# Patient Record
Sex: FEMALE | Race: WHITE | NOT HISPANIC OR LATINO | Employment: OTHER | URBAN - METROPOLITAN AREA
[De-identification: names, ages, dates, MRNs, and addresses within clinical notes are randomized per-mention and may not be internally consistent; named-entity substitution may affect disease eponyms.]

---

## 2017-01-04 ENCOUNTER — APPOINTMENT (OUTPATIENT)
Dept: PHYSICAL THERAPY | Facility: CLINIC | Age: 72
End: 2017-01-04
Payer: MEDICARE

## 2017-01-04 PROCEDURE — 97140 MANUAL THERAPY 1/> REGIONS: CPT

## 2017-01-04 PROCEDURE — G8991 OTHER PT/OT GOAL STATUS: HCPCS

## 2017-01-04 PROCEDURE — G8990 OTHER PT/OT CURRENT STATUS: HCPCS

## 2017-01-04 PROCEDURE — 97161 PT EVAL LOW COMPLEX 20 MIN: CPT

## 2017-01-16 ENCOUNTER — APPOINTMENT (OUTPATIENT)
Dept: PHYSICAL THERAPY | Facility: CLINIC | Age: 72
End: 2017-01-16
Payer: MEDICARE

## 2017-01-16 PROCEDURE — 97110 THERAPEUTIC EXERCISES: CPT

## 2017-01-16 PROCEDURE — 97140 MANUAL THERAPY 1/> REGIONS: CPT

## 2017-01-18 ENCOUNTER — APPOINTMENT (OUTPATIENT)
Dept: PHYSICAL THERAPY | Facility: CLINIC | Age: 72
End: 2017-01-18
Payer: MEDICARE

## 2017-01-18 PROCEDURE — 97110 THERAPEUTIC EXERCISES: CPT

## 2017-01-18 PROCEDURE — 97140 MANUAL THERAPY 1/> REGIONS: CPT

## 2017-01-23 ENCOUNTER — APPOINTMENT (OUTPATIENT)
Dept: PHYSICAL THERAPY | Facility: CLINIC | Age: 72
End: 2017-01-23
Payer: MEDICARE

## 2017-01-23 PROCEDURE — 97110 THERAPEUTIC EXERCISES: CPT

## 2017-01-23 PROCEDURE — 97140 MANUAL THERAPY 1/> REGIONS: CPT

## 2017-01-25 ENCOUNTER — APPOINTMENT (OUTPATIENT)
Dept: PHYSICAL THERAPY | Facility: CLINIC | Age: 72
End: 2017-01-25
Payer: MEDICARE

## 2017-01-25 PROCEDURE — 97110 THERAPEUTIC EXERCISES: CPT

## 2017-01-25 PROCEDURE — G8991 OTHER PT/OT GOAL STATUS: HCPCS

## 2017-01-25 PROCEDURE — 97140 MANUAL THERAPY 1/> REGIONS: CPT

## 2017-01-25 PROCEDURE — G8992 OTHER PT/OT  D/C STATUS: HCPCS | Performed by: PHYSICAL THERAPIST

## 2017-03-22 ENCOUNTER — APPOINTMENT (OUTPATIENT)
Dept: PHYSICAL THERAPY | Facility: CLINIC | Age: 72
End: 2017-03-22
Payer: MEDICARE

## 2017-03-22 PROCEDURE — G8991 OTHER PT/OT GOAL STATUS: HCPCS

## 2017-03-22 PROCEDURE — 97110 THERAPEUTIC EXERCISES: CPT

## 2017-03-22 PROCEDURE — 97161 PT EVAL LOW COMPLEX 20 MIN: CPT

## 2017-03-22 PROCEDURE — 97140 MANUAL THERAPY 1/> REGIONS: CPT

## 2017-03-22 PROCEDURE — G8990 OTHER PT/OT CURRENT STATUS: HCPCS

## 2017-03-28 ENCOUNTER — APPOINTMENT (OUTPATIENT)
Dept: PHYSICAL THERAPY | Facility: CLINIC | Age: 72
End: 2017-03-28
Payer: MEDICARE

## 2017-03-28 PROCEDURE — G8992 OTHER PT/OT  D/C STATUS: HCPCS

## 2017-03-28 PROCEDURE — G8991 OTHER PT/OT GOAL STATUS: HCPCS

## 2017-03-28 PROCEDURE — 97110 THERAPEUTIC EXERCISES: CPT

## 2017-03-28 PROCEDURE — 97140 MANUAL THERAPY 1/> REGIONS: CPT

## 2017-03-30 ENCOUNTER — APPOINTMENT (OUTPATIENT)
Dept: PHYSICAL THERAPY | Facility: CLINIC | Age: 72
End: 2017-03-30
Payer: MEDICARE

## 2017-09-20 ENCOUNTER — APPOINTMENT (OUTPATIENT)
Dept: PHYSICAL THERAPY | Facility: CLINIC | Age: 72
End: 2017-09-20
Payer: MEDICARE

## 2017-09-20 PROCEDURE — 97161 PT EVAL LOW COMPLEX 20 MIN: CPT

## 2017-09-20 PROCEDURE — 97140 MANUAL THERAPY 1/> REGIONS: CPT

## 2017-09-20 PROCEDURE — G8990 OTHER PT/OT CURRENT STATUS: HCPCS

## 2017-09-20 PROCEDURE — G8991 OTHER PT/OT GOAL STATUS: HCPCS

## 2017-09-25 ENCOUNTER — APPOINTMENT (OUTPATIENT)
Dept: PHYSICAL THERAPY | Facility: CLINIC | Age: 72
End: 2017-09-25
Payer: MEDICARE

## 2017-09-25 PROCEDURE — 97110 THERAPEUTIC EXERCISES: CPT

## 2017-09-25 PROCEDURE — 97140 MANUAL THERAPY 1/> REGIONS: CPT

## 2017-09-29 ENCOUNTER — APPOINTMENT (OUTPATIENT)
Dept: PHYSICAL THERAPY | Facility: CLINIC | Age: 72
End: 2017-09-29
Payer: MEDICARE

## 2017-09-29 PROCEDURE — 97140 MANUAL THERAPY 1/> REGIONS: CPT

## 2017-09-29 PROCEDURE — G8992 OTHER PT/OT  D/C STATUS: HCPCS

## 2017-09-29 PROCEDURE — G8991 OTHER PT/OT GOAL STATUS: HCPCS

## 2017-12-10 ENCOUNTER — OFFICE VISIT (OUTPATIENT)
Dept: URGENT CARE | Facility: CLINIC | Age: 72
End: 2017-12-10
Payer: MEDICARE

## 2017-12-10 ENCOUNTER — APPOINTMENT (EMERGENCY)
Dept: CT IMAGING | Facility: HOSPITAL | Age: 72
End: 2017-12-10
Payer: MEDICARE

## 2017-12-10 ENCOUNTER — HOSPITAL ENCOUNTER (EMERGENCY)
Facility: HOSPITAL | Age: 72
Discharge: HOME | End: 2017-12-10
Payer: MEDICARE

## 2017-12-10 VITALS
RESPIRATION RATE: 16 BRPM | HEIGHT: 60 IN | OXYGEN SATURATION: 98 % | WEIGHT: 140 LBS | BODY MASS INDEX: 27.48 KG/M2 | DIASTOLIC BLOOD PRESSURE: 74 MMHG | HEART RATE: 75 BPM | TEMPERATURE: 97.6 F | SYSTOLIC BLOOD PRESSURE: 179 MMHG

## 2017-12-10 DIAGNOSIS — R42 LIGHTHEADEDNESS: ICD-10-CM

## 2017-12-10 DIAGNOSIS — R11.2 NAUSEA AND VOMITING: Primary | ICD-10-CM

## 2017-12-10 DIAGNOSIS — R19.7 DIARRHEA: ICD-10-CM

## 2017-12-10 LAB
ALBUMIN SERPL BCP-MCNC: 4.5 G/DL (ref 3.5–5)
ALP SERPL-CCNC: 50 U/L (ref 46–116)
ALT SERPL W P-5'-P-CCNC: 48 U/L (ref 12–78)
AMORPH PHOS CRY URNS QL MICRO: ABNORMAL /HPF
ANION GAP SERPL CALCULATED.3IONS-SCNC: 10 MMOL/L (ref 4–13)
AST SERPL W P-5'-P-CCNC: 28 U/L (ref 5–45)
BACTERIA UR QL AUTO: ABNORMAL /HPF
BASOPHILS # BLD AUTO: 0.02 THOUSANDS/ΜL (ref 0–0.1)
BASOPHILS NFR BLD AUTO: 0 % (ref 0–1)
BILIRUB SERPL-MCNC: 0.5 MG/DL (ref 0.2–1)
BILIRUB UR QL STRIP: NEGATIVE
BUN SERPL-MCNC: 17 MG/DL (ref 5–25)
CALCIUM SERPL-MCNC: 9.3 MG/DL (ref 8.3–10.1)
CHLORIDE SERPL-SCNC: 101 MMOL/L (ref 100–108)
CLARITY UR: ABNORMAL
CO2 SERPL-SCNC: 29 MMOL/L (ref 21–32)
COLOR UR: YELLOW
CREAT SERPL-MCNC: 0.71 MG/DL (ref 0.6–1.3)
EOSINOPHIL # BLD AUTO: 0.02 THOUSAND/ΜL (ref 0–0.61)
EOSINOPHIL NFR BLD AUTO: 0 % (ref 0–6)
ERYTHROCYTE [DISTWIDTH] IN BLOOD BY AUTOMATED COUNT: 15.2 % (ref 11.6–15.1)
GFR SERPL CREATININE-BSD FRML MDRD: 85 ML/MIN/1.73SQ M
GLUCOSE SERPL-MCNC: 124 MG/DL (ref 65–140)
GLUCOSE UR STRIP-MCNC: NEGATIVE MG/DL
HCT VFR BLD AUTO: 44 % (ref 34.8–46.1)
HGB BLD-MCNC: 15.3 G/DL (ref 11.5–15.4)
HGB UR QL STRIP.AUTO: ABNORMAL
KETONES UR STRIP-MCNC: NEGATIVE MG/DL
LEUKOCYTE ESTERASE UR QL STRIP: NEGATIVE
LIPASE SERPL-CCNC: 146 U/L (ref 73–393)
LYMPHOCYTES # BLD AUTO: 1.15 THOUSANDS/ΜL (ref 0.6–4.47)
LYMPHOCYTES NFR BLD AUTO: 13 % (ref 14–44)
MCH RBC QN AUTO: 31.1 PG (ref 26.8–34.3)
MCHC RBC AUTO-ENTMCNC: 34.8 G/DL (ref 31.4–37.4)
MCV RBC AUTO: 89 FL (ref 82–98)
MONOCYTES # BLD AUTO: 0.09 THOUSAND/ΜL (ref 0.17–1.22)
MONOCYTES NFR BLD AUTO: 1 % (ref 4–12)
NEUTROPHILS # BLD AUTO: 7.5 THOUSANDS/ΜL (ref 1.85–7.62)
NEUTS SEG NFR BLD AUTO: 86 % (ref 43–75)
NITRITE UR QL STRIP: NEGATIVE
NON-SQ EPI CELLS URNS QL MICRO: ABNORMAL /HPF
PH UR STRIP.AUTO: 8 [PH] (ref 4.5–8)
PLATELET # BLD AUTO: 307 THOUSANDS/UL (ref 149–390)
PMV BLD AUTO: 10 FL (ref 8.9–12.7)
POTASSIUM SERPL-SCNC: 3.7 MMOL/L (ref 3.5–5.3)
PROT SERPL-MCNC: 8.1 G/DL (ref 6.4–8.2)
PROT UR STRIP-MCNC: NEGATIVE MG/DL
RBC # BLD AUTO: 4.92 MILLION/UL (ref 3.81–5.12)
RBC #/AREA URNS AUTO: ABNORMAL /HPF
SODIUM SERPL-SCNC: 140 MMOL/L (ref 136–145)
SP GR UR STRIP.AUTO: 1.01 (ref 1–1.03)
TROPONIN I SERPL-MCNC: <0.02 NG/ML
UROBILINOGEN UR QL STRIP.AUTO: 0.2 E.U./DL
WBC # BLD AUTO: 8.78 THOUSAND/UL (ref 4.31–10.16)
WBC #/AREA URNS AUTO: ABNORMAL /HPF

## 2017-12-10 PROCEDURE — G0463 HOSPITAL OUTPT CLINIC VISIT: HCPCS

## 2017-12-10 PROCEDURE — 96361 HYDRATE IV INFUSION ADD-ON: CPT

## 2017-12-10 PROCEDURE — 36415 COLL VENOUS BLD VENIPUNCTURE: CPT | Performed by: PHYSICIAN ASSISTANT

## 2017-12-10 PROCEDURE — 93005 ELECTROCARDIOGRAM TRACING: CPT | Performed by: PHYSICIAN ASSISTANT

## 2017-12-10 PROCEDURE — 83690 ASSAY OF LIPASE: CPT | Performed by: PHYSICIAN ASSISTANT

## 2017-12-10 PROCEDURE — 93005 ELECTROCARDIOGRAM TRACING: CPT

## 2017-12-10 PROCEDURE — 70450 CT HEAD/BRAIN W/O DYE: CPT

## 2017-12-10 PROCEDURE — 99284 EMERGENCY DEPT VISIT MOD MDM: CPT

## 2017-12-10 PROCEDURE — 84484 ASSAY OF TROPONIN QUANT: CPT | Performed by: PHYSICIAN ASSISTANT

## 2017-12-10 PROCEDURE — 96374 THER/PROPH/DIAG INJ IV PUSH: CPT

## 2017-12-10 PROCEDURE — 80053 COMPREHEN METABOLIC PANEL: CPT | Performed by: PHYSICIAN ASSISTANT

## 2017-12-10 PROCEDURE — 85025 COMPLETE CBC W/AUTO DIFF WBC: CPT | Performed by: PHYSICIAN ASSISTANT

## 2017-12-10 PROCEDURE — 81001 URINALYSIS AUTO W/SCOPE: CPT | Performed by: PHYSICIAN ASSISTANT

## 2017-12-10 PROCEDURE — 99213 OFFICE O/P EST LOW 20 MIN: CPT

## 2017-12-10 RX ORDER — ACETAMINOPHEN 325 MG/1
650 TABLET ORAL ONCE
Status: COMPLETED | OUTPATIENT
Start: 2017-12-10 | End: 2017-12-10

## 2017-12-10 RX ORDER — MULTIVIT-MIN/IRON FUM/FOLIC AC 7.5 MG-4
1 TABLET ORAL DAILY
COMMUNITY

## 2017-12-10 RX ORDER — MELOXICAM 7.5 MG/5ML
SUSPENSION ORAL
COMMUNITY
End: 2022-02-17

## 2017-12-10 RX ORDER — ROSUVASTATIN CALCIUM 5 MG/1
2.5 TABLET, COATED ORAL DAILY
COMMUNITY
End: 2022-03-24

## 2017-12-10 RX ORDER — ONDANSETRON 4 MG/1
4 TABLET, ORALLY DISINTEGRATING ORAL EVERY 4 HOURS PRN
Qty: 15 TABLET | Refills: 0 | Status: SHIPPED | OUTPATIENT
Start: 2017-12-10 | End: 2022-02-17

## 2017-12-10 RX ORDER — ONDANSETRON 2 MG/ML
4 INJECTION INTRAMUSCULAR; INTRAVENOUS ONCE
Status: COMPLETED | OUTPATIENT
Start: 2017-12-10 | End: 2017-12-10

## 2017-12-10 RX ADMIN — ACETAMINOPHEN 650 MG: 325 TABLET ORAL at 19:14

## 2017-12-10 RX ADMIN — ONDANSETRON 4 MG: 2 INJECTION INTRAMUSCULAR; INTRAVENOUS at 18:43

## 2017-12-10 RX ADMIN — SODIUM CHLORIDE 1000 ML: 0.9 INJECTION, SOLUTION INTRAVENOUS at 18:43

## 2017-12-10 NOTE — ED PROVIDER NOTES
History  Chief Complaint   Patient presents with    Nausea     N/V/D since 11am this am approx 5 episodes of both  Presented to urgent care and was sent here for treatment   Diarrhea     Patient presents to the ED with lightheadedness, nausea, vomiting, and diarrhea that started this morning  Patient states she ate breakfast this morning, but shortly after started with nausea, vomiting, diarrhea  She denies any sick contacts, foreign travel, recent antibiotic use  She states she made lamb soup last night, no one else ate it, but doesn't think it was bad  She denies any abdominal pain  She denies any blood in stool or vomit  She went to the urgent care today, and they sent her to the ED  PMH significant for hyperlipidemia  Patient states her BP is normally in the 120s  Patient denies headache or visual changes  Patient states standing up makes her lightheadedness worse  History provided by:  Patient  Nausea   The primary symptoms include nausea, vomiting and diarrhea  Primary symptoms do not include fever, abdominal pain or rash  The illness began today  The problem has not changed since onset  The illness is also significant for chills  The illness does not include bloating, constipation or back pain  Diarrhea   Associated symptoms: chills and vomiting    Associated symptoms: no abdominal pain, no fever and no headaches        Prior to Admission Medications   Prescriptions Last Dose Informant Patient Reported? Taking?    ASPIRIN 81 PO   Yes No   Sig: Take by mouth   Cholecalciferol (VITAMIN D PO)   Yes Yes   Sig: Take by mouth   Glucosamine-Chondroitin--400-375 MG TABS   Yes No   Sig: Take by mouth   Meloxicam 7 5 MG/5ML SUSP   Yes No   Sig: Take by mouth   Mirabegron ER (MYRBETRIQ) 50 MG TB24   Yes No   Sig: Take by mouth   Multiple Vitamins-Minerals (MULTIVITAMIN WITH MINERALS) tablet   Yes Yes   Sig: Take 1 tablet by mouth daily   rosuvastatin (CRESTOR) 5 mg tablet   Yes No   Sig: Take 2 5 mg by mouth      Facility-Administered Medications: None       Past Medical History:   Diagnosis Date    Hyperlipidemia     Overactive bladder        Past Surgical History:   Procedure Laterality Date    HYSTERECTOMY      REPLACEMENT TOTAL KNEE         History reviewed  No pertinent family history  I have reviewed and agree with the history as documented  Social History   Substance Use Topics    Smoking status: Former Smoker    Smokeless tobacco: Never Used    Alcohol use Yes      Comment: occasional        Review of Systems   Constitutional: Positive for chills  Negative for fever  HENT: Negative for facial swelling and trouble swallowing  Eyes: Negative for visual disturbance  Respiratory: Negative for shortness of breath  Cardiovascular: Negative for chest pain and leg swelling  Gastrointestinal: Positive for diarrhea, nausea and vomiting  Negative for abdominal pain, bloating, blood in stool and constipation  Musculoskeletal: Negative for back pain and neck pain  Skin: Negative for color change and rash  Neurological: Positive for dizziness and light-headedness  Negative for tremors, seizures, syncope, facial asymmetry, speech difficulty, weakness, numbness and headaches  Psychiatric/Behavioral: Negative for confusion  All other systems reviewed and are negative  Physical Exam  ED Triage Vitals [12/10/17 1803]   Temperature Pulse Respirations Blood Pressure SpO2   97 6 °F (36 4 °C) 75 16 (!) 205/93 99 %      Temp Source Heart Rate Source Patient Position - Orthostatic VS BP Location FiO2 (%)   Temporal Monitor Lying Left arm --      Pain Score       No Pain           Orthostatic Vital Signs  Vitals:    12/10/17 1830 12/10/17 1845 12/10/17 1900 12/10/17 1915   BP: (!) 182/79 170/80 138/63    Pulse: 81 75 81 80   Patient Position - Orthostatic VS:           Physical Exam   Constitutional: She is oriented to person, place, and time   She appears well-developed and well-nourished  She is active and cooperative  She does not appear ill  No distress  HENT:   Head: Normocephalic and atraumatic  Right Ear: Hearing normal    Left Ear: Hearing normal    Nose: Nose normal    Mouth/Throat: Mucous membranes are pale and dry  No posterior oropharyngeal edema or posterior oropharyngeal erythema  Eyes: Conjunctivae and lids are normal  Pupils are equal, round, and reactive to light  Neck: Normal range of motion  No spinous process tenderness and no muscular tenderness present  Cardiovascular: Normal rate, regular rhythm and normal heart sounds  No murmur heard  Pulmonary/Chest: Effort normal and breath sounds normal  She has no wheezes  She has no rhonchi  She has no rales  Abdominal: Soft  Normal appearance  Bowel sounds are increased  There is no tenderness  Musculoskeletal: Normal range of motion  She exhibits no edema or deformity  Neurological: She is alert and oriented to person, place, and time  She has normal strength  No sensory deficit  Gait normal    Skin: Skin is warm and dry  No rash noted  She is not diaphoretic  No pallor  Psychiatric: She has a normal mood and affect  Her speech is normal  Cognition and memory are normal    Nursing note and vitals reviewed        ED Medications  Medications   sodium chloride 0 9 % bolus 1,000 mL (1,000 mL Intravenous New Bag 12/10/17 1843)   ondansetron (ZOFRAN) injection 4 mg (4 mg Intravenous Given 12/10/17 1843)   acetaminophen (TYLENOL) tablet 650 mg (650 mg Oral Given 12/10/17 1914)       Diagnostic Studies  Results Reviewed     Procedure Component Value Units Date/Time    Troponin I [[id number]]  (Normal) Collected:  12/10/17 1833    Lab Status:  Final result Specimen:  Blood from Arm, Right Updated:  12/10/17 1912     Troponin I <0 02 ng/mL     Narrative:         [employer] Chemistry analyzer 99% cutoff is > 0 04 ng/mL in network labs    o cTnI 99% cutoff is useful only when applied to patients in the clinical setting of myocardial ischemia  o cTnI 99% cutoff should be interpreted in the context of clinical history, ECG findings and possibly cardiac imaging to establish correct diagnosis  o cTnI 99% cutoff may be suggestive but clearly not indicative of a coronary event without the clinical setting of myocardial ischemia  Comprehensive metabolic panel [[id number]] Collected:  12/10/17 1817    Lab Status:  Final result Specimen:  Blood from Arm, Right Updated:  12/10/17 1911     Sodium 140 mmol/L      Potassium 3 7 mmol/L      Chloride 101 mmol/L      CO2 29 mmol/L      Anion Gap 10 mmol/L      BUN 17 mg/dL      Creatinine 0 71 mg/dL      Glucose 124 mg/dL      Calcium 9 3 mg/dL      AST 28 U/L      ALT 48 U/L      Alkaline Phosphatase 50 U/L      Total Protein 8 1 g/dL      Albumin 4 5 g/dL      Total Bilirubin 0 50 mg/dL      eGFR 85 ml/min/1 73sq m     Narrative:         National Kidney Disease Education Program recommendations are as follows:  GFR calculation is accurate only with a steady state creatinine  Chronic Kidney disease less than 60 ml/min/1 73 sq  meters  Kidney failure less than 15 ml/min/1 73 sq  meters      Lipase [[id number]]  (Normal) Collected:  12/10/17 1817    Lab Status:  Final result Specimen:  Blood from Arm, Right Updated:  12/10/17 1911     Lipase 146 u/L     Urine Microscopic [[id number]]  (Abnormal) Collected:  12/10/17 1837    Lab Status:  Final result Specimen:  Urine from Urine, Clean Catch Updated:  12/10/17 1908     RBC, UA 2-4 (A) /hpf      WBC, UA 2-4 (A) /hpf      Epithelial Cells Occasional /hpf      Bacteria, UA Occasional /hpf      AMORPH PHOSPATES Moderate /hpf     UA w Reflex to Microscopic w Reflex to Culture [[id number]]  (Abnormal) Collected:  12/10/17 1837    Lab Status:  Final result Specimen:  Urine from Urine, Clean Catch Updated:  12/10/17 1855     Color, UA Yellow     Clarity, UA Cloudy     Specific Gravity, UA 1 015     pH, UA 8 0     Leukocytes, UA Negative     Nitrite, UA Negative     Protein, UA Negative mg/dl      Glucose, UA Negative mg/dl      Ketones, UA Negative mg/dl      Urobilinogen, UA 0 2 E U /dl      Bilirubin, UA Negative     Blood, UA Trace-Intact (A)    CBC and differential [[id number]]  (Abnormal) Collected:  12/10/17 1817    Lab Status:  Final result Specimen:  Blood from Arm, Right Updated:  12/10/17 1852     WBC 8 78 Thousand/uL      RBC 4 92 Million/uL      Hemoglobin 15 3 g/dL      Hematocrit 44 0 %      MCV 89 fL      MCH 31 1 pg      MCHC 34 8 g/dL      RDW 15 2 (H) %      MPV 10 0 fL      Platelets 712 Thousands/uL      Neutrophils Relative 86 (H) %      Lymphocytes Relative 13 (L) %      Monocytes Relative 1 (L) %      Eosinophils Relative 0 %      Basophils Relative 0 %      Neutrophils Absolute 7 50 Thousands/µL      Lymphocytes Absolute 1 15 Thousands/µL      Monocytes Absolute 0 09 (L) Thousand/µL      Eosinophils Absolute 0 02 Thousand/µL      Basophils Absolute 0 02 Thousands/µL                  CT head without contrast   Final Result by Charly Moore MD (12/10 2005)      No acute intracranial abnormality  Workstation performed: [id number]                    Procedures  ECG 12 Lead Documentation  Date/Time: 12/10/2017 6:51 PM  Performed by: Hannah Deshpande by: Yani Arita     Indications / Diagnosis:  Lightheaded, n/v  Patient location:  ED  Previous ECG:     Previous ECG:  Unavailable  Interpretation:     Interpretation: abnormal    Rate:     ECG rate:  79  Rhythm:     Rhythm: sinus rhythm    Conduction:     Conduction: normal    ST segments:     ST segments:  Non-specific    Depression:  V4, V5, V6, V3 and I           Phone Contacts  ED Phone Contact    ED Course  ED Course as of Dec 10 2015   Sun Dec 10, 2017   1908 Patient states she is feeling a little better, but now has a headache  2006 Patient feeling better, able to ambulate without difficulty                                   MDM  Number of Diagnoses or Management Options  Diarrhea: new and requires workup  Lightheadedness: new and requires workup  Nausea and vomiting: new and requires workup  Diagnosis management comments: Patient with nausea and vomiting and HTN upon arrival, will check labs, trop, ekg  Patient did improve after zofran, but developed a headache, will order CT to r/o brain hemorrhage since she was HTN  The HTN did improve in the ED, patient able to tolerate po  Advised patient to f/u with PCP in 2 days for recheck of BP and to return to ER if symptoms worsen  Amount and/or Complexity of Data Reviewed  Clinical lab tests: ordered and reviewed  Tests in the radiology section of CPT®: ordered and reviewed    Patient Progress  Patient progress: improved    CritCare Time    Disposition  Final diagnoses:   Nausea and vomiting   Diarrhea   Lightheadedness     Time reflects when diagnosis was documented in both MDM as applicable and the Disposition within this note     Time User Action Codes Description Comment    12/10/2017  8:09 PM Lenon Kiss Add [R11 2] Nausea and vomiting     12/10/2017  8:09 PM Lenon Kiss Add [R19 7] Diarrhea     12/10/2017  8:09 PM Lenon Kiss Add [R42] [facility]       ED Disposition     ED Disposition Condition Comment    Discharge  Alina Velarde discharge to home/self care  Condition at discharge: Stable        Follow-up Information     Follow up With Specialties Details Why Contact Info    Alyce Cabrera MD Internal Medicine In 2 days For recheck [address] [facility]  [phone]          Patient's Medications   Discharge Prescriptions    ONDANSETRON (ZOFRAN-ODT) 4 MG DISINTEGRATING TABLET    Take 1 tablet by mouth every 4 (four) hours as needed for nausea       Start Date: 12/10/2017End Date: --       Order Dose: 4 mg       Quantity: 15 tablet    Refills: 0     No discharge procedures on file      ED Provider  Electronically Signed by           Leighann Westfall GENTRY  12/10/17 2015

## 2017-12-11 NOTE — PROGRESS NOTES
Assessment    1  Elevated blood pressure reading (796 2) (R03 0)   2  Dizziness (780 4) (R42)    Plan  Dizziness, Elevated blood pressure reading    · *1 - [facility] (EMERGENCY MEDICINE ) Co-Management  *  Status: Hold For -Scheduling  Requested for: [id number]  Care Summary provided  : Yes    Chief Complaint    1  Dizziness  Chief Complaint Free Text Note Form: Dizziness, N/V/D that started this morning  Denies new medications or and changes to medications  No new diet  180/90  Denies weakness, arm pain, chest pain, blurry vision or spots, being stressed  Does have light sensitivity  Has good  a symmetric smile  History of Present Illness  HPI: Patient presents with dizziness, nausea, vomiting, diarrhea since 10:00 a m  this morning  She has had 5 episodes of vomiting and diarrhea each  She has had dizziness that tends to be worse with positional change  Her blood pressure in the office is 180/90  she does not have a history of hypertension  She denies any headache, numbness, tingling, slurred speech, disorientation or confusion  She denies chest pain or shortness of breath, no palpitations or syncope  patient denies URI symptoms, no cough  No dysuria, urgency, frequency  Hospital Based Practices Required Assessment:   Prefered Language is  english  Primary Language is  english  Review of Systems  Focused-Female:  Constitutional: as noted in HPI   ENT: no ear ache, no loss of hearing, no nosebleeds or nasal discharge, no sore throat or hoarseness  Cardiovascular: no complaints of slow or fast heart rate, no chest pain, no palpitations, no leg claudication or lower extremity edema  Respiratory: no complaints of shortness of breath, no wheezing, no dyspnea on exertion, no orthopnea or PND  Gastrointestinal: as noted in HPI  Genitourinary: no complaints of dysuria, no incontinence, no pelvic pain, no dysmenorrhea, no vaginal discharge or abnormal vaginal bleeding    Neurological: as noted in HPI       Past Medical History  1  History of high cholesterol (V12 29) (Z86 39)    Social History   · Never a smoker    Surgical History    1  History of Knee Surgery    Current Meds   1  Aspirin 81 MG CAPS; Therapy: (Recorded:[id number]) to Recorded   2  Crestor 5 MG Oral Tablet; Therapy: (Recorded:[id number]) to Recorded   3  Meloxicam 7 5 MG/5ML SUSP; Therapy: (Recorded:[id number]) to Recorded   4  Myrbetriq 50 MG Oral Tablet Extended Release 24 Hour; Therapy: (Recorded:[id number]) to Recorded   5  Triple Flex [phone] MG Oral Tablet; Therapy: (Recorded:[id number]) to Recorded    Allergies    1  Wellbutrin SR TB12    Vitals  Signs   Recorded: [id number] 05:20PM   Temperature: 97 1 F  Heart Rate: 64  Respiration: 16  Systolic: 718  Diastolic: 90  Height: 5 ft   Weight: 143 lb   BMI Calculated: 27 93  BSA Calculated: 1 62  O2 Saturation: 99    Physical Exam   Constitutional  General appearance: No acute distress, well appearing and well nourished  Eyes  Conjunctiva and lids: No swelling, erythema or discharge  Pupils and irises: Equal, round and reactive to light  Ears, Nose, Mouth, and Throat  External inspection of ears and nose: Normal    Otoscopic examination: Tympanic membranes translucent with normal light reflex  Canals patent without erythema  Nasal mucosa, septum, and turbinates: Normal without edema or erythema  Oropharynx: Normal with no erythema, edema, exudate or lesions  Pulmonary  Respiratory effort: No increased work of breathing or signs of respiratory distress  Auscultation of lungs: Clear to auscultation  Cardiovascular  Auscultation of heart: Normal rate and rhythm, normal S1 and S2, without murmurs  Examination of extremities for edema and/or varicosities: Normal    Abdomen  Abdomen: Non-tender, no masses  Liver and spleen: No hepatomegaly or splenomegaly  Lymphatic  Palpation of lymph nodes in neck: No lymphadenopathy     Neurologic  Cranial nerves: Cranial nerves 2-12 intact  Psychiatric  Orientation to person, place, and time: Normal    Additional Exam:  Mild tenting centrally        Signatures   Electronically signed by : Layne Wu DO; Dec 10 2017  6:05PM EST                       (Author)

## 2017-12-11 NOTE — DISCHARGE INSTRUCTIONS
Rest, increase fluids  Zofran as needed for nausea  Slowly advance foods  Follow up with family doctor in 1-2 days for recheck of BP  Return to ER if symptoms worsen  Acute Nausea and Vomiting, Ambulatory Care   GENERAL INFORMATION:   Acute nausea and vomiting  starts suddenly, gets worse quickly, and lasts a short time  Nausea and vomiting may be caused by pregnancy, alcohol, infection, or medicines  Common related symptoms include the following:   · Fever    · Abdominal swelling    · Pain, tenderness, or a lump in the abdomen    · Splashing sounds heard in your stomach when you move  Seek immediate care for the following symptoms:   · Blood in your vomit or bowel movements    · Sudden, severe pain in your chest and upper abdomen after hard vomiting    · Dizziness, dry mouth, and thirst    · Urinating very little or not at all    · Muscle weakness, leg cramps, and trouble breathing    · A heart beat that is faster than normal    · Vomiting for more than 48 hours  Treatment for acute nausea and vomiting  may include medicines to calm your stomach and stop the vomiting  You may need IV fluids if you are dehydrated  Manage your nausea and vomiting:   · Drink liquids as directed to prevent dehydration  Ask how much liquid to drink each day and which liquids are best for you  You may need to drink an oral rehydration solution (ORS)  ORS contains water, salts, and sugar that are needed to replace the lost body fluids  Ask what kind of ORS to use, how much to drink, and where to get it  · Eat smaller meals, more often  Eat small amounts of food every 2 to 3 hours, even if you are not hungry  Food in your stomach may help decrease your nausea  · Avoid stress  Find ways to relax and manage your stress  Headaches due to stress may cause nausea and vomiting  Get more rest and sleep    Follow up with your healthcare provider as directed:  Write down your questions so you remember to ask them during your visits  CARE AGREEMENT:   You have the right to help plan your care  Learn about your health condition and how it may be treated  Discuss treatment options with your caregivers to decide what care you want to receive  You always have the right to refuse treatment  The above information is an  only  It is not intended as medical advice for individual conditions or treatments  Talk to your doctor, nurse or pharmacist before following any medical regimen to see if it is safe and effective for you  © 2014 [address] is for End User's use only and may not be sold, redistributed or otherwise used for commercial purposes  All illustrations and images included in CareNotes® are the copyrighted property of A D A M [employer] Inc  or Yamil Marvin  Acute Diarrhea   WHAT YOU NEED TO KNOW:   Acute diarrhea starts quickly and lasts a short time, usually 1 to 3 days  It can last up to 2 weeks  You may not be able to control your diarrhea  Acute diarrhea usually stops on its own  DISCHARGE INSTRUCTIONS:   Return to the emergency department if:   · You feel confused  · Your heartbeat is faster than normal      · Your eyes look deeply sunken, or you have no tears when you cry  · You urinate less than usual, or your urine is dark yellow  · You have blood or mucus in your stools  · You have severe abdominal pain  · You are unable to drink any liquids  Contact your healthcare provider if:   · Your symptoms do not get better with treatment  · You have a fever higher than 101 3°F (38 5°C)  · You have trouble eating and drinking because you are vomiting  · You are thirsty or have a dry mouth  · Your diarrhea does not get better in 7 days  · You have questions or concerns about your condition or care  Follow up with your healthcare provider as directed:  Write down your questions so you remember to ask them during your visits  Medicines:  · Diarrhea medicine  is an over-the-counter medicine that helps slow or stop your diarrhea  If you take other medicines, talk to your healthcare provider before you take diarrhea medicine  · Antibiotics  may be given to help treat an infection caused by bacteria  · Antiparasitics  may be given to treat an infection caused by parasites  · Take your medicine as directed  Contact your healthcare provider if you think your medicine is not helping or if you have side effects  Tell him of her if you are allergic to any medicine  Keep a list of the medicines, vitamins, and herbs you take  Include the amounts, and when and why you take them  Bring the list or the pill bottles to follow-up visits  Carry your medicine list with you in case of an emergency  Self-care:   · Drink liquids as directed  Liquids will help prevent dehydration caused by diarrhea  Ask your healthcare provider how much liquid to drink each day and which liquids are best for you  You may need to drink an oral rehydration solution (ORS)  An ORS has the right amounts of water, salts, and sugar you need to replace body fluids  You can buy an ORS at most grocery stores and pharmacies  · Eat foods that are easy to digest   Examples include rice, lentils, cereal, bananas, potatoes, and bread  It also includes some fruits (bananas, melon), well-cooked vegetables, and lean meats  Avoid foods high in fiber, fat, and sugar  Also avoid caffeine, alcohol, dairy, and red meat until your diarrhea is gone  Prevent acute diarrhea:   · Wash your hands often  Use soap and water  Wash your hands before you eat or prepare food  Also wash your hands after you use the bathroom  Use an alcohol-based hand gel when soap and water are not available  · Keep bathroom surfaces clean  This helps prevent the spread of germs that cause acute diarrhea  · Wash fruits and vegetables well before you eat them    This can help remove germs that cause diarrhea  If possible, remove the skin from fruits and vegetables, or cook them well before you eat them  · Cook meat as directed  ¨ Cook ground meat  to 160°F      ¨ Cook ground poultry, whole poultry, or cuts of poultry  to at least 165°F  Remove the meat from heat  Let it stand for 3 minutes before you eat it  ¨ Cook whole cuts of meat other than poultry  to at least 145°F  Remove the meat from heat  Let it stand for 3 minutes before you eat it  · Wash dishes that have touched raw meat with hot water and soap  This includes cutting boards, utensils, dishes, and serving containers  · Place raw or cooked meat in the refrigerator as soon as possible  Bacteria can grow in meat that is left at room temperature too long  · Do not eat raw or undercooked oysters, clams, or mussels  These foods may be contaminated and cause infection  · Drink filtered or treated water only when you travel  Do not put ice in your drinks  Drink bottled water whenever possible  © 2017 [facility] Information is for End User's use only and may not be sold, redistributed or otherwise used for commercial purposes  All illustrations and images included in CareNotes® are the copyrighted property of A D A M , Inc  or Yamil Wong  The above information is an  only  It is not intended as medical advice for individual conditions or treatments  Talk to your doctor, nurse or pharmacist before following any medical regimen to see if it is safe and effective for you  Dizziness   AMBULATORY CARE:   Dizziness  is a feeling of being off balance or unsteady  Common causes of dizziness are an inner ear fluid imbalance or a lack of oxygen in your blood  Dizziness may be acute (lasts 3 days or less) or chronic (lasts longer than 3 days)  You may have dizzy spells that last from seconds to a few hours     Common symptoms that may happen with dizziness:   · A feeling that your surroundings are moving even though you are standing still    · Ringing in your ears or hearing loss     · Feeling faint or lightheaded     · Weakness or unsteadiness     · Double vision or eye movements you cannot control    · Nausea or vomiting     · Confusion  Seek care immediately if:   · You have a headache and a stiff neck  · You have shaking chills and a fever  · You vomit over and over with no relief  · Your vomit or bowel movements are red or black  · You have pain in your chest, back, or abdomen  · You have numbness, especially in your face, arms, or legs  · You have trouble moving your arms or legs  · You are confused  Contact your healthcare provider if:   · You have a fever  · Your symptoms do not get better with treatment  · You have questions or concerns about your condition or care  Treatment for dizziness  depends on the cause  Your healthcare provider may give you oxygen or medicines to decrease your dizziness and nausea  He may also refer you to a specialist  Eugene Woodlawn may need to be admitted to the hospital for treatment  Manage your symptoms:   · Do not drive  or operate heavy machinery when you are dizzy  · Get up slowly  from sitting or lying down  · Drink plenty of liquids  Liquids help prevent dehydration  Ask how much liquid to drink each day and which liquids are best for you  Follow up with your healthcare provider as directed:  Write down your questions so you remember to ask them during your visits  © 2017 [facility] Information is for End User's use only and may not be sold, redistributed or otherwise used for commercial purposes  All illustrations and images included in CareNotes® are the copyrighted property of A D A M , Inc  or [address]  The above information is an  only  It is not intended as medical advice for individual conditions or treatments   Talk to your doctor, nurse or pharmacist before following any medical regimen to see if it is safe and effective for you

## 2018-01-04 LAB
ATRIAL RATE: 73 BPM
P AXIS: 73 DEGREES
PR INTERVAL: 174 MS
QRS AXIS: 86 DEGREES
QRSD INTERVAL: 86 MS
QT INTERVAL: 396 MS
QTC INTERVAL: 436 MS
T WAVE AXIS: 79 DEGREES
VENTRICULAR RATE: 73 BPM

## 2018-01-23 VITALS
HEART RATE: 64 BPM | OXYGEN SATURATION: 99 % | WEIGHT: 143 LBS | HEIGHT: 60 IN | RESPIRATION RATE: 16 BRPM | DIASTOLIC BLOOD PRESSURE: 90 MMHG | TEMPERATURE: 97.1 F | BODY MASS INDEX: 28.07 KG/M2 | SYSTOLIC BLOOD PRESSURE: 180 MMHG

## 2018-04-25 ENCOUNTER — TRANSCRIBE ORDERS (OUTPATIENT)
Dept: PHYSICAL THERAPY | Facility: CLINIC | Age: 73
End: 2018-04-25

## 2018-04-25 ENCOUNTER — EVALUATION (OUTPATIENT)
Dept: PHYSICAL THERAPY | Facility: CLINIC | Age: 73
End: 2018-04-25
Payer: MEDICARE

## 2018-04-25 DIAGNOSIS — M25.512 ARTHRALGIA OF SHOULDER REGION, LEFT: Primary | ICD-10-CM

## 2018-04-25 DIAGNOSIS — M25.512 PAIN IN JOINT OF LEFT SHOULDER: Primary | ICD-10-CM

## 2018-04-25 PROCEDURE — 97110 THERAPEUTIC EXERCISES: CPT | Performed by: PHYSICAL THERAPIST

## 2018-04-25 PROCEDURE — G8984 CARRY CURRENT STATUS: HCPCS | Performed by: PHYSICAL THERAPIST

## 2018-04-25 PROCEDURE — 97140 MANUAL THERAPY 1/> REGIONS: CPT | Performed by: PHYSICAL THERAPIST

## 2018-04-25 PROCEDURE — G8985 CARRY GOAL STATUS: HCPCS | Performed by: PHYSICAL THERAPIST

## 2018-04-25 PROCEDURE — 97161 PT EVAL LOW COMPLEX 20 MIN: CPT | Performed by: PHYSICAL THERAPIST

## 2018-04-25 NOTE — LETTER
2018    Luann Mayer MD  [address] [id number]    Patient: Evon Blandon   YOB: 1945   Date of Visit: 2018     Encounter Diagnosis     ICD-10-CM    1  Pain in joint of left shoulder M25 512        Dear Dr Braxton Green:    Please review the attached Plan of Care from [facility] recent visit  Please verify that you agree therapy should continue by signing the attached document and sending it back to our office  If you have any questions or concerns, please don't hesitate to call  Sincerely,    Sanya Yanes, PT      Referring Provider:      I certify that I have read the below Plan of Care and certify the need for these services furnished under this plan of treatment while under my care  Luann Mayer MD  [address]: [phone]          PT Evaluation     Today's date: 2018  Patient name: Evon Blandon  : 1945  MRN: [id number]  Referring provider: Marco Antonio Luna MD  Dx:   Encounter Diagnosis     ICD-10-CM    1  Pain in joint of left shoulder M25 512                   Assessment  Impairments: abnormal or restricted ROM, activity intolerance, impaired physical strength, lacks appropriate home exercise program and pain with function    Assessment details: Evon Blandon is a 67 y o  female presenting to outpatient physical therapy at [place] [address] with complaints of L shoulder pain  She presents with decreased range of motion, decreased strength, limited flexibility, poor postural awareness, decreased tolerance to activity and decreased functional mobility due to Pain in joint of left shoulder  She would benefit from skilled PT services in order to address these deficits and reach maximum level of function  Thank you for the referral!  Barriers to therapy: None  Understanding of Dx/Px/POC: excellent  Goals  ST  Independent with HEP in 2 weeks  2    Increase ROM L shoulder to WNL all motions degrees in 3 weeks     LT  Achieve FOTO score of 67/100 in 4 weeks   2  Able to reach [facility] and lift weights at the gym without L shoulder pain in 4 weeks  3  Strength = 5/5 all L shoulder motions in 4 weeks  4  No tightness L pec minor in 4 weeks    Plan  Patient would benefit from: skilled PT  Planned modality interventions: cryotherapy  Planned therapy interventions: flexibility, functional ROM exercises, home exercise program, joint mobilization, manual therapy, neuromuscular re-education, postural training, strengthening, stretching, therapeutic activities and therapeutic exercise  Frequency: 2x week  Duration in weeks: 4  Treatment plan discussed with: patient        Subjective Evaluation    History of Present Illness  Date of onset: 2018  Mechanism of injury: trauma  Mechanism of injury: On 18 pt fell off of a hover board onto her L side with mild pain following  The next pain pain increased  X-rays were negative and MRI showed minor changes per pt  No prior hx of UE injures  Not a recurrent problem   Quality of life: excellent    Pain  Current pain ratin  At best pain ratin  At worst pain ratin  Quality: dull ache  Aggravating factors: lifting and overhead activity  Progression: improved    Social Support    Employment status: not working  Hand dominance: right      Diagnostic Tests  X-ray: normal  MRI studies: abnormal  Patient Goals  Patient goals for therapy: decreased pain, increased motion, increased strength, independence with ADLs/IADLs and return to sport/leisure activities          Objective     Postural Observations  Seated posture: poor  Standing posture: poor  Correction of posture: makes symptoms better        Palpation   Left   No palpable tenderness to the anterior deltoid  Tenderness of the biceps and supraspinatus  Left Shoulder Comments  Left biceps: proximal tendon  Left pectoralis minor: mild tightness  Left supraspinatus: SS tenon  Cervical/Thoracic Screen   Cervical range of motion within normal limits    Neurological Testing     Sensation     Shoulder   Left Shoulder   Intact: light touch    Right Shoulder   Intact: light touch    Reflexes   Left   Biceps (C5/C6): normal (2+)  Brachioradialis (C6): normal (2+)    Right   Biceps (C5/C6): normal (2+)  Brachioradialis (C6): normal (2+)    Active Range of Motion   Left Shoulder   Flexion: 155 degrees   Extension: WFL  Abduction: 150 degrees with pain  External rotation 0°:  WFL  Internal rotation 90°:  WFL    Right Shoulder   Normal active range of motion    Passive Range of Motion   Left Shoulder   Normal passive range of motion    Scapular Mobility   Left Shoulder   Scapular Dyskinesis: none  [facility]: [facility]    Joint Play   Left Shoulder  Joints within functional limits are the anterior capsule, posterior capsule and inferior capsule  Strength/Myotome Testing     Left Shoulder     Planes of Motion   Flexion: 5   Extension: 5   Abduction: 4+   External rotation at 0°:  5   Internal rotation at 0°:  5     Isolated Muscles   Biceps: 5   Triceps: 5     Right Shoulder   Normal muscle strength    Tests     Left Shoulder   Negative apprehension, empty can, Hawkin's and Neer's  General Comments     Shoulder Comments   Unable to lift > 5# OH or with outstretched L arm  Flowsheet Rows    Flowsheet Row Most Recent Value   PT/OT G-Codes   Current Score  57   Projected Score  67   FOTO information reviewed  Yes   Assessment Type  Evaluation   G code set  Carrying, Moving & Handling Objects   Carrying, Moving and Handling Objects Current Status ()  CK   Carrying, Moving and Handling Objects Goal Status ()  CJ        Daily Treatment Diary     Dx:    1   Pain in joint of left shoulder      POC EXPIRES On:  5/23/18  PRECAUTIONS:  None  CO-MORBIDITES:  TKA  PERSONAL FACTORS:  None    Manual  4/25            L GH jt mobs 5'            STM L SS tendon 10' Exercise Diary  4/25            Cross arm posterior delt stretch L 10" 5            Cane AAROM L shoulder flex sitting 10" 10                                                                                                                                                                                                                                                          Modalities

## 2018-04-25 NOTE — PROGRESS NOTES
PT Evaluation     Today's date: 2018  Patient name: Yue Sampson  : 1945  MRN: [id number]  Referring provider: Be Trotter MD  Dx:   Encounter Diagnosis     ICD-10-CM    1  Pain in joint of left shoulder M25 512                   Assessment  Impairments: abnormal or restricted ROM, activity intolerance, impaired physical strength, lacks appropriate home exercise program and pain with function    Assessment details: Yue Sampson is a 67 y o  female presenting to outpatient physical therapy at [place] [address] with complaints of L shoulder pain  She presents with decreased range of motion, decreased strength, limited flexibility, poor postural awareness, decreased tolerance to activity and decreased functional mobility due to Pain in joint of left shoulder  She would benefit from skilled PT services in order to address these deficits and reach maximum level of function  Thank you for the referral!  Barriers to therapy: None  Understanding of Dx/Px/POC: excellent  Goals  ST  Independent with HEP in 2 weeks  2  Increase ROM L shoulder to WNL all motions degrees in 3 weeks     LT  Achieve FOTO score of 67/100 in 4 weeks   2  Able to reach [facility] and lift weights at the gym without L shoulder pain in 4 weeks  3  Strength = 5/5 all L shoulder motions in 4 weeks  4    No tightness L pec minor in 4 weeks    Plan  Patient would benefit from: skilled PT  Planned modality interventions: cryotherapy  Planned therapy interventions: flexibility, functional ROM exercises, home exercise program, joint mobilization, manual therapy, neuromuscular re-education, postural training, strengthening, stretching, therapeutic activities and therapeutic exercise  Frequency: 2x week  Duration in weeks: 4  Treatment plan discussed with: patient        Subjective Evaluation    History of Present Illness  Date of onset: 2018  Mechanism of injury: trauma  Mechanism of injury: On 18 pt fell off of a hover board onto her L side with mild pain following  The next pain pain increased  X-rays were negative and MRI showed minor changes per pt  No prior hx of UE injures  Not a recurrent problem   Quality of life: excellent    Pain  Current pain ratin  At best pain ratin  At worst pain ratin  Quality: dull ache  Aggravating factors: lifting and overhead activity  Progression: improved    Social Support    Employment status: not working  Hand dominance: right      Diagnostic Tests  X-ray: normal  MRI studies: abnormal  Patient Goals  Patient goals for therapy: decreased pain, increased motion, increased strength, independence with ADLs/IADLs and return to sport/leisure activities          Objective     Postural Observations  Seated posture: poor  Standing posture: poor  Correction of posture: makes symptoms better        Palpation   Left   No palpable tenderness to the anterior deltoid  Tenderness of the biceps and supraspinatus  Left Shoulder Comments  Left biceps: proximal tendon  Left pectoralis minor: mild tightness  Left supraspinatus: SS tenon  Cervical/Thoracic Screen   Cervical range of motion within normal limits    Neurological Testing     Sensation     Shoulder   Left Shoulder   Intact: light touch    Right Shoulder   Intact: light touch    Reflexes   Left   Biceps (C5/C6): normal (2+)  Brachioradialis (C6): normal (2+)    Right   Biceps (C5/C6): normal (2+)  Brachioradialis (C6): normal (2+)    Active Range of Motion   Left Shoulder   Flexion: 155 degrees   Extension: WFL  Abduction: 150 degrees with pain  External rotation 0°: WFL  Internal rotation 90°: WFL    Right Shoulder   Normal active range of motion    Passive Range of Motion   Left Shoulder   Normal passive range of motion    Scapular Mobility   Left Shoulder   Scapular Dyskinesis: none  Scapular mobility: [facility]    Joint Play   Left Shoulder  Joints within functional limits are the anterior capsule, posterior capsule and inferior capsule  Strength/Myotome Testing     Left Shoulder     Planes of Motion   Flexion: 5   Extension: 5   Abduction: 4+   External rotation at 0°: 5   Internal rotation at 0°: 5     Isolated Muscles   Biceps: 5   Triceps: 5     Right Shoulder   Normal muscle strength    Tests     Left Shoulder   Negative apprehension, empty can, Hawkin's and Neer's  General Comments     Shoulder Comments   Unable to lift > 5# OH or with outstretched L arm  Flowsheet Rows    Flowsheet Row Most Recent Value   PT/OT G-Codes   Current Score  57   Projected Score  67   FOTO information reviewed  Yes   Assessment Type  Evaluation   G code set  Carrying, Moving & Handling Objects   Carrying, Moving and Handling Objects Current Status ()  CK   Carrying, Moving and Handling Objects Goal Status ()  CJ        Daily Treatment Diary     Dx:    1   Pain in joint of left shoulder      POC EXPIRES On:  5/23/18  PRECAUTIONS:  None  CO-MORBIDITES:  TKA  PERSONAL FACTORS:  None    Manual  4/25            L GH jt mobs 5'            STM L SS tendon 10'                                                       Exercise Diary  4/25            Cross arm posterior delt stretch L 10" 5            Cane AAROM L shoulder flex sitting 10" 10                                                                                                                                                                                                                                                          Modalities

## 2018-05-01 ENCOUNTER — OFFICE VISIT (OUTPATIENT)
Dept: PHYSICAL THERAPY | Facility: CLINIC | Age: 73
End: 2018-05-01
Payer: MEDICARE

## 2018-05-01 DIAGNOSIS — M25.512 PAIN IN JOINT OF LEFT SHOULDER: Primary | ICD-10-CM

## 2018-05-01 PROCEDURE — 97140 MANUAL THERAPY 1/> REGIONS: CPT | Performed by: PHYSICAL THERAPIST

## 2018-05-01 PROCEDURE — 97110 THERAPEUTIC EXERCISES: CPT | Performed by: PHYSICAL THERAPIST

## 2018-05-01 NOTE — PROGRESS NOTES
Daily Note     Today's date: 2018  Patient name: Nadege Mo  : 1945  MRN: [id number]  Referring provider: Krupa Okeefe MD  Dx:   Encounter Diagnosis     ICD-10-CM    1  Pain in joint of left shoulder M25 512                   Subjective: Pt reports feeling 95% better since IE last week  Objective: See treatment diary below      Assessment:  Pt presents to outpatient physical therapy at [place] [address] with complaints of L shoulder pain  She presents with decreased range of motion, decreased strength, limited flexibility, poor postural awareness, decreased tolerance to activity and decreased functional mobility due to Pain in joint of left shoulder  She will continue to benefit from skilled PT services in order to address these deficits and reach maximum level of function  Minor discomfort with UBE, but all symptoms resolved after manual tx  Plan:  PT 2x/wk x 1-2 more weeks  Focus on soft tissue mobility        POC EXPIRES On:  18  PRECAUTIONS:  None  CO-MORBIDITES:  TKA  PERSONAL FACTORS:  None    Manual             L GH jt mobs 5' 5'           STM L SS tendon 10' 10'                                                      Exercise Diary             Cross arm posterior delt stretch L 10" 5 10" 5           Cane AAROM L shoulder flex sitting 10" 10 10" 10           Retro UBE  L6 6'                                                                                                                                                                                                                                            Modalities

## 2018-05-04 ENCOUNTER — OFFICE VISIT (OUTPATIENT)
Dept: PHYSICAL THERAPY | Facility: CLINIC | Age: 73
End: 2018-05-04
Payer: MEDICARE

## 2018-05-04 DIAGNOSIS — M25.512 PAIN IN JOINT OF LEFT SHOULDER: Primary | ICD-10-CM

## 2018-05-04 PROCEDURE — G8986 CARRY D/C STATUS: HCPCS | Performed by: PHYSICAL THERAPIST

## 2018-05-04 PROCEDURE — G8985 CARRY GOAL STATUS: HCPCS | Performed by: PHYSICAL THERAPIST

## 2018-05-04 PROCEDURE — 97110 THERAPEUTIC EXERCISES: CPT | Performed by: PHYSICAL THERAPIST

## 2018-05-04 PROCEDURE — 97140 MANUAL THERAPY 1/> REGIONS: CPT | Performed by: PHYSICAL THERAPIST

## 2018-05-04 NOTE — PROGRESS NOTES
PT Evaluation / Discharge    Today's date: 2018  Patient name: Deborah Lucio  : 1945  MRN: [id number]  Referring provider: Echo Cifuentes MD  Dx:   Encounter Diagnosis     ICD-10-CM    1  Pain in joint of left shoulder M25 512                   Assessment    Assessment details: Deborah Lucio is a 67 y o  female who presented to outpatient physical therapy at [place] [address] with complaints of L shoulder pain  She completed 3 PT sessions and has now met all goals  She is independent with her HEP and is ready for D/C at this time  Barriers to therapy: None  Understanding of Dx/Px/POC: excellent  Goals  ST  Independent with HEP in 2 weeks - Met  2  Increase ROM L shoulder to WNL all motions degrees in 3 weeks  - Met    LT  Achieve FOTO score of 67/100 in 4 weeks - Met  2  Able to reach [facility] and lift weights at the gym without L shoulder pain in 4 weeks - Met  3  Strength = 5/5 all L shoulder motions in 4 weeks - Met  4  No tightness L pec minor in 4 weeks - Met    Plan  Treatment plan discussed with: patient        Subjective Evaluation    History of Present Illness  Date of onset: 2018  Mechanism of injury: trauma  Mechanism of injury: On 18 pt fell off of a hover board onto her L side with mild pain following  The next pain pain increased  X-rays were negative and MRI showed minor changes per pt  No prior hx of UE injures  Pt states that pain has resolved now and is ready for D/C     Not a recurrent problem   Quality of life: excellent    Pain  Current pain ratin  At best pain ratin  At worst pain ratin  Progression: resolved    Social Support    Employment status: not working  Hand dominance: right      Diagnostic Tests  X-ray: normal  MRI studies: abnormal        Objective     Postural Observations  Seated posture: good  Standing posture: good  Correction of posture: makes symptoms better        Palpation   Left   No palpable tenderness to the anterior deltoid, biceps and supraspinatus  Left Shoulder Comments  Left biceps: proximal tendon  Left pectoralis minor: mild tightness  Left supraspinatus: SS tenon  Cervical/Thoracic Screen   Cervical range of motion within normal limits    Neurological Testing     Sensation     Shoulder   Left Shoulder   Intact: light touch    Right Shoulder   Intact: light touch    Reflexes   Left   Biceps (C5/C6): normal (2+)  Brachioradialis (C6): normal (2+)    Right   Biceps (C5/C6): normal (2+)  Brachioradialis (C6): normal (2+)    Active Range of Motion   Left Shoulder   Normal active range of motion    Right Shoulder   Normal active range of motion    Passive Range of Motion   Left Shoulder   Normal passive range of motion    Scapular Mobility   Left Shoulder   Scapular Dyskinesis: none  Scapular mobility: [facility] [employer]    Joint Play   Left Shoulder  Joints within functional limits are the anterior capsule, posterior capsule and inferior capsule  Strength/Myotome Testing     Left Shoulder     Planes of Motion   Flexion: 5   Extension: 5   Abduction: 5   External rotation at 0°: 5   Internal rotation at 0°: 5     Isolated Muscles   Biceps: 5   Triceps: 5     Right Shoulder   Normal muscle strength    Tests     Left Shoulder   Negative apprehension, empty can, Hawkin's and Neer's  Daily Treatment Diary     Dx:    1   Pain in joint of left shoulder        POC EXPIRES On:  5/23/18  PRECAUTIONS:  None  CO-MORBIDITES:  TKA  PERSONAL FACTORS:  None    Manual  4/25 5/1 5/4          L GH jt mobs 5' 5' 5'          STM L SS tendon 10' 10' 10'                                                     Exercise Diary  4/25 5/1 5/4          Cross arm posterior delt stretch L 10" 5 10" 5 10" 5          Cane AAROM L shoulder flex sitting 10" 10 10" 10 10" 10          Retro UBE  L6 6' L6 6' Modalities

## 2018-05-07 ENCOUNTER — APPOINTMENT (OUTPATIENT)
Dept: PHYSICAL THERAPY | Facility: CLINIC | Age: 73
End: 2018-05-07
Payer: MEDICARE

## 2018-05-11 ENCOUNTER — APPOINTMENT (OUTPATIENT)
Dept: PHYSICAL THERAPY | Facility: CLINIC | Age: 73
End: 2018-05-11
Payer: MEDICARE

## 2018-11-21 ENCOUNTER — EVALUATION (OUTPATIENT)
Dept: PHYSICAL THERAPY | Facility: CLINIC | Age: 73
End: 2018-11-21
Payer: MEDICARE

## 2018-11-21 ENCOUNTER — TRANSCRIBE ORDERS (OUTPATIENT)
Dept: PHYSICAL THERAPY | Facility: CLINIC | Age: 73
End: 2018-11-21

## 2018-11-21 DIAGNOSIS — M17.11 ARTHRITIS OF RIGHT KNEE: Primary | ICD-10-CM

## 2018-11-21 PROCEDURE — G8990 OTHER PT/OT CURRENT STATUS: HCPCS | Performed by: PHYSICAL THERAPIST

## 2018-11-21 PROCEDURE — 97110 THERAPEUTIC EXERCISES: CPT | Performed by: PHYSICAL THERAPIST

## 2018-11-21 PROCEDURE — G8992 OTHER PT/OT  D/C STATUS: HCPCS | Performed by: PHYSICAL THERAPIST

## 2018-11-21 PROCEDURE — G8991 OTHER PT/OT GOAL STATUS: HCPCS | Performed by: PHYSICAL THERAPIST

## 2018-11-21 PROCEDURE — 97161 PT EVAL LOW COMPLEX 20 MIN: CPT | Performed by: PHYSICAL THERAPIST

## 2018-11-21 NOTE — PROGRESS NOTES
PT Evaluation and D/C    Today's date: 2018  Patient name: Lyssa Don  : 1945  MRN: [id number]  Referring provider: Ángel Garcia MD  Dx:   Encounter Diagnosis     ICD-10-CM    1  Arthritis of right knee M17 11                   Assessment  Assessment details: Lyssa Don is a 68 y o  female presenting to outpatient physical therapy at [place] [address] with complaints of R knee pain  She presents with decreased range of motion, decreased strength, limited flexibility, decreased tolerance to activity and decreased functional mobility due to Arthritis of right knee  (primary encounter diagnosis)  She is independent with HEP now and ready for D/C to HEP prior to surgery  She would benefit from skilled PT services in order to address these deficits and reach maximum level of function  Thank you for the referral!  Impairments: abnormal or restricted ROM, activity intolerance, impaired balance, impaired physical strength, lacks appropriate home exercise program and pain with function  Barriers to therapy: None  Understanding of Dx/Px/POC: excellent  Goals  ST  Independent with HEP in 1 session - Met      Plan  Patient would benefit from: skilled PT  Planned therapy interventions: functional ROM exercises, home exercise program, strengthening, stretching, therapeutic exercise and flexibility  Frequency: 1x week  Duration in visits: 1  Duration in weeks: 1  Treatment plan discussed with: patient        Subjective Evaluation    History of Present Illness  Mechanism of injury: Pt reports falling about 6 weeks ago and has had R knee pain since that time with advanced OA  Planning to have R TKA on 19  Has L TKA last year with good results  Active in walking 1-2 miles/day and would like to do matt, but pain limits her  Given a HEP from surgeon     Not a recurrent problem   Quality of life: good    Pain  No pain reported  Current pain ratin  At best pain ratin  At worst pain rating: 5  Quality: dull ache  Relieving factors: rest  Progression: no change    Social Support  Steps to enter house: yes  Stairs in house: yes   Lives in: multiple-level home  Lives with: alone    Employment status: not working    Diagnostic Tests  X-ray: abnormal  Treatments  No previous or current treatments  Patient Goals  Patient goals for therapy: decreased pain, increased motion, increased strength and return to sport/leisure activities          Objective     Observations     Right Knee   Negative for edema  Tenderness   Left Knee   Tenderness in the lateral joint line and medial joint line  Right Knee   Tenderness in the lateral joint line and medial joint line  Neurological Testing     Sensation     Knee   Left Knee   Intact: light touch    Right Knee   Intact: light touch     Active Range of Motion   Left Hip   Normal active range of motion    Right Hip   Normal active range of motion  Left Knee   Flexion: 113 degrees   Extension: 0 degrees     Right Knee   Flexion: 125 degrees   Extensor la degrees     Passive Range of Motion   Left Knee   Flexion: 115 degrees   Extension: 0 degrees     Right Knee   Flexion: 128 degrees   Extension: -1 degrees     Strength/Myotome Testing     Left Hip   Normal muscle strength    Right Hip   Normal muscle strength    Left Knee   Flexion: 4+  Extension: 4+    Right Knee   Flexion: 4-  Extension: 4-    Ambulation     Observational Gait     Additional Observational Gait Details  Min decreased L LE stance time         Flowsheet Rows      Most Recent Value   PT/OT G-Codes   Current Score  46   Projected Score  62   FOTO information reviewed  Yes   Assessment Type  Evaluation & Discharge same visit   G code set  Other PT/OT Primary   Other PT Primary Current Status ()  CK   Other PT Primary Goal Status ()  CK   Other PT Primary Discharge Status ()  CK          Precautions: None    Daily Treatment Diary     Manual Exercise Diary  11 21            Quad sets R 5" 20            Prone hangs R 2'            Supine R knee extension stretch with towel roll under ankle 2'                                                                                                                                                                                                                                             Modalities

## 2018-11-21 NOTE — LETTER
2018    Radha Beckwith MD  [address] [address]    Patient: Kim Cardenas   YOB: 1945   Date of Visit: 2018     Encounter Diagnosis     ICD-10-CM    1  Arthritis of right knee M17 11        Dear Dr Svetlana Davenport:    Please review the attached Plan of Care from [facility] recent visit  Please verify that you agree therapy should continue by signing the attached document and sending it back to our office  If you have any questions or concerns, please don't hesitate to call  Sincerely,    Ruben Martínez PT      Referring Provider:      I certify that I have read the below Plan of Care and certify the need for these services furnished under this plan of treatment while under my care  Radha Beckwith MD  [address] [facility]: [phone]          PT Evaluation and D/C    Today's date: 2018  Patient name: Kim Cardenas  : 1945  MRN: [id number]  Referring provider: Susannah Husain MD  Dx:   Encounter Diagnosis     ICD-10-CM    1  Arthritis of right knee M17 11                   Assessment  Assessment details: Kim Cardenas is a 68 y o  female presenting to outpatient physical therapy at [facility] with complaints of R knee pain  She presents with decreased range of motion, decreased strength, limited flexibility, decreased tolerance to activity and decreased functional mobility due to Arthritis of right knee  (primary encounter diagnosis)  She is independent with HEP now and ready for D/C to HEP prior to surgery  She would benefit from skilled PT services in order to address these deficits and reach maximum level of function    Thank you for the referral!  Impairments: abnormal or restricted ROM, activity intolerance, impaired balance, impaired physical strength, lacks appropriate home exercise program and pain with function  Barriers to therapy: None  Understanding of Dx/Px/POC: excellent  Goals  ST  Independent with HEP in 1 session - Met      Plan  Patient would benefit from: skilled PT  Planned therapy interventions: functional ROM exercises, home exercise program, strengthening, stretching, therapeutic exercise and flexibility  Frequency: 1x week  Duration in visits: 1  Duration in weeks: 1  Treatment plan discussed with: patient        Subjective Evaluation    History of Present Illness  Mechanism of injury: Pt reports falling about 6 weeks ago and has had R knee pain since that time with advanced OA  Planning to have R TKA on 19  Has L TKA last year with good results  Active in walking 1-2 miles/day and would like to do matt, but pain limits her  Given a HEP from surgeon  Not a recurrent problem   Quality of life: good    Pain  No pain reported  Current pain ratin  At best pain ratin  At worst pain ratin  Quality: dull ache  Relieving factors: rest  Progression: no change    Social Support  Steps to enter house: yes  Stairs in house: yes   Lives in: multiple-level home  Lives with: alone    Employment status: not working    Diagnostic Tests  X-ray: abnormal  Treatments  No previous or current treatments  Patient Goals  Patient goals for therapy: decreased pain, increased motion, increased strength and return to sport/leisure activities          Objective     Observations     Right Knee   Negative for edema  Tenderness   Left Knee   Tenderness in the lateral joint line and medial joint line  Right Knee   Tenderness in the lateral joint line and medial joint line       Neurological Testing     Sensation     Knee   Left Knee   Intact: light touch    Right Knee   Intact: light touch     Active Range of Motion   Left Hip   Normal active range of motion    Right Hip   Normal active range of motion  Left Knee   Flexion: 113 degrees   Extension: 0 degrees     Right Knee   Flexion: 125 degrees   Extensor la degrees     Passive Range of Motion   Left Knee Flexion: 115 degrees   Extension: 0 degrees     Right Knee   Flexion: 128 degrees   Extension: -1 degrees     Strength/Myotome Testing     Left Hip   Normal muscle strength    Right Hip   Normal muscle strength    Left Knee   Flexion: 4+  Extension: 4+    Right Knee   Flexion: 4-  Extension: 4-    Ambulation     Observational Gait     Additional Observational Gait Details  Min decreased L LE stance time         Flowsheet Rows      Most Recent Value   PT/OT G-Codes   Current Score  46   Projected Score  62   FOTO information reviewed  Yes   Assessment Type  Evaluation & Discharge same visit   G code set  Other PT/OT Primary   Other PT Primary Current Status ()  CK   Other PT Primary Goal Status ()  CK   Other PT Primary Discharge Status ()  CK          Precautions: None    Daily Treatment Diary     Manual                                                                                   Exercise Diary  11 21            Quad sets R 5" 20            Prone hangs R 2'            Supine R knee extension stretch with towel roll under ankle 2'                                                                                                                                                                                                                                             Modalities

## 2018-11-21 NOTE — LETTER
2018    Darling Sandhu MD  [address]  [place] [address]    Patient: Caitlin Martinez   YOB: 1945   Date of Visit: 2018     Encounter Diagnosis     ICD-10-CM    1  Arthritis of right knee M17 11        Dear Dr Brenna Mukherjee:    Please review the attached Plan of Care from [facility] recent visit  Please verify that you agree therapy should continue by signing the attached document and sending it back to our office  If you have any questions or concerns, please don't hesitate to call  Sincerely,    Geri Justice, PT      Referring Provider:      I certify that I have read the below Plan of Care and certify the need for these services furnished under this plan of treatment while under my care  Darling Sandhu MD  [address]  [place] [facility] [place]: [phone]          PT Evaluation and D/C    Today's date: 2018  Patient name: Caitlin Martinez  : 1945  MRN: [id number]  Referring provider: Yolanda Tang MD  Dx:   Encounter Diagnosis     ICD-10-CM    1  Arthritis of right knee M17 11                   Assessment  Assessment details: Caitlin Martinez is a 68 y o  female presenting to outpatient physical therapy at [place] [address] with complaints of R knee pain  She presents with decreased range of motion, decreased strength, limited flexibility, decreased tolerance to activity and decreased functional mobility due to Arthritis of right knee  (primary encounter diagnosis)  She is independent with HEP now and ready for D/C to HEP prior to surgery  She would benefit from skilled PT services in order to address these deficits and reach maximum level of function    Thank you for the referral!  Impairments: abnormal or restricted ROM, activity intolerance, impaired balance, impaired physical strength, lacks appropriate home exercise program and pain with function  Barriers to therapy: None  Understanding of Dx/Px/POC: excellent  Goals  ST  Independent with HEP in 2 weeks  2  Increase ROM by *** degrees in 3 weeks     LT  Achieve FOTO score of ***/100 in 6 weeks   2  Able to *** in 6 weeks  3  Strength = 5/5 *** in 6 weeks      Plan  Patient would benefit from: skilled PT  Planned therapy interventions: functional ROM exercises, home exercise program, strengthening, stretching, therapeutic exercise and flexibility  Frequency: 1x week  Duration in visits: 1  Duration in weeks: 1  Treatment plan discussed with: patient        Subjective Evaluation    History of Present Illness  Mechanism of injury: Pt reports falling about 6 weeks ago and has had R knee pain since that time with advanced OA  Planning to have R TKA on 19  Has L TKA last year with good results  Active in walking 1-2 miles/day and would like to do matt, but pain limits her  Given a HEP from surgeon  Not a recurrent problem   Quality of life: good    Pain  No pain reported  Current pain ratin  At best pain ratin  At worst pain ratin  Quality: dull ache  Relieving factors: rest  Progression: no change    Social Support  Steps to enter house: yes  Stairs in house: yes   Lives in: multiple-level home  Lives with: alone    Employment status: not working    Diagnostic Tests  X-ray: abnormal  Treatments  No previous or current treatments  Patient Goals  Patient goals for therapy: decreased pain, increased motion, increased strength and return to sport/leisure activities          Objective     Observations     Right Knee   Negative for edema  Tenderness   Left Knee   Tenderness in the lateral joint line and medial joint line  Right Knee   Tenderness in the lateral joint line and medial joint line       Neurological Testing     Sensation     Knee   Left Knee   Intact: light touch    Right Knee   Intact: light touch     Active Range of Motion   Left Hip   Normal active range of motion    Right Hip   Normal active range of motion  Left Knee   Flexion: 113 degrees   Extension: 0 degrees     Right Knee   Flexion: 125 degrees   Extensor la degrees     Passive Range of Motion   Left Knee   Flexion: 115 degrees   Extension: 0 degrees     Right Knee   Flexion: 128 degrees   Extension: -1 degrees     Strength/Myotome Testing     Left Hip   Normal muscle strength    Right Hip   Normal muscle strength    Left Knee   Flexion: 4+  Extension: 4+    Right Knee   Flexion: 4-  Extension: 4-    Ambulation     Observational Gait     Additional Observational Gait Details  Min decreased L LE stance time         Flowsheet Rows      Most Recent Value   PT/OT G-Codes   Current Score  46   Projected Score  62   FOTO information reviewed  Yes   Assessment Type  Evaluation & Discharge same visit   G code set  Other PT/OT Primary   Other PT Primary Current Status ()  CK   Other PT Primary Goal Status ()  CK   Other PT Primary Discharge Status ()  CK          Precautions: None    Daily Treatment Diary     Manual                                                                                   Exercise Diary  11 21            Quad sets R 5" 20            Prone hangs R 2'            Supine R knee extension stretch with towel roll under ankle 2'                                                                                                                                                                                                                                             Modalities

## 2019-01-28 ENCOUNTER — EVALUATION (OUTPATIENT)
Dept: PHYSICAL THERAPY | Facility: CLINIC | Age: 74
End: 2019-01-28
Payer: MEDICARE

## 2019-01-28 ENCOUNTER — TRANSCRIBE ORDERS (OUTPATIENT)
Dept: PHYSICAL THERAPY | Facility: CLINIC | Age: 74
End: 2019-01-28

## 2019-01-28 DIAGNOSIS — Z96.651 STATUS POST RIGHT KNEE REPLACEMENT: Primary | ICD-10-CM

## 2019-01-28 PROCEDURE — 97140 MANUAL THERAPY 1/> REGIONS: CPT | Performed by: PHYSICAL THERAPIST

## 2019-01-28 PROCEDURE — 97161 PT EVAL LOW COMPLEX 20 MIN: CPT | Performed by: PHYSICAL THERAPIST

## 2019-01-28 RX ORDER — OXYCODONE HYDROCHLORIDE AND ACETAMINOPHEN 325; 5 MG/5ML; MG/5ML
SOLUTION ORAL EVERY 4 HOURS PRN
COMMUNITY
End: 2022-02-17

## 2019-01-28 NOTE — LETTER
2019    Nguyễn Ceja MD  [address] [address]    Patient: Tommie Ojeda   YOB: 1945   Date of Visit: 2019     Encounter Diagnosis     ICD-10-CM    1  Status post right knee replacement Z96 651        Dear Dr Lonnie Reeves:    Please review the attached Plan of Care from [facility] recent visit  Please verify that you agree therapy should continue by signing the attached document and sending it back to our office  If you have any questions or concerns, please don't hesitate to call  Sincerely,    Saman Eastman PT      Referring Provider:      I certify that I have read the below Plan of Care and certify the need for these services furnished under this plan of treatment while under my care  Nguyễn Ceja MD  [address] [place] [id number]  VIA Facsimile: [phone]          PT Evaluation     Today's date: 2019  Patient name: Tommie Ojeda  : 1945  MRN: [id number]  Referring provider: Benji Pearce MD  Dx:   Encounter Diagnosis     ICD-10-CM    1  Status post right knee replacement Z96 651                   Assessment  Assessment details: Tommie Ojeda is a 68 y o  female presenting to outpatient physical therapy at [place] [address] with complaints of R knee pain  She presents with decreased range of motion, decreased strength, limited flexibility, altered gait pattern, poor balance, decreased tolerance to activity and decreased functional mobility due to Status post right knee replacement on 19  She would benefit from skilled PT services in order to address these deficits and reach maximum level of function    Thank you for the referral!  Impairments: abnormal gait, abnormal or restricted ROM, activity intolerance, impaired balance, impaired physical strength, lacks appropriate home exercise program, pain with function and weight-bearing intolerance  Barriers to therapy: None  Understanding of Dx/Px/POC: excellent  Goals  ST  Independent with HEP in 2 weeks  2  Increase PROM R knee to 0-90 degrees in 3 weeks     LT  Achieve FOTO score of 57/100 in 8 weeks   2  Able to walk x 30 min, perform all housework, drive in 8 weeks  3  Strength R knee flex and ext = 5/5 in 8 weeks  4  Excellent R LE balance in 8 weeks  5  No tenderness R knee in 6 weeks  6  AROM R knee = 0-110 degrees in 8 weeks    Plan  Patient would benefit from: skilled PT  Planned modality interventions: cryotherapy  Planned therapy interventions: balance/weight bearing training, flexibility, functional ROM exercises, home exercise program, joint mobilization, manual therapy, neuromuscular re-education, postural training, strengthening, stretching, therapeutic activities, therapeutic exercise and gait training  Frequency: 2x week  Duration in weeks: 8  Treatment plan discussed with: patient        Subjective Evaluation    History of Present Illness  Date of surgery: 2019  Mechanism of injury: surgery  Mechanism of injury: Pt reports having R knee pain for years and had R TKA on 19  Staples were removed on 19  Has hx of L TKA in May 2016  Would like to be able to hike and ride a horse by the summer  Pt is dependent on friends to take her to PT since she can not drive yet following surgery     Quality of life: good    Pain  Current pain ratin  At best pain ratin  At worst pain ratin  Quality: tight, dull ache and sharp  Relieving factors: ice  Aggravating factors: standing and walking  Progression: no change    Social Support  Steps to enter house: no  Stairs in house: no   Lives in: [facility]  Lives with: alone    Employment status: not working    Diagnostic Tests  No diagnostic tests performed  Treatments  No previous or current treatments  Patient Goals  Patient goals for therapy: decreased pain, improved balance, increased motion, increased strength, independence with ADLs/IADLs, decreased edema and return to sport/leisure activities  Patient goal: return to matt and the gym, travel        Objective     Observations     Additional Observation Details  Well healing R knee incision  Palpation     Additional Palpation Details  Mod general tenderness R knee  Neurological Testing     Sensation     Knee   Left Knee   Intact: light touch    Right Knee   Intact: light touch     Reflexes   Left   Achilles (S1): normal (2+)    Right   Achilles (S1): normal (2+)    Active Range of Motion   Left Knee   Flexion: 112 degrees   Extensor la degrees     Right Knee   Flexion: 66 degrees   Extensor la degrees     Passive Range of Motion   Left Knee   Flexion: 115 degrees   Extension: 0 degrees     Right Knee   Flexion: 80 degrees   Extension: -5 degrees     Patellar Static Positioning   Left Knee: WFL  Right Knee: [place]    Strength/Myotome Testing     Left Knee   Flexion: 4+  Extension: 4+  Quadriceps contraction: good    Right Knee   Flexion: 3+  Extension: 3-  Quadriceps contraction: fair    Additional Strength Details  R hip grossly = 4+/5, L = 5/5  Ambulation     Ambulation: Stairs   Ascend stairs: independent  Pattern: creeps up  Railings: one rail  Descend stairs: independent  Pattern: creeps down  Railings: one rail  Curbs: hand held    Observational Gait   Decreased walking speed, right stance time and left step length  Additional Observational Gait Details  Mod limited R knee flexion with swing phase with or without SPC  General Comments     Knee Comments  1 5 inches swelling around R knee > L  Daily Treatment Diary     Dx:    1   Status post right knee replacement      POC EXPIRES On:  3/25/19  PRECAUTIONS:  None  CO-MORBIDITES:  B TKA  PERSONAL FACTORS:  Dependent on  to get her to PT, lives alone    Manual              PROM R knee flex/ext 10'            R H/S stretching                                                        Exercise Diary              Bike             B calf raises             Mini squats             SLS R             Step ups R             Standing R knee flex             R LAQ             Seated R knee flex stretch             Bridges             Quad sets R             SLR R             SAQ R             Heel slides with strap for knee flex R                                                                                                            Modalities              CP R knee NV

## 2019-01-28 NOTE — PROGRESS NOTES
PT Evaluation     Today's date: 2019  Patient name: Enedelia Woodard  : 1945  MRN: [id number]  Referring provider: Eleanor Crigler, MD  Dx:   Encounter Diagnosis     ICD-10-CM    1  Status post right knee replacement Z96 651                   Assessment  Assessment details: Enedelia Woodard is a 68 y o  female presenting to outpatient physical therapy at [place] [address] with complaints of R knee pain  She presents with decreased range of motion, decreased strength, limited flexibility, altered gait pattern, poor balance, decreased tolerance to activity and decreased functional mobility due to Status post right knee replacement on 19  She would benefit from skilled PT services in order to address these deficits and reach maximum level of function  Thank you for the referral!  Impairments: abnormal gait, abnormal or restricted ROM, activity intolerance, impaired balance, impaired physical strength, lacks appropriate home exercise program, pain with function and weight-bearing intolerance  Barriers to therapy: None  Understanding of Dx/Px/POC: excellent  Goals  ST  Independent with HEP in 2 weeks  2  Increase PROM R knee to 0-90 degrees in 3 weeks     LT  Achieve FOTO score of 57/100 in 8 weeks   2  Able to walk x 30 min, perform all housework, drive in 8 weeks  3  Strength R knee flex and ext = 5/5 in 8 weeks  4  Excellent R LE balance in 8 weeks  5  No tenderness R knee in 6 weeks  6    AROM R knee = 0-110 degrees in 8 weeks    Plan  Patient would benefit from: skilled PT  Planned modality interventions: cryotherapy  Planned therapy interventions: balance/weight bearing training, flexibility, functional ROM exercises, home exercise program, joint mobilization, manual therapy, neuromuscular re-education, postural training, strengthening, stretching, therapeutic activities, therapeutic exercise and gait training  Frequency: 2x week  Duration in weeks: 8  Treatment plan discussed with: patient        Subjective Evaluation    History of Present Illness  Date of surgery: 2019  Mechanism of injury: surgery  Mechanism of injury: Pt reports having R knee pain for years and had R TKA on 19  Staples were removed on 19  Has hx of L TKA in May 2016  Would like to be able to hike and ride a horse by the summer  Pt is dependent on friends to take her to PT since she can not drive yet following surgery  Quality of life: good    Pain  Current pain ratin  At best pain ratin  At worst pain ratin  Quality: tight, dull ache and sharp  Relieving factors: ice  Aggravating factors: standing and walking  Progression: no change    Social Support  Steps to enter house: no  Stairs in house: no   Lives in: [facility]  Lives with: alone    Employment status: not working    Diagnostic Tests  No diagnostic tests performed  Treatments  No previous or current treatments  Patient Goals  Patient goals for therapy: decreased pain, improved balance, increased motion, increased strength, independence with ADLs/IADLs, decreased edema and return to sport/leisure activities  Patient goal: return to matt and the gym, travel        Objective     Observations     Additional Observation Details  Well healing R knee incision  Palpation     Additional Palpation Details  Mod general tenderness R knee       Neurological Testing     Sensation     Knee   Left Knee   Intact: light touch    Right Knee   Intact: light touch     Reflexes   Left   Achilles (S1): normal (2+)    Right   Achilles (S1): normal (2+)    Active Range of Motion   Left Knee   Flexion: 112 degrees   Extensor la degrees     Right Knee   Flexion: 66 degrees   Extensor la degrees     Passive Range of Motion   Left Knee   Flexion: 115 degrees   Extension: 0 degrees     Right Knee   Flexion: 80 degrees   Extension: -5 degrees     Patellar Static Positioning   Left Knee: WFL  Right Knee: [facility]    Strength/Myotome Testing     Left Knee Flexion: 4+  Extension: 4+  Quadriceps contraction: good    Right Knee   Flexion: 3+  Extension: 3-  Quadriceps contraction: fair    Additional Strength Details  R hip grossly = 4+/5, L = 5/5  Ambulation     Ambulation: Stairs   Ascend stairs: independent  Pattern: creeps up  Railings: one rail  Descend stairs: independent  Pattern: creeps down  Railings: one rail  Curbs: hand held    Observational Gait   Decreased walking speed, right stance time and left step length  Additional Observational Gait Details  Mod limited R knee flexion with swing phase with or without SPC  General Comments     Knee Comments  1 5 inches swelling around R knee > L  Daily Treatment Diary     Dx:    1   Status post right knee replacement      POC EXPIRES On:  3/25/19  PRECAUTIONS:  None  CO-MORBIDITES:  B TKA  PERSONAL FACTORS:  Dependent on  to get her to PT, lives alone    Manual  1/28            PROM R knee flex/ext 10'            R H/S stretching                                                        Exercise Diary              Bike             B calf raises             Mini squats             SLS R             Step ups R             Standing R knee flex             R LAQ             Seated R knee flex stretch             Bridges             Quad sets R             SLR R             SAQ R             Heel slides with strap for knee flex R                                                                                                            Modalities              CP R knee NV

## 2019-01-31 ENCOUNTER — OFFICE VISIT (OUTPATIENT)
Dept: PHYSICAL THERAPY | Facility: CLINIC | Age: 74
End: 2019-01-31
Payer: MEDICARE

## 2019-01-31 DIAGNOSIS — Z96.651 STATUS POST RIGHT KNEE REPLACEMENT: Primary | ICD-10-CM

## 2019-01-31 PROCEDURE — 97140 MANUAL THERAPY 1/> REGIONS: CPT | Performed by: PHYSICAL THERAPIST

## 2019-01-31 PROCEDURE — 97112 NEUROMUSCULAR REEDUCATION: CPT | Performed by: PHYSICAL THERAPIST

## 2019-01-31 PROCEDURE — 97110 THERAPEUTIC EXERCISES: CPT | Performed by: PHYSICAL THERAPIST

## 2019-01-31 NOTE — PROGRESS NOTES
Daily Note     Today's date: 2019  Patient name: Torrey Christopher  : 1945  MRN: [id number]  Referring provider: Rochelle Gayle MD  Dx:   Encounter Diagnosis     ICD-10-CM    1  Status post right knee replacement Z96 651                   Subjective:  Pt reports that R knee feels very stiff  Objective: See treatment diary below      Assessment:  Pt presented to outpatient physical therapy at [place] [address] with complaints of R knee pain  She presents with decreased range of motion, decreased strength, limited flexibility, altered gait pattern, poor balance, decreased tolerance to activity and decreased functional mobility due to Status post right knee replacement on 19  She will continue to benefit from skilled PT services in order to address these deficits and reach maximum level of function  Pt did very well with all additional exercises today and has little limp remaining with SPC  May be able to D/C [facility] in the next week  Plan:  Continue to focus on knee flex ROM with more balance, gait training, general R LE strengthening  CP prn         POC EXPIRES On:  3/25/19  PRECAUTIONS:  None  CO-MORBIDITES:  B TKA  PERSONAL FACTORS:  Dependent on  to get her to PT, lives alone    Manual             PROM R knee flex/ext 10' 10'           R H/S stretching  2'                                                      Exercise Diary              Bike 5'            B calf raises 30            Mini squats 20            SLS R 10" 10            Step ups/overs R 6" 20            Standing R knee flex 20            R LAQ 20            Seated R knee flex stretch 5" 20            Bridges 20            Quad sets R 5" 20            SLR R 20            SAQ R 20            Heel slides with strap for knee flex R 10" 10                                                                                                           Modalities              CP R knee NV

## 2019-02-04 ENCOUNTER — OFFICE VISIT (OUTPATIENT)
Dept: PHYSICAL THERAPY | Facility: CLINIC | Age: 74
End: 2019-02-04
Payer: MEDICARE

## 2019-02-04 DIAGNOSIS — Z96.651 STATUS POST RIGHT KNEE REPLACEMENT: Primary | ICD-10-CM

## 2019-02-04 PROCEDURE — 97112 NEUROMUSCULAR REEDUCATION: CPT | Performed by: PHYSICAL THERAPIST

## 2019-02-04 PROCEDURE — 97110 THERAPEUTIC EXERCISES: CPT | Performed by: PHYSICAL THERAPIST

## 2019-02-04 PROCEDURE — 97140 MANUAL THERAPY 1/> REGIONS: CPT | Performed by: PHYSICAL THERAPIST

## 2019-02-04 NOTE — PROGRESS NOTES
Daily Note     Today's date: 2019  Patient name: Odalis Nichole  : 1945  MRN: [id number]  Referring provider: Lowell Davis MD  Dx:   Encounter Diagnosis     ICD-10-CM    1  Status post right knee replacement Z96 651               Subjective:  Pt reports that R knee feels a little less stiff  Objective: See treatment diary below      Assessment:  Pt presented to outpatient physical therapy at [place] [address] with complaints of R knee pain  She presents with decreased range of motion, decreased strength, limited flexibility, altered gait pattern, poor balance, decreased tolerance to activity and decreased functional mobility due to Status post right knee replacement on 19  She will continue to benefit from skilled PT services in order to address these deficits and reach maximum level of function  Pt did very well with all additional exercises today and has essentially no limp remaining with SPC  Should be able to D/C [facility] in the next week  Plan:  Continue to focus on knee flex ROM with more balance, gait training, general R LE strengthening  Add light weights as able  CP prn  8" step ups        POC EXPIRES On:  3/25/19  PRECAUTIONS:  None  CO-MORBIDITES:  B TKA   PERSONAL FACTORS:  Dependent on  to get her to PT, lives alone    Manual            PROM R knee flex/ext 10' 10' 10'          R H/S stretching  2' 2'                                                     Exercise Diary             Bike 5' 5'           B calf raises 30 30           Mini squats 20 20           SLS R 10" 10 Blue foam 10" 10           Step ups/overs R 6" 20 6" 20 8"          Standing R knee flex 20 20           R LAQ 20 20           Seated R knee flex stretch 5" 20 5" 20           Bridges 20 20           Quad sets R 5" 20 5" 20           SLR R 20 20           SAQ R 20 20           Heel slides with strap for knee flex R 10" 10 10" 10 Modalities  2/4            CP R knee 10'

## 2019-02-07 ENCOUNTER — OFFICE VISIT (OUTPATIENT)
Dept: PHYSICAL THERAPY | Facility: CLINIC | Age: 74
End: 2019-02-07
Payer: MEDICARE

## 2019-02-07 DIAGNOSIS — Z96.651 STATUS POST RIGHT KNEE REPLACEMENT: Primary | ICD-10-CM

## 2019-02-07 PROCEDURE — 97112 NEUROMUSCULAR REEDUCATION: CPT | Performed by: PHYSICAL THERAPIST

## 2019-02-07 PROCEDURE — 97110 THERAPEUTIC EXERCISES: CPT | Performed by: PHYSICAL THERAPIST

## 2019-02-07 PROCEDURE — 97140 MANUAL THERAPY 1/> REGIONS: CPT | Performed by: PHYSICAL THERAPIST

## 2019-02-07 NOTE — PROGRESS NOTES
Daily Note     Today's date: 2019  Patient name: Edilia Robles  : 1945  MRN: [id number]  Referring provider: Reinier Houston MD  Dx:   Encounter Diagnosis     ICD-10-CM    1  Status post right knee replacement Z96 651                   Subjective:  Pt reports that she doesn't think that she's getting any better  Walking 1/4 mile and then is tired after  Objective: See treatment diary below      Assessment:  Pt presented to outpatient physical therapy at [place] [address] with complaints of R knee pain  She presents with decreased range of motion, decreased strength, limited flexibility, altered gait pattern, poor balance, decreased tolerance to activity and decreased functional mobility due to Status post right knee replacement on 19  She will continue to benefit from skilled PT services in order to address these deficits and reach maximum level of function  Pt did very well with all additional weights and stretches today and has essentially no limp remaining with or without SPC  R knee flex AROM is now > 90 degrees  Needed some cueing not to circumduct R LE for 8" step ups  Plan:  Continue to focus on knee flex ROM with more balance, gait training, general R LE strengthening  CP prn         POC EXPIRES On:  3/25/19  PRECAUTIONS:  None  CO-MORBIDITES:  B TKA   PERSONAL FACTORS:  Dependent on  to get her to PT, lives alone    Manual           PROM R knee flex/ext 10' 10' 10' 8'         R H/S stretching  2' 2' 2'                                                    Exercise Diary            Bike 5' 5' 6'          B calf raises 30 30 30          Mini squats 20 20 20          SLS R 10" 10 Blue foam 10" 10 Blue foam 10" 10          Step ups/overs R 6" 20 6" 20 8"          Standing R knee flex 20 20 20          R LAQ 20 20 2# 20          Seated R knee flex stretch 5" 20 5" 20 10" 20          Bridges 20 20 20          Quad sets R 5" 20 5" 20 5" 20 SLR R 20 20 2# 20          SAQ R 20 20 2# 20          Heel slides with strap for knee flex R 10" 10 10" 10 10" 10          Step stretch to increase R knee flex ROM   10" 10                                                                                            Modalities  2/4 2/7           CP R knee 10' 10';

## 2019-02-11 ENCOUNTER — OFFICE VISIT (OUTPATIENT)
Dept: PHYSICAL THERAPY | Facility: CLINIC | Age: 74
End: 2019-02-11
Payer: MEDICARE

## 2019-02-11 DIAGNOSIS — Z96.651 STATUS POST RIGHT KNEE REPLACEMENT: Primary | ICD-10-CM

## 2019-02-11 PROCEDURE — 97112 NEUROMUSCULAR REEDUCATION: CPT | Performed by: PHYSICAL THERAPIST

## 2019-02-11 PROCEDURE — 97140 MANUAL THERAPY 1/> REGIONS: CPT | Performed by: PHYSICAL THERAPIST

## 2019-02-11 PROCEDURE — 97110 THERAPEUTIC EXERCISES: CPT | Performed by: PHYSICAL THERAPIST

## 2019-02-11 NOTE — PROGRESS NOTES
Daily Note     Today's date: 2019  Patient name: Angely Dominguez  : 1945  MRN: [id number]  Referring provider: Marcia Witt MD  Dx:   Encounter Diagnosis     ICD-10-CM    1  Status post right knee replacement Z96 651                      Subjective:  Pt reports that she has more knee pain today after doing a lot of walking yesterday  Other knee hurts too  Able to drive in hr neighborhood and short distances for shopping without any issues  Objective: See treatment diary below       Assessment:  Pt presented to outpatient physical therapy at [place] [address] with complaints of R knee pain  She presents with decreased range of motion, decreased strength, limited flexibility, altered gait pattern, poor balance, decreased tolerance to activity and decreased functional mobility due to Status post right knee replacement on 19  She will continue to benefit from skilled PT services in order to address these deficits and reach maximum level of function  Pt improved gait pattern after cueing to increase R knee flex ROM with increased R knee to chest with swing phase  No longer needs SPC  R knee flex AROM is now > 90 degrees  Pt is gaining independence back with several tasks including driving  R LE balance is much improved  Plan:  Continue to focus on knee flex ROM with more balance, gait training, general R LE strengthening  CP prn         POC EXPIRES On:  3/25/19  PRECAUTIONS:  None  CO-MORBIDITES:  B TKA   PERSONAL FACTORS:  Dependent on  to get her to PT, lives alone    Manual          PROM R knee flex/ext 10' 10' 10' 8' 6'        R H/S stretching  2' 2' 2' 2'                                                   Exercise Diary           Bike 5' 5' 6' 6'         B calf raises 30 30 30 30         Mini squats 20 20 20 20         SLS R 10" 10 Blue foam 10" 10 Blue foam 10" 10 Blue foam 10" 10         Step ups/overs R 6" 20 6" 20 8" 20 8" 20 Standing R knee flex 20 20 20 20         R LAQ 20 20 2# 20 2# 20         Seated R knee flex stretch 5" 20 5" 20 10" 20 10" 20         Bridges 20 20 20 20         Quad sets R 5" 20 5" 20 5" 20 5" 20         SLR R 20 20 2# 20 2# 20         SAQ R 20 20 2# 20 2# 20         Heel slides with strap for knee flex R 10" 10 10" 10 10" 10 10" 10         Step stretch to increase R knee flex ROM   10" 10 10" 10         T-mill    1 5 mph 5'                                                                              Modalities  2/4 2/7 2/11          CP R knee 10' 10'; 10'

## 2019-02-14 ENCOUNTER — OFFICE VISIT (OUTPATIENT)
Dept: PHYSICAL THERAPY | Facility: CLINIC | Age: 74
End: 2019-02-14
Payer: MEDICARE

## 2019-02-14 DIAGNOSIS — Z96.651 STATUS POST RIGHT KNEE REPLACEMENT: Primary | ICD-10-CM

## 2019-02-14 PROCEDURE — 97110 THERAPEUTIC EXERCISES: CPT | Performed by: PHYSICAL THERAPIST

## 2019-02-14 PROCEDURE — 97140 MANUAL THERAPY 1/> REGIONS: CPT | Performed by: PHYSICAL THERAPIST

## 2019-02-14 PROCEDURE — 97112 NEUROMUSCULAR REEDUCATION: CPT | Performed by: PHYSICAL THERAPIST

## 2019-02-14 NOTE — PROGRESS NOTES
Daily Note     Today's date: 2019  Patient name: Maria Del Carmen Davis  : 1945  MRN: [id number]  Referring provider: Jas Nash MD  Dx:   Encounter Diagnosis     ICD-10-CM    1  Status post right knee replacement Z96 651                    Subjective:  Pt reports that she is able to lay on her stomach for the first time since surgery as of last night  Able to drive to Pt for the first time as well today  Objective: See treatment diary below       Assessment:  Pt presented to outpatient physical therapy at [place] [address] with complaints of R knee pain  She presents with decreased range of motion, decreased strength, limited flexibility, altered gait pattern, poor balance, decreased tolerance to activity and decreased functional mobility due to Status post right knee replacement on 19  She will continue to benefit from skilled PT services in order to address these deficits and reach maximum level of function  Pt is showing better gait pattern, but still needs some cueing to increase R knee flex ROM with increased R knee to chest with swing phase  No longer needs SPC  R knee flex AROM is now > 90 degrees and even able to climb up and down steps almost normally now  Pt is gaining independence back with several tasks including driving  R LE balance is much improved  Plan:  Continue to focus on knee flex ROM with more balance, gait training, general R LE strengthening  CP prn         POC EXPIRES On:  3/25/19  PRECAUTIONS:  None  CO-MORBIDITES:  B TKA   PERSONAL FACTORS:  Dependent on  to get her to PT, lives alone    Manual         PROM R knee flex/ext 10' 10' 10' 8' 6' 6'       R H/S stretching  2' 2' 2' 2' 2'                                                  Exercise Diary          Bike 5' 5' 6' 6' 6'        B calf raises 30 30 30 30 30        Mini squats 20 20 20 20 20        SLS R 10" 10 Blue foam 10" 10 Blue foam 10" 10 Blue foam 10" 10 Blue foam 10" 10        Step ups/overs R 6" 20 6" 20 8" 20 8" 20 8" 20        Standing R knee flex 20 20 20 20 20        R LAQ 20 20 2# 20 2# 20 3# 20        Seated R knee flex stretch 5" 20 5" 20 10" 20 10" 20 10" 20        Bridges 20 20 20 20 20        Quad sets R 5" 20 5" 20 5" 20 5" 20 5" 20        SLR R 20 20 2# 20 2# 20 3# 20        SAQ R 20 20 2# 20 2# 20 3# 20        Heel slides with strap for knee flex R 10" 10 10" 10 10" 10 10" 10 10" 10        Step stretch to increase R knee flex ROM   10" 10 10" 10 10" 10        T-mill    1 5 mph 5' 2 0 mph 5'                                                                             Modalities  2/4 2/7 2/11 2/14         CP R knee 10' 10'; 10' 10'

## 2019-02-18 ENCOUNTER — OFFICE VISIT (OUTPATIENT)
Dept: PHYSICAL THERAPY | Facility: CLINIC | Age: 74
End: 2019-02-18
Payer: MEDICARE

## 2019-02-18 DIAGNOSIS — Z96.651 STATUS POST RIGHT KNEE REPLACEMENT: Primary | ICD-10-CM

## 2019-02-18 PROCEDURE — 97110 THERAPEUTIC EXERCISES: CPT | Performed by: PHYSICAL THERAPIST

## 2019-02-18 PROCEDURE — 97140 MANUAL THERAPY 1/> REGIONS: CPT | Performed by: PHYSICAL THERAPIST

## 2019-02-18 NOTE — PROGRESS NOTES
Daily Note     Today's date: 2019  Patient name: Haley Bowen  : 1945  MRN: [id number]  Referring provider: Tennie Krabbe, MD  Dx:   Encounter Diagnosis     ICD-10-CM    1  Status post right knee replacement Z96 651                      Subjective:  Pt reports that she is still having some R quadriceps tightness that makes it hard to bend her knee  Objective: See treatment diary below       Assessment:  Pt presented to outpatient physical therapy at [place] [address] with complaints of R knee pain  She presents with decreased range of motion, decreased strength, limited flexibility, altered gait pattern, poor balance, decreased tolerance to activity and decreased functional mobility due to Status post right knee replacement on 19  She will continue to benefit from skilled PT services in order to address these deficits and reach maximum level of function  Pt is showing better gait pattern, but still needs some cueing to increase R knee flex ROM with increased R knee to chest with swing phase  No longer needs SPC  R knee flex AROM is now > 90 degrees and even able to climb up and down steps almost normally now  She is able to complete a full revolution on the bike but is afraid of the pain to do so  Pt is gaining independence back with several tasks including driving  R LE balance is much improved  Plan:  Continue to focus on knee flex ROM with more balance, gait training, general R LE strengthening  CP prn         POC EXPIRES On:  3/25/19  PRECAUTIONS:  None  CO-MORBIDITES:  B TKA   PERSONAL FACTORS:  Dependent on  to get her to PT, lives alone    Manual        PROM R knee flex/ext 10' 10' 10' 8' 6' 6' 6'      R H/S stretching  2' 2' 2' 2' 2' 2'                                                 Exercise Diary         Bike 5' 5' 6' 6' 6' 6'       B calf raises 30 30 30 30 30 30       Mini squats 20 20 20 20 20 20 SLS R 10" 10 Blue foam 10" 10 Blue foam 10" 10 Blue foam 10" 10 Blue foam 10" 10 Blue foam 10" 10       Step ups/overs R 6" 20 6" 20 8" 20 8" 20 8" 20 8" 20       Standing R knee flex 20 20 20 20 20 20       R LAQ 20 20 2# 20 2# 20 3# 20 3# 20       Seated R knee flex stretch 5" 20 5" 20 10" 20 10" 20 10" 20 10" 20       Bridges 20 20 20 20 20 20       Quad sets R 5" 20 5" 20 5" 20 5" 20 5" 20 5" 20 D/C      SLR R 20 20 2# 20 2# 20 3# 20 3# 20       SAQ R 20 20 2# 20 2# 20 3# 20 3# 20       Heel slides with strap for knee flex R 10" 10 10" 10 10" 10 10" 10 10" 10 10" 10       Step stretch to increase R knee flex ROM   10" 10 10" 10 10" 10 10" 10       T-mill    1 5 mph 5' 2 0 mph 5' 2 0 mph 6'       Standing R quad stretch with foot on chair and knee flexed behind body      20" 5                                                               Modalities  2/4 2/7 2/11 2/14 2/18        CP R knee 10' 10'; 10' 10' 10'

## 2019-02-21 ENCOUNTER — OFFICE VISIT (OUTPATIENT)
Dept: PHYSICAL THERAPY | Facility: CLINIC | Age: 74
End: 2019-02-21
Payer: MEDICARE

## 2019-02-21 DIAGNOSIS — Z96.651 STATUS POST RIGHT KNEE REPLACEMENT: Primary | ICD-10-CM

## 2019-02-21 PROCEDURE — 97140 MANUAL THERAPY 1/> REGIONS: CPT | Performed by: PHYSICAL THERAPIST

## 2019-02-21 PROCEDURE — 97112 NEUROMUSCULAR REEDUCATION: CPT | Performed by: PHYSICAL THERAPIST

## 2019-02-21 PROCEDURE — 97110 THERAPEUTIC EXERCISES: CPT | Performed by: PHYSICAL THERAPIST

## 2019-02-21 NOTE — PROGRESS NOTES
Daily Note     Today's date: 2019  Patient name: Emir Myers  : 1945  MRN: [id number]  Referring provider: Chao Strauss MD  Dx:   Encounter Diagnosis     ICD-10-CM    1  Status post right knee replacement Z96 651                Subjective:  Pt reports that she was able to walk 1 mile yesterday for the first time since surgery, but is having knee pain  R quadriceps tightness is lessening  Objective: See treatment diary below       Assessment:  Pt presented to outpatient physical therapy at [place] [address] with complaints of R knee pain  She presents with decreased range of motion, decreased strength, limited flexibility, altered gait pattern, poor balance, decreased tolerance to activity and decreased functional mobility due to Status post right knee replacement on 19  She will continue to benefit from skilled PT services in order to address these deficits and reach maximum level of function  Pt is showing better gait pattern this week and needs less cueing to increase R knee flex ROM with swing phase  R knee flex AROM is now close to 100 degrees and even able to climb up and down steps almost normally now  She is able to complete a full revolution on the bike but is afraid of the pain to do so  Her knee is warm to the touch and was advised to use ice more  No sign of infection  Pt is gaining independence back with several tasks including driving  R LE balance is much improved  Plan:  Continue to focus on knee flex ROM with more balance, gait training, general R LE strengthening  FOTO next session  CP prn         POC EXPIRES On:  3/25/19  PRECAUTIONS:  None  CO-MORBIDITES:  B TKA   PERSONAL FACTORS:  Dependent on  to get her to PT, lives alone    Manual              PROM R knee flex/ext 6'            R H/S stretching 2'                                                       Exercise Diary              Bike 6'            B calf raises 30            Mini squats 20            SLS R on blue foam 10" 10            Step ups/overs R 8" 20            Standing R knee flex 20            R LAQ 4# 20            Seated R knee flex stretch 10" 20            Bridges 20                         SLR R 4# 20            SAQ R 4# 20            Heel slides with strap for knee flex R 10" 10            Step stretch to increase R knee flex ROM 10" 10            T-mill 2 3 mph 6'            Standing R quad stretch with foot on chair and knee flexed behind body 20" 5                                                                    Modalities  2/21            CP R knee 8'

## 2019-02-25 ENCOUNTER — OFFICE VISIT (OUTPATIENT)
Dept: PHYSICAL THERAPY | Facility: CLINIC | Age: 74
End: 2019-02-25
Payer: MEDICARE

## 2019-02-25 DIAGNOSIS — Z96.651 STATUS POST RIGHT KNEE REPLACEMENT: Primary | ICD-10-CM

## 2019-02-25 PROCEDURE — 97110 THERAPEUTIC EXERCISES: CPT | Performed by: PHYSICAL THERAPIST

## 2019-02-25 PROCEDURE — 97112 NEUROMUSCULAR REEDUCATION: CPT | Performed by: PHYSICAL THERAPIST

## 2019-02-25 PROCEDURE — 97140 MANUAL THERAPY 1/> REGIONS: CPT | Performed by: PHYSICAL THERAPIST

## 2019-02-25 NOTE — PROGRESS NOTES
Daily Note / Progress Note    Today's date: 2019  Patient name: John Marlow  : 1945  MRN: [id number]  Referring provider: Catherine Wright MD  Dx:   Encounter Diagnosis     ICD-10-CM    1  Status post right knee replacement Z96 651                   Subjective:  Pt reports that she is able to walk more than 1 mile now and is bending knee better, but still painful and stiff until she loosens it up  R quadriceps tightness is lessening  Objective: See treatment diary below       Assessment:  Pt presented to outpatient physical therapy at [place] [address] with complaints of R knee pain  She presents with decreased range of motion, decreased strength, limited flexibility, altered gait pattern, poor balance, decreased tolerance to activity and decreased functional mobility due to Status post right knee replacement on 19  She will continue to benefit from skilled PT services in order to address these deficits and reach maximum level of function  Pt is showing much better gait pattern today with essentially normal R knee flexion ROM  R knee flex AROM is now about 100 degrees and able to climb up and down steps almost normally now  She is able to complete full revolutions on the bike retro now consistently due to improved ROM  Her knee is less warm to the touch today  No sign of infection  Pt is gaining independence back with several tasks including driving  R LE balance is much improved  FOTO scores show moderate improvement  Plan:  Continue to focus on knee flex ROM with more balance, gait training, general R LE strengthening  CP prn         POC EXPIRES On:  3/25/19  PRECAUTIONS:  None  CO-MORBIDITES:  B TKA   PERSONAL FACTORS:  Dependent on  to get her to PT, lives alone    Manual             PROM R knee flex/ext 6' 6'           R H/S stretching 2' 2'                                                      Exercise Diary             Bike 6' 6'           B calf raises 30 30           Mini squats 20 20           SLS R on blue foam 10" 10 10" 10           Step ups/overs R 8" 20 8" 20           Standing R knee flex 20 20           R LAQ 4# 20 4# 20           Seated R knee flex stretch 10" 20 10" 20           Bridges with ball adduction 20 20                        SLR R 4# 20 4# 20           SAQ R 4# 20 4# 20           Heel slides with strap for knee flex R 10" 10 10" 10           Step stretch to increase R knee flex ROM 10" 10 10" 10           T-mill 2 3 mph 6' 2 5 mph 8'           Standing R quad stretch with foot on chair and knee flexed behind body 20" 5 HEP                                                                   Modalities  2/21 2/25           CP R knee 8' 8'

## 2019-02-28 ENCOUNTER — OFFICE VISIT (OUTPATIENT)
Dept: PHYSICAL THERAPY | Facility: CLINIC | Age: 74
End: 2019-02-28
Payer: MEDICARE

## 2019-02-28 DIAGNOSIS — Z96.651 STATUS POST RIGHT KNEE REPLACEMENT: Primary | ICD-10-CM

## 2019-02-28 PROCEDURE — 97112 NEUROMUSCULAR REEDUCATION: CPT | Performed by: PHYSICAL THERAPIST

## 2019-02-28 PROCEDURE — 97140 MANUAL THERAPY 1/> REGIONS: CPT | Performed by: PHYSICAL THERAPIST

## 2019-02-28 PROCEDURE — 97110 THERAPEUTIC EXERCISES: CPT | Performed by: PHYSICAL THERAPIST

## 2019-02-28 NOTE — PROGRESS NOTES
Daily Note     Today's date: 2019  Patient name: Viviana Platt  : 1945  MRN: [id number]  Referring provider: Loyd Banuelos MD  Dx:   Encounter Diagnosis     ICD-10-CM    1  Status post right knee replacement Z96 651                      Subjective:  Pt reports that she is stiff this morning after being very busy yesterday  Walking more and getting back to the gym  Takes a while to loosen up early, but then does better with ROM  R quadriceps tightness is lessening  Objective: See treatment diary below       Assessment:  Pt presented to outpatient physical therapy at [place] [address] with complaints of R knee pain  She presents with decreased range of motion, decreased strength, limited flexibility, altered gait pattern, poor balance, decreased tolerance to activity and decreased functional mobility due to Status post right knee replacement on 19  She will continue to benefit from skilled PT services in order to address these deficits and reach maximum level of function  Pt is showing much better gait pattern this week with essentially normal R knee flexion ROM  R knee flex AROM is now about 100 degrees and able to climb up and down steps normally now  She is able to complete full revolutions on the bike retro after loosening up for a few minutes due to improved ROM  Her knee is no longer warm to the touch today  Pt is gaining independence back with several tasks including driving and getting to the gym  R LE balance is much improved  = good now  FOTO scores show moderate improvement  Plan:  Continue to focus on knee flex ROM with more balance, gait training, general R LE strengthening  CP prn         POC EXPIRES On:  3/25/19  PRECAUTIONS:  None  CO-MORBIDITES:  B TKA   PERSONAL FACTORS:  Dependent on  to get her to PT, lives alone    Manual            PROM R knee flex/ext 6' 6' 6'          R H/S stretching 2' 2' 2' Exercise Diary  2/21 2/25 2/28          Bike 6' 6' 6'          B calf raises 30 30 30          Mini squats 20 20 20          SLS R on blue foam 10" 10 10" 10 10" 10          Step ups/overs R 8" 20 8" 20 8" 20          Standing R knee flex 20 20 20          R LAQ 4# 20 4# 20 4# 20          Seated R knee flex stretch 10" 20 10" 20 10" 20          Bridges with ball adduction 20 20 20                       SLR R 4# 20 4# 20 4# 20          SAQ R 4# 20 4# 20 4# 20          Heel slides with strap for knee flex R 10" 10 10" 10 10" 10          Step stretch to increase R knee flex ROM 10" 10 10" 10 10" 10          T-mill 2 3 mph 6' 2 5 mph 8' 2 5 mph 8'          Standing R quad stretch with foot on chair and knee flexed behind body 20" 5 HEP                                                                   Modalities  2/21 2/25 2/28          CP R knee 8' 8' 8'

## 2019-03-04 ENCOUNTER — OFFICE VISIT (OUTPATIENT)
Dept: PHYSICAL THERAPY | Facility: CLINIC | Age: 74
End: 2019-03-04
Payer: MEDICARE

## 2019-03-04 DIAGNOSIS — Z96.651 STATUS POST RIGHT KNEE REPLACEMENT: Primary | ICD-10-CM

## 2019-03-04 PROCEDURE — 97110 THERAPEUTIC EXERCISES: CPT | Performed by: PHYSICAL THERAPIST

## 2019-03-04 PROCEDURE — 97112 NEUROMUSCULAR REEDUCATION: CPT | Performed by: PHYSICAL THERAPIST

## 2019-03-04 PROCEDURE — 97140 MANUAL THERAPY 1/> REGIONS: CPT | Performed by: PHYSICAL THERAPIST

## 2019-03-04 NOTE — PROGRESS NOTES
Daily Note     Today's date: 3/4/2019  Patient name: Jhony Dixon  : 1945  MRN: [id number]  Referring provider: Cisco Thorne MD  Dx:   Encounter Diagnosis     ICD-10-CM    1  Status post right knee replacement Z96 651                      Subjective:  Pt reports that she is stiff in the mornings, but getting better overall  Walking more and getting back to the gym  R quadriceps tightness is lessening  Objective: See treatment diary below   R knee A/PROM = 0-103 / 0-106 degrees today  Assessment:  Pt presented to outpatient physical therapy at [place] [address] with complaints of R knee pain  She presents with decreased range of motion, decreased strength, limited flexibility, altered gait pattern, poor balance, decreased tolerance to activity and decreased functional mobility due to Status post right knee replacement on 19  She will continue to benefit from skilled PT services in order to address these deficits and reach maximum level of function  Pt is showing much better gait pattern this week with essentially normal R knee flexion ROM  She is able to climb up and down steps normally now  She is able to complete full revolutions on the bike retro after loosening up for just 1 minute due to improved ROM  Her knee is no longer warm to palpation  Pt is gaining independence back with several tasks including driving and getting to the gym  R LE balance is much improved  = good now  FOTO scores show moderate improvement  Plan:  Continue to focus on knee flex ROM with more balance, gait training, general R LE strengthening  CP prn  Plan D/C by 3/28/19         POC EXPIRES On:  3/25/19  PRECAUTIONS:  None  CO-MORBIDITES:  B TKA   PERSONAL FACTORS:  Dependent on  to get her to PT, lives alone    Manual   3/4         PROM R knee flex/ext 6' 6' 6' 6'         R H/S stretching 2' 2' 2' 2'                                                    Exercise Diary   2/25 2/28 3/4         Bike 6' 6' 6' 6'         B calf raises 30 30 30 30         Mini squats 20 20 20 20         SLS R on blue foam 10" 10 10" 10 10" 10 10" 10         Step ups/overs R 8" 20 8" 20 8" 20 8" 20         Standing R knee flex 20 20 20 20         R LAQ 4# 20 4# 20 4# 20 4# 20         Seated R knee flex stretch 10" 20 10" 20 10" 20 10" 20         Bridges with ball adduction 20 20 20 20                      SLR R 4# 20 4# 20 4# 20 4# 20         SAQ R 4# 20 4# 20 4# 20 4# 20         Heel slides with strap for knee flex R 10" 10 10" 10 10" 10 10" 10         Step stretch to increase R knee flex ROM 10" 10 10" 10 10" 10 10" 10         T-mill 2 3 mph 6' 2 5 mph 8' 2 5 mph 8' 2 3 mph 8'         Standing R quad stretch with foot on chair and knee flexed behind body 20" 5 HEP                                                                   Modalities  2/21 2/25 2/28 3/4         CP R knee 8' 8' 8' 5'

## 2019-03-07 ENCOUNTER — OFFICE VISIT (OUTPATIENT)
Dept: PHYSICAL THERAPY | Facility: CLINIC | Age: 74
End: 2019-03-07
Payer: MEDICARE

## 2019-03-07 DIAGNOSIS — Z96.651 STATUS POST RIGHT KNEE REPLACEMENT: Primary | ICD-10-CM

## 2019-03-07 PROCEDURE — 97112 NEUROMUSCULAR REEDUCATION: CPT

## 2019-03-07 PROCEDURE — 97140 MANUAL THERAPY 1/> REGIONS: CPT

## 2019-03-07 PROCEDURE — 97110 THERAPEUTIC EXERCISES: CPT

## 2019-03-07 NOTE — PROGRESS NOTES
Daily Note     Today's date: 3/7/2019  Patient name: Lucia Borjas  : 1945  MRN: [id number]  Referring provider: Bernard Sepulveda MD  Dx:   Encounter Diagnosis     ICD-10-CM    1  Status post right knee replacement Z96 651                      Subjective:  Pt reports she is doing well overall but still isn't sleeping well and stiff when she wakes up  Objective: See treatment diary below   R knee A/PROM = 0-103 / 0-106 degrees today  Assessment:  Pt presented to outpatient physical therapy at [place] [address] with complaints of R knee pain  She presents with decreased range of motion, decreased strength, limited flexibility, altered gait pattern, poor balance, decreased tolerance to activity and decreased functional mobility due to Status post right knee replacement on 19  She will continue to benefit from skilled PT services in order to address these deficits and reach maximum level of function  Pt  continues to show improvement  Pt  tolerated progressions to TE's with minimal difficulty and no discomfort throughout  Will continue to progress as tolerated and monitor pt  response  Plan:  Continue to focus on knee flex ROM with more balance, gait training, general R LE strengthening  CP prn  Plan D/C by 3/28/19         POC EXPIRES On:  3/25/19  PRECAUTIONS:  None  CO-MORBIDITES:  B TKA   PERSONAL FACTORS:  Dependent on  to get her to PT, lives alone    Manual   3/4 3/7        PROM R knee flex/ext 6' 6' 6' 6'         R H/S stretching 2' 2' 2' 2'                                                    Exercise Diary  2/21 2/25 2/28 3/4 3/7        Bike 6' 6' 6' 6' 6'        B calf raises 30 30 30 30 30        Mini squats 20 20 20 20 20        SLS R on blue foam 10" 10 10" 10 10" 10 10" 10 10" 10        Step ups/overs R 8" 20 8" 20 8" 20 8" 20 8" 20        Standing R knee flex 20 20 20 20 20        R LAQ 4# 20 4# 20 4# 20 4# 20 4# 20        Seated R knee flex stretch 10" 20 10" 20 10" 20 10" 20 10" 20        Bridges with ball adduction 20 20 20 20 20                     SLR R 4# 20 4# 20 4# 20 4# 20 4# 20        SAQ R 4# 20 4# 20 4# 20 4# 20 4# 20        Heel slides with strap for knee flex R 10" 10 10" 10 10" 10 10" 10 10" 10        Step stretch to increase R knee flex ROM 10" 10 10" 10 10" 10 10" 10 10" 10        T-mill 2 3 mph 6' 2 5 mph 8' 2 5 mph 8' 2 3 mph 8' 2 3 mph  8'        Standing R quad stretch with foot on chair and knee flexed behind body 20" 5 HEP                                                                   Modalities  2/21 2/25 2/28 3/4         CP R knee 8' 8' 8' 5'

## 2019-03-11 ENCOUNTER — OFFICE VISIT (OUTPATIENT)
Dept: PHYSICAL THERAPY | Facility: CLINIC | Age: 74
End: 2019-03-11
Payer: MEDICARE

## 2019-03-11 DIAGNOSIS — Z96.651 STATUS POST RIGHT KNEE REPLACEMENT: Primary | ICD-10-CM

## 2019-03-11 PROCEDURE — 97110 THERAPEUTIC EXERCISES: CPT

## 2019-03-11 PROCEDURE — 97112 NEUROMUSCULAR REEDUCATION: CPT

## 2019-03-11 PROCEDURE — 97140 MANUAL THERAPY 1/> REGIONS: CPT

## 2019-03-11 NOTE — PROGRESS NOTES
Daily Note     Today's date: 3/11/2019  Patient name: Kim Cardenas  : 1945  MRN: [id number]  Referring provider: Susannah Husain MD  Dx:   Encounter Diagnosis     ICD-10-CM    1  Status post right knee replacement Z96 651                      Subjective:  Pt reports she is doing ok today having minimal pain mostly on the inside of her R knee 2/10  Objective: See treatment diary below         Assessment:  Pt presented to outpatient physical therapy at [facility] with complaints of R knee pain  She presents with decreased range of motion, decreased strength, limited flexibility, altered gait pattern, poor balance, decreased tolerance to activity and decreased functional mobility due to Status post right knee replacement on 19  She will continue to benefit from skilled PT services in order to address these deficits and reach maximum level of function  Continued to focus on knee mobility as well as strengthening this session with good tolerance  Pt did however only tolerate light overpressure with PROM due to guarding and a low pain tolerance  Will continue to progress as tolerated and monitor pt  response  Plan:  Continue to focus on knee flex ROM with more balance, gait training, general R LE strengthening  CP prn  Plan D/C by 3/28/19         POC EXPIRES On:  3/25/19  PRECAUTIONS:  None  CO-MORBIDITES:  B TKA   PERSONAL FACTORS:  Dependent on  to get her to PT, lives alone    Manual  2/21 2/25 2/28 3/4 3/7 3/11       PROM R knee flex/ext 6' 6' 6' 6' 6' 6'       R H/S stretching 2' 2' 2' 2' 2' 2'                                                  Exercise Diary  2/21 2/25 2/28 3/4 3/7 3/11       Bike 6' 6' 6' 6' 6' 6'       B calf raises 30 30 30 30 30 30       Mini squats 20 20 20 20 20 20       SLS R on blue foam 10" 10 10" 10 10" 10 10" 10 10" 10 10" 10       Step ups/overs R 8" 20 8" 20 8" 20 8" 20 8" 20 8" 20       Standing R knee flex 20 20 20 20 20 20       R LAQ 4# 20 4# 20 4# 20 4# 20 4# 20 4# 20       Seated R knee flex stretch 10" 20 10" 20 10" 20 10" 20 10" 20 10" 20       Bridges with ball adduction 20 20 20 20 20 20                    SLR R 4# 20 4# 20 4# 20 4# 20 4# 20 4# 20       SAQ R 4# 20 4# 20 4# 20 4# 20 4# 20 4# 20       Heel slides with strap for knee flex R 10" 10 10" 10 10" 10 10" 10 10" 10 10"x10       Step stretch to increase R knee flex ROM 10" 10 10" 10 10" 10 10" 10 10" 10 10" 10       T-mill 2 3 mph 6' 2 5 mph 8' 2 5 mph 8' 2 3 mph 8' 2 3 mph  8' 2 3 mph 8'       Standing R quad stretch with foot on chair and knee flexed behind body 20" 5 HEP                                                                   Modalities  2/21 2/25 2/28 3/4 3/11        CP R knee 8' 8' 8' 5' 5'

## 2019-03-14 ENCOUNTER — OFFICE VISIT (OUTPATIENT)
Dept: PHYSICAL THERAPY | Facility: CLINIC | Age: 74
End: 2019-03-14
Payer: MEDICARE

## 2019-03-14 DIAGNOSIS — Z96.651 STATUS POST RIGHT KNEE REPLACEMENT: Primary | ICD-10-CM

## 2019-03-14 PROCEDURE — 97140 MANUAL THERAPY 1/> REGIONS: CPT | Performed by: PHYSICAL THERAPIST

## 2019-03-14 PROCEDURE — 97110 THERAPEUTIC EXERCISES: CPT | Performed by: PHYSICAL THERAPIST

## 2019-03-14 PROCEDURE — 97112 NEUROMUSCULAR REEDUCATION: CPT | Performed by: PHYSICAL THERAPIST

## 2019-03-14 NOTE — PROGRESS NOTES
Daily Note     Today's date: 3/14/2019  Patient name: Aparna Turcios  : 1945  MRN: [id number]  Referring provider: Linda Jeff MD  Dx:   Encounter Diagnosis     ICD-10-CM    1  Status post right knee replacement Z96 651                      Subjective:  Pt reports she is doing well, but still has some tightness in her distal R thigh  Pain level is 2/10  Objective: See treatment diary below         Assessment:  Pt presented to outpatient physical therapy at [place] [address] with complaints of R knee pain  She presents with decreased range of motion, decreased strength, limited flexibility, altered gait pattern, poor balance, decreased tolerance to activity and decreased functional mobility due to Status post right knee replacement on 19  She will continue to benefit from skilled PT services in order to address these deficits and reach maximum level of function  Continued to focus on knee mobility  R LE balance is improving and gait pattern is good  She should be ready for D/C in about 2 weeks  AROM R knee = 0-103 vs L = 0-105 degrees  Plan:  Continue to focus on knee flex ROM with more balance, gait training, general R LE strengthening  CP prn  Plan D/C by 3/25/19 - 3/28/19         POC EXPIRES On:  3/25/19  PRECAUTIONS:  None  CO-MORBIDITES:  B TKA   PERSONAL FACTORS:  Lives alone    Manual  3/14            PROM R knee flex/ext 6'            R H/S stretching 2'                                                       Exercise Diary  3/14            Bike 6'            B calf raises 30            Mini squats 20            SLS R on black foam 10" 10            Step ups/overs R 8" 20            Standing R knee flex 20            R LAQ 4# 20            Seated R knee flex stretch 10" 20            Bridges with ball adduction 20                         SLR R 4# 20            SAQ R 4# 20            Heel slides with strap for knee flex R 10" 10            Step stretch to increase R knee flex ROM 10" 10            T-mill 2 3 mph 8'                                                                                 Modalities  3/14            CP R knee 8'

## 2019-03-18 ENCOUNTER — OFFICE VISIT (OUTPATIENT)
Dept: PHYSICAL THERAPY | Facility: CLINIC | Age: 74
End: 2019-03-18
Payer: MEDICARE

## 2019-03-18 DIAGNOSIS — Z96.651 STATUS POST RIGHT KNEE REPLACEMENT: Primary | ICD-10-CM

## 2019-03-18 PROCEDURE — 97110 THERAPEUTIC EXERCISES: CPT | Performed by: PHYSICAL THERAPIST

## 2019-03-18 PROCEDURE — 97112 NEUROMUSCULAR REEDUCATION: CPT | Performed by: PHYSICAL THERAPIST

## 2019-03-18 PROCEDURE — 97140 MANUAL THERAPY 1/> REGIONS: CPT | Performed by: PHYSICAL THERAPIST

## 2019-03-18 NOTE — PROGRESS NOTES
Daily Note     Today's date: 3/18/2019  Patient name: Bernard Rivas  : 1945  MRN: [id number]  Referring provider: Ana Badillo MD  Dx:   Encounter Diagnosis     ICD-10-CM    1  Status post right knee replacement Z96 651                   Subjective:  Pt reports she is doing well, but still has some tightness and pain in her R knee and stiffens up after walking  Objective: See treatment diary below         Assessment:  Pt presented to outpatient physical therapy at [place] [address] with complaints of R knee pain  She presents with decreased range of motion, decreased strength, limited flexibility, altered gait pattern, poor balance, decreased tolerance to activity and decreased functional mobility due to Status post right knee replacement on 19  She will continue to benefit from skilled PT services in order to address these deficits and reach maximum level of function  Continued to focus on knee mobility  R LE balance is improving and gait pattern is good  She should be ready for D/C in about 2 weeks  She needs to perform more stretching after walking to prevent tightness returning quickly in R quadriceps  AROM R knee = 0-109 vs L = 0-110 degrees  Plan:  Continue to focus on knee flex ROM with more balance, gait training, general R LE strengthening  CP prn  Plan D/C by 3/25/19 - 3/28/19         POC EXPIRES On:  3/25/19  PRECAUTIONS:  None  CO-MORBIDITES:  B TKA   PERSONAL FACTORS:  Lives alone    Manual  3/14 3/18           PROM R knee flex/ext 6' 6'           R H/S stretching 2' 2'           STM R distal quadriceps  2'                                         Exercise Diary  3/14 3/18           Bike 6' 5'           B calf raises 30 30           Mini squats 20 20           SLS R on black foam 10" 10 10" 10           Step ups/overs R 8" 20 8" 20           Standing R knee flex 20 20           R LAQ 4# 20 4# 20           Seated R knee flex stretch 10" 20 10" 20           Bridges with ball adduction 20 20                        SLR R 4# 20 4# 20           SAQ R 4# 20 4# 20           Heel slides with strap for knee flex R 10" 10 10" 10           Step stretch to increase R knee flex ROM 10" 10 10" 10           T-mill 2 3 mph 8' 2 4 mph 6'                                                                                Modalities  3/14 3/18           CP R knee 8' 8'

## 2019-03-21 ENCOUNTER — OFFICE VISIT (OUTPATIENT)
Dept: PHYSICAL THERAPY | Facility: CLINIC | Age: 74
End: 2019-03-21
Payer: MEDICARE

## 2019-03-21 DIAGNOSIS — Z96.651 STATUS POST RIGHT KNEE REPLACEMENT: Primary | ICD-10-CM

## 2019-03-21 PROCEDURE — 97140 MANUAL THERAPY 1/> REGIONS: CPT | Performed by: PHYSICAL THERAPIST

## 2019-03-21 PROCEDURE — 97110 THERAPEUTIC EXERCISES: CPT | Performed by: PHYSICAL THERAPIST

## 2019-03-21 PROCEDURE — 97112 NEUROMUSCULAR REEDUCATION: CPT | Performed by: PHYSICAL THERAPIST

## 2019-03-21 NOTE — PROGRESS NOTES
Daily Note     Today's date: 3/21/2019  Patient name: Maria Del Carmen Davis  : 1945  MRN: [id number]  Referring provider: Jas Nash MD  Dx:   Encounter Diagnosis     ICD-10-CM    1  Status post right knee replacement Z96 651                   Subjective:  Pt reports that she is still limited to about 1 mile walking, but otherwise feels good  Doing everything at home  Objective: See treatment diary below         Assessment:  Pt presented to outpatient physical therapy at [place] [address] with complaints of R knee pain  She presents with decreased range of motion, decreased strength, limited flexibility, altered gait pattern, poor balance, decreased tolerance to activity and decreased functional mobility due to Status post right knee replacement on 19  She will continue to benefit from skilled PT services in order to address these deficits and reach maximum level of function  Continued to focus on knee mobility  R LE balance is improving and gait pattern is good  She should be ready for D/C in about 1 week  The past few days have been very good with less pain  She needs to perform more stretching after walking to prevent tightness returning quickly in R quadriceps  A/PROM R knee now = 0-110 degrees  Plan:  Continue to focus on knee flex ROM with more balance, gait training, general R LE strengthening  CP prn  Plan D/C by 3/25/19 - 3/28/19         POC EXPIRES On:  3/25/19  PRECAUTIONS:  None  CO-MORBIDITES:  B TKA   PERSONAL FACTORS:  Lives alone    Manual  3/14 3/18 3/21          PROM R knee flex/ext 6' 6' 6'          R H/S stretching 2' 2' 2'          STM R distal quadriceps  2' 2'                                        Exercise Diary  3/14 3/18 3/21          Bike 6' 5' 6'          B calf raises 30 30 30          Mini squats 20 20 20          SLS R on black foam 10" 10 10" 10 10" 10          Step ups/overs R 8" 20 8" 20 8" 20          Standing R knee flex 20 20 20          R LAQ 4# 20 4# 20 4# 20          Seated R knee flex stretch 10" 20 10" 20 10" 20          Bridges with ball adduction 20 20 20                       SLR R 4# 20 4# 20 4# 20          SAQ R 4# 20 4# 20 4# 20          Heel slides with strap for knee flex R 10" 10 10" 10 10" 10          Step stretch to increase R knee flex ROM 10" 10 10" 10 10" 10          T-mill 2 3 mph 8' 2 4 mph 6' 2 5 mph 6'                                                                               Modalities  3/14 3/18 3/21          CP R knee 8' 8' 8'

## 2019-03-25 ENCOUNTER — OFFICE VISIT (OUTPATIENT)
Dept: PHYSICAL THERAPY | Facility: CLINIC | Age: 74
End: 2019-03-25
Payer: MEDICARE

## 2019-03-25 DIAGNOSIS — Z96.651 STATUS POST RIGHT KNEE REPLACEMENT: Primary | ICD-10-CM

## 2019-03-25 PROCEDURE — 97140 MANUAL THERAPY 1/> REGIONS: CPT | Performed by: PHYSICAL THERAPIST

## 2019-03-25 PROCEDURE — 97110 THERAPEUTIC EXERCISES: CPT | Performed by: PHYSICAL THERAPIST

## 2019-03-25 PROCEDURE — 97112 NEUROMUSCULAR REEDUCATION: CPT | Performed by: PHYSICAL THERAPIST

## 2019-03-25 NOTE — PROGRESS NOTES
PT Discharge    Today's date: 3/25/2019  Patient name: Anushka Freire  : 1945  MRN: [id number]  Referring provider: Irma Lyle MD  Dx:   Encounter Diagnosis     ICD-10-CM    1  Status post right knee replacement Z96 651                   Assessment  Assessment details: Anushka Freire is a 68 y o  female who presented to outpatient physical therapy at [place] [address] with complaints of R knee pain  She presented with decreased range of motion, decreased strength, limited flexibility, altered gait pattern, poor balance, decreased tolerance to activity and decreased functional mobility due to Status post right knee replacement on 19  She has progressed very well and still has min knee pain, but is fully functional now  She is independent with her HEP now and is ready fro D/C  Barriers to therapy: None  Understanding of Dx/Px/POC: excellent  Goals  ST  Independent with HEP in 2 weeks - Met  2  Increase PROM R knee to 0-90 degrees in 3 weeks - Met     LT  Achieve FOTO score of 57/100 in 8 weeks - Met  2  Able to walk x 30 min, perform all housework, drive in 8 weeks - Met  3  Strength R knee flex and ext = 5/5 in 8 weeks - Met  4  Excellent R LE balance in 8 weeks - Met  5  No tenderness R knee in 6 weeks - Mostly Met  6  AROM R knee = 0-110 degrees in 8 weeks - Met    Plan  Treatment plan discussed with: patient        Subjective Evaluation    History of Present Illness  Date of surgery: 2019  Mechanism of injury: surgery  Mechanism of injury: Pt reports having R knee pain for years and had R TKA on 19  Staples were removed on 19  Has hx of L TKA in May 2016  Feeling much better since starting PT with still min pain at times, but doing essentially everything on her own now  Ready for D/C             Not a recurrent problem   Quality of life: excellent    Pain  Current pain ratin  At best pain ratin  At worst pain rating: 3  Quality: dull ache and tight  Relieving factors: ice  Progression: improved    Social Support  Steps to enter house: no  Stairs in house: no   Lives in: [facility]  Lives with: alone    Employment status: not working    Diagnostic Tests  No diagnostic tests performed  Treatments  Current treatment: physical therapy        Objective     Observations     Additional Observation Details  Well healing R knee incision  Palpation     Additional Palpation Details  Min distal quadriceps tenderness R      Neurological Testing     Sensation     Knee   Left Knee   Intact: light touch    Right Knee   Intact: light touch     Reflexes   Left   Achilles (S1): normal (2+)    Right   Achilles (S1): normal (2+)    Active Range of Motion   Left Knee   Flexion: 112 degrees   Extensor la degrees     Right Knee   Flexion: 110 degrees   Extensor la degrees     Passive Range of Motion   Left Knee   Flexion: 115 degrees   Extension: 0 degrees     Right Knee   Flexion: 111 degrees   Extension: 0 degrees     Patellar Static Positioning   Left Knee: WFL  [facility]: [facility]    Strength/Myotome Testing     Left Knee   Flexion: 5  Extension: 5  Quadriceps contraction: good    Right Knee   Flexion: 5  Extension: 5  Quadriceps contraction: good    Additional Strength Details  R hip grossly = 5/5, L = 5/5  Ambulation     Ambulation: Stairs   Ascend stairs: independent  Pattern: reciprocal  Descend stairs: independent  Pattern: reciprocal  Railings: without rails  Curbs: independent    Observational Gait   Gait: within functional limits     General Comments:      Knee Comments  No swelling R knee           Daily Treatment Diary     Dx:    1   Status post right knee replacement        POC EXPIRES On:  3/25/19  PRECAUTIONS:  None  CO-MORBIDITES:  B TKA   PERSONAL FACTORS:  Lives alone     Manual  3/14 3/18 3/21  3/25               PROM R knee flex/ext 6' 6' 6'  6'               R H/S stretching 2' 2' 2'  2'               STM R distal quadriceps   2' 2'  2'                                                                     Exercise Diary  3/14 3/18 3/21  3/25               Bike 6' 5' 6'  6'               B calf raises 30 30 30  30               Mini squats 20 20 20  20               SLS R on black foam 10" 10 10" 10 10" 10  10" 10               Step ups/overs R 8" 20 8" 20 8" 20  8" 20               Standing R knee flex 20 20 20  20               R LAQ 4# 20 4# 20 4# 20  4# 20               Seated R knee flex stretch 10" 20 10" 20 10" 20  10" 20               Bridges with ball adduction 20 20 20  20                                       SLR R 4# 20 4# 20 4# 20  4# 20               SAQ R 4# 20 4# 20 4# 20  4# 20               Heel slides with strap for knee flex R 10" 10 10" 10 10" 10  10" 10               Step stretch to increase R knee flex ROM 10" 10 10" 10 10" 10  10" 10               T-mill 2 3 mph 8' 2 4 mph 6' 2 5 mph 6'  2 5 mph 6'                                                                                                                                             Modalities  3/14 3/18 3/21  3/25               CP R knee 8' 8' 8'  8'

## 2019-03-28 ENCOUNTER — APPOINTMENT (OUTPATIENT)
Dept: PHYSICAL THERAPY | Facility: CLINIC | Age: 74
End: 2019-03-28
Payer: MEDICARE

## 2019-10-07 ENCOUNTER — EVALUATION (OUTPATIENT)
Dept: PHYSICAL THERAPY | Facility: CLINIC | Age: 74
End: 2019-10-07
Payer: MEDICARE

## 2019-10-07 ENCOUNTER — TRANSCRIBE ORDERS (OUTPATIENT)
Dept: PHYSICAL THERAPY | Facility: CLINIC | Age: 74
End: 2019-10-07

## 2019-10-07 DIAGNOSIS — M17.11 PRIMARY LOCALIZED OSTEOARTHRITIS OF RIGHT KNEE: Primary | ICD-10-CM

## 2019-10-07 PROCEDURE — 97161 PT EVAL LOW COMPLEX 20 MIN: CPT | Performed by: PHYSICAL THERAPIST

## 2019-10-07 PROCEDURE — 97140 MANUAL THERAPY 1/> REGIONS: CPT | Performed by: PHYSICAL THERAPIST

## 2019-10-07 NOTE — LETTER
2019    Gene Love MD  [address]    Patient: Sp Heath   YOB: 1945   Date of Visit: 10/7/2019     Encounter Diagnosis     ICD-10-CM    1  Primary localized osteoarthritis of right knee M17 11        Dear Dr Apryl Newton: Thank you for your recent referral of Sp Heath  Please review the attached evaluation summary from Yuni's recent visit  Please verify that you agree with the plan of care by signing the attached order  If you have any questions or concerns, please do not hesitate to call  I sincerely appreciate the opportunity to share in the care of one of your patients and hope to have another opportunity to work with you in the near future  Sincerely,    Jeffrey Bucio, PT      Referring Provider:      I certify that I have read the below Plan of Care and certify the need for these services furnished under this plan of treatment while under my care  Gene Love MD  [address] [facility]: [phone]          PT Evaluation     Today's date: 10/7/2019  Patient name: Sp Heath  : 1945  MRN: [id number]  Referring provider: Antonina Villar MD  Dx:   Encounter Diagnosis     ICD-10-CM    1  Primary localized osteoarthritis of right knee M17 11                   Assessment  Assessment details: Sp Heath is a 68 y o  female presenting to outpatient physical therapy at [place] [address] with complaints of R knee pain  She presents with decreased R quadriceps strength, limited R distal quadriceps flexibility, limited R LE balance, altered gait pattern, decreased tolerance to activity and decreased functional mobility due to Primary localized osteoarthritis of right knee  (primary encounter diagnosis)  She would benefit from skilled PT services in order to address these deficits and reach maximum level of function    Thank you for the referral!  Impairments: abnormal gait, abnormal or restricted ROM, activity intolerance, impaired balance, impaired physical strength, lacks appropriate home exercise program and pain with function  Barriers to therapy: None  Understanding of Dx/Px/POC: excellent  Goals  ST  Independent with HEP in 2 weeks  2  Increase R quadriceps flexibility to WNL in 3 weeks     LT  Achieve FOTO score of 57/100 in 6 weeks   2  Able to walk x 30 min without R LE fatigue in 6 weeks  3  Strength R quadriceps = 5/5 in 6 weeks  4  Excellent R LE balance in 6 weeks  5  Normal gait pattern in 6 weeks    Plan  Patient would benefit from: skilled PT  Planned modality interventions: cryotherapy and thermotherapy: hydrocollator packs  Planned therapy interventions: ADL retraining, balance/weight bearing training, flexibility, functional ROM exercises, home exercise program, joint mobilization, manual therapy, neuromuscular re-education, strengthening, stretching, therapeutic activities and therapeutic exercise  Frequency: 2x week  Duration in weeks: 6  Treatment plan discussed with: patient        Subjective Evaluation    History of Present Illness  Mechanism of injury: Pt reports having R TKA on 19 and has done well, but feels that she can not walk as fast as she wants to and fatigues quickly  Sometimes gets R LE caught as she swings her leg through  Retired  Had L TKA in May 2016 with success                Recurrent probem    Quality of life: excellent    Pain  Current pain ratin  At best pain ratin  At worst pain ratin  Quality: tight and dull ache  Aggravating factors: walking  Progression: no change    Social Support  Lives with: alone    Employment status: not working    Diagnostic Tests  No diagnostic tests performed  Treatments  Previous treatment: physical therapy  Patient Goals  Patient goals for therapy: improved balance, increased strength and return to sport/leisure activities          Objective     Observations Right Knee   Negative for effusion  Palpation     Additional Palpation Details  Mod tightness R distal quadriceps  No tightness R rectus femoris  Tenderness     Right Knee   No tenderness in the ITB, lateral joint line, medial joint line, patellar tendon and quadriceps tendon  Neurological Testing     Sensation     Knee   Left Knee   Intact: light touch    Right Knee   Intact: light touch     Reflexes   Left   Achilles (S1): normal (2+)    Right   Achilles (S1): normal (2+)    Active Range of Motion     Right Knee   Flexion: 112 degrees   Extension: 0 degrees   Extensor la degrees     Mobility   Patellar Mobility:     Right Knee   WFL: medial, lateral, superior and inferior    Patellar Static Positioning   Right Knee: [facility]    Strength/Myotome Testing     Left Hip   Normal muscle strength    Right Hip   Normal muscle strength    Left Knee   Flexion: 5  Extension: 5    Right Knee   Flexion: 5  Extension: 4+    Left Ankle/Foot   Normal strength    Right Ankle/Foot   Normal strength    Ambulation     Ambulation: Level Surfaces   Ambulation without assistive device: independent    Ambulation: Stairs   Ascend stairs: independent  Descend stairs: independent  Curbs: independent    Observational Gait   Walking speed, left stance time, right stance time, left step length and right step length within functional limits  Additional Observational Gait Details  Min increased inversion R foot with swing  General Comments:      Knee Comments  No swelling R LE  Fair R vs excellent L LE balance  Daily Treatment Diary     Dx:    1   Primary localized osteoarthritis of right knee      POC EXPIRES On:  19  PRECAUTIONS:  None  CO-MORBIDITES:  B TKA  PERSONAL FACTORS:  None    Manual  10/7            STM distal R quadriceps sitting 10'                                                                    Exercise Diary  10/7            R LE SLS  10            T-band kicks L/R flex/ext Green 15 ea                                                                                                                                                                                                                                                          Modalities

## 2019-10-07 NOTE — PROGRESS NOTES
PT Evaluation     Today's date: 10/7/2019  Patient name: Radha Frost  : 1945  MRN: [id number]  Referring provider: Chavo Almeida MD  Dx:   Encounter Diagnosis     ICD-10-CM    1  Primary localized osteoarthritis of right knee M17 11                   Assessment  Assessment details: Radha Frost is a 68 y o  female presenting to outpatient physical therapy at [facility] with complaints of R knee pain  She presents with decreased R quadriceps strength, limited R distal quadriceps flexibility, limited R LE balance, altered gait pattern, decreased tolerance to activity and decreased functional mobility due to Primary localized osteoarthritis of right knee  (primary encounter diagnosis)  She would benefit from skilled PT services in order to address these deficits and reach maximum level of function  Thank you for the referral!  Impairments: abnormal gait, abnormal or restricted ROM, activity intolerance, impaired balance, impaired physical strength, lacks appropriate home exercise program and pain with function  Barriers to therapy: None  Understanding of Dx/Px/POC: excellent  Goals  ST  Independent with HEP in 2 weeks  2  Increase R quadriceps flexibility to WNL in 3 weeks     LT  Achieve FOTO score of 57/100 in 6 weeks   2  Able to walk x 30 min without R LE fatigue in 6 weeks  3  Strength R quadriceps = 5/5 in 6 weeks  4  Excellent R LE balance in 6 weeks  5    Normal gait pattern in 6 weeks    Plan  Patient would benefit from: skilled PT  Planned modality interventions: cryotherapy and thermotherapy: hydrocollator packs  Planned therapy interventions: ADL retraining, balance/weight bearing training, flexibility, functional ROM exercises, home exercise program, joint mobilization, manual therapy, neuromuscular re-education, strengthening, stretching, therapeutic activities and therapeutic exercise  Frequency: 2x week  Duration in weeks: 6  Treatment plan discussed with: patient        Subjective Evaluation    History of Present Illness  Mechanism of injury: Pt reports having R TKA on 19 and has done well, but feels that she can not walk as fast as she wants to and fatigues quickly  Sometimes gets R LE caught as she swings her leg through  Retired  Had L TKA in May 2016 with success  Recurrent probem    Quality of life: excellent    Pain  Current pain ratin  At best pain ratin  At worst pain ratin  Quality: tight and dull ache  Aggravating factors: walking  Progression: no change    Social Support  Lives with: alone    Employment status: not working    Diagnostic Tests  No diagnostic tests performed  Treatments  Previous treatment: physical therapy  Patient Goals  Patient goals for therapy: improved balance, increased strength and return to sport/leisure activities          Objective     Observations     Right Knee   Negative for effusion  Palpation     Additional Palpation Details  Mod tightness R distal quadriceps  No tightness R rectus femoris  Tenderness     Right Knee   No tenderness in the ITB, lateral joint line, medial joint line, patellar tendon and quadriceps tendon       Neurological Testing     Sensation     Knee   Left Knee   Intact: light touch    Right Knee   Intact: light touch     Reflexes   Left   Achilles (S1): normal (2+)    Right   Achilles (S1): normal (2+)    Active Range of Motion     Right Knee   Flexion: 112 degrees   Extension: 0 degrees   Extensor la degrees     Mobility   Patellar Mobility:     Right Knee   WFL: medial, lateral, superior and inferior    Patellar Static Positioning   Right Knee: [facility]    Strength/Myotome Testing     Left Hip   Normal muscle strength    Right Hip   Normal muscle strength    Left Knee   Flexion: 5  Extension: 5    Right Knee   Flexion: 5  Extension: 4+    Left Ankle/Foot   Normal strength    Right Ankle/Foot   Normal strength    Ambulation     Ambulation: Level Surfaces   Ambulation without assistive device: independent    Ambulation: Stairs   Ascend stairs: independent  Descend stairs: independent  Curbs: independent    Observational Gait   Walking speed, left stance time, right stance time, left step length and right step length within functional limits  Additional Observational Gait Details  Min increased inversion R foot with swing  General Comments:      Knee Comments  No swelling R LE  Fair R vs excellent L LE balance  Daily Treatment Diary     Dx:    1   Primary localized osteoarthritis of right knee      POC EXPIRES On:  11/18/19  PRECAUTIONS:  None  CO-MORBIDITES:  B TKA  PERSONAL FACTORS:  None    Manual  10/7            STM distal R quadriceps sitting 10'                                                                    Exercise Diary  10/7            R LE SLS  10            T-band kicks L/R flex/ext Green 15 ea                                                                                                                                                                                                                                                          Modalities

## 2019-10-10 ENCOUNTER — EVALUATION (OUTPATIENT)
Dept: PHYSICAL THERAPY | Facility: CLINIC | Age: 74
End: 2019-10-10
Payer: MEDICARE

## 2019-10-10 DIAGNOSIS — M17.11 PRIMARY LOCALIZED OSTEOARTHRITIS OF RIGHT KNEE: Primary | ICD-10-CM

## 2019-10-10 PROCEDURE — 97112 NEUROMUSCULAR REEDUCATION: CPT | Performed by: PHYSICAL THERAPIST

## 2019-10-10 PROCEDURE — 97140 MANUAL THERAPY 1/> REGIONS: CPT | Performed by: PHYSICAL THERAPIST

## 2019-10-10 PROCEDURE — 97110 THERAPEUTIC EXERCISES: CPT | Performed by: PHYSICAL THERAPIST

## 2019-10-10 NOTE — PROGRESS NOTES
Daily Note     Today's date: 10/10/2019  Patient name: Pablito Goyal  : 1945  MRN: [id number]  Referring provider: Cordelia Lorenzana MD  Dx:   Encounter Diagnosis     ICD-10-CM    1  Primary localized osteoarthritis of right knee M17 11                   Subjective:  Pt reports that she is walking too slowly  Can't get out of her own way sometimes  Objective:  See treatment diary below      Assessment:  Pt presented to outpatient physical therapy at [place] [address] with complaints of R knee pain  She presented with decreased R quadriceps strength, limited R distal quadriceps flexibility, limited R LE balance, altered gait pattern, decreased tolerance to activity and decreased functional mobility due to Primary localized osteoarthritis of right knee  (primary encounter diagnosis)  She will continue to benefit from skilled PT services in order to address these deficits and reach maximum level of function  Pt had mod soft tissue release with ART today  Plan:  Continue to focus on balance and STM distal R quad  Daily Treatment Diary     Dx:    1   Primary localized osteoarthritis of right knee      POC EXPIRES On:  19  PRECAUTIONS:  None  CO-MORBIDITES:  B TKA  PERSONAL FACTORS:  None    Manual  10/7 10/10           STM distal R quadriceps sitting 10' 10'           ART distal R quad  5'                                                      Exercise Diary  10/7 10/10           R LE SLS on foam with small squat 10 no foam Black 20           T-band kicks L/R flex/ext Green 15 ea Blue 20 ea           Bike  L1 5'           Mini squats on bosu dome up + down  20 ea           Leg press B  65# 20                                                                                                                                                                                                                  Modalities

## 2019-10-14 ENCOUNTER — EVALUATION (OUTPATIENT)
Dept: PHYSICAL THERAPY | Facility: CLINIC | Age: 74
End: 2019-10-14
Payer: MEDICARE

## 2019-10-14 DIAGNOSIS — M17.11 PRIMARY LOCALIZED OSTEOARTHRITIS OF RIGHT KNEE: Primary | ICD-10-CM

## 2019-10-14 PROCEDURE — 97112 NEUROMUSCULAR REEDUCATION: CPT | Performed by: PHYSICAL THERAPIST

## 2019-10-14 PROCEDURE — 97110 THERAPEUTIC EXERCISES: CPT | Performed by: PHYSICAL THERAPIST

## 2019-10-14 PROCEDURE — 97140 MANUAL THERAPY 1/> REGIONS: CPT | Performed by: PHYSICAL THERAPIST

## 2019-10-17 ENCOUNTER — EVALUATION (OUTPATIENT)
Dept: PHYSICAL THERAPY | Facility: CLINIC | Age: 74
End: 2019-10-17
Payer: MEDICARE

## 2019-10-17 DIAGNOSIS — M17.11 PRIMARY LOCALIZED OSTEOARTHRITIS OF RIGHT KNEE: Primary | ICD-10-CM

## 2019-10-17 PROCEDURE — 97110 THERAPEUTIC EXERCISES: CPT | Performed by: PHYSICAL THERAPIST

## 2019-10-17 PROCEDURE — 97112 NEUROMUSCULAR REEDUCATION: CPT | Performed by: PHYSICAL THERAPIST

## 2019-10-17 PROCEDURE — 97140 MANUAL THERAPY 1/> REGIONS: CPT | Performed by: PHYSICAL THERAPIST

## 2019-10-17 NOTE — PROGRESS NOTES
PT Discharge    Today's date: 10/17/2019  Patient name: Tomasa Frey  : 1945  MRN: [id number]  Referring provider: Sarah Ling MD  Dx:   Encounter Diagnosis     ICD-10-CM    1  Primary localized osteoarthritis of right knee M17 11                   Assessment  Assessment details: Tomasa Frey is a 68 y o  female who presented to outpatient physical therapy at [place] [address] with complaints of R knee pain  She presented with decreased R quadriceps strength, limited R distal quadriceps flexibility, limited R LE balance, altered gait pattern, decreased tolerance to activity and decreased functional mobility due to Primary localized osteoarthritis of right knee  (primary encounter diagnosis)  She has progressed well and has met her goals  She is independent with her HEP and is ready for D/C to HEP at this time  Barriers to therapy: None  Understanding of Dx/Px/POC: excellent  Goals  ST  Independent with HEP in 2 weeks - Met  2  Increase R quadriceps flexibility to WNL in 3 weeks - Met     LT  Achieve FOTO score of 57/100 in 6 weeks - Met  2  Able to walk x 30 min without R LE fatigue in 6 weeks - Met  3  Strength R quadriceps = 5/5 in 6 weeks - Met  4  Excellent R LE balance in 6 weeks - Met  5  Normal gait pattern in 6 weeks - Met    Plan  Treatment plan discussed with: patient        Subjective Evaluation    History of Present Illness  Mechanism of injury: Pt reports having R TKA on 19 and has done well, but felt that she could not walk as fast as she wanted to and fatigued quickly prior to starting PT  Sometimes got R LE caught as she swung her leg through  Retired  Had L TKA in May 2016 with success  Feeling good now               Recurrent probem    Quality of life: excellent    Pain  Current pain ratin  At best pain ratin  At worst pain ratin  Progression: resolved    Social Support  Lives with: alone    Employment status: not working    Diagnostic Tests  No diagnostic tests performed  Treatments  Previous treatment: physical therapy  Current treatment: physical therapy        Objective     Observations     Right Knee   Negative for effusion  Palpation     Additional Palpation Details  No tightness R distal quadriceps  No tightness R rectus femoris  Tenderness     Right Knee   No tenderness in the ITB, lateral joint line, medial joint line, patellar tendon and quadriceps tendon  Neurological Testing     Sensation     Knee   Left Knee   Intact: light touch    Right Knee   Intact: light touch     Reflexes   Left   Achilles (S1): normal (2+)    Right   Achilles (S1): normal (2+)    Active Range of Motion     Right Knee   Flexion: 112 degrees   Extension: 0 degrees   Extensor la degrees     Mobility   Patellar Mobility:     Right Knee   WFL: medial, lateral, superior and inferior    Patellar Static Positioning   Right Knee: [facility]    Strength/Myotome Testing     Left Hip   Normal muscle strength    Right Hip   Normal muscle strength    Left Knee   Flexion: 5  Extension: 5    Right Knee   Flexion: 5  Extension: 5    Left Ankle/Foot   Normal strength    Right Ankle/Foot   Normal strength    Ambulation     Ambulation: Level Surfaces   Ambulation without assistive device: independent    Ambulation: Stairs   Ascend stairs: independent  Descend stairs: independent  Curbs: independent    Observational Gait   Walking speed, left stance time, right stance time, left step length and right step length within functional limits  General Comments:      Knee Comments  No swelling R LE  Excellent B LE balance  Daily Treatment Diary     Dx:    1   Primary localized osteoarthritis of right knee      POC EXPIRES On:  19  PRECAUTIONS:  None  CO-MORBIDITES:  B TKA  PERSONAL FACTORS:  None     Manual  10/7 10/10 10/14  10/17               STM distal R quadriceps sitting 10' 10' 10'  10'               ART distal R quad   5' 5'  5'                                                                                             Exercise Diary  10/7 10/10 10/14  10/17               R LE SLS on foam with small squat 10 no foam Black 20 Black 20  Black 20               T-band kicks L/R flex/ext Green 15 ea Blue 20 ea Blue 20 ea Blue 20 ea               Bike   L1 5' L1 5'  L1 5'               Mini squats on bosu dome up + down   20 ea 20 ea  20 ea               Leg press B   65# 20 75# 2x15  75# 30                Standing hip flexor stretch L/R       15" 3 ea                                                                                                                                                                                                                                                                                                                                                                      Modalities

## 2019-12-17 ENCOUNTER — TELEPHONE (OUTPATIENT)
Dept: PAIN MEDICINE | Facility: CLINIC | Age: 74
End: 2019-12-17

## 2019-12-17 NOTE — TELEPHONE ENCOUNTER
NO PAIN MEDS   NO PAIN DR'S SEEN  PATIENT UNDERSTANDS DISCLAIMER    PT BEING REFERRED BY DR Shelly Estrada FROM [facility]

## 2020-01-03 ENCOUNTER — CONSULT (OUTPATIENT)
Dept: PAIN MEDICINE | Facility: CLINIC | Age: 75
End: 2020-01-03
Payer: MEDICARE

## 2020-01-03 VITALS
SYSTOLIC BLOOD PRESSURE: 120 MMHG | HEIGHT: 60 IN | WEIGHT: 149 LBS | DIASTOLIC BLOOD PRESSURE: 68 MMHG | BODY MASS INDEX: 29.25 KG/M2 | HEART RATE: 58 BPM

## 2020-01-03 DIAGNOSIS — M54.16 LUMBAR RADICULOPATHY: ICD-10-CM

## 2020-01-03 DIAGNOSIS — M48.062 SPINAL STENOSIS OF LUMBAR REGION WITH NEUROGENIC CLAUDICATION: ICD-10-CM

## 2020-01-03 DIAGNOSIS — M51.26 LUMBAR DISC HERNIATION: Primary | ICD-10-CM

## 2020-01-03 PROCEDURE — 99214 OFFICE O/P EST MOD 30 MIN: CPT | Performed by: ANESTHESIOLOGY

## 2020-01-03 RX ORDER — ZOLPIDEM TARTRATE 10 MG/1
TABLET ORAL
COMMUNITY
Start: 2020-01-02 | End: 2022-02-17

## 2020-01-03 RX ORDER — AMLODIPINE BESYLATE 2.5 MG/1
TABLET ORAL
COMMUNITY
Start: 2019-12-28 | End: 2022-03-24

## 2020-01-03 RX ORDER — ALENDRONATE SODIUM 70 MG/1
TABLET ORAL
COMMUNITY
Start: 2019-12-18 | End: 2022-02-17

## 2020-01-03 RX ORDER — LORAZEPAM 1 MG/1
TABLET ORAL
Qty: 4 TABLET | Refills: 0 | Status: SHIPPED | OUTPATIENT
Start: 2020-01-03 | End: 2022-02-17

## 2020-01-03 NOTE — PROGRESS NOTES
Assessment  1  Lumbar disc herniation    2  Lumbar radiculopathy    3  Spinal stenosis of lumbar region with neurogenic claudication        Plan  The patient's pain persists despite time, relative rest, activity modification and oral analgesics  I believe that she would benefit from a lumbar epidural steroid injection to diminish any inflammatory component of her pain  I will initially use an translaminar approach  If the patient does not receive significant pain relief following the initial injection, I may need to repeat using a transforaminal approach that may allow for better concentrate of the steroid along the affected nerve root  In addition will have the patient undergo a course of physical therapy for lumbar stabilization and strengthening, prescription was provided  The patient is somewhat anxious regard the procedure  I did write a prescription for lorazepam 1 mg to take one hour prior to the procedure and the patient understands the need a  for the procedure  In the office today, we reviewed the nature of the patient's pathology in depth using  diagrams and models  I discussed the approach I would use for the epidural steroid injection and provided literature for home review  The patient understands the risks associated with the procedure including but not limited to bleeding, infection, tissue injury, exacerbation of symptoms, allergic reaction, spinal headache, and paralysis and provided written and verbal consent  today  My impressions and treatment recommendations were discussed in detail with the patient who verbalized understanding and had no further questions  Discharge instructions were provided  I personally saw and examined the patient and I agree with the above discussed plan of care  This note is created using dictation transcription  It may contain typographical errors, grammatical errors, improperly dictated words, background noise and other errors      Orders Placed This Encounter   Procedures    FL spine and pain procedure     Standing Status:   Future     Standing Expiration Date:   1/3/2024     Order Specific Question:   Reason for Exam:     Answer:   LESI to the left 1  Order Specific Question:   Anticoagulant hold needed? Answer:   No    FL spine and pain procedure     Standing Status:   Future     Standing Expiration Date:   1/3/2024     Order Specific Question:   Reason for Exam:     Answer:   LESI to the left 2  Order Specific Question:   Anticoagulant hold needed? Answer:   No    Ambulatory referral to Physical Therapy     Standing Status:   Future     Standing Expiration Date:   1/3/2021     Referral Priority:   Routine     Referral Type:   Physical Therapy     Referral Reason:   Specialty Services Required     Requested Specialty:   Physical Therapy     Number of Visits Requested:   1     Expiration Date:   1/3/2021     New Medications Ordered This Visit   Medications    alendronate (FOSAMAX) 70 mg tablet    amLODIPine (NORVASC) 2 5 mg tablet    zolpidem (AMBIEN) 10 mg tablet    LORazepam (ATIVAN) 1 mg tablet     Sig: Take 1 tablet 60 minutes prior to procedure, may take an additional tablet 30 minutes prior to procedure, needs  day of procedure     Dispense:  4 tablet     Refill:  0     REFERRED BY DR Eb Cueto      History of Present Illness    Marlen Martin is a 76 y o  female with approximately one year history of low back and left leg pain  She is unaware of any clear precipitating event denies any trauma or injury  Her pain is moderate to severe she rates as 6/10 on the visual analog scale significant interfering with her daily living activities  Pain is nearly constant describes achy denies any weakness upper lower limbs reports that sitting increases or symptoms  She denies any bowel or bladder difficulty      I have personally reviewed and/or updated the patient's past medical history, past surgical history, family history, social history, current medications, allergies, and vital signs today  Review of Systems   Constitutional: Negative for fever and unexpected weight change  HENT: Negative for trouble swallowing  Eyes: Negative for visual disturbance  Respiratory: Negative for shortness of breath and wheezing  Cardiovascular: Negative for chest pain and palpitations  Gastrointestinal: Negative for constipation, diarrhea, nausea and vomiting  Endocrine: Negative for cold intolerance, heat intolerance and polydipsia  Genitourinary: Negative for difficulty urinating and frequency  Musculoskeletal: Positive for gait problem (difficulty walking decreased rom )  Negative for arthralgias, joint swelling and myalgias  Skin: Negative for rash  Neurological: Negative for dizziness, seizures, syncope, weakness and headaches  Hematological: Does not bruise/bleed easily  Psychiatric/Behavioral: Negative for dysphoric mood  All other systems reviewed and are negative  There is no problem list on file for this patient  Past Medical History:   Diagnosis Date    Hyperlipidemia     Overactive bladder        Past Surgical History:   Procedure Laterality Date    HYSTERECTOMY      JOINT REPLACEMENT Right     REPLACEMENT TOTAL KNEE         History reviewed  No pertinent family history      Social History     Occupational History    Not on file   Tobacco Use    Smoking status: Former Smoker    Smokeless tobacco: Never Used   Substance and Sexual Activity    Alcohol use: Yes     Comment: occasional    Drug use: Not on file    Sexual activity: Not on file       Current Outpatient Medications on File Prior to Visit   Medication Sig    alendronate (FOSAMAX) 70 mg tablet     amLODIPine (NORVASC) 2 5 mg tablet     Cholecalciferol (VITAMIN D PO) Take by mouth    Meloxicam 7 5 MG/5ML SUSP Take by mouth    Multiple Vitamins-Minerals (MULTIVITAMIN WITH MINERALS) tablet Take 1 tablet by mouth daily    rosuvastatin (CRESTOR) 5 mg tablet Take 2 5 mg by mouth    zolpidem (AMBIEN) 10 mg tablet     ASPIRIN 81 PO Take by mouth    Glucosamine-Chondroitin--400-375 MG TABS Take by mouth    Mirabegron ER (MYRBETRIQ) 50 MG TB24 Take by mouth    ondansetron (ZOFRAN-ODT) 4 mg disintegrating tablet Take 1 tablet by mouth every 4 (four) hours as needed for nausea (Patient not taking: Reported on 1/3/2020)    oxyCODONE-acetaminophen (ROXICET) 5-325 mg/5 mL solution Take by mouth every 4 (four) hours as needed for moderate pain     No current facility-administered medications on file prior to visit  Allergies   Allergen Reactions    Bupropion        Physical Exam    /68   Pulse 58   Ht 5' (1 524 m)   Wt 67 6 kg (149 lb)   BMI 29 10 kg/m²     Constitutional: normal, well developed, well nourished, alert, in no distress and non-toxic and no overt pain behavior  and overweight  Eyes: anicteric  HEENT: grossly intact  Neck: supple, symmetric, trachea midline and no masses   Pulmonary:even and unlabored  Cardiovascular:No edema or pitting edema present  Skin:Normal without rashes or lesions and well hydrated  Psychiatric:Mood and affect appropriate  Neurologic:Cranial Nerves II-XII grossly intact  Musculoskeletal:normal, difficulty going from sitting to standing to sitting position, exaggeration of her lumbar lordosis no obvious skin lesions or erythema lumbar sacral spine    No tenderness to palpation lumbar sacral spine spinous process sacroiliac joint or greater trochanter bilateral, deep tendon reflexes diminished but symmetrical bilateral patella Achilles no motor deficit appreciated lower limbs she is able to heel and toe walk without difficulty, no sensory deficit appreciated lower limbs negative bilateral straight leg raising hip range of motion on the left within normal limits without pain, negative Justin's maneuver bilateral     Imaging  11/24/19 MR lumbar spine wo con [facility]       Low back pain with pain in the anterior thighs for approximately 9 months  Multiplanar multiecho sequences of the lumbar spine was performed with the 1 5 [employer] GE magnet  Study is compared to a prior old examination of 5/14/2012  The T12-L1 disc appears normal without abnormal signal on T2 weighting and there are no findings of disc bulges or herniations  L1-2 disc shows signal loss on T2 weighting compatible with degenerative disc disease  No disc bulging or herniation is seen  The intervertebral foramen are unremarkable  The L2-3 disc shows signal loss on T2 weighting compatible with degenerative disc disease  There are findings of a central disc herniation that has developed since the prior examination  In addition there is adjacent endplate sclerotic changes that have developed since the prior study  There is mild bilateral foraminal stenosis  There is prominence of the ligamentum flavum bilaterally and findings of borderline canal stenosis, similar to the prior study  The L3-4 disc shows signal loss on T2 weighting compatible with degenerative disc disease  There is mild broad-based disc bulging  There appears to be a small annular tear on the left side laterally  There is bilateral facet arthritis, slightly worse on the left with prominence of ligamentum flavum and findings of a mild canal stenosis  Similar findings are seen on the old MR study  The L4-5 disc shows signal loss on T2 weighting compatible with degenerative disc disease  No disc bulging or herniation is seen  There is bilateral facet arthritis of a moderate degree with prominence of the ligamentum flavum and findings of a moderate canal stenosis  This has progressed since the prior study  The L5-S1 disc appears normal without signal loss on T2 weighting  There is no disc bulging or herniation or stenosis  The conus at the T11-12 level  CONCLUSION:  1   Moderate canal stenosis at L4-5   2  Mild canal stenosis at L3-4   3  Borderline canal stenosis at L2-3   4  Central disc herniation at L2-3   5  Multilevel degenerative disc disease  I have personally reviewed pertinent films in PACS

## 2020-01-08 ENCOUNTER — EVALUATION (OUTPATIENT)
Dept: PHYSICAL THERAPY | Facility: CLINIC | Age: 75
End: 2020-01-08
Payer: MEDICARE

## 2020-01-08 DIAGNOSIS — M48.062 SPINAL STENOSIS OF LUMBAR REGION WITH NEUROGENIC CLAUDICATION: ICD-10-CM

## 2020-01-08 DIAGNOSIS — M54.16 LUMBAR RADICULOPATHY: ICD-10-CM

## 2020-01-08 DIAGNOSIS — M51.26 LUMBAR DISC HERNIATION: ICD-10-CM

## 2020-01-08 PROCEDURE — 97161 PT EVAL LOW COMPLEX 20 MIN: CPT | Performed by: PHYSICAL THERAPIST

## 2020-01-08 NOTE — PROGRESS NOTES
PT Evaluation + Discharge    Today's date: 2020  Patient name: Kai Delaney  : 1945  MRN: [id number]  Referring provider: Shelli Dupont DO  Dx:   Encounter Diagnosis     ICD-10-CM    1  Lumbar disc herniation M51 26 Ambulatory referral to Physical Therapy   2  Lumbar radiculopathy M54 16 Ambulatory referral to Physical Therapy   3  Spinal stenosis of lumbar region with neurogenic claudication M48 062 Ambulatory referral to Physical Therapy                  Assessment  Assessment details: Kai Delaney is a 76 y o  female presenting to outpatient physical therapy at [place] [address] with complaints of LBP  She presents with decreased strength, limited flexibility, decreased tolerance to activity and decreased functional mobility due to Lumbar disc herniation, lumbar radiculopathy, spinal stenosis of lumbar region with neurogenic claudication  She is most concerned about injuring herself at the gym and wants to learn what not to do  She would benefit from skilled PT services in order to address these deficits and reach maximum level of function  Thank you for the referral!  Impairments: abnormal gait, abnormal or restricted ROM, activity intolerance, impaired balance, impaired physical strength, lacks appropriate home exercise program and pain with function  Barriers to therapy: None  Understanding of Dx/Px/POC: excellent  Goals  ST    Independent with HEP in 2 weeks - Met    Plan  Patient would benefit from: skilled PT and PT eval  Planned therapy interventions: abdominal trunk stabilization, ADL retraining, body mechanics training, flexibility, functional ROM exercises, home exercise program, joint mobilization, manual therapy, neuromuscular re-education, postural training, strengthening, stretching, therapeutic activities and therapeutic exercise  Frequency: 2x week  Duration in visits: 1  Treatment plan discussed with: patient        Subjective Evaluation    History of Present Illness  Mechanism of injury: Pt reports having central LBP that is usually only mild, but wants to make sure that she will not hurt herself especially at the gym  She likes to do crossfit  Retired  Enjoys traveling  Will have a ANDRE on 20  Has hx of TKA  Some stiffness when sitting for a while, but as she gets moving, feels better  Not a recurrent problem   Quality of life: excellent    Pain  Current pain ratin  At best pain ratin  At worst pain rating: 3  Quality: dull ache and tight  Aggravating factors: lifting  Progression: no change    Social Support  Lives with: alone    Employment status: not working    Diagnostic Tests  MRI studies: abnormal (Lumbar DDD per pt)  Treatments  No previous or current treatments  Patient Goals  Patient goals for therapy: decreased pain, increased motion and return to sport/leisure activities          Objective     Concurrent Complaints  Negative for night pain and disturbed sleep    Postural Observations  Seated posture: good  Standing posture: good        Palpation   Left   No palpable tenderness to the erector spinae  Right   No palpable tenderness to the erector spinae  Tenderness     Lumbar Spine  No tenderness in the spinous process  Neurological Testing     Sensation     Lumbar   Left   Intact: light touch    Right   Intact: light touch    Reflexes   Left   Patellar (L4): normal (2+)    Right   Patellar (L4): normal (2+)    Active Range of Motion     Lumbar   Normal active range of motion    Passive Range of Motion     Additional Passive Range of Motion Details  Min tightness B hip flexors  Others LE's = WNL  Strength/Myotome Testing     Lumbar   Left   Normal strength    Right   Normal strength    Additional Strength Details  Abdominals = 4/5  Tests   Cervical   Negative vertical compression  Lumbar   Negative vertical compression       Left   Negative passive SLR and slump test      Right   Negative passive SLR and slump test      Ambulation Observational Gait   Gait: within functional limits     General Comments:      Lumbar Comments  Poor lifting mechanics  This was addressed today for proper gym lifting mechanics today  Advised pt to do cross leg stretch L/R as part of HEP       Knee Comments  Hx of TKA  Graphical documentation             Precautions:  None      Manual                                                                                   Exercise Diary                                                                                                                                                                                                                                                                                      Modalities  1/8            Instruction in proper lifting mechanics 5'            Crossed leg stretch L/R supine 10" 3 ea

## 2020-01-13 ENCOUNTER — HOSPITAL ENCOUNTER (OUTPATIENT)
Dept: RADIOLOGY | Facility: CLINIC | Age: 75
Discharge: HOME | End: 2020-01-13
Attending: ANESTHESIOLOGY
Payer: MEDICARE

## 2020-01-13 VITALS
HEART RATE: 53 BPM | DIASTOLIC BLOOD PRESSURE: 79 MMHG | TEMPERATURE: 97.7 F | RESPIRATION RATE: 20 BRPM | OXYGEN SATURATION: 97 % | SYSTOLIC BLOOD PRESSURE: 130 MMHG

## 2020-01-13 DIAGNOSIS — M51.26 LUMBAR DISC HERNIATION: ICD-10-CM

## 2020-01-13 DIAGNOSIS — M54.16 LUMBAR RADICULOPATHY: ICD-10-CM

## 2020-01-13 DIAGNOSIS — M48.062 SPINAL STENOSIS OF LUMBAR REGION WITH NEUROGENIC CLAUDICATION: ICD-10-CM

## 2020-01-13 PROCEDURE — 62323 NJX INTERLAMINAR LMBR/SAC: CPT | Performed by: ANESTHESIOLOGY

## 2020-01-13 RX ORDER — METHYLPREDNISOLONE ACETATE 80 MG/ML
160 INJECTION, SUSPENSION INTRA-ARTICULAR; INTRALESIONAL; INTRAMUSCULAR; PARENTERAL; SOFT TISSUE ONCE
Status: COMPLETED | OUTPATIENT
Start: 2020-01-13 | End: 2020-01-13

## 2020-01-13 RX ORDER — LIDOCAINE HYDROCHLORIDE 10 MG/ML
5 INJECTION, SOLUTION EPIDURAL; INFILTRATION; INTRACAUDAL; PERINEURAL ONCE
Status: COMPLETED | OUTPATIENT
Start: 2020-01-13 | End: 2020-01-13

## 2020-01-13 RX ADMIN — LIDOCAINE HYDROCHLORIDE 3 ML: 10 INJECTION, SOLUTION EPIDURAL; INFILTRATION; INTRACAUDAL; PERINEURAL at 14:34

## 2020-01-13 RX ADMIN — IOHEXOL 1 ML: 300 INJECTION, SOLUTION INTRAVENOUS at 14:34

## 2020-01-13 RX ADMIN — METHYLPREDNISOLONE ACETATE 160 MG: 80 INJECTION, SUSPENSION INTRA-ARTICULAR; INTRALESIONAL; INTRAMUSCULAR; SOFT TISSUE at 14:35

## 2020-01-13 NOTE — H&P
History of Present Illness: The patient is a 76 y o  female who presents with complaints of low back and leg pain  There is no problem list on file for this patient        Past Medical History:   Diagnosis Date    Hyperlipidemia     Overactive bladder        Past Surgical History:   Procedure Laterality Date    HYSTERECTOMY      JOINT REPLACEMENT Right     REPLACEMENT TOTAL KNEE           Current Outpatient Medications:     alendronate (FOSAMAX) 70 mg tablet, , Disp: , Rfl:     amLODIPine (NORVASC) 2 5 mg tablet, , Disp: , Rfl:     ASPIRIN 81 PO, Take by mouth, Disp: , Rfl:     Cholecalciferol (VITAMIN D PO), Take by mouth, Disp: , Rfl:     Glucosamine-Chondroitin--400-375 MG TABS, Take by mouth, Disp: , Rfl:     LORazepam (ATIVAN) 1 mg tablet, Take 1 tablet 60 minutes prior to procedure, may take an additional tablet 30 minutes prior to procedure, needs  day of procedure, Disp: 4 tablet, Rfl: 0    Meloxicam 7 5 MG/5ML SUSP, Take by mouth, Disp: , Rfl:     Mirabegron ER (MYRBETRIQ) 50 MG TB24, Take by mouth, Disp: , Rfl:     Multiple Vitamins-Minerals (MULTIVITAMIN WITH MINERALS) tablet, Take 1 tablet by mouth daily, Disp: , Rfl:     ondansetron (ZOFRAN-ODT) 4 mg disintegrating tablet, Take 1 tablet by mouth every 4 (four) hours as needed for nausea (Patient not taking: Reported on 1/3/2020), Disp: 15 tablet, Rfl: 0    oxyCODONE-acetaminophen (ROXICET) 5-325 mg/5 mL solution, Take by mouth every 4 (four) hours as needed for moderate pain, Disp: , Rfl:     rosuvastatin (CRESTOR) 5 mg tablet, Take 2 5 mg by mouth, Disp: , Rfl:     zolpidem (AMBIEN) 10 mg tablet, , Disp: , Rfl:     Current Facility-Administered Medications:     iohexol (OMNIPAQUE) 300 mg/mL injection 50 mL, 50 mL, Epidural, Once, Arash Fuller DO    lidocaine (PF) (XYLOCAINE-MPF) 1 % injection 5 mL, 5 mL, Other, Once, Arash Fuller DO    methylPREDNISolone acetate (DEPO-MEDROL) injection 160 mg, 160 mg, Epidural, Once, Sunday Hirsch, DO    Allergies   Allergen Reactions    Bupropion        Physical Exam:   General: Awake, Alert, Oriented x 3, Mood and affect appropriate  Respiratory: Respirations even and unlabored  Cardiovascular: Peripheral pulses intact; no edema  Musculoskeletal Exam:  Decreased range of motion lumbar spine    ASA Score: II         Assessment:   1  Lumbar disc herniation    2  Lumbar radiculopathy    3  Spinal stenosis of lumbar region with neurogenic claudication        Plan: LESI to the left 1

## 2020-01-13 NOTE — DISCHARGE INSTRUCTIONS
Epidural Steroid Injection   WHAT YOU NEED TO KNOW:   An epidural steroid injection (ANDRE) is a procedure to inject steroid medicine into the epidural space  The epidural space is between your spinal cord and vertebrae  Steroids reduce inflammation and fluid buildup in your spine that may be causing pain  You may be given pain medicine along with the steroids  ACTIVITY  · Do not drive or operate machinery today  · No strenuous activity today - bending, lifting, etc   · You may resume normal activites starting tomorrow - start slowly and as tolerated  · You may shower today, but no tub baths or hot tubs  · You may have numbness for several hours from the local anesthetic  Please use caution and common sense, especially with weight-bearing activities  CARE OF THE INJECTION SITE  · If you have soreness or pain, apply ice to the area today (20 minutes on/20 minutes off)  · Starting tomorrow, you may use warm, moist heat or ice if needed  · You may have an increase or change in your discomfort for 36-48 hours after your treatment  · Apply ice and continue with any pain medication you have been prescribed  · Notify [facility] if you have any of the following: redness, drainage, swelling, headache, stiff neck or fever above 100°F     SPECIAL INSTRUCTIONS  · Our office will contact you in approximately 7 days for a progress report  MEDICATIONS  · Continue to take all routine medications  · Our office may have instructed you to hold some medications  If you have a problem specifically related to your procedure, please call our office at [phone]  Problems not related to your procedure should be directed to your primary care physician

## 2020-01-20 ENCOUNTER — TELEPHONE (OUTPATIENT)
Dept: PAIN MEDICINE | Facility: CLINIC | Age: 75
End: 2020-01-20

## 2020-01-20 NOTE — TELEPHONE ENCOUNTER
Dr Alin Moreno Procedure F/u (S/p LES to the left F/u LESI #2 1/29  )       Spoke with pt who states that her pain level is a 2 and she got 50% relief from injection   She was thinking of canceling her next injection would like a call from procedure        Follow up on 1/29 LESI #2

## 2020-01-21 NOTE — TELEPHONE ENCOUNTER
S/w pt and pt states she will cancel upcoming procedure for 1/29/2020, advised pt if needs anything or symptoms return call office  Procedure canceled in epic

## 2020-02-12 ENCOUNTER — APPOINTMENT (OUTPATIENT)
Dept: LAB | Facility: HOSPITAL | Age: 75
End: 2020-02-12

## 2020-02-12 ENCOUNTER — TRANSCRIBE ORDERS (OUTPATIENT)
Dept: ADMINISTRATIVE | Facility: HOSPITAL | Age: 75
End: 2020-02-12

## 2020-02-12 DIAGNOSIS — Z01.84 IMMUNITY STATUS TESTING: ICD-10-CM

## 2020-02-12 DIAGNOSIS — Z01.84 IMMUNITY STATUS TESTING: Primary | ICD-10-CM

## 2020-02-12 PROCEDURE — 86735 MUMPS ANTIBODY: CPT

## 2020-02-12 PROCEDURE — 86480 TB TEST CELL IMMUN MEASURE: CPT

## 2020-02-12 PROCEDURE — 86765 RUBEOLA ANTIBODY: CPT

## 2020-02-12 PROCEDURE — 86762 RUBELLA ANTIBODY: CPT

## 2020-02-12 PROCEDURE — 86787 VARICELLA-ZOSTER ANTIBODY: CPT

## 2020-02-12 PROCEDURE — 36415 COLL VENOUS BLD VENIPUNCTURE: CPT

## 2020-02-13 ENCOUNTER — TRANSCRIBE ORDERS (OUTPATIENT)
Dept: PHYSICAL THERAPY | Facility: CLINIC | Age: 75
End: 2020-02-13

## 2020-02-13 ENCOUNTER — EVALUATION (OUTPATIENT)
Dept: PHYSICAL THERAPY | Facility: CLINIC | Age: 75
End: 2020-02-13
Payer: MEDICARE

## 2020-02-13 DIAGNOSIS — M54.2 CERVICALGIA: Primary | ICD-10-CM

## 2020-02-13 LAB
MEV IGG SER QL: NORMAL
MUV IGG SER QL: NORMAL
RUBV IGG SERPL IA-ACNC: >175 IU/ML
VZV IGG SER IA-ACNC: NORMAL

## 2020-02-13 PROCEDURE — 97161 PT EVAL LOW COMPLEX 20 MIN: CPT | Performed by: PHYSICAL THERAPIST

## 2020-02-13 PROCEDURE — 97140 MANUAL THERAPY 1/> REGIONS: CPT | Performed by: PHYSICAL THERAPIST

## 2020-02-13 RX ORDER — CYCLOBENZAPRINE HCL 10 MG
10 TABLET ORAL 3 TIMES DAILY PRN
COMMUNITY
End: 2022-02-17

## 2020-02-13 NOTE — LETTER
2020    Michelle Dailey MD  [address]    Patient: Jada Cerrato   YOB: 1945   Date of Visit: 2020     Encounter Diagnosis     ICD-10-CM    1  Cervicalgia M54 2        Dear Dr Mady Rueda: Thank you for your recent referral of Jada Cerrato  Please review the attached evaluation summary from Yuni's recent visit  Please verify that you agree with the plan of care by signing the attached order  If you have any questions or concerns, please do not hesitate to call  I sincerely appreciate the opportunity to share in the care of one of your patients and hope to have another opportunity to work with you in the near future  Sincerely,    Vanna Velázquez, PT      Referring Provider:      I certify that I have read the below Plan of Care and certify the need for these services furnished under this plan of treatment while under my care  Michelle Dailey MD  [address]  [facility]: [phone]          PT Evaluation     Today's date: 2020  Patient name: Jada Cerrato  : 1945  MRN: [id number]  Referring provider: Veronica Dubon MD  Dx:   Encounter Diagnosis     ICD-10-CM    1  Cervicalgia M54 2                   Assessment  Assessment details: Jada Cerrato is a 76 y o  female presenting to outpatient physical therapy at [facility] with complaints of L cervical pain  She presents with decreased range of motion, decreased strength, limited flexibility, poor postural awareness, decreased tolerance to activity and decreased functional mobility due to Cervicalgia (primary encounter diagnosis)  She would benefit from skilled PT services in order to address these deficits and reach maximum level of function    Thank you for the referral!  Impairments: abnormal or restricted ROM, activity intolerance, impaired physical strength, lacks appropriate home exercise program, pain with function and poor posture Barriers to therapy: None  Understanding of Dx/Px/POC: excellent  Goals  ST  Independent with HEP in 2 weeks  2  Increase cervical AROM to WNL all motions in 3 weeks   3  Good postural awareness in 2 weeks    LT  Achieve FOTO score of 67/100 in 6 weeks   2  Able to return to the gym and sleep without cervical pain in 6 weeks  3  Strength traps B = 5/5 in 6 weeks      Plan  Patient would benefit from: skilled PT  Planned modality interventions: cryotherapy, TENS and thermotherapy: hydrocollator packs  Planned therapy interventions: ADL retraining, flexibility, functional ROM exercises, home exercise program, joint mobilization, manual therapy, neuromuscular re-education, postural training, strengthening, stretching, therapeutic activities and therapeutic exercise  Frequency: 2x week  Duration in weeks: 6  Treatment plan discussed with: patient        Subjective Evaluation    History of Present Illness  Mechanism of injury: Pt reports having R>L cervical pain since 20 after lifting weights at the gym  Went to urgent care on 20 due to pain and had x-rays that showed DJD  Since that time pain is getting better  Not sleeping well due to pain  Unable to exercise at the gym due to pain  Concerned about being able to turn her head to drive  No prior hx of cervical strain  Not a recurrent problem   Quality of life: excellent    Pain  Current pain ratin  At best pain ratin  At worst pain ratin  Quality: tight  Aggravating factors: lifting  Progression: improved    Social Support  Lives with: alone    Employment status: not working    Diagnostic Tests  X-ray: abnormal (DJD)  Treatments  No previous or current treatments  Patient Goals  Patient goals for therapy: increased strength, return to sport/leisure activities, decreased pain and increased motion          Objective     Concurrent Complaints  Positive for disturbed sleep   Negative for night pain and dizziness    Static Posture     Head  Forward  Shoulders  Rounded  Postural Observations  Seated posture: fair  Standing posture: fair  Correction of posture: makes symptoms better        Palpation   Left   Muscle spasm in the levator scapulae  Tenderness of the levator scapulae  Tenderness   Cervical Spine   Tenderness in the facet joint (L C5-7)  No tenderness in the spinous process  Neurological Testing     Sensation   Cervical/Thoracic   Left   Intact: light touch    Right   Intact: light touch    Reflexes   Left   Biceps (C5/C6): normal (2+)    Right   Biceps (C5/C6): normal (2+)    Active Range of Motion   Cervical/Thoracic Spine       Cervical    Flexion:  with pain Restriction level: moderate  Extension:  with pain Restriction level: moderate  Left lateral flexion:  with pain Restriction level: maximal  Right lateral flexion:  with pain Restriction level maximal  Left rotation:  with pain Restriction level: maximal  Right rotation:  with pain Restriction level: maximal    Joint Play     Hypomobile: C3, C4, C5, C6 and C7     Pain: C5, C6 and C7     Strength/Myotome Testing     Left Shoulder     Planes of Motion   Flexion: 5   Extension: 5   Abduction: 5   External rotation at 0°:  5   Internal rotation at 0°:  5     Isolated Muscles   Middle trapezius: 4     Right Shoulder     Planes of Motion   Flexion: 5   Extension: 5   Abduction: 5   External rotation at 0°:  5   Internal rotation at 0°:  5     Isolated Muscles   Middle trapezius: 4+     Left Elbow   Flexion: 5  Extension: 5    Right Elbow   Flexion: 5  Extension: 5    Tests   Cervical   Negative vertical compression and alar ligament test      Left Shoulder   Negative ULTT1  Right Shoulder   Negative ULTT1  Lumbar   Negative vertical compression  Graphical documentation              Daily Treatment Diary     Dx:    1   Cervicalgia      POC EXPIRES On:  3/26/20  PRECAUTIONS:  None  CO-MORBIDITES:  None  PERSONAL FACTORS:  None    Manual  2/13 Cervical rotational mobs 5'            STM L UT and cervical paraspinals 10'                                                       Exercise Diary  2/13            Seated chin tuck with B scap retraction 5" 5            L levator scap stretch 10" 5                                                                                                                                                                                                                                                          Modalities

## 2020-02-13 NOTE — PROGRESS NOTES
PT Evaluation     Today's date: 2020  Patient name: Marlen Martin  : 1945  MRN: [id number]  Referring provider: Charli Barrientos MD  Dx:   Encounter Diagnosis     ICD-10-CM    1  Cervicalgia M54 2                   Assessment  Assessment details: Marlen Martin is a 76 y o  female presenting to outpatient physical therapy at [place] [address] with complaints of L cervical pain  She presents with decreased range of motion, decreased strength, limited flexibility, poor postural awareness, decreased tolerance to activity and decreased functional mobility due to Cervicalgia (primary encounter diagnosis)  She would benefit from skilled PT services in order to address these deficits and reach maximum level of function  Thank you for the referral!  Impairments: abnormal or restricted ROM, activity intolerance, impaired physical strength, lacks appropriate home exercise program, pain with function and poor posture   Barriers to therapy: None  Understanding of Dx/Px/POC: excellent  Goals  ST  Independent with HEP in 2 weeks  2  Increase cervical AROM to WNL all motions in 3 weeks   3  Good postural awareness in 2 weeks    LT  Achieve FOTO score of 67/100 in 6 weeks   2  Able to return to the gym and sleep without cervical pain in 6 weeks  3    Strength traps B = 5/5 in 6 weeks      Plan  Patient would benefit from: skilled PT  Planned modality interventions: cryotherapy, TENS and thermotherapy: hydrocollator packs  Planned therapy interventions: ADL retraining, flexibility, functional ROM exercises, home exercise program, joint mobilization, manual therapy, neuromuscular re-education, postural training, strengthening, stretching, therapeutic activities and therapeutic exercise  Frequency: 2x week  Duration in weeks: 6  Treatment plan discussed with: patient        Subjective Evaluation    History of Present Illness  Mechanism of injury: Pt reports having R>L cervical pain since 20 after lifting weights at the gym  Went to urgent care on 20 due to pain and had x-rays that showed DJD  Since that time pain is getting better  Not sleeping well due to pain  Unable to exercise at the gym due to pain  Concerned about being able to turn her head to drive  No prior hx of cervical strain  Not a recurrent problem   Quality of life: excellent    Pain  Current pain ratin  At best pain ratin  At worst pain ratin  Quality: tight  Aggravating factors: lifting  Progression: improved    Social Support  Lives with: alone    Employment status: not working    Diagnostic Tests  X-ray: abnormal (DJD)  Treatments  No previous or current treatments  Patient Goals  Patient goals for therapy: increased strength, return to sport/leisure activities, decreased pain and increased motion          Objective     Concurrent Complaints  Positive for disturbed sleep  Negative for night pain and dizziness    Static Posture     Head  Forward  Shoulders  Rounded  Postural Observations  Seated posture: fair  Standing posture: fair  Correction of posture: makes symptoms better        Palpation   Left   Muscle spasm in the levator scapulae  Tenderness of the levator scapulae  Tenderness   Cervical Spine   Tenderness in the facet joint (L C5-7)  No tenderness in the spinous process       Neurological Testing     Sensation   Cervical/Thoracic   Left   Intact: light touch    Right   Intact: light touch    Reflexes   Left   Biceps (C5/C6): normal (2+)    Right   Biceps (C5/C6): normal (2+)    Active Range of Motion   Cervical/Thoracic Spine       Cervical    Flexion:  with pain Restriction level: moderate  Extension:  with pain Restriction level: moderate  Left lateral flexion:  with pain Restriction level: maximal  Right lateral flexion:  with pain Restriction level maximal  Left rotation:  with pain Restriction level: maximal  Right rotation:  with pain Restriction level: maximal    Joint Play     Hypomobile: C3, C4, C5, C6 and C7     Pain: C5, C6 and C7     Strength/Myotome Testing     Left Shoulder     Planes of Motion   Flexion: 5   Extension: 5   Abduction: 5   External rotation at 0°: 5   Internal rotation at 0°: 5     Isolated Muscles   Middle trapezius: 4     Right Shoulder     Planes of Motion   Flexion: 5   Extension: 5   Abduction: 5   External rotation at 0°: 5   Internal rotation at 0°: 5     Isolated Muscles   Middle trapezius: 4+     Left Elbow   Flexion: 5  Extension: 5    Right Elbow   Flexion: 5  Extension: 5    Tests   Cervical   Negative vertical compression and alar ligament test      Left Shoulder   Negative ULTT1  Right Shoulder   Negative ULTT1  Lumbar   Negative vertical compression  Graphical documentation              Daily Treatment Diary     Dx:    1   Cervicalgia      POC EXPIRES On:  3/26/20  PRECAUTIONS:  None  CO-MORBIDITES:  None  PERSONAL FACTORS:  None    Manual  2/13            Cervical rotational mobs 5'            STM L UT and cervical paraspinals 10'                                                       Exercise Diary  2/13            Seated chin tuck with B scap retraction 5" 5            L levator scap stretch 10" 5                                                                                                                                                                                                                                                          Modalities

## 2020-02-14 LAB
GAMMA INTERFERON BACKGROUND BLD IA-ACNC: 0.02 IU/ML
M TB IFN-G BLD-IMP: NEGATIVE
M TB IFN-G CD4+ BCKGRND COR BLD-ACNC: 0.01 IU/ML
M TB IFN-G CD4+ BCKGRND COR BLD-ACNC: 0.01 IU/ML
MITOGEN IGNF BCKGRD COR BLD-ACNC: >10 IU/ML

## 2020-02-19 ENCOUNTER — EVALUATION (OUTPATIENT)
Dept: PHYSICAL THERAPY | Facility: CLINIC | Age: 75
End: 2020-02-19
Payer: MEDICARE

## 2020-02-19 DIAGNOSIS — M54.2 CERVICALGIA: Primary | ICD-10-CM

## 2020-02-19 PROCEDURE — 97112 NEUROMUSCULAR REEDUCATION: CPT | Performed by: PHYSICAL THERAPIST

## 2020-02-19 PROCEDURE — 97140 MANUAL THERAPY 1/> REGIONS: CPT | Performed by: PHYSICAL THERAPIST

## 2020-02-19 NOTE — PROGRESS NOTES
Daily Note     Today's date: 2020  Patient name: Jada Cerrato  : 1945  MRN: [id number]  Referring provider: Veronica Dubon MD  Dx:   Encounter Diagnosis     ICD-10-CM    1  Cervicalgia M54 2                   Subjective:  Pt reports min more B cervical pain now that she is done taking her prednisone  Felt OK after IE last week  Objective:  See treatment diary below      Assessment:  Pt presented to outpatient physical therapy at [place] [address] with complaints of L cervical pain  She presented with decreased range of motion, decreased strength, limited flexibility, poor postural awareness, decreased tolerance to activity and decreased functional mobility due to Cervicalgia (primary encounter diagnosis)  She will continue to benefit from skilled PT services in order to address these deficits and reach maximum level of function  Pt had mod less tightness post manual tx  Good tolerance of new TE  Plan: Add more postural strengthening  Last scheduled visit on 20  Daily Treatment Diary     Dx:    1   Cervicalgia      POC EXPIRES On:  3/26/20  PRECAUTIONS:  None  CO-MORBIDITES:  None  PERSONAL FACTORS:  None    Manual             Cervical rotational mobs 5' 5'           STM L UT and cervical paraspinals 10' 10' B                                                      Exercise Diary             Seated chin tuck with B scap retraction 5" 5 Supine 5" 10           L levator scap stretch 10" 5 10" 5           Retro UBE  L4 5'           T-band rows B  Blue 20           T-band LPD B  Blue 20           Doorway pec stretch  15" 5           Supine B UE slides OH with shoulder ER  15                                                                                                                                                                                        Modalities

## 2020-02-21 ENCOUNTER — EVALUATION (OUTPATIENT)
Dept: PHYSICAL THERAPY | Facility: CLINIC | Age: 75
End: 2020-02-21
Payer: MEDICARE

## 2020-02-21 DIAGNOSIS — M54.2 CERVICALGIA: Primary | ICD-10-CM

## 2020-02-21 PROCEDURE — 97112 NEUROMUSCULAR REEDUCATION: CPT | Performed by: PHYSICAL THERAPIST

## 2020-02-21 PROCEDURE — 97140 MANUAL THERAPY 1/> REGIONS: CPT | Performed by: PHYSICAL THERAPIST

## 2020-02-21 PROCEDURE — 97110 THERAPEUTIC EXERCISES: CPT | Performed by: PHYSICAL THERAPIST

## 2020-02-21 NOTE — PROGRESS NOTES
Daily Note / Discharge    Today's date: 2020  Patient name: Meagan Knight  : 1945  MRN: [id number]  Referring provider: Yonatan Olmedo MD  Dx:   Encounter Diagnosis     ICD-10-CM    1  Cervicalgia M54 2         Addendum 3/18/20:  Pt continuing HEP and will call if mor PT is needed  Subjective:  Pt reports feeling better, but still having difficulty sleeping because she sleeps prone and can't turn her head fully yet  Objective:  See treatment diary below      Assessment:  Pt presented to outpatient physical therapy at [place] [address] with complaints of L cervical pain  She presented with decreased range of motion, decreased strength, limited flexibility, poor postural awareness, decreased tolerance to activity and decreased functional mobility due to cervicalgia (primary encounter diagnosis)  She will continue to benefit from skilled PT services in order to address these deficits and reach maximum level of function  Pt had mod less tightness today with good response to manual tx, but still min L sided cervical tightness that is limiting full cervical AROM  Plan: Add more postural strengthening  Pt to call for more PT after she returns from [place] trip next week  Daily Treatment Diary     Dx:    1   Cervicalgia      POC EXPIRES On:  3/26/20  PRECAUTIONS:  None  CO-MORBIDITES:  None  PERSONAL FACTORS:  None    Manual            Cervical rotational mobs 5' 5' 5'          STM L UT and cervical paraspinals 10' 10' B 10' B                                                     Exercise Diary            Seated chin tuck with B scap retraction 5" 5 Supine 5" 10 Supine 5" 10          L levator scap stretch 10" 5 10" 5 10" 5          Retro UBE  L4 5' L5 5'          T-band rows B  Blue 20 Blue 20          T-band LPD B  Blue 20 Blue 20          Doorway pec stretch  15" 5 15" 5          Supine B UE slides OH with shoulder ER  15 15 Modalities

## 2020-12-30 ENCOUNTER — APPOINTMENT (OUTPATIENT)
Dept: RADIOLOGY | Facility: CLINIC | Age: 75
End: 2020-12-30
Payer: MEDICARE

## 2020-12-30 ENCOUNTER — OFFICE VISIT (OUTPATIENT)
Dept: URGENT CARE | Facility: CLINIC | Age: 75
End: 2020-12-30
Payer: MEDICARE

## 2020-12-30 VITALS
BODY MASS INDEX: 31.41 KG/M2 | HEIGHT: 60 IN | WEIGHT: 160 LBS | OXYGEN SATURATION: 97 % | DIASTOLIC BLOOD PRESSURE: 78 MMHG | RESPIRATION RATE: 18 BRPM | HEART RATE: 62 BPM | SYSTOLIC BLOOD PRESSURE: 184 MMHG | TEMPERATURE: 97.4 F

## 2020-12-30 DIAGNOSIS — M25.571 ACUTE RIGHT ANKLE PAIN: ICD-10-CM

## 2020-12-30 DIAGNOSIS — M77.51 RETROCALCANEAL BURSITIS (BACK OF HEEL), RIGHT: Primary | ICD-10-CM

## 2020-12-30 PROCEDURE — 73610 X-RAY EXAM OF ANKLE: CPT

## 2020-12-30 PROCEDURE — 99213 OFFICE O/P EST LOW 20 MIN: CPT | Performed by: FAMILY MEDICINE

## 2020-12-30 PROCEDURE — G0463 HOSPITAL OUTPT CLINIC VISIT: HCPCS | Performed by: FAMILY MEDICINE

## 2020-12-30 RX ORDER — FLUOROURACIL 50 MG/G
CREAM TOPICAL
COMMUNITY
Start: 2020-11-18 | End: 2022-03-24

## 2021-01-08 ENCOUNTER — IMMUNIZATIONS (OUTPATIENT)
Dept: FAMILY MEDICINE CLINIC | Facility: HOSPITAL | Age: 76
End: 2021-01-08

## 2021-01-08 DIAGNOSIS — Z23 ENCOUNTER FOR IMMUNIZATION: ICD-10-CM

## 2021-01-08 PROCEDURE — 0011A SARS-COV-2 / COVID-19 MRNA VACCINE (MODERNA) 100 MCG: CPT

## 2021-01-08 PROCEDURE — 91301 SARS-COV-2 / COVID-19 MRNA VACCINE (MODERNA) 100 MCG: CPT

## 2021-02-04 ENCOUNTER — IMMUNIZATIONS (OUTPATIENT)
Dept: FAMILY MEDICINE CLINIC | Facility: HOSPITAL | Age: 76
End: 2021-02-04

## 2021-02-04 DIAGNOSIS — Z23 ENCOUNTER FOR IMMUNIZATION: Primary | ICD-10-CM

## 2021-02-04 PROCEDURE — 0012A SARS-COV-2 / COVID-19 MRNA VACCINE (MODERNA) 100 MCG: CPT

## 2021-02-04 PROCEDURE — 91301 SARS-COV-2 / COVID-19 MRNA VACCINE (MODERNA) 100 MCG: CPT

## 2021-03-19 ENCOUNTER — TRANSCRIBE ORDERS (OUTPATIENT)
Dept: ADMINISTRATIVE | Facility: HOSPITAL | Age: 76
End: 2021-03-19

## 2021-03-19 DIAGNOSIS — Z78.0 MENOPAUSE: Primary | ICD-10-CM

## 2021-04-02 ENCOUNTER — OFFICE VISIT (OUTPATIENT)
Dept: PODIATRY | Facility: CLINIC | Age: 76
End: 2021-04-02
Payer: MEDICARE

## 2021-04-02 VITALS
WEIGHT: 158.8 LBS | HEART RATE: 65 BPM | DIASTOLIC BLOOD PRESSURE: 76 MMHG | BODY MASS INDEX: 31.18 KG/M2 | HEIGHT: 60 IN | SYSTOLIC BLOOD PRESSURE: 111 MMHG

## 2021-04-02 DIAGNOSIS — M67.88 BILATERAL ACHILLES TENDONOSIS: Primary | ICD-10-CM

## 2021-04-02 PROCEDURE — 99203 OFFICE O/P NEW LOW 30 MIN: CPT | Performed by: PODIATRIST

## 2021-04-02 RX ORDER — ASCORBIC ACID 500 MG
500 TABLET ORAL DAILY
COMMUNITY
End: 2022-02-17

## 2021-04-02 NOTE — PATIENT INSTRUCTIONS
Ankle Exercises   AMBULATORY CARE:   What you need to know about ankle exercises: Ankle exercises help strengthen your ankle and improve its function after injury  These are beginning exercises  Ask your healthcare provider if you need to see a physical therapist for more advanced exercises  · Do these exercises 3 to 5 days a week , or as directed by your healthcare provider  Ask if you should perform the exercises on each ankle  · Do the exercises in the order that your healthcare provider recommends  This will help prevent swelling, chronic pain, and reinjury  Start with range of motion exercises  Then progress to strengthening exercises, and finally to balancing exercises  · Warm up before you do ankle exercises  Walk or ride a stationary bike for 5 to 10 minutes to prepare your ankle for movement  · Stop if you feel pain  It is normal to feel some discomfort at first  Regular exercise will help decrease your discomfort over time  How to perform range of motion exercises safely:  Begin with range of motion exercises to improve flexibility  Ask your healthcare provider when you can progress to strengthening exercises  · Ankle alphabet:  Sit on a chair so that your feet do not touch the floor  Use your big toe to write each letter of the alphabet  Use only your foot and ankle, and keep your movements small  Do 2 sets  · Calf stretches:      ? Sitting calf stretches with a towel:  Sit on the floor with both legs out straight in front of you  Loop a towel around the ball of your injured foot  Grasp the ends of the towel and pull it toward you  Keep your leg and back straight  Do not lean forward as you pull the towel  Hold for 30 seconds  Then relax for 30 seconds  Do 2 sets of 10          ? Standing calf stretches:  Stand facing a wall with the foot that is not injured forward and your knee slightly bent   Keep the leg with the injured foot straight and behind you with your toes pointed in slightly  With both heels flat on the floor, press your hips forward  Do not arch your back  Hold for 30 seconds, and then relax for 30 seconds  Do 2 sets of 10  Repeat with your leg bent  Do 2 sets of 10  How to perform strengthening exercises safely:  After you can perform range of motion exercises without pain, you may begin strengthening exercises  Ask your healthcare provider when you can progress to balancing exercises  · Ankle movement in 4 directions:  Sit on the floor with your legs straight in front of you  Keep your heels on the floor for support  ? Dorsiflexion:  Begin with your toes pointing straight up  Pull your toes toward your body  Slowly return to the starting position  Do 3 sets of 5      ? Plantar flexion:  Begin with your toes pointing straight up  Push your toes away from your body  Slowly return to the starting position  Do 3 sets of 5          ? Inversion:  Begin with your toes pointing straight up  Push your toes inward, toward each other  Slowly return to the starting position  Do 3 sets of 5      ? Eversion:  Begin with your toes pointing straight up  Push your toes outward, away from each other  Slowly return to the starting position  Do 3 sets of 5        · Toe curls with a towel:  Sit on a chair so that both of your feet are flat on the floor  Place a small towel on the floor in front of your injured foot  Grab the center of the towel with your toes and curl the towel toward you  Relax and repeat  Do 1 set of 5          · [place] pick-ups:  Sit on a chair so that both of your feet are flat on the floor  Place 20 marbles on the floor in front of your injured foot  Use your toes to  one marble at a time and place it into a bowl  Repeat until you have picked up all the marbles  Do 1 set  · Heel raises:      ? Single leg heel raises:  Stand with your weight evenly on both feet  Hold on to a chair or a wall for balance   Lift the foot that is not injured off the floor so all your weight is placed on your injured foot  Raise the heel of your injured foot as high as you can  Slowly lower your heel to the floor  Do 1 set of 10          ? Double leg heel raises:  Stand with your weight evenly on both feet  Hold on to a chair or a wall for balance  Raise both of your heels as high as you can  Slowly lower your heels to the floor  Do 1 set of 10  · Heel and toe walks:      ? Heel walks:  Begin in a standing position  Lift your toes off the floor and walk on your heels  Keep your toes lifted as high as possible  Do 2 sets of 10          ? Toe walks:  Begin in a standing position  Lift your heels off the floor and walk on the balls and toes of your feet  Keep your heels lifted as high as possible  Do 2 sets of 10  How to perform a balance exercise safely:  After you can perform strengthening exercises without pain, you may do this beginning balancing exercise  Ask your healthcare provider for more advanced balance exercises  · Single leg stance:  Stand with your weight evenly on both feet, or hold on to a chair or a wall  Do not lean to the side  Lift the foot that is not injured off the floor so all your weight is placed on your injured foot  Balance on your injured foot  Ask your healthcare provider how long to hold this position  Contact your healthcare provider if:   · Your pain becomes worse  · You have new pain  · You have questions or concerns about your condition, care, or exercise program     © Copyright [address] Information is for End User's use only and may not be sold, redistributed or otherwise used for commercial purposes  All illustrations and images included in CareNotes® are the copyrighted property of [employer]  or [facility] [employer]   The above information is an  only  It is not intended as medical advice for individual conditions or treatments   Talk to your doctor, nurse or pharmacist before following any medical regimen to see if it is safe and effective for you

## 2021-04-02 NOTE — PROGRESS NOTES
Assessment/Plan:         Diagnoses and all orders for this visit:    Bilateral Achilles tendonosis  -     Ambulatory referral to Physical Therapy; Future    Other orders  -     ascorbic acid (VITAMIN C) 500 MG tablet; Take 500 mg by mouth daily      diagnosis and options discussed  Patient has chronic thickening at the watershed area of bilateral Achilles tendons  This has been present from quite some time and does not represent an acute tearing but likely more chronic degeneration of the tendon  Options discussed with patient  Aggressive physical therapy recommended  Proper shoe gear discussed  Consider a heel lift  Check progress in 6-8 weeks  We did discuss possibility of an MRI to evaluate the amount of degeneration of the tendon but the only realistic use of this would be if surgical intervention is necessary  At this point she would like to avoid surgery as what I  I am recommending conservative care  If we are not seeing any progress this may need a more aggressive surgical plan  Subjective:      Patient ID: Romario Roy is a 76 y o  female  Patient presents with right ankle pain  It began approximately 1 month ago  It is exacerbated with exercise and walking  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   She noticed some some pain and achiness as long with swelling at the back of her Achilles tendon on the right  She went to urgent care on December 30, 2020  An x-ray was taken which did show some edema but no acute bony deformity  She was  Recommended topical anti-inflammatories, diclofenac, and told to follow-up with her primary care physician if symptoms did not improve  She does have medical history significant for lumbar spine disease, osteoarthritis of knees, cervical spine pain  She does seen pain management for some of her spinal pain issues  She has gone to physical therapy multiple times    She is on chronic pain medication including oxycodone, cyclobenzaprine, meloxicam   She denies any trauma to the ankle  She does not smoke  She now has it in both ankles, the left is getting worse, the right is stable but still painful  The left now has a lump in the achilles  The following portions of the patient's history were reviewed and updated as appropriate: allergies, current medications, past family history, past medical history, past social history, past surgical history and problem list     Review of Systems   Constitutional: Negative  HENT: Negative for sinus pressure and sinus pain  Respiratory: Negative for cough and shortness of breath  Cardiovascular: Negative for chest pain and leg swelling  Gastrointestinal: Negative for constipation, diarrhea and vomiting  Musculoskeletal: Positive for arthralgias, joint swelling and myalgias  Skin: Negative for color change and wound  Neurological: Negative for weakness and numbness  Objective:      /76   Pulse 65   Ht 5' (1 524 m) Comment: verbal  Wt 72 kg (158 lb 12 8 oz)   BMI 31 01 kg/m²          Physical Exam      Vitals reviewed    Constitutional: Patient is not distressed  Patient is well developed  Patient is obese  Vascular: Dorsalis pedis and posterior tibial pulses palpable  Capillary refill time within normal limits to all digits  No erythema  No edema  No significant varicosities  Dermatology: No rash  No open lesions  Present pedal hair  Skin has healthy turgor  Musculoskeletal: Normal range of motion to ankle, subtalar joint, and midtarsal joint  Normal range of motion first MTPJ  Manual muscle testing 5 out of 5 for inversion/eversion/dorsiflexion/plantarflexion  On stance patients feet are generally rectus  Palpable lump in watershed area B/L achilles with tenderness  Pain with pressure to the tendon but not with PF  No equinus, normal sanabria test B/L       Neurological: Monofilament sensation is intact  Vibratory sensation is intact  Achilles reflex is normal    Proprioception is normal    Respiratory: Normal respiratory effort, no distress    Psych: Patient is AAOx3  Normal mood  Lymphatic: nonpalpable popliteal lymph nodes  Nonpalpable groin lymph nodes            XRay 3 views of the right ankle personally read by Dr Carlin Sierra in office today and discussed with patient: (XR from 12/30/2020)  1  No bony deformity  2  No specific calcaneal spurring  3  Mild edema distal achilles tendon noted

## 2021-04-05 ENCOUNTER — HOSPITAL ENCOUNTER (OUTPATIENT)
Dept: BONE DENSITY | Facility: CLINIC | Age: 76
Discharge: HOME | End: 2021-04-05
Payer: MEDICARE

## 2021-04-05 DIAGNOSIS — Z78.0 MENOPAUSE: ICD-10-CM

## 2021-04-05 PROCEDURE — 77080 DXA BONE DENSITY AXIAL: CPT

## 2021-04-19 ENCOUNTER — EVALUATION (OUTPATIENT)
Dept: PHYSICAL THERAPY | Facility: CLINIC | Age: 76
End: 2021-04-19
Payer: MEDICARE

## 2021-04-19 DIAGNOSIS — M67.88 BILATERAL ACHILLES TENDONOSIS: ICD-10-CM

## 2021-04-19 PROCEDURE — 97140 MANUAL THERAPY 1/> REGIONS: CPT | Performed by: PHYSICAL THERAPIST

## 2021-04-19 PROCEDURE — 97161 PT EVAL LOW COMPLEX 20 MIN: CPT | Performed by: PHYSICAL THERAPIST

## 2021-04-19 NOTE — PROGRESS NOTES
PT Evaluation     Today's date: 2021  Patient name: Michelle Alvarado  : 1945  MRN: [id number]  Referring provider: LEWIS Rankin  Dx:   Encounter Diagnosis     ICD-10-CM    1  Bilateral Achilles tendonosis  M67 88 Ambulatory referral to Physical Therapy                  Assessment  Assessment details: Michelle Alvarado is a 76 y o  female presenting to outpatient physical therapy at [place] [address] with complaints of R>L achilles pain  She presents with decreased intrinsic foot strength, limited flexibility, decreased tolerance to activity and decreased functional mobility due to B achilles tendinitis  She would benefit from skilled PT services in order to address these deficits and reach maximum level of function  Thank you for the referral!  Impairments: abnormal gait, abnormal or restricted ROM, activity intolerance, impaired physical strength, lacks appropriate home exercise program and pain with function  Barriers to therapy: None  Understanding of Dx/Px/POC: excellent  Goals  ST  Independent with HEP in 2 weeks  2  Increase B achilles flexibility to WNL in 3 weeks     LT  Achieve FOTO score of 65/100 in 6 weeks   2  Able to walk x 30 min for exercise without achilles pain in 6 weeks  3    Strength B foot intrinsics = 5/5 in 6 weeks      Plan  Patient would benefit from: skilled PT and PT eval  Planned modality interventions: cryotherapy, TENS and thermotherapy: hydrocollator packs  Other planned modality interventions: laser  Planned therapy interventions: abdominal trunk stabilization, ADL retraining, balance/weight bearing training, flexibility, functional ROM exercises, home exercise program, joint mobilization, manual therapy, neuromuscular re-education, postural training, strengthening, stretching, therapeutic activities, therapeutic exercise and gait training  Frequency: 2x week  Duration in weeks: 6  Treatment plan discussed with: patient        Subjective Evaluation    History of Present Illness  Mechanism of injury: Pt reports having R>L achilles pain for about 3 1/2 months without cause  Has been limited significantly in her walking  No problems in general health  Has hx of B TKA with success  Not a recurrent problem   Quality of life: good    Pain  Current pain ratin  At best pain ratin  At worst pain ratin  Quality: tight and dull ache  Aggravating factors: walking  Progression: worsening    Social Support  Steps to enter house: no  Stairs in house: no   Lives in: multiple-level home  Lives with: alone    Employment status: not working    Diagnostic Tests  X-ray: abnormal (Thickening R achilles)  Treatments  No previous or current treatments  Patient Goals  Patient goals for therapy: increased strength, return to sport/leisure activities, independence with ADLs/IADLs, decreased pain and increased motion          Objective     Observations   Left Ankle/Foot   Negative for edema  Right Ankle/Foot   Negative for edema  Tenderness   Left Ankle/Foot   Tenderness in the Achilles insertion  Right Ankle/Foot   Tenderness in the Achilles insertion  Additional Tenderness Details  Min tenderness L , severe R achilles tendon  Neurological Testing     Sensation     Ankle/Foot   Left Ankle/Foot   Intact: light touch    Right Ankle/Foot   Intact: light touch     Active Range of Motion   Left Ankle/Foot   Normal active range of motion    Right Ankle/Foot   Normal active range of motion    Strength/Myotome Testing     Left Ankle/Foot   Normal strength    Right Ankle/Foot   Normal strength    Additional Strength Details  Foot intrinsics = 4-/5 B       Ambulation   Weight-Bearing Status   Weight-Bearing Status (Left): full weight bearing   Weight-Bearing Status (Right): full weight-bearing    Assistive device used: none    Ambulation: Level Surfaces   Ambulation without assistive device: independent    Ambulation: Stairs   Ascend stairs: independent  Pattern: non-reciprocal  Railings: one rail  Descend stairs: independent  Pattern: non-reciprocal  Railings: one rail  Curbs: unable    Observational Gait   Gait: within functional limits   Decreased walking speed  Comments   Mod pes planus B               POC EXPIRES On:  5/31/21  PRECAUTIONS:  B TKA  CO-MORBIDITES:  None  PERSONAL FACTORS:  None      Manuals HEP 4/19           STM B achilles in prone R>L  10'                                                  Neuro Re-Ed                                                                                                Ther Ex    Gastroc stretch L/R 4/19            Soleus stretch L/R 4/19            Toe splaying L/R 4/19            Toe flexion L/R 4/19                                                                Ther Activity                              Gait Training                              Modalities

## 2021-04-22 ENCOUNTER — EVALUATION (OUTPATIENT)
Dept: PHYSICAL THERAPY | Facility: CLINIC | Age: 76
End: 2021-04-22
Payer: MEDICARE

## 2021-04-22 DIAGNOSIS — M67.88 BILATERAL ACHILLES TENDONOSIS: Primary | ICD-10-CM

## 2021-04-22 PROCEDURE — 97140 MANUAL THERAPY 1/> REGIONS: CPT | Performed by: PHYSICAL THERAPIST

## 2021-04-22 NOTE — PROGRESS NOTES
Daily Note     Today's date: 2021  Patient name: Lyssa Don  : 1945  MRN: [id number]  Referring provider: LEWIS Arredondo  Dx:   Encounter Diagnosis     ICD-10-CM    1  Bilateral Achilles tendonosis  M67 88                   Subjective:  Pt reports still having difficulty walking due to achilles pain B  Doing HEP, but not much more  Objective:  See treatment diary below      Assessment:  Pt presented to outpatient physical therapy at [place] [address] with complaints of R>L achilles pain  She presented with decreased intrinsic foot strength, limited flexibility, decreased tolerance to activity and decreased functional mobility due to B achilles tendinitis  She will continue to benefit from skilled PT services in order to address these deficits and reach maximum level of function  Mod less tightness post manual tx today with less tenderness R achilles  She needs to be more active at home vs only sitting all day to avoid tightening of B achilles  Plan:  Continue STM and foot strengthening  Add B LE balance          POC EXPIRES On:  21  PRECAUTIONS:  B TKA  CO-MORBIDITES:  None  PERSONAL FACTORS:  None      Manuals HEP           STM B achilles in prone R>L  10' 23'                                                 Neuro Re-Ed                                                                                                Ther Ex    Gastroc stretch L/R   1'          Soleus stretch L/R   1'          Toe splaying L/R   1'          Toe flexion L/R   1'                                                              Ther Activity                              Gait Training                              Modalities

## 2021-04-27 ENCOUNTER — EVALUATION (OUTPATIENT)
Dept: PHYSICAL THERAPY | Facility: CLINIC | Age: 76
End: 2021-04-27
Payer: MEDICARE

## 2021-04-27 DIAGNOSIS — M67.88 BILATERAL ACHILLES TENDONOSIS: Primary | ICD-10-CM

## 2021-04-27 PROCEDURE — 97140 MANUAL THERAPY 1/> REGIONS: CPT | Performed by: PHYSICAL THERAPIST

## 2021-04-27 PROCEDURE — 97112 NEUROMUSCULAR REEDUCATION: CPT | Performed by: PHYSICAL THERAPIST

## 2021-04-27 NOTE — PROGRESS NOTES
Daily Note     Today's date: 2021  Patient name: Enedelia Woodard  : 1945  MRN: [id number]  Referring provider: LEWIS Torres  Dx:   Encounter Diagnosis     ICD-10-CM    1  Bilateral Achilles tendonosis  M67 88                   Subjective:  Pt reports more R achilles pain this week  L isn't as bad  Walking is very painful  Objective:  See treatment diary below      Assessment:  Pt presented to outpatient physical therapy at [place] [address] with complaints of R>L achilles pain  She presented with decreased intrinsic foot strength, limited flexibility, decreased tolerance to activity and decreased functional mobility due to B achilles tendinitis  She will continue to benefit from skilled PT services in order to address these deficits and reach maximum level of function  Mod more tenderness, swelling and tightness in R achilles today vs last week without any significant changes to her activity  That tightness and pain was moderately lessened with manual tx today  Good tolerance to addition of balance activities today  Plan:  Continue STM and foot strengthening  Add more B LE balance  If no significant changes in R achilles tendon mobility in 2 weeks, refer back to MD for possible immobilization            POC EXPIRES On:  21  PRECAUTIONS:  B TKA  CO-MORBIDITES:  None  PERSONAL FACTORS:  None      Manuals HEP          STM B achilles in prone R>L  10' 23' 23'                                                Neuro Re-Ed     Tandem walk on foam FWD 12'    2 laps         SLS on black disc L/R    20" 3 ea                                                                          Ther Ex    Gastroc stretch L/R wedge  20" 3 ea         Soleus stretch L/R wedge ' 20" 3 ea         Toe splaying L/R   1' 1'         Toe flexion L/R   1' 1'                                                             Ther Activity                              Gait Training Modalities

## 2021-04-29 ENCOUNTER — OFFICE VISIT (OUTPATIENT)
Dept: PODIATRY | Facility: CLINIC | Age: 76
End: 2021-04-29
Payer: MEDICARE

## 2021-04-29 ENCOUNTER — EVALUATION (OUTPATIENT)
Dept: PHYSICAL THERAPY | Facility: CLINIC | Age: 76
End: 2021-04-29
Payer: MEDICARE

## 2021-04-29 VITALS
HEIGHT: 60 IN | WEIGHT: 161 LBS | SYSTOLIC BLOOD PRESSURE: 128 MMHG | BODY MASS INDEX: 31.61 KG/M2 | HEART RATE: 58 BPM | DIASTOLIC BLOOD PRESSURE: 60 MMHG

## 2021-04-29 DIAGNOSIS — M67.88 BILATERAL ACHILLES TENDONOSIS: Primary | ICD-10-CM

## 2021-04-29 DIAGNOSIS — S86.011A PARTIAL ACHILLES TENDON TEAR, RIGHT, INITIAL ENCOUNTER: Primary | ICD-10-CM

## 2021-04-29 DIAGNOSIS — M67.88 BILATERAL ACHILLES TENDONOSIS: ICD-10-CM

## 2021-04-29 PROCEDURE — 97112 NEUROMUSCULAR REEDUCATION: CPT | Performed by: PHYSICAL THERAPIST

## 2021-04-29 PROCEDURE — 97140 MANUAL THERAPY 1/> REGIONS: CPT | Performed by: PHYSICAL THERAPIST

## 2021-04-29 PROCEDURE — 99213 OFFICE O/P EST LOW 20 MIN: CPT | Performed by: PODIATRIST

## 2021-04-29 NOTE — PROGRESS NOTES
Assessment/Plan:    No problem-specific Assessment & Plan notes found for this encounter  Diagnoses and all orders for this visit:    Partial Achilles tendon tear, right, initial encounter  -     Cam Boot  -     MRI ankle/heel right wo contrast; Future    Bilateral Achilles tendonosis  -     Cam Boot  -     MRI ankle/heel right wo contrast; Future      PAtient   Is not making much progress on her Achilles tendinosis  The right is extremely painful today and I concerned about a significant partial tear at the watershed area of the tendon  She has not reduced any of her activity and only gone to for physical therapy appointments  I told her she needs to significantly reduce activity on this foot and given the amount of pain on the right we should probably place her in a boot to immobilize the tendon for few weeks  I would still like her to perform daily physical therapy exercises but otherwise remain in the boot and reduce from other activity as much as possible  I also recommended an MRI of the right Achilles tendon  She may need surgical intervention if the tendinosis is severe enough  She also may need a period of immobilization in a cast   She is obviously disappointed with this but  Is agreeable to this plan  Subjective:      Patient ID: John Marlow is a 76 y o  female  4/2/21: Patient presents with right ankle pain  It began approximately 1 month ago  It is exacerbated with exercise and walking  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   She noticed some some pain and achiness as long with swelling at the back of her Achilles tendon on the right  She went to urgent care on December 30, 2020  An x-ray was taken which did show some edema but no acute bony deformity    She was  Recommended topical anti-inflammatories, diclofenac, and told to follow-up with her primary care physician if symptoms did not improve      She does have medical history significant for lumbar spine disease, osteoarthritis of knees, cervical spine pain  She does seen pain management for some of her spinal pain issues  She has gone to physical therapy multiple times  She is on chronic pain medication including oxycodone, cyclobenzaprine, meloxicam   She denies any trauma to the ankle  She does not smoke      She now has it in both ankles, the left is getting worse, the right is stable but still painful  The left now has a lump in the achilles  UPDATE 4/29/21: Patient returns and states she is making little progress in physical therapy  She has been 4 times and states she is trying to do exercises at home  She still volunteering in very active on her feet  Her right seems to hurt worse than the left today  She still only has pain at the thickened part of the Achilles tendon but not at the insertion on the heel  The following portions of the patient's history were reviewed and updated as appropriate:   She  has a past medical history of Hyperlipidemia and Overactive bladder  She   Patient Active Problem List    Diagnosis Date Noted    Retrocalcaneal bursitis (back of heel), right 12/30/2020    Lumbar disc herniation     Lumbar radiculopathy     Spinal stenosis of lumbar region with neurogenic claudication      She  has a past surgical history that includes Replacement total knee; Hysterectomy; and Joint replacement (Right)  Her family history is not on file  She  reports that she has quit smoking  Her smoking use included cigarettes  She has never used smokeless tobacco  She reports current alcohol use  No history on file for drug    Current Outpatient Medications   Medication Sig Dispense Refill    amLODIPine (NORVASC) 2 5 mg tablet       ascorbic acid (VITAMIN C) 500 MG tablet Take 500 mg by mouth daily      Cholecalciferol (VITAMIN D PO) Take by mouth      Multiple Vitamins-Minerals (MULTIVITAMIN WITH MINERALS) tablet Take 1 tablet by mouth daily      rosuvastatin (CRESTOR) 5 mg tablet Take 2 5 mg by mouth      alendronate (FOSAMAX) 70 mg tablet       ASPIRIN 81 PO Take by mouth      cyclobenzaprine (FLEXERIL) 10 mg tablet Take 10 mg by mouth 3 (three) times a day as needed for muscle spasms      Diclofenac Sodium (Voltaren) 1 % Apply 2 g topically 4 (four) times a day 1 Tube 0    fluorouracil (EFUDEX) 5 % cream APPLY TWICE A DAY TO SPOTS ON FACE X 3 WEEKS AS DIRECTED   AVOID EYES       LORazepam (ATIVAN) 1 mg tablet Take 1 tablet 60 minutes prior to procedure, may take an additional tablet 30 minutes prior to procedure, needs  day of procedure (Patient not taking: Reported on 12/30/2020) 4 tablet 0    Meloxicam 7 5 MG/5ML SUSP Take by mouth      Mirabegron ER (MYRBETRIQ) 50 MG TB24 Take by mouth      ondansetron (ZOFRAN-ODT) 4 mg disintegrating tablet Take 1 tablet by mouth every 4 (four) hours as needed for nausea (Patient not taking: Reported on 1/3/2020) 15 tablet 0    oxyCODONE-acetaminophen (ROXICET) 5-325 mg/5 mL solution Take by mouth every 4 (four) hours as needed for moderate pain      zolpidem (AMBIEN) 10 mg tablet        No current facility-administered medications for this visit  Current Outpatient Medications on File Prior to Visit   Medication Sig    amLODIPine (NORVASC) 2 5 mg tablet     ascorbic acid (VITAMIN C) 500 MG tablet Take 500 mg by mouth daily    Cholecalciferol (VITAMIN D PO) Take by mouth    Multiple Vitamins-Minerals (MULTIVITAMIN WITH MINERALS) tablet Take 1 tablet by mouth daily    rosuvastatin (CRESTOR) 5 mg tablet Take 2 5 mg by mouth    alendronate (FOSAMAX) 70 mg tablet     ASPIRIN 81 PO Take by mouth    cyclobenzaprine (FLEXERIL) 10 mg tablet Take 10 mg by mouth 3 (three) times a day as needed for muscle spasms    Diclofenac Sodium (Voltaren) 1 % Apply 2 g topically 4 (four) times a day    fluorouracil (EFUDEX) 5 % cream APPLY TWICE A DAY TO SPOTS ON FACE X 3 WEEKS AS DIRECTED   AVOID EYES      LORazepam (ATIVAN) 1 mg tablet Take 1 tablet 60 minutes prior to procedure, may take an additional tablet 30 minutes prior to procedure, needs  day of procedure (Patient not taking: Reported on 12/30/2020)    Meloxicam 7 5 MG/5ML SUSP Take by mouth    Mirabegron ER (MYRBETRIQ) 50 MG TB24 Take by mouth    ondansetron (ZOFRAN-ODT) 4 mg disintegrating tablet Take 1 tablet by mouth every 4 (four) hours as needed for nausea (Patient not taking: Reported on 1/3/2020)    oxyCODONE-acetaminophen (ROXICET) 5-325 mg/5 mL solution Take by mouth every 4 (four) hours as needed for moderate pain    zolpidem (AMBIEN) 10 mg tablet     [DISCONTINUED] Glucosamine-Chondroitin--400-375 MG TABS Take by mouth     No current facility-administered medications on file prior to visit  She is allergic to bupropion       Review of Systems   Constitutional: Negative  HENT: Negative for sinus pressure and sinus pain  Respiratory: Negative for cough and shortness of breath  Cardiovascular: Negative for chest pain and leg swelling  Gastrointestinal: Negative for diarrhea, nausea and vomiting  Musculoskeletal: Positive for arthralgias, gait problem and myalgias  Skin: Negative for color change and wound  Neurological: Negative for weakness and numbness  Objective:      /60   Pulse 58   Ht 5' (1 524 m)   Wt 73 kg (161 lb)   BMI 31 44 kg/m²          Physical Exam    Vitals reviewed    Constitutional: Patient is not distressed  Patient is well developed  Patient is obese  Vascular: Dorsalis pedis and posterior tibial pulses palpable  Capillary refill time within normal limits to all digits  No erythema  No edema  No significant varicosities  Dermatology: No rash  No open lesions  Present pedal hair  Skin has healthy turgor  Musculoskeletal: Normal range of motion to ankle, subtalar joint, and midtarsal joint  Normal range of motion first MTPJ     Manual muscle testing 5 out of 5 for inversion/eversion/dorsiflexion/plantarflexion  On stance patients feet are generally rectus  There is moderate to severe thickening of the Achilles tendon at the watershed area B/L, right thicker than left  This is TTP  No pain at Achilles insertion  Neurological: Monofilament sensation is intact  Vibratory sensation is intact  Achilles reflex is normal    Proprioception is normal    Respiratory: Normal respiratory effort, no distress    Psych: Patient is AAOx3  Normal mood       Lymphatic: nonpalpable popliteal lymph nodes  Nonpalpable groin lymph nodes

## 2021-04-29 NOTE — PROGRESS NOTES
Daily Note     Today's date: 2021  Patient name: Danielle Mariscal  : 1945  MRN: [id number]  Referring provider: LEWIS Schreiber  Dx:   Encounter Diagnosis     ICD-10-CM    1  Bilateral Achilles tendonosis  M67 88                   Subjective:  Pt reports min less R>L achilles pain this week  Still limited in walking due to achilles pain  Objective:  See treatment diary below      Assessment:  Pt presented to outpatient physical therapy at [place] [address] with complaints of R>L achilles pain  She presented with decreased intrinsic foot strength, limited flexibility, decreased tolerance to activity and decreased functional mobility due to B achilles tendinitis  She will continue to benefit from skilled PT services in order to address these deficits and reach maximum level of function  Min less L achilles tenderness since starting PT, but R still limiting walking tolerance with thickening of her tendon and poor soft tissue mobility  Less swelling and tightness in R achilles today vs last week  Tenderness today was proximal R achilles vs lateral mid aspect last week  Good tolerance to additional of balance activities today  Plan:  Continue STM and foot strengthening  Continue to add more B LE balance  To MD today for possible immobilization to lessen inflammation            POC EXPIRES On:  21  PRECAUTIONS:  B TKA  CO-MORBIDITES:  None  PERSONAL FACTORS:  None      Manuals HEP         STM B achilles in prone R>L  10' 23' 23' 23'                                               Neuro Re-Ed     Tandem walk on foam FWD 12'    2 laps 3 laps        SLS on black disc L/R    20" 3 ea 20" 5 ea                                                                         Ther Ex    Gastroc stretch L/R wedge ' 20" 3 ea 20" 3 ea        Soleus stretch L/R wedge ' 20" 3 ea 20" 3 ea        Toe splaying L/R   1' 1' 1'        Toe flexion L/R   1' 1' 1' Ther Activity                              Gait Training                              Modalities

## 2021-05-05 ENCOUNTER — EVALUATION (OUTPATIENT)
Dept: PHYSICAL THERAPY | Facility: CLINIC | Age: 76
End: 2021-05-05
Payer: MEDICARE

## 2021-05-05 DIAGNOSIS — M67.88 BILATERAL ACHILLES TENDONOSIS: Primary | ICD-10-CM

## 2021-05-05 PROCEDURE — 97110 THERAPEUTIC EXERCISES: CPT | Performed by: PHYSICAL THERAPIST

## 2021-05-05 PROCEDURE — 97112 NEUROMUSCULAR REEDUCATION: CPT | Performed by: PHYSICAL THERAPIST

## 2021-05-05 PROCEDURE — 97140 MANUAL THERAPY 1/> REGIONS: CPT | Performed by: PHYSICAL THERAPIST

## 2021-05-05 NOTE — PROGRESS NOTES
Daily Note     Today's date: 2021  Patient name: Viviana Platt  : 1945  MRN: [id number]  Referring provider: LEWIS Terrazas  Dx:   Encounter Diagnosis     ICD-10-CM    1  Bilateral Achilles tendonosis  M67 88                   Subjective:  Pt reports to PT in a CAM boot for her R foot  States her L knee is hurting now due to walking differently  Will have MRI on 21 and back to MD o 21  Objective:  See treatment diary below      Assessment:  Pt presented to outpatient physical therapy at [place] [address] with complaints of R>L achilles pain  She presented with decreased intrinsic foot strength, limited flexibility, decreased tolerance to activity and decreased functional mobility due to B achilles tendinitis  She will continue to benefit from skilled PT services in order to address these deficits and reach maximum level of function, but needs to allow her achilles tendons to rest first, especially her R  Tenderness to soft tissue manipulation was slightly better today after a few days of immobilization in her CAM boot  Plan:  Continue STM and foot strengthening  Continue to add more B LE balance, but hold on R for 1 more week  MRI for achilles on 21 and to MD on 21         POC EXPIRES On:  21  PRECAUTIONS:  B TKA  CO-MORBIDITES:  None  PERSONAL FACTORS:  None      Manuals HEP        STM B achilles in prone R>L  10' 23' 23' 23' NP       Graston achilles in prone L/R      12' light on R, mod on L                                 Neuro Re-Ed     Tandem walk on foam FWD 12'    2 laps 3 laps NP       SLS on black disc L/R    20" 3 ea 20" 5 ea L only 20" 5       TB kicks with R for L LE balance - hip ext + abd      Blue 20 ea                                                           Ther Ex    Gastroc stretch L/R wedge   1' 20" 3 ea 20" 3 ea L only 20" 3       Soleus stretch L/R wedge   1' 20" 3 ea 20" 3 ea L only 20" 3 Toe splaying L/R 4/19  1' 1' 1' 1' ea       Toe flexion L/R 4/19  1' 1' 1' 1' ea       Strap stretch into DF L/R      20" 5 ea                                              Ther Activity                              Gait Training                              Modalities

## 2021-05-07 ENCOUNTER — EVALUATION (OUTPATIENT)
Dept: PHYSICAL THERAPY | Facility: CLINIC | Age: 76
End: 2021-05-07
Payer: MEDICARE

## 2021-05-07 DIAGNOSIS — M67.88 BILATERAL ACHILLES TENDONOSIS: Primary | ICD-10-CM

## 2021-05-07 PROCEDURE — 97140 MANUAL THERAPY 1/> REGIONS: CPT | Performed by: PHYSICAL THERAPIST

## 2021-05-07 NOTE — PROGRESS NOTES
Daily Note     Today's date: 2021  Patient name: Ramy Ross  : 1945  MRN: [id number]  Referring provider: Griselda Blue, D*  Dx:   Encounter Diagnosis     ICD-10-CM    1  Bilateral Achilles tendonosis  M67 88                   Subjective:  Pt reports to PT in a CAM boot for her R foot  States her L knee is less painful today  Will have MRI on 21 and back to MD o 21  Objective:  See treatment diary below      Assessment:  Pt presented to outpatient physical therapy at [place] [address] with complaints of R>L achilles pain  She presented with decreased intrinsic foot strength, limited flexibility, decreased tolerance to activity and decreased functional mobility due to B achilles tendinitis  She will continue to benefit from skilled PT services in order to address these deficits and reach maximum level of function, but needs to allow her achilles tendons to rest first, especially her R  Tenderness to soft tissue manipulation was moderately better today after 1 week of immobilization in her CAM boot  She should be able to ambulate with normal footwear in the next few weeks  Plan:  Continue STM and foot strengthening  Continue to add more B LE balance, but hold on R for 1 more week  MRI for achilles on 21 and to MD on 21         POC EXPIRES On:  21  PRECAUTIONS:  B TKA  CO-MORBIDITES:  None  PERSONAL FACTORS:  None      Manuals HEP       STM B achilles in prone R>L  10' 23' 23' 23' NP       Graston achilles in prone L/R      12' light on R, mod on L 12'      Laser achilles tendon L/R       11'                   Neuro Re-Ed     Tandem walk on foam FWD 12'    2 laps 3 laps NP       SLS on black disc L/R    20" 3 ea 20" 5 ea L only 20" 5       TB kicks with R for L LE balance - hip ext + abd      Blue 20 ea                                                           Ther Ex    Gastroc stretch L/R wedge   1' 20" 3 ea 20" 3 ea L only 20" 3       Soleus stretch L/R wedge 4/19  1' 20" 3 ea 20" 3 ea L only 20" 3       Toe splaying L/R 4/19  1' 1' 1' 1' ea       Toe flexion L/R 4/19  1' 1' 1' 1' ea       Strap stretch into DF L/R      20" 5 ea                                              Ther Activity                              Gait Training                              Modalities

## 2021-05-11 ENCOUNTER — EVALUATION (OUTPATIENT)
Dept: PHYSICAL THERAPY | Facility: CLINIC | Age: 76
End: 2021-05-11
Payer: MEDICARE

## 2021-05-11 DIAGNOSIS — M67.88 BILATERAL ACHILLES TENDONOSIS: Primary | ICD-10-CM

## 2021-05-11 PROCEDURE — 97140 MANUAL THERAPY 1/> REGIONS: CPT | Performed by: PHYSICAL THERAPIST

## 2021-05-11 NOTE — PROGRESS NOTES
Daily Note     Today's date: 2021  Patient name: Elieser Sofia  : 1945  MRN: [id number]  Referring provider: LEWIS Alvarenga  Dx:   Encounter Diagnosis     ICD-10-CM    1  Bilateral Achilles tendonosis  M67 88                   Subjective:  Pt reports she is trying to wear her CAM boot for her R foot as much as she can with walking  Will have MRI on 21 and back to MD on 21  Objective:  See treatment diary below      Assessment:  Pt presented to outpatient physical therapy at [place] [address] with complaints of R>L achilles pain  She presented with decreased intrinsic foot strength, limited flexibility, decreased tolerance to activity and decreased functional mobility due to B achilles tendinitis  She will continue to benefit from skilled PT services in order to address these deficits and reach maximum level of function, but needs to allow her achilles tendons to rest first, especially her R  Tenderness to soft tissue manipulation was moderately better again today after 1 1/2 weeks of immobilization in her CAM boot  Much less tenderness to palpation  The integrity of her L achilles is still very poor and rough  She should be able to ambulate with normal footwear in the next few weeks  Plan:  Continue STM and foot strengthening  Continue to add more B LE balance, but hold on R until next week  MRI for achilles on 21 and to MD on 21         POC EXPIRES On:  21  PRECAUTIONS:  B TKA  CO-MORBIDITES:  None  PERSONAL FACTORS:  None      Manuals HEP      STM B achilles in prone R>L  10' 23' 23' 23' NP       Graston achilles in prone L/R      12' light on R, mod on L 12' 12'     Laser achilles tendon L/R       11' 11'                  Neuro Re-Ed     Tandem walk on foam FWD 12'    2 laps 3 laps NP       SLS on black disc L/R    20" 3 ea 20" 5 ea L only 20" 5       TB kicks with R for L LE balance - hip ext + abd      Blue 20 ea Ther Ex    Gastroc stretch L/R wedge 4/19 1' 20" 3 ea 20" 3 ea L only 20" 3       Soleus stretch L/R wedge 4/19 1' 20" 3 ea 20" 3 ea L only 20" 3       Toe splaying L/R 4/19  1' 1' 1' 1' ea       Toe flexion L/R 4/19  1' 1' 1' 1' ea       Strap stretch into DF L/R      20" 5 ea                                              Ther Activity                              Gait Training                              Modalities

## 2021-05-13 ENCOUNTER — EVALUATION (OUTPATIENT)
Dept: PHYSICAL THERAPY | Facility: CLINIC | Age: 76
End: 2021-05-13
Payer: MEDICARE

## 2021-05-13 DIAGNOSIS — M67.88 BILATERAL ACHILLES TENDONOSIS: Primary | ICD-10-CM

## 2021-05-13 PROCEDURE — 97140 MANUAL THERAPY 1/> REGIONS: CPT | Performed by: PHYSICAL THERAPIST

## 2021-05-13 NOTE — PROGRESS NOTES
Daily Note     Today's date: 2021  Patient name: Aparna Turcios  : 1945  MRN: [id number]  Referring provider: LEWIS Ward  Dx:   Encounter Diagnosis     ICD-10-CM    1  Bilateral Achilles tendonosis  M67 88                   Subjective:  Pt reports having more R achilles pain this week  Has been wearing her foot  Will have MRI on 21 and back to MD on 21  Objective:  See treatment diary below      Assessment:  Pt presented to outpatient physical therapy at [place] [address] with complaints of R>L achilles pain  She presented with decreased intrinsic foot strength, limited flexibility, decreased tolerance to activity and decreased functional mobility due to B achilles tendinitis  She will continue to benefit from skilled PT services in order to address these deficits and reach maximum level of function, but needs to allow her achilles tendons to rest first, especially her R  Tenderness to soft tissue manipulation was moderately worse today despite reporting wearing her boot  The integrity of her L achilles is still very poor and rough  She may benefit from PRP  Plan:  MRI for achilles on 21 and to MD on 21  Consider PRP          POC EXPIRES On:  21  PRECAUTIONS:  B TKA  CO-MORBIDITES:  None  PERSONAL FACTORS:  None      Manuals HEP     STM B achilles in prone R>L  10' 23' 23' 23' NP       Graston achilles in prone L/R      12' light on R, mod on L 12' 12' 12'    Laser achilles tendon L/R       11' 11' 11'                 Neuro Re-Ed     Tandem walk on foam FWD 12'    2 laps 3 laps NP       SLS on black disc L/R    20" 3 ea 20" 5 ea L only 20" 5       TB kicks with R for L LE balance - hip ext + abd      Blue 20 ea                                                           Ther Ex    Gastroc stretch L/R wedge   1' 20" 3 ea 20" 3 ea L only 20" 3       Soleus stretch L/R wedge   1' 20" 3 ea 20" 3 ea L only 20" 3       Toe splaying L/R 4/19  1' 1' 1' 1' ea       Toe flexion L/R 4/19  1' 1' 1' 1' ea       Strap stretch into DF L/R      20" 5 ea                                              Ther Activity                              Gait Training                              Modalities

## 2021-05-14 ENCOUNTER — HOSPITAL ENCOUNTER (OUTPATIENT)
Dept: MRI IMAGING | Facility: HOSPITAL | Age: 76
Discharge: HOME | End: 2021-05-14
Attending: PODIATRIST
Payer: MEDICARE

## 2021-05-14 DIAGNOSIS — M67.88 BILATERAL ACHILLES TENDONOSIS: ICD-10-CM

## 2021-05-14 DIAGNOSIS — S86.011A PARTIAL ACHILLES TENDON TEAR, RIGHT, INITIAL ENCOUNTER: ICD-10-CM

## 2021-05-14 PROCEDURE — 73721 MRI JNT OF LWR EXTRE W/O DYE: CPT

## 2021-05-14 PROCEDURE — G1004 CDSM NDSC: HCPCS

## 2021-05-18 ENCOUNTER — EVALUATION (OUTPATIENT)
Dept: PHYSICAL THERAPY | Facility: CLINIC | Age: 76
End: 2021-05-18
Payer: MEDICARE

## 2021-05-18 DIAGNOSIS — M67.88 BILATERAL ACHILLES TENDONOSIS: Primary | ICD-10-CM

## 2021-05-18 PROCEDURE — 97140 MANUAL THERAPY 1/> REGIONS: CPT | Performed by: PHYSICAL THERAPIST

## 2021-05-18 NOTE — PROGRESS NOTES
Daily Note     Today's date: 2021  Patient name: Ramy Ross  : 1945  MRN: [id number]  Referring provider: Griselda Blue, D*  Dx:   Encounter Diagnosis     ICD-10-CM    1  Bilateral Achilles tendonosis  M67 88                   Subjective:  Pt reports having more L achilles pain this week  Had MRI, but not results yet  Still wearing her CAM boot on R            Objective:  See treatment diary below      Assessment:  Pt presented to outpatient physical therapy at [place] [address] with complaints of R>L achilles pain  She presented with decreased intrinsic foot strength, limited flexibility, decreased tolerance to activity and decreased functional mobility due to B achilles tendinitis  She will continue to benefit from skilled PT services in order to address these deficits and reach maximum level of function, but needs to allow her achilles tendons to rest first, especially her R  Tenderness to soft tissue manipulation was slightly better today, but still poor pliability  The integrity of B achilles are still very poor and rough  She may benefit from PRP  Plan: To MD on 21  Consider PRP          POC EXPIRES On:  21  PRECAUTIONS:  B TKA  CO-MORBIDITES:  None  PERSONAL FACTORS:  None      Manuals HEP    STM B achilles in prone R>L  10' 23' 23' 23' NP       Graston achilles in prone L/R      12' light on R, mod on L 12' 12' 12' 12'   Laser achilles tendon L/R       11' 11' 11' 11'                Neuro Re-Ed     Tandem walk on foam FWD 12'    2 laps 3 laps NP       SLS on black disc L/R    20" 3 ea 20" 5 ea L only 20" 5       TB kicks with R for L LE balance - hip ext + abd      Blue 20 ea                                                           Ther Ex    Gastroc stretch L/R wedge   1' 20" 3 ea 20" 3 ea L only 20" 3       Soleus stretch L/R wedge   1' 20" 3 ea 20" 3 ea L only 20" 3       Toe splaying L/R   1' 1' 1' 1' ea Toe flexion L/R 4/19  1' 1' 1' 1' ea       Strap stretch into DF L/R      20" 5 ea                                              Ther Activity                              Gait Training                              Modalities

## 2021-05-21 ENCOUNTER — EVALUATION (OUTPATIENT)
Dept: PHYSICAL THERAPY | Facility: CLINIC | Age: 76
End: 2021-05-21
Payer: MEDICARE

## 2021-05-21 ENCOUNTER — OFFICE VISIT (OUTPATIENT)
Dept: PODIATRY | Facility: CLINIC | Age: 76
End: 2021-05-21
Payer: MEDICARE

## 2021-05-21 ENCOUNTER — TELEPHONE (OUTPATIENT)
Dept: PAIN MEDICINE | Facility: MEDICAL CENTER | Age: 76
End: 2021-05-21

## 2021-05-21 VITALS
HEART RATE: 60 BPM | HEIGHT: 60 IN | DIASTOLIC BLOOD PRESSURE: 77 MMHG | SYSTOLIC BLOOD PRESSURE: 125 MMHG | BODY MASS INDEX: 31.61 KG/M2 | WEIGHT: 161 LBS

## 2021-05-21 DIAGNOSIS — S86.011A PARTIAL ACHILLES TENDON TEAR, RIGHT, INITIAL ENCOUNTER: ICD-10-CM

## 2021-05-21 DIAGNOSIS — M67.88 BILATERAL ACHILLES TENDONOSIS: Primary | ICD-10-CM

## 2021-05-21 DIAGNOSIS — S86.012A PARTIAL TEAR OF LEFT ACHILLES TENDON, INITIAL ENCOUNTER: ICD-10-CM

## 2021-05-21 PROCEDURE — 99214 OFFICE O/P EST MOD 30 MIN: CPT | Performed by: PODIATRIST

## 2021-05-21 PROCEDURE — 97140 MANUAL THERAPY 1/> REGIONS: CPT | Performed by: PHYSICAL THERAPIST

## 2021-05-21 NOTE — PROGRESS NOTES
Assessment/Plan:         Diagnoses and all orders for this visit:    Bilateral Achilles tendonosis  -     Walker  -     Cam Boot    Partial Achilles tendon tear, right, initial encounter  -     Walker    Partial tear of left Achilles tendon, initial encounter  -     Cam Boot    Other orders  -     Calcium-Magnesium-Vitamin D (CITRACAL SLOW RELEASE PO)      diagnosis and options discussed  Patient's right ankle MRI reviewed in detail  She has severe tendinosis and some interstitial tearing of her Achilles tendon  We discuss conservative versus surgical options  Surgical option involves possible platelet rich plasma injection with primary repair of the tendon  Unfortunately she has this condition to both lower extremities  It will be very difficult for her to recover from doing both at the same time but even doing just the right foot would put a lot of strain on her left ankle  Following surgery she would need to be as limited weight-bearing as possible if not completely to the surgical site  This is very difficult as she lives alone and does not have any help  There are multiple reasons why recovering for this type of surgery would be difficult for her and at this point I do not feel it is a very safe option  Conservative care involves placing both of her ankles and Cam boots  I would like to immobilize the Achilles tendons for 6 weeks and she must reduce all activity to the lower extremities unless absolutely essential   I explained she cannot be walking to the grocery store and should use the scooter  She should really only be on her feet if moving from the bedroom to the living room or to the kitchen  She should not spend more than 5-10 minutes at any given time on her feet even if they are in the Cam boot  Patient stated she would have a very difficult time reducing activity on her feet as she lives alone    Unfortunately I explained whether or not we do surgery but this would be a necessary component to her recovery  She is obviously very frustrated and I did offer a 2nd opinion either to my partners or other foot and ankle surgeons  I tried to remain apathetic about her condition as she has severe tendinosis of both Achilles tendons but unfortunately without significant reduction in activity, immobilization, possible surgical intervention she will most likely need to live with this pain and risks rupture of the tendons  She states she is considering a 2nd opinion but for the time being did agree to use a walker and Cam boots  She seems to understand the situation but obviously is not very pleased with it  It is extremely difficult to recover from this wound 1 limb is involved but given she has it on both the left and right I understand her frustration  Subjective:      Patient ID: Bakari Lares is a 76 y o  female     /2/21: Patient presents with right ankle pain  It began approximately 1 month ago  It is exacerbated with exercise and walking  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   She noticed some some pain and achiness as long with swelling at the back of her Achilles tendon on the right  She went to urgent care on December 30, 2020  An x-ray was taken which did show some edema but no acute bony deformity  She was  Recommended topical anti-inflammatories, diclofenac, and told to follow-up with her primary care physician if symptoms did not improve      She does have medical history significant for lumbar spine disease, osteoarthritis of knees, cervical spine pain  She does seen pain management for some of her spinal pain issues  She has gone to physical therapy multiple times  She is on chronic pain medication including oxycodone, cyclobenzaprine, meloxicam   She denies any trauma to the ankle  She does not smoke      She now has it in both ankles, the left is getting worse, the right is stable but still painful   The left now has a lump in the achilles       UPDATE 4/29/21: Patient returns and states she is making little progress in physical therapy  She has been 4 times and states she is trying to do exercises at home  She still volunteering in very active on her feet  Her right seems to hurt worse than the left today  She still only has pain at the thickened part of the Achilles tendon but not at the insertion on the heel  UPDATE 5/21/21:  Patient is not still having severe pain in both Achilles tendons  She has been wearing her boot most of the time on her right foot but states now she uses her left more often some of that Achilles hurts  She did get an MRI of her right Achilles tendon  She is here to go over the results  The following portions of the patient's history were reviewed and updated as appropriate:   She  has a past medical history of Hyperlipidemia and Overactive bladder  She   Patient Active Problem List    Diagnosis Date Noted    Retrocalcaneal bursitis (back of heel), right 12/30/2020    Lumbar disc herniation     Lumbar radiculopathy     Spinal stenosis of lumbar region with neurogenic claudication      She  has a past surgical history that includes Replacement total knee; Hysterectomy; and Joint replacement (Right)  Her family history is not on file  She  reports that she has quit smoking  Her smoking use included cigarettes  She has never used smokeless tobacco  She reports current alcohol use  No history on file for drug    Current Outpatient Medications   Medication Sig Dispense Refill    amLODIPine (NORVASC) 2 5 mg tablet       ascorbic acid (VITAMIN C) 500 MG tablet Take 500 mg by mouth daily      Calcium-Magnesium-Vitamin D (CITRACAL SLOW RELEASE PO)       Multiple Vitamins-Minerals (MULTIVITAMIN WITH MINERALS) tablet Take 1 tablet by mouth daily      rosuvastatin (CRESTOR) 5 mg tablet Take 2 5 mg by mouth      alendronate (FOSAMAX) 70 mg tablet       ASPIRIN 81 PO Take by mouth      Cholecalciferol (VITAMIN D PO) Take by mouth      cyclobenzaprine (FLEXERIL) 10 mg tablet Take 10 mg by mouth 3 (three) times a day as needed for muscle spasms      Diclofenac Sodium (Voltaren) 1 % Apply 2 g topically 4 (four) times a day 1 Tube 0    fluorouracil (EFUDEX) 5 % cream APPLY TWICE A DAY TO SPOTS ON FACE X 3 WEEKS AS DIRECTED   AVOID EYES       LORazepam (ATIVAN) 1 mg tablet Take 1 tablet 60 minutes prior to procedure, may take an additional tablet 30 minutes prior to procedure, needs  day of procedure (Patient not taking: Reported on 12/30/2020) 4 tablet 0    Meloxicam 7 5 MG/5ML SUSP Take by mouth      Mirabegron ER (MYRBETRIQ) 50 MG TB24 Take by mouth      ondansetron (ZOFRAN-ODT) 4 mg disintegrating tablet Take 1 tablet by mouth every 4 (four) hours as needed for nausea (Patient not taking: Reported on 1/3/2020) 15 tablet 0    oxyCODONE-acetaminophen (ROXICET) 5-325 mg/5 mL solution Take by mouth every 4 (four) hours as needed for moderate pain      zolpidem (AMBIEN) 10 mg tablet        No current facility-administered medications for this visit  Current Outpatient Medications on File Prior to Visit   Medication Sig    amLODIPine (NORVASC) 2 5 mg tablet     ascorbic acid (VITAMIN C) 500 MG tablet Take 500 mg by mouth daily    Calcium-Magnesium-Vitamin D (CITRACAL SLOW RELEASE PO)     Multiple Vitamins-Minerals (MULTIVITAMIN WITH MINERALS) tablet Take 1 tablet by mouth daily    rosuvastatin (CRESTOR) 5 mg tablet Take 2 5 mg by mouth    alendronate (FOSAMAX) 70 mg tablet     ASPIRIN 81 PO Take by mouth    Cholecalciferol (VITAMIN D PO) Take by mouth    cyclobenzaprine (FLEXERIL) 10 mg tablet Take 10 mg by mouth 3 (three) times a day as needed for muscle spasms    Diclofenac Sodium (Voltaren) 1 % Apply 2 g topically 4 (four) times a day    fluorouracil (EFUDEX) 5 % cream APPLY TWICE A DAY TO SPOTS ON FACE X 3 WEEKS AS DIRECTED    AVOID EYES     LORazepam (ATIVAN) 1 mg tablet Take 1 tablet 60 minutes prior to procedure, may take an additional tablet 30 minutes prior to procedure, needs  day of procedure (Patient not taking: Reported on 12/30/2020)    Meloxicam 7 5 MG/5ML SUSP Take by mouth    Mirabegron ER (MYRBETRIQ) 50 MG TB24 Take by mouth    ondansetron (ZOFRAN-ODT) 4 mg disintegrating tablet Take 1 tablet by mouth every 4 (four) hours as needed for nausea (Patient not taking: Reported on 1/3/2020)    oxyCODONE-acetaminophen (ROXICET) 5-325 mg/5 mL solution Take by mouth every 4 (four) hours as needed for moderate pain    zolpidem (AMBIEN) 10 mg tablet      No current facility-administered medications on file prior to visit  She is allergic to bupropion       Review of Systems   Constitutional: Negative  HENT: Negative for sinus pressure and sinus pain  Respiratory: Negative for cough and shortness of breath  Cardiovascular: Negative for chest pain and leg swelling  Gastrointestinal: Negative for diarrhea, nausea and vomiting  Musculoskeletal: Positive for arthralgias, gait problem and myalgias  Skin: Negative for color change and wound  Neurological: Positive for weakness  Negative for numbness  Psychiatric/Behavioral: The patient is nervous/anxious  Objective:      /77   Pulse 60   Ht 5' (1 524 m) Comment: verbal  Wt 73 kg (161 lb)   BMI 31 44 kg/m²          Physical Exam  Vitals signs reviewed  Constitutional:       Appearance: She is obese  She is not ill-appearing or diaphoretic  HENT:      Nose: No congestion or rhinorrhea  Cardiovascular:      Rate and Rhythm: Normal rate  Pulses: Normal pulses  Pulmonary:      Effort: Pulmonary effort is normal  No respiratory distress  Musculoskeletal:         General: Swelling, tenderness, deformity and signs of injury present  Right lower leg: Edema present  Left lower leg: Edema present          Feet:    Skin:     Capillary Refill: Capillary refill takes less than 2 seconds  Findings: No bruising, erythema, lesion or rash  Neurological:      Mental Status: She is alert and oriented to person, place, and time  Sensory: No sensory deficit  Motor: No weakness  Gait: Gait abnormal    Psychiatric:         Mood and Affect: Mood normal        MRI reviewed with patient of the right ankle  I showed her the severe thickening and interstitial tearing of right Achilles tendon at the watershed area

## 2021-05-21 NOTE — TELEPHONE ENCOUNTER
Pt called stating that she would like to know if she is supposed to be wearing the boots at bedtime     Pt can be reached at [phone]

## 2021-05-21 NOTE — PROGRESS NOTES
Daily Note / D/C    Today's date: 2021  Patient name: Jhony Dixon  : 1945  MRN: [id number]  Referring provider: LEWIS Negrete  Dx:   Encounter Diagnosis     ICD-10-CM    1  Bilateral Achilles tendonosis  M67 88      Addendum 21:  Pt having B achilles surgery next week with Dr Ailyn Zambrano  Subjective:  Pt reports having more L achilles pain recently  Has been wearing her boot, but pain is still bad and limiting her walking a lot  Objective:  See treatment diary below      Assessment:  Pt presented to outpatient physical therapy at [place] [address] with complaints of R>L achilles pain  She presented with decreased intrinsic foot strength, limited flexibility, decreased tolerance to activity and decreased functional mobility due to B achilles tendinitis  Her progression has been poor in PT and may need to consider PRP or achilles surgery  She lives by herself and is concerned about being limited in taking care of her house or getting to PT after surgery  MRI results are very significant for achilles changes  Plan: To MD on 21  Consider PRP or possible reconstruction surgery  Pt on hold for now           POC EXPIRES On:  21  PRECAUTIONS:  B TKA  CO-MORBIDITES:  None  PERSONAL FACTORS:  None      Manuals HEP    STM B achilles in prone R>L  10' 23' 23' 23' NP        Graston achilles in prone L/R      12' light on R, mod on L 12' 12' 12' 12'    Laser achilles tendon L/R       11' 11' 11' 11' 11'   STM B calves           12'   Neuro Re-Ed      Tandem walk on foam FWD 12'    2 laps 3 laps NP        SLS on black disc L/R    20" 3 ea 20" 5 ea L only 20" 5        TB kicks with R for L LE balance - hip ext + abd      Blue 20 ea                                                                Ther Ex     Gastroc stretch L/R wedge   1' 20" 3 ea 20" 3 ea L only 20" 3        Soleus stretch L/R wedge   1' 20" 3 ea 20" 3 ea L only 20" 3        Toe splaying L/R 4/19  1' 1' 1' 1' ea        Toe flexion L/R 4/19  1' 1' 1' 1' ea        Strap stretch into DF L/R      20" 5 ea                                                  Ther Activity                                 Gait Training                                 Modalities

## 2021-05-25 ENCOUNTER — TELEPHONE (OUTPATIENT)
Dept: PODIATRY | Facility: CLINIC | Age: 76
End: 2021-05-25

## 2021-05-28 ENCOUNTER — TELEPHONE (OUTPATIENT)
Dept: PODIATRY | Facility: CLINIC | Age: 76
End: 2021-05-28

## 2021-07-21 ENCOUNTER — EVALUATION (OUTPATIENT)
Dept: PHYSICAL THERAPY | Facility: CLINIC | Age: 76
End: 2021-07-21
Payer: MEDICARE

## 2021-07-21 DIAGNOSIS — M76.61 TENDONITIS, ACHILLES, RIGHT: Primary | ICD-10-CM

## 2021-07-21 PROCEDURE — 97161 PT EVAL LOW COMPLEX 20 MIN: CPT | Performed by: PHYSICAL THERAPIST

## 2021-07-21 PROCEDURE — 97140 MANUAL THERAPY 1/> REGIONS: CPT | Performed by: PHYSICAL THERAPIST

## 2021-07-21 NOTE — LETTER
2021    Yolanda Krabbe, PA-C  [address] [address] [address]    Patient: Iliana Lee   YOB: 1945   Date of Visit: 2021     Encounter Diagnosis     ICD-10-CM    1  Tendonitis, Achilles, right  M76 61        Dear Dr Lovely Garcia: Thank you for your recent referral of Iliana Lee  Please review the attached evaluation summary from Yuni's recent visit  Please verify that you agree with the plan of care by signing the attached order  If you have any questions or concerns, please do not hesitate to call  I sincerely appreciate the opportunity to share in the care of one of your patients and hope to have another opportunity to work with you in the near future  Sincerely,    Shelbi Avila, PT      Referring Provider:      I certify that I have read the below Plan of Care and certify the need for these services furnished under this plan of treatment while under my care  Yolanda Krabbe, PA-C  [address] [address]  Via Fax: [phone]          PT Evaluation     Today's date: 2021  Patient name: Iliana Lee  : 1945  MRN: [id number]  Referring provider: Dionne Fleming*  Dx:   Encounter Diagnosis     ICD-10-CM    1  Tendonitis, Achilles, right  M76 61                   Assessment  Assessment details: Iliana Lee is a 76 y o  female presenting to outpatient physical therapy at [facility] with complaints of R>L achilles pain  She presents with decreased intrinsic foot strength, limited flexibility, limited balance, decreased tolerance to activity and decreased functional mobility following a R achilles debridement on 6/3/21 due to B achilles tendinitis  She would benefit from skilled PT services in order to address these deficits and reach maximum level of function    Thank you for the referral!  Impairments: abnormal gait, abnormal or restricted ROM, activity intolerance, impaired physical strength, lacks appropriate home exercise program and pain with function  Barriers to therapy: None  Understanding of Dx/Px/POC: excellent   Prognosis: good    Goals  ST  Independent with HEP in 2 weeks  2  Increase B achilles flexibility to WNL in 3 weeks     LT  Achieve FOTO score of 64/100 in 8 weeks   2  Able to walk x 30 min for exercise without achilles pain in 8 weeks  3  Strength B foot intrinsics = 5/5 in 8 weeks  4  Good B LE balance in 8 weeks    Plan  Patient would benefit from: skilled PT and PT eval  Planned modality interventions: cryotherapy, TENS and thermotherapy: hydrocollator packs  Other planned modality interventions: laser  Planned therapy interventions: ADL retraining, balance/weight bearing training, flexibility, functional ROM exercises, home exercise program, joint mobilization, manual therapy, neuromuscular re-education, postural training, strengthening, stretching, therapeutic activities, therapeutic exercise and gait training  Frequency: 2x week  Duration in weeks: 8  Treatment plan discussed with: patient        Subjective Evaluation    History of Present Illness  Mechanism of injury: Pt reports having R>L achilles pain for about 6 months without cause  Tried PT without much success  Saw Dr Cheyanne Xiong who performed a R achilles debridement and casted her on 6/3/21 for 2 weeks and CAM boot for 4 weeks  CAM was removed on 21  Has been limited significantly in her walking - limited 10 minutes  Would like to be able to walk more in her neighborhood and travel  No problems in general health  Has hx of B TKA with success              Not a recurrent problem   Quality of life: good    Pain  Current pain ratin  At best pain ratin  At worst pain ratin  Quality: tight and dull ache  Aggravating factors: walking  Progression: improved    Social Support  Steps to enter house: no  Stairs in house: no   Lives in: multiple-level home  Lives with: alone    Employment status: not working    Diagnostic Tests  X-ray: abnormal (Thickening R achilles)  Treatments  Previous treatment: physical therapy and immobilization  Patient Goals  Patient goals for therapy: increased strength, return to sport/leisure activities, independence with ADLs/IADLs, decreased pain, increased motion and improved balance          Objective     Observations   Left Ankle/Foot   Negative for edema  Right Ankle/Foot   Negative for edema  Tenderness   Left Ankle/Foot   No tenderness in the Achilles insertion  Right Ankle/Foot   Tenderness in the Achilles insertion  Additional Tenderness Details  Min tenderness L , severe R achilles tendon mid lateral aspect  Neurological Testing     Sensation     Ankle/Foot   Left Ankle/Foot   Intact: light touch    Right Ankle/Foot   Intact: light touch     Active Range of Motion   Left Ankle/Foot   Normal active range of motion    Right Ankle/Foot   Normal active range of motion    Strength/Myotome Testing     Left Ankle/Foot   Normal strength    Right Ankle/Foot   Dorsiflexion: 5  Plantar flexion: 4-  Inversion: 5  Eversion: 5    Additional Strength Details  Foot intrinsics = 4-/5 B  Ambulation   Weight-Bearing Status   Weight-Bearing Status (Left): full weight bearing   Weight-Bearing Status (Right): full weight-bearing    Assistive device used: none    Ambulation: Level Surfaces   Ambulation without assistive device: independent    Ambulation: Stairs   Ascend stairs: independent  Pattern: non-reciprocal  Railings: one rail  Descend stairs: independent  Pattern: non-reciprocal  Railings: one rail  Curbs: unable    Observational Gait   Gait: within functional limits   Decreased walking speed  Comments   Mod pes planus B               POC EXPIRES On:  9/15/21  PRECAUTIONS:  B TKA  CO-MORBIDITES:  None  PERSONAL FACTORS:  None      Manuals HEP 7/21           STM B achilles in sitting R  10' Neuro Re-Ed                                                                                                Ther Ex    Gastroc stretch L/R             Soleus stretch L/R             Toe splaying L/R             Toe flexion L/R                                                                 Ther Activity                              Gait Training                              Modalities

## 2021-07-21 NOTE — PROGRESS NOTES
PT Evaluation     Today's date: 2021  Patient name: Deborah Lucio  : 1945  MRN: [id number]  Referring provider: Benedicto Rivera*  Dx:   Encounter Diagnosis     ICD-10-CM    1  Tendonitis, Achilles, right  M76 61                   Assessment  Assessment details: Deborah Lucio is a 76 y o  female presenting to outpatient physical therapy at [address] [address] with complaints of R>L achilles pain  She presents with decreased intrinsic foot strength, limited flexibility, limited balance, decreased tolerance to activity and decreased functional mobility following a R achilles debridement on 6/3/21 due to B achilles tendinitis  She would benefit from skilled PT services in order to address these deficits and reach maximum level of function  Thank you for the referral!  Impairments: abnormal gait, abnormal or restricted ROM, activity intolerance, impaired physical strength, lacks appropriate home exercise program and pain with function  Barriers to therapy: None  Understanding of Dx/Px/POC: excellent   Prognosis: good    Goals  ST  Independent with HEP in 2 weeks  2  Increase B achilles flexibility to WNL in 3 weeks     LT  Achieve FOTO score of 64/100 in 8 weeks   2  Able to walk x 30 min for exercise without achilles pain in 8 weeks  3  Strength B foot intrinsics = 5/5 in 8 weeks  4    Good B LE balance in 8 weeks    Plan  Patient would benefit from: skilled PT and PT eval  Planned modality interventions: cryotherapy, TENS and thermotherapy: hydrocollator packs  Other planned modality interventions: laser  Planned therapy interventions: ADL retraining, balance/weight bearing training, flexibility, functional ROM exercises, home exercise program, joint mobilization, manual therapy, neuromuscular re-education, postural training, strengthening, stretching, therapeutic activities, therapeutic exercise and gait training  Frequency: 2x week  Duration in weeks: 8  Treatment plan discussed with: patient        Subjective Evaluation    History of Present Illness  Mechanism of injury: Pt reports having R>L achilles pain for about 6 months without cause  Tried PT without much success  Saw Dr Alexia Baker who performed a R achilles debridement and casted her on 6/3/21 for 2 weeks and CAM boot for 4 weeks  CAM was removed on 21  Has been limited significantly in her walking - limited 10 minutes  Would like to be able to walk more in her neighborhood and travel  No problems in general health  Has hx of B TKA with success  Not a recurrent problem   Quality of life: good    Pain  Current pain ratin  At best pain ratin  At worst pain ratin  Quality: tight and dull ache  Aggravating factors: walking  Progression: improved    Social Support  Steps to enter house: no  Stairs in house: no   Lives in: multiple-level home  Lives with: alone    Employment status: not working    Diagnostic Tests  X-ray: abnormal (Thickening R achilles)  Treatments  Previous treatment: physical therapy and immobilization  Patient Goals  Patient goals for therapy: increased strength, return to sport/leisure activities, independence with ADLs/IADLs, decreased pain, increased motion and improved balance          Objective     Observations   Left Ankle/Foot   Negative for edema  Right Ankle/Foot   Negative for edema  Tenderness   Left Ankle/Foot   No tenderness in the Achilles insertion  Right Ankle/Foot   Tenderness in the Achilles insertion  Additional Tenderness Details  Min tenderness L , severe R achilles tendon mid lateral aspect       Neurological Testing     Sensation     Ankle/Foot   Left Ankle/Foot   Intact: light touch    Right Ankle/Foot   Intact: light touch     Active Range of Motion   Left Ankle/Foot   Normal active range of motion    Right Ankle/Foot   Normal active range of motion    Strength/Myotome Testing     Left Ankle/Foot   Normal strength    Right Ankle/Foot   Dorsiflexion: 5  Plantar flexion: 4-  Inversion: 5  Eversion: 5    Additional Strength Details  Foot intrinsics = 4-/5 B  Ambulation   Weight-Bearing Status   Weight-Bearing Status (Left): full weight bearing   Weight-Bearing Status (Right): full weight-bearing    Assistive device used: none    Ambulation: Level Surfaces   Ambulation without assistive device: independent    Ambulation: Stairs   Ascend stairs: independent  Pattern: non-reciprocal  Railings: one rail  Descend stairs: independent  Pattern: non-reciprocal  Railings: one rail  Curbs: unable    Observational Gait   Gait: within functional limits   Decreased walking speed  Comments   Mod pes planus B               POC EXPIRES On:  9/15/21  PRECAUTIONS:  B TKA  CO-MORBIDITES:  None  PERSONAL FACTORS:  None      Manuals HEP 7/21           STM B achilles in sitting R  10'                                                  Neuro Re-Ed                                                                                                Ther Ex    Gastroc stretch L/R             Soleus stretch L/R             Toe splaying L/R             Toe flexion L/R                                                                 Ther Activity                              Gait Training                              Modalities

## 2021-07-23 ENCOUNTER — OFFICE VISIT (OUTPATIENT)
Dept: PHYSICAL THERAPY | Facility: CLINIC | Age: 76
End: 2021-07-23
Payer: MEDICARE

## 2021-07-23 DIAGNOSIS — M76.61 TENDONITIS, ACHILLES, RIGHT: Primary | ICD-10-CM

## 2021-07-23 PROCEDURE — 97140 MANUAL THERAPY 1/> REGIONS: CPT

## 2021-07-23 PROCEDURE — 97110 THERAPEUTIC EXERCISES: CPT

## 2021-07-23 NOTE — PROGRESS NOTES
Daily Note     Today's date: 2021  Patient name: Melanie Freitas  : 1945  MRN: [id number]  Referring provider: Payam Colindres*  Dx:   Encounter Diagnosis     ICD-10-CM    1  Tendonitis, Achilles, right  M76 61                   Subjective: Pt states wanting to walk without discomfort  Objective: Survey Monkey given (___) and FOTO given ()      Assessment: Introduced new TE with great tolerance  However, during STM pt was very sensitive, highly recommended to pt perform self-stm to area to help desensitize area  Plan: Introduce more wt   Bearing TE     POC EXPIRES On:  9/15/21  PRECAUTIONS:  B TKA  CO-MORBIDITES:  None  PERSONAL FACTORS:  None      Manuals HEP           STM B achilles in sitting R  10' 10'                                                 Neuro Re-Ed     BAPS 4 directions   20 ea          BAPS CCW/CW   20 ea          SLS   10" 3                                                              Ther Ex    Gastroc stretch L/R on wedge   10" 10          Soleus stretch L/R             Gastroc stretch + strap   30" 3          Toe splaying L/R             Toe flexion L/R   20           AROM ccw/cw, 4way    20 ea          Forward lunges   20           Side Lunges   20                       Ther Activity                              Gait Training                              Modalities

## 2021-07-26 ENCOUNTER — OFFICE VISIT (OUTPATIENT)
Dept: PHYSICAL THERAPY | Facility: CLINIC | Age: 76
End: 2021-07-26
Payer: MEDICARE

## 2021-07-26 DIAGNOSIS — M76.61 TENDONITIS, ACHILLES, RIGHT: Primary | ICD-10-CM

## 2021-07-26 PROCEDURE — 97110 THERAPEUTIC EXERCISES: CPT | Performed by: PHYSICAL THERAPIST

## 2021-07-26 PROCEDURE — 97112 NEUROMUSCULAR REEDUCATION: CPT | Performed by: PHYSICAL THERAPIST

## 2021-07-26 PROCEDURE — 97140 MANUAL THERAPY 1/> REGIONS: CPT | Performed by: PHYSICAL THERAPIST

## 2021-07-26 NOTE — PROGRESS NOTES
Daily Note     Today's date: 2021  Patient name: Tori Bhakta  : 1945  MRN: [id number]  Referring provider: Luis Valencia*  Dx:   Encounter Diagnosis     ICD-10-CM    1  Tendonitis, Achilles, right  M76 61                   Subjective:  Pt reports feeling sore since last session  Most soreness is along her R achilles incision  Objective: Survey Monkey given (___) and FOTO given ()      Assessment:  Pt had mod less sensitivity along R achilles tendon today  R LE balance is still fair at best, but control with BAPS was improved vs last week  Pt needs more R achilles STM in order to be able to return to normal tasks  Plan:  Progress R LE WB and balance tasks  Try bike for endurance          POC EXPIRES On:  9/15/21  PRECAUTIONS:  B TKA  CO-MORBIDITES:  None  PERSONAL FACTORS:  None      Manuals HEP          STM B achilles in sitting R  10' 10' 10'                                                Neuro Re-Ed     BAPS R 4 directions   20 ea L2 20 ea         BAPS R CCW/CW   20 ea L2 20 ea         SLS R   10" 3 5" 10         Tandem walk on foam FWD 12'    3 laps                                                Ther Ex    Gastroc stretch R on wedge   10" 10 20" 3          Soleus stretch R on wedge    20" 3          Gastroc stretch with strap R   30" 3 30" 3         Toe splaying R             Toe flexion R   20  30         AROM ccw/cw, 4way    20 ea NP         Forward lunges   20  20         Side Lunges   20 20                      Ther Activity                              Gait Training                              Modalities

## 2021-07-28 ENCOUNTER — OFFICE VISIT (OUTPATIENT)
Dept: PHYSICAL THERAPY | Facility: CLINIC | Age: 76
End: 2021-07-28
Payer: MEDICARE

## 2021-07-28 DIAGNOSIS — M76.61 TENDONITIS, ACHILLES, RIGHT: Primary | ICD-10-CM

## 2021-07-28 PROCEDURE — 97110 THERAPEUTIC EXERCISES: CPT

## 2021-07-28 PROCEDURE — 97140 MANUAL THERAPY 1/> REGIONS: CPT

## 2021-07-28 PROCEDURE — 97112 NEUROMUSCULAR REEDUCATION: CPT

## 2021-07-28 NOTE — PROGRESS NOTES
Daily Note     Today's date: 2021  Patient name: Petrona Sotelo  : 1945  MRN: [id number]  Referring provider: Ailin Gilmore*  Dx:   Encounter Diagnosis     ICD-10-CM    1  Tendonitis, Achilles, right  M76 61                   Subjective:  Pt reports feeling okay  Objective: Survey Monkey given (___) and FOTO given ()      Assessment:  Pt continues to be sensitive around incision region, advised to pt perform self-scar tissue massage more often instead of once a day for less than 1min but 3x/day for 1-2mins  Plan:  Progress R LE WB and balance tasks  Try bike for endurance          POC EXPIRES On:  9/15/21  PRECAUTIONS:  B TKA  CO-MORBIDITES:  None  PERSONAL FACTORS:  None      Manuals HEP         STM B achilles in sitting R  10' 10' 10' 10'                                               Neuro Re-Ed     BAPS R 4 directions   20 ea L2 20 ea L2 20        BAPS R CCW/CW   20 ea L2 20 ea L2 20        SLS R   10" 3 5" 10 5" 10        Tandem walk on foam FWD 12'    3 laps 3LAPS                                               Ther Ex    Gastroc stretch R on wedge   10" 10 20" 3  20" 3        Soleus stretch R on wedge    20" 3  20"3        Gastroc stretch with strap R   30" 3 30" 3 30"3        Toe splaying R             Toe flexion R   20  30 30        AROM ccw/cw, 4way    20 ea NP 20 OTB nv 4way       Forward lunges   20  20 20        Side Lunges   20 20 20                     Ther Activity                              Gait Training                              Modalities

## 2021-07-30 ENCOUNTER — OFFICE VISIT (OUTPATIENT)
Dept: PHYSICAL THERAPY | Facility: CLINIC | Age: 76
End: 2021-07-30
Payer: MEDICARE

## 2021-07-30 DIAGNOSIS — M76.61 TENDONITIS, ACHILLES, RIGHT: Primary | ICD-10-CM

## 2021-07-30 PROCEDURE — 97112 NEUROMUSCULAR REEDUCATION: CPT

## 2021-07-30 PROCEDURE — 97140 MANUAL THERAPY 1/> REGIONS: CPT

## 2021-07-30 PROCEDURE — 97110 THERAPEUTIC EXERCISES: CPT

## 2021-07-30 NOTE — PROGRESS NOTES
Daily Note     Today's date: 2021  Patient name: Nakia Humphreys  : 1945  MRN: [id number]  Referring provider: Beatriz Silva Si*  Dx:   Encounter Diagnosis     ICD-10-CM    1  Tendonitis, Achilles, right  M76 61                   Subjective:  Pt reports her incision is still sensitive  Objective: Survey Monkey given (___) and FOTO given ()      Assessment:  Pt continues to be sensitive around incision region, continue to advise pt to perform self-scar tissue massage and utilize different materials to rub the scar with I e towel, pt appeared to understand  Plan:  Progress R LE WB and balance tasks  Try bike for endurance          POC EXPIRES On:  9/15/21  PRECAUTIONS:  B TKA  CO-MORBIDITES:  None  PERSONAL FACTORS:  None      Manuals HEP        STM B achilles in sitting R  10' 10' 10' 10' 10'                                              Neuro Re-Ed     BAPS R 4 directions   20 ea L2 20 ea L2 20 L2 20       BAPS R CCW/CW   20 ea L2 20 ea L2 20 L2 20       SLS R   10" 3 5" 10 5" 10 5" 10       Tandem walk on foam FWD 12'    3 laps 3LAPS 3laps                                              Ther Ex    Bike      NV       Gastroc stretch R on wedge   10" 10 20" 3  20" 3 20" 3       Soleus stretch R on wedge    20" 3  20"3 20" 3       Gastroc stretch with strap R   30" 3 30" 3 30"3 30" 3       Toe splaying R             Toe flexion R   20  30 30 30       AROM ccw/cw, 4way    20 ea NP 20 OTB nv 4way       Forward lunges   20  20 20 20       Side Lunges   20 20 20 20                    Ther Activity                              Gait Training                              Modalities

## 2021-08-02 ENCOUNTER — OFFICE VISIT (OUTPATIENT)
Dept: PHYSICAL THERAPY | Facility: CLINIC | Age: 76
End: 2021-08-02
Payer: MEDICARE

## 2021-08-02 DIAGNOSIS — M76.61 TENDONITIS, ACHILLES, RIGHT: Primary | ICD-10-CM

## 2021-08-02 PROCEDURE — 97112 NEUROMUSCULAR REEDUCATION: CPT

## 2021-08-02 PROCEDURE — 97110 THERAPEUTIC EXERCISES: CPT

## 2021-08-02 NOTE — PROGRESS NOTES
Daily Note     Today's date: 2021  Patient name: Darryn Norris  : 1945  MRN: [id number]  Referring provider: Kenny Olivier*  Dx:   Encounter Diagnosis     ICD-10-CM    1  Tendonitis, Achilles, right  M76 61                   Subjective:  Pt reports her incision continues to be sensitive  Objective: Survey Monkey given (___) and FOTO given ()      Assessment:  Pt continues to be sensitive around incision region  Progressed below TE with good tolerance  Plan:  Progress R LE WB and balance tasks  Try bike for endurance          POC EXPIRES On:  9/15/21  PRECAUTIONS:  B TKA  CO-MORBIDITES:  None  PERSONAL FACTORS:  None      Manuals HEP       STM B achilles in sitting R  10' 10' 10' 10' 10' 8'                                             Neuro Re-Ed     BAPS R 4 directions   20 ea L2 20 ea L2 20 L2 20 L3 20      BAPS R CCW/CW   20 ea L2 20 ea L2 20 L2 20 L3 20      SLS R   10" 3 5" 10 5" 10 5" 10 5" 10      Tandem walk on foam FWD 12'    3 laps 3LAPS 3laps 3laps                                             Ther Ex    Bike      NV NV      Gastroc stretch R on wedge   10" 10 20" 3  20" 3 20" 3 20" 3      Soleus stretch R on wedge    20" 3  20"3 20" 3 20" 3      Gastroc stretch with strap R   30" 3 30" 3 30"3 30" 3 30" 3      Toe splaying R             Toe flexion R   20  30 30 30 30      AROM ccw/cw, 4way    20 ea NP 20 OTB nv 4way OTB 4way      Forward lunges   20  20 20 20 20      Side Lunges   20 20 20 20 20                   Ther Activity    Step over 8'      10 20                   Gait Training                              Modalities

## 2021-08-04 ENCOUNTER — OFFICE VISIT (OUTPATIENT)
Dept: PHYSICAL THERAPY | Facility: CLINIC | Age: 76
End: 2021-08-04
Payer: MEDICARE

## 2021-08-04 DIAGNOSIS — M76.61 TENDONITIS, ACHILLES, RIGHT: Primary | ICD-10-CM

## 2021-08-04 PROCEDURE — 97110 THERAPEUTIC EXERCISES: CPT | Performed by: PHYSICAL THERAPIST

## 2021-08-04 PROCEDURE — 97140 MANUAL THERAPY 1/> REGIONS: CPT | Performed by: PHYSICAL THERAPIST

## 2021-08-04 PROCEDURE — 97112 NEUROMUSCULAR REEDUCATION: CPT | Performed by: PHYSICAL THERAPIST

## 2021-08-04 NOTE — PROGRESS NOTES
Daily Note     Today's date: 2021  Patient name: Sammy Saez  : 1945  MRN: [id number]  Referring provider: Yvette Mai*  Dx:   Encounter Diagnosis     ICD-10-CM    1  Tendonitis, Achilles, right  M76 61                   Subjective:  Pt reports walking about 1 mile today in her neighborhood and did ok, but has some achilles soreness now  Wants to try matt  Has been going up and down steps with less pain  Objective: Survey Monkey given (___) and FOTO given ()      Assessment:  Pt was less sensitive around incision region today with min less achilles tightness  Good tolerance to addition of foam for SLS  Endurance for bike was poor due to deconditioning  Pt is able to walk longer distances as well with less pain  Plan:  Progress R LE WB and balance tasks  To MD on 21         POC EXPIRES On:  9/15/21  PRECAUTIONS:  B TKA  CO-MORBIDITES:  None  PERSONAL FACTORS:  None      Manuals HEP      STM B achilles in sitting R  10' 10' 10' 10' 10' 8' 8'                                            Neuro Re-Ed     BAPS R 4 directions   20 ea L2 20 ea L2 20 L2 20 L3 20 L3 20     BAPS R CCW/CW   20 ea L2 20 ea L2 20 L2 20 L3 20 L3 20     SLS R   10" 3 5" 10 5" 10 5" 10 5" 10 Blue disc 5" 10     Tandem walk on foam FWD 12'    3 laps 3LAPS 3laps 3laps 3 laps                                            Ther Ex    Bike      NV NV L1 5'     Gastroc stretch R on wedge   10" 10 20" 3  20" 3 20" 3 20" 3 20" 3     Soleus stretch R on wedge    20" 3  20"3 20" 3 20" 3 20" 3     Gastroc stretch with strap R   30" 3 30" 3 30"3 30" 3 30" 3 30" 3     Toe splaying R             Toe flexion R   20  30 30 30 30 30     AROM ccw/cw, 4way    20 ea NP 20 OTB nv 4way OTB 4way OTB 4 way 20 ea     Forward lunges   20  20 20 20 20 20     Side Lunges   20 20 20 20 20 20                  Ther Activity    Step over 8'      10 20 20                  Gait Training Modalities

## 2021-08-06 ENCOUNTER — OFFICE VISIT (OUTPATIENT)
Dept: PHYSICAL THERAPY | Facility: CLINIC | Age: 76
End: 2021-08-06
Payer: MEDICARE

## 2021-08-06 DIAGNOSIS — M76.61 TENDONITIS, ACHILLES, RIGHT: Primary | ICD-10-CM

## 2021-08-06 PROCEDURE — 97140 MANUAL THERAPY 1/> REGIONS: CPT

## 2021-08-06 PROCEDURE — 97110 THERAPEUTIC EXERCISES: CPT

## 2021-08-06 PROCEDURE — 97112 NEUROMUSCULAR REEDUCATION: CPT

## 2021-08-06 NOTE — PROGRESS NOTES
Daily Note     Today's date: 2021  Patient name: Junito Salazar  : 1945  MRN: [id number]  Referring provider: Yoon Brown Angry*  Dx:   Encounter Diagnosis     ICD-10-CM    1  Tendonitis, Achilles, right  M76 61                   Subjective:  Pt reports walking in her neighborhood and had severe pain in her achilles tendon region  Objective: Survey Monkey given (___) and FOTO given ()      Assessment:   Endurance for bike was poor due to deconditioning, pt experienced increased burning in achilles tendon region  Held some TE due to increased achilles pain  Trial ice massage to aid in pt inflammation and tenderness  Advised pt to ice for 20mins throughout the day  Plan:  Progress R LE WB and balance tasks  To MD on 21         POC EXPIRES On:  9/15/21  PRECAUTIONS:  B TKA  CO-MORBIDITES:  None  PERSONAL FACTORS:  None      Manuals HEP     STM B achilles in sitting R  10' 10' 10' 10' 10' 8' 8' gentel 5'    Ice massage          5'                              Neuro Re-Ed     BAPS R 4 directions   20 ea L2 20 ea L2 20 L2 20 L3 20 L3 20 L3 20    BAPS R CCW/CW   20 ea L2 20 ea L2 20 L2 20 L3 20 L3 20 L3 20    SLS R   10" 3 5" 10 5" 10 5" 10 5" 10 Blue disc 5" 10 held    Tandem walk on foam FWD 12'    3 laps 3LAPS 3laps 3laps 3 laps held                                           Ther Ex    Bike      NV NV L1 5' L1 5'    Gastroc stretch R on wedge   10" 10 20" 3  20" 3 20" 3 20" 3 20" 3 held    Soleus stretch R on wedge    20" 3  20"3 20" 3 20" 3 20" 3 held    Gastroc stretch with strap R   30" 3 30" 3 30"3 30" 3 30" 3 30" 3 30" 3    Toe splaying R             Toe flexion R   20  30 30 30 30 30 20    AROM ccw/cw, 4way    20 ea NP 20 OTB nv 4way OTB 4way OTB 4 way 20 ea OTB 4way 20    Forward lunges   20  20 20 20 20 20 20    Side Lunges   20 20 20 20 20 20 20                 Ther Activity    Step over 8'      10 20 20 held                 Gait Training                              Modalities

## 2021-08-09 ENCOUNTER — OFFICE VISIT (OUTPATIENT)
Dept: PHYSICAL THERAPY | Facility: CLINIC | Age: 76
End: 2021-08-09
Payer: MEDICARE

## 2021-08-09 DIAGNOSIS — M76.61 TENDONITIS, ACHILLES, RIGHT: Primary | ICD-10-CM

## 2021-08-09 PROCEDURE — 97112 NEUROMUSCULAR REEDUCATION: CPT

## 2021-08-09 PROCEDURE — 97110 THERAPEUTIC EXERCISES: CPT

## 2021-08-09 PROCEDURE — 97140 MANUAL THERAPY 1/> REGIONS: CPT

## 2021-08-09 NOTE — PROGRESS NOTES
Daily Note     Today's date: 2021  Patient name: Libby Troy  : 1945  MRN: [id number]  Referring provider: Thomas Britt*  Dx:   Encounter Diagnosis     ICD-10-CM    1  Tendonitis, Achilles, right  M76 61                   Subjective:  Pt reports having severe pain due to being on her feet a lot during the weekend hosting for her family  Objective: Survey Monkey given (___) and FOTO given ()      Assessment: Resume below TE with fair tolerance, pt experienced achilles pain when finishing her bike warm up  Noticed significant plantar fascia tightness, highly advised pt to roll plantar fascia with a roller or a frozen ice bottle  Pt was tender as well during STM to fascia  Plan:  Progress R LE WB and balance tasks  To MD on 21         POC EXPIRES On:  9/15/21  PRECAUTIONS:  B TKA  CO-MORBIDITES:  None  PERSONAL FACTORS:  None      Manuals HEP    STM  achilles in sitting R  8' 8' gentel 5' 5'   Plantar fascia STM     10   Ice massage     5'                    Neuro Re-Ed     BAPS R 4 directions  L3 20 L3 20 L3 20 L3 20   BAPS R CCW/CW  L3 20 L3 20 L3 20 L3 2   SLS R  5" 10 Blue disc 5" 10 held [facility] disc 5" 10   Tandem walk on foam FWD 12'  3laps 3 laps held 3 laps                           Ther Ex    Bike  NV L1 5' L1 5' L1 5'   Gastroc stretch R on wedge  20" 3 20" 3 held 20" 3   Soleus stretch R on wedge  20" 3 20" 3 held 20" 3   Gastroc stretch with strap R  30" 3 30" 3 30" 3 30" 3   Toe splaying R        Toe flexion R  30 30 20 20   AROM ccw/cw, 4way   OTB 4way OTB 4 way 20 ea OTB 4way 20 OTB 4way 20   Forward lunges  20 20 20 20   Side Lunges  20 20 20 20           Ther Activity    Step over 8'  20 20 held 20           Gait Training                    Modalities

## 2021-08-11 ENCOUNTER — OFFICE VISIT (OUTPATIENT)
Dept: PHYSICAL THERAPY | Facility: CLINIC | Age: 76
End: 2021-08-11
Payer: MEDICARE

## 2021-08-11 DIAGNOSIS — M76.61 TENDONITIS, ACHILLES, RIGHT: Primary | ICD-10-CM

## 2021-08-11 PROCEDURE — 97112 NEUROMUSCULAR REEDUCATION: CPT | Performed by: PHYSICAL THERAPIST

## 2021-08-11 PROCEDURE — 97140 MANUAL THERAPY 1/> REGIONS: CPT | Performed by: PHYSICAL THERAPIST

## 2021-08-11 PROCEDURE — 97110 THERAPEUTIC EXERCISES: CPT | Performed by: PHYSICAL THERAPIST

## 2021-08-11 NOTE — LETTER
2021    Joel Vivar PA-C  [address] [address] [place]    Patient: Petrona Sotelo   YOB: 1945   Date of Visit: 2021     Encounter Diagnosis     ICD-10-CM    1  Tendonitis, Achilles, right  M76 61        Dear Dr Fred Carson: Thank you for your recent referral of Petrona Sotelo  Please review the attached evaluation summary from Yuni's recent visit  Please verify that you agree with the plan of care by signing the attached order  If you have any questions or concerns, please do not hesitate to call  I sincerely appreciate the opportunity to share in the care of one of your patients and hope to have another opportunity to work with you in the near future  Sincerely,    Frances Earl, PT      Referring Provider:      I certify that I have read the below Plan of Care and certify the need for these services furnished under this plan of treatment while under my care  Joel Vivar PA-C  [place] [address]  Via Fax: [phone]          PT Re-evaluation    Today's date: 2021  Patient name: Petrona Sotelo  : 1945  MRN: [id number]  Referring provider: Ailin Gilmore*  Dx:   Encounter Diagnosis     ICD-10-CM    1  Tendonitis, Achilles, right  M76 61                   Assessment  Assessment details: Petrona Sotelo is a 76 y o  female who presented to outpatient physical therapy at [place] [address] with complaints of R>L achilles pain  She presented with decreased intrinsic foot strength, limited flexibility, limited balance, decreased tolerance to activity and decreased functional mobility following a R achilles debridement on 6/3/21 due to B achilles tendinitis  Overall progression has been good despite still having some burning in her achilles at night  She is now able to ambulate 3-4x as far as she could prior to starting PT and R LE balance is significantly improved    She will continue to benefit from skilled PT services in order to address these deficits and reach maximum level of function  Thank you for the referral!  Impairments: abnormal gait, abnormal or restricted ROM, activity intolerance, impaired physical strength and pain with function  Barriers to therapy: None  Understanding of Dx/Px/POC: excellent   Prognosis: good    Goals  ST  Independent with HEP in 2 weeks - Met  2  Increase B achilles flexibility to WNL in 3 weeks - Mostly Met     LT  Achieve FOTO score of 64/100 in 8 weeks - Mostly Met  2  Able to walk x 30 min for exercise without achilles pain in 8 weeks - Partially Met  3  Strength B foot intrinsics = 5/5 in 8 weeks - Partially Met  4  Good B LE balance in 8 weeks - Mostly Met  5  No burning R achilles in 6 weeks    Plan  Patient would benefit from: skilled PT and PT eval  Planned modality interventions: cryotherapy, TENS and thermotherapy: hydrocollator packs  Other planned modality interventions: laser  Planned therapy interventions: ADL retraining, balance/weight bearing training, flexibility, functional ROM exercises, home exercise program, joint mobilization, manual therapy, neuromuscular re-education, postural training, strengthening, stretching, therapeutic activities, therapeutic exercise and gait training  Frequency: 2x week  Duration in weeks: 6  Treatment plan discussed with: patient        Subjective Evaluation    History of Present Illness  Mechanism of injury: Pt reports having R>L achilles pain for about 7 months without cause  Tried PT without much success  Saw Dr Sandra Ontiveros who performed a R achilles debridement and casted her on 6/3/21 for 2 weeks and CAM boot for 4 weeks  CAM was removed on 21  Has been limited significantly in her walking - limited 10 minutes initially, but since starting PT is able to walk at least 3-4x as far  Would like to be able to walk more in her neighborhood and travel    No problems in general health  Has hx of B TKA with success  Main concern is burning in her achilles area at night  Not a recurrent problem   Quality of life: good    Pain  Current pain ratin  At best pain ratin  At worst pain ratin  Quality: tight and dull ache  Aggravating factors: walking  Progression: improved    Social Support  Steps to enter house: no  Stairs in house: no   Lives in: multiple-level home  Lives with: alone    Employment status: not working    Diagnostic Tests  X-ray: abnormal (Thickening R achilles)  Treatments  Previous treatment: physical therapy and immobilization  Current treatment: physical therapy  Patient Goals  Patient goals for therapy: increased strength, return to sport/leisure activities, independence with ADLs/IADLs, decreased pain, increased motion and improved balance          Objective     Observations   Left Ankle/Foot   Negative for edema  Right Ankle/Foot   Negative for edema  Tenderness   Left Ankle/Foot   No tenderness in the Achilles insertion  Right Ankle/Foot   Tenderness in the Achilles insertion  Additional Tenderness Details  No tenderness L , min R achilles tendon mid lateral aspect  Neurological Testing     Sensation     Ankle/Foot   Left Ankle/Foot   Intact: light touch    Right Ankle/Foot   Intact: light touch     Active Range of Motion   Left Ankle/Foot   Normal active range of motion    Right Ankle/Foot   Normal active range of motion    Strength/Myotome Testing     Left Ankle/Foot   Normal strength    Right Ankle/Foot   Dorsiflexion: 5  Plantar flexion: 4  Inversion: 5  Eversion: 5    Additional Strength Details  Foot intrinsics = 4/5 B       Ambulation   Weight-Bearing Status   Weight-Bearing Status (Left): full weight bearing   Weight-Bearing Status (Right): full weight-bearing    Assistive device used: none    Ambulation: Level Surfaces   Ambulation without assistive device: independent    Ambulation: Stairs   Ascend stairs: independent  Pattern: reciprocal  Railings: without rails  Descend stairs: independent  Pattern: reciprocal  Railings: without rails  Curbs: independent    Observational Gait   Gait: within functional limits   Decreased walking speed  Comments   Mod pes planus B           POC EXPIRES On:  9/22/21  PRECAUTIONS:  B TKA  CO-MORBIDITES:  None  PERSONAL FACTORS:  None      Manuals HEP 8/2 8/4 8/6 8/9 8/11   STM  achilles in sitting R  8' 8' gentel 5' 5' 5'   Plantar fascia STM     10 5'   Ice massage     5'     R ankle PROM into DF      5'            Neuro Re-Ed      BAPS R 4 directions  L3 20 L3 20 L3 20 L3 20 L3 20   BAPS R CCW/CW  L3 20 L3 20 L3 20 L3 20 L3 20   SLS R  5" 10 Blue disc 5" 10 held Blue disc 5" 10 Blue disc 10" 10   Tandem walk on foam FWD 12'  3laps 3 laps held 3 laps 3 laps                              Ther Ex     Bike  NV L1 5' L1 5' L1 5' L1 5'   Gastroc stretch R on wedge  20" 3 20" 3 held 20" 3 20" 3   Soleus stretch R on wedge  20" 3 20" 3 held 20" 3 20" 3   Gastroc stretch with strap R  30" 3 30" 3 30" 3 30" 3 30" 3   Toe splaying R         Toe flexion R  30 30 20 20 20   AROM ccw/cw, 4way   OTB 4way OTB 4 way 20 ea OTB 4way 20 OTB 4way 20 Blue 4 way 20   Forward lunges  20 20 20 20 20   Side Lunges  20 20 20 20 20            Ther Activity     Step over 8'  20 20 held 20 20            Gait Training                       Modalities

## 2021-08-11 NOTE — PROGRESS NOTES
PT Re-evaluation    Today's date: 2021  Patient name: Neo Hwang  : 1945  MRN: [id number]  Referring provider: Te Elias*  Dx:   Encounter Diagnosis     ICD-10-CM    1  Tendonitis, Achilles, right  M76 61                   Assessment  Assessment details: Neo Hwang is a 76 y o  female who presented to outpatient physical therapy at [place] [address] with complaints of R>L achilles pain  She presented with decreased intrinsic foot strength, limited flexibility, limited balance, decreased tolerance to activity and decreased functional mobility following a R achilles debridement on 6/3/21 due to B achilles tendinitis  Overall progression has been good despite still having some burning in her achilles at night  She is now able to ambulate 3-4x as far as she could prior to starting PT and R LE balance is significantly improved  She will continue to benefit from skilled PT services in order to address these deficits and reach maximum level of function  Thank you for the referral!  Impairments: abnormal gait, abnormal or restricted ROM, activity intolerance, impaired physical strength and pain with function  Barriers to therapy: None  Understanding of Dx/Px/POC: excellent   Prognosis: good    Goals  ST  Independent with HEP in 2 weeks - Met  2  Increase B achilles flexibility to WNL in 3 weeks - Mostly Met     LT  Achieve FOTO score of 64/100 in 8 weeks - Mostly Met  2  Able to walk x 30 min for exercise without achilles pain in 8 weeks - Partially Met  3  Strength B foot intrinsics = 5/5 in 8 weeks - Partially Met  4  Good B LE balance in 8 weeks - Mostly Met  5    No burning R achilles in 6 weeks    Plan  Patient would benefit from: skilled PT and PT eval  Planned modality interventions: cryotherapy, TENS and thermotherapy: hydrocollator packs  Other planned modality interventions: laser  Planned therapy interventions: ADL retraining, balance/weight bearing training, flexibility, functional ROM exercises, home exercise program, joint mobilization, manual therapy, neuromuscular re-education, postural training, strengthening, stretching, therapeutic activities, therapeutic exercise and gait training  Frequency: 2x week  Duration in weeks: 6  Treatment plan discussed with: patient        Subjective Evaluation    History of Present Illness  Mechanism of injury: Pt reports having R>L achilles pain for about 7 months without cause  Tried PT without much success  Saw Dr Ancelmo Maloney who performed a R achilles debridement and casted her on 6/3/21 for 2 weeks and CAM boot for 4 weeks  CAM was removed on 21  Has been limited significantly in her walking - limited 10 minutes initially, but since starting PT is able to walk at least 3-4x as far  Would like to be able to walk more in her neighborhood and travel  No problems in general health  Has hx of B TKA with success  Main concern is burning in her achilles area at night  Not a recurrent problem   Quality of life: good    Pain  Current pain ratin  At best pain ratin  At worst pain ratin  Quality: tight and dull ache  Aggravating factors: walking  Progression: improved    Social Support  Steps to enter house: no  Stairs in house: no   Lives in: multiple-level home  Lives with: alone    Employment status: not working    Diagnostic Tests  X-ray: abnormal (Thickening R achilles)  Treatments  Previous treatment: physical therapy and immobilization  Current treatment: physical therapy  Patient Goals  Patient goals for therapy: increased strength, return to sport/leisure activities, independence with ADLs/IADLs, decreased pain, increased motion and improved balance          Objective     Observations   Left Ankle/Foot   Negative for edema  Right Ankle/Foot   Negative for edema  Tenderness   Left Ankle/Foot   No tenderness in the Achilles insertion  Right Ankle/Foot   Tenderness in the Achilles insertion  Additional Tenderness Details  No tenderness L , min R achilles tendon mid lateral aspect  Neurological Testing     Sensation     Ankle/Foot   Left Ankle/Foot   Intact: light touch    Right Ankle/Foot   Intact: light touch     Active Range of Motion   Left Ankle/Foot   Normal active range of motion    Right Ankle/Foot   Normal active range of motion    Strength/Myotome Testing     Left Ankle/Foot   Normal strength    Right Ankle/Foot   Dorsiflexion: 5  Plantar flexion: 4  Inversion: 5  Eversion: 5    Additional Strength Details  Foot intrinsics = 4/5 B  Ambulation   Weight-Bearing Status   Weight-Bearing Status (Left): full weight bearing   Weight-Bearing Status (Right): full weight-bearing    Assistive device used: none    Ambulation: Level Surfaces   Ambulation without assistive device: independent    Ambulation: Stairs   Ascend stairs: independent  Pattern: reciprocal  Railings: without rails  Descend stairs: independent  Pattern: reciprocal  Railings: without rails  Curbs: independent    Observational Gait   Gait: within functional limits   Decreased walking speed  Comments   Mod pes planus B           POC EXPIRES On:  9/22/21  PRECAUTIONS:  B TKA  CO-MORBIDITES:  None  PERSONAL FACTORS:  None      Manuals HEP 8/2 8/4 8/6 8/9 8/11   STM  achilles in sitting R  8' 8' gentel 5' 5' 5'   Plantar fascia STM     10 5'   Ice massage     5'     R ankle PROM into DF      5'            Neuro Re-Ed      BAPS R 4 directions  L3 20 L3 20 L3 20 L3 20 L3 20   BAPS R CCW/CW  L3 20 L3 20 L3 20 L3 20 L3 20   SLS R  5" 10 Blue disc 5" 10 held Blue disc 5" 10 Blue disc 10" 10   Tandem walk on foam FWD 12'  3laps 3 laps held 3 laps 3 laps                              Ther Ex     Bike  NV L1 5' L1 5' L1 5' L1 5'   Gastroc stretch R on wedge  20" 3 20" 3 held 20" 3 20" 3   Soleus stretch R on wedge  20" 3 20" 3 held 20" 3 20" 3   Gastroc stretch with strap R  30" 3 30" 3 30" 3 30" 3 30" 3   Toe splaying R         Toe flexion R  30 30 20 20 20   AROM ccw/cw, 4way   OTB 4way OTB 4 way 20 ea OTB 4way 20 OTB 4way 20 Blue 4 way 20   Forward lunges  20 20 20 20 20   Side Lunges  20 20 20 20 20            Ther Activity     Step over 8'  20 20 held 20 20            Gait Training                       Modalities

## 2021-08-13 ENCOUNTER — OFFICE VISIT (OUTPATIENT)
Dept: PHYSICAL THERAPY | Facility: CLINIC | Age: 76
End: 2021-08-13
Payer: MEDICARE

## 2021-08-13 DIAGNOSIS — M76.61 TENDONITIS, ACHILLES, RIGHT: Primary | ICD-10-CM

## 2021-08-13 PROCEDURE — 97110 THERAPEUTIC EXERCISES: CPT

## 2021-08-13 PROCEDURE — 97140 MANUAL THERAPY 1/> REGIONS: CPT

## 2021-08-13 NOTE — PROGRESS NOTES
Daily Note     Today's date: 2021  Patient name: Krissy Kilgore  : 1945  MRN: [id number]  Referring provider: Tim Sales*  Dx:   Encounter Diagnosis     ICD-10-CM    1  Tendonitis, Achilles, right  M76 61                   Subjective: Pt states not seeing an improvement and is bummed out  Objective: See treatment diary below      Assessment: Pt below TE with fair tolerance, everything seemed to increased pt discomfort  Pt complained of burning in the achilles tendon  Pt did not respond well to Manuals pt continues to present with tenderness plantar region and achilles region  Plan: Progress as neil        POC EXPIRES On:  21  PRECAUTIONS:  B TKA  CO-MORBIDITES:  None  PERSONAL FACTORS:  None      Manuals HEP    STM  achilles in sitting R  5' 5' 5   Plantar fascia STM  10 5' 5   Ice massage        R ankle PROM into DF   5' 5+big toe stretch          Neuro Re-Ed     BAPS R 4 directions  L3 20 L3 20 L3 20   BAPS R CCW/CW  L3 20 L3 20 L3 20   SLS R  Blue disc 5" 10 Blue disc 10" 10 Black 10" 10   Tandem walk on foam FWD 12'  3 laps 3 laps 3laps                        Ther Ex    Bike  L1 5' L1 5' L1 6'   Gastroc stretch R on wedge  20" 3 20" 3 20" 3   Soleus stretch R on wedge  20" 3 20" 3 20" 3   Gastroc stretch with strap R  30" 3 30" 3 30" 3   Toe flexion R  20 20 20   Big toe stretch    10" 5   AROM ccw/cw, 4way   OTB 4way 20 Blue 4 way 20 Blue 4way    Forward lunges  20 20 20   Side Lunges  20 20 20          Ther Activity    Step over 8'  20 20 20          Gait Training                  Modalities

## 2021-08-16 ENCOUNTER — OFFICE VISIT (OUTPATIENT)
Dept: PHYSICAL THERAPY | Facility: CLINIC | Age: 76
End: 2021-08-16
Payer: MEDICARE

## 2021-08-16 DIAGNOSIS — M76.61 TENDONITIS, ACHILLES, RIGHT: Primary | ICD-10-CM

## 2021-08-16 PROCEDURE — 97110 THERAPEUTIC EXERCISES: CPT

## 2021-08-16 NOTE — PROGRESS NOTES
Daily Note     Today's date: 2021  Patient name: Jericho Carvajal  : 1945  MRN: [id number]  Referring provider: Sunita Trujillo*  Dx:   Encounter Diagnosis     ICD-10-CM    1  Tendonitis, Achilles, right  M76 61                   Subjective: Pt states walking a mile and ever since her achilles tendon is on "fire/burning"  Objective: See treatment diary below      Assessment: Pt arrived to PT session with "burning" in her achilles tendon, deferred below TE except for certain TE and deferred manuals  Resume below TE as neil       Plan: Progress as neil        POC EXPIRES On:  21  PRECAUTIONS:  B TKA  CO-MORBIDITES:  None  PERSONAL FACTORS:  None      Manuals HEP    STM  achilles in sitting R  5' 5' 5    Plantar fascia STM  10 5' 5    Ice massage         R ankle PROM into DF   5' 5+big toe stretch            Neuro Re-Ed      BAPS R 4 directions  L3 20 L3 20 L3 20 L3 20   BAPS R CCW/CW  L3 20 L3 20 L3 20 L3 20   SLS R  Blue disc 5" 10 Blue disc 10" 10 Black 10" 10    Tandem walk on foam FWD 12'  3 laps 3 laps 3laps                            Ther Ex     Bike  L1 5' L1 5' L1 6'    Gastroc stretch R on wedge  20" 3 20" 3 20" 3 20" 3   Soleus stretch R on wedge  20" 3 20" 3 20" 3    Gastroc stretch with strap R  30" 3 30" 3 30" 3    Toe flexion R  20 20 20 20   Big toe stretch    10" 5 10" 5   AROM ccw/cw, 4way   OTB 4way 20 Blue 4 way 20 Blue 4way  Blue 4way   Forward lunges  20 20 20    Side Lunges  20 20 20            Ther Activity     Step over 8'  20 20 20            Gait Training                     Modalities     Seated on table     10'

## 2021-12-16 ENCOUNTER — OFFICE VISIT (OUTPATIENT)
Dept: BARIATRICS | Facility: CLINIC | Age: 76
End: 2021-12-16
Payer: MEDICARE

## 2021-12-16 VITALS
TEMPERATURE: 98 F | RESPIRATION RATE: 16 BRPM | SYSTOLIC BLOOD PRESSURE: 130 MMHG | BODY MASS INDEX: 34.46 KG/M2 | HEART RATE: 71 BPM | HEIGHT: 60 IN | DIASTOLIC BLOOD PRESSURE: 70 MMHG | WEIGHT: 175.5 LBS

## 2021-12-16 DIAGNOSIS — I10 ESSENTIAL HYPERTENSION: ICD-10-CM

## 2021-12-16 DIAGNOSIS — E66.9 OBESITY, CLASS I, BMI 30-34.9: Primary | ICD-10-CM

## 2021-12-16 DIAGNOSIS — R53.83 FATIGUE, UNSPECIFIED TYPE: ICD-10-CM

## 2021-12-16 DIAGNOSIS — E78.5 HYPERLIPIDEMIA: ICD-10-CM

## 2021-12-16 PROBLEM — E66.811 OBESITY, CLASS I, BMI 30-34.9: Status: ACTIVE | Noted: 2021-12-16

## 2021-12-16 PROCEDURE — 99204 OFFICE O/P NEW MOD 45 MIN: CPT | Performed by: PHYSICIAN ASSISTANT

## 2021-12-28 ENCOUNTER — OFFICE VISIT (OUTPATIENT)
Dept: BARIATRICS | Facility: CLINIC | Age: 76
End: 2021-12-28

## 2021-12-28 VITALS — BODY MASS INDEX: 34.89 KG/M2 | HEIGHT: 60 IN | WEIGHT: 177.7 LBS

## 2021-12-28 DIAGNOSIS — R63.5 ABNORMAL WEIGHT GAIN: ICD-10-CM

## 2021-12-28 PROCEDURE — WMPRO12

## 2021-12-28 PROCEDURE — RECHECK

## 2022-01-03 ENCOUNTER — TELEPHONE (OUTPATIENT)
Dept: GERIATRICS | Age: 77
End: 2022-01-03

## 2022-01-03 NOTE — TELEPHONE ENCOUNTER
[address]  [phone]  Telephone Intake: PCP    Referral Source: self [facility])     Caller (and relationship to patient): Julita Rosas (self)     (relationship to patient): self    Phone Number: [phone]   Is caller POA? Yes     Reason for choosing Geriatric PCP: proximity is close to her home  Would also like a geriatrician as her PCP    Any additional concerns that you would like the provider to be aware of: memory issues that has noticed are getting worse    Patient Insurance: Medicare and AARP   If insurance is a Medicare Advantage plan please make the patient aware that they will be responsible for a specialties co-pay  Is the patient aware of higher co-pay? Patient has Medicare and AARP    Patient scheduled with Dr Serenity Guerra: Dr Edson Kee does not accept new PCP patients who reside in facilities (i e  levels of care higher than independent living)  She WILL accept patients at independent living facilities

## 2022-01-03 NOTE — TELEPHONE ENCOUNTER
[place] [address]  [phone]    Telephone Intake: Geriatric Assessment         NOTE: Patient also has appointment to establish primary care with Dr Alexander Mercado in March, 2022      Referral source: self                              Caller who is scheduling/relationship to patient: patient Fernandez Song)  Caller's phone number: [phone]              Is there a living will/healthcare POA in place/If so, who? Yes     Reason for referral: Patient concerns  regarding memory concerns  What is the goal of the visit? Patient concerned with memory issues and would like assessment  Patient relayed that she does not think her children are aware  Patient requesting to be present at assessment without a family member  Has the patient been seen by a Neurologist or Geriatrician? No  Has the patient ever been diagnosed with dementia? No      Preferred language? English  Highest education level? Did not inquire  Does the patient wear glasses? Did Not inquire  Does the patient use hearing aids? Did Not inquire     Does the patient and/or caregiver have access for a virtual visit (computer or smart phone, working audio and video)? Yes     Caller was informed:   Please make sure the pt is accompanied by someone who knows them well / caregiver / family member to participate in this appointment  If a pt plans to attend alone, please route a message to the assigned provider for approval  Who will accompany the patient (name and relationship)? Patient does not have a spouse, just moved to the area so does not know anyone who can accompany her  Patient expressed not wanting her children to be present, even virtually by phone, for assessment because "they work"    Office packet mailed out to: [address] [facility] [facility] [id number]  Patient added to wait list for sooner appointments?  No    NOTE FOR : If the patient was recently hospitalized, please route intake to assigned provider for chart review

## 2022-01-03 NOTE — LETTER
January 3, 2022      Dear Jhony Dixon,    Thank you for choosing [address] [place] [address] as your new primary care provider  We specialize in the needs of older adults  Our goal is to assist you in maintaining a healthy, safe and independent lifestyle  Please bring the following to your initial appointment:      A mask without a valve (to be worn by everyone entering the building)    Eye glasses and/or hearing aids (as applicable)    Enclosed forms (please complete and bring to appointment)    Advance directives (a living will and/or durable power of ) if established    Medication list (including vitamins or supplements you are currently taking)     Prior to your appointment, we recommend that you contact your insurance provider to determine whether you may be charged a primary care copay or a specialty copay, due to our providers specializing in geriatric medicine  [place] [address] is a teaching institution and there will be residents and fellows as a part of your visits with us  APPOINTMENT DATE/TIME: Thursday, March 24, 2022    Please arrive at least 15 minutes before your scheduled appointment time  To maintain healthy and safety precautions, please follow the below process:      When you arrive in the parking lot, please call the office at [phone] to let us know you have arrived  We will conduct check-in over the telephone while you are in the vehicle   One person may accompany you to your appointment   All persons entering the building must wear a mask without a valve  If you don't have one, please inform us at [facility] and we will provide one  Thank you again for choosing [address] [place], [place] [address]  We look forward to meeting you!

## 2022-01-03 NOTE — LETTER
January 3, 2022        Dear Breanna Sharma,    Thank you for choosing [facility]  We assist in the needs of older adults and their families  Our goal is to help aging adults in maintaining a healthy, safe and independent lifestyle and to work with their primary care physician to coordinate care  There are many reasons appointments are scheduled with us  Some patients are seeking a baseline assessment as part of medical history; other patients might have a specific concern regarding cognition, multiple medications, or overall health concerns  What to Expect  Two, possibly, three visits:     Initial Visit is approximately one hour  The patient and representative/family meet with the doctor, possibly a resident/fellow, and the   In addition to medical issues, patient goals, quality of life, home safety, and greatest areas of concern are addressed  If necessary, additional testing may be ordered such as bloodwork, imaging, and/or a computerized cognitive evaluation  A physical therapy/occupation therapy evaluation may also be ordered  Conference visit is approximately one hour  The patient and representative/family review an individualized care plan targeting patient's goals/concerns/issues and are provided with recommendations and resources  [facility]  is a teaching institution and there will be residents and fellows as a part of your visits with us       Please bring the following to your initial appointment:      A mask without a valve (to be worn by everyone entering the building)    Eye glasses and/or hearing aids (as applicable)    Enclosed forms (please complete and bring to appointment)    Advance directives (a living will and/or durable power of ) if established    Medication list (including vitamins or supplements you are currently taking)       DATE of INITIAL VISIT: Thursday, February 17, 2022 at 9:00 AM    Please arrive at least 15 minutes before scheduled appointment time  When you park, please call our office at [phone] to let us know you have arrived  We will conduct check-in by telephone  One person may accompany you to your appointment  DATE of CARE CONFERENCE: Thursday, March 31, 2022 at 9:00 AM    For Video Conference: [facility] office will call 30-45 minutes prior to appointment complete check in, review medications and allergies, as well as ask for vitals  If you can take the patient's blood pressure, pulse, temperature, and weight on the day of this appointment, it would be appreciated  For In-office Conference: One person may accompany the patient to appointment  Others are welcome to join by phone or video  Please let us know which you prefer so we can provide a conference phone number or a video link  PLEASE NOTE: Due to the extensive and comprehensive nature of the visit, if you are more than 15 minutes late we will need to reschedule the appointment  Thank you again for choosing [address] [place] [address]  We look forward to meeting you!

## 2022-01-06 ENCOUNTER — CLINICAL SUPPORT (OUTPATIENT)
Dept: BARIATRICS | Facility: CLINIC | Age: 77
End: 2022-01-06

## 2022-01-06 VITALS — WEIGHT: 172.2 LBS | BODY MASS INDEX: 33.81 KG/M2 | HEIGHT: 60 IN

## 2022-01-06 DIAGNOSIS — R63.5 ABNORMAL WEIGHT GAIN: Primary | ICD-10-CM

## 2022-01-06 PROCEDURE — RECHECK

## 2022-01-10 NOTE — PROGRESS NOTES
Weight Management Medical Nutrition Assessment  Sarah Dalton is here for f/u RD session for Healthy [facility] program    Current wt:  170 3  lbs  She has lost 7 4 lbs x 2 wks  Binging during the day has been improved  Planning with [email or web address] pal and consuming 1000 calories/day  Has increased protein at breakfast as recommended  Also reports increase from 2 cups water to 4 cups per day  Questions answered re: dining out, protein bars  Patient seen by Medical Provider in past 6 months:  yes  Requested to schedule appointment with Medical Provider: No    Anthropometric Measurements  Start Weight (#): 177 7 lb 12/28/21  Current Weight (#):  170 3 lbs  TBW % Change from start weight:  4 2%  Ideal Body Weight (#): 127 3 lbs BMI 25 (60")  Goal Weight (#):  lbs   Highest: 183 lbs  Lowest: 97 lbs mid 20's    Weight Loss History  Previous weight loss attempts: Commercial Programs ([employer], [employer], etc )  Exercise  Self Created Diets (Portion Control, Healthy Food Choices, etc )    Food and Nutrition Related History  Wake up:  6:00   Bed Time: 10    Food Recall  Breakfast: 8:00: decaf coffee w/2 tbsp h/h, protein bar    Snack: skip  Lunch: 12:00:   deli turkey or chicken, s/w thin OR healthy choice meal   Snack: quest chips OR fruit OR low fat string cheese   Dinner: 5:00:~3 oz chicken, larger veggies, sometimes carb OR sometimes pasta <1x/wk  Snack:  Skip     Beverages: water, decaf coffee/tea and alcohol (rare)  Volume of beverage intake: 4 cups water, 1 cup decaf    Weekends: Same  Cravings:  carbs   Trouble area of day:midmorning & midafternoon     Frequency of Eating out: 1x/wk  Food restrictions: lactose? Cooking: self   Food Shopping: self    Physical Activity Intake  Activity:Walking and matt, strength training  [facility]  Physical limitations/barriers to exercise:  n/a    Estimated Needs  Energy  [employer] Needs:  BMR : 8073   1# loss weekly sedentary:  921            1# loss weekly lightly active: 1128  Maintenance calories for sedentary activity level: 1421  Protein: 55-68 gm      (1 2-1 5g/kg IBW)  Fluid: 63 oz     (35mL/kg adjusted body weight)    Nutrition Diagnosis  Yes; Overweight/obesity  related to Excess energy intake as evidenced by  BMI more than normative standard for age and sex (obesity-grade I 26-30  9)     Nutrition Intervention    Nutrition Prescription  Calories: 850-1050  Protein: 55-70 gm    Meal Plan (Stewart/Pro)  Breakfast: 200-250, 15 (+45)  Snack: skip  Lunch: 200-300, 15-20  Snack: 100-150, 5-10  Dinner: 300-350, 20-25  Snack: skip  **add mid morning snack if needed**    Nutrition Education:    Healthy Core Manual  Calorie controlled menu  Lean protein food choices  Healthy snack options  Food journaling tips      Nutrition Counseling:  Strategies: meal planning, portion sizes, healthy snack choices, hydration, fiber intake, protein intake, exercise, food journal      Monitoring and Evaluation:  Evaluation criteria:  Energy Intake  Meet protein needs  Maintain adequate hydration  Monitor weekly weight  Meal planning/preparation  Food journal   Decreased portions at mealtimes and snacks  Physical activity     Barriers to learning:none  Readiness to change: Action:  (Changing behavior)  Comprehension: good  Expected Compliance: good

## 2022-01-11 ENCOUNTER — OFFICE VISIT (OUTPATIENT)
Dept: BARIATRICS | Facility: CLINIC | Age: 77
End: 2022-01-11

## 2022-01-11 VITALS — BODY MASS INDEX: 33.44 KG/M2 | HEIGHT: 60 IN | WEIGHT: 170.3 LBS

## 2022-01-11 DIAGNOSIS — R63.5 ABNORMAL WEIGHT GAIN: Primary | ICD-10-CM

## 2022-01-11 PROCEDURE — RECHECK

## 2022-01-13 ENCOUNTER — CLINICAL SUPPORT (OUTPATIENT)
Dept: BARIATRICS | Facility: CLINIC | Age: 77
End: 2022-01-13

## 2022-01-13 VITALS — HEIGHT: 60 IN | WEIGHT: 170.8 LBS | BODY MASS INDEX: 33.53 KG/M2

## 2022-01-13 DIAGNOSIS — R63.5 ABNORMAL WEIGHT GAIN: Primary | ICD-10-CM

## 2022-01-13 PROCEDURE — RECHECK

## 2022-01-20 ENCOUNTER — CLINICAL SUPPORT (OUTPATIENT)
Dept: BARIATRICS | Facility: CLINIC | Age: 77
End: 2022-01-20

## 2022-01-20 VITALS — BODY MASS INDEX: 33.01 KG/M2 | WEIGHT: 169 LBS

## 2022-01-20 DIAGNOSIS — R63.5 ABNORMAL WEIGHT GAIN: Primary | ICD-10-CM

## 2022-01-20 PROCEDURE — RECHECK

## 2022-01-27 ENCOUNTER — CLINICAL SUPPORT (OUTPATIENT)
Dept: BARIATRICS | Facility: CLINIC | Age: 77
End: 2022-01-27

## 2022-01-27 VITALS — HEIGHT: 60 IN | BODY MASS INDEX: 33.18 KG/M2 | WEIGHT: 169 LBS

## 2022-01-27 DIAGNOSIS — R63.5 ABNORMAL WEIGHT GAIN: Primary | ICD-10-CM

## 2022-01-27 PROCEDURE — RECHECK

## 2022-02-03 ENCOUNTER — CLINICAL SUPPORT (OUTPATIENT)
Dept: BARIATRICS | Facility: CLINIC | Age: 77
End: 2022-02-03

## 2022-02-03 VITALS — HEIGHT: 60 IN | BODY MASS INDEX: 32.79 KG/M2 | WEIGHT: 167 LBS

## 2022-02-03 DIAGNOSIS — R63.5 ABNORMAL WEIGHT GAIN: Primary | ICD-10-CM

## 2022-02-03 PROCEDURE — RECHECK

## 2022-02-04 LAB
INSULIN SERPL-ACNC: 5.2 UIU/ML
TSH SERPL-ACNC: 3.69 MIU/L (ref 0.4–4.5)

## 2022-02-07 ENCOUNTER — OFFICE VISIT (OUTPATIENT)
Dept: BARIATRICS | Facility: CLINIC | Age: 77
End: 2022-02-07

## 2022-02-07 VITALS — BODY MASS INDEX: 32.42 KG/M2 | WEIGHT: 166 LBS

## 2022-02-07 DIAGNOSIS — R63.2 BINGE EATING: Primary | ICD-10-CM

## 2022-02-07 DIAGNOSIS — R63.5 ABNORMAL WEIGHT GAIN: ICD-10-CM

## 2022-02-07 PROCEDURE — RECHECK

## 2022-02-07 NOTE — PROGRESS NOTES
Weight Management Medical Nutrition Assessment  Brooke Garner is here for f/u RD session for Healthy [facility] program    Current wt:  163 3   lbs  She has lost 7 lbs  x 1 month with overall loss of 14 4 lbs x 6 wks  Binging during the day continues to be improved  She did see  & was referred for therapy  Has not yet heard from that office, phone number provided along with recommendations for alternative practices  Tracking with [employer] pal and consuming 1000 calories/day  She has no concerns at this time  Patient seen by Medical Provider in past 6 months:  yes  Requested to schedule appointment with Medical Provider: No    Anthropometric Measurements  Start Weight (#): 177 7 lb 12/28/21  Current Weight (#):  163 3 lbs  TBW % Change from start weight: 8 1%  Ideal Body Weight (#): 127 3 lbs BMI 25 (60")  Goal Weight (#):  lbs   Highest: 183 lbs  Lowest: 97 lbs mid 20's    Weight Loss History  Previous weight loss attempts: Commercial Programs ([employer], [employer], etc )  Exercise  Self Created Diets (Portion Control, Healthy Food Choices, etc )    Food and Nutrition Related History  Wake up:  6:00   Bed Time: 10    Food Recall  Breakfast: 8:00: decaf coffee w/2 tbsp h/h, protein bar OR eggs OR cottage cheese, light bread, 1 tbsp pb   Snack: quest chips OR fruit  Lunch: 12:00:   deli turkey or chicken, s/w thin or flatout wrap     Snack: quest chips OR fruit OR low fat string cheese   Dinner: 5:00:~3 oz chicken, larger veggies, sometimes carb OR sometimes pasta <1x/wk OR healthy choice meal  Snack:  Skip     Beverages: water, decaf coffee/tea and alcohol (rare)  Volume of beverage intake: 4 cups water, 1 cup decaf    Weekends: Same  Cravings:  carbs   Trouble area of day:midmorning & midafternoon     Frequency of Eating out: 1x/wk  Food restrictions: lactose?    Cooking: self   Food Shopping: self    Physical Activity Intake  Activity:Walking and matt, strength training  [facility]; daily exercises at home  Physical limitations/barriers to exercise:  n/a    Estimated Needs  Energy  [employer] Energy Needs: BMR : 2294   1# loss weekly sedentary:  883            1# loss weekly lightly active: 1084  Maintenance calories for sedentary activity level: 1383  Protein: 55-68 gm      (1 2-1 5g/kg IBW)  Fluid: 63 oz     (35mL/kg adjusted body weight)    Nutrition Diagnosis  Yes; Overweight/obesity  related to Excess energy intake as evidenced by  BMI more than normative standard for age and sex (obesity-grade I 26-30  9)     Nutrition Intervention    Nutrition Prescription  Calories: 850-1050  Protein: 55-70 gm    Meal Plan (Stewart/Pro)  Breakfast: 200-250, 15 (+45)  Snack: skip  Lunch: 200-300, 15-20  Snack: 100-150, 5-10  Dinner: 300-350, 20-25  Snack: skip  **add mid morning snack if needed**    Nutrition Education:    Healthy Core Manual  Calorie controlled menu  Lean protein food choices  Healthy snack options  Food journaling tips      Nutrition Counseling:  Strategies: meal planning, portion sizes, healthy snack choices, hydration, fiber intake, protein intake, exercise, food journal      Monitoring and Evaluation:  Evaluation criteria:  Energy Intake  Meet protein needs  Maintain adequate hydration  Monitor weekly weight  Meal planning/preparation  Food journal   Decreased portions at mealtimes and snacks  Physical activity     Barriers to learning:none  Readiness to change: Action:  (Changing behavior)  Comprehension: good  Expected Compliance: good

## 2022-02-07 NOTE — PATIENT INSTRUCTIONS
- Continue logging foods and include mood logging to identify stressors that lead to eating  - plan meals before going to grocery store, be aware of environments that may tempt to buy higher calorie itesm

## 2022-02-07 NOTE — RESULT ENCOUNTER NOTE
Spoke with Ke Lund re: Labs  She will be bringing copies of other lab results with her for next appt

## 2022-02-07 NOTE — PROGRESS NOTES
Patient presents for [facility] Evaluation as a part of her Healthy Core program, current weight 166lbs  Eating behaviors/food choices: Patient reports having three meals a day and logging her foods but she will engage in binging episodes  Patient describes going to a grocery story or various grocery stores, getting higher calorie foods and eating them at home over several episodes throughout the day  She has bought food, had some of it and then thrown it away to avoid eating more  She tries not to keep higher calorie snack foods in the home  The last time she engaged in this behavior was in December, since joining the program she hasn't had the impulse to engage in these behaviors  Patient encouraged to continue to plan and track her meals, to create a grocery list that she will stick to and be mindful of places that may trigger buying higher calorie snacks like the checkout line  Patient has tried [employer] in the past to limit her trips to the grocery store but has found it to be expensive  She agrees to be mindful about her meal planning and food purchases  Activity/Exercise:  Patient does go to the gym or for walks every day of the week for as little as 15 mins up to an hour and a half  She enjoys working out and it takes up some of her free time  Mental Health/Wellness:  Patient alluded to some past trauma, the loss of loved ones that may have an impact on her current struggles  Patient has seen a therapist in the past with the hope of processing stressors that may be impacting her weight loss  When she didn't lose the weight she wanted to she felt that therapy wasn't much of a help  SW suggested that her weight may be a symptom of a bigger problem and processing the trauma and stress of her past may help her to work through them without the use of food  Patient was agreeable to a referral to [facility]    Mindfulness was discussed, encouraged to journal moods along with food to identify stressors and themes  Also if patient does engage in a binge eating episodes to reflect after, pulling on her journal entries to understand what triggered it and identifying coping skills to change out that behavior        Goals:    - Continue logging foods and include mood logging to identify stressors that lead to eating  - plan meals before going to grocery store, be aware of environments that may tempt to buy higher calorie itesm    Next Appointment:  With RD on 2/14

## 2022-02-10 ENCOUNTER — CLINICAL SUPPORT (OUTPATIENT)
Dept: BARIATRICS | Facility: CLINIC | Age: 77
End: 2022-02-10

## 2022-02-10 VITALS — BODY MASS INDEX: 32.31 KG/M2 | HEIGHT: 60 IN | WEIGHT: 164.6 LBS

## 2022-02-10 DIAGNOSIS — R63.5 ABNORMAL WEIGHT GAIN: Primary | ICD-10-CM

## 2022-02-10 PROCEDURE — RECHECK

## 2022-02-14 ENCOUNTER — OFFICE VISIT (OUTPATIENT)
Dept: BARIATRICS | Facility: CLINIC | Age: 77
End: 2022-02-14

## 2022-02-14 VITALS — HEIGHT: 60 IN | BODY MASS INDEX: 32.06 KG/M2 | WEIGHT: 163.3 LBS

## 2022-02-14 DIAGNOSIS — R63.5 ABNORMAL WEIGHT GAIN: Primary | ICD-10-CM

## 2022-02-14 PROCEDURE — RECHECK

## 2022-02-17 ENCOUNTER — CONSULT (OUTPATIENT)
Dept: GERIATRICS | Age: 77
End: 2022-02-17
Payer: MEDICARE

## 2022-02-17 ENCOUNTER — CLINICAL SUPPORT (OUTPATIENT)
Dept: BARIATRICS | Facility: CLINIC | Age: 77
End: 2022-02-17

## 2022-02-17 VITALS — WEIGHT: 163.4 LBS | HEIGHT: 60 IN | BODY MASS INDEX: 32.08 KG/M2

## 2022-02-17 VITALS
BODY MASS INDEX: 34.17 KG/M2 | SYSTOLIC BLOOD PRESSURE: 126 MMHG | DIASTOLIC BLOOD PRESSURE: 59 MMHG | WEIGHT: 162.8 LBS | HEIGHT: 58 IN | OXYGEN SATURATION: 98 % | HEART RATE: 65 BPM | TEMPERATURE: 99.6 F

## 2022-02-17 DIAGNOSIS — G31.84 MCI (MILD COGNITIVE IMPAIRMENT): Primary | ICD-10-CM

## 2022-02-17 DIAGNOSIS — F41.9 ANXIETY: ICD-10-CM

## 2022-02-17 DIAGNOSIS — E78.2 MIXED HYPERLIPIDEMIA: ICD-10-CM

## 2022-02-17 DIAGNOSIS — E66.09 CLASS 1 OBESITY DUE TO EXCESS CALORIES WITHOUT SERIOUS COMORBIDITY WITH BODY MASS INDEX (BMI) OF 31.0 TO 31.9 IN ADULT: ICD-10-CM

## 2022-02-17 DIAGNOSIS — R63.5 ABNORMAL WEIGHT GAIN: Primary | ICD-10-CM

## 2022-02-17 DIAGNOSIS — I10 ESSENTIAL HYPERTENSION: ICD-10-CM

## 2022-02-17 DIAGNOSIS — G47.33 OSA (OBSTRUCTIVE SLEEP APNEA): ICD-10-CM

## 2022-02-17 PROCEDURE — 99483 ASSMT & CARE PLN PT COG IMP: CPT | Performed by: INTERNAL MEDICINE

## 2022-02-17 PROCEDURE — RECHECK

## 2022-02-17 RX ORDER — CALCIUM POLYCARBOPHIL 625 MG 625 MG/1
625 TABLET ORAL DAILY
COMMUNITY

## 2022-02-17 NOTE — PATIENT INSTRUCTIONS
ASSESSMENT AND PLAN:  1  MCI (mild cognitive impairment)  Assessment & Plan:  Likely related to anxiety and poor sleep  Recommend continued exercise, staying awake during the day and starting psychology services for CBT and treatment of anxiety  2  VEGA (obstructive sleep apnea)  Assessment & Plan:  Had sleep study  Results not here  Will request results  3  Mixed hyperlipidemia  Assessment & Plan:  May consider stopping rosuvastatin for primary prevention      4  Essential hypertension  Assessment & Plan:  May stop amlodipine and follow BP considering recent weight loss  5  Class 1 obesity due to excess calories without serious comorbidity with body mass index (BMI) of 31 0 to 31 9 in adult  Assessment & Plan:  Appears stable  Continue weight loss with bariatric team       6  Anxiety  Assessment & Plan:  Start with CBT with psychology  Decision-making capacity: Intact    Staging: MCI (Mild Cognitive Impairment)    Medications Review: appear appropriate  Could consider stopping amlodipine and follow BP  HPI:    We had the pleasure of evaluating Teressa Holter who is a 68 y o  female in Geriatric consultation today  Ms Han Moe is in the office with alone  She lives alone  Has 2 daughters, one in [place], one in [place]  History as per patient:    COGNITION:  Memory Issues noticed since 6 month(s)    Memory affected: short term memory loss    Symptoms started: gradual  Over time the memory has:  worsened  Memory issue(s) were noted by: patient   Patient has difficulties with cooking (nonly occasional and in a new kitchen)  Difficulty finding the right word while speaking: Yes once in a while  Requires repeat information or asking the same question repeatedly: Yes - sometimes  Fluctuation in alertness: No  Changes in mood or personality:No  Current or previous treatment for depression or anxiety: Yes    Family member with dementia and what type?  Yes, aunt in [de-identified]  History of head trauma: No  History of stroke: No  History of alcohol or substance abuse: No    FUNCTIONAL STATUS:  BADLs  Does patient require assistance with:  Bathing: No  Dressing: No  Toileting: No  Transferring: No  Continence: No  Feeding: No    IADLs  Dose patient require assistance with:  Telephone: No  Shopping: No  Food Preparation: No  Housekeeping: No  Laundry: No  Transportation: No  Medications: No  Finances: No    NEUROPSYCH SYMPTOMS:  Does patient have trouble sleeping at night? Yes - very poor  Difficulty falling asleep and also with early awakenings  No other symptoms    SAFETY:  Hearing and vision issue: No  Any gait or balance disorder: No  Uses: no assistive devices  Any falls in the last year: No  Any history of wandering: No  Are there firearms or guns in the home: No  Does patient drive: Yes  Any driving accidents or citations in the last year: No  Any concerns about patient's ability to drive: No    ACP REVIEW:  Does patient have POA: Yes  Does patient have a Living will: Yes  Any legal assistance needed for healthcare planning?: No    ROS: Review of Systems   Constitutional: Negative for chills and fever  HENT: Negative for trouble swallowing and voice change  Eyes: Negative for pain and discharge  Respiratory: Negative for cough and wheezing  Cardiovascular: Negative for chest pain and palpitations  Gastrointestinal: Negative for abdominal pain and blood in stool  Genitourinary: Negative for dysuria and hematuria  Musculoskeletal: Positive for joint swelling (ankle, shoulder, hip pain)  Negative for myalgias and neck pain  Skin: Negative for rash and wound  Neurological: Negative for seizures and headaches  Psychiatric/Behavioral: Negative for hallucinations and suicidal ideas         Allergies   Allergen Reactions    Imodium [Loperamide] Headache     Felt that peripheral vision was impaired and mild headache    Pedi-Pre Tape Spray [Wound Dressing Adhesive] Itching and Rash     Adhesive Tape    Bupropion Rash     Around 2010       Medications:    Current Outpatient Medications on File Prior to Visit   Medication Sig Dispense Refill    amLODIPine (NORVASC) 2 5 mg tablet       calcium polycarbophil (FIBERCON) 625 mg tablet Take 625 mg by mouth daily      Calcium-Magnesium-Vitamin D (CITRACAL SLOW RELEASE PO)       Multiple Vitamins-Minerals (MULTIVITAMIN WITH MINERALS) tablet Take 1 tablet by mouth daily      rosuvastatin (CRESTOR) 5 mg tablet Take 2 5 mg by mouth in the morning       fluorouracil (EFUDEX) 5 % cream APPLY TWICE A DAY TO SPOTS ON FACE X 3 WEEKS AS DIRECTED   AVOID EYES   (Patient not taking: Reported on 2/17/2022)      [DISCONTINUED] alendronate (FOSAMAX) 70 mg tablet  (Patient not taking: Reported on 2/17/2022 )      [DISCONTINUED] ascorbic acid (VITAMIN C) 500 MG tablet Take 500 mg by mouth daily (Patient not taking: Reported on 2/17/2022 )      [DISCONTINUED] ASPIRIN 81 PO Take by mouth (Patient not taking: Reported on 2/17/2022 )      [DISCONTINUED] Cholecalciferol (VITAMIN D PO) Take by mouth (Patient not taking: Reported on 2/17/2022 )      [DISCONTINUED] cyclobenzaprine (FLEXERIL) 10 mg tablet Take 10 mg by mouth 3 (three) times a day as needed for muscle spasms (Patient not taking: Reported on 2/17/2022 )      [DISCONTINUED] Diclofenac Sodium (Voltaren) 1 % Apply 2 g topically 4 (four) times a day (Patient not taking: Reported on 2/17/2022 ) 1 Tube 0    [DISCONTINUED] LORazepam (ATIVAN) 1 mg tablet Take 1 tablet 60 minutes prior to procedure, may take an additional tablet 30 minutes prior to procedure, needs  day of procedure (Patient not taking: Reported on 12/30/2020) 4 tablet 0    [DISCONTINUED] Meloxicam 7 5 MG/5ML SUSP Take by mouth (Patient not taking: Reported on 2/17/2022 )      [DISCONTINUED] Mirabegron ER (MYRBETRIQ) 50 MG TB24 Take by mouth (Patient not taking: Reported on 2/17/2022 )      [DISCONTINUED] ondansetron (ZOFRAN-ODT) 4 mg disintegrating tablet Take 1 tablet by mouth every 4 (four) hours as needed for nausea (Patient not taking: Reported on 1/3/2020) 15 tablet 0    [DISCONTINUED] oxyCODONE-acetaminophen (ROXICET) 5-325 mg/5 mL solution Take by mouth every 4 (four) hours as needed for moderate pain (Patient not taking: Reported on 2/17/2022 )      [DISCONTINUED] zolpidem (AMBIEN) 10 mg tablet  (Patient not taking: Reported on 2/17/2022 )       No current facility-administered medications on file prior to visit  History:  Past Medical History:   Diagnosis Date    Hyperlipidemia     Overactive bladder      Past Surgical History:   Procedure Laterality Date    ACHILLES TENDON REPAIR      CATARACT EXTRACTION, BILATERAL      COLPOPEXY VAGINAL EXTRAPERITONEAL (VEC) WITH INSERTION PUBOVAGINAL SLING      HYSTERECTOMY      JOINT REPLACEMENT Right     REPLACEMENT TOTAL KNEE       Family History   Problem Relation Age of Onset    Heart disease Father     Ovarian cancer Maternal Aunt     Breast cancer Paternal Aunt     Hypertension Paternal Grandfather     Diabetes Neg Hx     Stroke Neg Hx     Thyroid disease Neg Hx      Social History     Socioeconomic History    Marital status:       Spouse name: Not on file    Number of children: Not on file    Years of education: Not on file    Highest education level: Not on file   Occupational History    Not on file   Tobacco Use    Smoking status: Former Smoker     Types: Cigarettes    Smokeless tobacco: Never Used   Vaping Use    Vaping Use: Never used   Substance and Sexual Activity    Alcohol use: Yes     Comment: occasional    Drug use: Not on file    Sexual activity: Not on file   Other Topics Concern    Not on file   Social History Narrative    Not on file     Social Determinants of Health     Financial Resource Strain: Not on file   Food Insecurity: Not on file   Transportation Needs: Not on file   Physical Activity: Not on file   Stress: Not on file   Social Connections: Not on file   Intimate Partner Violence: Not on file   Housing Stability: Not on file     Past Surgical History:   Procedure Laterality Date    ACHILLES TENDON REPAIR      CATARACT EXTRACTION, BILATERAL      COLPOPEXY VAGINAL EXTRAPERITONEAL (VEC) WITH INSERTION PUBOVAGINAL SLING      HYSTERECTOMY      JOINT REPLACEMENT Right     REPLACEMENT TOTAL KNEE         OBJECTIVE:  Vitals:    02/17/22 0854   BP: 126/59   BP Location: Left arm   Patient Position: Sitting   Cuff Size: Adult   Pulse: 65   Temp: 99 6 °F (37 6 °C)   TempSrc: Temporal   SpO2: 98%   Weight: 73 8 kg (162 lb 12 8 oz)   Height: 4' 10 25" (1 48 m)     Body mass index is 33 73 kg/m²  Physical Exam  Constitutional:       General: She is not in acute distress  Appearance: She is well-developed  She is not diaphoretic  HENT:      Head: Normocephalic and atraumatic  Eyes:      General: No scleral icterus  Conjunctiva/sclera: Conjunctivae normal    Cardiovascular:      Rate and Rhythm: Normal rate and regular rhythm  Heart sounds: Normal heart sounds  Pulmonary:      Effort: Pulmonary effort is normal  No respiratory distress  Breath sounds: Normal breath sounds  Abdominal:      General: Bowel sounds are normal  There is no distension  Palpations: Abdomen is soft  Musculoskeletal:         General: No deformity  Skin:     General: Skin is warm and dry  Findings: No rash  Neurological:      Mental Status: She is alert and oriented to person, place, and time  Psychiatric:         Thought Content: Thought content normal          MoCA: 26/30    Geriatric Depression Screen      Most Recent Value   Total Score (max 30) 7        Depression Screening Follow-up Plan: Patient's depression screening was positive with a geriatric depression screen (GDS) score of 7  Patient assessed for underlying major depression  They have no active suicidal ideations   Brief counseling provided and recommend additional follow-up/re-evaluation next office visit  Labs & Imaging:  Lab Results   Component Value Date    WBC 8 78 12/10/2017    HGB 15 3 12/10/2017    HCT 44 0 12/10/2017    MCV 89 12/10/2017     12/10/2017     Lab Results   Component Value Date    SODIUM 140 12/10/2017    K 3 7 12/10/2017     12/10/2017    CO2 29 12/10/2017    BUN 17 12/10/2017    CREATININE 0 71 12/10/2017    GLUC 124 12/10/2017    CALCIUM 9 3 12/10/2017       Results for orders placed during the hospital encounter of 12/10/17    CT head without contrast    Narrative  CT BRAIN - WITHOUT CONTRAST    INDICATION:  Headache, nausea and vomiting  COMPARISON:  None  TECHNIQUE:  CT examination of the brain was performed  In addition to axial images, coronal reformatted images were created and submitted for interpretation  Radiation dose length product (DLP) for this visit:  939 41 mGy-cm   This examination, like all CT scans performed in the [facility], was performed utilizing techniques to minimize radiation dose exposure, including the use of iterative  reconstruction and automated exposure control  IMAGE QUALITY:  Diagnostic  FINDINGS:    PARENCHYMA:  Decreased attenuation is noted in the supratentorial white matter demonstrating an appearance most consistent with mild microangiopathic change  No intracranial mass, mass effect or midline shift  No CT signs of acute infarction  There is no parenchymal hemorrhage  VENTRICLES AND EXTRA-AXIAL SPACES:  Normal for patient's age  VISUALIZED ORBITS AND PARANASAL SINUSES:  Unremarkable  CALVARIUM AND EXTRACRANIAL SOFT TISSUES:   Normal     Impression  No acute intracranial abnormality        Workstation performed: [id number]

## 2022-02-17 NOTE — ASSESSMENT & PLAN NOTE
Likely related to anxiety and poor sleep  Recommend continued exercise, staying awake during the day and starting psychology services for CBT and treatment of anxiety

## 2022-02-17 NOTE — PROGRESS NOTES
ASSESSMENT AND PLAN:  1  MCI (mild cognitive impairment)  Assessment & Plan:  Likely related to anxiety and poor sleep  Recommend continued exercise, staying awake during the day and starting psychology services for CBT and treatment of anxiety  2  VEGA (obstructive sleep apnea)  Assessment & Plan:  Had sleep study  Results not here  Will request results  3  Mixed hyperlipidemia  Assessment & Plan:  May consider stopping rosuvastatin for primary prevention      4  Essential hypertension  Assessment & Plan:  May stop amlodipine and follow BP considering recent weight loss  5  Class 1 obesity due to excess calories without serious comorbidity with body mass index (BMI) of 31 0 to 31 9 in adult  Assessment & Plan:  Appears stable  Continue weight loss with bariatric team       6  Anxiety  Assessment & Plan:  Start with CBT with psychology  Decision-making capacity: Intact    Staging: MCI (Mild Cognitive Impairment)    Medications Review: appear appropriate  Could consider stopping amlodipine and follow BP  HPI:    We had the pleasure of evaluating Marino Gordon who is a 68 y o  female in Geriatric consultation today  Ms Ivis Alston is in the office with alone  She lives alone  Has 2 daughters, one in [place], one in [place]  History as per patient:    COGNITION:  Memory Issues noticed since 6 month(s)    Memory affected: short term memory loss    Symptoms started: gradual  Over time the memory has:  worsened  Memory issue(s) were noted by: patient   Patient has difficulties with cooking (nonly occasional and in a new kitchen)  Difficulty finding the right word while speaking: Yes once in a while  Requires repeat information or asking the same question repeatedly: Yes - sometimes  Fluctuation in alertness: No  Changes in mood or personality:No  Current or previous treatment for depression or anxiety: Yes    Family member with dementia and what type?  Yes, aunt in [de-identified]  History of head trauma: No  History of stroke: No  History of alcohol or substance abuse: No    FUNCTIONAL STATUS:  BADLs  Does patient require assistance with:  Bathing: No  Dressing: No  Toileting: No  Transferring: No  Continence: No  Feeding: No    IADLs  Dose patient require assistance with:  Telephone: No  Shopping: No  Food Preparation: No  Housekeeping: No  Laundry: No  Transportation: No  Medications: No  Finances: No    NEUROPSYCH SYMPTOMS:  Does patient have trouble sleeping at night? Yes - very poor  Difficulty falling asleep and also with early awakenings  No other symptoms    SAFETY:  Hearing and vision issue: No  Any gait or balance disorder: No  Uses: no assistive devices  Any falls in the last year: No  Any history of wandering: No  Are there firearms or guns in the home: No  Does patient drive: Yes  Any driving accidents or citations in the last year: No  Any concerns about patient's ability to drive: No    ACP REVIEW:  Does patient have POA: Yes  Does patient have a Living will: Yes  Any legal assistance needed for healthcare planning?: No    ROS: Review of Systems   Constitutional: Negative for chills and fever  HENT: Negative for trouble swallowing and voice change  Eyes: Negative for pain and discharge  Respiratory: Negative for cough and wheezing  Cardiovascular: Negative for chest pain and palpitations  Gastrointestinal: Negative for abdominal pain and blood in stool  Genitourinary: Negative for dysuria and hematuria  Musculoskeletal: Positive for joint swelling (ankle, shoulder, hip pain)  Negative for myalgias and neck pain  Skin: Negative for rash and wound  Neurological: Negative for seizures and headaches  Psychiatric/Behavioral: Negative for hallucinations and suicidal ideas         Allergies   Allergen Reactions    Imodium [Loperamide] Headache     Felt that peripheral vision was impaired and mild headache    Pedi-Pre Tape Spray [Wound Dressing Adhesive] Itching and Rash     Adhesive Tape    Bupropion Rash     Around 2010       Medications:    Current Outpatient Medications on File Prior to Visit   Medication Sig Dispense Refill    amLODIPine (NORVASC) 2 5 mg tablet       calcium polycarbophil (FIBERCON) 625 mg tablet Take 625 mg by mouth daily      Calcium-Magnesium-Vitamin D (CITRACAL SLOW RELEASE PO)       Multiple Vitamins-Minerals (MULTIVITAMIN WITH MINERALS) tablet Take 1 tablet by mouth daily      rosuvastatin (CRESTOR) 5 mg tablet Take 2 5 mg by mouth in the morning       fluorouracil (EFUDEX) 5 % cream APPLY TWICE A DAY TO SPOTS ON FACE X 3 WEEKS AS DIRECTED   AVOID EYES   (Patient not taking: Reported on 2/17/2022)      [DISCONTINUED] alendronate (FOSAMAX) 70 mg tablet  (Patient not taking: Reported on 2/17/2022 )      [DISCONTINUED] ascorbic acid (VITAMIN C) 500 MG tablet Take 500 mg by mouth daily (Patient not taking: Reported on 2/17/2022 )      [DISCONTINUED] ASPIRIN 81 PO Take by mouth (Patient not taking: Reported on 2/17/2022 )      [DISCONTINUED] Cholecalciferol (VITAMIN D PO) Take by mouth (Patient not taking: Reported on 2/17/2022 )      [DISCONTINUED] cyclobenzaprine (FLEXERIL) 10 mg tablet Take 10 mg by mouth 3 (three) times a day as needed for muscle spasms (Patient not taking: Reported on 2/17/2022 )      [DISCONTINUED] Diclofenac Sodium (Voltaren) 1 % Apply 2 g topically 4 (four) times a day (Patient not taking: Reported on 2/17/2022 ) 1 Tube 0    [DISCONTINUED] LORazepam (ATIVAN) 1 mg tablet Take 1 tablet 60 minutes prior to procedure, may take an additional tablet 30 minutes prior to procedure, needs  day of procedure (Patient not taking: Reported on 12/30/2020) 4 tablet 0    [DISCONTINUED] Meloxicam 7 5 MG/5ML SUSP Take by mouth (Patient not taking: Reported on 2/17/2022 )      [DISCONTINUED] Mirabegron ER (MYRBETRIQ) 50 MG TB24 Take by mouth (Patient not taking: Reported on 2/17/2022 )      [DISCONTINUED] ondansetron (ZOFRAN-ODT) 4 mg disintegrating tablet Take 1 tablet by mouth every 4 (four) hours as needed for nausea (Patient not taking: Reported on 1/3/2020) 15 tablet 0    [DISCONTINUED] oxyCODONE-acetaminophen (ROXICET) 5-325 mg/5 mL solution Take by mouth every 4 (four) hours as needed for moderate pain (Patient not taking: Reported on 2/17/2022 )      [DISCONTINUED] zolpidem (AMBIEN) 10 mg tablet  (Patient not taking: Reported on 2/17/2022 )       No current facility-administered medications on file prior to visit  History:  Past Medical History:   Diagnosis Date    Hyperlipidemia     Overactive bladder      Past Surgical History:   Procedure Laterality Date    ACHILLES TENDON REPAIR      CATARACT EXTRACTION, BILATERAL      COLPOPEXY VAGINAL EXTRAPERITONEAL (VEC) WITH INSERTION PUBOVAGINAL SLING      HYSTERECTOMY      JOINT REPLACEMENT Right     REPLACEMENT TOTAL KNEE       Family History   Problem Relation Age of Onset    Heart disease Father     Ovarian cancer Maternal Aunt     Breast cancer Paternal Aunt     Hypertension Paternal Grandfather     Diabetes Neg Hx     Stroke Neg Hx     Thyroid disease Neg Hx      Social History     Socioeconomic History    Marital status:       Spouse name: Not on file    Number of children: Not on file    Years of education: Not on file    Highest education level: Not on file   Occupational History    Not on file   Tobacco Use    Smoking status: Former Smoker     Types: Cigarettes    Smokeless tobacco: Never Used   Vaping Use    Vaping Use: Never used   Substance and Sexual Activity    Alcohol use: Yes     Comment: occasional    Drug use: Not on file    Sexual activity: Not on file   Other Topics Concern    Not on file   Social History Narrative    Not on file     Social Determinants of Health     Financial Resource Strain: Not on file   Food Insecurity: Not on file   Transportation Needs: Not on file   Physical Activity: Not on file   Stress: Not on file   Social Connections: Not on file   Intimate Partner Violence: Not on file   Housing Stability: Not on file     Past Surgical History:   Procedure Laterality Date    ACHILLES TENDON REPAIR      CATARACT EXTRACTION, BILATERAL      COLPOPEXY VAGINAL EXTRAPERITONEAL (VEC) WITH INSERTION PUBOVAGINAL SLING      HYSTERECTOMY      JOINT REPLACEMENT Right     REPLACEMENT TOTAL KNEE         OBJECTIVE:  Vitals:    02/17/22 0854   BP: 126/59   BP Location: Left arm   Patient Position: Sitting   Cuff Size: Adult   Pulse: 65   Temp: 99 6 °F (37 6 °C)   TempSrc: Temporal   SpO2: 98%   Weight: 73 8 kg (162 lb 12 8 oz)   Height: 4' 10 25" (1 48 m)     Body mass index is 33 73 kg/m²  Physical Exam  Constitutional:       General: She is not in acute distress  Appearance: She is well-developed  She is not diaphoretic  HENT:      Head: Normocephalic and atraumatic  Eyes:      General: No scleral icterus  Conjunctiva/sclera: Conjunctivae normal    Cardiovascular:      Rate and Rhythm: Normal rate and regular rhythm  Heart sounds: Normal heart sounds  Pulmonary:      Effort: Pulmonary effort is normal  No respiratory distress  Breath sounds: Normal breath sounds  Abdominal:      General: Bowel sounds are normal  There is no distension  Palpations: Abdomen is soft  Musculoskeletal:         General: No deformity  Skin:     General: Skin is warm and dry  Findings: No rash  Neurological:      Mental Status: She is alert and oriented to person, place, and time  Psychiatric:         Thought Content: Thought content normal          MoCA: 26/30    Geriatric Depression Screen      Most Recent Value   Total Score (max 30) 7        Depression Screening Follow-up Plan: Patient's depression screening was positive with a geriatric depression screen (GDS) score of 7  Patient assessed for underlying major depression  They have no active suicidal ideations   Brief counseling provided and recommend additional follow-up/re-evaluation next office visit  Labs & Imaging:  Lab Results   Component Value Date    WBC 8 78 12/10/2017    HGB 15 3 12/10/2017    HCT 44 0 12/10/2017    MCV 89 12/10/2017     12/10/2017     Lab Results   Component Value Date    SODIUM 140 12/10/2017    K 3 7 12/10/2017     12/10/2017    CO2 29 12/10/2017    BUN 17 12/10/2017    CREATININE 0 71 12/10/2017    GLUC 124 12/10/2017    CALCIUM 9 3 12/10/2017       Results for orders placed during the hospital encounter of 12/10/17    CT head without contrast    Narrative  CT BRAIN - WITHOUT CONTRAST    INDICATION:  Headache, nausea and vomiting  COMPARISON:  None  TECHNIQUE:  CT examination of the brain was performed  In addition to axial images, coronal reformatted images were created and submitted for interpretation  Radiation dose length product (DLP) for this visit:  939 41 mGy-cm   This examination, like all CT scans performed in the [facility], was performed utilizing techniques to minimize radiation dose exposure, including the use of iterative  reconstruction and automated exposure control  IMAGE QUALITY:  Diagnostic  FINDINGS:    PARENCHYMA:  Decreased attenuation is noted in the supratentorial white matter demonstrating an appearance most consistent with mild microangiopathic change  No intracranial mass, mass effect or midline shift  No CT signs of acute infarction  There is no parenchymal hemorrhage  VENTRICLES AND EXTRA-AXIAL SPACES:  Normal for patient's age  VISUALIZED ORBITS AND PARANASAL SINUSES:  Unremarkable  CALVARIUM AND EXTRACRANIAL SOFT TISSUES:   Normal     Impression  No acute intracranial abnormality        Workstation performed: [id number]

## 2022-02-24 ENCOUNTER — CLINICAL SUPPORT (OUTPATIENT)
Dept: BARIATRICS | Facility: CLINIC | Age: 77
End: 2022-02-24

## 2022-02-24 VITALS — HEIGHT: 60 IN | BODY MASS INDEX: 31.88 KG/M2 | WEIGHT: 162.4 LBS

## 2022-02-24 DIAGNOSIS — R63.5 ABNORMAL WEIGHT GAIN: Primary | ICD-10-CM

## 2022-02-24 PROCEDURE — RECHECK

## 2022-03-03 ENCOUNTER — CLINICAL SUPPORT (OUTPATIENT)
Dept: BARIATRICS | Facility: CLINIC | Age: 77
End: 2022-03-03

## 2022-03-03 VITALS — HEIGHT: 60 IN | WEIGHT: 161 LBS | BODY MASS INDEX: 31.61 KG/M2

## 2022-03-03 DIAGNOSIS — R63.5 ABNORMAL WEIGHT GAIN: Primary | ICD-10-CM

## 2022-03-03 PROCEDURE — RECHECK

## 2022-03-10 ENCOUNTER — CLINICAL SUPPORT (OUTPATIENT)
Dept: BARIATRICS | Facility: CLINIC | Age: 77
End: 2022-03-10

## 2022-03-10 VITALS — BODY MASS INDEX: 31.45 KG/M2 | HEIGHT: 60 IN | WEIGHT: 160.2 LBS

## 2022-03-10 DIAGNOSIS — R63.5 ABNORMAL WEIGHT GAIN: Primary | ICD-10-CM

## 2022-03-10 PROCEDURE — RECHECK

## 2022-03-14 ENCOUNTER — OFFICE VISIT (OUTPATIENT)
Dept: URGENT CARE | Facility: CLINIC | Age: 77
End: 2022-03-14
Payer: MEDICARE

## 2022-03-14 VITALS
OXYGEN SATURATION: 97 % | RESPIRATION RATE: 20 BRPM | WEIGHT: 150 LBS | HEIGHT: 59 IN | TEMPERATURE: 98 F | DIASTOLIC BLOOD PRESSURE: 74 MMHG | SYSTOLIC BLOOD PRESSURE: 170 MMHG | HEART RATE: 55 BPM | BODY MASS INDEX: 30.24 KG/M2

## 2022-03-14 DIAGNOSIS — L03.90 CELLULITIS, UNSPECIFIED CELLULITIS SITE: Primary | ICD-10-CM

## 2022-03-14 PROCEDURE — G0463 HOSPITAL OUTPT CLINIC VISIT: HCPCS | Performed by: PHYSICIAN ASSISTANT

## 2022-03-14 PROCEDURE — 99213 OFFICE O/P EST LOW 20 MIN: CPT | Performed by: PHYSICIAN ASSISTANT

## 2022-03-14 RX ORDER — CEPHALEXIN 500 MG/1
500 CAPSULE ORAL EVERY 8 HOURS SCHEDULED
Qty: 30 CAPSULE | Refills: 0 | Status: SHIPPED | OUTPATIENT
Start: 2022-03-14 | End: 2022-03-24

## 2022-03-14 NOTE — PROGRESS NOTES
[facility] [employer] [facility]        NAME: Rita Méndez is a 68 y o  female  : 1945    MRN: [id number]  DATE: 2022  TIME: 7:11 PM    /74   Pulse 55   Temp 98 °F (36 7 °C) (Tympanic)   Resp 20   Ht 4' 11" (1 499 m)   Wt 68 kg (150 lb)   SpO2 97%   BMI 30 30 kg/m²     Assessment and Plan   Cellulitis, unspecified cellulitis site [L03 90]  1  Cellulitis, unspecified cellulitis site  cephalexin (KEFLEX) 500 mg capsule         Patient Instructions       Follow up with PCP in 3-5 days  Proceed to  ER if symptoms worsen  Chief Complaint     Chief Complaint   Patient presents with    Toe Pain     started 2 weeks ago with right toe redness and swelling and pain, no draiange from toe, issues with cutting toenail, no soaks, using neosporin on it and washed out the hydrogen peroxide, no streaking          History of Present Illness       Pt with right 4th toe pain and swelling since cutting skin with nail clippers 12 days ago       Review of Systems   Review of Systems   Constitutional: Negative  HENT: Negative  Eyes: Negative  Respiratory: Negative  Cardiovascular: Negative  Gastrointestinal: Negative  Endocrine: Negative  Genitourinary: Negative  Musculoskeletal: Negative  Skin: Negative  Allergic/Immunologic: Negative  Neurological: Negative  Hematological: Negative  Psychiatric/Behavioral: Negative  All other systems reviewed and are negative          Current Medications       Current Outpatient Medications:     calcium polycarbophil (FIBERCON) 625 mg tablet, Take 625 mg by mouth daily, Disp: , Rfl:     Calcium-Magnesium-Vitamin D (CITRACAL SLOW RELEASE PO), , Disp: , Rfl:     Multiple Vitamins-Minerals (MULTIVITAMIN WITH MINERALS) tablet, Take 1 tablet by mouth daily, Disp: , Rfl:     amLODIPine (NORVASC) 2 5 mg tablet, , Disp: , Rfl:     cephalexin (KEFLEX) 500 mg capsule, Take 1 capsule (500 mg total) by mouth every 8 (eight) hours for 10 days, Disp: 30 capsule, Rfl: 0    fluorouracil (EFUDEX) 5 % cream, APPLY TWICE A DAY TO SPOTS ON FACE X 3 WEEKS AS DIRECTED   AVOID EYES  (Patient not taking: Reported on 2/17/2022), Disp: , Rfl:     rosuvastatin (CRESTOR) 5 mg tablet, Take 2 5 mg by mouth in the morning  (Patient not taking: Reported on 3/14/2022 ), Disp: , Rfl:     Current Allergies     Allergies as of 03/14/2022 - Reviewed 03/14/2022   Allergen Reaction Noted    Imodium [loperamide] Headache 02/17/2022    Pedi-pre tape spray [wound dressing adhesive] Itching and Rash 02/17/2022    Bupropion Rash 12/10/2017            The following portions of the patient's history were reviewed and updated as appropriate: allergies, current medications, past family history, past medical history, past social history, past surgical history and problem list      Past Medical History:   Diagnosis Date    Hyperlipidemia     Overactive bladder        Past Surgical History:   Procedure Laterality Date    ACHILLES TENDON REPAIR      CATARACT EXTRACTION, BILATERAL      COLPOPEXY VAGINAL EXTRAPERITONEAL (VEC) WITH INSERTION PUBOVAGINAL SLING      HYSTERECTOMY      JOINT REPLACEMENT Right     REPLACEMENT TOTAL KNEE         Family History   Problem Relation Age of Onset    Heart disease Father     Ovarian cancer Maternal Aunt     Breast cancer Paternal Aunt     Hypertension Paternal Grandfather     Diabetes Neg Hx     Stroke Neg Hx     Thyroid disease Neg Hx          Medications have been verified  Objective   /74   Pulse 55   Temp 98 °F (36 7 °C) (Tympanic)   Resp 20   Ht 4' 11" (1 499 m)   Wt 68 kg (150 lb)   SpO2 97%   BMI 30 30 kg/m²        Physical Exam     Physical Exam  Vitals and nursing note reviewed  Constitutional:       Appearance: Normal appearance  She is normal weight  Comments: Pt states last dtap was 2 years ago    HENT:      Head: Normocephalic and atraumatic        Right Ear: Tympanic membrane, ear canal and external ear normal       Left Ear: Tympanic membrane, ear canal and external ear normal       Nose: Nose normal       Mouth/Throat:      Mouth: Mucous membranes are moist       Pharynx: Oropharynx is clear  Eyes:      Extraocular Movements: Extraocular movements intact  Conjunctiva/sclera: Conjunctivae normal       Pupils: Pupils are equal, round, and reactive to light  Cardiovascular:      Rate and Rhythm: Normal rate and regular rhythm  Pulses: Normal pulses  Heart sounds: Normal heart sounds  Pulmonary:      Effort: Pulmonary effort is normal       Breath sounds: Normal breath sounds  Abdominal:      General: Abdomen is flat  Bowel sounds are normal    Musculoskeletal:         General: Normal range of motion  Cervical back: Normal range of motion and neck supple  Comments: Right 4th toe slight erythema and swelling    Skin:     General: Skin is warm  Capillary Refill: Capillary refill takes less than 2 seconds  Neurological:      General: No focal deficit present  Mental Status: She is alert and oriented to person, place, and time     Psychiatric:         Mood and Affect: Mood normal          Behavior: Behavior normal

## 2022-03-14 NOTE — PATIENT INSTRUCTIONS
Cellulitis   WHAT YOU NEED TO KNOW:   Cellulitis is a skin infection caused by bacteria  Cellulitis is common and can become severe  Cellulitis usually appears on the lower legs  It can also appear on the arms, face, and other areas  Cellulitis develops when bacteria enter a crack or break in your skin, such as a scratch, bite, or cut  DISCHARGE INSTRUCTIONS:   Return to the emergency department if:   · Your wound gets larger and more painful  · You feel a crackling under your skin when you touch it  · You have purple dots or bumps on your skin, or you see bleeding under your skin  · You see red streaks coming from the infected area  Call your doctor if:   · The red, warm, swollen area gets larger  · Your fever or pain does not go away or gets worse  · The area does not get smaller after 3 days of antibiotics  · You have questions or concerns about your condition or care  Medicines: You should start to see improvement in 3 days  If cellulitis is not treated, the infection can spread through your body and become life-threatening  You may need any of the following medicines:  · Antibiotics  help treat the bacterial infection  · Acetaminophen  decreases pain and fever  It is available without a doctor's order  Ask how much to take and how often to take it  Follow directions  Read the labels of all other medicines you are using to see if they also contain acetaminophen, or ask your doctor or pharmacist  Acetaminophen can cause liver damage if not taken correctly  Do not use more than 4 grams (4,000 milligrams) total of acetaminophen in one day  · NSAIDs , such as ibuprofen, help decrease swelling, pain, and fever  This medicine is available with or without a doctor's order  NSAIDs can cause stomach bleeding or kidney problems in certain people  If you take blood thinner medicine, always ask your healthcare provider if NSAIDs are safe for you   Always read the medicine label and follow directions  · Take your medicine as directed  Contact your healthcare provider if you think your medicine is not helping or if you have side effects  Tell him or her if you are allergic to any medicine  Keep a list of the medicines, vitamins, and herbs you take  Include the amounts, and when and why you take them  Bring the list or the pill bottles to follow-up visits  Carry your medicine list with you in case of an emergency  Self-care:   · Wash the area with soap and water every day  Gently pat dry  Use bandages if directed by your healthcare provider  · Elevate the area above the level of your heart  as often as you can  This will help decrease swelling and pain  Prop the area on pillows or blankets to keep it elevated comfortably  · Place a cool, damp cloth on the area  Use clean cloths and clean water  You can do this as often as you need to  Cool, damp cloths may help decrease pain  · Apply cream or ointment as directed  These help protect the area  Most over-the-counter products, such as petroleum jelly, are good to use  Ask your healthcare provider about specific creams or ointments you should use  Prevent cellulitis:   · Do not scratch bug bites or areas of injury  You increase your risk for cellulitis by scratching these areas  · Do not share personal items, such as towels, clothing, and razors  · Clean exercise equipment  with germ-killing  before and after you use it  · Treat athlete's foot  This can help prevent the spread of a bacterial skin infection  · Wash your hands often  Use soap and water  Wash your hands after you use the bathroom, change a child's diapers, or sneeze  Wash your hands before you prepare or eat food  Use lotion to prevent dry, cracked skin  Follow up with your doctor within 3 days, or as directed:  He or she will check if your cellulitis is getting better   Write down your questions so you remember to ask them during your visits  © Copyright [employer] 2022 Information is for End User's use only and may not be sold, redistributed or otherwise used for commercial purposes  All illustrations and images included in CareNotes® are the copyrighted property of A D A M , Inc  or [employer]  The above information is an  only  It is not intended as medical advice for individual conditions or treatments  Talk to your doctor, nurse or pharmacist before following any medical regimen to see if it is safe and effective for you

## 2022-03-16 NOTE — PROGRESS NOTES
Weight Management Medical Nutrition Assessment  Oneal Barksdale is here for f/u RD session for Healthy Core program w/Seca & REE  Current wt:  154 76  lbs  She has lost  22 9 lbs x 11 wks  Trcatalina Ramirez shows fat loss of 16 1 lbs (70%)  REE WNL  She has been active on fb page with exercise  Binging during the day continues to be improved  She is still waiting on therapist and in the meantime would like to see our [address] [place] [address]  Appointment scheduled at her request  She will continue to follow up in [facility]  Patient seen by Medical Provider in past 6 months:  yes  Requested to schedule appointment with Medical Provider: No    Anthropometric Measurements  Start Weight (#): 177 7 lb 12/28/21  Current Weight (#):  154 8 lbs  TBW % Change from start weight: 12 9%  Ideal Body Weight (#): 127 3 lbs BMI 25 (60")  Goal Weight (#):  lbs   Highest: 183 lbs  Lowest: 97 lbs mid 20's    Weight Loss History  Previous weight loss attempts: Commercial Programs ([employer], [employer], etc )  Exercise  Self Created Diets (Portion Control, Healthy Food Choices, etc )    Food and Nutrition Related History  Wake up:  6:00   Bed Time: 10    Food Recall  Breakfast: 8:00: decaf coffee w/2 tbsp h/h, protein bar OR eggs OR cottage cheese, light bread, 1 tbsp pb   Snack: quest chips OR fruit  Lunch: 12:00:   deli turkey or chicken, s/w thin or flatout wrap     Snack: quest chips OR fruit OR low fat string cheese   Dinner: 5:00:~3 oz chicken, larger veggies, sometimes carb OR sometimes pasta <1x/wk OR healthy choice meal  Snack:  Skip     Beverages: water, decaf coffee/tea and alcohol (rare)  Volume of beverage intake: 4 cups water, 1 cup decaf    Weekends: Same  Cravings:  carbs   Trouble area of day:midmorning & midafternoon     Frequency of Eating out: 1x/wk  Food restrictions: lactose?    Cooking: self   Food Shopping: self    Physical Activity Intake  Activity:Walking and matt, strength training  [facility]; daily exercises at [facility]  Physical limitations/barriers to exercise:  n/a    Estimated Needs  Energy  Seca REE 1296x1 [id number]  [address] 1310, wt loss w/o exercise 950-1150; wt loss w/exercise [id number]  [employer] Needs: BMR : 1113   1# loss weekly sedentary:  836            1# loss weekly lightly active: 1031  Maintenance calories for sedentary activity level: 1336  Protein: 55-68 gm      (1 2-1 5g/kg IBW)  Fluid: 53 oz     (35mL/kg IBW)    Nutrition Diagnosis  Yes; Overweight/obesity  related to Excess energy intake as evidenced by  BMI more than normative standard for age and sex (obesity-grade I 26-30  9)     Nutrition Intervention    Nutrition Prescription  Calories: 850-1050  Protein: 55-70 gm    Meal Plan (Stewart/Pro)  Breakfast: 200-250, 15 (+45)  Snack: skip  Lunch: 200-300, 15-20  Snack: 100-150, 5-10  Dinner: 300-350, 20-25  Snack: skip  **add mid morning snack if needed**    Nutrition Education:    Healthy Core Manual  Calorie controlled menu  Lean protein food choices  Healthy snack options  Food journaling tips      Nutrition Counseling:  Strategies: meal planning, portion sizes, healthy snack choices, hydration, fiber intake, protein intake, exercise, food journal      Monitoring and Evaluation:  Evaluation criteria:  Energy Intake  Meet protein needs  Maintain adequate hydration  Monitor weekly weight  Meal planning/preparation  Food journal   Decreased portions at mealtimes and snacks  Physical activity     Barriers to learning:none  Readiness to change: Action:  (Changing behavior)  Comprehension: very good  Expected Compliance: very good dentures/eyeglasses

## 2022-03-17 ENCOUNTER — CLINICAL SUPPORT (OUTPATIENT)
Dept: BARIATRICS | Facility: CLINIC | Age: 77
End: 2022-03-17

## 2022-03-17 VITALS — WEIGHT: 157.6 LBS | HEIGHT: 60 IN | BODY MASS INDEX: 30.94 KG/M2

## 2022-03-17 DIAGNOSIS — R63.5 ABNORMAL WEIGHT GAIN: Primary | ICD-10-CM

## 2022-03-17 PROCEDURE — RECHECK

## 2022-03-18 ENCOUNTER — TELEPHONE (OUTPATIENT)
Dept: PSYCHIATRY | Facility: CLINIC | Age: 77
End: 2022-03-18

## 2022-03-18 NOTE — TELEPHONE ENCOUNTER
Called patient and talked with her stating that Yanci Rice does not know much about eating disorders gave her the phone number to [facility]  [phone]

## 2022-03-18 NOTE — TELEPHONE ENCOUNTER
From: Negin To <[email or web address]   Sent: Friday, March 18, 2022 9:28 AM  To: Tiny Gerardo <[email or web address]  Subject: RE: [facility]  [phone]    From: Tiny Gerardo <[email or web address]   Sent: Friday, March 18, 2022 9:23 AM  To: Negin To <[email or web address]  Subject: Lucia Borjas    Hi,  I have a new pt appt today (I know you are not here today), it is a referral for a binge eater, who was supposed to see a psychologist   This is definitely outside my wheelhouse  Cecily Christian is calling the pt to make sure she wants to see and LCSW  I can work with this pt short term, but it looks like there is a 6-8 month wait for psychology  Any help?     Daniel,  Divina Laws, [address] in Primary Care

## 2022-03-23 ENCOUNTER — OFFICE VISIT (OUTPATIENT)
Dept: BARIATRICS | Facility: CLINIC | Age: 77
End: 2022-03-23

## 2022-03-23 VITALS — BODY MASS INDEX: 30.38 KG/M2 | HEIGHT: 60 IN | WEIGHT: 154.76 LBS

## 2022-03-23 DIAGNOSIS — R63.5 ABNORMAL WEIGHT GAIN: Primary | ICD-10-CM

## 2022-03-23 PROCEDURE — RECHECK

## 2022-03-24 ENCOUNTER — OFFICE VISIT (OUTPATIENT)
Dept: GERIATRICS | Age: 77
End: 2022-03-24
Payer: MEDICARE

## 2022-03-24 ENCOUNTER — CLINICAL SUPPORT (OUTPATIENT)
Dept: BARIATRICS | Facility: CLINIC | Age: 77
End: 2022-03-24

## 2022-03-24 VITALS
SYSTOLIC BLOOD PRESSURE: 108 MMHG | RESPIRATION RATE: 16 BRPM | OXYGEN SATURATION: 95 % | TEMPERATURE: 98.7 F | BODY MASS INDEX: 32.66 KG/M2 | DIASTOLIC BLOOD PRESSURE: 58 MMHG | HEART RATE: 63 BPM | WEIGHT: 155.6 LBS | HEIGHT: 58 IN

## 2022-03-24 VITALS — BODY MASS INDEX: 30.78 KG/M2 | HEIGHT: 60 IN | WEIGHT: 156.8 LBS

## 2022-03-24 DIAGNOSIS — M85.852 OSTEOPENIA OF NECK OF LEFT FEMUR: ICD-10-CM

## 2022-03-24 DIAGNOSIS — G31.84 MCI (MILD COGNITIVE IMPAIRMENT): ICD-10-CM

## 2022-03-24 DIAGNOSIS — E55.9 VITAMIN D DEFICIENCY: ICD-10-CM

## 2022-03-24 DIAGNOSIS — R63.5 ABNORMAL WEIGHT GAIN: Primary | ICD-10-CM

## 2022-03-24 DIAGNOSIS — G47.33 OSA (OBSTRUCTIVE SLEEP APNEA): ICD-10-CM

## 2022-03-24 DIAGNOSIS — E78.2 MIXED HYPERLIPIDEMIA: ICD-10-CM

## 2022-03-24 DIAGNOSIS — F41.9 ANXIETY: ICD-10-CM

## 2022-03-24 DIAGNOSIS — I10 ESSENTIAL HYPERTENSION: Primary | ICD-10-CM

## 2022-03-24 PROCEDURE — 99483 ASSMT & CARE PLN PT COG IMP: CPT | Performed by: INTERNAL MEDICINE

## 2022-03-24 PROCEDURE — RECHECK

## 2022-03-24 NOTE — PATIENT INSTRUCTIONS
Care Conference     NAME: Tolu Akers   AGE: 68 y o  SEX: female  YOB: 1945   DATE OF ASSESSMENT: 2/17/22  DATE OF CONFERENCE: 3/24/2022     Family Present: none  Staff Present: Karoline London MD     Patient / Family Goals of Care: Review cognitive status and associated symptoms, discuss treatment options and care planning       Medical Concerns (Current/Historical):  hypertension, lumbar radiculopathy, obstructive sleep apnea, spinal stenosis of lumbar region, obesity       Geriatric Syndromes/Age Related Syndromes: Mild cognitive impairment     Neuropsychological  · Mild cognitive impairment  ? [place] Cognitive Assessment: 26/30  ?  Geriatric Depression Screen: 7/15     · Remain active physically, mentally and socially  · Pharmaceutical and non-pharmaceutical interventions discussed  · Engage in cognitively challenging exercises  · Maintain chronic conditions under control     Diagnostic Studies  · Review of bloodwork and prior imaging, CT head     Physical Finding Impacting Function              Fall risk assessment: low risk              Activities of Daily Living: Independent              Instrumental Activities of Daily Living: Independent     · Continue regular exercise  · Encourage appropriate footwear at all times  · Review fall risk prevention tips and adjust within the home environment as needed     Medications Reviewed   · Consider stopping amlodipine, rosuvastatin considering recent weight loss  · Avoid over the counter medications that can affect cognition (e g , Benadryl, Tylenol PM)   · Avoid NSAIDs due to risk of GI bleed and renal impairment     Other Findings              BMI: 33 73 kg/m2  · Maintain well-balanced diet  · Continue following with primary care physician regularly  VEGA  · Will obtain records to review  Hyperlipidemia  · May consider stopping rosuvastatin for primary prevention  Primary hypertension  · Goal blood pressure systolic less than 637  · Will stop amlodipine and monitor blood pressure  Obesity  · Continue weight loss with bariatric team         Recommended Health Maintenance              Immunizations, if not contraindicated:   · Influenza vaccine yearly  · Pneumo vaccine (PCV-20)  · Shingles vaccine (Shingrix)  · COVID-19 vaccine     Social / Safety Concerns  · Consider a [address] for positive socialization, physical exercise and cognitive stimulation  · Stay in touch with family and friends*  · Plan self-care activities for your mental well-being each week  · Recommend review of fall risk prevention tips (Home Safety Checklist); consider use of fall alert device  · Consider assistance for medication administration such as blister packaging, if desired     Long Term Care Issues  · Maintain updated advance directives and provide a copy to your primary care provider     Patient and family verbalized understanding of above care plan  With any questions, please contact our office at [phone]

## 2022-03-24 NOTE — PROGRESS NOTES
Care Conference     NAME: Evelio Huerta   AGE: 68 y o  SEX: female  YOB: 1945   DATE OF ASSESSMENT: 2/17/22  DATE OF CONFERENCE: 3/24/2022     Family Present: none  Staff Present: Mushtaq Parent MD     Patient / Family Goals of Care: Review cognitive status and associated symptoms, discuss treatment options and care planning       Medical Concerns (Current/Historical):  hypertension, lumbar radiculopathy, obstructive sleep apnea, spinal stenosis of lumbar region, obesity       Geriatric Syndromes/Age Related Syndromes: Mild cognitive impairment     Neuropsychological  · Mild cognitive impairment  ? [place] Cognitive Assessment: 26/30  ?  Geriatric Depression Screen: 7/15     · Remain active physically, mentally and socially  · Pharmaceutical and non-pharmaceutical interventions discussed  · Engage in cognitively challenging exercises  · Maintain chronic conditions under control     Diagnostic Studies  · Review of bloodwork and prior imaging, CT head     Physical Finding Impacting Function              Fall risk assessment: low risk              Activities of Daily Living: Independent              Instrumental Activities of Daily Living: Independent     · Continue regular exercise  · Encourage appropriate footwear at all times  · Review fall risk prevention tips and adjust within the home environment as needed     Medications Reviewed   · Consider stopping amlodipine, rosuvastatin considering recent weight loss  · Avoid over the counter medications that can affect cognition (e g , Benadryl, Tylenol PM)   · Avoid NSAIDs due to risk of GI bleed and renal impairment     Other Findings              BMI: 33 73 kg/m2  · Maintain well-balanced diet  · Continue following with primary care physician regularly  VEGA  · Will obtain records to review  Hyperlipidemia  · May consider stopping rosuvastatin for primary prevention  Primary hypertension  · Goal blood pressure systolic less than 262  · Will stop amlodipine and monitor blood pressure  Obesity  · Continue weight loss with bariatric team         Recommended Health Maintenance              Immunizations, if not contraindicated:   · Influenza vaccine yearly  · Pneumo vaccine (PCV-20)  · Shingles vaccine (Shingrix)  · COVID-19 vaccine     Social / Safety Concerns  · Consider a [address] for positive socialization, physical exercise and cognitive stimulation  · Stay in touch with family and friends*  · Plan self-care activities for your mental well-being each week  · Recommend review of fall risk prevention tips (Home Safety Checklist); consider use of fall alert device  · Consider assistance for medication administration such as blister packaging, if desired     Long Term Care Issues  · Maintain updated advance directives and provide a copy to your primary care provider     Patient and family verbalized understanding of above care plan  With any questions, please contact our office at [phone]

## 2022-03-24 NOTE — PROGRESS NOTES
Assessment/Plan:    1  Essential hypertension  Assessment & Plan:  Appears resolved  May continue off of amlodipine      2  Osteopenia of neck of left femur  -     Vitamin D 25 hydroxy; Future    3  Vitamin D deficiency  -     Vitamin D 25 hydroxy; Future    4  Mixed hyperlipidemia  Assessment & Plan:  May continue off of rosuvastatin    Orders:  -     Lipid panel; Future    5  MCI (mild cognitive impairment)  Assessment & Plan: Will work on insomnia and monitor  Reviewed previous results of testing      6  VEGA (obstructive sleep apnea)  Assessment & Plan:  Very mild abnormalities  Would not suggest CPAP  Continue to work on anxiety      7  Anxiety  Assessment & Plan:  Refer to psychotherapy services  Subjective:      Patient ID: Rita Méndez is a 68 y o  female  HPI    Seen for review of geriatric assessment and review of medical issues  No new concerns today  Patient reports stopping amlodipine and stopping rosuvastatin without issue  She reports continuing to take blood pressures at home  She brought in blood pressure along with average systolic pressure 848  The following portions of the patient's history were reviewed and updated as appropriate: allergies, current medications, past family history, past medical history, past social history, past surgical history and problem list     Review of Systems   Constitutional: Negative for chills and fever  HENT: Negative for trouble swallowing and voice change  Eyes: Negative for pain and discharge  Respiratory: Negative for cough and wheezing  Cardiovascular: Negative for chest pain and palpitations  Gastrointestinal: Negative for abdominal pain and blood in stool  Genitourinary: Negative for dysuria and hematuria  Musculoskeletal: Negative for myalgias and neck pain  Skin: Negative for rash and wound  Neurological: Negative for seizures and headaches  Psychiatric/Behavioral: Positive for sleep disturbance   Negative for hallucinations and suicidal ideas  The patient is nervous/anxious  Objective:    /58 (BP Location: Left arm, Patient Position: Sitting, Cuff Size: Adult)   Pulse 63   Temp 98 7 °F (37 1 °C) (Temporal)   Resp 16   Ht 4' 10" (1 473 m)   Wt 70 6 kg (155 lb 9 6 oz)   SpO2 95%   BMI 32 52 kg/m²      Physical Exam  Constitutional:       Appearance: Normal appearance  HENT:      Head: Normocephalic and atraumatic  Nose: Nose normal       Mouth/Throat:      Mouth: Mucous membranes are moist    Eyes:      General: No scleral icterus  Right eye: No discharge  Left eye: No discharge  Conjunctiva/sclera: Conjunctivae normal    Pulmonary:      Effort: Pulmonary effort is normal  No respiratory distress  Abdominal:      General: There is no distension  Skin:     Coloration: Skin is not pale  Findings: No erythema  Neurological:      General: No focal deficit present  Mental Status: She is alert

## 2022-03-28 ENCOUNTER — APPOINTMENT (OUTPATIENT)
Dept: LAB | Facility: CLINIC | Age: 77
End: 2022-03-28
Payer: MEDICARE

## 2022-03-28 DIAGNOSIS — M85.852 OSTEOPENIA OF NECK OF LEFT FEMUR: ICD-10-CM

## 2022-03-28 DIAGNOSIS — E78.2 MIXED HYPERLIPIDEMIA: ICD-10-CM

## 2022-03-28 DIAGNOSIS — E55.9 VITAMIN D DEFICIENCY: ICD-10-CM

## 2022-03-28 LAB
25(OH)D3 SERPL-MCNC: 45 NG/ML (ref 30–100)
CHOLEST SERPL-MCNC: 216 MG/DL
HDLC SERPL-MCNC: 64 MG/DL
LDLC SERPL CALC-MCNC: 125 MG/DL (ref 0–100)
NONHDLC SERPL-MCNC: 152 MG/DL
TRIGL SERPL-MCNC: 136 MG/DL

## 2022-03-28 PROCEDURE — 80061 LIPID PANEL: CPT

## 2022-03-28 PROCEDURE — 82306 VITAMIN D 25 HYDROXY: CPT

## 2022-03-28 PROCEDURE — 36415 COLL VENOUS BLD VENIPUNCTURE: CPT

## 2022-03-29 ENCOUNTER — TELEPHONE (OUTPATIENT)
Dept: GERIATRICS | Age: 77
End: 2022-03-29

## 2022-03-29 DIAGNOSIS — I10 ESSENTIAL HYPERTENSION: Primary | ICD-10-CM

## 2022-03-29 DIAGNOSIS — E78.2 MIXED HYPERLIPIDEMIA: ICD-10-CM

## 2022-03-29 NOTE — TELEPHONE ENCOUNTER
Patient called to inquire about her lab results, particularly would like to know what to do about the cholesterol results  Please advise  (Patient's next appointment is September)

## 2022-03-30 RX ORDER — ATORVASTATIN CALCIUM 10 MG/1
10 TABLET, FILM COATED ORAL DAILY
Qty: 90 TABLET | Refills: 3 | Status: SHIPPED | OUTPATIENT
Start: 2022-03-30

## 2022-03-30 NOTE — PROGRESS NOTES
Weight Management Medical Nutrition Assessment  Ras Eastman is here for f/u RD session for [facility]  Current wt: 155 1    lbs  She has lost 22 6  lbs x just over 3 months  Lipids recently checked and show increase in total cholesterol and LDL despite weight loss and exercise  Had stopped statin and has now resumed  Continues to do well with lifestyle modifications and has no concerns at this time  Meeting with [facility] on Wednesday  She will continue to follow up in [facility]  Patient seen by Medical Provider in past 6 months:  yes  Requested to schedule appointment with Medical Provider: No    Anthropometric Measurements  Start Weight (#): 177 7 lb 12/28/21  Current Weight (#):  155 1 lbs  TBW % Change from start weight: 12 7%  Ideal Body Weight (#): 127 3 lbs BMI 25 (60")  Goal Weight (#):  lbs   Highest: 183 lbs  Lowest: 97 lbs mid 20's    Weight Loss History  Previous weight loss attempts: Commercial Programs ([employer], Margherita Cockayne, etc )  Exercise  Self Created Diets (Portion Control, Healthy Food Choices, etc )    Food and Nutrition Related History  Wake up:  6:00   Bed Time: 10    Food Recall  Breakfast: 8:00: decaf coffee w/2 tbsp h/h, protein bar OR eggs OR cottage cheese, 2 light bread OR oatmeal, fairlife   Snack: quest chips OR fruit OR string cheese  Lunch: 12:00:   deli turkey or chicken, s/w thin or flatout wrap  OR plain greek yogurt, fruit   Snack: quest chips OR fruit OR low fat string cheese   Dinner: 5:00:~3 oz chicken, larger veggies, sometimes carb OR  healthy choice meal  Snack:  Skip     Beverages: water, decaf coffee/tea and alcohol (rare)  Volume of beverage intake: 4 cups water, 1 cup decaf    Weekends: Same  Cravings:  carbs   Trouble area of day:midmorning & midafternoon     Frequency of Eating out: 1x/wk  Food restrictions: lactose?    Cooking: self   Food Shopping: self    Physical Activity Intake  Activity:Walking and matt, strength training  Frequency:1x/wk; daily exercises at home  Physical limitations/barriers to exercise:  n/a    Estimated Needs  Energy  Seca REE 1296x1 [id number]  Re[address] 1310, wt loss w/o exercise 950-1150; wt loss w/exercise 0954-2301  [employer] [place] Energy Needs: BMR : 1113   1# loss weekly sedentary:  836            1# loss weekly lightly active: 1031  Maintenance calories for sedentary activity level: 1336  Protein: 55-68 gm      (1 2-1 5g/kg IBW)  Fluid: 53 oz     (35mL/kg IBW)    Nutrition Diagnosis  Yes; Overweight/obesity  related to Excess energy intake as evidenced by  BMI more than normative standard for age and sex (obesity-grade I 26-30  9)     Nutrition Intervention    Nutrition Prescription  Calories: 850-1050  Protein: 55-70 gm    Meal Plan (Stewart/Pro)  Breakfast: 200-250, 15 (+45)  Snack: skip  Lunch: 200-300, 15-20  Snack: 100-150, 5-10  Dinner: 300-350, 20-25  Snack: skip  **add mid morning snack if needed**    Nutrition Education:    Healthy Core Manual  Calorie controlled menu  Lean protein food choices  Healthy snack options  Food journaling tips      Nutrition Counseling:  Strategies: meal planning, portion sizes, healthy snack choices, hydration, fiber intake, protein intake, exercise, food journal      Monitoring and Evaluation:  Evaluation criteria:  Energy Intake  Meet protein needs  Maintain adequate hydration  Monitor weekly weight  Meal planning/preparation  Food journal   Decreased portions at mealtimes and snacks  Physical activity     Barriers to learning:none  Readiness to change: Action:  (Changing behavior)  Comprehension: very good  Expected Compliance: very good

## 2022-03-31 ENCOUNTER — TELEPHONE (OUTPATIENT)
Dept: GERIATRICS | Age: 77
End: 2022-03-31

## 2022-03-31 ENCOUNTER — CLINICAL SUPPORT (OUTPATIENT)
Dept: BARIATRICS | Facility: CLINIC | Age: 77
End: 2022-03-31

## 2022-03-31 VITALS — BODY MASS INDEX: 30.39 KG/M2 | HEIGHT: 60 IN | WEIGHT: 154.8 LBS

## 2022-03-31 DIAGNOSIS — R63.5 ABNORMAL WEIGHT GAIN: Primary | ICD-10-CM

## 2022-03-31 PROCEDURE — RECHECK

## 2022-03-31 NOTE — TELEPHONE ENCOUNTER
Faxed medical records request to patient's previous PCP office, Dr Saulo Quiroz (fax: [phone]) via [facility]  Correspondence composed in patient's communications tab

## 2022-04-04 ENCOUNTER — OFFICE VISIT (OUTPATIENT)
Dept: BARIATRICS | Facility: CLINIC | Age: 77
End: 2022-04-04

## 2022-04-04 VITALS — BODY MASS INDEX: 30.45 KG/M2 | WEIGHT: 155.1 LBS | HEIGHT: 60 IN

## 2022-04-04 DIAGNOSIS — R63.5 ABNORMAL WEIGHT GAIN: Primary | ICD-10-CM

## 2022-04-04 PROCEDURE — RECHECK

## 2022-04-06 ENCOUNTER — OFFICE VISIT (OUTPATIENT)
Dept: BARIATRICS | Facility: CLINIC | Age: 77
End: 2022-04-06

## 2022-04-06 DIAGNOSIS — F41.9 ANXIETY: Primary | ICD-10-CM

## 2022-04-06 PROCEDURE — RECHECK
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
## 2022-04-14 ENCOUNTER — CLINICAL SUPPORT (OUTPATIENT)
Dept: BARIATRICS | Facility: CLINIC | Age: 77
End: 2022-04-14

## 2022-04-14 VITALS — BODY MASS INDEX: 30.04 KG/M2 | HEIGHT: 60 IN | WEIGHT: 153 LBS

## 2022-04-14 DIAGNOSIS — R63.5 ABNORMAL WEIGHT GAIN: Primary | ICD-10-CM

## 2022-04-14 PROCEDURE — RECHECK

## 2022-04-19 ENCOUNTER — OFFICE VISIT (OUTPATIENT)
Dept: BARIATRICS | Facility: CLINIC | Age: 77
End: 2022-04-19
Payer: MEDICARE

## 2022-04-19 VITALS
BODY MASS INDEX: 29.78 KG/M2 | TEMPERATURE: 97 F | WEIGHT: 151.7 LBS | SYSTOLIC BLOOD PRESSURE: 121 MMHG | HEART RATE: 52 BPM | DIASTOLIC BLOOD PRESSURE: 68 MMHG | HEIGHT: 60 IN

## 2022-04-19 DIAGNOSIS — E78.5 HYPERLIPIDEMIA: ICD-10-CM

## 2022-04-19 DIAGNOSIS — I10 ESSENTIAL HYPERTENSION: ICD-10-CM

## 2022-04-19 DIAGNOSIS — E66.3 OVERWEIGHT: Primary | ICD-10-CM

## 2022-04-19 PROCEDURE — 99214 OFFICE O/P EST MOD 30 MIN: CPT | Performed by: PHYSICIAN ASSISTANT

## 2022-04-19 NOTE — PROGRESS NOTES
Assessment/Plan:    Overweight  -Patient is pursuing [facility]-Intensive Lifestyle Intervention Program  -Initial weight loss goal of 5-10% weight loss for improved health  -not a wellbutrin candidate due to allergy     Initial:177 7 lbs  Current:151 7 lbs  Change:-26 lbs  Goal:120 lbs    Discussed she is in a normal BMI category for her age   Increase water intake  Continue healthways  Continue logging and exercise     Essential hypertension  Not taking norvasc  -should improve with weight loss, dietary, and lifestyle changes  -continue management with prescribing provider      Hyperlipidemia  Taking crestor  -should improve with weight loss, dietary, and lifestyle changes  -continue management with prescribing provider          Follow up in approximately 2 weeks with Non-Surgical Dietician  Diagnoses and all orders for this visit:    Overweight    Essential hypertension    Hyperlipidemia          Subjective:   Chief Complaint   Patient presents with    Follow-up     MWM 16 wk f/u4/        Patient ID: Khadra Walter  is a 68 y o  female with excess weight/obesity here to pursue weight managment    Patient is pursuing [facility]-Intensive Lifestyle Intervention Program      [facility] [employer] [facility]  now doing [address] logging:logging and staying around 1000 calories-weighs and measures food   Exercise: walking 1-2 miles daily and going to [employer] once a week and occasionally silver sneakers online   Hydration:16 oz of water, 16 oz of decaf coffee with half and half, sparkling with diet root beer     B: protein bar and coffee  S: quest chips  L: wrap with deli turkey with batres and lettuce   S: fruit and decaf or SF julius pudding  D: healthychoice meal or salmon and veggies  S: n/a     The following portions of the patient's history were reviewed and updated as appropriate: allergies, current medications, past family history, past medical history, past social history, past surgical history and problem list     Review of Systems   HENT: Negative for sore throat  Respiratory: Negative for cough and shortness of breath  Cardiovascular: Negative for chest pain and palpitations  Gastrointestinal: Negative for abdominal pain, constipation, diarrhea, nausea and vomiting  Denies GERD   Skin: Negative for rash  Psychiatric/Behavioral: Negative for suicidal ideas (denies HI)  Denies depression   + anxiety       Objective:    /68 (BP Location: Left arm, Patient Position: Sitting, Cuff Size: Standard)   Pulse (!) 52   Temp (!) 97 °F (36 1 °C) (Tympanic)   Ht 5' (1 524 m)   Wt 68 8 kg (151 lb 11 2 oz)   BMI 29 63 kg/m²      Physical Exam  Vitals and nursing note reviewed  Constitutional   General appearance: Abnormal   well developed and overweight  Eyes No conjunctival injection   Pulmonary   Respiratory effort: No increased work of breathing or signs of respiratory distress  Abdomen   Abdomen: Abnormal-overweight  Musculoskeletal   Gait and station: Normal     Skin  Dry   No visible rashes   Psychiatric   Orientation to person, place and time: Normal     Affect: appropriate  Neurological   Normal gait

## 2022-04-19 NOTE — ASSESSMENT & PLAN NOTE
Not taking norvasc  -should improve with weight loss, dietary, and lifestyle changes  -continue management with prescribing provider

## 2022-04-19 NOTE — ASSESSMENT & PLAN NOTE
-Patient is pursuing [facility]-Intensive Lifestyle Intervention Program  -Initial weight loss goal of 5-10% weight loss for improved health  -not a wellbutrin candidate due to allergy     Initial:177 7 lbs  Current:151 7 lbs  Change:-26 lbs  Goal:120 lbs    Discussed she is in a normal BMI category for her age   Increase water intake  Continue healthways  Continue logging and exercise

## 2022-04-21 ENCOUNTER — CLINICAL SUPPORT (OUTPATIENT)
Dept: BARIATRICS | Facility: CLINIC | Age: 77
End: 2022-04-21

## 2022-04-21 VITALS — WEIGHT: 152.4 LBS | HEIGHT: 60 IN | BODY MASS INDEX: 29.92 KG/M2

## 2022-04-21 DIAGNOSIS — R63.5 ABNORMAL WEIGHT GAIN: Primary | ICD-10-CM

## 2022-04-21 PROCEDURE — RECHECK

## 2022-04-28 ENCOUNTER — CLINICAL SUPPORT (OUTPATIENT)
Dept: BARIATRICS | Facility: CLINIC | Age: 77
End: 2022-04-28

## 2022-04-28 VITALS — WEIGHT: 150.2 LBS | BODY MASS INDEX: 29.49 KG/M2 | HEIGHT: 60 IN

## 2022-04-28 DIAGNOSIS — R63.5 ABNORMAL WEIGHT GAIN: Primary | ICD-10-CM

## 2022-04-28 PROCEDURE — RECHECK

## 2022-05-05 ENCOUNTER — CLINICAL SUPPORT (OUTPATIENT)
Dept: BARIATRICS | Facility: CLINIC | Age: 77
End: 2022-05-05

## 2022-05-05 VITALS — WEIGHT: 148.6 LBS | BODY MASS INDEX: 29.17 KG/M2 | HEIGHT: 60 IN

## 2022-05-05 DIAGNOSIS — R63.5 ABNORMAL WEIGHT GAIN: Primary | ICD-10-CM

## 2022-05-05 PROCEDURE — RECHECK

## 2022-05-17 NOTE — PROGRESS NOTES
Weight Management Medical Nutrition Assessment  Kurt Mcrae is here for f/u RD session for [facility]  Current wt: 145 4 lbs  She has lost  32 3 lbs x ~5 months  We re-measured height today and is only 58"  Based on this difference will do a Seca scan at next visit  She has been struggling to find a [address] who takes her insurance and specializes in disordered eating  Has a few other options to reach out to that are located further away but she would be willing to meet virtually  She continues to track and is consuming ~1000 calories/day  She would like to lose an additional 20-25 lbs  Discussed that this may or may not be realistic for her body  Instead focus on 5 lbs at a time  Doing great with exercise and has just joined the gym! She will continue to follow up in [facility]       Patient seen by Medical Provider in past 6 months:  yes  Requested to schedule appointment with Medical Provider: No    Anthropometric Measurements  Start Weight (#): 177 7 lb 12/28/21  Current Weight (#):  145 4 lbs  TBW % Change from start weight: 18 1%  Ideal Body Weight (#): 119 1 lbs BMI 25 (58")  Goal Weight (#):  lbs   Highest: 183 lbs  Lowest: 97 lbs mid 20's    Weight Loss History  Previous weight loss attempts: Commercial Programs ([employer], Jorden Mc, etc )  Exercise  Self Created Diets (Portion Control, Healthy Food Choices, etc )    Food and Nutrition Related History  Wake up:  6:00   Bed Time: 10    Food Recall  Breakfast: 8:00: decaf coffee w/2 tbsp h/h, protein bar OR kashi go lean, skim milk OR cottage cheese, 2 light bread OR oatmeal, fairlife   Snack: quest chips OR fruit OR string cheese  Lunch: 12:00:   deli turkey or chicken or tuna, s/w thin or flatout wrap, light batres  OR plain greek yogurt, fruit   Snack: quest chips OR fruit OR low fat string cheese   Dinner: 5:00:~3 oz chicken, larger veggies, sometimes carb OR  healthy choice meal  Snack:  Skip     Beverages: water, decaf coffee/tea and alcohol (rare)  Volume of beverage intake: 48 cups water, 1 cup decaf    Weekends: Same  Cravings:  carbs   Trouble area of day:midmorning & midafternoon     Frequency of Eating out: 1x/wk  Food restrictions: lactose? Cooking: self   Food Shopping: self    Physical Activity Intake  Activity:Walking and matt, strength training  Frequency:3x/wk; Physical limitations/barriers to exercise:  n/a    Estimated Needs  Energy   [employer]ear [place] Energy Needs: BMR : 8313   0 5-1# loss weekly sedentary:  601-376            0 5-1# loss weekly lightly active: 929-1179  Maintenance calories for sedentary activity level: 1247  Protein: 55-68 gm      (1 2-1 5g/kg IBW)  Fluid: 53 oz     (35mL/kg IBW)    Nutrition Diagnosis  Yes; Overweight/obesity  related to Excess energy intake as evidenced by  BMI more than normative standard for age and sex (obesity-grade I 26-30  9)     Nutrition Intervention    Nutrition Prescription  Calories: 850-1050  Protein: 55-70 gm    Meal Plan (Stewart/Pro)  Breakfast: 200-250, 15 (+45)  Snack: skip  Lunch: 200-300, 15-20  Snack: 100-150, 5-10  Dinner: 300-350, 20-25  Snack: skip  **add mid morning snack if needed**    Nutrition Education:    Healthy Core Manual  Calorie controlled menu  Lean protein food choices  Healthy snack options  Food journaling tips      Nutrition Counseling:  Strategies: meal planning, portion sizes, healthy snack choices, hydration, fiber intake, protein intake, exercise, food journal      Monitoring and Evaluation:  Evaluation criteria:  Energy Intake  Meet protein needs  Maintain adequate hydration  Monitor weekly weight  Meal planning/preparation  Food journal   Decreased portions at mealtimes and snacks  Physical activity     Barriers to learning:none  Readiness to change: Action:  (Changing behavior)  Comprehension: very good  Expected Compliance: very good

## 2022-05-19 ENCOUNTER — CLINICAL SUPPORT (OUTPATIENT)
Dept: BARIATRICS | Facility: CLINIC | Age: 77
End: 2022-05-19

## 2022-05-19 VITALS — WEIGHT: 147.2 LBS | BODY MASS INDEX: 28.9 KG/M2 | HEIGHT: 60 IN

## 2022-05-19 DIAGNOSIS — R63.5 ABNORMAL WEIGHT GAIN: Primary | ICD-10-CM

## 2022-05-19 PROCEDURE — RECHECK

## 2022-05-23 ENCOUNTER — OFFICE VISIT (OUTPATIENT)
Dept: BARIATRICS | Facility: CLINIC | Age: 77
End: 2022-05-23

## 2022-05-23 VITALS — WEIGHT: 145.4 LBS | HEIGHT: 58 IN | BODY MASS INDEX: 30.52 KG/M2

## 2022-05-23 DIAGNOSIS — R63.5 ABNORMAL WEIGHT GAIN: Primary | ICD-10-CM

## 2022-05-23 PROCEDURE — RECHECK

## 2022-05-26 ENCOUNTER — CLINICAL SUPPORT (OUTPATIENT)
Dept: BARIATRICS | Facility: CLINIC | Age: 77
End: 2022-05-26

## 2022-05-26 VITALS — BODY MASS INDEX: 30.54 KG/M2 | HEIGHT: 58 IN | WEIGHT: 145.5 LBS

## 2022-05-26 DIAGNOSIS — R63.5 ABNORMAL WEIGHT GAIN: Primary | ICD-10-CM

## 2022-05-26 PROCEDURE — RECHECK

## 2022-06-02 ENCOUNTER — CLINICAL SUPPORT (OUTPATIENT)
Dept: BARIATRICS | Facility: CLINIC | Age: 77
End: 2022-06-02

## 2022-06-02 VITALS — WEIGHT: 144.2 LBS | BODY MASS INDEX: 30.27 KG/M2 | HEIGHT: 58 IN

## 2022-06-02 DIAGNOSIS — R63.5 ABNORMAL WEIGHT GAIN: Primary | ICD-10-CM

## 2022-06-02 PROCEDURE — RECHECK

## 2022-06-09 ENCOUNTER — CLINICAL SUPPORT (OUTPATIENT)
Dept: BARIATRICS | Facility: CLINIC | Age: 77
End: 2022-06-09

## 2022-06-09 VITALS — HEIGHT: 58 IN | BODY MASS INDEX: 30.19 KG/M2 | WEIGHT: 143.8 LBS

## 2022-06-09 DIAGNOSIS — R63.5 ABNORMAL WEIGHT GAIN: Primary | ICD-10-CM

## 2022-06-09 PROCEDURE — RECHECK

## 2022-06-14 NOTE — PROGRESS NOTES
Weight Management Medical Nutrition Assessment  Ras Eastman is here for f/u RD session for [facility]  Current wt: 141 4  lbs  She has lost  36 3  lbs x ~6 months  BMI now WNL for age  Still struggling to find a [facility] who takes her insurance  Had 1 episode of binging which she feels was r/t stress, anxiety due to a trip that lasted longer and threw her schedule off track  She consumed 2-3 bars and a few s/w thins  This had not happened in a long time  Suggest that she keep a bar in her purse so that if she is in this situation she will be better prepared  She continues to track and is consuming ~1000 calories/day  Continues to do well with exercise  Has been doing new classes  She will continue to follow up in [facility]       Patient seen by Medical Provider in past 6 months:  yes  Requested to schedule appointment with Medical Provider: No    Anthropometric Measurements  Start Weight (#): 177 7 lb 12/28/21  Current Weight (#):  141 4 lbs  TBW % Change from start weight: 20 4%  Ideal Body Weight (#): 119 1 lbs BMI 25 (58")  Goal Weight (#):  lbs   Highest: 183 lbs  Lowest: 97 lbs mid 20's    Weight Loss History  Previous weight loss attempts: Commercial Programs ([employer], Margherita Cockayne, etc )  Exercise  Self Created Diets (Portion Control, Healthy Food Choices, etc )    Food and Nutrition Related History  Wake up:  6:00   Bed Time: 10    Food Recall  Breakfast: 8:00: decaf coffee w/2 tbsp h/h, protein bar OR kodiak pancakes 2, SF syrup OR kashi go lean, skim milk OR cottage cheese, 2 light bread  Snack: quest chips OR fruit OR string cheese  Lunch: 12:00:   deli turkey or chicken or tuna, s/w thin or flatout wrap, light batres  OR plain greek yogurt, fruit   Snack: quest chips OR fruit OR low fat string cheese   Dinner: 5:00:~3 oz chicken, larger veggies, sometimes carb OR  healthy choice meal  Snack:  Skip     Beverages: water, decaf coffee/tea and alcohol (rare)  Volume of beverage intake: 48 cups water, 1 cup decaf    Weekends: Same  Cravings:  carbs   Trouble area of day:midmorning & midafternoon     Frequency of Eating out: 1x/wk  Food restrictions: lactose? Cooking: self   Food Shopping: self    Physical Activity Intake  Activity:Walking, cardio fitness class, matt, strength training, pool aerobics  Frequency:3x/wk; Physical limitations/barriers to exercise:  n/a    Estimated Needs  Energy   [employer] [place] Energy Needs: BMR : 1020   0 5# loss weekly sedentary:  974            0 5-1# loss weekly lightly active: 902-1152  Maintenance calories for sedentary activity level: 1224  Protein: 55-68 gm      (1 2-1 5g/kg IBW)  Fluid: 53 oz     (35mL/kg IBW)    Nutrition Diagnosis  Yes; Overweight/obesity  related to Excess energy intake as evidenced by  BMI more than normative standard for age and sex (overweight 25-29  9)     Nutrition Intervention    Nutrition Prescription  Calories: 850-1050  Protein: 55-70 gm    Meal Plan (Stewart/Pro)  Breakfast: 200-250, 15 (+45)  Snack: skip  Lunch: 200-300, 15-20  Snack: 100-150, 5-10  Dinner: 300-350, 20-25  Snack: skip  **add mid morning snack if needed**    Nutrition Education:    Healthy Core Manual  Calorie controlled menu  Lean protein food choices  Healthy snack options  Food journaling tips      Nutrition Counseling:  Strategies: meal planning, portion sizes, healthy snack choices, hydration, fiber intake, protein intake, exercise, food journal      Monitoring and Evaluation:  Evaluation criteria:  Energy Intake  Meet protein needs  Maintain adequate hydration  Monitor weekly weight  Meal planning/preparation  Food journal   Decreased portions at mealtimes and snacks  Physical activity     Barriers to learning:none  Readiness to change: Action:  (Changing behavior)  Comprehension: very good  Expected Compliance: very good

## 2022-06-23 ENCOUNTER — OFFICE VISIT (OUTPATIENT)
Dept: BARIATRICS | Facility: CLINIC | Age: 77
End: 2022-06-23

## 2022-06-23 ENCOUNTER — CLINICAL SUPPORT (OUTPATIENT)
Dept: BARIATRICS | Facility: CLINIC | Age: 77
End: 2022-06-23

## 2022-06-23 VITALS — BODY MASS INDEX: 29.68 KG/M2 | WEIGHT: 141.4 LBS | HEIGHT: 58 IN

## 2022-06-23 VITALS — HEIGHT: 58 IN | BODY MASS INDEX: 29.66 KG/M2 | WEIGHT: 141.31 LBS

## 2022-06-23 DIAGNOSIS — R63.5 ABNORMAL WEIGHT GAIN: Primary | ICD-10-CM

## 2022-06-23 PROCEDURE — RECHECK

## 2022-06-30 ENCOUNTER — CLINICAL SUPPORT (OUTPATIENT)
Dept: BARIATRICS | Facility: CLINIC | Age: 77
End: 2022-06-30

## 2022-06-30 VITALS — HEIGHT: 58 IN | BODY MASS INDEX: 29.47 KG/M2 | WEIGHT: 140.4 LBS

## 2022-06-30 DIAGNOSIS — R63.5 ABNORMAL WEIGHT GAIN: Primary | ICD-10-CM

## 2022-06-30 PROCEDURE — RECHECK

## 2022-07-14 ENCOUNTER — CLINICAL SUPPORT (OUTPATIENT)
Dept: BARIATRICS | Facility: CLINIC | Age: 77
End: 2022-07-14

## 2022-07-14 VITALS — BODY MASS INDEX: 29.26 KG/M2 | HEIGHT: 58 IN | WEIGHT: 139.4 LBS

## 2022-07-14 DIAGNOSIS — R63.5 ABNORMAL WEIGHT GAIN: Primary | ICD-10-CM

## 2022-07-14 PROCEDURE — RECHECK

## 2022-07-18 ENCOUNTER — OFFICE VISIT (OUTPATIENT)
Dept: BARIATRICS | Facility: CLINIC | Age: 77
End: 2022-07-18

## 2022-07-18 VITALS — WEIGHT: 137.68 LBS | HEIGHT: 58 IN | BODY MASS INDEX: 28.9 KG/M2

## 2022-07-18 DIAGNOSIS — R63.5 ABNORMAL WEIGHT GAIN: ICD-10-CM

## 2022-07-18 PROCEDURE — WMWO WEIGHT MANAGEMENT WEEKLY OPTION

## 2022-07-18 PROCEDURE — RECHECK

## 2022-07-18 NOTE — PROGRESS NOTES
Weight Management Medical Nutrition Assessment  Elizabeth Donahue is here for f/u RD session for [facility]  Current wt:  137 7  lbs  She has lost  40  lbs x ~7 months  BMI WNL for age  Seca completed at provider discretion  Leaving Saturday for trip to [facility]  Feels prepared for her trip and plans on bringing bars with her  Continues to track, consuming 1000 calories/day  Incorporating physical activity daily  Has still been struggling to find [email or web address] who takes her insurance  Interested in learning more about mindfulness, assisted her with registering for mindfulness free info session  She will continue to follow up in [employer]       Patient seen by Medical Provider in past 6 months:  yes  Requested to schedule appointment with Medical Provider: No    Anthropometric Measurements  Start Weight (#): 177 7 lb 12/28/21  Current Weight (#):  137 7 lbs  TBW % Change from start weight: 22 5%  Ideal Body Weight (#): 119 1 lbs BMI 25 (58")  Goal Weight (#):  lbs   Highest: 183 lbs  Lowest: 97 lbs mid 20's    Weight Loss History  Previous weight loss attempts: Commercial Programs ([employer], Ang Slain, etc )  Exercise  Self Created Diets (Portion Control, Healthy Food Choices, etc )    Food and Nutrition Related History  Wake up:  6:00   Bed Time: 10    Food Recall  Breakfast: 8:00: decaf coffee w/2 tbsp h/h, protein bar OR kodiak pancakes 2, SF syrup OR kashi go lean, skim milk OR cottage cheese, 2 light bread  Snack: quest chips OR fruit OR string cheese OR sf pudding  Lunch: 12:00:   deli turkey or chicken or tuna, s/w thin or flatout wrap or light bread, light batres  OR plain greek yogurt, fruit   Snack: quest chips OR fruit OR low fat string cheese   Dinner: 5:00:~3 oz chicken/salmon, larger veggies, sometimes carb OR  healthy choice meal  Snack:  Skip or sometimes sf pudding    Beverages: water, decaf coffee/tea and alcohol (rare)  Volume of beverage intake: 48 cups water, 1 cup decaf    Weekends: Same  Cravings: carbs   Trouble area of day:midmorning & midafternoon     Frequency of Eating out: 1x/wk  Food restrictions: lactose? Cooking: self   Food Shopping: self    Physical Activity Intake  Activity:Walking, cardio fitness class, matt, strength training, pool aerobics  Frequency:3x/wk; Physical limitations/barriers to exercise:  n/a    Estimated Needs  Energy  Seca REE 1226x1 3-500= [address] Energy Needs: BMR : 0993   0 5# loss weekly sedentary:  960            0 5 # loss weekly lightly active: 1136  Maintenance calories for sedentary activity level: 1210  Protein: 55-68 gm      (1 2-1 5g/kg IBW)  Fluid: 53 oz     (35mL/kg IBW)    Nutrition Diagnosis  Yes; Overweight/obesity  related to Excess energy intake as evidenced by  BMI more than normative standard for age and sex (overweight 25-29  9)     Nutrition Intervention    Nutrition Prescription  Calories: 850-1050  Protein: 55-70 gm    Meal Plan (Stewart/Pro)  Breakfast: 200-250, 15 (+45)  Snack: skip  Lunch: 200-300, 15-20  Snack: 100-150, 5-10  Dinner: 300-350, 20-25  Snack: skip  **add mid morning snack if needed**    Nutrition Education:    Healthy Core Manual  Calorie controlled menu  Lean protein food choices  Healthy snack options  Food journaling tips      Nutrition Counseling:  Strategies: meal planning, portion sizes, healthy snack choices, hydration, fiber intake, protein intake, exercise, food journal      Monitoring and Evaluation:  Evaluation criteria:  Energy Intake  Meet protein needs  Maintain adequate hydration  Monitor weekly weight  Meal planning/preparation  Food journal   Decreased portions at mealtimes and snacks  Physical activity     Barriers to learning:none  Readiness to change: Action:  (Changing behavior)  Comprehension: very good  Expected Compliance: very good

## 2022-07-19 ENCOUNTER — OFFICE VISIT (OUTPATIENT)
Dept: BARIATRICS | Facility: CLINIC | Age: 77
End: 2022-07-19
Payer: MEDICARE

## 2022-07-19 VITALS
HEART RATE: 59 BPM | WEIGHT: 137.5 LBS | HEIGHT: 58 IN | BODY MASS INDEX: 28.86 KG/M2 | DIASTOLIC BLOOD PRESSURE: 62 MMHG | TEMPERATURE: 96.5 F | SYSTOLIC BLOOD PRESSURE: 116 MMHG

## 2022-07-19 DIAGNOSIS — E66.3 OVERWEIGHT: Primary | ICD-10-CM

## 2022-07-19 DIAGNOSIS — E78.2 MIXED HYPERLIPIDEMIA: ICD-10-CM

## 2022-07-19 PROCEDURE — 99213 OFFICE O/P EST LOW 20 MIN: CPT | Performed by: NURSE PRACTITIONER

## 2022-07-19 NOTE — PROGRESS NOTES
Assessment/Plan:     Overweight  -Patient is pursuing Healthy Ways  - Completed Healthy CORE and is now in healthy Ways   -Initial weight loss goal of 5-10% weight loss for improved health  -not a wellbutrin candidate due to allergy     Initial:177 7 lbs  Current:137 5 lbs  Change:-40 2 lbs  Goal:120 lbs      Increase water intake  Continue healthy ways  Continue logging  Keep up the great work with exercise     Hyperlipidemia  - Taking atorvastatin  May improve with weight loss and lifestyle modification  Continue management with prescribing provider  Sterling Ramires was seen today for follow-up  Diagnoses and all orders for this visit:    Overweight    Mixed hyperlipidemia            Follow up in approximately 3 months with Non-Surgical Physician/Advanced Practitioner  Subjective:   Chief Complaint   Patient presents with    Follow-up     3 MO MWM FUP       Patient ID: Fracisco Garcia  is a 68 y o  female with excess weight/obesity here to pursue weight management  Patient is pursuing Healthy Ways  Completed Healthy CORE, now in Healthy Ways  Most recent notes and records were reviewed  HPI      Wt Readings from Last 10 Encounters:   07/19/22 62 4 kg (137 lb 8 oz)   07/18/22 62 5 kg (137 lb 10 9 oz)   07/14/22 63 2 kg (139 lb 6 4 oz)   06/30/22 63 7 kg (140 lb 6 4 oz)   06/23/22 64 1 kg (141 lb 5 oz)   06/23/22 64 1 kg (141 lb 6 4 oz)   06/09/22 65 2 kg (143 lb 12 8 oz)   06/02/22 65 4 kg (144 lb 3 2 oz)   05/26/22 66 kg (145 lb 8 oz)   05/23/22 66 kg (145 lb 6 4 oz)     Food logging, weighing and measuring  Getting about 1000 calories per day  Completed Healthy CORE, now in Healthy Ways       B- protein bar or cereal with FF milk or cottage cheese with 2 pieces low calorie bread  S- fruit or Quest chips  L- [place] no salt with 2 pieces low russell bread or tuna fish or eggs or nonfat yogurt  S- protein bar or fruit or Quest chips or string cheese or SF pudding cup  D- fish and spinach or Healthy Choice   S- none    Hydration- 4 glasses of water, 2 cups decaf coffee with half and half, 10 oz diet coke daily  Alcohol- 1 glass of wine every other week  Exercise- 7 days per week walks 1 25 miles, weights/bands at home daily  Gym 1-2 times per week - treadmill and bike and machines for 45 minutes  Colonoscopy: UTD, due Jan 2023  Mammogram: UTD, due Jan 2023      The following portions of the patient's history were reviewed and updated as appropriate: allergies, current medications, past family history, past medical history, past social history, past surgical history, and problem list     Review of Systems   HENT: Negative for sore throat  Respiratory: Negative for cough and shortness of breath  Cardiovascular: Negative for chest pain and palpitations  Gastrointestinal: Negative for abdominal pain, constipation, diarrhea, nausea and vomiting  Denies GERD   Skin: Negative for rash  Psychiatric/Behavioral: Negative for suicidal ideas (or HI)  Denies depression and anxiety       Objective:  /62 (BP Location: Left arm, Patient Position: Sitting, Cuff Size: Standard)   Pulse 59   Temp (!) 96 5 °F (35 8 °C) (Tympanic)   Ht 4' 10 25" (1 48 m)   Wt 62 4 kg (137 lb 8 oz)   BMI 28 49 kg/m²     Physical Exam  Vitals and nursing note reviewed  Constitutional   General appearance: Abnormal   well developed and overweight  Eyes No conjunctival injection  Ears, Nose, Mouth, and Throat Oral mucosa moist    Pulmonary   Respiratory effort: No increased work of breathing or signs of respiratory distress  Cardiovascular   Examination of extremities for edema and/or varicosities: Normal   no edema  Abdomen   Abdomen: Abnormal overweight       Musculoskeletal   Gait and station: Normal     Psychiatric   Orientation to person, place and time: Normal     Affect: appropriate

## 2022-07-19 NOTE — ASSESSMENT & PLAN NOTE
-Patient is pursuing Healthy Ways     - Completed Healthy CORE and is now in healthy Ways   -Initial weight loss goal of 5-10% weight loss for improved health  -not a wellbutrin candidate due to allergy     Initial:177 7 lbs  Current:137 5 lbs  Change:-40 2 lbs  Goal:120 lbs      Increase water intake  Continue healthy ways  Continue logging  Keep up the great work with exercise

## 2022-07-21 ENCOUNTER — CLINICAL SUPPORT (OUTPATIENT)
Dept: BARIATRICS | Facility: CLINIC | Age: 77
End: 2022-07-21

## 2022-07-21 VITALS — BODY MASS INDEX: 28.84 KG/M2 | HEIGHT: 58 IN | WEIGHT: 137.4 LBS

## 2022-07-21 DIAGNOSIS — R63.5 ABNORMAL WEIGHT GAIN: Primary | ICD-10-CM

## 2022-07-21 PROCEDURE — RECHECK

## 2022-08-04 ENCOUNTER — OFFICE VISIT (OUTPATIENT)
Dept: URGENT CARE | Facility: CLINIC | Age: 77
End: 2022-08-04
Payer: MEDICARE

## 2022-08-04 VITALS
RESPIRATION RATE: 16 BRPM | DIASTOLIC BLOOD PRESSURE: 65 MMHG | SYSTOLIC BLOOD PRESSURE: 141 MMHG | OXYGEN SATURATION: 96 % | TEMPERATURE: 98.5 F | HEART RATE: 62 BPM

## 2022-08-04 DIAGNOSIS — B34.9 ACUTE VIRAL SYNDROME: Primary | ICD-10-CM

## 2022-08-04 PROCEDURE — G0463 HOSPITAL OUTPT CLINIC VISIT: HCPCS | Performed by: PHYSICIAN ASSISTANT

## 2022-08-04 PROCEDURE — 99213 OFFICE O/P EST LOW 20 MIN: CPT | Performed by: PHYSICIAN ASSISTANT

## 2022-08-04 NOTE — PROGRESS NOTES
[facility]        NAME: Maria Del Carmen Davis is a 68 y o  female  : 1945    MRN: [id number]  DATE: 2022  TIME: 12:03 PM    Assessment and Plan   Acute viral syndrome [B34 9]  1  Acute viral syndrome           Patient Instructions     Discussed etiology is most likely viral   Continue with over-the-counter symptom relief including Tylenol   Encourage lots of fluids and rest  Monitor for worsening symptoms   Follow up with PCP in 3-5 days  Proceed to  ER if symptoms worsen  Chief Complaint     Chief Complaint   Patient presents with    Cold Like Symptoms     Pt reports sore throat, cough, headache, and runny nose "for a few days  Negative home COVID test yesterday   Diarrhea     Pt reports diarrhea "for a couple of days"         History of Present Illness       Patient presents with complaint of cold symptoms for the past 4 days  She describes several episodes of loose stool, sore throat, cough, headache, and rhinorrhea  She reports recent travel to [place] and states that she tested negative for COVID-19 at home yesterday  She reports having had most 4 loose stools and a day and denies blood in stool  She denies fever, chills, sweats, chest tightness, dyspnea, abdominal pain, nausea, and vomiting  She denies known recent sick contacts  She reports taking over-the-counter symptom relief but states that she felt like her symptoms were worse yesterday  Review of Systems   Review of Systems   Constitutional: Negative for chills, diaphoresis and fever  HENT: Positive for rhinorrhea and sore throat  Negative for congestion and ear pain  Eyes: Negative for pain and visual disturbance  Respiratory: Positive for cough  Negative for chest tightness and shortness of breath  Cardiovascular: Negative for chest pain and palpitations  Gastrointestinal: Positive for diarrhea  Negative for abdominal pain, blood in stool and vomiting     Genitourinary: Negative for dysuria and hematuria  Musculoskeletal: Negative for arthralgias, back pain and myalgias  Skin: Negative for color change and rash  Neurological: Positive for headaches  Negative for seizures and syncope  All other systems reviewed and are negative  Current Medications       Current Outpatient Medications:     atorvastatin (LIPITOR) 10 mg tablet, Take 1 tablet (10 mg total) by mouth daily, Disp: 90 tablet, Rfl: 3    Multiple Vitamins-Minerals (MULTIVITAMIN WITH MINERALS) tablet, Take 1 tablet by mouth daily, Disp: , Rfl:     calcium polycarbophil (FIBERCON) 625 mg tablet, Take 625 mg by mouth daily (Patient not taking: Reported on 8/4/2022), Disp: , Rfl:     Calcium-Magnesium-Vitamin D (CITRACAL SLOW RELEASE PO), , Disp: , Rfl:     Current Allergies     Allergies as of 08/04/2022 - Reviewed 08/04/2022   Allergen Reaction Noted    Imodium [loperamide] Headache 02/17/2022    Pedi-pre tape spray [wound dressing adhesive] Itching and Rash 02/17/2022    Dairy aid [lactase] Diarrhea 03/24/2022    Bupropion Rash 12/10/2017            The following portions of the patient's history were reviewed and updated as appropriate: allergies, current medications, past family history, past medical history, past social history, past surgical history and problem list      Past Medical History:   Diagnosis Date    Edema of both lower legs 11/01/2018    Dr Basilio Morse; faxed records   Hx of skin cancer, basal cell     Hyperlipidemia     Impingement syndrome of left shoulder 04/10/2018    Dr Basilio Morse, faxed patient records      Migraines     Osteopenia     Overactive bladder        Past Surgical History:   Procedure Laterality Date    ACHILLES TENDON REPAIR      CATARACT EXTRACTION, BILATERAL      COLPOPEXY VAGINAL EXTRAPERITONEAL (VEC) WITH INSERTION PUBOVAGINAL SLING      CYST REMOVAL      HERNIA REPAIR      HYSTERECTOMY      JOINT REPLACEMENT Right     REPLACEMENT TOTAL KNEE      TONSILLECTOMY 1949       Family History   Problem Relation Age of Onset    Heart disease Father     Ovarian cancer Maternal Aunt     Breast cancer Paternal Aunt     Hypertension Paternal Grandfather     Breast cancer Maternal Grandmother     Heart disease Paternal Grandmother     Diabetes Neg Hx     Stroke Neg Hx     Thyroid disease Neg Hx          Medications have been verified  Objective   /65   Pulse 62   Temp 98 5 °F (36 9 °C)   Resp 16   SpO2 96%   No LMP recorded  Physical Exam     Physical Exam  Vitals and nursing note reviewed  Constitutional:       General: She is not in acute distress  Appearance: Normal appearance  She is well-developed  She is not ill-appearing or diaphoretic  HENT:      Head: Normocephalic and atraumatic  Right Ear: Tympanic membrane, ear canal and external ear normal       Left Ear: Tympanic membrane, ear canal and external ear normal       Nose: Nose normal       Mouth/Throat:      Mouth: Mucous membranes are moist       Pharynx: Oropharynx is clear  No oropharyngeal exudate or posterior oropharyngeal erythema  Eyes:      General:         Right eye: No discharge  Left eye: No discharge  Conjunctiva/sclera: Conjunctivae normal       Pupils: Pupils are equal, round, and reactive to light  Cardiovascular:      Rate and Rhythm: Normal rate and regular rhythm  Heart sounds: Normal heart sounds  Pulmonary:      Effort: Pulmonary effort is normal  No respiratory distress  Breath sounds: Normal breath sounds  No stridor  No wheezing, rhonchi or rales  Abdominal:      General: Abdomen is flat  Bowel sounds are normal       Palpations: Abdomen is soft  Tenderness: There is no abdominal tenderness  Musculoskeletal:      Cervical back: Normal range of motion and neck supple  No tenderness  Lymphadenopathy:      Cervical: No cervical adenopathy  Skin:     General: Skin is warm and dry        Capillary Refill: Capillary refill takes less than 2 seconds  Findings: No rash  Neurological:      Mental Status: She is alert and oriented to person, place, and time  Cranial Nerves: No cranial nerve deficit  Sensory: No sensory deficit  Psychiatric:         Behavior: Behavior normal          Thought Content:  Thought content normal

## 2022-08-05 ENCOUNTER — TELEPHONE (OUTPATIENT)
Dept: GERIATRICS | Age: 77
End: 2022-08-05

## 2022-08-05 DIAGNOSIS — U07.1 COVID-19: Primary | ICD-10-CM

## 2022-08-05 NOTE — TELEPHONE ENCOUNTER
Relayed Provider's message to the patient  Patient was to urgent care yesterday, but she will contact them to see if they will prescribe medication

## 2022-08-05 NOTE — TELEPHONE ENCOUNTER
Patient, Berna Hu ([phone]) called to say she just tested positive for Covid and is feeling crappy  She would like to know if she could get the anti-viral medication

## 2022-08-05 NOTE — TELEPHONE ENCOUNTER
The patient would need to be seen before prescribing any medications  I recommend she go to an urgent care for an assessment as we do not have any availability today

## 2022-08-11 ENCOUNTER — CLINICAL SUPPORT (OUTPATIENT)
Dept: BARIATRICS | Facility: CLINIC | Age: 77
End: 2022-08-11

## 2022-08-11 VITALS — WEIGHT: 136.2 LBS | HEIGHT: 58 IN | BODY MASS INDEX: 28.59 KG/M2

## 2022-08-11 DIAGNOSIS — R63.5 ABNORMAL WEIGHT GAIN: Primary | ICD-10-CM

## 2022-08-11 PROCEDURE — RECHECK

## 2022-08-18 ENCOUNTER — CLINICAL SUPPORT (OUTPATIENT)
Dept: BARIATRICS | Facility: CLINIC | Age: 77
End: 2022-08-18

## 2022-08-18 ENCOUNTER — OFFICE VISIT (OUTPATIENT)
Dept: BARIATRICS | Facility: CLINIC | Age: 77
End: 2022-08-18

## 2022-08-18 VITALS — BODY MASS INDEX: 28.23 KG/M2 | HEIGHT: 58 IN | WEIGHT: 134.5 LBS

## 2022-08-18 VITALS — WEIGHT: 134.5 LBS | HEIGHT: 58 IN | BODY MASS INDEX: 28.23 KG/M2

## 2022-08-18 DIAGNOSIS — R63.5 ABNORMAL WEIGHT GAIN: Primary | ICD-10-CM

## 2022-08-18 PROCEDURE — RECHECK

## 2022-08-18 NOTE — PROGRESS NOTES
Weight Management Medical Nutrition Assessment  Elisabet Mars is here for f/u RD session for Healthy Ways  Current wt:  134 5  lbs  She has lost  40  lbs x ~7 months  BMI WNL for age  Continues to track, consuming 1000 calories/day  Incorporating physical activity daily  She is going to be joining [employer] now that her [facility]  [employer] has   She will be signing up for a mindfulness course with [facility] which she is hoping will help with her relationship with food  She notes she has been eating sometimes 2 or 3 snacks in the afternoon which she thinks may be due to boredom  Recommend she try adding calories to her breakfast, example have light & fit greek yogurt with her bar in case she is having some rebound hunger  Keep up the great work!     Patient seen by Medical Provider in past 6 months:  yes  Requested to schedule appointment with Medical Provider: No    Anthropometric Measurements  Start Weight (#): 177 7 lb 21  Current Weight (#):  134 5 lbs  TBW % Change from start weight: 24 3%  Ideal Body Weight (#): 119 1 lbs BMI 25 (58")  Goal Weight (#):  lbs   Highest: 183 lbs  Lowest: 97 lbs mid 20's    Weight Loss History  Previous weight loss attempts: Commercial Programs ([employer], Brisa Patel, etc )  Exercise  Self Created Diets (Portion Control, Healthy Food Choices, etc )    Food and Nutrition Related History  Wake up:  6:00   Bed Time: 10    Food Recall  Breakfast: 8:00: decaf coffee w/2 tbsp h/h, protein bar OR kodiak waffles OR high protein cereal, fairlife when available    Snack: 2 oz fresh mozz, tomtato OR quest chips OR fruit OR string cheese OR sf pudding  Lunch: 12:00:   deli turkey or chicken or tuna, s/w thin or flatout wrap or light bread, light batres  OR plain greek yogurt, fruit   Snack: 2:00 quest chips, fruit or 2, sf pudding cup   Dinner: 5:00:~3 oz chicken/salmon/halibut, larger veggies, sometimes carb, 1 tsp oil OR  healthy choice meal  Snack:  Skip or sometimes sf pudding    Beverages: water, decaf coffee/tea and alcohol (rare)  Volume of beverage intake: 48 cups water, 1 cup decaf    Weekends: Same  Cravings:  carbs   Trouble area of day:midmorning & midafternoon     Frequency of Eating out: 1x/wk  Food restrictions: lactose? Cooking: self   Food Shopping: self    Physical Activity Intake  Activity:Walking,  Strength training/bands, matt online  Frequency:most days; Physical limitations/barriers to exercise:  n/a    Estimated Needs  Energy   Roberto 1898 Energy Needs:  BMR : 1160   0 5# loss weekly sedentary:  943            0 5 # loss weekly lightly active: 1116  Maintenance calories for sedentary activity level: 1193  Protein: 55-68 gm      (1 2-1 5g/kg IBW)  Fluid: 53 oz     (35mL/kg IBW)    Nutrition Diagnosis   No  Resolved      Nutrition Intervention    Nutrition Prescription  Calories: 850-1050  Protein: 55-70 gm    Meal Plan (Stewart/Pro)  Breakfast: 200-250, 15 (+45)  Snack: skip  Lunch: 200-300, 15-20  Snack: 100-150, 5-10  Dinner: 300-350, 20-25  Snack: skip  **add mid morning snack if needed**    Nutrition Education:    Healthy Core Manual  Calorie controlled menu  Lean protein food choices  Healthy snack options  Food journaling tips      Nutrition Counseling:  Strategies: meal planning, portion sizes, healthy snack choices, hydration, fiber intake, protein intake, exercise, food journal      Monitoring and Evaluation:  Evaluation criteria:  Energy Intake  Meet protein needs  Maintain adequate hydration  Monitor weekly weight  Meal planning/preparation  Food journal   Decreased portions at mealtimes and snacks  Physical activity     Barriers to learning:none  Readiness to change: Action:  (Changing behavior)  Comprehension: very good  Expected Compliance: very good

## 2022-08-25 ENCOUNTER — CLINICAL SUPPORT (OUTPATIENT)
Dept: BARIATRICS | Facility: CLINIC | Age: 77
End: 2022-08-25

## 2022-08-25 VITALS — BODY MASS INDEX: 28.17 KG/M2 | HEIGHT: 58 IN | WEIGHT: 134.2 LBS

## 2022-08-25 DIAGNOSIS — R63.5 ABNORMAL WEIGHT GAIN: Primary | ICD-10-CM

## 2022-08-25 PROCEDURE — RECHECK

## 2022-09-01 ENCOUNTER — CLINICAL SUPPORT (OUTPATIENT)
Dept: BARIATRICS | Facility: CLINIC | Age: 77
End: 2022-09-01

## 2022-09-01 VITALS — WEIGHT: 133.2 LBS | HEIGHT: 58 IN | BODY MASS INDEX: 27.96 KG/M2

## 2022-09-01 DIAGNOSIS — R63.5 ABNORMAL WEIGHT GAIN: Primary | ICD-10-CM

## 2022-09-01 PROCEDURE — RECHECK

## 2022-09-08 ENCOUNTER — CLINICAL SUPPORT (OUTPATIENT)
Dept: BARIATRICS | Facility: CLINIC | Age: 77
End: 2022-09-08

## 2022-09-08 VITALS — HEIGHT: 57 IN | BODY MASS INDEX: 28.3 KG/M2 | WEIGHT: 131.2 LBS

## 2022-09-08 DIAGNOSIS — R63.5 ABNORMAL WEIGHT GAIN: Primary | ICD-10-CM

## 2022-09-08 PROCEDURE — RECHECK

## 2022-09-13 NOTE — PROGRESS NOTES
Weight Management Medical Nutrition Assessment  Brent Epstein is here for f/u RD session for Healthy Ways  Current wt: 131 5   lbs  She has lost  46 2   lbs x 8 1/2 months  BMI  WNL for age  She has started the mindfulness class which is on zoom  Still tracking and consuming ~1000 calories/day  Denies hunger at this time  Has been keeping up with her exercise  She has no concerns at this time & will continue to f/u with Healthy Ways  Patient seen by Medical Provider in past 6 months:  yes  Requested to schedule appointment with Medical Provider: No    Anthropometric Measurements  Start Weight (#): 177 7 lb 12/28/21  Current Weight (#):  134 5 lbs  TBW % Change from start weight: 26%  Ideal Body Weight (#): 119 1 lbs BMI 25 (58")  Goal Weight (#):  lbs   Highest: 183 lbs  Lowest: 97 lbs mid 20's    Weight Loss History  Previous weight loss attempts: Commercial Programs ([employer], Jose Walker, etc )  Exercise  Self Created Diets (Portion Control, Healthy Food Choices, etc )    Food and Nutrition Related History  Wake up:  6:00   Bed Time: 10    Food Recall  Breakfast: 8:00: decaf coffee w/2 tbsp h/h, protein bar OR kodiak waffles 2, SF syrup OR kashi go lean, skim milk OR cottage cheese, 2 light bread   Snack: quest chips    Lunch: 12:00:   deli turkey or chicken or tuna, s/w thin or flatout wrap, light batres  OR plain greek yogurt, fruit   Snack:  fruit    Dinner: 5:00:~3 oz chicken, larger veggies, sometimes carb OR  healthy choice meal  Snack:  Skip     Beverages: water, decaf coffee/tea and alcohol (rare)  Volume of beverage intake: 48 cups water, 1 cup decaf    Weekends: Same  Cravings:  carbs   Trouble area of day:midmorning & midafternoon     Frequency of Eating out: 1x/wk  Food restrictions: lactose? Cooking: self   Food Shopping: self    Physical Activity Intake  Activity:Walking, cardio fitness class, matt, strength training, walking  Frequency:3x/wk;    Physical limitations/barriers to exercise:  n/a    Estimated Needs  Energy   [place] Energy Needs:  BMR : 980   0 5# loss weekly sedentary:  926            0 5 # loss weekly lightly active: 1098  Maintenance calories for sedentary activity level: 1176  Protein: 55-68 gm      (1 2-1 5g/kg IBW)  Fluid: 53 oz     (35mL/kg IBW)    Nutrition Diagnosis  No, resolved    Nutrition Intervention    Nutrition Prescription  Calories: 850-1050  Protein: 55-70 gm    Meal Plan (Stewart/Pro)  Breakfast: 200-250, 15 (+45)  Snack: skip  Lunch: 200-300, 15-20  Snack: 100-150, 5-10  Dinner: 300-350, 20-25  Snack: skip  **add mid morning snack if needed**    Nutrition Education:    Healthy Core Manual  Calorie controlled menu  Lean protein food choices  Healthy snack options  Food journaling tips      Nutrition Counseling:  Strategies: meal planning, portion sizes, healthy snack choices, hydration, fiber intake, protein intake, exercise, food journal      Monitoring and Evaluation:  Evaluation criteria:  Energy Intake  Meet protein needs  Maintain adequate hydration  Monitor weekly weight  Meal planning/preparation  Food journal   Decreased portions at mealtimes and snacks  Physical activity     Barriers to learning:none  Readiness to change: Action:  (Changing behavior)  Comprehension: very good  Expected Compliance: very good

## 2022-09-19 ENCOUNTER — OFFICE VISIT (OUTPATIENT)
Dept: BARIATRICS | Facility: CLINIC | Age: 77
End: 2022-09-19

## 2022-09-19 VITALS — WEIGHT: 131.5 LBS | HEIGHT: 58 IN | BODY MASS INDEX: 27.6 KG/M2

## 2022-09-19 DIAGNOSIS — R63.5 ABNORMAL WEIGHT GAIN: Primary | ICD-10-CM

## 2022-09-19 PROCEDURE — RECHECK

## 2022-09-22 ENCOUNTER — CLINICAL SUPPORT (OUTPATIENT)
Dept: BARIATRICS | Facility: CLINIC | Age: 77
End: 2022-09-22

## 2022-09-22 VITALS — WEIGHT: 131 LBS | BODY MASS INDEX: 27.5 KG/M2 | HEIGHT: 58 IN

## 2022-09-22 DIAGNOSIS — R63.5 ABNORMAL WEIGHT GAIN: Primary | ICD-10-CM

## 2022-09-22 PROCEDURE — RECHECK

## 2022-09-29 ENCOUNTER — CLINICAL SUPPORT (OUTPATIENT)
Dept: BARIATRICS | Facility: CLINIC | Age: 77
End: 2022-09-29

## 2022-09-29 VITALS — WEIGHT: 131.2 LBS | HEIGHT: 58 IN | BODY MASS INDEX: 27.54 KG/M2

## 2022-09-29 DIAGNOSIS — R63.5 ABNORMAL WEIGHT GAIN: Primary | ICD-10-CM

## 2022-09-29 PROCEDURE — RECHECK

## 2022-10-04 NOTE — PROGRESS NOTES
Weight Management Medical Nutrition Assessment  Jania Zabala is here for f/u RD session for Healthy Ways  Current wt:  128 9  lbs  She has lost  48 8    lbs x 9 1/2 months  Will recheck her body composition at her next visit to assess muscle mass  BMI  WNL for age  She is currently enrolled in the mindfulness class which is on zoom  Trying to use the principles being taught but not sure if it has been helpful or not  Still tracking and consuming ~1000 calories/day  Denies hunger at this time  Has been keeping up with her exercise  Just joined a new gym which she has been happy with  She has no concerns at this time & will continue to f/u with Healthy Ways        Patient seen by Medical Provider in past 6 months:  yes  Requested to schedule appointment with Medical Provider: No    Anthropometric Measurements  Start Weight (#): 177 7 lb 12/28/21  Current Weight (#):  128 9 lbs  TBW % Change from start weight: 27 5%  Ideal Body Weight (#): 119 1 lbs BMI 25 (58")  Goal Weight (#):  lbs   Highest: 183 lbs  Lowest: 97 lbs mid 20's    Weight Loss History  Previous weight loss attempts: Commercial Programs ([employer], Farrel Angles, etc )  Exercise  Self Created Diets (Portion Control, Healthy Food Choices, etc )    Food and Nutrition Related History  Wake up:  6:00   Bed Time: 10    Food Recall  Breakfast: 8:00: decaf coffee w/2 tbsp h/h, protein bar OR kodiak waffles 2, SF syrup OR kashi go lean, skim milk OR cottage cheese, 2 light bread   Snack: quest chips    Lunch: 12:00:   deli turkey or chicken or tuna or egg, s/w thin or flatout wrap, light batres  OR plain greek yogurt, fruit   Snack: 4:00: Decaf coffee w/2 tbsp h/h, fruit , cherry tomatoes OR bar  Dinner: 5:00:~4 oz chicken, 1 1/2 cup veggies, sometimes carb OR  healthy choice meal  Snack:  Skip     Beverages: water, decaf coffee/tea, diet soda and alcohol (rare)  Volume of beverage intake: 8-16 oz water, 1 diet soda, 1-2 cup decaf    Weekends: Same  Cravings: carbs   Trouble area of day:midmorning & midafternoon     Frequency of Eating out: 1x every other week  Food restrictions: lactose? Cooking: self   Food Shopping: self    Physical Activity Intake  Activity:Walking, cardio fitness class, matt, strength training, walking  Frequency:2x/wk gym strength, walking daily  Physical limitations/barriers to exercise:  n/a    Estimated Needs  Energy   [place] [place] Energy Needs:  BMR : 980   0 5# loss weekly sedentary:  926            0 5 # loss weekly lightly active: 1098  Maintenance calories for sedentary activity level: 1176  Protein: 55-68 gm      (1 2-1 5g/kg IBW)  Fluid: 53 oz     (35mL/kg IBW)    Nutrition Diagnosis  No, resolved    Nutrition Intervention    Nutrition Prescription  Calories: 850-1050  Protein: 55-70 gm    Meal Plan (Stewart/Pro)  Breakfast: 200-250, 15 (+45)  Snack: skip  Lunch: 200-300, 15-20  Snack: 100-150, 5-10  Dinner: 300-350, 20-25  Snack: skip  **add mid morning snack if needed**    Nutrition Education:    Healthy Core Manual  Calorie controlled menu  Lean protein food choices  Healthy snack options  Food journaling tips      Nutrition Counseling:  Strategies: meal planning, portion sizes, healthy snack choices, hydration, fiber intake, protein intake, exercise, food journal      Monitoring and Evaluation:  Evaluation criteria:  Energy Intake  Meet protein needs  Maintain adequate hydration  Monitor weekly weight  Meal planning/preparation  Food journal   Decreased portions at mealtimes and snacks  Physical activity     Barriers to learning:none  Readiness to change: Action:  (Changing behavior)  Comprehension: very good  Expected Compliance: very good

## 2022-10-06 ENCOUNTER — CLINICAL SUPPORT (OUTPATIENT)
Dept: BARIATRICS | Facility: CLINIC | Age: 77
End: 2022-10-06

## 2022-10-06 VITALS — HEIGHT: 58 IN | BODY MASS INDEX: 27.08 KG/M2 | WEIGHT: 129 LBS

## 2022-10-06 DIAGNOSIS — R63.5 ABNORMAL WEIGHT GAIN: Primary | ICD-10-CM

## 2022-10-06 PROCEDURE — RECHECK

## 2022-10-11 ENCOUNTER — OFFICE VISIT (OUTPATIENT)
Dept: BARIATRICS | Facility: CLINIC | Age: 77
End: 2022-10-11

## 2022-10-11 VITALS — BODY MASS INDEX: 27.06 KG/M2 | HEIGHT: 58 IN | WEIGHT: 128.9 LBS

## 2022-10-11 DIAGNOSIS — R63.5 ABNORMAL WEIGHT GAIN: Primary | ICD-10-CM

## 2022-10-11 PROCEDURE — RECHECK

## 2022-10-14 ENCOUNTER — OFFICE VISIT (OUTPATIENT)
Dept: INTERNAL MEDICINE CLINIC | Facility: CLINIC | Age: 77
End: 2022-10-14
Payer: MEDICARE

## 2022-10-14 VITALS
SYSTOLIC BLOOD PRESSURE: 116 MMHG | HEART RATE: 61 BPM | OXYGEN SATURATION: 96 % | HEIGHT: 58 IN | WEIGHT: 129.2 LBS | RESPIRATION RATE: 14 BRPM | DIASTOLIC BLOOD PRESSURE: 78 MMHG | BODY MASS INDEX: 27.12 KG/M2

## 2022-10-14 DIAGNOSIS — Z00.00 MEDICARE ANNUAL WELLNESS VISIT, SUBSEQUENT: ICD-10-CM

## 2022-10-14 DIAGNOSIS — Z82.49 FAMILY HISTORY OF EARLY CAD: ICD-10-CM

## 2022-10-14 DIAGNOSIS — E55.9 VITAMIN D DEFICIENCY: ICD-10-CM

## 2022-10-14 DIAGNOSIS — R73.9 HYPERGLYCEMIA: Primary | ICD-10-CM

## 2022-10-14 DIAGNOSIS — Z12.11 SCREENING FOR COLON CANCER: ICD-10-CM

## 2022-10-14 DIAGNOSIS — Z87.42 HISTORY OF ABNORMAL UTERINE BLEEDING: ICD-10-CM

## 2022-10-14 DIAGNOSIS — Z13.29 SCREENING FOR THYROID DISORDER: ICD-10-CM

## 2022-10-14 DIAGNOSIS — M77.51 RETROCALCANEAL BURSITIS (BACK OF HEEL), RIGHT: ICD-10-CM

## 2022-10-14 DIAGNOSIS — E78.2 MIXED HYPERLIPIDEMIA: ICD-10-CM

## 2022-10-14 PROCEDURE — G0438 PPPS, INITIAL VISIT: HCPCS | Performed by: INTERNAL MEDICINE

## 2022-10-14 PROCEDURE — 99203 OFFICE O/P NEW LOW 30 MIN: CPT | Performed by: INTERNAL MEDICINE

## 2022-10-14 RX ORDER — ATORVASTATIN CALCIUM 10 MG/1
10 TABLET, FILM COATED ORAL DAILY
Qty: 90 TABLET | Refills: 3 | Status: SHIPPED | OUTPATIENT
Start: 2022-10-14

## 2022-10-14 RX ORDER — OCTISALATE, AVOBENZONE, HOMOSALATE, AND OCTOCRYLENE 29.4; 29.4; 49; 25.48 MG/ML; MG/ML; MG/ML; MG/ML
1 LOTION TOPICAL DAILY
COMMUNITY

## 2022-10-14 NOTE — PROGRESS NOTES
Assessment and Plan:     Problem List Items Addressed This Visit        Other    Hyperlipidemia    Relevant Medications    atorvastatin (LIPITOR) 10 mg tablet    Other Relevant Orders    CBC and differential    Comprehensive metabolic panel    Lipid Panel with Direct LDL reflex      Other Visit Diagnoses     Hyperglycemia    -  Primary    Relevant Orders    CBC and differential    Comprehensive metabolic panel    Hemoglobin A1C    Screening for thyroid disorder        Relevant Orders    CBC and differential    Comprehensive metabolic panel    TSH, 3rd generation with Free T4 reflex    Vitamin D deficiency        Relevant Orders    CBC and differential    Comprehensive metabolic panel    Vitamin D 25 hydroxy    Screening for colon cancer        Relevant Orders    Ambulatory referral for colonoscopy    Family history of early CAD        Relevant Orders    VAS screening    Echo complete w/ contrast if indicated           Preventive health issues were discussed with patient, and age appropriate screening tests were ordered as noted in patient's After Visit Summary  Personalized health advice and appropriate referrals for health education or preventive services given if needed, as noted in patient's After Visit Summary       History of Present Illness:     Patient presents for a Medicare Wellness Visit    HPI   Patient Care Team:  Ernesto Lee DO as PCP - General (Internal Medicine)     Review of Systems:     Review of Systems     Problem List:     Patient Active Problem List   Diagnosis   • Lumbar disc herniation   • Lumbar radiculopathy   • Spinal stenosis of lumbar region with neurogenic claudication   • Retrocalcaneal bursitis (back of heel), right   • Overweight   • Essential hypertension   • Hyperlipidemia   • MCI (mild cognitive impairment)   • VEGA (obstructive sleep apnea)   • Anxiety      Past Medical and Surgical History:     Past Medical History:   Diagnosis Date   • Edema of both lower legs 11/01/2018 Dr Vivek Luna; faxed records  • Hx of skin cancer, basal cell    • Hyperlipidemia    • Impingement syndrome of left shoulder 04/10/2018    Dr Vivek Luna, faxed patient records  • Migraines    • Osteopenia    • Overactive bladder      Past Surgical History:   Procedure Laterality Date   • ACHILLES TENDON REPAIR     • CATARACT EXTRACTION, BILATERAL     • COLPOPEXY VAGINAL EXTRAPERITONEAL (VEC) WITH INSERTION PUBOVAGINAL SLING     • CYST REMOVAL     • HERNIA REPAIR     • HYSTERECTOMY     • JOINT REPLACEMENT Right    • REPLACEMENT TOTAL KNEE     • TONSILLECTOMY  1949      Family History:     Family History   Problem Relation Age of Onset   • Heart disease Father    • Ovarian cancer Maternal Aunt    • Breast cancer Paternal Aunt    • Hypertension Paternal Grandfather    • Breast cancer Maternal Grandmother    • Heart disease Paternal Grandmother    • Diabetes Neg Hx    • Stroke Neg Hx    • Thyroid disease Neg Hx       Social History:     Social History     Socioeconomic History   • Marital status:      Spouse name: None   • Number of children: 2   • Years of education: 25   • Highest education level: Master's degree (e g , MA, MS, Derek, MEd, MSW, CHANELL)   Occupational History   • None   Tobacco Use   • Smoking status: Former Smoker     Types: Cigarettes   • Smokeless tobacco: Never Used   Vaping Use   • Vaping Use: Never used   Substance and Sexual Activity   • Alcohol use: Yes     Comment: occasional   • Drug use: Never   • Sexual activity: Not Currently   Other Topics Concern   • None   Social History Narrative   • None     Social Determinants of Health     Financial Resource Strain: Low Risk    • Difficulty of Paying Living Expenses: Not hard at all   Food Insecurity: No Food Insecurity   • Worried About Running Out of Food in the Last Year: Never true   • Ran Out of Food in the Last Year: Never true   Transportation Needs: No Transportation Needs   • Lack of Transportation (Medical):  No   • Lack of Transportation (Non-Medical): No   Physical Activity: Sufficiently Active   • Days of Exercise per Week: 4 days   • Minutes of Exercise per Session: 60 min   Stress: Stress Concern Present   • Feeling of Stress : Rather much   Social Connections: Socially Isolated   • Frequency of Communication with Friends and Family: More than three times a week   • Frequency of Social Gatherings with Friends and Family: Three times a week   • Attends Restoration Services: Never   • Active Member of Clubs or Organizations: No   • Attends Club or Organization Meetings: Never   • Marital Status:    Intimate Partner Violence: Not At Risk   • Fear of Current or Ex-Partner: No   • Emotionally Abused: No   • Physically Abused: No   • Sexually Abused: No   Housing Stability: Not on file      Medications and Allergies:     Current Outpatient Medications   Medication Sig Dispense Refill   • atorvastatin (LIPITOR) 10 mg tablet Take 1 tablet (10 mg total) by mouth daily 90 tablet 3   • Multiple Vitamins-Minerals (MULTIVITAMIN WITH MINERALS) tablet Take 1 tablet by mouth daily     • Probiotic Product (Align) 4 MG CAPS Take 1 capsule by mouth daily     • calcium polycarbophil (FIBERCON) 625 mg tablet Take 625 mg by mouth daily (Patient not taking: No sig reported)     • Calcium-Magnesium-Vitamin D (CITRACAL SLOW RELEASE PO)  (Patient not taking: No sig reported)       No current facility-administered medications for this visit       Allergies   Allergen Reactions   • Imodium [Loperamide] Headache     Felt that peripheral vision was impaired and mild headache   • Pedi-Pre Tape Spray [Wound Dressing Adhesive] Itching and Rash     Adhesive Tape   • Dairy Aid [Lactase] Diarrhea   • Bupropion Rash     Around 2010      Immunizations:     Immunization History   Administered Date(s) Administered   • COVID-19 MODERNA VACC 0 5 ML IM 01/08/2021, 02/04/2021, 08/28/2021   • INFLUENZA 09/15/2018   • Influenza, high dose seasonal 0 7 mL 09/30/2022      Health Maintenance:         Topic Date Due   • Hepatitis C Screening  Never done   • Breast Cancer Screening: Mammogram  03/19/2022         Topic Date Due   • Pneumococcal Vaccine: 65+ Years (1 - PCV) Never done   • COVID-19 Vaccine (4 - Booster for Al Plaster series) 12/28/2021      Medicare Screening Tests and Risk Assessments:         Health Risk Assessment:   Patient rates overall health as very good  Patient feels that their physical health rating is same  Patient is satisfied with their life  Eyesight was rated as same  Hearing was rated as same  Patient feels that their emotional and mental health rating is same  Patients states they are never, rarely angry  Patient states they are never, rarely unusually tired/fatigued  Pain experienced in the last 7 days has been some  Patient's pain rating has been 1/10  Patient states that she has experienced no weight loss or gain in last 6 months  Fall Risk Screening: In the past year, patient has experienced: no history of falling in past year      Urinary Incontinence Screening:   Patient has not leaked urine accidently in the last six months  Home Safety:  Patient does not have trouble with stairs inside or outside of their home  Patient has working smoke alarms and has working carbon monoxide detector  Home safety hazards include: none  Nutrition:   Current diet is Regular  Medications:   Patient is currently taking over-the-counter supplements  OTC medications include: see medication list  Patient is able to manage medications  Activities of Daily Living (ADLs)/Instrumental Activities of Daily Living (IADLs):   Walk and transfer into and out of bed and chair?: Yes  Dress and groom yourself?: Yes    Bathe or shower yourself?: Yes    Feed yourself?  Yes  Manage your money, pay your bills and track your expenses?: Yes  Make your own meals?: Yes    Do your own shopping?: Yes    Previous Hospitalizations:   Any hospitalizations or ED visits within the last 12 months?: No      Advance Care Planning:   Living will: Yes    Durable POA for healthcare: Yes    Advanced directive: Yes    Five wishes given: Yes      PREVENTIVE SCREENINGS      Cardiovascular Screening:    General: Screening Not Indicated and History Lipid Disorder      Breast Cancer Screening:     General: Screening Current      Cervical Cancer Screening:    General: Screening Not Indicated      Lung Cancer Screening:     General: Screening Not Indicated    Screening, Brief Intervention, and Referral to Treatment (SBIRT)    Screening  Typical number of drinks in a day: 0  Typical number of drinks in a week: 0  Interpretation: Low risk drinking behavior      AUDIT-C Screenin) How often did you have a drink containing alcohol in the past year? never  2) How many drinks did you have on a typical day when you were drinking in the past year? 0  3) How often did you have 6 or more drinks on one occasion in the past year? never    AUDIT-C Score: 0  Interpretation: Score 0-2 (female): Negative screen for alcohol misuse    Single Item Drug Screening:  How often have you used an illegal drug (including marijuana) or a prescription medication for non-medical reasons in the past year? never    Single Item Drug Screen Score: 0  Interpretation: Negative screen for possible drug use disorder    No exam data present     Physical Exam:     /78   Pulse 61   Resp 14   Ht 4' 10 25" (1 48 m)   Wt 58 6 kg (129 lb 3 2 oz)   SpO2 96%   BMI 26 77 kg/m²     Physical Exam     Amiel Severin, DO

## 2022-10-14 NOTE — PATIENT INSTRUCTIONS
Medicare Preventive Visit Patient Instructions  Thank you for completing your Welcome to Medicare Visit or Medicare Annual Wellness Visit today  Your next wellness visit will be due in one year (10/15/2023)  The screening/preventive services that you may require over the next 5-10 years are detailed below  Some tests may not apply to you based off risk factors and/or age  Screening tests ordered at today's visit but not completed yet may show as past due  Also, please note that scanned in results may not display below  Preventive Screenings:  Service Recommendations Previous Testing/Comments   Colorectal Cancer Screening  * Colonoscopy    * Fecal Occult Blood Test (FOBT)/Fecal Immunochemical Test (FIT)  * Fecal DNA/Cologuard Test  * Flexible Sigmoidoscopy Age: 39-70 years old   Colonoscopy: every 10 years (may be performed more frequently if at higher risk)  OR  FOBT/FIT: every 1 year  OR  Cologuard: every 3 years  OR  Sigmoidoscopy: every 5 years  Screening may be recommended earlier than age 39 if at higher risk for colorectal cancer  Also, an individualized decision between you and your healthcare provider will decide whether screening between the ages of 74-80 would be appropriate  Colonoscopy: Not on file  FOBT/FIT: Not on file  Cologuard: Not on file  Sigmoidoscopy: Not on file          Breast Cancer Screening Age: 36 years old  Frequency: every 1-2 years  Not required if history of left and right mastectomy Mammogram: 03/19/2021    Screening Current   Cervical Cancer Screening Between the ages of 21-29, pap smear recommended once every 3 years  Between the ages of 33-67, can perform pap smear with HPV co-testing every 5 years     Recommendations may differ for women with a history of total hysterectomy, cervical cancer, or abnormal pap smears in past  Pap Smear: Not on file    Screening Not Indicated   Hepatitis C Screening Once for adults born between 1945 and 1965  More frequently in patients at high risk for Hepatitis C Hep C Antibody: Not on file        Diabetes Screening 1-2 times per year if you're at risk for diabetes or have pre-diabetes Fasting glucose: No results in last 5 years (No results in last 5 years)  A1C: No results in last 5 years (No results in last 5 years)      Cholesterol Screening Once every 5 years if you don't have a lipid disorder  May order more often based on risk factors  Lipid panel: 03/28/2022    Screening Not Indicated  History Lipid Disorder     Other Preventive Screenings Covered by Medicare:  1  Abdominal Aortic Aneurysm (AAA) Screening: covered once if your at risk  You're considered to be at risk if you have a family history of AAA  2  Lung Cancer Screening: covers low dose CT scan once per year if you meet all of the following conditions: (1) Age 50-69; (2) No signs or symptoms of lung cancer; (3) Current smoker or have quit smoking within the last 15 years; (4) You have a tobacco smoking history of at least 20 pack years (packs per day multiplied by number of years you smoked); (5) You get a written order from a healthcare provider  3  Glaucoma Screening: covered annually if you're considered high risk: (1) You have diabetes OR (2) Family history of glaucoma OR (3)  aged 48 and older OR (3)  American aged 72 and older  3  Osteoporosis Screening: covered every 2 years if you meet one of the following conditions: (1) You're estrogen deficient and at risk for osteoporosis based off medical history and other findings; (2) Have a vertebral abnormality; (3) On glucocorticoid therapy for more than 3 months; (4) Have primary hyperparathyroidism; (5) On osteoporosis medications and need to assess response to drug therapy  · Last bone density test (DXA Scan): 04/05/2021   5  HIV Screening: covered annually if you're between the age of 15-65  Also covered annually if you are younger than 13 and older than 72 with risk factors for HIV infection   For pregnant patients, it is covered up to 3 times per pregnancy  Immunizations:  Immunization Recommendations   Influenza Vaccine Annual influenza vaccination during flu season is recommended for all persons aged >= 6 months who do not have contraindications   Pneumococcal Vaccine   * Pneumococcal conjugate vaccine = PCV13 (Prevnar 13), PCV15 (Vaxneuvance), PCV20 (Prevnar 20)  * Pneumococcal polysaccharide vaccine = PPSV23 (Pneumovax) Adults 25-60 years old: 1-3 doses may be recommended based on certain risk factors  Adults 72 years old: 1-2 doses may be recommended based off what pneumonia vaccine you previously received   Hepatitis B Vaccine 3 dose series if at intermediate or high risk (ex: diabetes, end stage renal disease, liver disease)   Tetanus (Td) Vaccine - COST NOT COVERED BY MEDICARE PART B Following completion of primary series, a booster dose should be given every 10 years to maintain immunity against tetanus  Td may also be given as tetanus wound prophylaxis  Tdap Vaccine - COST NOT COVERED BY MEDICARE PART B Recommended at least once for all adults  For pregnant patients, recommended with each pregnancy  Shingles Vaccine (Shingrix) - COST NOT COVERED BY MEDICARE PART B  2 shot series recommended in those aged 48 and above     Health Maintenance Due:      Topic Date Due   • Hepatitis C Screening  Never done   • Breast Cancer Screening: Mammogram  03/19/2022     Immunizations Due:      Topic Date Due   • Pneumococcal Vaccine: 65+ Years (1 - PCV) Never done   • COVID-19 Vaccine (4 - Booster for Moderna series) 12/28/2021   • Influenza Vaccine (1) 09/01/2022     Advance Directives   What are advance directives? Advance directives are legal documents that state your wishes and plans for medical care  These plans are made ahead of time in case you lose your ability to make decisions for yourself   Advance directives can apply to any medical decision, such as the treatments you want, and if you want to donate organs  What are the types of advance directives? There are many types of advance directives, and each state has rules about how to use them  You may choose a combination of any of the following:  · Living will: This is a written record of the treatment you want  You can also choose which treatments you do not want, which to limit, and which to stop at a certain time  This includes surgery, medicine, IV fluid, and tube feedings  · Durable power of  for healthcare [facility]): This is a written record that states who you want to make healthcare choices for you when you are unable to make them for yourself  This person, called a proxy, is usually a family member or a friend  You may choose more than 1 proxy  · Do not resuscitate (DNR) order:  A DNR order is used in case your heart stops beating or you stop breathing  It is a request not to have certain forms of treatment, such as CPR  A DNR order may be included in other types of advance directives  · Medical directive: This covers the care that you want if you are in a coma, near death, or unable to make decisions for yourself  You can list the treatments you want for each condition  Treatment may include pain medicine, surgery, blood transfusions, dialysis, IV or tube feedings, and a ventilator (breathing machine)  · Values history: This document has questions about your views, beliefs, and how you feel and think about life  This information can help others choose the care that you would choose  Why are advance directives important? An advance directive helps you control your care  Although spoken wishes may be used, it is better to have your wishes written down  Spoken wishes can be misunderstood, or not followed  Treatments may be given even if you do not want them  An advance directive may make it easier for your family to make difficult choices about your care     Weight Management   Why it is important to manage your weight:  Being overweight increases your risk of health conditions such as heart disease, high blood pressure, type 2 diabetes, and certain types of cancer  It can also increase your risk for osteoarthritis, sleep apnea, and other respiratory problems  Aim for a slow, steady weight loss  Even a small amount of weight loss can lower your risk of health problems  How to lose weight safely:  A safe and healthy way to lose weight is to eat fewer calories and get regular exercise  You can lose up about 1 pound a week by decreasing the number of calories you eat by 500 calories each day  Healthy meal plan for weight management:  A healthy meal plan includes a variety of foods, contains fewer calories, and helps you stay healthy  A healthy meal plan includes the following:  · Eat whole-grain foods more often  A healthy meal plan should contain fiber  Fiber is the part of grains, fruits, and vegetables that is not broken down by your body  Whole-grain foods are healthy and provide extra fiber in your diet  Some examples of whole-grain foods are whole-wheat breads and pastas, oatmeal, brown rice, and bulgur  · Eat a variety of vegetables every day  Include dark, leafy greens such as spinach, kale, nava greens, and mustard greens  Eat yellow and orange vegetables such as carrots, sweet potatoes, and winter squash  · Eat a variety of fruits every day  Choose fresh or canned fruit (canned in its own juice or light syrup) instead of juice  Fruit juice has very little or no fiber  · Eat low-fat dairy foods  Drink fat-free (skim) milk or 1% milk  Eat fat-free yogurt and low-fat cottage cheese  Try low-fat cheeses such as mozzarella and other reduced-fat cheeses  · Choose meat and other protein foods that are low in fat  Choose beans or other legumes such as split peas or lentils  Choose fish, skinless poultry (chicken or turkey), or lean cuts of red meat (beef or pork)  Before you cook meat or poultry, cut off any visible fat     · Use less fat and oil  Try baking foods instead of frying them  Add less fat, such as margarine, sour cream, regular salad dressing and mayonnaise to foods  Eat fewer high-fat foods  Some examples of high-fat foods include french fries, doughnuts, ice cream, and cakes  · Eat fewer sweets  Limit foods and drinks that are high in sugar  This includes candy, cookies, regular soda, and sweetened drinks  Exercise:  Exercise at least 30 minutes per day on most days of the week  Some examples of exercise include walking, biking, dancing, and swimming  You can also fit in more physical activity by taking the stairs instead of the elevator or parking farther away from stores  Ask your healthcare provider about the best exercise plan for you  © Copyright [employer] 2018 Information is for End User's use only and may not be sold, redistributed or otherwise used for commercial purposes   All illustrations and images included in CareNotes® are the copyrighted property of [employer]  or [address] [place] [address]

## 2022-10-14 NOTE — PROGRESS NOTES
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 and seeing ophthalmology Dr Shin Munoz    #IBS  -has diarrhea with vegetables cooked or raw  -remains on fiber supplementation  -will add on probiotics to see if there is improvement  -colonoscopy pending    #Osteopenia  -noted on DEXA 2021  -will continue with vitamin D and weight lifting exercises    #Family History  -father and grandfather with early MI  -will obtain vas screen and ECHO  -daughters with melanoma and seeing Dr Jairo Diane for skin exam     #Urinary Incontinence  -underwent transvaginal sling    #Health Maintenance  -routine labs and followup 6 months  -mammogram up to date 2022 at [facility]  -covid bivalent and flu vaccine up to date 2022  -retired   -colonsocopy due  -PNA vaccine up to date at age 72 pre patient, used to live in [place] before moving here to be closer to daughter  -seeing gynecology Dr [facility]    No problem-specific [address] [place] [address] notes found for this encounter  Diagnoses and all orders for this visit:    Hyperglycemia  -     CBC and differential  -     Comprehensive metabolic panel  -     Hemoglobin A1C    Mixed hyperlipidemia  -     atorvastatin (LIPITOR) 10 mg tablet;  Take 1 tablet (10 mg total) by mouth daily  -     CBC and differential  -     Comprehensive metabolic panel  -     Lipid Panel with Direct LDL reflex    Screening for thyroid disorder  -     CBC and differential  -     Comprehensive metabolic panel  -     TSH, 3rd generation with Free T4 reflex    Vitamin D deficiency  -     CBC and differential  -     Comprehensive metabolic panel  -     Vitamin D 25 hydroxy    Screening for colon cancer  -     Ambulatory referral for colonoscopy; Future    Family history of early CAD  -     VAS screening; Future  -     Echo complete w/ contrast if indicated; Future    Other orders  -     Probiotic Product (Align) 4 MG CAPS; Take 1 capsule by mouth daily            Current Outpatient Medications:   •  atorvastatin (LIPITOR) 10 mg tablet, Take 1 tablet (10 mg total) by mouth daily, Disp: 90 tablet, Rfl: 3  •  Multiple Vitamins-Minerals (MULTIVITAMIN WITH MINERALS) tablet, Take 1 tablet by mouth daily, Disp: , Rfl:   •  Probiotic Product (Align) 4 MG CAPS, Take 1 capsule by mouth daily, Disp: , Rfl:   •  calcium polycarbophil (FIBERCON) 625 mg tablet, Take 625 mg by mouth daily (Patient not taking: No sig reported), Disp: , Rfl:   •  Calcium-Magnesium-Vitamin D (CITRACAL SLOW RELEASE PO), , Disp: , Rfl:     Subjective:      Patient ID: Enedelia Woodard is a 68 y o  female  HPI     Patient presents for new patient visit  Reports that she had COVID back in August and was treated with Paxlovid  Her symptoms have resolved  She has hyperlipidemia and we refilled her atorvastatin  She is due for routine labs and we will wait this  She is also due for colonoscopy  We will obtain a coronary artery disease screening with a vascular screening as well as an echo  She does have a family history of coronary artery disease and her father who  early  She denies any dyspnea on exertion or any chest pressure or pains  She reports that she has intermittent bouts of diarrhea with cooked and wear vegetables  We will recommend that she trial the probiotics to see if this will help  Patient continues to follow-up with Ophthalmology on a yearly basis    She reports that her left upper quadrant of the eye has optic nerve issues and 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  She states that she depends on her right eye for eyesight  She is up-to-date on her flu vaccine as well as her pneumonia vaccine  She has been work with weight management to lose weight  Her goal I do weight is to get down to 125 lb  She had her surgeries include total hysterectomy for endometrial bleeding  Two knee replacements in 2010  Achilles repair due to calcification in pain in the right Achilles  Cataract surgery in 2020 and transvaginal sling in 2018  She has allergies to Wellbutrin  She denies any smoking or drug use  She drinks alcohol socially  Family history is positive for daughter's with melanoma and aunts with ovarian cancer and breast cancer and pancreatic cancer and grandmother with breast cancer  Father and grandfather both had an MI and hypertension  Patient will return to care in 6 months    The following portions of the patient's history were reviewed and updated as appropriate: allergies, current medications, past family history, past medical history, past social history, past surgical history and problem list     Review of Systems   Constitutional: Negative for activity change, appetite change, chills, fatigue and fever  HENT: Negative for congestion, ear pain, facial swelling, hearing loss, sore throat, tinnitus and trouble swallowing  Eyes: Negative for photophobia and visual disturbance  Respiratory: Negative for cough, shortness of breath and wheezing  Cardiovascular: Negative for chest pain, palpitations and leg swelling  Gastrointestinal: Positive for diarrhea (with vegetables)  Negative for abdominal distention, abdominal pain, blood in stool, constipation, nausea and vomiting  Genitourinary: Negative for difficulty urinating, dysuria and pelvic pain     Musculoskeletal: Negative for arthralgias, back pain, gait problem, joint swelling, myalgias, neck pain and neck stiffness  Skin: Negative for rash and wound  Neurological: Negative for dizziness, tremors, light-headedness and headaches  Objective:      /78   Pulse 61   Resp 14   Ht 4' 10 25" (1 48 m)   Wt 58 6 kg (129 lb 3 2 oz)   SpO2 96%   BMI 26 77 kg/m²          Physical Exam  Vitals reviewed  Constitutional:       Appearance: Normal appearance  She is well-developed  HENT:      Head: Normocephalic and atraumatic  Right Ear: Tympanic membrane, ear canal and external ear normal  There is no impacted cerumen  Left Ear: Tympanic membrane, ear canal and external ear normal  There is no impacted cerumen  Nose: Nose normal  No congestion or rhinorrhea  Mouth/Throat:      Pharynx: Oropharynx is clear  No oropharyngeal exudate or posterior oropharyngeal erythema  Eyes:      Conjunctiva/sclera: Conjunctivae normal       Pupils: Pupils are equal, round, and reactive to light  Neck:      Thyroid: No thyromegaly  Vascular: No JVD  Cardiovascular:      Rate and Rhythm: Normal rate and regular rhythm  Pulses: Normal pulses  Heart sounds: Normal heart sounds  No murmur heard  Pulmonary:      Effort: Pulmonary effort is normal  No respiratory distress  Breath sounds: Normal breath sounds  No stridor  No wheezing, rhonchi or rales  Abdominal:      General: Bowel sounds are normal  There is no distension  Palpations: Abdomen is soft  There is no mass  Tenderness: There is no abdominal tenderness  There is no right CVA tenderness, left CVA tenderness, guarding or rebound  Musculoskeletal:         General: Deformity (right achilles scarring, b/l knee replacements) present  No tenderness  Normal range of motion  Cervical back: Normal range of motion and neck supple  Right lower leg: No edema  Left lower leg: No edema  Lymphadenopathy:      Cervical: No cervical adenopathy  Skin:     General: Skin is warm and dry        Findings: No erythema or rash  Neurological:      Mental Status: She is alert and oriented to person, place, and time  Mental status is at baseline  Motor: No weakness  Gait: Gait normal       Deep Tendon Reflexes: Reflexes are normal and symmetric  Psychiatric:         Mood and Affect: Mood normal          Behavior: Behavior normal          Thought Content: Thought content normal          Judgment: Judgment normal            This note was completed in part utilizing [employer] direct voice recognition software  Grammatical errors, random word insertion, spelling mistakes, and incomplete sentences may be an occasional consequence of the system secondary to software limitations, ambient noise and hardware issues  At the time of dictation, efforts were made to edit, clarify and /or correct errors  Please read the chart carefully and recognize, using context, where substitutions have occurred  If you have any questions or concerns about the context, text or information contained within the body of this dictation, please contact myself, the provider, for further clarification

## 2022-10-17 ENCOUNTER — APPOINTMENT (OUTPATIENT)
Dept: LAB | Facility: CLINIC | Age: 77
End: 2022-10-17
Payer: MEDICARE

## 2022-10-17 DIAGNOSIS — E78.2 MIXED HYPERLIPIDEMIA: Primary | ICD-10-CM

## 2022-10-17 LAB
25(OH)D3 SERPL-MCNC: 70.7 NG/ML (ref 30–100)
ALBUMIN SERPL BCP-MCNC: 3.4 G/DL (ref 3.5–5)
ALP SERPL-CCNC: 35 U/L (ref 46–116)
ALT SERPL W P-5'-P-CCNC: 77 U/L (ref 12–78)
ANION GAP SERPL CALCULATED.3IONS-SCNC: 8 MMOL/L (ref 4–13)
AST SERPL W P-5'-P-CCNC: 38 U/L (ref 5–45)
BASOPHILS # BLD AUTO: 0.03 THOUSANDS/ΜL (ref 0–0.1)
BASOPHILS NFR BLD AUTO: 1 % (ref 0–1)
BILIRUB SERPL-MCNC: 0.5 MG/DL (ref 0.2–1)
BUN SERPL-MCNC: 18 MG/DL (ref 5–25)
CALCIUM ALBUM COR SERPL-MCNC: 10 MG/DL (ref 8.3–10.1)
CALCIUM SERPL-MCNC: 9.5 MG/DL (ref 8.3–10.1)
CHLORIDE SERPL-SCNC: 111 MMOL/L (ref 96–108)
CHOLEST SERPL-MCNC: 150 MG/DL
CO2 SERPL-SCNC: 24 MMOL/L (ref 21–32)
CREAT SERPL-MCNC: 0.66 MG/DL (ref 0.6–1.3)
EOSINOPHIL # BLD AUTO: 0.14 THOUSAND/ΜL (ref 0–0.61)
EOSINOPHIL NFR BLD AUTO: 2 % (ref 0–6)
ERYTHROCYTE [DISTWIDTH] IN BLOOD BY AUTOMATED COUNT: 15.2 % (ref 11.6–15.1)
EST. AVERAGE GLUCOSE BLD GHB EST-MCNC: 100 MG/DL
GFR SERPL CREATININE-BSD FRML MDRD: 86 ML/MIN/1.73SQ M
GLUCOSE P FAST SERPL-MCNC: 97 MG/DL (ref 65–99)
HBA1C MFR BLD: 5.1 %
HCT VFR BLD AUTO: 44.1 % (ref 34.8–46.1)
HDLC SERPL-MCNC: 68 MG/DL
HGB BLD-MCNC: 14.5 G/DL (ref 11.5–15.4)
IMM GRANULOCYTES # BLD AUTO: 0.02 THOUSAND/UL (ref 0–0.2)
IMM GRANULOCYTES NFR BLD AUTO: 0 % (ref 0–2)
LDLC SERPL CALC-MCNC: 63 MG/DL (ref 0–100)
LYMPHOCYTES # BLD AUTO: 1.57 THOUSANDS/ΜL (ref 0.6–4.47)
LYMPHOCYTES NFR BLD AUTO: 27 % (ref 14–44)
MCH RBC QN AUTO: 30.8 PG (ref 26.8–34.3)
MCHC RBC AUTO-ENTMCNC: 32.9 G/DL (ref 31.4–37.4)
MCV RBC AUTO: 94 FL (ref 82–98)
MONOCYTES # BLD AUTO: 0.32 THOUSAND/ΜL (ref 0.17–1.22)
MONOCYTES NFR BLD AUTO: 6 % (ref 4–12)
NEUTROPHILS # BLD AUTO: 3.73 THOUSANDS/ΜL (ref 1.85–7.62)
NEUTS SEG NFR BLD AUTO: 64 % (ref 43–75)
NRBC BLD AUTO-RTO: 0 /100 WBCS
PLATELET # BLD AUTO: 323 THOUSANDS/UL (ref 149–390)
PMV BLD AUTO: 10.9 FL (ref 8.9–12.7)
POTASSIUM SERPL-SCNC: 4 MMOL/L (ref 3.5–5.3)
PROT SERPL-MCNC: 7.1 G/DL (ref 6.4–8.4)
RBC # BLD AUTO: 4.71 MILLION/UL (ref 3.81–5.12)
SODIUM SERPL-SCNC: 143 MMOL/L (ref 135–147)
TRIGL SERPL-MCNC: 97 MG/DL
TSH SERPL DL<=0.05 MIU/L-ACNC: 4.26 UIU/ML (ref 0.45–4.5)
WBC # BLD AUTO: 5.81 THOUSAND/UL (ref 4.31–10.16)

## 2022-10-17 PROCEDURE — 82306 VITAMIN D 25 HYDROXY: CPT | Performed by: INTERNAL MEDICINE

## 2022-10-17 PROCEDURE — 85025 COMPLETE CBC W/AUTO DIFF WBC: CPT | Performed by: INTERNAL MEDICINE

## 2022-10-17 PROCEDURE — 83036 HEMOGLOBIN GLYCOSYLATED A1C: CPT | Performed by: INTERNAL MEDICINE

## 2022-10-17 PROCEDURE — 36415 COLL VENOUS BLD VENIPUNCTURE: CPT | Performed by: INTERNAL MEDICINE

## 2022-10-17 PROCEDURE — 80053 COMPREHEN METABOLIC PANEL: CPT | Performed by: INTERNAL MEDICINE

## 2022-10-17 PROCEDURE — 84443 ASSAY THYROID STIM HORMONE: CPT | Performed by: INTERNAL MEDICINE

## 2022-10-17 PROCEDURE — 80061 LIPID PANEL: CPT | Performed by: INTERNAL MEDICINE

## 2022-10-20 ENCOUNTER — CLINICAL SUPPORT (OUTPATIENT)
Dept: BARIATRICS | Facility: CLINIC | Age: 77
End: 2022-10-20

## 2022-10-20 ENCOUNTER — TELEPHONE (OUTPATIENT)
Dept: BARIATRICS | Facility: CLINIC | Age: 77
End: 2022-10-20

## 2022-10-20 VITALS — HEIGHT: 58 IN | BODY MASS INDEX: 27.16 KG/M2 | WEIGHT: 129.4 LBS

## 2022-10-20 DIAGNOSIS — R63.5 ABNORMAL WEIGHT GAIN: Primary | ICD-10-CM

## 2022-10-20 PROCEDURE — RECHECK

## 2022-10-20 NOTE — TELEPHONE ENCOUNTER
----- Message from Lorie Rich 28 sent at 10/19/2022  5:51 PM EDT -----  Please let the patient know I have reviewed her TSH and it was within normal limits   ThanksRachel

## 2022-10-27 ENCOUNTER — CLINICAL SUPPORT (OUTPATIENT)
Dept: BARIATRICS | Facility: CLINIC | Age: 77
End: 2022-10-27

## 2022-10-27 VITALS — BODY MASS INDEX: 26.62 KG/M2 | HEIGHT: 58 IN | WEIGHT: 126.8 LBS

## 2022-10-27 DIAGNOSIS — R63.5 ABNORMAL WEIGHT GAIN: Primary | ICD-10-CM

## 2022-10-27 PROCEDURE — RECHECK

## 2022-10-31 ENCOUNTER — HOSPITAL ENCOUNTER (OUTPATIENT)
Dept: NON INVASIVE DIAGNOSTICS | Facility: HOSPITAL | Age: 77
Discharge: HOME | End: 2022-10-31

## 2022-10-31 VITALS
HEART RATE: 50 BPM | WEIGHT: 126 LBS | HEIGHT: 58 IN | DIASTOLIC BLOOD PRESSURE: 78 MMHG | BODY MASS INDEX: 26.45 KG/M2 | SYSTOLIC BLOOD PRESSURE: 116 MMHG

## 2022-10-31 DIAGNOSIS — Z82.49 FAMILY HISTORY OF EARLY CAD: ICD-10-CM

## 2022-10-31 LAB
AORTIC ROOT: 2.8 CM
AORTIC VALVE MEAN VELOCITY: 10.3 M/S
APICAL FOUR CHAMBER EJECTION FRACTION: 66 %
ASCENDING AORTA: 2.9 CM
AV LVOT MEAN GRADIENT: 2 MMHG
AV LVOT PEAK GRADIENT: 3 MMHG
AV MEAN GRADIENT: 5 MMHG
AV PEAK GRADIENT: 8 MMHG
DOP CALC AO VTI: 38.4 CM
DOP CALC LVOT PEAK VEL VTI: 20.5 CM
DOP CALC LVOT PEAK VEL: 0.82 M/S
DOP CALC MV VTI: 27.3 CM
E WAVE DECELERATION TIME: 213 MS
FRACTIONAL SHORTENING: 35 % (ref 28–44)
GLOBAL LONGITUIDAL STRAIN: -21 %
INTERVENTRICULAR SEPTUM IN DIASTOLE (PARASTERNAL SHORT AXIS VIEW): 0.8 CM
INTERVENTRICULAR SEPTUM: 0.8 CM (ref 0.6–1.1)
IVC: 29 MM
LA/AORTA RATIO 2D: 1.54
LAAS-AP2: 20 CM2
LAAS-AP4: 17.3 CM2
LEFT ATRIUM SIZE: 4.3 CM
LEFT INTERNAL DIMENSION IN SYSTOLE: 3 CM (ref 2.1–4)
LEFT VENTRICLE DIASTOLIC VOLUME (MOD BIPLANE): 92 ML
LEFT VENTRICLE SYSTOLIC VOLUME (MOD BIPLANE): 33 ML
LEFT VENTRICULAR INTERNAL DIMENSION IN DIASTOLE: 4.6 CM (ref 3.5–6)
LEFT VENTRICULAR POSTERIOR WALL IN END DIASTOLE: 0.7 CM
LEFT VENTRICULAR STROKE VOLUME: 66 ML
LV EF: 64 %
LVSV (TEICH): 66 ML
MV E'TISSUE VEL-LAT: 8 CM/S
MV E'TISSUE VEL-SEP: 8 CM/S
MV MEAN GRADIENT: 1 MMHG
MV PEAK A VEL: 0.61 M/S
MV PEAK E VEL: 67 CM/S
MV PEAK GRADIENT: 2 MMHG
MV STENOSIS PRESSURE HALF TIME: 62 MS
MV VALVE AREA P 1/2 METHOD: 3.55 CM2
RA PRESSURE ESTIMATED: 8 MMHG
RIGHT VENTRICLE ID DIMENSION: 3.8 CM
RV PSP: 28 MMHG
SL CV LV EF: 60
SL CV PED ECHO LEFT VENTRICLE DIASTOLIC VOLUME (MOD BIPLANE) 2D: 100 ML
SL CV PED ECHO LEFT VENTRICLE SYSTOLIC VOLUME (MOD BIPLANE) 2D: 34 ML
TR MAX PG: 20 MMHG
TR PEAK VELOCITY: 2.2 M/S
TRICUSPID ANNULAR PLANE SYSTOLIC EXCURSION: 2.4 CM
TRICUSPID VALVE PEAK REGURGITATION VELOCITY: 2.22 M/S

## 2022-11-03 ENCOUNTER — CLINICAL SUPPORT (OUTPATIENT)
Dept: BARIATRICS | Facility: CLINIC | Age: 77
End: 2022-11-03

## 2022-11-03 VITALS — BODY MASS INDEX: 26.24 KG/M2 | WEIGHT: 125 LBS | HEIGHT: 58 IN

## 2022-11-03 DIAGNOSIS — R63.5 ABNORMAL WEIGHT GAIN: Primary | ICD-10-CM

## 2022-11-04 ENCOUNTER — HOSPITAL ENCOUNTER (OUTPATIENT)
Dept: NON INVASIVE DIAGNOSTICS | Facility: HOSPITAL | Age: 77
Discharge: HOME | End: 2022-11-04

## 2022-11-04 DIAGNOSIS — Z82.49 FAMILY HISTORY OF EARLY CAD: ICD-10-CM

## 2022-11-07 NOTE — PROGRESS NOTES
Weight Management Medical Nutrition Assessment  Ravindra is here for f/u RD session for [facility]  Current wt:   125 3  lbs  She has lost   52 4    lbs x just under 11 months  Seca completed, results reviewed  In the last 4 months she has lost 12 4 lbs with Seca showing 4 of this to be muscle (33%)  ? Accuracy as machine did give an error message  She has been strength training 2x/wk  At this time I did recommend weight maintenance as BMI WNL for age  She has been consuming 1000 calories/day  Discussed increasing to maintenance level of 0250-3049 as per [place]  She was in agreement of this and will add 1/2 cup carb with her dinner meal  Will recheck body composition at next visit as well  She will continue to f/u with [facility] [employer]        Patient seen by Medical Provider in past 6 months:  yes  Requested to schedule appointment with Medical Provider: No    Anthropometric Measurements  Start Weight (#): 177 7 lb 12/28/21  Current Weight (#):  125 3 lbs  TBW % Change from start weight: 29 4%  Ideal Body Weight (#): 119 1 lbs BMI 25 (58")  Goal Weight (#):  lbs   Highest: 183 lbs  Lowest: 97 lbs mid 20's    Weight Loss History  Previous weight loss attempts: Commercial Programs ([employer], Jose Walker, etc )  Exercise  Self Created Diets (Portion Control, Healthy Food Choices, etc )    Food and Nutrition Related History  Wake up:  6:00   Bed Time: 10    Food Recall  Breakfast: 8:00: decaf coffee w/2 tbsp h/h, protein bar OR kodiak waffles 2, SF syrup OR hannah vineet egg s/w  Snack: quest chips   Lunch: 12:00:   2 5-3 oz deli turkey or chicken or tuna or egg, mission low carb or flatout wrap, light batres  OR plain greek yogurt, fruit   Snack: 4:00: Decaf coffee w/2 tbsp h/h, fruit, OR bar  Dinner: 5:00:~4 oz chicken, 1 1/2 cup veggies, sometimes carb OR  healthy choice meal  Snack:  Skip     Beverages: water, decaf coffee/tea, diet soda and alcohol (rare)  Volume of beverage intake: 32 oz water, 1 diet soda, 1-2 cup decaf    Weekends: Same  Cravings:  carbs   Trouble area of day:midmorning & midafternoon     Frequency of Eating out: 1x every other week  Food restrictions: lactose? Cooking: self   Food Shopping: self    Physical Activity Intake  Activity:Walking, cardio fitness class, matt, strength training, walking  Frequency:2x/wk gym strength, walking daily  Physical limitations/barriers to exercise:  n/a    Estimated Needs  Energy   Roberto 1898 Energy Needs:  BMR : 947   Maintenance sedentary: 1137          Maintenance lightly active: 1302  Maintenance calories for sedentary activity level: 1137  Protein: 55-68 gm      (1 2-1 5g/kg IBW)  Fluid: 53 oz     (35mL/kg IBW)    Nutrition Diagnosis  No, resolved    Nutrition Intervention    Nutrition Prescription  Calories:0444-1732  Protein: 55-70 gm    Meal Plan (Stewart/Pro)  Breakfast: 250-350, 15   Snack:  150  Lunch: 300, 15-20  Snack:  150, 5-10  Dinner: 300-350, 20-25  Snack: skip     Nutrition Education:    Healthy Core Manual  Calorie controlled menu  Lean protein food choices  Healthy snack options  Food journaling tips      Nutrition Counseling:  Strategies: meal planning, portion sizes, healthy snack choices, hydration, fiber intake, protein intake, exercise, food journal      Monitoring and Evaluation:  Evaluation criteria:  Energy Intake  Meet protein needs  Maintain adequate hydration  Monitor weekly weight  Meal planning/preparation  Food journal   Decreased portions at mealtimes and snacks  Physical activity     Barriers to learning:none  Readiness to change: Maintenance: (Maintaining the behavior change)  Comprehension: very good  Expected Compliance: very good

## 2022-11-14 ENCOUNTER — OFFICE VISIT (OUTPATIENT)
Dept: BARIATRICS | Facility: CLINIC | Age: 77
End: 2022-11-14

## 2022-11-14 VITALS — HEIGHT: 58 IN | BODY MASS INDEX: 26.31 KG/M2 | WEIGHT: 125.33 LBS

## 2022-11-14 DIAGNOSIS — R63.5 ABNORMAL WEIGHT GAIN: Primary | ICD-10-CM

## 2022-11-17 ENCOUNTER — CLINICAL SUPPORT (OUTPATIENT)
Dept: BARIATRICS | Facility: CLINIC | Age: 77
End: 2022-11-17

## 2022-11-17 VITALS — BODY MASS INDEX: 26.28 KG/M2 | WEIGHT: 125.2 LBS | HEIGHT: 58 IN

## 2022-11-17 DIAGNOSIS — R63.5 ABNORMAL WEIGHT GAIN: Primary | ICD-10-CM

## 2022-12-01 ENCOUNTER — CLINICAL SUPPORT (OUTPATIENT)
Dept: BARIATRICS | Facility: CLINIC | Age: 77
End: 2022-12-01

## 2022-12-01 VITALS — BODY MASS INDEX: 25.86 KG/M2 | WEIGHT: 123.2 LBS | HEIGHT: 58 IN

## 2022-12-01 DIAGNOSIS — R63.5 ABNORMAL WEIGHT GAIN: Primary | ICD-10-CM

## 2022-12-05 NOTE — PROGRESS NOTES
Weight Management Medical Nutrition Assessment  Jignesh Martin is here for f/u RD session for [facility] [employer]  Current wt:  123 2   lbs  She has lost  54 5   lbs x 11 1/2 months  Has increased calories to 1200 per day  Discussed increasing to 1300 on exercise days  Suggestions made for where to add calories  She has been having some GI upset which she believes is from the sugar alcohol in the bars  Recommend she see if she better tolerates them every other day instead of daily  Upcoming holiday discussed  She will continue to f/u with [facility] [employer] [facility]        Patient seen by Medical Provider in past 6 months:  yes  Requested to schedule appointment with Medical Provider: No    Anthropometric Measurements  Start Weight (#): 177 7 lb 12/28/21  Current Weight (#):  123 2 lbs  TBW % Change from start weight: 30 6%  Ideal Body Weight (#): 119 1 lbs BMI 25 (58")  Goal Weight (#):  lbs  GOAL MET  Highest: 183 lbs  Lowest: 97 lbs mid 20's    Weight Loss History  Previous weight loss attempts: Commercial Programs ([employer], Laurel Cohen, etc )  Exercise  Self Created Diets (Portion Control, Healthy Food Choices, etc )    Food and Nutrition Related History  Wake up:  6:00   Bed Time: 10    Food Recall  Breakfast: 8:00: decaf coffee w/2 tbsp h/h, protein bar OR kodiak waffles 2, SF syrup OR hannah vineet egg s/w OR steel cut oats, milk  Snack: quest chips   Lunch: 12:00:   2 5-3 oz deli turkey or chicken or tuna or egg, mission low carb or flatout wrap, light batres    Snack: 4:00: Decaf coffee w/2 tbsp h/h, fruit OR bar OR SF pudding OR hot chocolate w/milk  Dinner: 5:00:~4 oz chicken, 1 1/2 cup veggies, sometimes carb OR  healthy choice meal  Snack:  Skip     Beverages: water, decaf coffee/tea, diet soda and alcohol (rare)  Volume of beverage intake: 32 oz water, 1 diet soda, 1-2 cup decaf    Weekends: Same  Cravings:  carbs   Trouble area of day:midmorning & midafternoon     Frequency of Eating out: 1x every other week  Food restrictions: lactose? Cooking: self   Food Shopping: self    Physical Activity Intake  Activity:Walking, cardio fitness class, matt, strength training, walking  Frequency:2x/wk gym strength, walking daily  Physical limitations/barriers to exercise:  n/a    Estimated Needs  Energy   [employer] Energy Needs:  BMR : 938   Maintenance sedentary: 1125          Maintenance lightly active: 1289  Protein: 55-68 gm      (1 2-1 5g/kg IBW)  Fluid: 53 oz     (35mL/kg IBW)    Nutrition Diagnosis  No, resolved    Nutrition Intervention    Nutrition Prescription  Calories:5583-5676  Protein: 55-70 gm    Meal Plan (Stewart/Pro)  Breakfast: 250-350, 15   Snack:  150  Lunch: 300, 15-20  Snack:  150, 5-10  Dinner: 300-350, 20-25  Snack: skip     Nutrition Education:    Healthy Core Manual  Calorie controlled menu  Lean protein food choices  Healthy snack options  Food journaling tips      Nutrition Counseling:  Strategies: meal planning, portion sizes, healthy snack choices, hydration, fiber intake, protein intake, exercise, food journal      Monitoring and Evaluation:  Evaluation criteria:  Energy Intake  Meet protein needs  Maintain adequate hydration  Monitor weekly weight  Meal planning/preparation  Food journal   Decreased portions at mealtimes and snacks  Physical activity     Barriers to learning:none  Readiness to change: Maintenance: (Maintaining the behavior change)  Comprehension: very good  Expected Compliance: very good

## 2022-12-08 ENCOUNTER — CLINICAL SUPPORT (OUTPATIENT)
Dept: BARIATRICS | Facility: CLINIC | Age: 77
End: 2022-12-08

## 2022-12-08 VITALS — WEIGHT: 124.6 LBS | HEIGHT: 58 IN | BODY MASS INDEX: 26.16 KG/M2

## 2022-12-08 DIAGNOSIS — R63.5 ABNORMAL WEIGHT GAIN: Primary | ICD-10-CM

## 2022-12-12 ENCOUNTER — OFFICE VISIT (OUTPATIENT)
Dept: BARIATRICS | Facility: CLINIC | Age: 77
End: 2022-12-12

## 2022-12-12 VITALS — BODY MASS INDEX: 25.86 KG/M2 | HEIGHT: 58 IN | WEIGHT: 123.2 LBS

## 2022-12-12 DIAGNOSIS — R63.5 ABNORMAL WEIGHT GAIN: Primary | ICD-10-CM

## 2022-12-13 ENCOUNTER — OFFICE VISIT (OUTPATIENT)
Dept: URGENT CARE | Facility: CLINIC | Age: 77
End: 2022-12-13

## 2022-12-13 VITALS
HEART RATE: 91 BPM | HEIGHT: 58 IN | WEIGHT: 126 LBS | TEMPERATURE: 98.4 F | BODY MASS INDEX: 26.45 KG/M2 | RESPIRATION RATE: 17 BRPM | OXYGEN SATURATION: 96 % | DIASTOLIC BLOOD PRESSURE: 64 MMHG | SYSTOLIC BLOOD PRESSURE: 134 MMHG

## 2022-12-13 DIAGNOSIS — H69.83 DYSFUNCTION OF BOTH EUSTACHIAN TUBES: Primary | ICD-10-CM

## 2022-12-13 NOTE — PROGRESS NOTES
[facility]        NAME: Angely Dominguez is a 68 y o  female  : 1945    MRN: [id number]  DATE: 2022  TIME: 2:51 PM    Assessment and Plan   Dysfunction of both eustachian tubes [H69 83]  1  Dysfunction of both eustachian tubes              Patient Instructions       Follow up with PCP in 3-5 days  Proceed to  ER if symptoms worsen  Chief Complaint     Chief Complaint   Patient presents with   • Earache     PT presents with ear pain and pressure bilaterally, with left ear worse than right x 3 weeks  No other symptoms at this time  History of Present Illness       Both ears feel congested and clogged  No fever  No recent head cold  Earache         Review of Systems   Review of Systems   HENT: Positive for ear pain            Current Medications       Current Outpatient Medications:   •  atorvastatin (LIPITOR) 10 mg tablet, Take 1 tablet (10 mg total) by mouth daily, Disp: 90 tablet, Rfl: 3  •  calcium polycarbophil (FIBERCON) 625 mg tablet, Take 625 mg by mouth daily, Disp: , Rfl:   •  Calcium-Magnesium-Vitamin D (CITRACAL SLOW RELEASE PO), , Disp: , Rfl:   •  Multiple Vitamins-Minerals (MULTIVITAMIN WITH MINERALS) tablet, Take 1 tablet by mouth daily, Disp: , Rfl:   •  Probiotic Product (Align) 4 MG CAPS, Take 1 capsule by mouth daily, Disp: , Rfl:     Current Allergies     Allergies as of 2022 - Reviewed 2022   Allergen Reaction Noted   • Imodium [loperamide] Headache 2022   • Pedi-pre tape spray [wound dressing adhesive] Itching and Rash 2022   • Dairy aid [tilactase] Diarrhea 2022   • Bupropion Rash 12/10/2017            The following portions of the patient's history were reviewed and updated as appropriate: allergies, current medications, past family history, past medical history, past social history, past surgical history and problem list      Past Medical History:   Diagnosis Date   • Edema of both lower legs 2018    Dr Janae Parish Latesha Curran; faxed records  • Hx of skin cancer, basal cell    • Hyperlipidemia    • Impingement syndrome of left shoulder 04/10/2018    Dr Bhakti Henriquez, faxed patient records  • Migraines    • Osteopenia    • Overactive bladder        Past Surgical History:   Procedure Laterality Date   • ACHILLES TENDON REPAIR     • CATARACT EXTRACTION, BILATERAL     • COLPOPEXY VAGINAL EXTRAPERITONEAL (VEC) WITH INSERTION PUBOVAGINAL SLING     • CYST REMOVAL     • HERNIA REPAIR     • HYSTERECTOMY     • JOINT REPLACEMENT Right    • REPLACEMENT TOTAL KNEE     • TONSILLECTOMY  1949       Family History   Problem Relation Age of Onset   • Heart disease Father    • Ovarian cancer Maternal Aunt    • Breast cancer Paternal Aunt    • Hypertension Paternal Grandfather    • Breast cancer Maternal Grandmother    • Heart disease Paternal Grandmother    • Diabetes Neg Hx    • Stroke Neg Hx    • Thyroid disease Neg Hx          Medications have been verified  Objective   /64   Pulse 91   Temp 98 4 °F (36 9 °C)   Resp 17   Ht 4' 10" (1 473 m)   Wt 57 2 kg (126 lb)   SpO2 96%   BMI 26 33 kg/m²   No LMP recorded  Physical Exam     Physical Exam  HENT:      Right Ear: Tympanic membrane and ear canal normal       Left Ear: Tympanic membrane and ear canal normal       Ears:      Comments: Both tympanic membranes are mildly convex with no evidence of infection

## 2022-12-13 NOTE — PATIENT INSTRUCTIONS
I believe you have mild mucus congestion behind the eardrums  Try Zyrtec 1 a day    Afrin nasal spray 2 sprays each nostril 3 times a day for no more than 3 days

## 2022-12-29 ENCOUNTER — CLINICAL SUPPORT (OUTPATIENT)
Dept: BARIATRICS | Facility: CLINIC | Age: 77
End: 2022-12-29

## 2022-12-29 VITALS — WEIGHT: 123.4 LBS | BODY MASS INDEX: 25.9 KG/M2 | HEIGHT: 58 IN

## 2022-12-29 DIAGNOSIS — R63.5 ABNORMAL WEIGHT GAIN: Primary | ICD-10-CM

## 2022-12-29 NOTE — PROGRESS NOTES
Weight Management Medical Nutrition Assessment  Blayne Chinchilla is here for f/u RD session for [facility]  Current wt:  123 2  lbs  She has maintained her weight over the last 2 months which is the goal with overall loss of  54 5 lbs x just over 1 year  Reports a few times where she ate 2-3 bars or flatout wraps and is concerned with this  She does feel it was r/t stress and did try to engage in mindfulness techniques  Still tracking, avg ~1200 calories/day  Keep up the great work! She will continue to f/u with [facility]        Patient seen by Medical Provider in past 6 months:  yes  Requested to schedule appointment with Medical Provider: No    Anthropometric Measurements  Start Weight (#): 177 7 lb 12/28/21  Current Weight (#):  123 2 lbs  TBW % Change from start weight: 30 6%  Ideal Body Weight (#): 119 1 lbs BMI 25 (58")  Goal Weight (#):  lbs  GOAL MET  Highest: 183 lbs  Lowest: 97 lbs mid 20's    Weight Loss History  Previous weight loss attempts: Commercial Programs ([employer], [employer], etc )  Exercise  Self Created Diets (Portion Control, Healthy Food Choices, etc )    Food and Nutrition Related History  Wake up:  6:00   Bed Time: 10    Food Recall  Breakfast: 8:00: decaf coffee w/2 tbsp h/h, protein bar OR kodiak waffles 2, SF syrup OR hannah vineet egg s/w OR steel cut oats, milk  Snack: quest chips   Lunch: 12:00:   2 5-3 oz deli turkey or chicken or tuna or egg, mission low carb or flatout wrap, light batres    Snack: 4:00: Decaf coffee w/2 tbsp h/h, fruit OR bar OR SF pudding OR hot chocolate w/milk  Dinner: 5:00:~4 oz chicken/fish, 1 1/2 cup veggies, sometimes carb OR  healthy choice meal  Snack:  Skip     Beverages: water, decaf coffee/tea, diet soda and alcohol (rare)  Volume of beverage intake: 32 oz water, 1 diet soda, 1-2 cup decaf    Weekends: Same  Cravings:  carbs   Trouble area of day:midmorning & midafternoon     Frequency of Eating out: 1x every other week  Food restrictions: lactose? Cooking: self   Food Shopping: self    Physical Activity Intake  Activity:Walking, cardio fitness class, matt, strength training, walking  Frequency:2x/wk gym strength, walking daily  Physical limitations/barriers to exercise:  n/a    Estimated Needs  Energy   [employer] Energy Needs:  BMR : 938   Maintenance sedentary: 1125          Maintenance lightly active: 1289  Protein: 55-68 gm      (1 2-1 5g/kg IBW)  Fluid: 53 oz     (35mL/kg IBW)    Nutrition Diagnosis  No, resolved    Nutrition Intervention    Nutrition Prescription  Calories:8422-3814  Protein: 55-70 gm    Meal Plan (Stewart/Pro)  Breakfast: 250-350, 15   Snack:  150  Lunch: 300, 15-20  Snack:  150, 5-10  Dinner: 300-350, 20-25  Snack: skip     Nutrition Education:    Healthy Core Manual  Calorie controlled menu  Lean protein food choices  Healthy snack options  Food journaling tips      Nutrition Counseling:  Strategies: meal planning, portion sizes, healthy snack choices, hydration, fiber intake, protein intake, exercise, food journal      Monitoring and Evaluation:  Evaluation criteria:  Energy Intake  Meet protein needs  Maintain adequate hydration  Monitor weekly weight  Meal planning/preparation  Food journal   Decreased portions at mealtimes and snacks  Physical activity     Barriers to learning:none  Readiness to change: Maintenance: (Maintaining the behavior change)  Comprehension: very good  Expected Compliance: very good

## 2023-01-03 ENCOUNTER — TELEMEDICINE (OUTPATIENT)
Dept: INTERNAL MEDICINE CLINIC | Facility: CLINIC | Age: 78
End: 2023-01-03

## 2023-01-03 DIAGNOSIS — H66.90 CHRONIC OTITIS MEDIA, UNSPECIFIED OTITIS MEDIA TYPE: Primary | ICD-10-CM

## 2023-01-03 RX ORDER — NEOMYCIN SULFATE, POLYMYXIN B SULFATE AND HYDROCORTISONE 10; 3.5; 1 MG/ML; MG/ML; [USP'U]/ML
4 SUSPENSION/ DROPS AURICULAR (OTIC) EVERY 8 HOURS SCHEDULED
Qty: 10 ML | Refills: 0 | Status: SHIPPED | OUTPATIENT
Start: 2023-01-03

## 2023-01-03 NOTE — PROGRESS NOTES
Virtual Brief Visit    Patient is located in the following state in which I hold an active license [place]      Assessment/Plan:    Problem List Items Addressed This Visit    None  Visit Diagnoses     Chronic otitis media, unspecified otitis media type    -  Primary    Relevant Medications    neomycin-polymyxin-hydrocortisone (CORTISPORIN) 0 35%-10,000 units/mL-1% otic suspension        Patient presents for an acute visit  Reports that her ears feel plugged  She states that she went to urgent care back in December and was prescribed Afrin and Zyrtec however they do not help  She denies any runny nose or congestion  She denies any pain in her ears or ringing in the ears  We will trial her on Cortisporin eardrops to see if this will help  She reports the urgent care physician did not report any earwax in your ears  We will follow-up with her again at her next appointment in January  Recent Visits  No visits were found meeting these conditions  Showing recent visits within past 7 days and meeting all other requirements  Future Appointments  No visits were found meeting these conditions    Showing future appointments within next 150 days and meeting all other requirements         Visit Time    Visit Start Time: 6330  Visit Stop Time: 0683  Total Visit Duration: 10 minutes

## 2023-01-05 ENCOUNTER — CLINICAL SUPPORT (OUTPATIENT)
Dept: BARIATRICS | Facility: CLINIC | Age: 78
End: 2023-01-05

## 2023-01-05 VITALS — BODY MASS INDEX: 26.03 KG/M2 | WEIGHT: 124 LBS | HEIGHT: 58 IN

## 2023-01-05 DIAGNOSIS — R63.5 ABNORMAL WEIGHT GAIN: Primary | ICD-10-CM

## 2023-01-06 ENCOUNTER — OFFICE VISIT (OUTPATIENT)
Dept: BARIATRICS | Facility: CLINIC | Age: 78
End: 2023-01-06

## 2023-01-06 VITALS — BODY MASS INDEX: 25.86 KG/M2 | WEIGHT: 123.2 LBS | HEIGHT: 58 IN

## 2023-01-06 DIAGNOSIS — R63.5 ABNORMAL WEIGHT GAIN: Primary | ICD-10-CM

## 2023-01-12 ENCOUNTER — CLINICAL SUPPORT (OUTPATIENT)
Dept: BARIATRICS | Facility: CLINIC | Age: 78
End: 2023-01-12

## 2023-01-12 VITALS — BODY MASS INDEX: 25.78 KG/M2 | WEIGHT: 122.8 LBS | HEIGHT: 58 IN

## 2023-01-12 DIAGNOSIS — R63.5 ABNORMAL WEIGHT GAIN: Primary | ICD-10-CM

## 2023-01-13 ENCOUNTER — RA CDI HCC (OUTPATIENT)
Dept: OTHER | Facility: HOSPITAL | Age: 78
End: 2023-01-13

## 2023-01-13 NOTE — PROGRESS NOTES
[address]  coding opportunities       Chart reviewed, no opportunity found: CHART REVIEWED, NO OPPORTUNITY FOUND        Patients Insurance     Medicare Insurance: Medicare

## 2023-01-19 ENCOUNTER — CLINICAL SUPPORT (OUTPATIENT)
Dept: BARIATRICS | Facility: CLINIC | Age: 78
End: 2023-01-19

## 2023-01-19 VITALS — BODY MASS INDEX: 26.07 KG/M2 | WEIGHT: 124.2 LBS | HEIGHT: 58 IN

## 2023-01-19 DIAGNOSIS — R63.5 ABNORMAL WEIGHT GAIN: Primary | ICD-10-CM

## 2023-01-20 ENCOUNTER — OFFICE VISIT (OUTPATIENT)
Dept: INTERNAL MEDICINE CLINIC | Facility: CLINIC | Age: 78
End: 2023-01-20

## 2023-01-20 DIAGNOSIS — H91.93 CHANGE IN HEARING, BILATERAL: Primary | ICD-10-CM

## 2023-01-20 DIAGNOSIS — H93.8X3 PRESSURE SENSATION IN EAR, BILATERAL: ICD-10-CM

## 2023-01-20 RX ORDER — METHYLPREDNISOLONE 4 MG/1
TABLET ORAL
Qty: 21 EACH | Refills: 0 | Status: SHIPPED | OUTPATIENT
Start: 2023-01-20

## 2023-01-20 NOTE — H&P (VIEW-ONLY)
Assessment/Plan:    #Pressure in Ears  -has associated hearing loss  -present since December  -went to urgent care and received zyrtec and afrin without relief  -trialed on cortisporin ear drops through a virtual visit without improvement  -will trial medrol dose harinder  -if symptoms persist then will obtain MRI brain and refer to ENT    No problem-specific Assessment & Plan notes found for this encounter  Diagnoses and all orders for this visit:    Change in hearing, bilateral  -     methylPREDNISolone 4 MG tablet therapy pack; Use as directed on package  -     MRI brain w wo contrast; Future  -     Ambulatory Referral to Otolaryngology; Future    Pressure sensation in ear, bilateral  -     methylPREDNISolone 4 MG tablet therapy pack; Use as directed on package  -     MRI brain w wo contrast; Future  -     Ambulatory Referral to Otolaryngology; Future            Current Outpatient Medications:   •  methylPREDNISolone 4 MG tablet therapy pack, Use as directed on package, Disp: 21 each, Rfl: 0  •  atorvastatin (LIPITOR) 10 mg tablet, Take 1 tablet (10 mg total) by mouth daily, Disp: 90 tablet, Rfl: 3  •  calcium polycarbophil (FIBERCON) 625 mg tablet, Take 625 mg by mouth daily, Disp: , Rfl:   •  Calcium-Magnesium-Vitamin D (CITRACAL SLOW RELEASE PO), , Disp: , Rfl:   •  Multiple Vitamins-Minerals (MULTIVITAMIN WITH MINERALS) tablet, Take 1 tablet by mouth daily, Disp: , Rfl:   •  neomycin-polymyxin-hydrocortisone (CORTISPORIN) 0 35%-10,000 units/mL-1% otic suspension, Administer 4 drops into both ears every 8 (eight) hours, Disp: 10 mL, Rfl: 0  •  Probiotic Product (Align) 4 MG CAPS, Take 1 capsule by mouth daily, Disp: , Rfl:     Subjective:      Patient ID: Subhash Burkett is a 68 y o  female  HPI     Patient presents for an acute visit  She was seen in the urgent care back in December for sensation of ears feeling clogged  She was prescribed Afrin and Zyrtec however it did not help    She denies any other upper respiratory symptoms  We placed her on Cortisporin eardrops to see if this would help however it did not improve  On examination today her symptoms continue to persist   She reports that she does have mildly decreased hearing  She denies any trauma to the area  She has not taken anything else for relief  We will obtain an MRI of her brain as well as start her on a Medrol Dosepak to see if there is any improvement after she obtains the MRI she will follow-up with ENT  The following portions of the patient's history were reviewed and updated as appropriate: allergies, current medications, past family history, past medical history, past social history, past surgical history and problem list     Review of Systems   Constitutional: Negative for activity change, appetite change, chills, diaphoresis, fatigue and fever  HENT: Positive for hearing loss  Negative for congestion, ear discharge, ear pain, sore throat and trouble swallowing  Ear pressure sensation   Respiratory: Negative for cough and shortness of breath  Cardiovascular: Negative for chest pain and palpitations  Gastrointestinal: Negative for abdominal pain, constipation, diarrhea, nausea and vomiting  Musculoskeletal: Negative for back pain and myalgias  Neurological: Negative for dizziness and headaches  Objective: There were no vitals taken for this visit  Physical Exam  Constitutional:       General: She is not in acute distress  Appearance: She is well-developed  She is not diaphoretic  HENT:      Head: Normocephalic and atraumatic  Right Ear: Tympanic membrane, ear canal and external ear normal  There is no impacted cerumen  Left Ear: Tympanic membrane, ear canal and external ear normal  There is no impacted cerumen  Eyes:      Conjunctiva/sclera: Conjunctivae normal       Pupils: Pupils are equal, round, and reactive to light  Neck:      Vascular: No JVD        Trachea: No tracheal deviation  Pulmonary:      Effort: Pulmonary effort is normal  No respiratory distress  Musculoskeletal:         General: No tenderness or deformity  Normal range of motion  Cervical back: Normal range of motion and neck supple  Skin:     General: Skin is warm and dry  Findings: No erythema  Neurological:      Mental Status: She is alert and oriented to person, place, and time  Mental status is at baseline  Cranial Nerves: Cranial nerve deficit (hearing diminished b/l ears) present  This note was completed in part utilizing [employer] direct voice recognition software  Grammatical errors, random word insertion, spelling mistakes, and incomplete sentences may be an occasional consequence of the system secondary to software limitations, ambient noise and hardware issues  At the time of dictation, efforts were made to edit, clarify and /or correct errors  Please read the chart carefully and recognize, using context, where substitutions have occurred  If you have any questions or concerns about the context, text or information contained within the body of this dictation, please contact myself, the provider, for further clarification

## 2023-01-20 NOTE — PROGRESS NOTES
Assessment/Plan:    #Pressure in Ears  -has associated hearing loss  -present since December  -went to urgent care and received zyrtec and afrin without relief  -trialed on cortisporin ear drops through a virtual visit without improvement  -will trial medrol dose harinder  -if symptoms persist then will obtain MRI brain and refer to ENT    No problem-specific Assessment & Plan notes found for this encounter  Diagnoses and all orders for this visit:    Change in hearing, bilateral  -     methylPREDNISolone 4 MG tablet therapy pack; Use as directed on package  -     MRI brain w wo contrast; Future  -     Ambulatory Referral to Otolaryngology; Future    Pressure sensation in ear, bilateral  -     methylPREDNISolone 4 MG tablet therapy pack; Use as directed on package  -     MRI brain w wo contrast; Future  -     Ambulatory Referral to Otolaryngology; Future            Current Outpatient Medications:   •  methylPREDNISolone 4 MG tablet therapy pack, Use as directed on package, Disp: 21 each, Rfl: 0  •  atorvastatin (LIPITOR) 10 mg tablet, Take 1 tablet (10 mg total) by mouth daily, Disp: 90 tablet, Rfl: 3  •  calcium polycarbophil (FIBERCON) 625 mg tablet, Take 625 mg by mouth daily, Disp: , Rfl:   •  Calcium-Magnesium-Vitamin D (CITRACAL SLOW RELEASE PO), , Disp: , Rfl:   •  Multiple Vitamins-Minerals (MULTIVITAMIN WITH MINERALS) tablet, Take 1 tablet by mouth daily, Disp: , Rfl:   •  neomycin-polymyxin-hydrocortisone (CORTISPORIN) 0 35%-10,000 units/mL-1% otic suspension, Administer 4 drops into both ears every 8 (eight) hours, Disp: 10 mL, Rfl: 0  •  Probiotic Product (Align) 4 MG CAPS, Take 1 capsule by mouth daily, Disp: , Rfl:     Subjective:      Patient ID: Bharat Strauss is a 68 y o  female  HPI     Patient presents for an acute visit  She was seen in the urgent care back in December for sensation of ears feeling clogged  She was prescribed Afrin and Zyrtec however it did not help    She denies any other upper respiratory symptoms  We placed her on Cortisporin eardrops to see if this would help however it did not improve  On examination today her symptoms continue to persist   She reports that she does have mildly decreased hearing  She denies any trauma to the area  She has not taken anything else for relief  We will obtain an MRI of her brain as well as start her on a Medrol Dosepak to see if there is any improvement after she obtains the MRI she will follow-up with ENT  The following portions of the patient's history were reviewed and updated as appropriate: allergies, current medications, past family history, past medical history, past social history, past surgical history and problem list     Review of Systems   Constitutional: Negative for activity change, appetite change, chills, diaphoresis, fatigue and fever  HENT: Positive for hearing loss  Negative for congestion, ear discharge, ear pain, sore throat and trouble swallowing  Ear pressure sensation   Respiratory: Negative for cough and shortness of breath  Cardiovascular: Negative for chest pain and palpitations  Gastrointestinal: Negative for abdominal pain, constipation, diarrhea, nausea and vomiting  Musculoskeletal: Negative for back pain and myalgias  Neurological: Negative for dizziness and headaches  Objective: There were no vitals taken for this visit  Physical Exam  Constitutional:       General: She is not in acute distress  Appearance: She is well-developed  She is not diaphoretic  HENT:      Head: Normocephalic and atraumatic  Right Ear: Tympanic membrane, ear canal and external ear normal  There is no impacted cerumen  Left Ear: Tympanic membrane, ear canal and external ear normal  There is no impacted cerumen  Eyes:      Conjunctiva/sclera: Conjunctivae normal       Pupils: Pupils are equal, round, and reactive to light  Neck:      Vascular: No JVD        Trachea: No tracheal deviation  Pulmonary:      Effort: Pulmonary effort is normal  No respiratory distress  Musculoskeletal:         General: No tenderness or deformity  Normal range of motion  Cervical back: Normal range of motion and neck supple  Skin:     General: Skin is warm and dry  Findings: No erythema  Neurological:      Mental Status: She is alert and oriented to person, place, and time  Mental status is at baseline  Cranial Nerves: Cranial nerve deficit (hearing diminished b/l ears) present  This note was completed in part utilizing [employer] direct voice recognition software  Grammatical errors, random word insertion, spelling mistakes, and incomplete sentences may be an occasional consequence of the system secondary to software limitations, ambient noise and hardware issues  At the time of dictation, efforts were made to edit, clarify and /or correct errors  Please read the chart carefully and recognize, using context, where substitutions have occurred  If you have any questions or concerns about the context, text or information contained within the body of this dictation, please contact myself, the provider, for further clarification

## 2023-01-26 ENCOUNTER — CLINICAL SUPPORT (OUTPATIENT)
Dept: BARIATRICS | Facility: CLINIC | Age: 78
End: 2023-01-26

## 2023-01-26 VITALS — HEIGHT: 58 IN | BODY MASS INDEX: 25.78 KG/M2 | WEIGHT: 122.8 LBS

## 2023-01-26 DIAGNOSIS — R63.5 ABNORMAL WEIGHT GAIN: Primary | ICD-10-CM

## 2023-01-27 ENCOUNTER — HOSPITAL ENCOUNTER (OUTPATIENT)
Dept: GASTROENTEROLOGY | Facility: HOSPITAL | Age: 78
Setting detail: OUTPATIENT SURGERY
Discharge: HOME | End: 2023-01-27
Attending: COLON & RECTAL SURGERY | Admitting: COLON & RECTAL SURGERY

## 2023-01-27 ENCOUNTER — ANESTHESIA (OUTPATIENT)
Dept: GASTROENTEROLOGY | Facility: HOSPITAL | Age: 78
End: 2023-01-27

## 2023-01-27 ENCOUNTER — ANESTHESIA EVENT (OUTPATIENT)
Dept: GASTROENTEROLOGY | Facility: HOSPITAL | Age: 78
End: 2023-01-27

## 2023-01-27 VITALS
TEMPERATURE: 96.2 F | RESPIRATION RATE: 16 BRPM | DIASTOLIC BLOOD PRESSURE: 58 MMHG | HEART RATE: 56 BPM | SYSTOLIC BLOOD PRESSURE: 97 MMHG | OXYGEN SATURATION: 98 %

## 2023-01-27 DIAGNOSIS — Z86.010 PERSONAL HISTORY OF COLONIC POLYPS: ICD-10-CM

## 2023-01-27 PROBLEM — Z90.710 HISTORY OF HYSTERECTOMY: Status: ACTIVE | Noted: 2023-01-27

## 2023-01-27 PROBLEM — G47.33 OSA (OBSTRUCTIVE SLEEP APNEA): Status: RESOLVED | Noted: 2022-02-17 | Resolved: 2023-01-27

## 2023-01-27 RX ORDER — PROPOFOL 10 MG/ML
INJECTION, EMULSION INTRAVENOUS CONTINUOUS PRN
Status: DISCONTINUED | OUTPATIENT
Start: 2023-01-27 | End: 2023-01-27

## 2023-01-27 RX ORDER — SODIUM CHLORIDE, SODIUM LACTATE, POTASSIUM CHLORIDE, CALCIUM CHLORIDE 600; 310; 30; 20 MG/100ML; MG/100ML; MG/100ML; MG/100ML
INJECTION, SOLUTION INTRAVENOUS CONTINUOUS PRN
Status: DISCONTINUED | OUTPATIENT
Start: 2023-01-27 | End: 2023-01-27

## 2023-01-27 RX ORDER — PROPOFOL 10 MG/ML
INJECTION, EMULSION INTRAVENOUS AS NEEDED
Status: DISCONTINUED | OUTPATIENT
Start: 2023-01-27 | End: 2023-01-27

## 2023-01-27 RX ORDER — SODIUM CHLORIDE 9 MG/ML
INJECTION, SOLUTION INTRAVENOUS CONTINUOUS PRN
Status: DISCONTINUED | OUTPATIENT
Start: 2023-01-27 | End: 2023-01-27

## 2023-01-27 RX ADMIN — SODIUM CHLORIDE: 0.9 INJECTION, SOLUTION INTRAVENOUS at 09:09

## 2023-01-27 RX ADMIN — PROPOFOL 80 MCG/KG/MIN: 10 INJECTION, EMULSION INTRAVENOUS at 09:09

## 2023-01-27 RX ADMIN — PROPOFOL 80 MG: 10 INJECTION, EMULSION INTRAVENOUS at 09:09

## 2023-01-27 NOTE — INTERVAL H&P NOTE
51-year-old female, personal history of colon polyps, last checked in 2013 by epic record  Denies nausea/vomiting/abdominal pain, change in bowel habits, rectal bleeding, or other constitutional symptoms  Plan:  Screening colonoscopy,discussed in a face-to-face, personal, informed consent process, the benefits, alternatives, risks including not limited to bleeding, missed lesion, perforation requiring emergent surgery discussed/understood  H&P reviewed  After examining the patient I find no changes in the patients condition since the H&P had been written      Vitals:    01/27/23 0849   BP: 138/60   Pulse: 60   Resp: 16   Temp: (!) 97 1 °F (36 2 °C)   SpO2: 98%

## 2023-01-27 NOTE — ANESTHESIA PREPROCEDURE EVALUATION
Procedure:  COLONOSCOPY    Relevant Problems   ANESTHESIA (within normal limits)      CARDIO   (+) Essential hypertension   (+) Hyperlipidemia      ENDO   (-) Diabetes mellitus, type 2 (HCC)   (-) Hyperthyroidism   (-) Hypothyroidism      GI/HEPATIC   (-) Gastroesophageal reflux disease      /RENAL (within normal limits)      GYN   (+) History of hysterectomy      HEMATOLOGY (within normal limits)      MUSCULOSKELETAL (within normal limits)      NEURO/PSYCH   (+) Anxiety   (+) History of abnormal uterine bleeding      Musculoskeletal and Integument   (+) Lumbar disc herniation        TTE 10/31/2022  •  Left Ventricle: Left ventricular cavity size is normal  Wall thickness is normal  The left ventricular ejection fraction is 60-65%  Systolic function is normal  Global longitudinal strain is normal at -21%  Wall motion is normal  Diastolic function is normal for age  •  Right Ventricle: Right ventricular cavity size is normal  Systolic function is normal   •  Left Atrium: The atrium is mildly dilated (35-41 mL/m2)  •  Aortic Valve: The aortic valve is trileaflet  The leaflets are mildly calcified  There is mild regurgitation  The aortic valve has no significant stenosis  •  Mitral Valve: There is no evidence of regurgitation  There is no evidence of stenosis  •  Tricuspid Valve: There is mild regurgitation        Physical Exam    Airway    Mallampati score: III  TM Distance: >3 FB  Neck ROM: limited     Dental   No notable dental hx     Cardiovascular      Pulmonary      Other Findings        Anesthesia Plan  ASA Score- 3     Anesthesia Type- IV sedation with anesthesia with ASA Monitors  Additional Monitors:   Airway Plan:           Plan Factors-Exercise tolerance (METS): >4 METS  Chart reviewed  Existing labs reviewed  Patient summary reviewed  Patient is not a current smoker  Induction- intravenous      Postoperative Plan-     Informed Consent- Anesthetic plan and risks discussed with patient  I personally reviewed this patient with the CRNA  Discussed and agreed on the Anesthesia Plan with the CRNA  Frutoso Spatz

## 2023-01-27 NOTE — ANESTHESIA POSTPROCEDURE EVALUATION
Post-Op Assessment Note    CV Status:  Stable  Pain Score: 0    Pain management: adequate     Mental Status:  Alert and awake   Hydration Status:  Euvolemic   PONV Controlled:  Controlled   Airway Patency:  Patent      Post Op Vitals Reviewed: Yes      Staff: CRNA         No notable events documented      BP      Temp     Pulse     Resp      SpO2

## 2023-01-30 NOTE — PROGRESS NOTES
Weight Management Medical Nutrition Assessment  Leanna Butler is here for f/u RD session for [employer]  Current wt:  122 1   lbs  She has maintained her weight  over the last 2 months which is the goal with overall loss of 55 5  lbs since 12/28/21  Seca completed to check muscle mass  No significant changes from last time with overall fat loss of 74% when comparing to initial  Suggest increase calories from 1200 to 1300 by adding 1/2 cup carb at dinner  She will continue to f/u with [employer]  Patient seen by Medical Provider in past 6 months:  yes  Requested to schedule appointment with Medical Provider: No    Anthropometric Measurements  Start Weight (#): 177 7 lb 12/28/21  Current Weight (#):  122 1 lbs  TBW % Change from start weight: 31%  Ideal Body Weight (#): 119 1 lbs BMI 25 (58")  Goal Weight (#):  lbs  GOAL MET  Highest: 183 lbs  Lowest: 97 lbs mid 20's    Weight Loss History  Previous weight loss attempts: Commercial Programs ([employer], Nayla Boo, etc )  Exercise  Self Created Diets (Portion Control, Healthy Food Choices, etc )    Food and Nutrition Related History  Wake up:  6:00   Bed Time: 10    Food Recall  Breakfast: 8:00: decaf coffee w/2 tbsp h/h, toast w/pb, cottage cheese OR kodiak waffles 2, SF syrup OR hannah vineet egg s/w OR steel cut oats, milk  Snack: quest chips   Lunch: 12:00:   2 5-3 oz deli turkey or chicken or tuna or egg, mission low carb or flatout wrap, light batres    Snack: 4:00: Decaf coffee w/2 tbsp h/h, fruit OR bar OR SF pudding OR hot chocolate w/milk  Dinner: 5:00:~4 oz chicken/fish, 1 1/2 cup veggies, sometimes carb OR  healthy choice meal  Snack:  Skip     Beverages: water, decaf coffee/tea, diet soda and alcohol (rare)  Volume of beverage intake: 32 oz water, 1 diet soda, 1-2 cup decaf    Weekends: Same  Cravings:  carbs   Trouble area of day:midmorning & midafternoon     Frequency of Eating out: 1x every other week  Food restrictions: lactose?    Cooking: self   Food Shopping: self    Physical Activity Intake  Activity:Walking, cardio fitness class, matt, strength training, walking  Frequency:2x/wk gym strength, walking daily  Physical limitations/barriers to exercise:  n/a    Estimated Needs  Energy   [employer] Energy Needs:  BMR : 938   Maintenance sedentary: 1125          Maintenance lightly active: 1289  Protein: 55-68 gm      (1 2-1 5g/kg IBW)  Fluid: 53 oz     (35mL/kg IBW)    Nutrition Diagnosis  No, resolved    Nutrition Intervention    Nutrition Prescription  Calories:2448-3930  Protein: 55-70 gm    Meal Plan (Stewart/Pro)  Breakfast: 250-350, 15   Snack:  150  Lunch: 300, 15-20  Snack:  150, 5-10  Dinner: 300-350, 20-25  Snack: skip     Nutrition Education:    Healthy Core Manual  Calorie controlled menu  Lean protein food choices  Healthy snack options  Food journaling tips      Nutrition Counseling:  Strategies: meal planning, portion sizes, healthy snack choices, hydration, fiber intake, protein intake, exercise, food journal      Monitoring and Evaluation:  Evaluation criteria:  Energy Intake  Meet protein needs  Maintain adequate hydration  Monitor weekly weight  Meal planning/preparation  Food journal   Decreased portions at mealtimes and snacks  Physical activity     Barriers to learning:none  Readiness to change: Maintenance: (Maintaining the behavior change)  Comprehension: very good  Expected Compliance: very good

## 2023-02-02 DIAGNOSIS — D24.2 BREAST FIBROADENOMA IN FEMALE, LEFT: Primary | ICD-10-CM

## 2023-02-03 ENCOUNTER — OFFICE VISIT (OUTPATIENT)
Dept: BARIATRICS | Facility: CLINIC | Age: 78
End: 2023-02-03

## 2023-02-03 VITALS — WEIGHT: 122.14 LBS | HEIGHT: 58 IN | BODY MASS INDEX: 25.64 KG/M2

## 2023-02-03 DIAGNOSIS — R63.5 ABNORMAL WEIGHT GAIN: Primary | ICD-10-CM

## 2023-02-05 ENCOUNTER — TELEPHONE (OUTPATIENT)
Dept: OTHER | Facility: OTHER | Age: 78
End: 2023-02-05

## 2023-02-05 NOTE — TELEPHONE ENCOUNTER
Patient is requesting a call back from the office  She states she received a call from the MRI department stating she needs blood work done prior to the test  The orders need to be within 2 months

## 2023-02-06 NOTE — TELEPHONE ENCOUNTER
All of the labs are ordered for now, she should hold off the lipid until next month since Medicare only covers this every 4 months

## 2023-02-06 NOTE — TELEPHONE ENCOUNTER
A CMP has been ordered for her along with a CBC and lipids  She only needs the CMP to get the MRI however we would like her to get the lipids and CBC in March prior to her next appointment

## 2023-02-07 ENCOUNTER — APPOINTMENT (OUTPATIENT)
Dept: LAB | Facility: CLINIC | Age: 78
End: 2023-02-07

## 2023-02-07 DIAGNOSIS — E78.2 MIXED HYPERLIPIDEMIA: ICD-10-CM

## 2023-02-07 LAB
ALBUMIN SERPL BCP-MCNC: 3.8 G/DL (ref 3.5–5)
ALP SERPL-CCNC: 33 U/L (ref 46–116)
ALT SERPL W P-5'-P-CCNC: 44 U/L (ref 12–78)
ANION GAP SERPL CALCULATED.3IONS-SCNC: 8 MMOL/L (ref 4–13)
AST SERPL W P-5'-P-CCNC: 30 U/L (ref 5–45)
BILIRUB SERPL-MCNC: 0.44 MG/DL (ref 0.2–1)
BUN SERPL-MCNC: 22 MG/DL (ref 5–25)
CALCIUM SERPL-MCNC: 9.1 MG/DL (ref 8.3–10.1)
CHLORIDE SERPL-SCNC: 107 MMOL/L (ref 96–108)
CO2 SERPL-SCNC: 28 MMOL/L (ref 21–32)
CREAT SERPL-MCNC: 0.65 MG/DL (ref 0.6–1.3)
GFR SERPL CREATININE-BSD FRML MDRD: 85 ML/MIN/1.73SQ M
GLUCOSE P FAST SERPL-MCNC: 77 MG/DL (ref 65–99)
POTASSIUM SERPL-SCNC: 4.2 MMOL/L (ref 3.5–5.3)
PROT SERPL-MCNC: 7 G/DL (ref 6.4–8.4)
SODIUM SERPL-SCNC: 143 MMOL/L (ref 135–147)

## 2023-02-09 ENCOUNTER — CLINICAL SUPPORT (OUTPATIENT)
Dept: BARIATRICS | Facility: CLINIC | Age: 78
End: 2023-02-09

## 2023-02-09 VITALS — HEIGHT: 57 IN | WEIGHT: 123.6 LBS | BODY MASS INDEX: 26.66 KG/M2

## 2023-02-09 DIAGNOSIS — R63.5 ABNORMAL WEIGHT GAIN: Primary | ICD-10-CM

## 2023-02-11 ENCOUNTER — HOSPITAL ENCOUNTER (OUTPATIENT)
Dept: MRI IMAGING | Facility: HOSPITAL | Age: 78
Discharge: HOME | End: 2023-02-11

## 2023-02-11 DIAGNOSIS — H91.93 CHANGE IN HEARING, BILATERAL: ICD-10-CM

## 2023-02-11 DIAGNOSIS — H93.8X3 PRESSURE SENSATION IN EAR, BILATERAL: ICD-10-CM

## 2023-02-11 RX ADMIN — GADOBUTROL 6 ML: 604.72 INJECTION INTRAVENOUS at 15:06

## 2023-02-14 NOTE — PROGRESS NOTES
Weight Management Medical Nutrition Assessment  Joan Fulling is here for f/u RD session for [facility]  Current wt:  123 7    lbs  She has maintained her weight  over the last 2 1/2 months which is the goal with overall loss of  54  lbs since 12/28/21  Notices she is struggling with binge type episodes more frequently than before  These occur in the afternoon  Based on recall it may be that she is not consuming enough calories at lunch  Has been eating salads with 1/2 cup fat free feta but no additional protein or carbs  Suggest she use log as a guide and ensure she is having at least 300 calories with 20 gm of protein at lunch  She will continue to f/u in [facility]         Patient seen by Medical Provider in past 6 months:  yes  Requested to schedule appointment with Medical Provider: No    Anthropometric Measurements  Start Weight (#): 177 7 lb 12/28/21  Current Weight (#):  123 7 lbs  TBW % Change from start weight: 30 3%  Ideal Body Weight (#): 119 1 lbs BMI 25 (58")  Goal Weight (#):  lbs  GOAL MET  Highest: 183 lbs  Lowest: 97 lbs mid 20's    Weight Loss History  Previous weight loss attempts: Commercial Programs ([employer], Renelda [employer], etc )  Exercise  Self Created Diets (Portion Control, Healthy Food Choices, etc )    Food and Nutrition Related History  Wake up:  6:00   Bed Time: 10    Food Recall  Breakfast: 8:00: decaf coffee w/2 tbsp h/h, toast w/pb, cottage cheese OR kodiak waffles 2, SF syrup OR hannah vineet egg s/w OR steel cut oats, milk  Snack: quest chips   Lunch: 12:00:   2 5-3 oz deli turkey or chicken or tuna or egg, mission low carb or flatout wrap, light batres OR salad w/ff feta, dressing    Snack: 4:00: Decaf coffee w/2 tbsp h/h, fruit OR bar OR SF pudding OR hot chocolate w/milk  Dinner: 5:00:~4 oz chicken/fish, 1 1/2 cup veggies, sometimes carb OR  healthy choice meal  Snack:  Skip     Beverages: water, decaf coffee/tea, diet soda and alcohol (rare)  Volume of beverage intake: 32 oz water, 1 diet soda, 1-2 cup decaf    Weekends: Same  Cravings:  carbs   Trouble area of day:midmorning & midafternoon     Frequency of Eating out: 1x every other week  Food restrictions: lactose? Cooking: self   Food Shopping: self    Physical Activity Intake  Activity:Walking, cardio fitness class, matt, strength training, walking  Frequency:2x/wk gym strength, walking daily  Physical limitations/barriers to exercise:  n/a    Estimated Needs  Energy   [employer] Energy Needs:  BMR : 938   Maintenance sedentary: 1125          Maintenance lightly active: 1289  Protein: 55-68 gm      (1 2-1 5g/kg IBW)  Fluid: 53 oz     (35mL/kg IBW)    Nutrition Diagnosis  No, resolved    Nutrition Intervention    Nutrition Prescription  Calories:7768-0039  Protein: 55-70 gm    Meal Plan (Stewart/Pro)  Breakfast: 250-350, 15   Snack:  150  Lunch: 300, 15-20  Snack:  150, 5-10  Dinner: 300-350, 20-25  Snack: skip     Nutrition Education:    Healthy Core Manual  Calorie controlled menu  Lean protein food choices  Healthy snack options  Food journaling tips      Nutrition Counseling:  Strategies: meal planning, portion sizes, healthy snack choices, hydration, fiber intake, protein intake, exercise, food journal      Monitoring and Evaluation:  Evaluation criteria:  Energy Intake  Meet protein needs  Maintain adequate hydration  Monitor weekly weight  Meal planning/preparation  Food journal   Decreased portions at mealtimes and snacks  Physical activity     Barriers to learning:none  Readiness to change: Maintenance: (Maintaining the behavior change)  Comprehension: very good  Expected Compliance: very good

## 2023-02-16 ENCOUNTER — CLINICAL SUPPORT (OUTPATIENT)
Dept: BARIATRICS | Facility: CLINIC | Age: 78
End: 2023-02-16

## 2023-02-16 VITALS — HEIGHT: 57 IN | BODY MASS INDEX: 26.41 KG/M2 | WEIGHT: 122.4 LBS

## 2023-02-16 DIAGNOSIS — R63.5 ABNORMAL WEIGHT GAIN: Primary | ICD-10-CM

## 2023-02-24 ENCOUNTER — OFFICE VISIT (OUTPATIENT)
Dept: BARIATRICS | Facility: CLINIC | Age: 78
End: 2023-02-24

## 2023-02-24 DIAGNOSIS — R63.5 ABNORMAL WEIGHT GAIN: Primary | ICD-10-CM

## 2023-03-02 ENCOUNTER — CLINICAL SUPPORT (OUTPATIENT)
Dept: BARIATRICS | Facility: CLINIC | Age: 78
End: 2023-03-02

## 2023-03-02 VITALS — HEIGHT: 58 IN | WEIGHT: 120.8 LBS | BODY MASS INDEX: 25.36 KG/M2

## 2023-03-02 DIAGNOSIS — R63.5 ABNORMAL WEIGHT GAIN: Primary | ICD-10-CM

## 2023-03-09 ENCOUNTER — CLINICAL SUPPORT (OUTPATIENT)
Dept: BARIATRICS | Facility: CLINIC | Age: 78
End: 2023-03-09

## 2023-03-09 VITALS — HEIGHT: 58 IN | WEIGHT: 121.2 LBS | BODY MASS INDEX: 25.44 KG/M2

## 2023-03-09 DIAGNOSIS — R63.5 ABNORMAL WEIGHT GAIN: Primary | ICD-10-CM

## 2023-03-22 NOTE — PROGRESS NOTES
"Weight Management Medical Nutrition Assessment  Ravindra is here for f/u RD session for [facility]  Current wt:   120   lbs  She has maintained her weight  over the last 4 months which is the goal with overall loss of  57 7  lbs since 12/28/21  Binge type episodes continue to occur at times but she has tried to keep some of these trigger foods (bread) out of the house  She did add carb and/or additional protein to lunch which she does think has helped with the afternoon  Upcoming trip discussed  She will continue to f/u in [facility]         Patient seen by Medical Provider in past 6 months:  yes  Requested to schedule appointment with Medical Provider: No    Anthropometric Measurements  Start Weight (#): 177 7 lb 12/28/21  Current Weight (#):  120 lbs  TBW % Change from start weight: 32 4%  Ideal Body Weight (#): 119 1 lbs BMI 25 (58\")  Goal Weight (#):  lbs  GOAL MET  Highest: 183 lbs  Lowest: 97 lbs mid 20's    Weight Loss History  Previous weight loss attempts: Commercial Programs ([employer], [employer], etc )  Exercise  Self Created Diets (Portion Control, Healthy Food Choices, etc )    Food and Nutrition Related History  Wake up:  6:00   Bed Time: 10    Food Recall  Breakfast: 8:00: decaf coffee w/2 tbsp h/h, fruit, cottage cheese/yogurt OR kodiak waffles 2, SF syrup OR hannah vineet egg s/w OR steel cut oats, milk  Snack:  quest chips   Lunch: 12:00:   2 5-3 oz deli turkey or chicken or tuna or egg, mission low carb or flatout wrap or banana, light batres OR salad w/ff feta, dressing, hot chocolate with skim milk OR healthy choice meal     Snack: 4:00: Decaf coffee w/2 tbsp h/h, fruit, string cheese OR occasional chocolate  Dinner: 5:00:~4 oz chicken/fish/hamburger, 1 1/2 cup veggies, corn/peas OR  healthy choice meal  Snack:  Skip     Beverages: water, decaf coffee/tea, diet soda and alcohol (rare)  Volume of beverage intake: 8 oz water, 16 oz diet soda caffeine soda, 16 oz cup decaf    Weekends: " Same  Cravings:  carbs   Trouble area of day:midmorning & midafternoon     Frequency of Eating out: 1x every other week  Food restrictions: lactose? Cooking: self   Food Shopping: self    Physical Activity Intake  Activity:Walking, cardio fitness class, matt, strength training, walking  Frequency:2x/wk gym strength, walking daily  Physical limitations/barriers to exercise:  n/a    Estimated Needs  Energy   [employer] Needs:  BMR : 923   Maintenance sedentary: 1108          Maintenance lightly active: 1269  Protein: 55-68 gm      (1 2-1 5g/kg IBW)  Fluid: 53 oz     (35mL/kg IBW)    Nutrition Diagnosis  No, resolved    Nutrition Intervention    Nutrition Prescription  Calories:5807-9172  Protein: 55-70 gm    Meal Plan (Stewart/Pro)  Breakfast: 250-350, 15   Snack:  150  Lunch: 300, 15-20  Snack:  150, 5-10  Dinner: 300-350, 20-25  Snack: skip     Nutrition Education:    Healthy Core Manual  Calorie controlled menu  Lean protein food choices  Healthy snack options  Food journaling tips      Nutrition Counseling:  Strategies: meal planning, portion sizes, healthy snack choices, hydration, fiber intake, protein intake, exercise, food journal      Monitoring and Evaluation:  Evaluation criteria:  Energy Intake  Meet protein needs  Maintain adequate hydration  Monitor weekly weight  Meal planning/preparation  Food journal   Decreased portions at mealtimes and snacks  Physical activity     Barriers to learning:none  Readiness to change: Maintenance: (Maintaining the behavior change)  Comprehension: very good  Expected Compliance: very good

## 2023-03-23 ENCOUNTER — CLINICAL SUPPORT (OUTPATIENT)
Dept: BARIATRICS | Facility: CLINIC | Age: 78
End: 2023-03-23

## 2023-03-23 VITALS — BODY MASS INDEX: 25.86 KG/M2 | WEIGHT: 123.2 LBS | HEIGHT: 58 IN

## 2023-03-23 DIAGNOSIS — R63.5 ABNORMAL WEIGHT GAIN: Primary | ICD-10-CM

## 2023-03-29 ENCOUNTER — OFFICE VISIT (OUTPATIENT)
Dept: BARIATRICS | Facility: CLINIC | Age: 78
End: 2023-03-29

## 2023-03-29 VITALS — HEIGHT: 58 IN | WEIGHT: 120 LBS | BODY MASS INDEX: 25.19 KG/M2

## 2023-03-29 DIAGNOSIS — R63.5 ABNORMAL WEIGHT GAIN: Primary | ICD-10-CM

## 2023-03-30 ENCOUNTER — CLINICAL SUPPORT (OUTPATIENT)
Dept: BARIATRICS | Facility: CLINIC | Age: 78
End: 2023-03-30

## 2023-03-30 VITALS — HEIGHT: 58 IN | WEIGHT: 121.4 LBS | BODY MASS INDEX: 25.48 KG/M2

## 2023-03-30 DIAGNOSIS — R63.5 ABNORMAL WEIGHT GAIN: Primary | ICD-10-CM

## 2023-04-06 ENCOUNTER — CLINICAL SUPPORT (OUTPATIENT)
Dept: BARIATRICS | Facility: CLINIC | Age: 78
End: 2023-04-06

## 2023-04-06 VITALS — HEIGHT: 58 IN | BODY MASS INDEX: 25.61 KG/M2 | WEIGHT: 122 LBS

## 2023-04-06 DIAGNOSIS — R63.5 ABNORMAL WEIGHT GAIN: Primary | ICD-10-CM

## 2023-05-04 ENCOUNTER — OFFICE VISIT (OUTPATIENT)
Dept: INTERNAL MEDICINE CLINIC | Facility: CLINIC | Age: 78
End: 2023-05-04

## 2023-05-04 ENCOUNTER — TELEPHONE (OUTPATIENT)
Dept: INTERNAL MEDICINE CLINIC | Facility: CLINIC | Age: 78
End: 2023-05-04

## 2023-05-04 VITALS
BODY MASS INDEX: 26.85 KG/M2 | HEART RATE: 66 BPM | DIASTOLIC BLOOD PRESSURE: 66 MMHG | RESPIRATION RATE: 16 BRPM | SYSTOLIC BLOOD PRESSURE: 124 MMHG | HEIGHT: 58 IN | OXYGEN SATURATION: 96 % | WEIGHT: 127.9 LBS

## 2023-05-04 DIAGNOSIS — D24.2 FIBROADENOMA OF BREAST, LEFT: ICD-10-CM

## 2023-05-04 DIAGNOSIS — J06.9 UPPER RESPIRATORY TRACT INFECTION, UNSPECIFIED TYPE: ICD-10-CM

## 2023-05-04 DIAGNOSIS — E55.9 VITAMIN D DEFICIENCY: ICD-10-CM

## 2023-05-04 DIAGNOSIS — E78.2 MIXED HYPERLIPIDEMIA: Primary | ICD-10-CM

## 2023-05-04 RX ORDER — DOXYCYCLINE HYCLATE 100 MG
100 TABLET ORAL 2 TIMES DAILY
Qty: 14 TABLET | Refills: 0 | Status: SHIPPED | OUTPATIENT
Start: 2023-05-04 | End: 2023-05-11

## 2023-05-04 RX ORDER — CALCIUM CITRATE 1040MG
250 TABLET ORAL
COMMUNITY
Start: 2022-11-07

## 2023-05-04 RX ORDER — HYDROCODONE POLISTIREX AND CHLORPHENIRAMINE POLISTIREX 10; 8 MG/5ML; MG/5ML
5 SUSPENSION, EXTENDED RELEASE ORAL EVERY 12 HOURS PRN
Qty: 115 ML | Refills: 0 | Status: SHIPPED | OUTPATIENT
Start: 2023-05-04

## 2023-05-04 RX ORDER — PROMETHAZINE HYDROCHLORIDE AND CODEINE PHOSPHATE 6.25; 1 MG/5ML; MG/5ML
5 SYRUP ORAL EVERY 6 HOURS PRN
Qty: 240 ML | Refills: 0 | Status: SHIPPED | OUTPATIENT
Start: 2023-05-04 | End: 2023-05-04

## 2023-05-04 RX ORDER — FLUOROURACIL 50 MG/G
CREAM TOPICAL
COMMUNITY
Start: 2023-03-13

## 2023-05-04 NOTE — PROGRESS NOTES
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  -undergoing surveillance US every 6 months at [address]    #URI  -present 2 weeks, was in [place]  -has yellow mucus production cough  -denies fever  -covid negative  -will start on doxycycline and promethazine codeine    #HLD  -LDL 49 HDL 95  -continue atorvastatin    #COVID  -infection in August 2022  -treated with paxlovid    #Uterine Bleeding  -abnormal in past and underwent DNC then had hysterectomy 1999  -now resolved    #Arthritis  -b/l knee replacements  -has stiffness but denies pain  -has some mobility issues going down steps but takes her time    #Achilles Pain  -underwent surgical repair to right achilles and now resolved  -MRI 2021 with severe right achilles tendinosis with low grade interstitial tearing at critical zone    #Cataracts  -previous surgery and seeing well  -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  -chronic and seeing ophthalmology Dr Franko Etienne    #IBS  -has diarrhea with vegetables cooked or raw  -remains on fiber supplementation  -will add on probiotics to see if there is improvement  -colonoscopy pending     #Osteopenia  -noted on DEXA 2021  -will continue with vitamin D and weight lifting exercises  -had repeat DEXA at [address], and await results     #Family History  -father and grandfather with early MI  -VAS screen normal  -ECHO 2022 EF 60-65% with mild AVR, mild MVR  -daughters with melanoma and seeing Dr Camila Farris for skin exam      #Urinary Incontinence  -underwent transvaginal sling    #Health Maintenance  -routine labs and followup 6 months  -mammogram up to date 2023 at [address], await records  -covid bivalent and flu vaccine up to date 2022  -retired   -colonsocopy up to date 2022 and does not require further surveillance  -PNA vaccine up to date at age 72 per patient, used to live in [place] before moving here to be closer to daughter  -seeing gynecology Dr Jefe Chase  -will be going with granddaughter to [facility]    No problem-specific [address] [place] [address] notes found for this encounter  Diagnoses and all orders for this visit:    Mixed hyperlipidemia  -     CBC and differential; Future  -     Comprehensive metabolic panel; Future  -     Lipid Panel with Direct LDL reflex; Future    Upper respiratory tract infection, unspecified type  -     CBC and differential; Future  -     Comprehensive metabolic panel; Future  -     doxycycline hyclate (VIBRA-TABS) 100 mg tablet; Take 1 tablet (100 mg total) by mouth 2 (two) times a day for 7 days  -     promethazine-codeine (PHENERGAN WITH CODEINE) 6 25-10 mg/5 mL syrup; Take 5 mL by mouth every 6 (six) hours as needed for cough    Vitamin D deficiency  -     CBC and differential; Future  -     Comprehensive metabolic panel; Future  -     Vitamin D 25 hydroxy;  Future    Other orders  -     Calcium Citrate 1040 MG TABS; Take 250 mg by mouth  -     fluorouracil (EFUDEX) 5 % cream; APPLY A THIN LAYER TO SPOT ON THE LIP TWO TIMES A DAY FOR 10 DAYS            Current Outpatient Medications:     Calcium Citrate 1040 MG TABS, Take 250 mg by mouth, Disp: , Rfl:     doxycycline hyclate (VIBRA-TABS) 100 mg tablet, Take 1 tablet (100 mg total) by mouth 2 (two) times a day for 7 days, Disp: 14 tablet, Rfl: 0    promethazine-codeine (PHENERGAN WITH CODEINE) 6 25-10 mg/5 mL syrup, Take 5 mL by mouth every 6 (six) hours as needed for cough, Disp: 240 mL, Rfl: 0    atorvastatin (LIPITOR) 10 mg tablet, Take 1 tablet (10 mg total) by mouth daily, Disp: 90 tablet, Rfl: 3    calcium polycarbophil (FIBERCON) 625 mg tablet, Take 625 mg by mouth daily, Disp: , Rfl:     Calcium-Magnesium-Vitamin D (CITRACAL SLOW RELEASE PO), , Disp: , Rfl:     fluorouracil (EFUDEX) 5 % cream, APPLY A THIN LAYER TO SPOT ON THE LIP TWO TIMES A DAY FOR 10 DAYS, Disp: , Rfl:     Multiple Vitamin (MULTI-VITAMIN DAILY PO),  0 Refill(s), Type: Maintenance, Disp: , Rfl:     Multiple Vitamins-Minerals (MULTIVITAMIN WITH MINERALS) tablet, Take 1 tablet by mouth daily, Disp: , Rfl:     Probiotic Product (Align) 4 MG CAPS, Take 1 capsule by mouth daily, Disp: , Rfl:     Subjective:      Patient ID: Lucero Lopes is a 68 y o  female  HPI     Patient presents for routine checkup  Denies any recent hospitalizations or surgeries  Her LDL was 49 and HDL 95 currently well controlled with atorvastatin and she will continue with this  She has been having an upper respiratory infection for the past couple weeks  She has not taken anything for relief  She was seen here for a vacation  We will start her on doxycycline and promethazine with codeine  She has been continuing to feel ear pressure  MRI of the brain was normal   She did follow-up with ENT but they could not find an etiology  She has a fibroadenoma on her left breast and will undergo surveillance ultrasound every 6 months  She underwent vascular screening previously that was unremarkable     Ejection fraction per echo was EF of 60 to 65% with mild aortic valve regurgitation and mild mitral valve regurgitation  She has osteopenia and completed a DEXA scan  We will await the records for this  She will return to care in 6 months  The following portions of the patient's history were reviewed and updated as appropriate: allergies, current medications, past family history, past medical history, past social history, past surgical history and problem list     Review of Systems   Constitutional: Negative for activity change, appetite change, chills, fatigue and fever     HENT: Negative for congestion, ear pain, facial swelling, hearing loss, sore throat, tinnitus and trouble "swallowing  Eyes: Negative for photophobia and visual disturbance  Respiratory: Negative for cough, shortness of breath and wheezing  Cardiovascular: Negative for chest pain, palpitations and leg swelling  Gastrointestinal: Negative for abdominal distention, abdominal pain, blood in stool, constipation, diarrhea, nausea and vomiting  Genitourinary: Negative for difficulty urinating, dysuria and pelvic pain  Musculoskeletal: Negative for arthralgias, back pain, gait problem, joint swelling, myalgias, neck pain and neck stiffness  Skin: Negative for rash and wound  Neurological: Negative for dizziness, tremors, light-headedness and headaches  Objective:      /66   Pulse 66   Resp 16   Ht 4' 10\" (1 473 m)   Wt 58 kg (127 lb 14 4 oz)   SpO2 96%   BMI 26 73 kg/m²          Physical Exam  Vitals reviewed  Constitutional:       Appearance: Normal appearance  She is well-developed  HENT:      Head: Normocephalic and atraumatic  Right Ear: Tympanic membrane, ear canal and external ear normal  There is no impacted cerumen  Left Ear: Tympanic membrane, ear canal and external ear normal  There is no impacted cerumen  Nose: Congestion and rhinorrhea present  Mouth/Throat:      Pharynx: Oropharynx is clear  No oropharyngeal exudate or posterior oropharyngeal erythema  Eyes:      Conjunctiva/sclera: Conjunctivae normal       Pupils: Pupils are equal, round, and reactive to light  Neck:      Thyroid: No thyromegaly  Vascular: No JVD  Cardiovascular:      Rate and Rhythm: Normal rate and regular rhythm  Pulses: Normal pulses  Heart sounds: Normal heart sounds  No murmur heard  Pulmonary:      Effort: Pulmonary effort is normal  No respiratory distress  Breath sounds: Normal breath sounds  No stridor  No wheezing, rhonchi or rales  Abdominal:      General: Bowel sounds are normal  There is no distension  Palpations: Abdomen is soft   There " is no mass  Tenderness: There is no abdominal tenderness  There is no guarding or rebound  Musculoskeletal:         General: No tenderness  Normal range of motion  Cervical back: Normal range of motion and neck supple  Right lower leg: No edema  Left lower leg: No edema  Lymphadenopathy:      Cervical: No cervical adenopathy  Skin:     General: Skin is warm  Findings: No erythema or rash  Neurological:      Mental Status: She is alert and oriented to person, place, and time  Mental status is at baseline  Deep Tendon Reflexes: Reflexes are normal and symmetric  Psychiatric:         Mood and Affect: Mood normal          Behavior: Behavior normal          Thought Content: Thought content normal          Judgment: Judgment normal            This note was completed in part utilizing [employer] direct voice recognition software  Grammatical errors, random word insertion, spelling mistakes, and incomplete sentences may be an occasional consequence of the system secondary to software limitations, ambient noise and hardware issues  At the time of dictation, efforts were made to edit, clarify and /or correct errors  Please read the chart carefully and recognize, using context, where substitutions have occurred  If you have any questions or concerns about the context, text or information contained within the body of this dictation, please contact myself, the provider, for further clarification

## 2023-05-04 NOTE — TELEPHONE ENCOUNTER
Susanne Haney left this message about this medication not being available  Please adivse         Hi, it's Gonzalez Long  I was in this morning to see Doctor Oliver Duke  He sent two prescriptions over to [facility]  One, the cough medicine is not available  So I'd like to know what to do instead or if he's going to call over a different prescription for the cough medicine But apparently cough medicine with codeine isn't available anywhere  So I guess if I'm going to have to wait a day, that's not going to help me too much  But give me a call back, [phone]  Thanks

## 2023-05-11 ENCOUNTER — OFFICE VISIT (OUTPATIENT)
Dept: BARIATRICS | Facility: CLINIC | Age: 78
End: 2023-05-11

## 2023-05-11 VITALS — BODY MASS INDEX: 26.3 KG/M2 | WEIGHT: 125.3 LBS | HEIGHT: 58 IN

## 2023-05-11 DIAGNOSIS — R63.5 ABNORMAL WEIGHT GAIN: Primary | ICD-10-CM

## 2023-05-11 NOTE — PROGRESS NOTES
"Weight Management Medical Nutrition Assessment  Steve Pinto is here for f/u RD session for [facility]  Current wt:   125 3    lbs  She has gained 3 3 lbs x 1 month but was just on a cruise in [place] for the last 3 weeks  Overall she has lost 52 4 lbs since 12/28/21  She had gotten sick on her trip and has been on an ABT and cough syrup with codeine  Still having fatigue  Has been tracking intermittently since she got back home  Upcoming events and trips discussed  She will continue to f/u in [facility]         Patient seen by Medical Provider in past 6 months:  yes  Requested to schedule appointment with Medical Provider: No    Anthropometric Measurements  Start Weight (#): 177 7 lb 12/28/21  Current Weight (#):  125 3 lbs  TBW % Change from start weight: 29 4%  Ideal Body Weight (#): 119 1 lbs BMI 25 (58\")  Goal Weight (#):  lbs  GOAL MET  Highest: 183 lbs  Lowest: 97 lbs mid 20's    Weight Loss History  Previous weight loss attempts: Commercial Programs ([employer], [employer], etc )  Exercise  Self Created Diets (Portion Control, Healthy Food Choices, etc )    Food and Nutrition Related History  Wake up:  6:00   Bed Time: 10    Food Recall  Breakfast: 8:00: decaf coffee w/2 tbsp h/h, fruit, cottage cheese/yogurt OR kodiak waffles 2, SF syrup OR hannah vineet egg s/w OR steel cut oats, milk  Snack:  quest chips   Lunch: 12:00:   2 5-3 oz deli turkey or chicken or tuna or egg, mission low carb or flatout wrap or banana, light batres OR salad w/ff feta, dressing, hot chocolate with skim milk OR healthy choice meal     Snack: 4:00: protein shake OR Decaf coffee w/2 tbsp h/h, fruit, string cheese OR occasional chocolate  Dinner: 5:00:~4 oz chicken/fish/hamburger, 1 1/2 cup veggies, corn/peas OR  healthy choice meal  Snack:  Skip     Beverages: water, decaf coffee/tea, diet soda and alcohol (rare)  Volume of beverage intake: 8 oz water, 16 oz diet soda caffeine soda, 16 oz cup decaf    Weekends: " Same  Cravings:  carbs   Trouble area of day:midmorning & midafternoon     Frequency of Eating out: 1x every other week  Food restrictions: lactose? Cooking: self   Food Shopping: self    Physical Activity Intake  Activity:Walking, cardio fitness class, matt, strength training, walking  Frequency:2x/wk gym strength, walking daily  Physical limitations/barriers to exercise:  n/a    Estimated Needs  Energy   [employer] Energy Needs:  BMR : 923   Maintenance sedentary: 1108          Maintenance lightly active: 1269  Protein: 55-68 gm      (1 2-1 5g/kg IBW)  Fluid: 53 oz     (35mL/kg IBW)    Nutrition Diagnosis  No, resolved    Nutrition Intervention    Nutrition Prescription  Calories:3837-6202  Protein: 55-70 gm    Meal Plan (Stewart/Pro)  Breakfast: 250-350, 15   Snack:  150  Lunch: 300, 15-20  Snack:  150, 5-10  Dinner: 300-350, 20-25  Snack: skip     Nutrition Education:    Healthy Core Manual  Calorie controlled menu  Lean protein food choices  Healthy snack options  Food journaling tips      Nutrition Counseling:  Strategies: meal planning, portion sizes, healthy snack choices, hydration, fiber intake, protein intake, exercise, food journal      Monitoring and Evaluation:  Evaluation criteria:  Energy Intake  Meet protein needs  Maintain adequate hydration  Monitor weekly weight  Meal planning/preparation  Food journal   Decreased portions at mealtimes and snacks  Physical activity     Barriers to learning:none  Readiness to change: Maintenance: (Maintaining the behavior change)  Comprehension: very good  Expected Compliance: very good

## 2023-05-18 ENCOUNTER — CLINICAL SUPPORT (OUTPATIENT)
Dept: BARIATRICS | Facility: CLINIC | Age: 78
End: 2023-05-18

## 2023-05-18 VITALS — HEIGHT: 58 IN | WEIGHT: 125 LBS | BODY MASS INDEX: 26.24 KG/M2

## 2023-05-18 DIAGNOSIS — R63.5 ABNORMAL WEIGHT GAIN: Primary | ICD-10-CM

## 2023-05-25 ENCOUNTER — CLINICAL SUPPORT (OUTPATIENT)
Dept: BARIATRICS | Facility: CLINIC | Age: 78
End: 2023-05-25

## 2023-05-25 VITALS — HEIGHT: 58 IN | BODY MASS INDEX: 26.41 KG/M2 | WEIGHT: 125.8 LBS

## 2023-05-25 DIAGNOSIS — R63.5 ABNORMAL WEIGHT GAIN: Primary | ICD-10-CM

## 2023-06-01 ENCOUNTER — CLINICAL SUPPORT (OUTPATIENT)
Dept: BARIATRICS | Facility: CLINIC | Age: 78
End: 2023-06-01

## 2023-06-01 VITALS — WEIGHT: 127.8 LBS | BODY MASS INDEX: 26.83 KG/M2 | HEIGHT: 58 IN

## 2023-06-01 DIAGNOSIS — R63.5 ABNORMAL WEIGHT GAIN: Primary | ICD-10-CM

## 2023-06-02 NOTE — PROGRESS NOTES
"Weight Management Medical Nutrition Assessment  Marbella Osuna is here for f/u RD session for [facility]  Current wt:     126 8   lbs  She has gained 1 8 lbs in the last month and has maintained a weight loss of 50 9 lbs since December 2021  She reports she is more frequently binging  Feels this is occurring a few times per week  Eating items such as a pint of ice cream  Tracking but not when having a binge type episode  Otherwise trying to consume ~1000 calories/day to lose the weight she gained while on vacation last month  At times it does seem she is having enough carbs earlier in the day  Discuss that this can set her up for a binge and recommend she consistently incorporate carbs with each meal and snack  Also discussed the physiological changes that occur after significant weight loss and using positive self talk to determine hunger vs mental hunger  She would like to meet with [facility] acute [facility] for strategies with this  Open to a bundle with   Appointment with [facility] acute [facility] scheduled  She will continue to f/u in [facility]         Patient seen by Medical Provider in past 6 months:  yes  Requested to schedule appointment with Medical Provider: No    Anthropometric Measurements  Start Weight (#): 177 7 lb 12/28/21  Current Weight (#):  126 8 lbs  TBW % Change from start weight: 28 6%  Ideal Body Weight (#): 119 1 lbs BMI 25 (58\")  Goal Weight (#):  lbs    Highest: 183 lbs  Lowest: 97 lbs mid 20's    Weight Loss History  Previous weight loss attempts: Commercial Programs ([email or web address], Amanda Newby, etc )  Exercise  Self Created Diets (Portion Control, Healthy Food Choices, etc )    Food and Nutrition Related History  Wake up:  6:00   Bed Time: 10    Food Recall  Breakfast: 8:00: decaf coffee w/2 tbsp h/h, fruit, omelet rounds OR just crack an egg OR cottage cheese, fruit OR oatmeal w/milk  Snack:  quest chips OR string cheese OR fruit  Lunch: 12:00:   2 5-3 oz deli turkey or chicken or tuna or egg, mission low carb or flatout " wrap or banana, light batres OR salad w/ff feta, dressing, hot chocolate with skim milk OR healthy choice meal OR shrimp salsa, string cheese    Snack: 4:00: protein shake OR Decaf coffee w/2 tbsp h/h, fruit, string cheese OR occasional chocolate  Dinner: 5:00:~4 oz chicken/fish/hamburger, 1 1/2 cup veggies, corn/peas OR  healthy choice meal  Snack:  Skip     Beverages: water, decaf coffee/tea, diet soda and alcohol (rare)  Volume of beverage intake: 8 oz water, 16 oz diet soda caffeine soda, 16 oz cup decaf    Weekends: Same  Cravings:  carbs   Trouble area of day: late afternoon    Frequency of Eating out: 1x every other week  Food restrictions: lactose? Cooking: self   Food Shopping: self    Physical Activity Intake  Activity:Walking, cardio fitness class, matt, strength training, walking  Frequency:2x/wk gym strength, walking daily  Physical limitations/barriers to exercise:  n/a    Estimated Needs  Energy   [employer] Energy Needs:  BMR : 923   Maintenance sedentary: 1108          Maintenance lightly active: 1269  Protein: 55-68 gm      (1 2-1 5g/kg IBW)  Fluid: 53 oz     (35mL/kg IBW)    Nutrition Diagnosis  No, resolved    Nutrition Intervention    Nutrition Prescription  Calories:3585-8353  Protein: 55-70 gm    Meal Plan (Stewart/Pro)  Breakfast: 250-350, 15   Snack:  150  Lunch: 300, 15-20  Snack:  150, 5-10  Dinner: 300-350, 20-25  Snack: skip     Nutrition Education:    Healthy Core Manual  Calorie controlled menu  Lean protein food choices  Healthy snack options  Food journaling tips      Nutrition Counseling:  Strategies: meal planning, portion sizes, healthy snack choices, hydration, fiber intake, protein intake, exercise, food journal      Monitoring and Evaluation:  Evaluation criteria:  Energy Intake  Meet protein needs  Maintain adequate hydration  Monitor weekly weight  Meal planning/preparation  Food journal   Decreased portions at mealtimes and snacks  Physical activity     Barriers to learning:none  Readiness to change: Maintenance: (Maintaining the behavior change) & relapse  Comprehension: very good  Expected Compliance: very good

## 2023-06-05 ENCOUNTER — OFFICE VISIT (OUTPATIENT)
Dept: BARIATRICS | Facility: CLINIC | Age: 78
End: 2023-06-05

## 2023-06-05 VITALS — WEIGHT: 126.8 LBS | BODY MASS INDEX: 26.62 KG/M2 | HEIGHT: 58 IN

## 2023-06-05 DIAGNOSIS — R63.5 ABNORMAL WEIGHT GAIN: Primary | ICD-10-CM

## 2023-06-05 PROCEDURE — RECHECK

## 2023-06-08 ENCOUNTER — CLINICAL SUPPORT (OUTPATIENT)
Dept: BARIATRICS | Facility: CLINIC | Age: 78
End: 2023-06-08

## 2023-06-08 VITALS — HEIGHT: 58 IN | BODY MASS INDEX: 26.2 KG/M2 | WEIGHT: 124.8 LBS

## 2023-06-08 DIAGNOSIS — R63.5 ABNORMAL WEIGHT GAIN: Primary | ICD-10-CM

## 2023-06-08 PROCEDURE — RECHECK

## 2023-06-13 NOTE — PROGRESS NOTES
Name           [address] Follow Up Note:    Read Elizabeth notes     Mental health and wellness -   Patient presents to the office today for a support visit  The pt explained that recently she went on vacation and increased in weight  Since then she has been worried that she will not be able to lose the weight  The pt reports she will mindless eat between 2-4p m  Kathleen Luna Discussed activities she can implement to distract self  Reviewed supports she can also reach out to and provided resources for [place] [id number] to implement extra support  The pt was open and receptive to reaching out to a [place] [address] provider  Reviewed and discussed  Patient educated and handouts provided physical vs emotional eating    Adequate hydration  Exercise  Meal planning and preparation  Lifestyle changes  Learning from her lapes   Techniques for self monitoring and keeping daily food journal    Goal: 1-implement activities to decrease mindless eating  Next appointment: 6/22/23 weight management classes

## 2023-06-16 ENCOUNTER — OFFICE VISIT (OUTPATIENT)
Dept: BARIATRICS | Facility: CLINIC | Age: 78
End: 2023-06-16

## 2023-06-16 DIAGNOSIS — F41.9 ANXIETY: Primary | ICD-10-CM

## 2023-06-16 PROCEDURE — RECHECK

## 2023-06-26 ENCOUNTER — TELEPHONE (OUTPATIENT)
Dept: PSYCHIATRY | Facility: CLINIC | Age: 78
End: 2023-06-26

## 2023-06-26 NOTE — TELEPHONE ENCOUNTER
Patient has been added to the Medication Management wait list with a referral     Insurance: medicare  Insurance Type:    Commercial []   Medicaid []   [place] (if applicable)   Medicare [x]  Location Preference: [place] or [place]  Provider Preference: no pref  Virtual: Yes [] No [x]

## 2023-06-26 NOTE — PROGRESS NOTES
Weight Management Medical Nutrition Assessment  Toni Arango is here for f/u RD session for [facility]. Current wt:      123.7   lbs. She has lost 3.1  lbs in the last month and has maintained a weight loss of   54 lbs since December 2021. Reports she has not been binging even despite increased stress. She is unsure why she is managing things differently. It may be r/t to our food logging challenge on facebook this month. The accountability has been beneficial for her. She did meet with [address] [place] [address] but does not wish to proceed with a bundle. Has reached out to a few more therapists and is on waiting list. I also referred her to one additional therapist today. Support and encouragement provided. Keep up the great work! She will continue to f/u in [facility].        Patient seen by Medical Provider in past 6 months:  no  Requested to schedule appointment with Medical Provider: No    Anthropometric Measurements  Start Weight (#): 177.7 lb 12/28/21  Current Weight (#):  123.7 lbs  TBW % Change from start weight: 30.3%  Ideal Body Weight (#): 119.1 lbs BMI 25 (58")  Goal Weight (#):  lbs    Highest: 183 lbs  Lowest: 97 lbs mid 20's    Weight Loss History  Previous weight loss attempts: Commercial Programs ([employer], Probiodrug Beans, etc.)  Exercise  Self Created Diets (Portion Control, Healthy Food Choices, etc.)    Food and Nutrition Related History  Wake up:  6:00   Bed Time: 10    Food Recall  Breakfast: 8:00: decaf coffee w/2 tbsp h/h, fruit, omelet rounds OR just crack an egg OR cottage cheese, fruit OR oatmeal w/milk  Snack:  quest chips OR string cheese OR fruit  Lunch: 12:00:   2.5-3 oz deli turkey or chicken or tuna or egg, mission low carb or flatout wrap or banana, light batres OR salad w/ff feta, dressing, hot chocolate with skim milk OR healthy choice meal OR shrimp salsa, string cheese    Snack: 4:00: protein shake OR Decaf coffee w/2 tbsp h/h, fruit, string cheese OR occasional chocolate  Dinner: 5:00:~4 oz chicken/fish/hamburger, 1 1/2 cup veggies, corn/peas OR  healthy choice meal  Snack:  Skip     Beverages: water, decaf coffee/tea, diet soda and alcohol (rare)  Volume of beverage intake: 8 oz water, 16 oz diet soda caffeine soda, 16 oz cup decaf    Weekends: Same  Cravings:  carbs   Trouble area of day: late afternoon    Frequency of Eating out: 1x every other week  Food restrictions: lactose? Cooking: self   Food Shopping: self    Physical Activity Intake  Activity:Walking, cardio fitness class, matt, strength training, walking, aqua aerobics   Frequency:2x/wk gym strength, walking daily  Physical limitations/barriers to exercise:  n/a    Estimated Needs  Energy   [employer] Needs:  BMR : 940   Maintenance sedentary: 1128          Maintenance lightly active: 1292  Protein: 55-68 gm      (1.2-1.5g/kg IBW)  Fluid: 53 oz     (35mL/kg IBW)    Nutrition Diagnosis  No, resolved    Nutrition Intervention    Nutrition Prescription  Calories:7590-0657  Protein: 55-70 gm    Meal Plan (Stewart/Pro)  Breakfast: 250-350, 15   Snack:  150  Lunch: 300, 15-20  Snack:  150, 5-10  Dinner: 300-350, 20-25  Snack: skip     Nutrition Education:    Healthy Core Manual  Calorie controlled menu  Lean protein food choices  Healthy snack options  Food journaling tips      Nutrition Counseling:  Strategies: meal planning, portion sizes, healthy snack choices, hydration, fiber intake, protein intake, exercise, food journal      Monitoring and Evaluation:  Evaluation criteria:  Energy Intake  Meet protein needs  Maintain adequate hydration  Monitor weekly weight  Meal planning/preparation  Food journal   Decreased portions at mealtimes and snacks  Physical activity     Barriers to learning:none  Readiness to change: Maintenance: (Maintaining the behavior change)    Comprehension: very good  Expected Compliance: very good

## 2023-06-29 ENCOUNTER — CLINICAL SUPPORT (OUTPATIENT)
Dept: BARIATRICS | Facility: CLINIC | Age: 78
End: 2023-06-29

## 2023-06-29 VITALS — BODY MASS INDEX: 26.7 KG/M2 | WEIGHT: 127.2 LBS | HEIGHT: 58 IN

## 2023-06-29 DIAGNOSIS — R63.5 ABNORMAL WEIGHT GAIN: Primary | ICD-10-CM

## 2023-06-29 PROCEDURE — WMWO WEIGHT MANAGEMENT WEEKLY OPTION

## 2023-06-29 PROCEDURE — RECHECK

## 2023-07-06 ENCOUNTER — OFFICE VISIT (OUTPATIENT)
Dept: BARIATRICS | Facility: CLINIC | Age: 78
End: 2023-07-06

## 2023-07-06 VITALS — BODY MASS INDEX: 25.97 KG/M2 | WEIGHT: 123.7 LBS | HEIGHT: 58 IN

## 2023-07-06 DIAGNOSIS — R63.5 ABNORMAL WEIGHT GAIN: Primary | ICD-10-CM

## 2023-07-06 PROCEDURE — RECHECK

## 2023-07-13 ENCOUNTER — CLINICAL SUPPORT (OUTPATIENT)
Dept: BARIATRICS | Facility: CLINIC | Age: 78
End: 2023-07-13

## 2023-07-13 VITALS — BODY MASS INDEX: 26.36 KG/M2 | WEIGHT: 125.6 LBS | HEIGHT: 58 IN

## 2023-07-13 DIAGNOSIS — R63.5 ABNORMAL WEIGHT GAIN: Primary | ICD-10-CM

## 2023-07-13 PROCEDURE — RECHECK

## 2023-07-14 ENCOUNTER — OFFICE VISIT (OUTPATIENT)
Dept: OBGYN CLINIC | Facility: CLINIC | Age: 78
End: 2023-07-14
Payer: MEDICARE

## 2023-07-14 ENCOUNTER — APPOINTMENT (OUTPATIENT)
Dept: RADIOLOGY | Facility: CLINIC | Age: 78
End: 2023-07-14
Payer: MEDICARE

## 2023-07-14 VITALS
BODY MASS INDEX: 26.24 KG/M2 | WEIGHT: 125 LBS | HEIGHT: 58 IN | DIASTOLIC BLOOD PRESSURE: 68 MMHG | SYSTOLIC BLOOD PRESSURE: 108 MMHG

## 2023-07-14 DIAGNOSIS — Z01.89 ENCOUNTER FOR LOWER EXTREMITY COMPARISON IMAGING STUDY: ICD-10-CM

## 2023-07-14 DIAGNOSIS — M25.571 PAIN, JOINT, ANKLE AND FOOT, RIGHT: ICD-10-CM

## 2023-07-14 DIAGNOSIS — M25.571 PAIN, JOINT, ANKLE AND FOOT, RIGHT: Primary | ICD-10-CM

## 2023-07-14 DIAGNOSIS — S86.011A RUPTURE OF RIGHT ACHILLES TENDON, INITIAL ENCOUNTER: ICD-10-CM

## 2023-07-14 PROCEDURE — 73600 X-RAY EXAM OF ANKLE: CPT

## 2023-07-14 PROCEDURE — 73610 X-RAY EXAM OF ANKLE: CPT

## 2023-07-14 PROCEDURE — 99203 OFFICE O/P NEW LOW 30 MIN: CPT | Performed by: ORTHOPAEDIC SURGERY

## 2023-07-14 NOTE — PROGRESS NOTES
Rebeka Tillman M.D. Attending, Orthopaedic Surgery  Foot and [facility]        ORTHOPAEDIC FOOT AND ANKLE CLINIC VISIT     Assessment:     Encounter Diagnoses   Name Primary? • Pain, joint, ankle and foot, right Yes   • Encounter for lower extremity comparison imaging study    • Rupture of right Achilles tendon, initial encounter               Plan:   · The patient verbalized understanding of exam findings and treatment plan. We engaged in the shared decision-making process and treatment options were discussed at length with the patient. Surgical and conservative management discussed today along with risks and benefits. · Patient has a history of a right achilles tendon repair/debridement for non-insertional achilles tendipathy performed by Dr Natalya Sepulveda in 2021. · She continues to have pain about her achilles tendon and has a bulbous portion at the critical zone of her achilles on examination today  · An MRI will be ordered to evaluate the achilles tendon after this surgery  · Continue with supportive shoe wear and activity modifications as tolerated. Return for review of MRI results. History of Present Illness:   Chief Complaint: right ankle pain  Chief Complaint   Patient presents with   • Right Ankle - Pain   • Left Ankle - Pain     Deniz Martinez is a 68 y.o. female who is being seen for right achilles pain. Patient reports an right achilles tendon surgery with Dr Natalya Sepulveda in 2021. Pain is localized at mid achilles tendon with minimal radiating and described as sharp and severe. Patient denies numbness, tingling or radicular pain. Denies history of neuropathy. Patient does not smoke, does not have diabetes and does not take blood thinners. Patient denies family history of anesthesia complications and has not had any complications with anesthesia.      Pain/symptom timing:  Worse during the day when active  Pain/symptom context:  Worse with activites and work  Pain/symptom modifying factors:  Rest makes better, activities make worse  Pain/symptom associated signs/symptoms: none    Prior treatment   · NSAIDsYes    · Injections No   · Bracing/Orthotics Yes   · Physical Therapy Yes     Orthopedic Surgical History:   See below    Past Medical, Surgical and Social History:  Past Medical History:  has a past medical history of Arthritis, Edema of both lower legs (11/01/2018), skin cancer, basal cell, Hyperlipidemia, Hypertension, Impingement syndrome of left shoulder (04/10/2018), Migraines, Osteopenia, and Overactive bladder. Problem List: does not have any pertinent problems on file. Past Surgical History:  has a past surgical history that includes Replacement total knee; Hysterectomy; Joint replacement (Right); COLPOPEXY VAGINAL EXTRAPERITONEAL (VEC) WITH INSERTION PUBOVAGINAL SLING; Cataract extraction, bilateral; Achilles tendon repair; Tonsillectomy (1949); Hernia repair; and Cyst Removal.  Family History: family history includes Breast cancer in her maternal grandmother and paternal aunt; Heart disease in her father and paternal grandmother; Hypertension in her paternal grandfather; Ovarian cancer in her maternal aunt. Social History:  reports that she has quit smoking. Her smoking use included cigarettes. She has never used smokeless tobacco. She reports current alcohol use. She reports that she does not use drugs. Current Medications: has a current medication list which includes the following prescription(s): atorvastatin, calcium citrate, calcium polycarbophil, multivitamin with minerals, calcium-magnesium-vitamin d, fluorouracil, hydrocod nikita-chlorphe nikita er, multiple vitamin, and align. Allergies: is allergic to imodium [loperamide], pedi-pre tape spray [wound dressing adhesive], tilactase, and bupropion.      Review of Systems:  General- denies fever/chills  HEENT- denies hearing loss or sore throat  Eyes- denies eye pain or visual disturbances, denies red eyes  Respiratory- denies cough or SOB  Cardio- denies chest pain or palpitations  GI- denies abdominal pain  Endocrine- denies urinary frequency  Urinary- denies pain with urination  Musculoskeletal- Negative except noted above  Skin- denies rashes or wounds  Neurological- denies dizziness or headache  Psychiatric- denies anxiety or difficulty concentrating    Physical Exam:   /68 (BP Location: Left arm, Patient Position: Sitting, Cuff Size: Adult)   Ht 4' 10" (1.473 m)   Wt 56.7 kg (125 lb)   BMI 26.13 kg/m²   General/Constitutional: No apparent distress: well-nourished and well developed. Eyes: normal ocular motion  Cardio: RRR, Normal S1S2, No m/r/g  Lymphatic: No appreciable lymphadenopathy  Respiratory: Non-labored breathing, CTA b/l no w/c/r  Vascular: No edema, swelling or tenderness, except as noted in detailed exam.  Integumentary: No impressive skin lesions present, except as noted in detailed exam.  Neuro: No ataxia or tremors noted  Psych: Normal mood and affect, oriented to person, place and time. Appropriate affect. Musculoskeletal: Normal, except as noted in detailed exam and in HPI. Examination    Right    Gait Normal   Musculoskeletal Tender to palpation at mid achilles tendon    Skin Normal.      Nails Normal    Range of Motion  20 degrees dorsiflexion, 30 degrees plantarflexion  Subtalar motion: normal    Stability Stable    Muscle Strength 5/5 tibialis anterior  4/5 gastrocnemius-soleus  5/5 posterior tibialis  5/5 peroneal/eversion strength  5/5 EHL  5/5 FHL    Neurologic Normal    Sensation Intact to light touch throughout sural, saphenous, superficial peroneal, deep peroneal and medial/lateral plantar nerve distributions. Mclean-Allison 5.07 filament (10g) testing  deferred.     Cardiovascular Brisk capillary refill < 2 seconds,intact DP and PT pulses    Special Tests None      Imaging Studies:   3 views of the right ankle were taken, reviewed and interpreted independently that demonstrate no acute osseous abnormalities or degenerative changes. Reviewed by me personally. Sherri Spar. Lachman, MD  Foot & Ankle Surgery   Department of [address] [place] [address]      I personally performed the service. Sherri Spar. Lachman, MD    Scribe Attestation    I,:  Catalino Michael am acting as a scribe while in the presence of the attending physician.:       I,:  Hardeep Guerrero MD personally performed the services described in this documentation    as scribed in my presence.:

## 2023-07-20 ENCOUNTER — CLINICAL SUPPORT (OUTPATIENT)
Dept: BARIATRICS | Facility: CLINIC | Age: 78
End: 2023-07-20

## 2023-07-20 VITALS — HEIGHT: 58 IN | BODY MASS INDEX: 26.28 KG/M2 | WEIGHT: 125.2 LBS

## 2023-07-20 DIAGNOSIS — R63.5 ABNORMAL WEIGHT GAIN: Primary | ICD-10-CM

## 2023-07-20 PROCEDURE — RECHECK

## 2023-08-03 ENCOUNTER — CLINICAL SUPPORT (OUTPATIENT)
Dept: BARIATRICS | Facility: CLINIC | Age: 78
End: 2023-08-03

## 2023-08-03 VITALS — BODY MASS INDEX: 26.53 KG/M2 | HEIGHT: 58 IN | WEIGHT: 126.4 LBS

## 2023-08-03 DIAGNOSIS — R63.5 ABNORMAL WEIGHT GAIN: Primary | ICD-10-CM

## 2023-08-03 PROCEDURE — RECHECK

## 2023-08-17 ENCOUNTER — CLINICAL SUPPORT (OUTPATIENT)
Dept: BARIATRICS | Facility: CLINIC | Age: 78
End: 2023-08-17

## 2023-08-17 VITALS — HEIGHT: 58 IN | BODY MASS INDEX: 26.32 KG/M2 | WEIGHT: 125.4 LBS

## 2023-08-17 DIAGNOSIS — R63.5 ABNORMAL WEIGHT GAIN: Primary | ICD-10-CM

## 2023-08-17 PROCEDURE — RECHECK

## 2023-08-21 NOTE — PROGRESS NOTES
Weight Management Medical Nutrition Assessment  Yanci Clemons is here for f/u RD session for [facility]. Current wt:  125.8 lbs. She has maintained her weight in the last month and has maintained a weight loss of 51.9    lbs since December 2021. Reports she is still binging 1-2x/wk and when this happens she might eat a box of ice cream sandwiches and sometimes other items. Then she tries to reduce her calories to lose weight. Discussed that she needs to stay at a maintenance range even after experiencing those binges as they may be further exacerbating the problem. She is on a waiting list for [facility] and has called multiple places who are not accepting her insurance. Had met with a SW here but did not have a connection. Offered additional visit with one of our other [address] [place] [address] to see if this would be helpful until she is off the waiting list. She was agreeable to this. Support and encouragement provided.        Patient seen by Medical Provider in past 6 months:  no  Requested to schedule appointment with Medical Provider: No    Anthropometric Measurements  Start Weight (#): 177.7 lb 12/28/21  Current Weight (#):  125.8 lbs  TBW % Change from start weight: 29.2%  Ideal Body Weight (#): 119.1 lbs BMI 25 (58")  Goal Weight (#):  lbs    Highest: 183 lbs  Lowest: 97 lbs mid 20's    Weight Loss History  Previous weight loss attempts: Commercial Programs ([employer], Irma Camejo, etc.)  Exercise  Self Created Diets (Portion Control, Healthy Food Choices, etc.)    Food and Nutrition Related History  Wake up:  6:00   Bed Time: 10    Food Recall  Breakfast: 8:00: decaf coffee w/2 tbsp h/h, fruit, omelet rounds OR just crack an egg OR hannah vineet egg white delight   Snack:  quest chips OR string cheese OR fruit  Lunch: 12:00:   2.5-3 oz deli turkey or chicken or tuna or egg, mission low carb or flatout wrap or banana, light batres OR salad w/ff feta, dressing OR healthy choice meal OR shrimp salsa, string cheese    Snack: 4:00: protein shake OR Decaf coffee w/2 tbsp h/h, fruit, string cheese OR occasional chocolate  Dinner: 5:00:~4 oz chicken/fish/hamburger, 1 1/2 cup veggies, corn/peas OR  healthy choice meal or lean cuisine   Snack:  Skip     Beverages: water, decaf coffee/tea, diet soda and alcohol (rare)  Volume of beverage intake: 8 oz water, 16 oz diet soda caffeine soda, 16 oz cup decaf    Weekends: Same  Cravings:  carbs   Trouble area of day: late afternoon    Frequency of Eating out: 1x every other week  Food restrictions: lactose? Cooking: self   Food Shopping: self    Physical Activity Intake  Activity:Walking, cardio fitness class, matt, strength training, walking, aqua aerobics   Frequency:2x/wk gym strength, walking daily  Physical limitations/barriers to exercise:  n/a    Estimated Needs  Energy   [employer] Needs:  BMR : 940   Maintenance sedentary: 1128          Maintenance lightly active: 1292  Protein: 55-68 gm      (1.2-1.5g/kg IBW)  Fluid: 53 oz     (35mL/kg IBW)    Nutrition Diagnosis  No, resolved    Nutrition Intervention    Nutrition Prescription  Calories:9515-2653  Protein: 55-70 gm    Meal Plan (Stewart/Pro)  Breakfast: 250-350, 15   Snack:  150  Lunch: 300, 15-20  Snack:  150, 5-10  Dinner: 300-350, 20-25  Snack: skip     Nutrition Education:    Healthy Core Manual  Calorie controlled menu  Lean protein food choices  Healthy snack options  Food journaling tips      Nutrition Counseling:  Strategies: meal planning, portion sizes, healthy snack choices, hydration, fiber intake, protein intake, exercise, food journal      Monitoring and Evaluation:  Evaluation criteria:  Energy Intake  Meet protein needs  Maintain adequate hydration  Monitor weekly weight  Meal planning/preparation  Food journal   Decreased portions at mealtimes and snacks  Physical activity     Barriers to learning:none  Readiness to change: Maintenance: (Maintaining the behavior change)    Comprehension: very good  Expected Compliance: very good

## 2023-08-23 ENCOUNTER — OFFICE VISIT (OUTPATIENT)
Dept: BARIATRICS | Facility: CLINIC | Age: 78
End: 2023-08-23

## 2023-08-23 VITALS — BODY MASS INDEX: 26.41 KG/M2 | WEIGHT: 125.8 LBS | HEIGHT: 58 IN

## 2023-08-23 DIAGNOSIS — R63.5 ABNORMAL WEIGHT GAIN: Primary | ICD-10-CM

## 2023-08-23 PROCEDURE — RECHECK

## 2023-08-31 ENCOUNTER — CLINICAL SUPPORT (OUTPATIENT)
Dept: BARIATRICS | Facility: CLINIC | Age: 78
End: 2023-08-31

## 2023-08-31 ENCOUNTER — OFFICE VISIT (OUTPATIENT)
Dept: BARIATRICS | Facility: CLINIC | Age: 78
End: 2023-08-31

## 2023-08-31 VITALS — BODY MASS INDEX: 26.41 KG/M2 | HEIGHT: 58 IN | WEIGHT: 125.8 LBS

## 2023-08-31 DIAGNOSIS — R63.5 ABNORMAL WEIGHT GAIN: Primary | ICD-10-CM

## 2023-08-31 PROCEDURE — RECHECK

## 2023-08-31 NOTE — PROGRESS NOTES
[facility]  Identified binge eating episodes. Did attend a mindfulness course. Not able to identify triggers, maybe boredom   Lives alone.    Looking for a therapist  Offered resources, [facility] website for binge eating

## 2023-09-05 ENCOUNTER — OFFICE VISIT (OUTPATIENT)
Dept: URGENT CARE | Facility: CLINIC | Age: 78
End: 2023-09-05
Payer: MEDICARE

## 2023-09-05 ENCOUNTER — APPOINTMENT (OUTPATIENT)
Dept: RADIOLOGY | Facility: CLINIC | Age: 78
End: 2023-09-05
Payer: MEDICARE

## 2023-09-05 VITALS
DIASTOLIC BLOOD PRESSURE: 61 MMHG | RESPIRATION RATE: 16 BRPM | TEMPERATURE: 97.5 F | SYSTOLIC BLOOD PRESSURE: 113 MMHG | HEART RATE: 55 BPM | OXYGEN SATURATION: 98 %

## 2023-09-05 DIAGNOSIS — S90.122A CONTUSION OF LESSER TOE OF LEFT FOOT WITHOUT DAMAGE TO NAIL, INITIAL ENCOUNTER: Primary | ICD-10-CM

## 2023-09-05 DIAGNOSIS — S99.922A INJURY OF TOE ON LEFT FOOT, INITIAL ENCOUNTER: ICD-10-CM

## 2023-09-05 DIAGNOSIS — S92.532A CLOSED DISPLACED FRACTURE OF DISTAL PHALANX OF LESSER TOE OF LEFT FOOT, INITIAL ENCOUNTER: Primary | ICD-10-CM

## 2023-09-05 PROCEDURE — 73630 X-RAY EXAM OF FOOT: CPT

## 2023-09-05 PROCEDURE — G0463 HOSPITAL OUTPT CLINIC VISIT: HCPCS

## 2023-09-05 PROCEDURE — 99213 OFFICE O/P EST LOW 20 MIN: CPT

## 2023-09-05 NOTE — PATIENT INSTRUCTIONS
Your x-rays were read by the provider. Radiologist will also read the x-rays and you will be notified of any abnormalities. Wear the post-op shoe for extra support and until confirmed no fracture. Apply ice for 15-20min, 2-3 times over the next 2-3 days. You can take Tylenol or Motrin for pain relief. Follow-up with your PCP or ortho for continued pain. Go to the ED for any severely worsening symptoms.

## 2023-09-05 NOTE — PROGRESS NOTES
[facility]        NAME: Juana Otero is a 68 y.o. female  : 1945    MRN: [id number]  DATE: 2023  TIME: 8:53 AM    Assessment and Plan   Contusion of lesser toe of left foot without damage to nail, initial encounter [S90.122A]  1. Contusion of lesser toe of left foot without damage to nail, initial encounter        2. Injury of toe on left foot, initial encounter  XR foot 3+ vw left            Patient Instructions     Your x-rays were read by the provider. Radiologist will also read the x-rays and you will be notified of any abnormalities. Wear the post-op shoe for extra support and until confirmed no fracture. Apply ice for 15-20min, 2-3 times over the next 2-3 days. You can take Tylenol or Motrin for pain relief. Follow-up with your PCP or ortho for continued pain. Go to the ED for any severely worsening symptoms. Chief Complaint     Chief Complaint   Patient presents with   • Toe Injury     Pt reports left second toe injury yesterday morning. History of Present Illness       This is a 42-year-old female who presents with pain, swelling, and bruising to her left second toe after stubbing it on a piece of furniture at the pool yesterday. She denies numbness and tingling. No history of diabetes. Review of Systems   Review of Systems   Constitutional: Negative for chills and fever. Respiratory: Negative for chest tightness and shortness of breath. Cardiovascular: Negative for chest pain and palpitations. Musculoskeletal: Positive for arthralgias (left 2nd toe). Negative for myalgias. Skin: Negative for wound. Neurological: Negative for dizziness, light-headedness and headaches.        Current Medications       Current Outpatient Medications:   •  atorvastatin (LIPITOR) 10 mg tablet, Take 1 tablet (10 mg total) by mouth daily, Disp: 90 tablet, Rfl: 3  •  Calcium Citrate 1040 MG TABS, Take 250 mg by mouth, Disp: , Rfl:   •  calcium polycarbophil (FIBERCON) 625 mg tablet, Take 625 mg by mouth daily, Disp: , Rfl:   •  Calcium-Magnesium-Vitamin D (CITRACAL SLOW RELEASE PO), , Disp: , Rfl:   •  fluorouracil (EFUDEX) 5 % cream, APPLY A THIN LAYER TO SPOT ON THE LIP TWO TIMES A DAY FOR 10 DAYS, Disp: , Rfl:   •  Hydrocod Eric-Chlorphe Eric ER (TUSSIONEX) 10-8 mg/5 mL ER suspension, Take 5 mL by mouth every 12 (twelve) hours as needed for cough Max Daily Amount: 10 mL, Disp: 115 mL, Rfl: 0  •  Multiple Vitamin (MULTI-VITAMIN DAILY PO),  0 Refill(s), Type: Maintenance, Disp: , Rfl:   •  Multiple Vitamins-Minerals (MULTIVITAMIN WITH MINERALS) tablet, Take 1 tablet by mouth daily, Disp: , Rfl:   •  Probiotic Product (Align) 4 MG CAPS, Take 1 capsule by mouth daily, Disp: , Rfl:     Current Allergies     Allergies as of 09/05/2023 - Reviewed 09/05/2023   Allergen Reaction Noted   • Imodium [loperamide] Headache 02/17/2022   • Pedi-pre tape spray [wound dressing adhesive] Itching and Rash 02/17/2022   • Tilactase Diarrhea 03/24/2022   • Bupropion Rash 12/10/2017            The following portions of the patient's history were reviewed and updated as appropriate: allergies, current medications, past family history, past medical history, past social history, past surgical history and problem list.     Past Medical History:   Diagnosis Date   • Arthritis    • Edema of both lower legs 11/01/2018    Dr. Rocky Dueñas; faxed records. • Hx of skin cancer, basal cell    • Hyperlipidemia    • Hypertension    • Impingement syndrome of left shoulder 04/10/2018    Dr. Rocky Dueñas, faxed patient records.    • Migraines    • Osteopenia    • Overactive bladder        Past Surgical History:   Procedure Laterality Date   • ACHILLES TENDON REPAIR     • CATARACT EXTRACTION, BILATERAL     • COLPOPEXY VAGINAL EXTRAPERITONEAL (VEC) WITH INSERTION PUBOVAGINAL SLING     • CYST REMOVAL     • HERNIA REPAIR     • HYSTERECTOMY     • JOINT REPLACEMENT Right    • REPLACEMENT TOTAL KNEE     • TONSILLECTOMY  1949       Family History   Problem Relation Age of Onset   • Heart disease Father    • Ovarian cancer Maternal Aunt    • Breast cancer Paternal Aunt    • Hypertension Paternal Grandfather    • Breast cancer Maternal Grandmother    • Heart disease Paternal Grandmother    • Diabetes Neg Hx    • Stroke Neg Hx    • Thyroid disease Neg Hx          Medications have been verified. Objective   /61   Pulse 55   Temp 97.5 °F (36.4 °C)   Resp 16   SpO2 98%        Physical Exam     Physical Exam  Vitals and nursing note reviewed. Constitutional:       General: She is not in acute distress. HENT:      Head: Normocephalic. Mouth/Throat:      Mouth: Mucous membranes are moist.   Cardiovascular:      Rate and Rhythm: Normal rate and regular rhythm. Pulses: Normal pulses. Heart sounds: Normal heart sounds. Pulmonary:      Effort: Pulmonary effort is normal.      Breath sounds: Normal breath sounds. Musculoskeletal:         General: Normal range of motion. Cervical back: Normal range of motion and neck supple. Comments: Swelling to left second toe. Ecchymosis over distal phalanx and DIP joint. Nailbed intact. TTP. Sensation intact. +2 DP/PT pulses. Skin:     General: Skin is warm and dry. Capillary Refill: Capillary refill takes less than 2 seconds. Neurological:      Mental Status: She is alert and oriented to person, place, and time.

## 2023-09-19 NOTE — PROGRESS NOTES
1 / 3 bundle    [facility]  Identified binge eating episodes. Did attend a mindfulness course. Not able to identify triggers, maybe boredom / stress  Lives alone. Looking for a therapist  purchased Ezio Calzada book and workbook on binge eating  Going to [facility] on a tour in October. One binge (ice cream) episode since last apt.   She and a friend each ate a pint of ice cream.

## 2023-09-21 ENCOUNTER — OFFICE VISIT (OUTPATIENT)
Dept: BARIATRICS | Facility: CLINIC | Age: 78
End: 2023-09-21

## 2023-09-21 VITALS — WEIGHT: 125.2 LBS | BODY MASS INDEX: 26.17 KG/M2

## 2023-09-21 DIAGNOSIS — R63.5 ABNORMAL WEIGHT GAIN: Primary | ICD-10-CM

## 2023-09-21 PROCEDURE — RECHECK

## 2023-09-21 PROCEDURE — BEHAVMEAL3

## 2023-09-26 NOTE — PROGRESS NOTES
Weight Management Medical Nutrition Assessment  Hank Gill is here for 2/3 RD/SW bundle. Current wt: 123.4   lbs. She has maintained her weight in the last 5 wks and has maintained a weight loss of   54.3 lbs since December 2021. Has not had a binging episode recently. Noticing some difficulty tolerating dairy which is resolved with lactaid as well as sugar alcohols. Logging and consuming ~1000 calories/day. Upcoming trip discussed. Support and encouragement provided.        Patient seen by Medical Provider in past 6 months:  no  Requested to schedule appointment with Medical Provider: No    Anthropometric Measurements  Start Weight (#): 177.7 lb 12/28/21  Current Weight (#):  123.4 lbs  TBW % Change from start weight: 30.5%  Ideal Body Weight (#): 119.1 lbs BMI 25 (58")  Goal Weight (#):  lbs    Highest: 183 lbs  Lowest: 97 lbs mid 20's    Weight Loss History  Previous weight loss attempts: Commercial Programs ([employer], Marianela Garcia, etc.)  Exercise  Self Created Diets (Portion Control, Healthy Food Choices, etc.)    Food and Nutrition Related History  Wake up:  6:00   Bed Time: 10    Food Recall  Breakfast: 8:00: decaf coffee w/2 tbsp h/h, simply protein bar OR just crack an egg OR cottage cheese or yogurt, fruit OR oatmeal or cherrios w/fairlife  Snack:  quest chips OR string cheese OR fruit OR simply protein bar  Lunch: 12:00:   2.5-3 oz deli turkey or chicken or tuna or egg, mission low carb or flatout wrap or banana, light batres OR salad w/ff feta, dressing OR healthy choice meal OR shrimp salsa, string cheese   OR egg salad (1 whole, 2 white), light batres  Snack: 4:00: protein shake OR Decaf coffee w/2 tbsp h/h, fruit, string cheese OR occasional chocolate  Dinner: 5:00:~4 oz chicken/fish/hamburger, 1 1/2 cup veggies, corn/peas OR  healthy choice meal or lean cuisine   Snack:  Skip     Beverages: water, decaf coffee/tea, diet soda and alcohol (rare)  Volume of beverage intake: 8 oz water, 16 oz diet soda caffeine soda, 16 oz cup decaf    Weekends: Same  Cravings:  carbs   Trouble area of day: late afternoon    Frequency of Eating out: 1x every other week  Food restrictions: lactose? Cooking: self   Food Shopping: self    Physical Activity Intake  Activity:Walking, cardio fitness class, matt, strength training, walking, aqua aerobics >>>>less currently   Frequency:2x/wk gym strength, walking daily  Physical limitations/barriers to exercise:  Recently broke toe    Estimated Needs  Energy   [employer] Needs:  BMR : 940   Maintenance sedentary: 1128          Maintenance lightly active: 1292  Protein: 55-68 gm      (1.2-1.5g/kg IBW)  Fluid: 53 oz     (35mL/kg IBW)    Nutrition Diagnosis  No, resolved    Nutrition Intervention    Nutrition Prescription  Calories:0405-2517  Protein: 55-70 gm    Meal Plan (Stewart/Pro)  Breakfast: 250-350, 15   Snack:  150  Lunch: 300, 15-20  Snack:  150, 5-10  Dinner: 300-350, 20-25  Snack: skip     Nutrition Education:    Healthy Core Manual  Calorie controlled menu  Lean protein food choices  Healthy snack options  Food journaling tips      Nutrition Counseling:  Strategies: meal planning, portion sizes, healthy snack choices, hydration, fiber intake, protein intake, exercise, food journal      Monitoring and Evaluation:  Evaluation criteria:  Energy Intake  Meet protein needs  Maintain adequate hydration  Monitor weekly weight  Meal planning/preparation  Food journal   Decreased portions at mealtimes and snacks  Physical activity     Barriers to learning:none  Readiness to change: Maintenance: (Maintaining the behavior change)    Comprehension: very good  Expected Compliance: very good

## 2023-09-27 ENCOUNTER — OFFICE VISIT (OUTPATIENT)
Dept: BARIATRICS | Facility: CLINIC | Age: 78
End: 2023-09-27

## 2023-09-27 VITALS — WEIGHT: 123.4 LBS | HEIGHT: 58 IN | BODY MASS INDEX: 25.9 KG/M2

## 2023-09-27 DIAGNOSIS — R63.5 ABNORMAL WEIGHT GAIN: Primary | ICD-10-CM

## 2023-09-27 PROCEDURE — RECHECK

## 2023-10-05 NOTE — PROGRESS NOTES
Weight Management Medical Nutrition Assessment  Parrish Terry is here for RD visit in lieu of class. Current wt:  122.2   lbs. She has lost 1.2 lbs in the last 2 wks and has maintained a weight loss of 55.5  lbs since December 2021. Has not had a binging episode recently. Noticing some difficulty tolerating dairy which is resolved with lactaid as well as sugar alcohols. Logging and consuming ~1000 calories/day. Upcoming trip discussed. Support and encouragement provided. She will f/u in ~3 wks.       Patient seen by Medical Provider in past 6 months:  no  Requested to schedule appointment with Medical Provider: No    Anthropometric Measurements  Start Weight (#): 177.7 lb 12/28/21  Current Weight (#):  122.2 lbs  TBW % Change from start weight: 31.2%  Ideal Body Weight (#): 119.1 lbs BMI 25 (58")  Goal Weight (#):  lbs    Highest: 183 lbs  Lowest: 97 lbs mid 20's    Weight Loss History  Previous weight loss attempts: Commercial Programs ([employer], [employer], etc.)  Exercise  Self Created Diets (Portion Control, Healthy Food Choices, etc.)    Food and Nutrition Related History  Wake up:  6:00   Bed Time: 10    Food Recall  Breakfast: 8:00: decaf coffee w/2 tbsp h/h, pure protein bar OR just crack an egg OR cottage cheese or yogurt, fruit OR oatmeal or cherrios w/fairlife  Snack:  quest chips OR string cheese OR fruit OR simply protein bar  Lunch: 12:00:   2.5-3 oz deli turkey or chicken or tuna or egg, mission low carb or flatout wrap or banana, light batres OR healthy choice meal OR shrimp salsa, string cheese   OR egg salad (1 whole, 2 white), light batres OR Healthy Choice or Progresso light soup  Snack: 4:00: protein shake OR Decaf coffee w/2 tbsp h/h, fruit, string cheese OR occasional chocolate  Dinner: 5:00:~4 oz chicken/fish/hamburger, 1 1/2 cup veggies, corn/peas OR  healthy choice meal or lean cuisine   Snack:  Skip     Beverages: water, decaf coffee/tea, diet soda and alcohol (rare)  Volume of beverage intake: 32oz water/decaf coffee    Weekends: Same  Cravings:  carbs   Trouble area of day: late afternoon    Frequency of Eating out: 1x every other week  Food restrictions: lactose? Cooking: self   Food Shopping: self    Physical Activity Intake  Activity:Walking, cardio fitness class, matt, strength training, walking, aqua aerobics  Frequency:2x/wk gym strength, walking daily  Physical limitations/barriers to exercise:  Recently broke toe    Estimated Needs  Energy   [employer] Energy Needs:  BMR : 940   Maintenance sedentary: 1128          Maintenance lightly active: 1292  Protein: 55-68 gm      (1.2-1.5g/kg IBW)  Fluid: 53 oz     (35mL/kg IBW)    Nutrition Diagnosis  No, resolved    Nutrition Intervention    Nutrition Prescription  Calories:4024-0508  Protein: 55-70 gm    Meal Plan (Stewart/Pro)  Breakfast: 250-350, 15   Snack:  150  Lunch: 300, 15-20  Snack:  150, 5-10  Dinner: 300-350, 20-25  Snack: skip     Nutrition Education:    Healthy Core Manual  Calorie controlled menu  Lean protein food choices  Healthy snack options  Food journaling tips      Nutrition Counseling:  Strategies: meal planning, portion sizes, healthy snack choices, hydration, fiber intake, protein intake, exercise, food journal      Monitoring and Evaluation:  Evaluation criteria:  Energy Intake  Meet protein needs  Maintain adequate hydration  Monitor weekly weight  Meal planning/preparation  Food journal   Decreased portions at mealtimes and snacks  Physical activity     Barriers to learning:none  Readiness to change: Maintenance: (Maintaining the behavior change)    Comprehension: very good  Expected Compliance: very good

## 2023-10-10 ENCOUNTER — OFFICE VISIT (OUTPATIENT)
Dept: BARIATRICS | Facility: CLINIC | Age: 78
End: 2023-10-10

## 2023-10-10 VITALS — WEIGHT: 122.2 LBS | HEIGHT: 58 IN | BODY MASS INDEX: 25.65 KG/M2

## 2023-10-10 DIAGNOSIS — R63.5 ABNORMAL WEIGHT GAIN: Primary | ICD-10-CM

## 2023-10-10 PROCEDURE — RECHECK

## 2023-10-20 NOTE — PROGRESS NOTES
Weight Management Medical Nutrition Assessment  Radha Walters is here for 3/3 RD/SW bundle. Current wt: 129.8 lbs. She has gained 7.6 lbs in the last 3 wks and has maintained a weight loss of   47.9 lbs since December 2021. Was on a bus trip the other week where there was not much else to do except for eating. After coming home she reports there were a few binging instances. Nothing in particular triggered these events. Discussed that it could have been hunger after eating more than she is used to while on vacation. Has had a few of episodes of symptoms of what seems to be low blood sugar and FBS on 10/26 was 66 mg/dl. On each of these instances her carb intake was 108 gm and 74 gm carbs. Recommend increase carbohydrates to include 1 serving with dinner (~20 gm) and add to snack as needed. Goal intake at her calorie range 100-120 gm per day. Advised her to report these symptoms to her PCP when she sees him on Monday. Would also keep glucose tablets in purse and if feeling these symptoms would try having 2 of these and see if symptoms resolve. Discussed that if her PCP gives different advice she should follow those guidelines. She will f/u in ~2 1/2 wks.        Patient seen by Medical Provider in past 6 months:  no  Requested to schedule appointment with Medical Provider: No    Anthropometric Measurements  Start Weight (#): 177.7 lb 12/28/21  Current Weight (#):  129.8 lbs  TBW % Change from start weight: 26.9%  Ideal Body Weight (#): 119.1 lbs BMI 25 (58")  Goal Weight (#):  lbs    Highest: 183 lbs  Lowest: 97 lbs mid 20's    Weight Loss History  Previous weight loss attempts: Commercial Programs (Weight Watchers, [employer], etc.)  Exercise  Self Created Diets (Portion Control, Healthy Food Choices, etc.)    Food and Nutrition Related History  Wake up:  6:00   Bed Time: 10    Food Recall  Breakfast: 8:00: decaf coffee w/2 tbsp h/h, pure protein bar OR just crack an egg OR cottage cheese or yogurt, fruit OR oatmeal or cherrios w/fairlife  Snack:  quest chips OR string cheese OR fruit OR simply protein bar  Lunch: 12:00:   2.5-3 oz deli turkey or chicken or tuna or egg, mission low carb or flatout wrap or banana, light batres OR healthy choice meal OR shrimp salsa, string cheese   OR egg salad (1 whole, 2 white), light batres OR Healthy Choice or Progresso light soup  Snack: 4:00: protein shake OR Decaf coffee w/2 tbsp h/h, fruit, string cheese OR occasional chocolate  Dinner: 5:00:~4 oz chicken/fish/hamburger, 1 1/2 cup veggies, corn/peas OR  healthy choice meal or lean cuisine   Snack:  Skip     Beverages: water, decaf coffee/tea, diet soda and alcohol (rare)  Volume of beverage intake: 32oz water/decaf coffee    Weekends: Same  Cravings:  carbs   Trouble area of day: late afternoon    Frequency of Eating out: 1x every other week  Food restrictions: lactose? Cooking: self   Food Shopping: self    Physical Activity Intake  Activity:Walking, cardio fitness class, matt, strength training, walking, aqua aerobics  Frequency: 2x/wk  gym strength, walking daily  Physical limitations/barriers to exercise:       Estimated Needs  Energy   [employer] Energy Needs:  BMR : 967   Maintenance sedentary: 1161          Maintenance lightly active: 1330  Protein: 55-68 gm      (1.2-1.5g/kg IBW)  Fluid: 53 oz     (35mL/kg IBW)    Nutrition Diagnosis  No, resolved    Nutrition Intervention    Nutrition Prescription  Calories:9390-9954  Protein: 55-70 gm    Meal Plan (Stewart/Pro)  Breakfast: 250-350, 15   Snack:  150  Lunch: 300, 15-20  Snack:  150, 5-10  Dinner: 300-350, 20-25  Snack: skip     Nutrition Education:    Healthy Core Manual  Calorie controlled menu  Lean protein food choices  Healthy snack options  Food journaling tips      Nutrition Counseling:  Strategies: meal planning, portion sizes, healthy snack choices, hydration, fiber intake, protein intake, exercise, food journal      Monitoring and Evaluation:  Evaluation criteria:  Energy Intake  Meet protein needs  Maintain adequate hydration  Monitor weekly weight  Meal planning/preparation  Food journal   Decreased portions at mealtimes and snacks  Physical activity     Barriers to learning:none  Readiness to change: Maintenance: (Maintaining the behavior change)    Comprehension: very good  Expected Compliance: very good

## 2023-10-26 ENCOUNTER — APPOINTMENT (OUTPATIENT)
Dept: LAB | Facility: CLINIC | Age: 78
End: 2023-10-26
Payer: MEDICARE

## 2023-10-26 DIAGNOSIS — J06.9 UPPER RESPIRATORY TRACT INFECTION, UNSPECIFIED TYPE: ICD-10-CM

## 2023-10-26 DIAGNOSIS — E55.9 VITAMIN D DEFICIENCY: ICD-10-CM

## 2023-10-26 DIAGNOSIS — E78.2 MIXED HYPERLIPIDEMIA: ICD-10-CM

## 2023-10-26 LAB
25(OH)D3 SERPL-MCNC: 42.9 NG/ML (ref 30–100)
ALBUMIN SERPL BCP-MCNC: 4.3 G/DL (ref 3.5–5)
ALP SERPL-CCNC: 40 U/L (ref 34–104)
ALT SERPL W P-5'-P-CCNC: 35 U/L (ref 7–52)
ANION GAP SERPL CALCULATED.3IONS-SCNC: 10 MMOL/L
AST SERPL W P-5'-P-CCNC: 32 U/L (ref 13–39)
BASOPHILS # BLD AUTO: 0.02 THOUSANDS/ÂΜL (ref 0–0.1)
BASOPHILS NFR BLD AUTO: 0 % (ref 0–1)
BILIRUB SERPL-MCNC: 0.66 MG/DL (ref 0.2–1)
BUN SERPL-MCNC: 18 MG/DL (ref 5–25)
CALCIUM SERPL-MCNC: 9.8 MG/DL (ref 8.4–10.2)
CHLORIDE SERPL-SCNC: 103 MMOL/L (ref 96–108)
CHOLEST SERPL-MCNC: 189 MG/DL
CO2 SERPL-SCNC: 28 MMOL/L (ref 21–32)
CREAT SERPL-MCNC: 0.65 MG/DL (ref 0.6–1.3)
EOSINOPHIL # BLD AUTO: 0.09 THOUSAND/ÂΜL (ref 0–0.61)
EOSINOPHIL NFR BLD AUTO: 2 % (ref 0–6)
ERYTHROCYTE [DISTWIDTH] IN BLOOD BY AUTOMATED COUNT: 16.6 % (ref 11.6–15.1)
GFR SERPL CREATININE-BSD FRML MDRD: 85 ML/MIN/1.73SQ M
GLUCOSE P FAST SERPL-MCNC: 66 MG/DL (ref 65–99)
HCT VFR BLD AUTO: 43.7 % (ref 34.8–46.1)
HDLC SERPL-MCNC: 106 MG/DL
HGB BLD-MCNC: 15 G/DL (ref 11.5–15.4)
IMM GRANULOCYTES # BLD AUTO: 0.02 THOUSAND/UL (ref 0–0.2)
IMM GRANULOCYTES NFR BLD AUTO: 0 % (ref 0–2)
LDLC SERPL CALC-MCNC: 64 MG/DL (ref 0–100)
LYMPHOCYTES # BLD AUTO: 1.33 THOUSANDS/ÂΜL (ref 0.6–4.47)
LYMPHOCYTES NFR BLD AUTO: 26 % (ref 14–44)
MCH RBC QN AUTO: 31.2 PG (ref 26.8–34.3)
MCHC RBC AUTO-ENTMCNC: 34.3 G/DL (ref 31.4–37.4)
MCV RBC AUTO: 91 FL (ref 82–98)
MONOCYTES # BLD AUTO: 0.33 THOUSAND/ÂΜL (ref 0.17–1.22)
MONOCYTES NFR BLD AUTO: 6 % (ref 4–12)
NEUTROPHILS # BLD AUTO: 3.41 THOUSANDS/ÂΜL (ref 1.85–7.62)
NEUTS SEG NFR BLD AUTO: 66 % (ref 43–75)
NRBC BLD AUTO-RTO: 0 /100 WBCS
PLATELET # BLD AUTO: 289 THOUSANDS/UL (ref 149–390)
PMV BLD AUTO: 9.8 FL (ref 8.9–12.7)
POTASSIUM SERPL-SCNC: 4 MMOL/L (ref 3.5–5.3)
PROT SERPL-MCNC: 7.2 G/DL (ref 6.4–8.4)
RBC # BLD AUTO: 4.81 MILLION/UL (ref 3.81–5.12)
SODIUM SERPL-SCNC: 141 MMOL/L (ref 135–147)
TRIGL SERPL-MCNC: 97 MG/DL
WBC # BLD AUTO: 5.2 THOUSAND/UL (ref 4.31–10.16)

## 2023-10-26 PROCEDURE — 80053 COMPREHEN METABOLIC PANEL: CPT

## 2023-10-26 PROCEDURE — 85025 COMPLETE CBC W/AUTO DIFF WBC: CPT

## 2023-10-26 PROCEDURE — 82306 VITAMIN D 25 HYDROXY: CPT

## 2023-10-26 PROCEDURE — 36415 COLL VENOUS BLD VENIPUNCTURE: CPT

## 2023-10-26 PROCEDURE — 80061 LIPID PANEL: CPT

## 2023-10-30 ENCOUNTER — OFFICE VISIT (OUTPATIENT)
Dept: BARIATRICS | Facility: CLINIC | Age: 78
End: 2023-10-30

## 2023-10-30 VITALS — HEIGHT: 58 IN | WEIGHT: 129.8 LBS | BODY MASS INDEX: 27.25 KG/M2

## 2023-10-30 DIAGNOSIS — R63.5 ABNORMAL WEIGHT GAIN: Primary | ICD-10-CM

## 2023-10-30 PROCEDURE — RECHECK

## 2023-11-06 ENCOUNTER — OFFICE VISIT (OUTPATIENT)
Dept: INTERNAL MEDICINE CLINIC | Facility: CLINIC | Age: 78
End: 2023-11-06
Payer: MEDICARE

## 2023-11-06 VITALS
TEMPERATURE: 97.9 F | HEIGHT: 58 IN | BODY MASS INDEX: 27.5 KG/M2 | WEIGHT: 131 LBS | DIASTOLIC BLOOD PRESSURE: 80 MMHG | SYSTOLIC BLOOD PRESSURE: 124 MMHG | HEART RATE: 57 BPM | OXYGEN SATURATION: 97 % | RESPIRATION RATE: 15 BRPM

## 2023-11-06 DIAGNOSIS — F51.01 PRIMARY INSOMNIA: ICD-10-CM

## 2023-11-06 DIAGNOSIS — Z00.00 MEDICARE ANNUAL WELLNESS VISIT, SUBSEQUENT: ICD-10-CM

## 2023-11-06 DIAGNOSIS — E55.9 VITAMIN D DEFICIENCY: ICD-10-CM

## 2023-11-06 DIAGNOSIS — M77.8 RIGHT SHOULDER TENDONITIS: Primary | ICD-10-CM

## 2023-11-06 DIAGNOSIS — S99.292A OTHER PHYSEAL FRACTURE OF PHALANX OF LEFT TOE, INITIAL ENCOUNTER FOR CLOSED FRACTURE: ICD-10-CM

## 2023-11-06 DIAGNOSIS — E78.2 MIXED HYPERLIPIDEMIA: ICD-10-CM

## 2023-11-06 PROCEDURE — 99214 OFFICE O/P EST MOD 30 MIN: CPT | Performed by: INTERNAL MEDICINE

## 2023-11-06 PROCEDURE — G0439 PPPS, SUBSEQ VISIT: HCPCS | Performed by: INTERNAL MEDICINE

## 2023-11-06 RX ORDER — ATORVASTATIN CALCIUM 10 MG/1
10 TABLET, FILM COATED ORAL DAILY
Qty: 90 TABLET | Refills: 3 | Status: SHIPPED | OUTPATIENT
Start: 2023-11-06

## 2023-11-06 RX ORDER — TRAZODONE HYDROCHLORIDE 50 MG/1
50 TABLET ORAL
Qty: 90 TABLET | Refills: 3 | Status: SHIPPED | OUTPATIENT
Start: 2023-11-06

## 2023-11-06 NOTE — PROGRESS NOTES
Assessment and Plan:     Problem List Items Addressed This Visit          Other    Hyperlipidemia    Relevant Medications    atorvastatin (LIPITOR) 10 mg tablet    Other Relevant Orders    CBC and differential    Comprehensive metabolic panel    Lipid Panel with Direct LDL reflex     Other Visit Diagnoses       Right shoulder tendonitis    -  Primary    Relevant Orders    CBC and differential    Comprehensive metabolic panel    Vitamin D deficiency        Relevant Orders    CBC and differential    Comprehensive metabolic panel    Vitamin D 25 hydroxy    Other physeal fracture of phalanx of left toe, initial encounter for closed fracture        Relevant Orders    Ambulatory Referral to Podiatry    CBC and differential    Comprehensive metabolic panel    Primary insomnia        Relevant Medications    traZODone (DESYREL) 50 mg tablet    Other Relevant Orders    CBC and differential    Comprehensive metabolic panel    Medicare annual wellness visit, subsequent              #Pressure in Ears  -has associated hearing loss  -present since December 2022  -went to urgent care and received zyrtec and afrin without relief  -trialed on cortisporin ear drops through a virtual visit without improvement  -medrol dose harinder without relief  -2023 MRI brain normal  -ENT normal as well    #Toe Fracture  -stubbed left 2nd toe outside at the pool  -went to urgent care and xray with 2nd toe distal phalanx intraarticular avulsion fracture dorsal base  -saw orthopedics at [address] and suggested monitoring  -still has pain since Labor Day  -will refer to podiatry Dr Marva James    #Shoulder Tendonitis  -over right side  -injured while lifting weights at the gym  -will trial ibuprofen temporarily and do stretching exercises     #Fibroadenoma  -on left breast  -undergoing surveillance US every 6 months at [address]     #HLD  -LDL 59   -father and grandfather with MI  -continue atorvastatin    #COVID  -infection in August 2022  -treated with paxlovid    #Uterine Bleeding  -abnormal in past and underwent DNC then had hysterectomy 1999  -now resolved    #Arthritis  -b/l knee replacements  -has stiffness but denies pain  -has some mobility issues going down steps but takes her time    #Achilles Pain  -underwent surgical repair to right achilles and now resolved  -MRI 2021 with severe right achilles tendinosis with low grade interstitial tearing at critical zone    #Cataracts  -previous surgery and seeing well  -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  -chronic and seeing ophthalmology Dr Juanita Davis    #IBS  -has diarrhea with vegetables cooked or raw  -remains on fiber supplementation  -will add on probiotics to see if there is improvement  -colonoscopy pending     #Osteopenia  -noted on DEXA 2023  -will continue with vitamin D in multivitamin and weight lifting exercises     #Family History  -father and grandfather with early MI  -VAS screen normal  -ECHO 2022 EF 60-65% with mild AVR, mild MVR  -daughters with melanoma and seeing Dr Noel Pérez for skin exam      #Urinary Incontinence  -underwent transvaginal sling    #Health Maintenance  -routine labs and followup 6 months  -mammogram up to date 2023 at [facility]  -covid booster and flu vaccine up to date 0  -retired   -colonsocopy up to date 2022 and does not require further surveillance  -PNA vaccine up to date at age 72 per patient, used to live in [facility] before moving here to be closer to daughter  -seeing gynecology Dr Nat Jaquez      Depression Screening and Follow-up Plan: Patient was screened for depression during today's encounter. They screened negative with a PHQ-2 score of 0. Preventive health issues were discussed with patient, and age appropriate screening tests were ordered as noted in patient's After Visit Summary. Personalized health advice and appropriate referrals for health education or preventive services given if needed, as noted in patient's After Visit Summary.      History of Present Illness:     Patient presents for a Medicare Wellness Visit    HPI patient presents for routine checkup. Denies any recent hospitalizations or surgeries besides an urgent care visit for a toe fracture back in September. She was at the pool and stubbed her toe. She has a fracture over the second intra-articular dorsal base of the second toe of the distal phalanx. She continues to have pain here with tenderness over the site. She has not been using anything or applying anything to this. Recommended that she see podiatry for a second opinion. We gave her the referral for this. She did see orthopedics but they recommended nothing. Vitamin D is 42.9 and stable. Her LDL is 64 and  currently controlled with atorvastatin. She will continue with this due to her family history of MI. She pulled her right shoulder several weeks ago while lifting weights at the gym. We will give her a series of stretches to do. She has good range of motion. She has insomnia and we will start her on trazodone to see if this will help. She will return to care in 6 months. Patient Care Team:  Seth Diop DO as PCP - General (Internal Medicine)     Review of Systems:     Review of Systems   Constitutional:  Negative for activity change, appetite change, chills, fatigue and fever. HENT:  Negative for congestion, ear pain, facial swelling, hearing loss, sore throat, tinnitus and trouble swallowing. Eyes:  Negative for photophobia and visual disturbance. Respiratory:  Negative for cough, shortness of breath and wheezing. Cardiovascular:  Negative for chest pain and leg swelling. Gastrointestinal:  Negative for abdominal distention, abdominal pain, blood in stool, constipation, diarrhea, nausea and vomiting. Genitourinary:  Negative for difficulty urinating, dysuria and pelvic pain. Musculoskeletal:  Positive for arthralgias (left 2nd toe fracture).  Negative for back pain, gait problem, joint swelling, myalgias, neck pain and neck stiffness. Skin:  Negative for rash and wound. Neurological:  Negative for dizziness, tremors, light-headedness and headaches. Psychiatric/Behavioral:  Positive for sleep disturbance (insomnia). Problem List:     Patient Active Problem List   Diagnosis    Lumbar disc herniation    Lumbar radiculopathy    Spinal stenosis of lumbar region with neurogenic claudication    Retrocalcaneal bursitis (back of heel), right    Overweight    Essential hypertension    Hyperlipidemia    MCI (mild cognitive impairment)    Anxiety    Family history of early CAD    History of abnormal uterine bleeding    History of hysterectomy    Fibroadenoma of breast, left      Past Medical and Surgical History:     Past Medical History:   Diagnosis Date    Arthritis     Edema of both lower legs 11/01/2018    Dr. Bobbi Jiang; faxed records. Hx of skin cancer, basal cell     Hyperlipidemia     Hypertension     Impingement syndrome of left shoulder 04/10/2018    Dr. Bobbi Jiang, faxed patient records. Migraines     Osteopenia     Overactive bladder      Past Surgical History:   Procedure Laterality Date    ACHILLES TENDON REPAIR      CATARACT EXTRACTION, BILATERAL      COLPOPEXY VAGINAL EXTRAPERITONEAL (VEC) WITH INSERTION PUBOVAGINAL SLING      CYST REMOVAL      HERNIA REPAIR      HYSTERECTOMY      JOINT REPLACEMENT Right     REPLACEMENT TOTAL KNEE      TONSILLECTOMY  1949      Family History:     Family History   Problem Relation Age of Onset    Heart disease Father     Ovarian cancer Maternal Aunt     Breast cancer Paternal Aunt     Hypertension Paternal Grandfather     Breast cancer Maternal Grandmother     Heart disease Paternal Grandmother     Diabetes Neg Hx     Stroke Neg Hx     Thyroid disease Neg Hx       Social History:     Social History     Socioeconomic History    Marital status:       Spouse name: None    Number of children: 2    Years of education: 25 Highest education level: Master's degree (e.g., MA, MS, Derek, MEd, MSW, CHANELL)   Occupational History    None   Tobacco Use    Smoking status: Former     Types: Cigarettes    Smokeless tobacco: Never   Vaping Use    Vaping Use: Never used   Substance and Sexual Activity    Alcohol use: Yes     Comment: occasional    Drug use: Never    Sexual activity: Not Currently   Other Topics Concern    None   Social History Narrative    None     Social Determinants of Health     Financial Resource Strain: Unknown (11/6/2023)    Overall Financial Resource Strain (CARDIA)     Difficulty of Paying Living Expenses: Patient refused   Food Insecurity: No Food Insecurity (3/24/2022)    Hunger Vital Sign     Worried About Running Out of Food in the Last Year: Never true     [address] in the Last Year: Never true   Transportation Needs: Unknown (11/6/2023)    [facility] Transportation     Lack of Transportation (Medical): Patient refused     Lack of Transportation (Non-Medical): Patient refused   Physical Activity: Sufficiently Active (3/24/2022)    Exercise Vital Sign     Days of Exercise per Week: 4 days     Minutes of Exercise per Session: 60 min   Stress: Stress Concern Present (3/24/2022)    [address] [place] [address]     Feeling of Stress : Rather much   Social Connections: Socially Isolated (3/24/2022)    Social Connection and Isolation Panel [NHANES]     Frequency of Communication with Friends and Family: More than three times a week     Frequency of Social Gatherings with Friends and Family: Three times a week     Attends Gnosticist Services: Never     Active Member of Clubs or Organizations: No     Attends Club or Organization Meetings: Never     Marital Status:     Intimate Partner Violence: Not At Risk (3/24/2022)    Humiliation, Afraid, Rape, and Kick questionnaire     Fear of Current or Ex-Partner: No     Emotionally Abused: No     Physically Abused: No     Sexually Abused: No   Housing Stability: Not on file      Medications and Allergies:     Current Outpatient Medications   Medication Sig Dispense Refill    atorvastatin (LIPITOR) 10 mg tablet Take 1 tablet (10 mg total) by mouth daily 90 tablet 3    traZODone (DESYREL) 50 mg tablet Take 1 tablet (50 mg total) by mouth daily at bedtime 90 tablet 3    calcium polycarbophil (FIBERCON) 625 mg tablet Take 625 mg by mouth daily      Multiple Vitamins-Minerals (MULTIVITAMIN WITH MINERALS) tablet Take 1 tablet by mouth daily       No current facility-administered medications for this visit. Allergies   Allergen Reactions    Imodium [Loperamide] Headache     Felt that peripheral vision was impaired and mild headache    Pedi-Pre Tape Spray [Wound Dressing Adhesive] Itching and Rash     Adhesive Tape    Tilactase Diarrhea    Bupropion Rash     Around 2010      Immunizations:     Immunization History   Administered Date(s) Administered    COVID-19 MODERNA VACC 0.5 ML IM 01/08/2021, 02/04/2021, 08/28/2021    COVID-19 Moderna mRNA Vaccine 12 Yr+ 50 mcg/0.5 mL (Spikevax) 09/28/2023    INFLUENZA 09/15/2018    Influenza, high dose seasonal 0.7 mL 09/30/2022    influenza, injectable, quadrivalent 09/28/2023      Health Maintenance:         Topic Date Due    Hepatitis C Screening  Never done    Breast Cancer Screening: Mammogram  04/07/2024    Colorectal Cancer Screening  01/26/2028    DXA SCAN  04/07/2028         Topic Date Due    Pneumococcal Vaccine: 65+ Years (1 - PCV) Never done      Medicare Screening Tests and Risk Assessments:     Riley Epstein is here for her Subsequent Wellness visit. Last Medicare Wellness visit information reviewed, patient interviewed and updates made to the record today. Health Risk Assessment:   Patient rates overall health as very good. Patient feels that their physical health rating is same. Patient is satisfied with their life. Eyesight was rated as same. Hearing was rated as same.  Patient feels that their emotional and mental health rating is same. Patients states they are never, rarely angry. Patient states they are often unusually tired/fatigued. Pain experienced in the last 7 days has been some. Patient's pain rating has been 3/10. Patient states that she has experienced no weight loss or gain in last 6 months. Depression Screening:   PHQ-2 Score: 0      Fall Risk Screening: In the past year, patient has experienced: no history of falling in past year      Urinary Incontinence Screening:   Patient has leaked urine accidently in the last six months. Home Safety:  Patient does not have trouble with stairs inside or outside of their home. Patient has working smoke alarms and has working carbon monoxide detector. Home safety hazards include: none. Nutrition:   Current diet is Regular. Medications:   Patient is currently taking over-the-counter supplements. OTC medications include: see medication list. Patient is able to manage medications. Activities of Daily Living (ADLs)/Instrumental Activities of Daily Living (IADLs):   Walk and transfer into and out of bed and chair?: Yes  Dress and groom yourself?: Yes    Bathe or shower yourself?: Yes    Feed yourself? Yes  Do your laundry/housekeeping?: Yes  Manage your money, pay your bills and track your expenses?: Yes  Make your own meals?: Yes    Do your own shopping?: Yes    Previous Hospitalizations:   Any hospitalizations or ED visits within the last 12 months?: No      Advance Care Planning:   Living will: Yes    Durable POA for healthcare:  Yes    Advanced directive: Yes      PREVENTIVE SCREENINGS      Cardiovascular Screening:    General: Screening Not Indicated and History Lipid Disorder      Diabetes Screening:     General: Screening Current      Colorectal Cancer Screening:     General: Screening Current      Breast Cancer Screening:     General: Screening Current      Cervical Cancer Screening:    General: Screening Not Indicated Osteoporosis Screening:    General: Screening Current      Lung Cancer Screening:     General: Screening Not Indicated    Screening, Brief Intervention, and Referral to Treatment (SBIRT)    Screening  Typical number of drinks in a day: 0  Typical number of drinks in a week: 2  Interpretation: Low risk drinking behavior. AUDIT-C Screenin) How often did you have a drink containing alcohol in the past year? monthly or less  2) How many drinks did you have on a typical day when you were drinking in the past year? 1 to 2  3) How often did you have 6 or more drinks on one occasion in the past year? never    AUDIT-C Score: 1  Interpretation: Score 0-2 (female): Negative screen for alcohol misuse    Single Item Drug Screening:  How often have you used an illegal drug (including marijuana) or a prescription medication for non-medical reasons in the past year? never    Single Item Drug Screen Score: 0  Interpretation: Negative screen for possible drug use disorder    No results found. Physical Exam:     /80   Pulse 57   Temp 97.9 °F (36.6 °C)   Resp 15   Ht 4' 10.25" (1.48 m)   Wt 59.4 kg (131 lb)   SpO2 97%   BMI 27.14 kg/m²     Physical Exam  Vitals and nursing note reviewed. Constitutional:       General: She is not in acute distress. Appearance: Normal appearance. She is well-developed. HENT:      Head: Normocephalic and atraumatic. Right Ear: Tympanic membrane, ear canal and external ear normal. There is no impacted cerumen. Left Ear: Tympanic membrane, ear canal and external ear normal. There is no impacted cerumen. Nose: No congestion or rhinorrhea. Mouth/Throat:      Pharynx: Oropharynx is clear. No oropharyngeal exudate or posterior oropharyngeal erythema. Eyes:      General: No scleral icterus. Right eye: No discharge. Left eye: No discharge.       Conjunctiva/sclera: Conjunctivae normal.   Cardiovascular:      Rate and Rhythm: Normal rate and regular rhythm. Pulses: Normal pulses. Heart sounds: Normal heart sounds. No murmur heard. Pulmonary:      Effort: Pulmonary effort is normal. No respiratory distress. Breath sounds: Normal breath sounds. No stridor. No wheezing, rhonchi or rales. Abdominal:      General: There is no distension. Palpations: Abdomen is soft. There is no mass. Tenderness: There is no abdominal tenderness. There is no guarding or rebound. Musculoskeletal:         General: Tenderness (left 2nd toe fracture and right shoulder muscle strain) present. No swelling. Cervical back: Neck supple. Right lower leg: No edema. Left lower leg: No edema. Skin:     General: Skin is warm and dry. Capillary Refill: Capillary refill takes less than 2 seconds. Neurological:      Mental Status: She is alert and oriented to person, place, and time. Mental status is at baseline. Psychiatric:         Mood and Affect: Mood normal.         Behavior: Behavior normal.         Thought Content:  Thought content normal.         Judgment: Judgment normal.          Misael Channel, DO

## 2023-11-06 NOTE — PATIENT INSTRUCTIONS
Medicare Preventive Visit Patient Instructions  Thank you for completing your Welcome to Medicare Visit or Medicare Annual Wellness Visit today. Your next wellness visit will be due in one year (11/6/2024). The screening/preventive services that you may require over the next 5-10 years are detailed below. Some tests may not apply to you based off risk factors and/or age. Screening tests ordered at today's visit but not completed yet may show as past due. Also, please note that scanned in results may not display below. Preventive Screenings:  Service Recommendations Previous Testing/Comments   Colorectal Cancer Screening  * Colonoscopy    * Fecal Occult Blood Test (FOBT)/Fecal Immunochemical Test (FIT)  * Fecal DNA/Cologuard Test  * Flexible Sigmoidoscopy Age: 43-73 years old   Colonoscopy: every 10 years (may be performed more frequently if at higher risk)  OR  FOBT/FIT: every 1 year  OR  Cologuard: every 3 years  OR  Sigmoidoscopy: every 5 years  Screening may be recommended earlier than age 39 if at higher risk for colorectal cancer. Also, an individualized decision between you and your healthcare provider will decide whether screening between the ages of 77-80 would be appropriate. Colonoscopy: 01/27/2023  FOBT/FIT: Not on file  Cologuard: Not on file  Sigmoidoscopy: Not on file    Screening Current     Breast Cancer Screening Age: 36 years old  Frequency: every 1-2 years  Not required if history of left and right mastectomy Mammogram: 04/07/2023    Screening Current   Cervical Cancer Screening Between the ages of 21-29, pap smear recommended once every 3 years. Between the ages of 32-69, can perform pap smear with HPV co-testing every 5 years.    Recommendations may differ for women with a history of total hysterectomy, cervical cancer, or abnormal pap smears in past. Pap Smear: Not on file    Screening Not Indicated   Hepatitis C Screening Once for adults born between 1945 and 1965  More frequently in patients at high risk for Hepatitis C Hep C Antibody: Not on file        Diabetes Screening 1-2 times per year if you're at risk for diabetes or have pre-diabetes Fasting glucose: 66 mg/dL (10/26/2023)  A1C: 5.1 % (10/17/2022)  Screening Current   Cholesterol Screening Once every 5 years if you don't have a lipid disorder. May order more often based on risk factors. Lipid panel: 10/26/2023    Screening Not Indicated  History Lipid Disorder     Other Preventive Screenings Covered by Medicare:  Abdominal Aortic Aneurysm (AAA) Screening: covered once if your at risk. You're considered to be at risk if you have a family history of AAA. Lung Cancer Screening: covers low dose CT scan once per year if you meet all of the following conditions: (1) Age 48-67; (2) No signs or symptoms of lung cancer; (3) Current smoker or have quit smoking within the last 15 years; (4) You have a tobacco smoking history of at least 20 pack years (packs per day multiplied by number of years you smoked); (5) You get a written order from a healthcare provider. Glaucoma Screening: covered annually if you're considered high risk: (1) You have diabetes OR (2) Family history of glaucoma OR (3)  aged 48 and older OR (3)  American aged 72 and older  Osteoporosis Screening: covered every 2 years if you meet one of the following conditions: (1) You're estrogen deficient and at risk for osteoporosis based off medical history and other findings; (2) Have a vertebral abnormality; (3) On glucocorticoid therapy for more than 3 months; (4) Have primary hyperparathyroidism; (5) On osteoporosis medications and need to assess response to drug therapy. Last bone density test (DXA Scan): 04/07/2023. HIV Screening: covered annually if you're between the age of 14-79. Also covered annually if you are younger than 13 and older than 72 with risk factors for HIV infection.  For pregnant patients, it is covered up to 3 times per pregnancy. Immunizations:  Immunization Recommendations   Influenza Vaccine Annual influenza vaccination during flu season is recommended for all persons aged >= 6 months who do not have contraindications   Pneumococcal Vaccine   * Pneumococcal conjugate vaccine = PCV13 (Prevnar 13), PCV15 (Vaxneuvance), PCV20 (Prevnar 20)  * Pneumococcal polysaccharide vaccine = PPSV23 (Pneumovax) Adults 63-23 yo with certain risk factors or if 69+ yo  If never received any pneumonia vaccine: recommend Prevnar 20 (PCV20)  Give PCV20 if previously received 1 dose of PCV13 or PPSV23   Hepatitis B Vaccine 3 dose series if at intermediate or high risk (ex: diabetes, end stage renal disease, liver disease)   Respiratory syncytial virus (RSV) Vaccine - COVERED BY MEDICARE PART D  * RSVPreF3 (Arexvy) CDC recommends that adults 61years of age and older may receive a single dose of RSV vaccine using shared clinical decision-making (SCDM)   Tetanus (Td) Vaccine - COST NOT COVERED BY MEDICARE PART B Following completion of primary series, a booster dose should be given every 10 years to maintain immunity against tetanus. Td may also be given as tetanus wound prophylaxis. Tdap Vaccine - COST NOT COVERED BY MEDICARE PART B Recommended at least once for all adults. For pregnant patients, recommended with each pregnancy.    Shingles Vaccine (Shingrix) - COST NOT COVERED BY MEDICARE PART B  2 shot series recommended in those 19 years and older who have or will have weakened immune systems or those 50 years and older     Health Maintenance Due:      Topic Date Due   • Hepatitis C Screening  Never done   • Breast Cancer Screening: Mammogram  04/07/2024   • Colorectal Cancer Screening  01/26/2028   • DXA SCAN  04/07/2028     Immunizations Due:      Topic Date Due   • Pneumococcal Vaccine: 65+ Years (1 - PCV) Never done   • COVID-19 Vaccine (4 - Moderna series) 10/23/2021   • Influenza Vaccine (1) 09/01/2023     Advance Directives   What are advance directives? Advance directives are legal documents that state your wishes and plans for medical care. These plans are made ahead of time in case you lose your ability to make decisions for yourself. Advance directives can apply to any medical decision, such as the treatments you want, and if you want to donate organs. What are the types of advance directives? There are many types of advance directives, and each state has rules about how to use them. You may choose a combination of any of the following:  Living will: This is a written record of the treatment you want. You can also choose which treatments you do not want, which to limit, and which to stop at a certain time. This includes surgery, medicine, IV fluid, and tube feedings. Durable power of  for [facility]): This is a written record that states who you want to make healthcare choices for you when you are unable to make them for yourself. This person, called a proxy, is usually a family member or a friend. You may choose more than 1 proxy. Do not resuscitate (DNR) order:  A DNR order is used in case your heart stops beating or you stop breathing. It is a request not to have certain forms of treatment, such as CPR. A DNR order may be included in other types of advance directives. Medical directive: This covers the care that you want if you are in a coma, near death, or unable to make decisions for yourself. You can list the treatments you want for each condition. Treatment may include pain medicine, surgery, blood transfusions, dialysis, IV or tube feedings, and a ventilator (breathing machine). Values history: This document has questions about your views, beliefs, and how you feel and think about life. This information can help others choose the care that you would choose. Why are advance directives important? An advance directive helps you control your care.  Although spoken wishes may be used, it is better to have your wishes written down. Spoken wishes can be misunderstood, or not followed. Treatments may be given even if you do not want them. An advance directive may make it easier for your family to make difficult choices about your care. Urinary Incontinence   Urinary incontinence (UI)  is when you lose control of your bladder. UI develops because your bladder cannot store or empty urine properly. The 3 most common types of UI are stress incontinence, urge incontinence, or both. Medicines:   May be given to help strengthen your bladder control. Report any side effects of medication to your healthcare provider. Do pelvic muscle exercises often:  Your pelvic muscles help you stop urinating. Squeeze these muscles tight for 5 seconds, then relax for 5 seconds. Gradually work up to squeezing for 10 seconds. Do 3 sets of 15 repetitions a day, or as directed. This will help strengthen your pelvic muscles and improve bladder control. Train your bladder:  Go to the bathroom at set times, such as every 2 hours, even if you do not feel the urge to go. You can also try to hold your urine when you feel the urge to go. For example, hold your urine for 5 minutes when you feel the urge to go. As that becomes easier, hold your urine for 10 minutes. Self-care:   Keep a UI record. Write down how often you leak urine and how much you leak. Make a note of what you were doing when you leaked urine. Drink liquids as directed. You may need to limit the amount of liquid you drink to help control your urine leakage. Do not drink any liquid right before you go to bed. Limit or do not have drinks that contain caffeine or alcohol. Prevent constipation. Eat a variety of high-fiber foods. Good examples are high-fiber cereals, beans, vegetables, and whole-grain breads. Walking is the best way to trigger your intestines to have a bowel movement. Exercise regularly and maintain a healthy weight.   Weight loss and exercise will decrease pressure on your bladder and help you control your leakage. Use a catheter as directed  to help empty your bladder. A catheter is a tiny, plastic tube that is put into your bladder to drain your urine. Go to behavior therapy as directed. Behavior therapy may be used to help you learn to control your urge to urinate. Weight Management   Why it is important to manage your weight:  Being overweight increases your risk of health conditions such as heart disease, high blood pressure, type 2 diabetes, and certain types of cancer. It can also increase your risk for osteoarthritis, sleep apnea, and other respiratory problems. Aim for a slow, steady weight loss. Even a small amount of weight loss can lower your risk of health problems. How to lose weight safely:  A safe and healthy way to lose weight is to eat fewer calories and get regular exercise. You can lose up about 1 pound a week by decreasing the number of calories you eat by 500 calories each day. Healthy meal plan for weight management:  A healthy meal plan includes a variety of foods, contains fewer calories, and helps you stay healthy. A healthy meal plan includes the following:  Eat whole-grain foods more often. A healthy meal plan should contain fiber. Fiber is the part of grains, fruits, and vegetables that is not broken down by your body. Whole-grain foods are healthy and provide extra fiber in your diet. Some examples of whole-grain foods are whole-wheat breads and pastas, oatmeal, brown rice, and bulgur. Eat a variety of vegetables every day. Include dark, leafy greens such as spinach, kale, nava greens, and mustard greens. Eat yellow and orange vegetables such as carrots, sweet potatoes, and winter squash. Eat a variety of fruits every day. Choose fresh or canned fruit (canned in its own juice or light syrup) instead of juice. Fruit juice has very little or no fiber. Eat low-fat dairy foods. Drink fat-free (skim) milk or 1% milk.  Eat fat-free yogurt and low-fat cottage cheese. Try low-fat cheeses such as mozzarella and other reduced-fat cheeses. Choose meat and other protein foods that are low in fat. Choose beans or other legumes such as split peas or lentils. Choose fish, skinless poultry (chicken or turkey), or lean cuts of red meat (beef or pork). Before you cook meat or poultry, cut off any visible fat. Use less fat and oil. Try baking foods instead of frying them. Add less fat, such as margarine, sour cream, regular salad dressing and mayonnaise to foods. Eat fewer high-fat foods. Some examples of high-fat foods include french fries, doughnuts, ice cream, and cakes. Eat fewer sweets. Limit foods and drinks that are high in sugar. This includes candy, cookies, regular soda, and sweetened drinks. Exercise:  Exercise at least 30 minutes per day on most days of the week. Some examples of exercise include walking, biking, dancing, and swimming. You can also fit in more physical activity by taking the stairs instead of the elevator or parking farther away from stores. Ask your healthcare provider about the best exercise plan for you. © Copyright [employer] 2018 Information is for End User's use only and may not be sold, redistributed or otherwise used for commercial purposes.  All illustrations and images included in CareNotes® are the copyrighted property of [employer] Inc. or [address] [place] [address]

## 2023-11-20 ENCOUNTER — EVALUATION (OUTPATIENT)
Dept: PHYSICAL THERAPY | Facility: CLINIC | Age: 78
End: 2023-11-20
Payer: MEDICARE

## 2023-11-20 DIAGNOSIS — M54.2 CERVICAL SPINE PAIN: Primary | ICD-10-CM

## 2023-11-20 PROCEDURE — 97161 PT EVAL LOW COMPLEX 20 MIN: CPT | Performed by: PHYSICAL THERAPIST

## 2023-11-20 NOTE — LETTER
2023    Whitney Verde,  [address]    Patient: Pina Figueroa   YOB: 1945   Date of Visit: 2023     Encounter Diagnosis     ICD-10-CM    1. Cervical spine pain  M54.2           Dear Dr. Harriet Villalba: Thank you for your recent referral of Pina Figueroa. Please review the attached evaluation summary from Yuni's recent visit. Please verify that you agree with the plan of care by signing the attached order. If you have any questions or concerns, please do not hesitate to call. I sincerely appreciate the opportunity to share in the care of one of your patients and hope to have another opportunity to work with you in the near future. Sincerely,    Barry Barriga, PT      Referring Provider:      I certify that I have read the below Plan of Care and certify the need for these services furnished under this plan of treatment while under my care. Whitney Verde MD  [place] [id number]  Via Fax: [phone]          PT Evaluation     Today's date: 2023  Patient name: Pina Figueroa  : 1945  MRN: [id number]  Referring provider: Whitney Verde MD  Dx:   Encounter Diagnosis     ICD-10-CM    1. Cervical spine pain  M54.2                      Assessment  Assessment details: Pt is a 66 y.o. female who presents to outpatient PT with pain, decreased rom, decreased strength and decreased functional mobility. She will benefit from skilled PT to address these deficits in order to achieve her goals and maximize her functional mobility. Goals  Short Term Goals: Independent performance of initial hep  Decrease pain 2 points on VAS      Long Term Goals:   Independent performance of comprehensive hep  Work performance is returned to max level of function  Performance of IADL's is returned to max level of function  Performance in recreational activities is improved to max level of function  Able to use sideview mirror when driving with min pain    Plan  Planned therapy interventions: abdominal trunk stabilization, joint mobilization, IASTM, manual therapy, massage, postural training, patient education, stretching, strengthening, therapeutic activities, therapeutic exercise, therapeutic training, home exercise program and IADL retraining  Frequency: 2x week  Duration in weeks: 6        Subjective Evaluation    History of Present Illness  Mechanism of injury: Pt reports that she has having neck pain for several weeks, insidious onset. X-ray reveal degenerative changes per pt reports. Has been tx with flexaril and meloxicam but stopped secondary to side effects. Reports that pain is R sided but will not radiate below her shoulder. Reports that she notices "crunching" when turning her head while driving. Reports occasional disturbances at night from pain  Has attempted heat to the area with min pain reduction. Reports that she works out using [employer] but has stopped performing pull downs and [employer] out of concern about possibly aggravating her symptoms.     Patient Goals  Patient goals for therapy: decreased pain, increased strength, increased motion, return to sport/leisure activities and independence with ADLs/IADLs    Pain  Current pain rating: 3  At worst pain ratin          Objective  Cervical spine:    ROM:   Flexion:min limited, pain   Extension: mod limited,   Right Rotation: mod limited, pain   Left Rotation: mod limited, pain   Right Lateral Flexion: mod limited pain   Left Lateral Flexion:  max limited, pain      Poor control of deep cervical flexors noted with chin tuck  Reactive trigger points UT, levator, scalenes  Rounded shoulder/forward head posture, increased thoracic kyphosis  Hypomobility noted with spring testing t/o cervical spine and thoracic pain, hypomobile R UPA's  Traction: decreased pain  Compression: increased pain  Cervical flexion rotation: limited B/L  B/L shoulder rom is WFL                 Precautions: OA, TKA, chronic neck pain      Manuals             Cervical pa's             R UPA             Thoracic pa's             Long axis traction             DTM-prn             Laser-R UT prn             Neuro Re-Ed                                                                                                        Ther Ex             Chin tuck             Thoracic extension stretch             rows             LPD             Scap retraction with tb ER             Cervical arom                                       Ther Activity             Ube-retro of L-roll                          Gait Training                                       Modalities

## 2023-11-20 NOTE — PROGRESS NOTES
PT Evaluation     Today's date: 2023  Patient name: Karol Eubanks  : 1945  MRN: [id number]  Referring provider: Dionne Ge MD  Dx:   Encounter Diagnosis     ICD-10-CM    1. Cervical spine pain  M54.2                      Assessment  Assessment details: Pt is a 66 y.o. female who presents to outpatient PT with pain, decreased rom, decreased strength and decreased functional mobility. She will benefit from skilled PT to address these deficits in order to achieve her goals and maximize her functional mobility. Goals  Short Term Goals: Independent performance of initial hep  Decrease pain 2 points on VAS      Long Term Goals: Independent performance of comprehensive hep  Work performance is returned to max level of function  Performance of IADL's is returned to max level of function  Performance in recreational activities is improved to max level of function  Able to use sideview mirror when driving with min pain    Plan  Planned therapy interventions: abdominal trunk stabilization, joint mobilization, IASTM, manual therapy, massage, postural training, patient education, stretching, strengthening, therapeutic activities, therapeutic exercise, therapeutic training, home exercise program and IADL retraining  Frequency: 2x week  Duration in weeks: 6        Subjective Evaluation    History of Present Illness  Mechanism of injury: Pt reports that she has having neck pain for several weeks, insidious onset. X-ray reveal degenerative changes per pt reports. Has been tx with flexaril and meloxicam but stopped secondary to side effects. Reports that pain is R sided but will not radiate below her shoulder. Reports that she notices "crunching" when turning her head while driving. Reports occasional disturbances at night from pain  Has attempted heat to the area with min pain reduction.   Reports that she works out using [employer] but has stopped performing pull downs and [employer] out of concern about possibly aggravating her symptoms.     Patient Goals  Patient goals for therapy: decreased pain, increased strength, increased motion, return to sport/leisure activities and independence with ADLs/IADLs    Pain  Current pain rating: 3  At worst pain ratin          Objective  Cervical spine:    ROM:   Flexion:min limited, pain   Extension: mod limited,   Right Rotation: mod limited, pain   Left Rotation: mod limited, pain   Right Lateral Flexion: mod limited pain   Left Lateral Flexion:  max limited, pain      Poor control of deep cervical flexors noted with chin tuck  Reactive trigger points UT, levator, scalenes  Rounded shoulder/forward head posture, increased thoracic kyphosis  Hypomobility noted with spring testing t/o cervical spine and thoracic pain, hypomobile R UPA's  Traction: decreased pain  Compression: increased pain  Cervical flexion rotation: limited B/L  B/L shoulder rom is WFL                 Precautions: OA, TKA, chronic neck pain      Manuals             Cervical pa's             R UPA             Thoracic pa's             Long axis traction             DTM-prn             Laser-R UT prn             Neuro Re-Ed                                                                                                        Ther Ex             Chin tuck             Thoracic extension stretch             rows             LPD             Scap retraction with tb ER             Cervical arom                                       Ther Activity             Ube-retro of L-roll                          Gait Training                                       Modalities

## 2023-11-24 ENCOUNTER — OFFICE VISIT (OUTPATIENT)
Dept: PHYSICAL THERAPY | Facility: CLINIC | Age: 78
End: 2023-11-24
Payer: MEDICARE

## 2023-11-24 DIAGNOSIS — M54.2 CERVICAL SPINE PAIN: Primary | ICD-10-CM

## 2023-11-24 PROCEDURE — 97140 MANUAL THERAPY 1/> REGIONS: CPT

## 2023-11-24 PROCEDURE — 97110 THERAPEUTIC EXERCISES: CPT

## 2023-11-24 NOTE — PROGRESS NOTES
Daily Note     Today's date: 2023  Patient name: Liz Narvaez  : 1945  MRN: [id number]  Referring provider: Belia Salcedo MD  Dx:   Encounter Diagnosis     ICD-10-CM    1. Cervical spine pain  M54.2           Start Time: 1345  Stop Time: 1419  Total time in clinic (min): 34 minutes    Subjective: Patient states that she has a HA prior to start of session with pain in R>L paraspinals/ UT. Objective: See treatment diary below      Assessment: Tolerated treatment well. Mod TTP on R UT, but tolerated STM and mobs by DPT well. All activities explained prior to patient execution. Patient demonstrated fatigue post treatment and would benefit from continued PT      Plan: Continue per plan of care.       Precautions: OA, TKA, chronic neck pain      Manuals            Cervical pa's  LQ STM cervical paraspinals,    KL, DPT           R UPA             Thoracic pa's             Long axis traction             DTM-prn             Laser-R UT prn             Neuro Re-Ed                                                                                                        Ther Ex             Chin tuck  5"x20 supine           Thoracic extension stretch  5"x10           rows  RTB x20           LPD  RTB 2x10           Scap retraction with tb ER  2x10, 5"           Cervical arom  ROT x15 ea, flex/ext x15                                     Ther Activity             Ube-retro of L-roll  NV                        Gait Training                                       Modalities

## 2023-11-24 NOTE — PROGRESS NOTES
Weight Management Medical Nutrition Assessment  Parrish Terry is here for 2/3 bundle. Current wt: 133.7 lbs. She has gained 3.9  lbs in the last month and has maintained a weight loss of 44  lbs since December 2021. Recently hurt neck at gym. Currently in PT for this. She reports frequent binging and is unable to pinpoint the cause. Still on a waiting list for [address]. Declined to see our [address] at this time. Feels it may be helpful to resume weekly classes for accountability. Encouraged her to write down her motivators to see if this helps with instances of binging. Support and encouragement provided.        Patient seen by Medical Provider in past 6 months:  no  Requested to schedule appointment with Medical Provider: No    Anthropometric Measurements  Start Weight (#): 177.7 lb 12/28/21  Current Weight (#):  133.7 lbs  TBW % Change from start weight: 24.7%  Ideal Body Weight (#): 119.1 lbs BMI 25 (58")  Goal Weight (#):  lbs    Highest: 183 lbs  Lowest: 97 lbs mid 20's    Weight Loss History  Previous weight loss attempts: Commercial Programs ([employer], Snapstream, etc.)  Exercise  Self Created Diets (Portion Control, Healthy Food Choices, etc.)    Food and Nutrition Related History  Wake up:  6:00   Bed Time: 10    Food Recall  Breakfast: 8:00: decaf coffee w/2 tbsp h/h, pure protein bar OR just crack an egg OR cottage cheese or yogurt, fruit OR oatmeal or cherrios w/fairlife  Snack:  quest chips OR string cheese OR fruit OR simply protein bar  Lunch: 12:00:   2.5-3 oz deli turkey or chicken or tuna or egg, mission low carb or flatout wrap or banana, light batres OR healthy choice meal OR shrimp salsa, string cheese   OR egg salad (1 whole, 2 white), light batres OR Healthy Choice or Progresso light soup  Snack: 4:00: protein shake OR Decaf coffee w/2 tbsp h/h, fruit, string cheese OR occasional chocolate  Dinner: 5:00:~4 oz chicken/fish/hamburger, 1 1/2 cup veggies, corn/peas OR  healthy choice meal or lean cuisine Snack:  Skip     Beverages: water, decaf coffee/tea, diet soda and alcohol (rare)  Volume of beverage intake: 32oz water/decaf coffee    Weekends: Same  Cravings:  carbs   Trouble area of day: late afternoon    Frequency of Eating out: 1x every other week  Food restrictions: lactose? Cooking: self   Food Shopping: self    Physical Activity Intake  Activity:Walking, cardio fitness class, matt, strength training, walking, aqua aerobics  Frequency: 2x/wk  gym strength, walking daily  Physical limitations/barriers to exercise:       Estimated Needs  Energy   [employer] Needs:  BMR : 985   0.5 lb wk wt loss-Maintenance sedentary: 932-1182          0.5 lb wk wt loss-Maintenance lightly active: 3882-4501  Protein: 55-68 gm      (1.2-1.5g/kg IBW)  Fluid: 53 oz     (35mL/kg IBW)    Nutrition Diagnosis  No, resolved    Nutrition Intervention    Nutrition Prescription  Calories:6251-6400  Protein: 55-70 gm    Meal Plan (Stewart/Pro)  Breakfast: 250-350, 15   Snack:  150  Lunch: 300, 15-20  Snack:  150, 5-10  Dinner: 300-350, 20-25  Snack: skip     Nutrition Education:    Healthy Core Manual  Calorie controlled menu  Lean protein food choices  Healthy snack options  Food journaling tips      Nutrition Counseling:  Strategies: meal planning, portion sizes, healthy snack choices, hydration, fiber intake, protein intake, exercise, food journal      Monitoring and Evaluation:  Evaluation criteria:  Energy Intake  Meet protein needs  Maintain adequate hydration  Monitor weekly weight  Meal planning/preparation  Food journal   Decreased portions at mealtimes and snacks  Physical activity     Barriers to learning:none  Readiness to change: Maintenance: (Maintaining the behavior change)  & relapse  Comprehension: very good  Expected Compliance: very good

## 2023-11-27 ENCOUNTER — OFFICE VISIT (OUTPATIENT)
Dept: BARIATRICS | Facility: CLINIC | Age: 78
End: 2023-11-27

## 2023-11-27 VITALS — BODY MASS INDEX: 28.07 KG/M2 | HEIGHT: 58 IN | WEIGHT: 133.7 LBS

## 2023-11-27 DIAGNOSIS — R63.5 ABNORMAL WEIGHT GAIN: Primary | ICD-10-CM

## 2023-11-27 PROCEDURE — RECHECK

## 2023-12-04 ENCOUNTER — OFFICE VISIT (OUTPATIENT)
Dept: PHYSICAL THERAPY | Facility: CLINIC | Age: 78
End: 2023-12-04
Payer: MEDICARE

## 2023-12-04 DIAGNOSIS — M54.2 CERVICAL SPINE PAIN: Primary | ICD-10-CM

## 2023-12-04 PROCEDURE — 97140 MANUAL THERAPY 1/> REGIONS: CPT

## 2023-12-04 PROCEDURE — 97110 THERAPEUTIC EXERCISES: CPT

## 2023-12-04 NOTE — PROGRESS NOTES
Daily Note     Today's date: 2023  Patient name: Shay Weinstein  : 1945  MRN: [id number]  Referring provider: Joyce Gipson MD  Dx:   Encounter Diagnosis     ICD-10-CM    1. Cervical spine pain  M54.2           Start Time:   Stop Time: 356  Total time in clinic (min): 43 minutes      Subjective: Patient reports her neck felt a little sore after the last PT session. Objective: See treatment diary below. Assessment: Progression of therapeutic exercise program is tolerated well. Right upper trap tender during STM. Plan: Continue treatment as per PT plan of care.        Precautions: OA, TKA, chronic neck pain      Manuals           Cervical pa's  LQ STM cervical paraspinals,    KL, DPT DL          R UPA             Thoracic pa's   DL          Long axis traction   SOR  JLW          DTM prn   STM  JLW          Laser-R UT prn                                       Neuro Re-Ed                                                                                           Ther Ex             Chin tuck  5"x20 supine supine  5"x20          Thoracic extension stretch  5"x10 10"x10          rows  RTB x20 black  20          LPD  RTB 2x10 blue  20          Scap retraction with tb ER  2x10, 5" green  20          Cervical arom  ROT x15 ea, flex/ext x15           ube retro    6'                                    Ther Activity                                       Gait Training                                       Modalities

## 2023-12-07 ENCOUNTER — CLINICAL SUPPORT (OUTPATIENT)
Dept: BARIATRICS | Facility: CLINIC | Age: 78
End: 2023-12-07

## 2023-12-07 VITALS — HEIGHT: 58 IN | WEIGHT: 130.8 LBS | BODY MASS INDEX: 27.46 KG/M2

## 2023-12-07 DIAGNOSIS — R63.5 ABNORMAL WEIGHT GAIN: Primary | ICD-10-CM

## 2023-12-07 PROCEDURE — RECHECK

## 2023-12-08 ENCOUNTER — OFFICE VISIT (OUTPATIENT)
Dept: PHYSICAL THERAPY | Facility: CLINIC | Age: 78
End: 2023-12-08
Payer: MEDICARE

## 2023-12-08 DIAGNOSIS — M54.2 CERVICAL SPINE PAIN: Primary | ICD-10-CM

## 2023-12-08 PROCEDURE — 97110 THERAPEUTIC EXERCISES: CPT

## 2023-12-08 PROCEDURE — 97140 MANUAL THERAPY 1/> REGIONS: CPT

## 2023-12-08 NOTE — PROGRESS NOTES
Daily Note     Today's date: 2023  Patient name: Merary Thompson  : 1945  MRN: [id number]  Referring provider: Abbey Morocho MD  Dx:   Encounter Diagnosis     ICD-10-CM    1. Cervical spine pain  M54.2           Start Time: 1341  Stop Time: 1429  Total time in clinic (min): 48 minutes      Subjective: Patient arrives to today's PT session complaining of right sided neck and shoulder discomfort. Objective: See treatment diary below. Assessment: Progression of therapeutic exercise program is tolerated well without complaints. DTM effective in reducing soft tissue restriction in the right upper trap. Plan: Continue treatment as per PT plan of care.        Precautions: OA, TKA, chronic neck pain      Manuals          Cervical pa's  LQ STM cervical paraspinals,    KL, DPT DL KL         R UPA             Thoracic pa's   DL          Long axis traction   SOR  JLW SOR  JLW         DTM prn   STM  JLW JLW         Laser R UT prn    4'  JLW                                   Neuro Re-Ed                                                                                           Ther Ex             Chin tuck  5"x20 supine supine  5"x20 supine  5"x20         Thoracic extension stretch  5"x10 10"x10 w/ towel for c spine  10"x10         rows  RTB x20 black  20 melany  16.7  20         LPD  RTB 2x10 blue  20 melany  11.7  20         Scap retraction with tb ER  2x10, 5" green  20 green  20         Cervical arom  ROT x15 ea, flex/ext x15           ube retro    6' 6'                                   Ther Activity                                       Gait Training                                       Modalities

## 2023-12-10 ENCOUNTER — OFFICE VISIT (OUTPATIENT)
Dept: URGENT CARE | Facility: CLINIC | Age: 78
End: 2023-12-10
Payer: MEDICARE

## 2023-12-10 VITALS
OXYGEN SATURATION: 95 % | TEMPERATURE: 101.5 F | HEART RATE: 85 BPM | WEIGHT: 133.6 LBS | BODY MASS INDEX: 28.04 KG/M2 | DIASTOLIC BLOOD PRESSURE: 60 MMHG | HEIGHT: 58 IN | RESPIRATION RATE: 18 BRPM | SYSTOLIC BLOOD PRESSURE: 143 MMHG

## 2023-12-10 DIAGNOSIS — R50.9 FEVER, UNSPECIFIED FEVER CAUSE: Primary | ICD-10-CM

## 2023-12-10 PROCEDURE — 87636 SARSCOV2 & INF A&B AMP PRB: CPT | Performed by: PREVENTIVE MEDICINE

## 2023-12-10 PROCEDURE — 99213 OFFICE O/P EST LOW 20 MIN: CPT | Performed by: PREVENTIVE MEDICINE

## 2023-12-10 PROCEDURE — G0463 HOSPITAL OUTPT CLINIC VISIT: HCPCS | Performed by: PREVENTIVE MEDICINE

## 2023-12-10 RX ORDER — CODEINE PHOSPHATE/GUAIFENESIN 10-100MG/5
5 LIQUID (ML) ORAL 4 TIMES DAILY PRN
Qty: 118 ML | Refills: 1 | Status: SHIPPED | OUTPATIENT
Start: 2023-12-10

## 2023-12-10 NOTE — PROGRESS NOTES
[facility]        NAME: Mitchel Alvarado is a 66 y.o. female  : 1945    MRN: [id number]  DATE: December 10, 2023  TIME: 2:02 PM    Assessment and Plan   Fever, unspecified fever cause [R50.9]  1. Fever, unspecified fever cause  Covid/Flu-Office Collect            Patient Instructions       Follow up with PCP in 3-5 days. Proceed to  ER if symptoms worsen. Chief Complaint     Chief Complaint   Patient presents with    Cough     Patient has been complaining of a fever, cough, & runny days for about 4 days . Patient has been taking medication for symptoms . Patient did test for covid twice on thursday and Saturday both came out negative. History of Present Illness       Fever and cough times several days. No shortness of breath. He has had a flu shot. Negative home COVID    Cough  Associated symptoms include a fever. Review of Systems   Review of Systems   Constitutional:  Positive for fever. Respiratory:  Positive for cough.           Current Medications       Current Outpatient Medications:     atorvastatin (LIPITOR) 10 mg tablet, Take 1 tablet (10 mg total) by mouth daily, Disp: 90 tablet, Rfl: 3    calcium polycarbophil (FIBERCON) 625 mg tablet, Take 625 mg by mouth daily, Disp: , Rfl:     Multiple Vitamins-Minerals (MULTIVITAMIN WITH MINERALS) tablet, Take 1 tablet by mouth daily, Disp: , Rfl:     traZODone (DESYREL) 50 mg tablet, Take 1 tablet (50 mg total) by mouth daily at bedtime, Disp: 90 tablet, Rfl: 3    Current Allergies     Allergies as of 12/10/2023 - Reviewed 12/10/2023   Allergen Reaction Noted    Imodium [loperamide] Headache 2022    Pedi-pre tape spray [wound dressing adhesive] Itching and Rash 2022    Tilactase Diarrhea 2022    Bupropion Rash 12/10/2017            The following portions of the patient's history were reviewed and updated as appropriate: allergies, current medications, past family history, past medical history, past social history, past surgical history and problem list.     Past Medical History:   Diagnosis Date    Arthritis     Edema of both lower legs 11/01/2018    Dr. Constanza Chicas; faxed records. Hx of skin cancer, basal cell     Hyperlipidemia     Hypertension     Impingement syndrome of left shoulder 04/10/2018    Dr. Constanza Chicas, faxed patient records. Migraines     Osteopenia     Overactive bladder        Past Surgical History:   Procedure Laterality Date    ACHILLES TENDON REPAIR      CATARACT EXTRACTION, BILATERAL      COLPOPEXY VAGINAL EXTRAPERITONEAL (VEC) WITH INSERTION PUBOVAGINAL SLING      CYST REMOVAL      HERNIA REPAIR      HYSTERECTOMY      JOINT REPLACEMENT Right     REPLACEMENT TOTAL KNEE      TONSILLECTOMY  1949       Family History   Problem Relation Age of Onset    Heart disease Father     Ovarian cancer Maternal Aunt     Breast cancer Paternal Aunt     Hypertension Paternal Grandfather     Breast cancer Maternal Grandmother     Heart disease Paternal Grandmother     Diabetes Neg Hx     Stroke Neg Hx     Thyroid disease Neg Hx          Medications have been verified. Objective   /60 (BP Location: Left arm, Patient Position: Sitting)   Pulse 85   Temp (!) 101.5 °F (38.6 °C) (Tympanic)   Resp 18   Ht 4' 10" (1.473 m)   Wt 60.6 kg (133 lb 9.6 oz)   SpO2 95%   BMI 27.92 kg/m²   No LMP recorded. Physical Exam     Physical Exam  Pulmonary:      Breath sounds: No wheezing, rhonchi or rales.        PCR flu and COVID taken

## 2023-12-10 NOTE — PATIENT INSTRUCTIONS
Check tomorrow for the results of your flu and COVID screen. You are positive for the flu you should go on medication.   Recheck within the next 3 to 5 days if you are not improving

## 2023-12-11 ENCOUNTER — APPOINTMENT (OUTPATIENT)
Dept: PHYSICAL THERAPY | Facility: CLINIC | Age: 78
End: 2023-12-11
Payer: MEDICARE

## 2023-12-11 LAB
FLUAV RNA RESP QL NAA+PROBE: NEGATIVE
FLUBV RNA RESP QL NAA+PROBE: NEGATIVE
SARS-COV-2 RNA RESP QL NAA+PROBE: NEGATIVE

## 2023-12-13 ENCOUNTER — APPOINTMENT (OUTPATIENT)
Dept: PHYSICAL THERAPY | Facility: CLINIC | Age: 78
End: 2023-12-13
Payer: MEDICARE

## 2023-12-15 ENCOUNTER — APPOINTMENT (OUTPATIENT)
Dept: PHYSICAL THERAPY | Facility: CLINIC | Age: 78
End: 2023-12-15
Payer: MEDICARE

## 2023-12-18 ENCOUNTER — APPOINTMENT (OUTPATIENT)
Dept: PHYSICAL THERAPY | Facility: CLINIC | Age: 78
End: 2023-12-18
Payer: MEDICARE

## 2023-12-21 ENCOUNTER — OFFICE VISIT (OUTPATIENT)
Dept: BARIATRICS | Facility: CLINIC | Age: 78
End: 2023-12-21

## 2023-12-21 VITALS — BODY MASS INDEX: 27.93 KG/M2 | WEIGHT: 133.05 LBS | HEIGHT: 58 IN

## 2023-12-21 DIAGNOSIS — R63.5 ABNORMAL WEIGHT GAIN: Primary | ICD-10-CM

## 2023-12-21 PROCEDURE — RECHECK

## 2023-12-21 PROCEDURE — WEIGHT

## 2023-12-22 ENCOUNTER — APPOINTMENT (OUTPATIENT)
Dept: PHYSICAL THERAPY | Facility: CLINIC | Age: 78
End: 2023-12-22
Payer: MEDICARE

## 2024-01-15 ENCOUNTER — OFFICE VISIT (OUTPATIENT)
Dept: PHYSICAL THERAPY | Facility: CLINIC | Age: 79
End: 2024-01-15
Payer: MEDICARE

## 2024-01-15 DIAGNOSIS — M25.511 ACUTE PAIN OF RIGHT SHOULDER: Primary | ICD-10-CM

## 2024-01-15 PROCEDURE — 97161 PT EVAL LOW COMPLEX 20 MIN: CPT | Performed by: PHYSICAL THERAPIST

## 2024-01-15 NOTE — PROGRESS NOTES
"PT Evaluation     Today's date: 1/15/2024  Patient name: Yuni Keys  : 1945  MRN: [id number]  Referring provider: Forrest Dow MD  Dx:   Encounter Diagnosis     ICD-10-CM    1. Acute pain of right shoulder  M25.511                        Assessment  Assessment details: Pt is a 78 y.o. female who presents to outpatient PT via direct access with pain, decreased rom, decreased strength and decreased functional mobility. She will benefit from skilled PT to address these deficits in order to achieve her goals and maximize her functional mobility.           Goals  Short Term Goals:   Independent performance of initial hep  Decrease pain 2 points on VAS      Long Term Goals:  Independent performance of comprehensive hep  Work performance is returned to max level of function  Performance of IADL's is returned to max level of function  Performance in recreational activities is improved to max level of function  Able to use sideview mirror when driving with min pain    Plan  Plan details: 4-6 sessions   Planned therapy interventions: abdominal trunk stabilization, joint mobilization, IASTM, manual therapy, massage, postural training, patient education, stretching, strengthening, therapeutic activities, therapeutic exercise, therapeutic training, home exercise program and IADL retraining        Subjective Evaluation    History of Present Illness  Mechanism of injury: Pt reports that she has been having R shoulder pain for several weeks possible after performing \"fly\"  exercises at the gym. Reports that she has pain when reaching behind her head and above shoulder height. Denies pain to or below her elbow. Reports pain when lying on her R side.     Pt is known to the clinic, she was being tx for cervical radiculopathy but reports that this pain feels different and that she is partially compliant with her previous hep but has difficulty with thoracic extension and doorway stretch secondary to shoulder pain. She has " "been instructed to modify thoracic stretch and defer doorway stretch at this time.       Pt reports that she has having neck pain for several weeks, insidious onset. X-ray reveal degenerative changes per pt reports. Has been tx with flexaril and meloxicam but stopped secondary to side effects.  Reports that pain is R sided but will not radiate below her shoulder.  Reports that she notices \"crunching\" when turning her head while driving. Reports occasional disturbances at night from pain  Has attempted heat to the area with min pain reduction.  Reports that she works out using weights but has stopped performing pull downs and  press out of concern about possibly aggravating her symptoms.    Patient Goals  Patient goals for therapy: decreased pain, increased strength, increased motion, return to sport/leisure activities and independence with ADLs/IADLs    Pain  Current pain rating: 3  At worst pain ratin          Objective    R shoulder  AROM:   Flexion: 85   Abduction: 90  Shoulder shrug with active flexion and abuction         PROM:   Flexion: 145   Abduction: 145   ER:  75   IR: 32    Strength:    Flexion:  4/5   Abduction:   4-/5   ER:    4-/5, pain induced   IR:     5-/5      Tender to palpation: bicipital groove      Hall: neg  Neer: neg  Empty can: pos  Painful arc: pos  Scour: slight crepitus but no pain  Speeds: pos                           Precautions: OA, TKA, chronic neck pain      Manuals             prom             Jnt mobs                                                    Laser-anterior shoulder             Neuro Re-Ed                                                                                                        Ther Ex             Sidelying ER             Sleeper stretch             Y's, T's             Scap punches                                                                 Ther Activity             Ube-retro with L-roll                          Gait Training              "                          Modalities

## 2024-01-17 ENCOUNTER — OFFICE VISIT (OUTPATIENT)
Dept: PHYSICAL THERAPY | Facility: CLINIC | Age: 79
End: 2024-01-17
Payer: MEDICARE

## 2024-01-17 DIAGNOSIS — M25.511 ACUTE PAIN OF RIGHT SHOULDER: Primary | ICD-10-CM

## 2024-01-17 PROCEDURE — 97110 THERAPEUTIC EXERCISES: CPT | Performed by: PHYSICAL THERAPIST

## 2024-01-17 PROCEDURE — 97140 MANUAL THERAPY 1/> REGIONS: CPT | Performed by: PHYSICAL THERAPIST

## 2024-01-17 PROCEDURE — 97530 THERAPEUTIC ACTIVITIES: CPT | Performed by: PHYSICAL THERAPIST

## 2024-01-17 NOTE — PROGRESS NOTES
"Daily Note     Today's date: 2024  Patient name: Yuni Keys  : 1945  MRN: [id number]  Referring provider: Forrest Dow MD  Dx:   Encounter Diagnosis     ICD-10-CM    1. Acute pain of right shoulder  M25.511                      Subjective: pt reports compliance with her hep and that she fatigue when performing DB ER.        Objective: See treatment diary below      Assessment: initiated therex as listed.   Improved IR rom noted after manual tx. Pt fatigues quickly with ER but denies any sig pain. Monitor response and progress as neil Mercado.        Plan: Continue per plan of care.      Precautions: OA, TKA, chronic neck pain      Manuals             prom kl            Jnt mobs kl                                                   Laser-anterior shoulder 5'            Neuro Re-Ed                                                                                                        Ther Ex             Sidelying ER reviewed            Sleeper stretch 15\"x4            Y's, T's             Scap punches 3# 5\"x20            Scap retraction with cane extension 10\"x10            Bicep curls-scap retraction 4#x20                                      Ther Activity             Ube-retro with L-roll 4'/4'                         Gait Training                                       Modalities                                            "

## 2024-01-18 ENCOUNTER — CLINICAL SUPPORT (OUTPATIENT)
Dept: BARIATRICS | Facility: CLINIC | Age: 79
End: 2024-01-18

## 2024-01-18 VITALS — BODY MASS INDEX: 28.42 KG/M2 | HEIGHT: 58 IN | WEIGHT: 135.4 LBS

## 2024-01-18 DIAGNOSIS — R63.5 ABNORMAL WEIGHT GAIN: Primary | ICD-10-CM

## 2024-01-18 PROCEDURE — RECHECK

## 2024-01-23 ENCOUNTER — OFFICE VISIT (OUTPATIENT)
Dept: PHYSICAL THERAPY | Facility: CLINIC | Age: 79
End: 2024-01-23
Payer: MEDICARE

## 2024-01-23 DIAGNOSIS — M25.511 ACUTE PAIN OF RIGHT SHOULDER: Primary | ICD-10-CM

## 2024-01-23 PROCEDURE — 97140 MANUAL THERAPY 1/> REGIONS: CPT | Performed by: PHYSICAL THERAPIST

## 2024-01-23 PROCEDURE — 97110 THERAPEUTIC EXERCISES: CPT | Performed by: PHYSICAL THERAPIST

## 2024-01-23 PROCEDURE — 97530 THERAPEUTIC ACTIVITIES: CPT | Performed by: PHYSICAL THERAPIST

## 2024-01-23 NOTE — PROGRESS NOTES
"Daily Note     Today's date: 2024  Patient name: Yuni Keys  : 1945  MRN: [id number]  Referring provider: Forrest Dow MD  Dx:   No diagnosis found.                 Subjective: pt reports that she continues to have shoulder pain and has been noticing R UT soreness as well.        Objective: See treatment diary below      Assessment: pt responds well to manual tx with improved passive flexion and Ir rotation but IR remains limited. No sig pain reports t/o therex but pt fatigues quickly with sidelying ER. Requires occasional verbal cues to minimize shoulder shrug during therex. Instructed pt to perform scap retraction in front of mirror to minimize UT substitution.  Progress as neil NV.        Plan: Continue per plan of care.      Precautions: OA, TKA, chronic neck pain      Manuals            prom kl kl           Jnt mobs kl kl           DTM  R UT                                     Laser-anterior shoulder 5' 5'           Neuro Re-Ed                                                                                                        Ther Ex             Sidelying ER reviewed x30           Sleeper stretch 15\"x4 15\"x4           Y's, T's             Scap punches 3# 5\"x20 3#5\"x20           Scap retraction with cane extension 10\"x10            Bicep curls-scap retraction 4#x20 4#x20                                     Ther Activity             Ube-retro with L-roll 4'/4' 4'/4'                        Gait Training                                       Modalities                                            "

## 2024-01-25 ENCOUNTER — CLINICAL SUPPORT (OUTPATIENT)
Dept: BARIATRICS | Facility: CLINIC | Age: 79
End: 2024-01-25

## 2024-01-25 VITALS — WEIGHT: 132.8 LBS | HEIGHT: 58 IN | BODY MASS INDEX: 27.88 KG/M2

## 2024-01-25 DIAGNOSIS — R63.5 ABNORMAL WEIGHT GAIN: Primary | ICD-10-CM

## 2024-01-25 PROCEDURE — RECHECK

## 2024-01-29 ENCOUNTER — OFFICE VISIT (OUTPATIENT)
Dept: OBGYN CLINIC | Facility: MEDICAL CENTER | Age: 79
End: 2024-01-29
Payer: MEDICARE

## 2024-01-29 ENCOUNTER — APPOINTMENT (OUTPATIENT)
Dept: RADIOLOGY | Facility: MEDICAL CENTER | Age: 79
End: 2024-01-29
Payer: MEDICARE

## 2024-01-29 VITALS
DIASTOLIC BLOOD PRESSURE: 72 MMHG | WEIGHT: 133 LBS | BODY MASS INDEX: 27.92 KG/M2 | HEIGHT: 58 IN | SYSTOLIC BLOOD PRESSURE: 132 MMHG | HEART RATE: 62 BPM

## 2024-01-29 DIAGNOSIS — M75.21 BICEPS TENDONITIS, RIGHT: ICD-10-CM

## 2024-01-29 DIAGNOSIS — S92.532A CLOSED DISPLACED FRACTURE OF DISTAL PHALANX OF LESSER TOE OF LEFT FOOT, INITIAL ENCOUNTER: ICD-10-CM

## 2024-01-29 DIAGNOSIS — M25.511 RIGHT SHOULDER PAIN, UNSPECIFIED CHRONICITY: ICD-10-CM

## 2024-01-29 DIAGNOSIS — M75.41 IMPINGEMENT SYNDROME OF RIGHT SHOULDER: Primary | ICD-10-CM

## 2024-01-29 PROCEDURE — 73030 X-RAY EXAM OF SHOULDER: CPT

## 2024-01-29 PROCEDURE — 99213 OFFICE O/P EST LOW 20 MIN: CPT | Performed by: ORTHOPAEDIC SURGERY

## 2024-01-29 NOTE — PROGRESS NOTES
Ortho Sports Medicine Shoulder New Patient Visit     Assesment:   78 y.o. female right shoulder impingement and biceps tendinitis    Plan:    Conservative treatment:    Ice to shoulder 1-2 times daily, for 20 minutes at a time.  Continue PT, focus on anterior capsule stretching and posterior strengthening   OTC NSAIDs as needed for pain       Imaging:    All imaging from today was reviewed by myself and explained to the patient.       Injection:    Offered but she declined at this time      Surgery:     No surgery is recommended at this point, continue with conservative treatment plan as noted.      Follow up:    Return in about 6 weeks (around 3/11/2024). If no improvement consider CSI vs MRI         Chief Complaint   Patient presents with    Right Shoulder - Pain       History of Present Illness:    The patient is a 78 y.o., right hand dominant female whose occupation is retired, referred to me by their primary care physician, seen in clinic for consultation of right shoulder pain.      The patient denies a history of diabetes.  The patient denies a history of thyroid disorder.      Pain is located anterior.  The patient rates the pain as a 7/10.  The pain has been present for a few months.      The patient states that the pain has been present for a few months.  Patient states that the pain began she was in the gym working on pool cables from overhead.  Patient states that she felt pain in the anterior aspect of the shoulder during that time.  Patient initially was seen and examined by an orthopedic surgeon with [facility] who instructed that her pain was coming from her neck and sent her for physical therapy.  Patient states that she did attend physical therapy at that time focusing on her neck and she states that that did help with the pain.  Patient states that then she was in the gym again and was performing flies when she felt pain again the anterior aspect of the shoulder.  Patient states that the pain was  in a slightly different location than it was previously.  Patient reports that even with this new pain she is continue to go to physical therapy.  Patient again reports that her pain is in the anterior aspect of the shoulder.  Patient reports that this is a constant dull achy pain.  She does report having some slight weakness to the right upper extremity.  She denies any painful clicking or catching.  She does report prior corticosteroid injection but states that this was over 30 years ago she denies any recent injection.  Patient denies any prior surgery to the right shoulder.    Pain is improved by rest and physical therapy.  Pain is aggravated by overhead activity and reaching back.      The patient has weakness.  The patient denies numbness and tingling.     The patient has tried rest, ice, NSAIDS, and physical therapy.          Shoulder Surgical History:  None    Past Medical, Social and Family History:  Past Medical History:   Diagnosis Date    Arthritis     Edema of both lower legs 11/01/2018    Dr. Thang Otero; faxed records.    Hx of skin cancer, basal cell     Hyperlipidemia     Hypertension     Impingement syndrome of left shoulder 04/10/2018    Dr. Thang Otero, faxed patient records.    Migraines     Osteopenia     Overactive bladder      Past Surgical History:   Procedure Laterality Date    ACHILLES TENDON REPAIR      CATARACT EXTRACTION, BILATERAL      COLPOPEXY VAGINAL EXTRAPERITONEAL (VEC) WITH INSERTION PUBOVAGINAL SLING      CYST REMOVAL      HERNIA REPAIR      HYSTERECTOMY      JOINT REPLACEMENT Right     REPLACEMENT TOTAL KNEE      TONSILLECTOMY  1949     Allergies   Allergen Reactions    Imodium [Loperamide] Headache     Felt that peripheral vision was impaired and mild headache    Pedi-Pre Tape Spray [Wound Dressing Adhesive] Itching and Rash     Adhesive Tape    Tilactase Diarrhea    Bupropion Rash     Around 2010     Current Outpatient Medications on File Prior to Visit   Medication  Sig Dispense Refill    atorvastatin (LIPITOR) 10 mg tablet Take 1 tablet (10 mg total) by mouth daily 90 tablet 3    calcium polycarbophil (FIBERCON) 625 mg tablet Take 625 mg by mouth daily      Multiple Vitamins-Minerals (MULTIVITAMIN WITH MINERALS) tablet Take 1 tablet by mouth daily      guaifenesin-codeine (GUAIFENESIN AC) 100-10 MG/5ML liquid Take 5 mL by mouth 4 (four) times a day as needed for cough 118 mL 1    traZODone (DESYREL) 50 mg tablet Take 1 tablet (50 mg total) by mouth daily at bedtime 90 tablet 3     No current facility-administered medications on file prior to visit.     Social History     Socioeconomic History    Marital status:      Spouse name: Not on file    Number of children: 2    Years of education: 18    Highest education level: Master's degree (e.g., MA, MS, Derek, MEd, MSW, CHANELL)   Occupational History    Not on file   Tobacco Use    Smoking status: Former     Types: Cigarettes    Smokeless tobacco: Never   Vaping Use    Vaping status: Never Used   Substance and Sexual Activity    Alcohol use: Yes     Comment: occasional    Drug use: Never    Sexual activity: Not Currently   Other Topics Concern    Not on file   Social History Narrative    Not on file     Social Determinants of Health     Financial Resource Strain: Patient Declined (11/6/2023)    Overall Financial Resource Strain (CARDIA)     Difficulty of Paying Living Expenses: Patient declined   Food Insecurity: No Food Insecurity (3/24/2022)    Hunger Vital Sign     Worried About Running Out of Food in the Last Year: Never true     Ran Out of Food in the Last Year: Never true   Transportation Needs: Patient Declined (11/6/2023)    PRAPARE - Transportation     Lack of Transportation (Medical): Patient declined     Lack of Transportation (Non-Medical): Patient declined   Physical Activity: Sufficiently Active (3/24/2022)    Exercise Vital Sign     Days of Exercise per Week: 4 days     Minutes of Exercise per Session: 60 min  "  Stress: Stress Concern Present (3/24/2022)    [employer] - Occupational Stress Questionnaire     Feeling of Stress : Rather much   Social Connections: Socially Isolated (3/24/2022)    Social Connection and Isolation Panel [NHANES]     Frequency of Communication with Friends and Family: More than three times a week     Frequency of Social Gatherings with Friends and Family: Three times a week     Attends Zoroastrianism Services: Never     Active Member of Clubs or Organizations: No     Attends Club or Organization Meetings: Never     Marital Status:    Intimate Partner Violence: Not At Risk (3/24/2022)    Humiliation, Afraid, Rape, and Kick questionnaire     Fear of Current or Ex-Partner: No     Emotionally Abused: No     Physically Abused: No     Sexually Abused: No   Housing Stability: Not on file         I have reviewed the past medical, surgical, social and family history, medications and allergies as documented in the EMR.    Review of systems: ROS is negative other than that noted in the HPI.  Constitutional: Negative for fatigue and fever.   HENT: Negative for sore throat.    Respiratory: Negative for shortness of breath.    Cardiovascular: Negative for chest pain.   Gastrointestinal: Negative for abdominal pain.   Endocrine: Negative for cold intolerance and heat intolerance.   Genitourinary: Negative for flank pain.   Musculoskeletal: Negative for back pain.   Skin: Negative for rash.   Allergic/Immunologic: Negative for immunocompromised state.   Neurological: Negative for dizziness.   Psychiatric/Behavioral: Negative for agitation.      Physical Exam:    Blood pressure 132/72, pulse 62, height 4' 10.25\" (1.48 m), weight 60.3 kg (133 lb).    General/Constitutional: NAD, well developed, well nourished  HENT: Normocephalic, atraumatic  CV: Intact distal pulses, regular rate  Resp: No respiratory distress or labored breathing  GI: Soft and non-tender   Lymphatic: No " lymphadenopathy palpated  Neuro: Alert and Oriented x 3, no focal deficits  Psych: Normal mood, normal affect, normal judgement, normal behavior  Skin: Warm, dry, no rashes, no erythema      Shoulder focused exam:       RIGHT LEFT    Scapula Atrophy Negative Negative     Winging Negative Negative     Protraction Negative Negative    Rotator cuff SS 5/5 5/5     IS 5/5 5/5     SubS 5/5 5/5    ROM  with pain     170     ER0 60 with pain  60                   IRb T6 with pain     T6    TTP: AC Negative Negative     Biceps Positive Negative     Coracoid Negative Negative    Special Tests: O'Briens Negative Negative     Jones-shear Negative Negative     Cross body Adduction Negative Negative     Speeds  Positive Negative     Ivette's Negative Negative     Whipple Negative Negative       Neer Negative Negative     Hall Negative Negative    Instability: Apprehension & relocation not tested not tested     Load & shift not tested not tested    Other: Crank Negative Negative                 UE NV Exam: +2 Radial pulses bilaterally  Sensation intact to light touch C5-T1 bilaterally, Radial/median/ulnar nerve motor intact      Bilateral elbow, wrist, and and forearm ROM full, painless with passive ROM, no ttp or crepitance throughout extremities below shoulder joint    Cervical ROM is full without pain, numbness or tingling      Shoulder Imaging    X-rays of the right shoulder were reviewed, which demonstrate severe AC joint OA with joint space narrowing, sclerotic changes, and osteophyte formation.  I have reviewed the radiology report and do not currently have a radiology reading from [facility], but will check the result once the reading is performed.

## 2024-01-30 ENCOUNTER — OFFICE VISIT (OUTPATIENT)
Dept: PHYSICAL THERAPY | Facility: CLINIC | Age: 79
End: 2024-01-30
Payer: MEDICARE

## 2024-01-30 DIAGNOSIS — M25.511 ACUTE PAIN OF RIGHT SHOULDER: Primary | ICD-10-CM

## 2024-01-30 DIAGNOSIS — M75.41 IMPINGEMENT SYNDROME OF RIGHT SHOULDER: ICD-10-CM

## 2024-01-30 DIAGNOSIS — M75.21 BICEPS TENDONITIS, RIGHT: ICD-10-CM

## 2024-01-30 PROCEDURE — 97140 MANUAL THERAPY 1/> REGIONS: CPT | Performed by: PHYSICAL THERAPIST

## 2024-01-30 PROCEDURE — 97530 THERAPEUTIC ACTIVITIES: CPT | Performed by: PHYSICAL THERAPIST

## 2024-01-30 PROCEDURE — 97110 THERAPEUTIC EXERCISES: CPT | Performed by: PHYSICAL THERAPIST

## 2024-01-30 NOTE — PROGRESS NOTES
"Daily Note     Today's date: 2024  Patient name: Yuni Keys  : 1945  MRN: [id number]  Referring provider: Forrest Dow MD  Dx:   Encounter Diagnosis     ICD-10-CM    1. Acute pain of right shoulder  M25.511                        Subjective: pt reports that she had a consult with ortho who agree with Dx of impingment and encouraged continue PT. She was offered CSI but declined. Was instructed to f/u in 6 weeks and symptoms are not improved then possible MRI at that time.  Reports reports soreness after her LV but that was further exacerbated by slipping over the weekend.        Objective: See treatment diary below      Assessment: Modified therex as listed. Pt has good tolerance to new therex with complaints of fatigue but no sig pain. Provided pt with red tb and instructed her to add IR/ER walkouts to her hep and to defer sidelying ER at this time.       Plan: Continue per plan of care.      Precautions: OA, TKA, chronic neck pain      Manuals           prom kl kl kl          Jnt mobs kl kl kl          DTM  R UT           X-friction massage   Kl long head of biceps                       Laser-anterior shoulder 5' 5' 5'          Neuro Re-Ed                                                                                                        Ther Ex             Sidelying ER reviewed x30 np          Sleeper stretch 15\"x4 15\"x4 15\"x4          Y's, T's   20ea          Scap punches 3# 5\"x20 3#5\"x20 5\"x20          Scap retraction with cane extension 10\"x10            Bicep curls-scap retraction 4#x20 4#x20           Tb ir/er walkouts   Red tb 20/10                       Ther Activity             Ube-retro with L-roll 4'/4' 4'/4' 4'/4'                       Gait Training                                       Modalities                                            "

## 2024-02-01 ENCOUNTER — CLINICAL SUPPORT (OUTPATIENT)
Dept: BARIATRICS | Facility: CLINIC | Age: 79
End: 2024-02-01

## 2024-02-01 VITALS — HEIGHT: 58 IN | BODY MASS INDEX: 27.88 KG/M2 | WEIGHT: 132.8 LBS

## 2024-02-01 DIAGNOSIS — R63.5 ABNORMAL WEIGHT GAIN: Primary | ICD-10-CM

## 2024-02-01 PROCEDURE — RECHECK

## 2024-02-06 ENCOUNTER — OFFICE VISIT (OUTPATIENT)
Dept: PHYSICAL THERAPY | Facility: CLINIC | Age: 79
End: 2024-02-06
Payer: MEDICARE

## 2024-02-06 DIAGNOSIS — M25.511 ACUTE PAIN OF RIGHT SHOULDER: Primary | ICD-10-CM

## 2024-02-06 DIAGNOSIS — M75.41 IMPINGEMENT SYNDROME OF RIGHT SHOULDER: ICD-10-CM

## 2024-02-06 DIAGNOSIS — M75.21 BICEPS TENDONITIS, RIGHT: ICD-10-CM

## 2024-02-06 PROCEDURE — 97110 THERAPEUTIC EXERCISES: CPT

## 2024-02-06 PROCEDURE — 97140 MANUAL THERAPY 1/> REGIONS: CPT

## 2024-02-06 PROCEDURE — 97530 THERAPEUTIC ACTIVITIES: CPT

## 2024-02-06 NOTE — PROGRESS NOTES
"Daily Note     Today's date: 2024  Patient name: Yuni Keys  : 1945  MRN: [id number]  Referring provider: Forrest Dow MD  Dx:   Encounter Diagnosis     ICD-10-CM    1. Acute pain of right shoulder  M25.511       2. Biceps tendonitis, right  M75.21       3. Impingement syndrome of right shoulder  M75.41           Start Time: 1229  Stop Time: 1310  Total time in clinic (min): 41 minutes      Subjective: Patient reports her right shoulder was starting to feel better however she experienced increased discomfort after performing the sleeper stretch at home.      Objective: See treatment diary below.      Assessment: TE program and manual therapy tolerated well.  Patient is reminded to perform sleeper stretch in a pain free ROM.      Plan: Continue treatment as per PT plan of care.       Precautions: OA, B/L TKA, chronic neck pain      Manuals  26         prom kl kl kl JLW         Jnt mobs kl kl kl          DTM  R UT           X-friction massage   Kl long head of biceps JLW                      Laser-anterior shoulder 5' 5' 5' 5'  JLW                      Neuro Re-Ed                                                                                                        Ther Ex             Sidelying ER reviewed x30 np          Sleeper stretch 15\"x4 15\"x4 15\"x4 review for HEP         Y's, T's   20ea 20 ea         Scap punches 3# 5\"x20 3#5\"x20 5\"x20 5\"x20         Scap retraction with cane extension 10\"x10   10\"x10         Bicep curls-scap retraction 4#x20 4#x20  4#  20         Tb ir/er walkouts   Red tb 20/10 red  20 ea         rows    blue  20                                   Ther Activity             Ube-retro with L-roll 4'/4' 4'/4' 4'/4' 4'/4'                      Gait Training                                       Modalities                                              "

## 2024-02-13 ENCOUNTER — APPOINTMENT (OUTPATIENT)
Dept: PHYSICAL THERAPY | Facility: CLINIC | Age: 79
End: 2024-02-13
Payer: MEDICARE

## 2024-02-14 ENCOUNTER — OFFICE VISIT (OUTPATIENT)
Dept: PHYSICAL THERAPY | Facility: CLINIC | Age: 79
End: 2024-02-14
Payer: MEDICARE

## 2024-02-14 ENCOUNTER — TELEPHONE (OUTPATIENT)
Age: 79
End: 2024-02-14

## 2024-02-14 DIAGNOSIS — M75.41 IMPINGEMENT SYNDROME OF RIGHT SHOULDER: ICD-10-CM

## 2024-02-14 DIAGNOSIS — M75.21 BICEPS TENDONITIS, RIGHT: ICD-10-CM

## 2024-02-14 DIAGNOSIS — M25.511 ACUTE PAIN OF RIGHT SHOULDER: Primary | ICD-10-CM

## 2024-02-14 PROCEDURE — 97110 THERAPEUTIC EXERCISES: CPT

## 2024-02-14 PROCEDURE — 97530 THERAPEUTIC ACTIVITIES: CPT

## 2024-02-14 PROCEDURE — 97140 MANUAL THERAPY 1/> REGIONS: CPT

## 2024-02-14 NOTE — PROGRESS NOTES
"Weight Management Medical Nutrition Assessment  Alley is here for healthy ways f/u.   Current wt: 133.6  lbs. She has  maintained her weight in the last ~3 wks and has maintained a weight loss of  44.1  lbs since December 2021. Still struggling with binging episodes. Denies hunger when this occurs, feels she is bored, less motivation to do different activities. When asked if she is feeling depressed she states she might be. Had been in contact with a therapist but the provider has not called her back. Has an appointment in March with psychiatry but not sure if she wants to keep this appointment. She is going to f/u with medical provider tomorrow to discuss if there are any medications that would be an option. She is aware that her BMI is WNL for age. Fluid intake does appear to be inadequate. Discussed that this can sometimes trigger hunger. Encouraged to increase hydration and see if this makes any difference in binge episodes. Support and encouragement provided.       Patient seen by Medical Provider in past 6 months:  no  Requested to schedule appointment with Medical Provider: No    Anthropometric Measurements  Start Weight (#): 177.7 lb 12/28/21  Current Weight (#):  133.6 lbs  TBW % Change from start weight: 24.7%  Ideal Body Weight (#): 119.1 lbs BMI 25 (58\")  Goal Weight (#):  lbs    Highest: 183 lbs  Lowest: 97 lbs mid 20's    Weight Loss History  Previous weight loss attempts: Commercial Programs (Weight Watchers, Yunait, etc.)  Exercise  Self Created Diets (Portion Control, Healthy Food Choices, etc.)    Food and Nutrition Related History  Wake up:  6:00   Bed Time: 10    Food Recall  Breakfast: 8:00: decaf coffee w/2 tbsp h/h, pure protein bar OR just crack an egg OR cottage cheese or yogurt, fruit OR oatmeal or cherrios w/fairlife  Snack:   string cheese OR fruit OR protein bar   Lunch: 12:00:   2.5-3 oz deli turkey or chicken or tuna or egg, mission low carb or flatout wrap or banana, light " batres OR healthy choice meal OR shrimp salsa, string cheese   OR egg salad (1 whole, 2 white), light batres OR Healthy Choice or Progresso light soup  Snack: 4:00: protein shake OR Decaf coffee w/2 tbsp h/h, fruit, string cheese OR occasional chocolate OR skinny pop  Dinner: 5:00:~4 oz chicken/fish/hamburger/salmon, 1 1/2 cup veggies, corn/peas OR  healthy choice meal or lean cuisine   Snack:  Skip     Beverages: water, decaf coffee/tea, diet soda and alcohol (rare)  Volume of beverage intake: 32oz water/decaf coffee    Weekends: Same  Cravings:  carbs   Trouble area of day: late afternoon    Frequency of Eating out: 1x every other week  Food restrictions: lactose?   Cooking: self   Food Shopping: self    Physical Activity Intake  Activity:Walking, cardio fitness class, matt, strength training, walking, aqua aerobics  Frequency: 2x/wk  gym strength, walking daily  Physical limitations/barriers to exercise:       Estimated Needs  Energy   [place] [employer] Energy Needs: BMR : 985   0.5 lb wk wt loss-Maintenance sedentary: 932-1182          0.5 lb wk wt loss-Maintenance lightly active: 8759-7466  Protein: 55-68 gm      (1.2-1.5g/kg IBW)  Fluid: 53 oz     (35mL/kg IBW)    Nutrition Diagnosis  No, resolved    Nutrition Intervention    Nutrition Prescription  Calories:6455-3303  Protein: 55-70 gm    Meal Plan (Stewart/Pro)  Breakfast: 250-350, 15   Snack:  150  Lunch: 300, 15-20  Snack:  150, 5-10  Dinner: 300-350, 20-25  Snack: skip     Nutrition Education:    Healthy Core Manual  Calorie controlled menu  Lean protein food choices  Healthy snack options  Food journaling tips      Nutrition Counseling:  Strategies: meal planning, portion sizes, healthy snack choices, hydration, fiber intake, protein intake, exercise, food journal      Monitoring and Evaluation:  Evaluation criteria:  Energy Intake  Meet protein needs  Maintain adequate hydration  Monitor weekly weight  Meal planning/preparation  Food journal   Decreased portions  at mealtimes and snacks  Physical activity     Barriers to learning:none  Readiness to change: Maintenance: (Maintaining the behavior change)     Comprehension: very good  Expected Compliance: very good

## 2024-02-14 NOTE — TELEPHONE ENCOUNTER
Patient requesting to reschedule weight management class for 2/15/2024. Warm transferred caller to office.

## 2024-02-14 NOTE — PROGRESS NOTES
"Daily Note     Today's date: 2024  Patient name: Yuni Keys  : 1945  MRN: [id number]  Referring provider: Forrest Dow MD  Dx:   Encounter Diagnosis     ICD-10-CM    1. Acute pain of right shoulder  M25.511       2. Biceps tendonitis, right  M75.21       3. Impingement syndrome of right shoulder  M75.41                      Subjective: \"My shoulder is feeling much better,\" adding \"it's not 100%.\"    Objective: See treatment diary below    Assessment: Performed exercise program w/o difficulty or discomfort. PROM to R shoulder w/o complaint. Able to neil X-friction massage as below. Comfortable during laser. Relief after tx. Tolerated treatment well. Will monitor.      Plan: Continue per plan of care.      Precautions: OA, B/L TKA, chronic neck pain      Manuals         prom kl kl kl JLW MO        Jnt mobs kl kl kl          DTM  R UT           X-friction massage   Kl long head of biceps JLW MO                     Laser-anterior shoulder 5' 5' 5' 5'  JLW 5' MO                     Neuro Re-Ed                                                                                                        Ther Ex             Sidelying ER reviewed x30 np          Sleeper stretch 15\"x4 15\"x4 15\"x4 review for HEP         Y's, T's   20ea 20 ea 20 ea        Scap punches 3# 5\"x20 3#5\"x20 5\"x20 5\"x20 5\"  x20        Scap retraction with cane extension 10\"x10   10\"x10 10\"x10        Bicep curls-scap retraction 4#x20 4#x20  4#  20 4#  20        Tb ir/er walkouts   Red tb 20/10 red  20 ea Red 20 ea        rows    blue  20 Blue 20                                  Ther Activity             Ube-retro with L-roll 4'/4' 4'/4' 4'/4' 4'/4' 4'/4'                     Gait Training                                       Modalities                                                "

## 2024-02-20 ENCOUNTER — EVALUATION (OUTPATIENT)
Dept: PHYSICAL THERAPY | Facility: CLINIC | Age: 79
End: 2024-02-20
Payer: MEDICARE

## 2024-02-20 DIAGNOSIS — M25.511 ACUTE PAIN OF RIGHT SHOULDER: ICD-10-CM

## 2024-02-20 DIAGNOSIS — M75.41 IMPINGEMENT SYNDROME OF RIGHT SHOULDER: ICD-10-CM

## 2024-02-20 DIAGNOSIS — M75.21 BICEPS TENDONITIS, RIGHT: Primary | ICD-10-CM

## 2024-02-20 PROCEDURE — 97110 THERAPEUTIC EXERCISES: CPT | Performed by: PHYSICAL THERAPIST

## 2024-02-20 NOTE — PROGRESS NOTES
PT Discharge    Today's date: 2024  Patient name: Yuni Keys  : 1945  MRN: [id number]  Referring provider: Forrest Dow MD  Dx:   Encounter Diagnosis     ICD-10-CM    1. Biceps tendonitis, right  M75.21       2. Acute pain of right shoulder  M25.511       3. Impingement syndrome of right shoulder  M75.41                        Assessment  Assessment details: Despite recent exacerbation of her symptoms pt has achieved a majority of her goals set at the start of therapy, is independent with her hep, and will be Dc'd at this time. She is instructed to call the clinic if she has question with her hep or if symptoms return.  Thank you for this referral.            Goals  Short Term Goals:   Independent performance of initial hep  Decrease pain 2 points on VAS      Long Term Goals:  Independent performance of comprehensive hep  Work performance is returned to max level of function  Performance of IADL's is returned to max level of function  Performance in recreational activities is improved to max level of function  Able to use sideview mirror when driving with min pain    Plan  Plan details: 4-6 sessions   Planned therapy interventions: abdominal trunk stabilization, joint mobilization, IASTM, manual therapy, massage, postural training, patient education, stretching, strengthening, therapeutic activities, therapeutic exercise, therapeutic training, home exercise program and IADL retraining        Subjective Evaluation    History of Present Illness  Mechanism of injury: Pt reports that her shoulder pain was significantly improved up until a recent exacerbation after lifting a heavy grocery bag. Reports that she is compliant with her hep and that she feels she can continue this independently    Patient Goals  Patient goals for therapy: decreased pain, increased strength, increased motion, return to sport/leisure activities and independence with ADLs/IADLs    Pain  Current pain rating: 3  At best pain  "ratin          Objective    R shoulder  AROM:   Flexion: 130   Abduction:130  Shoulder shrug with active flexion and abuction         PROM:   Flexion: 145   Abduction: 145   ER:  75   IR: 50    Strength:    Flexion:  4+/5   Abduction:   4+/5   ER:    4/5, no longer produces pain   IR:     5-/5      Tender to palpation: bicipital groove                               Precautions: OA, TKA, chronic neck pain        Manuals        prom kl kl kl JLW MO kl       Jnt mobs kl kl kl          DTM  R UT           X-friction massage   Kl long head of biceps JLW MO kl                    Laser-anterior shoulder 5' 5' 5' 5'  JLW 5' MO 5'                    Neuro Re-Ed                                                                                                        Ther Ex             Sidelying ER reviewed x30 np          Sleeper stretch 15\"x4 15\"x4 15\"x4 review for HEP         Y's, T's   20ea 20 ea 20 ea        Scap punches 3# 5\"x20 3#5\"x20 5\"x20 5\"x20 5\"  x20 5\"x20       Scap retraction with cane extension 10\"x10   10\"x10 10\"x10 10\"x10       Bicep curls-scap retraction 4#x20 4#x20  4#  20 4#  20 4#x20       Tb ir/er walkouts   Red tb 20/10 red  20 ea Red 20 ea Red x20       rows    blue  20 Blue 20 Green x20                                 Ther Activity             Ube-retro with L-roll 4'/4' 4'/4' 4'/4' 4'/4' 4'/4'                     Gait Training                                       Modalities                                         "

## 2024-02-21 ENCOUNTER — CLINICAL SUPPORT (OUTPATIENT)
Dept: BARIATRICS | Facility: CLINIC | Age: 79
End: 2024-02-21

## 2024-02-21 VITALS — BODY MASS INDEX: 28.04 KG/M2 | WEIGHT: 133.6 LBS | HEIGHT: 58 IN

## 2024-02-21 DIAGNOSIS — R63.5 ABNORMAL WEIGHT GAIN: Primary | ICD-10-CM

## 2024-02-21 PROCEDURE — RECHECK

## 2024-02-22 ENCOUNTER — CLINICAL SUPPORT (OUTPATIENT)
Dept: BARIATRICS | Facility: CLINIC | Age: 79
End: 2024-02-22

## 2024-02-22 ENCOUNTER — OFFICE VISIT (OUTPATIENT)
Dept: BARIATRICS | Facility: CLINIC | Age: 79
End: 2024-02-22
Payer: MEDICARE

## 2024-02-22 VITALS
HEIGHT: 58 IN | DIASTOLIC BLOOD PRESSURE: 91 MMHG | SYSTOLIC BLOOD PRESSURE: 133 MMHG | BODY MASS INDEX: 27.92 KG/M2 | RESPIRATION RATE: 16 BRPM | TEMPERATURE: 97.2 F | HEART RATE: 58 BPM | WEIGHT: 133 LBS

## 2024-02-22 VITALS — BODY MASS INDEX: 28 KG/M2 | WEIGHT: 133.4 LBS | HEIGHT: 58 IN

## 2024-02-22 DIAGNOSIS — I10 ESSENTIAL HYPERTENSION: ICD-10-CM

## 2024-02-22 DIAGNOSIS — R63.8 ABNORMAL CRAVING: ICD-10-CM

## 2024-02-22 DIAGNOSIS — R63.5 ABNORMAL WEIGHT GAIN: Primary | ICD-10-CM

## 2024-02-22 DIAGNOSIS — E78.5 HYPERLIPIDEMIA: ICD-10-CM

## 2024-02-22 DIAGNOSIS — E66.3 OVERWEIGHT: Primary | ICD-10-CM

## 2024-02-22 PROCEDURE — 99214 OFFICE O/P EST MOD 30 MIN: CPT | Performed by: PHYSICIAN ASSISTANT

## 2024-02-22 PROCEDURE — RECHECK

## 2024-02-22 RX ORDER — TOPIRAMATE 25 MG/1
TABLET ORAL
Qty: 60 TABLET | Refills: 1 | Status: SHIPPED | OUTPATIENT
Start: 2024-02-22

## 2024-02-22 NOTE — PROGRESS NOTES
Assessment/Plan:    Overweight  -Patient is pursuing  Health[facility].    - Completed [facility] and is now in healthy Ways.  -Initial weight loss goal of 5-10% weight loss for improved health  -not a wellbutrin candidate due to allergy. Discussed trying low dose topamax to help improve mood and decrease cravings.  She will be seeing psych next month but overeating episodes do seem to span a larger time frame than what is typically seen in binge eating disorder.  The potential side effects of topiramate may include numbness or tingling, fatigue, upper respiratory infection symptoms, depression/anxiety, changes in taste, confusion, abdominal upset/heartburn, and trouble sleeping. Notify the provider with any changes in mood. Denies history of renal stones and glaucoma      Initial:177.7 lbs  Current:133lb  Change:-44.7 lbs  Goal:120 lbs    Goals:  Increase water intake  Continue healthy ways  Continue logging  Continue to try to practice mindful eating    Essential hypertension  Slightly elevated today    Hyperlipidemia  On atorvastatin        Follow up in approximately  2 month weight check with APERA BAGS  with Non-Surgical Physician/Advanced Practitioner in 4 months.     Diagnoses and all orders for this visit:    Overweight  -     topiramate (Topamax) 25 mg tablet; Take 1 pill in the night for a week and then take 1 pill in the morning and night    Abnormal craving  -     topiramate (Topamax) 25 mg tablet; Take 1 pill in the night for a week and then take 1 pill in the morning and night    Essential hypertension  -     topiramate (Topamax) 25 mg tablet; Take 1 pill in the night for a week and then take 1 pill in the morning and night    Hyperlipidemia          Subjective:   Chief Complaint   Patient presents with    Follow-up     MWM OD F/u        Patient ID: Yuni Keys  is a 78 y.o. female with excess weight/obesity here to pursue weight managment.  Patient is pursuing  [facility] .     HPI  She started with   in 2021 and did [employer] program.  She has lost weight but is having problems losing more and controlling appetite.  She does have epidosdes of binging.  This started about 4 months ago and is irregular.  This week has not had a problem.  Sometimes feels the overeating is associated if dining out but othertimes buys excess or junk foods at the store. The overeating episodes are typically over the course of a day or more and not less than 2 hours.  No purging behaviors and she does not get sick from overeating but will sometimes eat when full. It does make her feel discouraged/depressed/guilty  when does do this. She has not been dx with binge eating d/o. She has been waiting for psych for more than a year and would like to see a therapist.       She does have an appointment with psychiatry next month.   She usually gets about 7 hours max of sleep but does wake up often      Wt Readings from Last 10 Encounters:   02/22/24 60.3 kg (133 lb)   02/22/24 60.5 kg (133 lb 6.4 oz)   02/21/24 60.6 kg (133 lb 9.6 oz)   02/01/24 60.2 kg (132 lb 12.8 oz)   01/29/24 60.3 kg (133 lb)   01/25/24 60.2 kg (132 lb 12.8 oz)   01/18/24 61.4 kg (135 lb 6.4 oz)   12/21/23 60.4 kg (133 lb 0.8 oz)   12/10/23 60.6 kg (133 lb 9.6 oz)   12/07/23 59.3 kg (130 lb 12.8 oz)       Food logging:  Increased appetite/cravings:  Exercise:  Hydration:      The following portions of the patient's history were reviewed and updated as appropriate: She  has a past medical history of Arthritis, Edema of both lower legs (11/01/2018), skin cancer, basal cell, Hyperlipidemia, Hypertension, Impingement syndrome of left shoulder (04/10/2018), Migraines, Osteopenia, and Overactive bladder.  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    History of hysterectomy 01/27/2023    Family history of early CAD 10/14/2022    History of abnormal uterine bleeding 10/14/2022    MCI (mild cognitive impairment)  02/17/2022    Anxiety 02/17/2022    Overweight 12/16/2021    Essential hypertension 12/16/2021    Hyperlipidemia 12/16/2021    Retrocalcaneal bursitis (back of heel), right 12/30/2020    Lumbar disc herniation     Lumbar radiculopathy     Spinal stenosis of lumbar region with neurogenic claudication      She  has a past surgical history that includes Replacement total knee; Hysterectomy; Joint replacement (Right); COLPOPEXY VAGINAL EXTRAPERITONEAL (VEC) WITH INSERTION PUBOVAGINAL SLING; Cataract extraction, bilateral; Achilles tendon repair; Tonsillectomy (1949); Hernia repair; and Cyst Removal.  Her family history includes Breast cancer in her maternal grandmother and paternal aunt; Heart disease in her father and paternal grandmother; Hypertension in her paternal grandfather; Ovarian cancer in her maternal aunt.  She  reports that she has quit smoking. Her smoking use included cigarettes. She has never used smokeless tobacco. She reports current alcohol use. She reports that she does not use drugs.  Current Outpatient Medications   Medication Sig Dispense Refill    atorvastatin (LIPITOR) 10 mg tablet Take 1 tablet (10 mg total) by mouth daily 90 tablet 3    calcium polycarbophil (FIBERCON) 625 mg tablet Take 625 mg by mouth daily      Multiple Vitamins-Minerals (MULTIVITAMIN WITH MINERALS) tablet Take 1 tablet by mouth daily      topiramate (Topamax) 25 mg tablet Take 1 pill in the night for a week and then take 1 pill in the morning and night 60 tablet 1     No current facility-administered medications for this visit.     Current Outpatient Medications on File Prior to Visit   Medication Sig    atorvastatin (LIPITOR) 10 mg tablet Take 1 tablet (10 mg total) by mouth daily    calcium polycarbophil (FIBERCON) 625 mg tablet Take 625 mg by mouth daily    Multiple Vitamins-Minerals (MULTIVITAMIN WITH MINERALS) tablet Take 1 tablet by mouth daily    [DISCONTINUED] guaifenesin-codeine (GUAIFENESIN AC) 100-10 MG/5ML  "liquid Take 5 mL by mouth 4 (four) times a day as needed for cough    [DISCONTINUED] traZODone (DESYREL) 50 mg tablet Take 1 tablet (50 mg total) by mouth daily at bedtime     No current facility-administered medications on file prior to visit.     She is allergic to imodium [loperamide], pedi-pre tape spray [wound dressing adhesive], tilactase, and bupropion..    Review of Systems   Constitutional:  Negative for fever.   Respiratory:  Negative for shortness of breath.    Cardiovascular:  Negative for chest pain and palpitations.   Gastrointestinal:  Negative for abdominal pain, constipation, diarrhea and vomiting.   Genitourinary:  Negative for difficulty urinating.   Musculoskeletal:  Negative for arthralgias and back pain.   Skin:  Negative for rash.   Neurological:  Negative for headaches.   Psychiatric/Behavioral:  Negative for dysphoric mood. The patient is not nervous/anxious.        Objective:    /91   Pulse 58   Temp (!) 97.2 °F (36.2 °C)   Resp 16   Ht 4' 10\" (1.473 m)   Wt 60.3 kg (133 lb)   BMI 27.80 kg/m²      Physical Exam  Vitals and nursing note reviewed.   Constitutional:       General: She is not in acute distress.     Appearance: She is well-developed. She is obese.   HENT:      Head: Normocephalic and atraumatic.   Eyes:      Conjunctiva/sclera: Conjunctivae normal.   Neck:      Thyroid: No thyromegaly.   Pulmonary:      Effort: Pulmonary effort is normal. No respiratory distress.   Skin:     Findings: No rash (visible).   Neurological:      Mental Status: She is alert and oriented to person, place, and time.   Psychiatric:         Mood and Affect: Mood normal.         Behavior: Behavior normal.         "

## 2024-02-22 NOTE — ASSESSMENT & PLAN NOTE
-Patient is pursuing  [facility].    - Completed [facility] and is now in healthy Ways.  -Initial weight loss goal of 5-10% weight loss for improved health  -not a wellbutrin candidate due to allergy. Discussed trying low dose topamax to help improve mood and decrease cravings.  She will be seeing psych next month but overeating episodes do seem to span a larger time frame than what is typically seen in binge eating disorder.  The potential side effects of topiramate may include numbness or tingling, fatigue, upper respiratory infection symptoms, depression/anxiety, changes in taste, confusion, abdominal upset/heartburn, and trouble sleeping. Notify the provider with any changes in mood. Denies history of renal stones and glaucoma      Initial:177.7 lbs  Current:133lb  Change:-44.7 lbs  Goal:120 lbs    Goals:  Increase water intake  Continue healthy ways  Continue logging  Continue to try to practice mindful eating

## 2024-02-29 ENCOUNTER — CLINICAL SUPPORT (OUTPATIENT)
Dept: BARIATRICS | Facility: CLINIC | Age: 79
End: 2024-02-29

## 2024-02-29 VITALS — BODY MASS INDEX: 28.18 KG/M2 | WEIGHT: 130.6 LBS | HEIGHT: 57 IN

## 2024-02-29 DIAGNOSIS — R63.5 ABNORMAL WEIGHT GAIN: Primary | ICD-10-CM

## 2024-02-29 PROCEDURE — RECHECK

## 2024-03-07 ENCOUNTER — CLINICAL SUPPORT (OUTPATIENT)
Dept: BARIATRICS | Facility: CLINIC | Age: 79
End: 2024-03-07

## 2024-03-07 VITALS — WEIGHT: 131 LBS | HEIGHT: 58 IN | BODY MASS INDEX: 27.5 KG/M2

## 2024-03-07 DIAGNOSIS — R63.5 ABNORMAL WEIGHT GAIN: Primary | ICD-10-CM

## 2024-03-07 PROCEDURE — RECHECK

## 2024-03-11 ENCOUNTER — OFFICE VISIT (OUTPATIENT)
Dept: OBGYN CLINIC | Facility: MEDICAL CENTER | Age: 79
End: 2024-03-11
Payer: MEDICARE

## 2024-03-11 VITALS
DIASTOLIC BLOOD PRESSURE: 68 MMHG | WEIGHT: 127 LBS | BODY MASS INDEX: 26.66 KG/M2 | HEART RATE: 56 BPM | HEIGHT: 58 IN | SYSTOLIC BLOOD PRESSURE: 112 MMHG

## 2024-03-11 DIAGNOSIS — M75.41 IMPINGEMENT SYNDROME OF RIGHT SHOULDER: Primary | ICD-10-CM

## 2024-03-11 PROCEDURE — 99213 OFFICE O/P EST LOW 20 MIN: CPT | Performed by: ORTHOPAEDIC SURGERY

## 2024-03-11 PROCEDURE — 20610 DRAIN/INJ JOINT/BURSA W/O US: CPT | Performed by: ORTHOPAEDIC SURGERY

## 2024-03-11 RX ORDER — BETAMETHASONE SODIUM PHOSPHATE AND BETAMETHASONE ACETATE 3; 3 MG/ML; MG/ML
9 INJECTION, SUSPENSION INTRA-ARTICULAR; INTRALESIONAL; INTRAMUSCULAR; SOFT TISSUE
Status: COMPLETED | OUTPATIENT
Start: 2024-03-11 | End: 2024-03-11

## 2024-03-11 RX ADMIN — BETAMETHASONE SODIUM PHOSPHATE AND BETAMETHASONE ACETATE 9 MG: 3; 3 INJECTION, SUSPENSION INTRA-ARTICULAR; INTRALESIONAL; INTRAMUSCULAR; SOFT TISSUE at 11:15

## 2024-03-11 NOTE — PROGRESS NOTES
Ortho Sports Medicine Shoulder New Patient Visit     Assesment:   78 y.o. female right shoulder impingement and biceps tendinitis    Plan:    Conservative treatment:    Ice to shoulder 1-2 times daily, for 20 minutes at a time.  Continue PT, focus on anterior capsule stretching and posterior strengthening   OTC NSAIDs as needed for pain       Imaging:    All imaging from today was reviewed by myself and explained to the patient.       Injection:    Offered but she declined at this time      Surgery:     No surgery is recommended at this point, continue with conservative treatment plan as noted.      Follow up:    No follow-ups on file. If no improvement consider CSI vs MRI         Chief Complaint   Patient presents with    Right Shoulder - Follow-up       History of Present Illness:    The patient is a 78 y.o., right hand dominant female whose occupation is retired, referred to me by their primary care physician, seen in clinic for consultation of right shoulder pain.      The patient denies a history of diabetes.  The patient denies a history of thyroid disorder.      Pain is located anterior.  The patient rates the pain as a 7/10.  The pain has been present for a few months.      The patient states that the pain has been present for a few months.  Patient states that the pain began she was in the gym working on pool cables from overhead.  Patient states that she felt pain in the anterior aspect of the shoulder during that time.  Patient initially was seen and examined by an orthopedic surgeon with [facility] who instructed that her pain was coming from her neck and sent her for physical therapy.  Patient states that she did attend physical therapy at that time focusing on her neck and she states that that did help with the pain.  Patient states that then she was in the gym again and was performing flies when she felt pain again the anterior aspect of the shoulder.  Patient states that the pain was in a slightly  different location than it was previously.  Patient reports that even with this new pain she is continue to go to physical therapy.  Patient again reports that her pain is in the anterior aspect of the shoulder.  Patient reports that this is a constant dull achy pain.  She does report having some slight weakness to the right upper extremity.  She denies any painful clicking or catching.  She does report prior corticosteroid injection but states that this was over 30 years ago she denies any recent injection.  Patient denies any prior surgery to the right shoulder.    Pain is improved by rest and physical therapy.  Pain is aggravated by overhead activity and reaching back.      The patient has weakness.  The patient denies numbness and tingling.     The patient has tried rest, ice, NSAIDS, and physical therapy.          Shoulder Surgical History:  None    Past Medical, Social and Family History:  Past Medical History:   Diagnosis Date    Arthritis     Edema of both lower legs 11/01/2018    Dr. Thang Otero; faxed records.    Hx of skin cancer, basal cell     Hyperlipidemia     Hypertension     Impingement syndrome of left shoulder 04/10/2018    Dr. Thang Otero, faxed patient records.    Migraines     Osteopenia     Overactive bladder      Past Surgical History:   Procedure Laterality Date    ACHILLES TENDON REPAIR      CATARACT EXTRACTION, BILATERAL      COLPOPEXY VAGINAL EXTRAPERITONEAL (VEC) WITH INSERTION PUBOVAGINAL SLING      CYST REMOVAL      HERNIA REPAIR      HYSTERECTOMY      JOINT REPLACEMENT Right     REPLACEMENT TOTAL KNEE      TONSILLECTOMY  1949     Allergies   Allergen Reactions    Imodium [Loperamide] Headache     Felt that peripheral vision was impaired and mild headache    Pedi-Pre Tape Spray [Wound Dressing Adhesive] Itching and Rash     Adhesive Tape    Tilactase Diarrhea    Bupropion Rash     Around 2010     Current Outpatient Medications on File Prior to Visit   Medication Sig Dispense  Refill    atorvastatin (LIPITOR) 10 mg tablet Take 1 tablet (10 mg total) by mouth daily 90 tablet 3    calcium polycarbophil (FIBERCON) 625 mg tablet Take 625 mg by mouth daily      Multiple Vitamins-Minerals (MULTIVITAMIN WITH MINERALS) tablet Take 1 tablet by mouth daily      topiramate (Topamax) 25 mg tablet Take 1 pill in the night for a week and then take 1 pill in the morning and night 60 tablet 1     No current facility-administered medications on file prior to visit.     Social History     Socioeconomic History    Marital status:      Spouse name: Not on file    Number of children: 2    Years of education: 18    Highest education level: Master's degree (e.g., MA, MS, Derek, MEd, MSW, CHANELL)   Occupational History    Not on file   Tobacco Use    Smoking status: Former     Types: Cigarettes    Smokeless tobacco: Never   Vaping Use    Vaping status: Never Used   Substance and Sexual Activity    Alcohol use: Yes     Comment: occasional    Drug use: Never    Sexual activity: Not Currently   Other Topics Concern    Not on file   Social History Narrative    Not on file     Social Determinants of Health     Financial Resource Strain: Patient Declined (11/6/2023)    Overall Financial Resource Strain (CARDIA)     Difficulty of Paying Living Expenses: Patient declined   Food Insecurity: No Food Insecurity (3/24/2022)    Hunger Vital Sign     Worried About Running Out of Food in the Last Year: Never true     Ran Out of Food in the Last Year: Never true   Transportation Needs: Patient Declined (11/6/2023)    PRAPARE - Transportation     Lack of Transportation (Medical): Patient declined     Lack of Transportation (Non-Medical): Patient declined   Physical Activity: Sufficiently Active (3/24/2022)    Exercise Vital Sign     Days of Exercise per Week: 4 days     Minutes of Exercise per Session: 60 min   Stress: Stress Concern Present (3/24/2022)    [employer] - Occupational Stress  "Questionnaire     Feeling of Stress : Rather much   Social Connections: Socially Isolated (3/24/2022)    Social Connection and Isolation Panel [NHANES]     Frequency of Communication with Friends and Family: More than three times a week     Frequency of Social Gatherings with Friends and Family: Three times a week     Attends [employer] Services: Never     Active Member of Clubs or Organizations: No     Attends Club or Organization Meetings: Never     Marital Status:    Intimate Partner Violence: Not At Risk (3/24/2022)    Humiliation, Afraid, Rape, and Kick questionnaire     Fear of Current or Ex-Partner: No     Emotionally Abused: No     Physically Abused: No     Sexually Abused: No   Housing Stability: Not on file         I have reviewed the past medical, surgical, social and family history, medications and allergies as documented in the EMR.    Review of systems: ROS is negative other than that noted in the HPI.  Constitutional: Negative for fatigue and fever.   HENT: Negative for sore throat.    Respiratory: Negative for shortness of breath.    Cardiovascular: Negative for chest pain.   Gastrointestinal: Negative for abdominal pain.   Endocrine: Negative for cold intolerance and heat intolerance.   Genitourinary: Negative for flank pain.   Musculoskeletal: Negative for back pain.   Skin: Negative for rash.   Allergic/Immunologic: Negative for immunocompromised state.   Neurological: Negative for dizziness.   Psychiatric/Behavioral: Negative for agitation.      Physical Exam:    Height 4' 10\" (1.473 m).    General/Constitutional: NAD, well developed, well nourished  HENT: Normocephalic, atraumatic  CV: Intact distal pulses, regular rate  Resp: No respiratory distress or labored breathing  GI: Soft and non-tender   Lymphatic: No lymphadenopathy palpated  Neuro: Alert and Oriented x 3, no focal deficits  Psych: Normal mood, normal affect, normal judgement, normal behavior  Skin: Warm, dry, no rashes, no " erythema      Shoulder focused exam:       RIGHT LEFT    Scapula Atrophy Negative Negative     Winging Negative Negative     Protraction Negative Negative    Rotator cuff SS 5/5 5/5     IS 5/5 5/5     SubS 5/5 5/5    ROM  with pain     170     ER0 60 with pain  60                   IRb T6 with pain     T6    TTP: AC Negative Negative     Biceps Positive Negative     Coracoid Negative Negative    Special Tests: O'Briens Negative Negative     Jones-shear Negative Negative     Cross body Adduction Negative Negative     Speeds  Positive Negative     Ivette's Negative Negative     Whipple Negative Negative       Neer Negative Negative     Hall Negative Negative    Instability: Apprehension & relocation not tested not tested     Load & shift not tested not tested    Other: Crank Negative Negative                 UE NV Exam: +2 Radial pulses bilaterally  Sensation intact to light touch C5-T1 bilaterally, Radial/median/ulnar nerve motor intact      Bilateral elbow, wrist, and and forearm ROM full, painless with passive ROM, no ttp or crepitance throughout extremities below shoulder joint    Cervical ROM is full without pain, numbness or tingling      Shoulder Imaging    X-rays of the right shoulder were reviewed, which demonstrate severe AC joint OA with joint space narrowing, sclerotic changes, and osteophyte formation.  I have reviewed the radiology report and do not currently have a radiology reading from [facility], but will check the result once the reading is performed.

## 2024-03-11 NOTE — PROGRESS NOTES
Ortho Sports Medicine Shoulder Follow Up Visit     Assesment:   78 y.o. female right shoulder impingement and right biceps tendinitis     Plan:  Patient presented well on exam. Minimal relief with PT, therefore decided to move forward with a right subacromial bursa CSI. Patient agreed to proceed. Injection was performed without complications. Recommend taking it easy tonight, icing the area.   Return in 4-6 weeks in minimal relief, recommend getting MRI if symptoms persist at that time.     Conservative treatment:    Ice to shoulder 1-2 times daily, for 20 minutes at a time.  PT for ROM and strengthening to shoulder, rotator cuff, scapular stabilizers.  Home exercise program for shoulder, including ROM and strenthening.  Instructions given to patient of what exercises to perform.      Imaging:    All imaging from today was reviewed by myself and explained to the patient.       Injection:    The risks and benefits of the injection (which include but are not limited to: infection, bleeding,damage to nerve/artery, need for further intervention), as well as the risks and benefits of all alternative treatments were explained and understood.  The patient elected to proceed with injection. The procedure was done with aseptic technique, and the patient tolerated the procedure well with no complications.  A corticosteroid injection of the subacromial space was performed.      Surgery:     No surgery is recommended at this point, continue with conservative treatment plan as noted.      Follow up:    No follow-ups on file.      Chief Complaint   Patient presents with    Right Shoulder - Follow-up         History of Present Illness:    The patient is returns for follow up of right shoulder impingement and right biceps tendinitis.  Since the prior visit, She reports no improvement. She did attend a few sessions of PT< but she wishes to not use all of her PT sessions right away. She has not done any activities for the last 6  weeks and sees no relief in pain. She has tried heat without any relief. She also mentions no relief with Advil or Aleve. She is unable to perform any weight bearing through the upper extremities, such as planks.     Shoulder Surgical History:  None    Past Medical, Social and Family History:  Past Medical History:   Diagnosis Date    Arthritis     Edema of both lower legs 11/01/2018    Dr. Thang Otero; faxed records.    Hx of skin cancer, basal cell     Hyperlipidemia     Hypertension     Impingement syndrome of left shoulder 04/10/2018    Dr. Thang Otero, faxed patient records.    Migraines     Osteopenia     Overactive bladder      Past Surgical History:   Procedure Laterality Date    ACHILLES TENDON REPAIR      CATARACT EXTRACTION, BILATERAL      COLPOPEXY VAGINAL EXTRAPERITONEAL (VEC) WITH INSERTION PUBOVAGINAL SLING      CYST REMOVAL      HERNIA REPAIR      HYSTERECTOMY      JOINT REPLACEMENT Right     REPLACEMENT TOTAL KNEE      TONSILLECTOMY  1949     Allergies   Allergen Reactions    Imodium [Loperamide] Headache     Felt that peripheral vision was impaired and mild headache    Pedi-Pre Tape Spray [Wound Dressing Adhesive] Itching and Rash     Adhesive Tape    Tilactase Diarrhea    Bupropion Rash     Around 2010     Current Outpatient Medications on File Prior to Visit   Medication Sig Dispense Refill    atorvastatin (LIPITOR) 10 mg tablet Take 1 tablet (10 mg total) by mouth daily 90 tablet 3    calcium polycarbophil (FIBERCON) 625 mg tablet Take 625 mg by mouth daily      Multiple Vitamins-Minerals (MULTIVITAMIN WITH MINERALS) tablet Take 1 tablet by mouth daily      topiramate (Topamax) 25 mg tablet Take 1 pill in the night for a week and then take 1 pill in the morning and night 60 tablet 1     No current facility-administered medications on file prior to visit.     Social History     Socioeconomic History    Marital status:      Spouse name: Not on file    Number of children: 2     Years of education: 18    Highest education level: Master's degree (e.g., MA, MS, Derek, MEd, MSW, CHANELL)   Occupational History    Not on file   Tobacco Use    Smoking status: Former     Types: Cigarettes    Smokeless tobacco: Never   Vaping Use    Vaping status: Never Used   Substance and Sexual Activity    Alcohol use: Yes     Comment: occasional    Drug use: Never    Sexual activity: Not Currently   Other Topics Concern    Not on file   Social History Narrative    Not on file     Social Determinants of Health     Financial Resource Strain: Patient Declined (11/6/2023)    Overall Financial Resource Strain (CARDIA)     Difficulty of Paying Living Expenses: Patient declined   Food Insecurity: No Food Insecurity (3/24/2022)    Hunger Vital Sign     Worried About Running Out of Food in the Last Year: Never true     Ran Out of Food in the Last Year: Never true   Transportation Needs: Patient Declined (11/6/2023)    PRAPARE - Transportation     Lack of Transportation (Medical): Patient declined     Lack of Transportation (Non-Medical): Patient declined   Physical Activity: Sufficiently Active (3/24/2022)    Exercise Vital Sign     Days of Exercise per Week: 4 days     Minutes of Exercise per Session: 60 min   Stress: Stress Concern Present (3/24/2022)    [employer] - Occupational Stress Questionnaire     Feeling of Stress : Rather much   Social Connections: Socially Isolated (3/24/2022)    Social Connection and Isolation Panel [NHANES]     Frequency of Communication with Friends and Family: More than three times a week     Frequency of Social Gatherings with Friends and Family: Three times a week     Attends Denominational Services: Never     Active Member of Clubs or Organizations: No     Attends Club or Organization Meetings: Never     Marital Status:    Intimate Partner Violence: Not At Risk (3/24/2022)    Humiliation, Afraid, Rape, and Kick questionnaire     Fear of Current or  "Ex-Partner: No     Emotionally Abused: No     Physically Abused: No     Sexually Abused: No   Housing Stability: Not on file       I have reviewed the past medical, surgical, social and family history, medications and allergies as documented in the EMR.    Review of systems: ROS is negative other than that noted in the HPI.  Constitutional: Negative for fatigue and fever.      Physical Exam:    Blood pressure 112/68, pulse 56, height 4' 10\" (1.473 m), weight 57.6 kg (127 lb).    General/Constitutional: NAD, well developed, well nourished  HENT: Normocephalic, atraumatic  CV: Intact distal pulses, regular rate  Resp: No respiratory distress or labored breathing  GI: Soft and non-tender   Lymphatic: No lymphadenopathy palpated  Neuro: Alert and Oriented x 3, no focal deficits  Psych: Normal mood, normal affect, normal judgement, normal behavior  Skin: Warm, dry, no rashes, no erythema      Shoulder focused exam:       RIGHT   Scapula Atrophy Negative    Winging Negative    Protraction Negative   Rotator cuff SS 5/5    IS 5/5    SubS 5/5   ROM  with pain       ER0 60 with pain    IRb T6 with pain       TTP: AC Positive    Biceps Positive    Coracoid Negative     UE NV Exam: +2 Radial pulses bilaterally  Sensation intact to light touch C5-T1 bilaterally, Radial/median/ulnar nerve motor intact    Cervical ROM is full without pain, numbness or tingling      Shoulder Imaging    No imaging was performed today    Large joint arthrocentesis: R subacromial bursa  Universal Protocol:  Consent: Verbal consent obtained.  Risks and benefits: risks, benefits and alternatives were discussed  Consent given by: patient  Patient understanding: patient states understanding of the procedure being performed  Patient consent: the patient's understanding of the procedure matches consent given  Site marked: the operative site was marked  Radiology Images displayed and confirmed. If images not available, report reviewed: imaging studies " available  Patient identity confirmed: verbally with patient  Supporting Documentation  Indications: pain   Procedure Details  Location: shoulder - R subacromial bursa  Needle size: 22 G  Ultrasound guidance: no  Medications administered: 9 mg betamethasone acetate-betamethasone sodium phosphate 6 (3-3) mg/mL    Patient tolerance: patient tolerated the procedure well with no immediate complications  Dressing:  Sterile dressing applied              Scribe Attestation      I,:  Yuli Temple am acting as a scribe while in the presence of the attending physician.:       I,:  Arash Krueger DO personally performed the services described in this documentation    as scribed in my presence.:

## 2024-03-12 ENCOUNTER — TELEPHONE (OUTPATIENT)
Dept: BARIATRICS | Facility: CLINIC | Age: 79
End: 2024-03-12

## 2024-03-12 ENCOUNTER — TELEPHONE (OUTPATIENT)
Dept: PSYCHIATRY | Facility: CLINIC | Age: 79
End: 2024-03-12

## 2024-03-12 NOTE — PROGRESS NOTES
"Weight Management Medical Nutrition Assessment  Alley is here for healthy ways f/u.   Current wt:   126.7 lbs. She has lost 6.9 lbs   in the last ~month and has maintained a weight loss of  51  lbs since December 2021. Topamax had been ordered by provider to help with cravings but she has weaned from this due to diarrhea. She has cancelled her appointment with psychiatry. Has not experienced any binging issues and feels it is because she is motivated for the upcoming fashion show. Discussed importance of keeping this motivation alive once the event is over. Upcoming holiday discussed. She is still interested in therapy but has been unable to find a therapist. Will re-visit this in the future. She will transition to biweekly [facility] Ways.       Patient seen by Medical Provider in past 6 months:  no  Requested to schedule appointment with Medical Provider: No    Anthropometric Measurements  Start Weight (#): 177.7 lb 12/28/21  Current Weight (#):  126.7 lbs  TBW % Change from start weight: 28.7%  Ideal Body Weight (#): 119.1 lbs BMI 25 (58\")  Goal Weight (#):  lbs    Highest: 183 lbs  Lowest: 97 lbs mid 20's    Weight Loss History  Previous weight loss attempts: Commercial Programs (Weight Watchers, [employer], etc.)  Exercise  Self Created Diets (Portion Control, Healthy Food Choices, etc.)    Food and Nutrition Related History  Wake up:  6:00   Bed Time: 10    Food Recall  Breakfast: 8:00: decaf coffee w/2 tbsp h/h, pure protein bar OR just crack an egg OR cottage cheese or yogurt, fruit OR oatmeal or cherrios w/fairlife  Snack:   string cheese OR fruit OR protein bar   Lunch: 12:00:   2.5-3 oz deli turkey or chicken or tuna or egg, mission low carb or flatout wrap or banana, light batres OR healthy choice meal OR shrimp salsa, string cheese   OR egg salad (1 whole, 2 white), light batres OR Healthy Choice or Progresso light soup  Snack: 4:00: protein shake OR Decaf coffee w/2 tbsp h/h, fruit, string cheese OR " occasional chocolate OR skinny pop  Dinner: 5:00:~4 oz chicken/fish/hamburger/salmon, 1 1/2 cup veggies, corn/peas OR  healthy choice meal or lean cuisine   Snack:  Skip     Beverages: water, decaf coffee/tea, diet soda and alcohol (rare)  Volume of beverage intake: 32oz water/decaf coffee    Weekends: Same  Cravings:  carbs   Trouble area of day: late afternoon    Frequency of Eating out: 1x every other week  Food restrictions: lactose?   Cooking: self   Food Shopping: self    Physical Activity Intake  Activity:Walking, cardio fitness class, matt, strength training, walking, aqua aerobics  Frequency: 2x/wk  gym strength, walking daily  Physical limitations/barriers to exercise:       Estimated Needs  Energy   [employer] Energy Needs: BMR : 948    Maintenance sedentary: 1138          Maintenance lightly active: 1304  Protein: 55-68 gm      (1.2-1.5g/kg IBW)  Fluid: 53 oz     (35mL/kg IBW)    Nutrition Diagnosis  No, resolved    Nutrition Intervention    Nutrition Prescription  Calories:9489-7729  Protein: 55-70 gm    Meal Plan (Stewart/Pro)  Breakfast: 250-350, 15   Snack:  150  Lunch: 300, 15-20  Snack:  150, 5-10  Dinner: 300-350, 20-25  Snack: skip     Nutrition Education:    Healthy Core Manual  Calorie controlled menu  Lean protein food choices  Healthy snack options  Food journaling tips      Nutrition Counseling:  Strategies: meal planning, portion sizes, healthy snack choices, hydration, fiber intake, protein intake, exercise, food journal      Monitoring and Evaluation:  Evaluation criteria:  Energy Intake  Meet protein needs  Maintain adequate hydration  Monitor weekly weight  Meal planning/preparation  Food journal   Decreased portions at mealtimes and snacks  Physical activity     Barriers to learning:none  Readiness to change: Maintenance: (Maintaining the behavior change)     Comprehension: very good  Expected Compliance: very good

## 2024-03-12 NOTE — TELEPHONE ENCOUNTER
"Received message via [employer] for cancellation of appt on 3/13 1030AM NP appt. Called and spoke with patient to and confirmed she is no longer interested in services as her cancellation reason stating \"feeling better\". Advised her to call the office if she is interested in services in the future.  "

## 2024-03-12 NOTE — TELEPHONE ENCOUNTER
Spoke with patient in regards in rescheduling the 6/19/24 appointment with Robin Morrow PA-C that she had cancelled through My Chart. She did not want to reschedule due to not being able to take the Topamax that as prescribed for her and she stated that she also sent Robin Morrow PA-C a message in regards to this as well.

## 2024-03-14 ENCOUNTER — CLINICAL SUPPORT (OUTPATIENT)
Dept: BARIATRICS | Facility: CLINIC | Age: 79
End: 2024-03-14

## 2024-03-14 VITALS — BODY MASS INDEX: 27.7 KG/M2 | HEIGHT: 57 IN | WEIGHT: 128.4 LBS

## 2024-03-14 DIAGNOSIS — R63.5 ABNORMAL WEIGHT GAIN: Primary | ICD-10-CM

## 2024-03-14 PROCEDURE — RECHECK

## 2024-03-20 ENCOUNTER — CLINICAL SUPPORT (OUTPATIENT)
Dept: BARIATRICS | Facility: CLINIC | Age: 79
End: 2024-03-20

## 2024-03-20 VITALS — BODY MASS INDEX: 26.6 KG/M2 | HEIGHT: 58 IN | WEIGHT: 126.7 LBS

## 2024-03-20 DIAGNOSIS — R63.5 ABNORMAL WEIGHT GAIN: Primary | ICD-10-CM

## 2024-03-20 PROCEDURE — RECHECK

## 2024-03-28 ENCOUNTER — CLINICAL SUPPORT (OUTPATIENT)
Dept: BARIATRICS | Facility: CLINIC | Age: 79
End: 2024-03-28

## 2024-03-28 VITALS — HEIGHT: 57 IN | WEIGHT: 126.8 LBS | BODY MASS INDEX: 27.36 KG/M2

## 2024-03-28 DIAGNOSIS — R63.5 ABNORMAL WEIGHT GAIN: Primary | ICD-10-CM

## 2024-03-28 PROCEDURE — RECHECK

## 2024-04-12 NOTE — PROGRESS NOTES
"Weight Management Medical Nutrition Assessment  Alley is here for healthy ways f/u.   Current wt: 124.7    lbs. She has lost 2 lbs   in the last ~month and has maintained a weight loss of 53    lbs since December 2021. She was able to meet with a therapist last week to discuss binging issues. She feels this was helpful and plans to continue with therapy. Keep up the great work! She will continue to f/u in biweekly [facility] Ways.       Patient seen by Medical Provider in past 6 months:  no  Requested to schedule appointment with Medical Provider: No    Anthropometric Measurements  Start Weight (#): 177.7 lb 12/28/21  Current Weight (#):  124.7 lbs  TBW % Change from start weight: 29.8%  Ideal Body Weight (#): 119.1 lbs BMI 25 (58\")  Goal Weight (#):  lbs    Highest: 183 lbs  Lowest: 97 lbs mid 20's    Weight Loss History  Previous weight loss attempts: Commercial Programs (Weight Watchers, [employer], etc.)  Exercise  Self Created Diets (Portion Control, Healthy Food Choices, etc.)    Food and Nutrition Related History  Wake up:  6:00   Bed Time: 10    Food Recall  Breakfast: 8:00: decaf coffee w/2 tbsp h/h, pure protein bar OR just crack an egg OR cottage cheese or yogurt, fruit OR oatmeal or cherrios w/fairlife  Snack:   string cheese OR fruit OR protein bar   Lunch: 12:00:   2.5-3 oz deli turkey or chicken or tuna or egg, mission low carb or flatout wrap or banana, light batres OR healthy choice meal OR shrimp salsa, string cheese   OR egg salad (1 whole, 2 white), light batres OR Healthy Choice or Progresso light soup  Snack: 4:00: protein shake OR Decaf coffee w/2 tbsp h/h, fruit, string cheese OR occasional chocolate OR skinny pop  Dinner: 5:00:~4 oz chicken/fish/hamburger/salmon, 1 1/2 cup veggies, corn/peas OR  healthy choice meal or lean cuisine   Snack:  Skip     Beverages: water, decaf coffee/tea, diet soda and alcohol (rare)  Volume of beverage intake: 32oz water/decaf coffee    Weekends: " Same  Cravings:  carbs   Trouble area of day: late afternoon    Frequency of Eating out: 1x every other week  Food restrictions: lactose?   Cooking: self   Food Shopping: self    Physical Activity Intake  Activity:Walking,  Frequency:walking daily  Physical limitations/barriers to exercise:   shoulder pain    Estimated Needs  Energy   [employer] Energy Needs: BMR : 948    Maintenance sedentary: 1138          Maintenance lightly active: 1304  Protein: 55-68 gm      (1.2-1.5g/kg IBW)  Fluid: 53 oz     (35mL/kg IBW)    Nutrition Diagnosis  No, resolved    Nutrition Intervention    Nutrition Prescription  Calories:8972-2236  Protein: 55-70 gm    Meal Plan (Stewart/Pro)  Breakfast: 250-350, 15   Snack:  150  Lunch: 300, 15-20  Snack:  150, 5-10  Dinner: 300-350, 20-25  Snack: skip     Nutrition Education:    Healthy Core Manual  Calorie controlled menu  Lean protein food choices  Healthy snack options  Food journaling tips      Nutrition Counseling:  Strategies: meal planning, portion sizes, healthy snack choices, hydration, fiber intake, protein intake, exercise, food journal      Monitoring and Evaluation:  Evaluation criteria:  Energy Intake  Meet protein needs  Maintain adequate hydration  Monitor weekly weight  Meal planning/preparation  Food journal   Decreased portions at mealtimes and snacks  Physical activity     Barriers to learning:none  Readiness to change: Maintenance: (Maintaining the behavior change)/action    Comprehension: very good  Expected Compliance: very good

## 2024-04-18 ENCOUNTER — CLINICAL SUPPORT (OUTPATIENT)
Dept: BARIATRICS | Facility: CLINIC | Age: 79
End: 2024-04-18

## 2024-04-18 VITALS — BODY MASS INDEX: 26.17 KG/M2 | WEIGHT: 124.7 LBS | HEIGHT: 58 IN

## 2024-04-18 DIAGNOSIS — R63.5 ABNORMAL WEIGHT GAIN: Primary | ICD-10-CM

## 2024-04-18 PROCEDURE — RECHECK

## 2024-04-19 DIAGNOSIS — Z00.6 ENCOUNTER FOR EXAMINATION FOR NORMAL COMPARISON OR CONTROL IN CLINICAL RESEARCH PROGRAM: ICD-10-CM

## 2024-04-25 ENCOUNTER — CLINICAL SUPPORT (OUTPATIENT)
Dept: BARIATRICS | Facility: CLINIC | Age: 79
End: 2024-04-25

## 2024-04-25 VITALS — BODY MASS INDEX: 26.16 KG/M2 | HEIGHT: 58 IN | WEIGHT: 124.6 LBS

## 2024-04-25 DIAGNOSIS — R63.5 ABNORMAL WEIGHT GAIN: Primary | ICD-10-CM

## 2024-04-25 PROCEDURE — RECHECK

## 2024-04-29 ENCOUNTER — APPOINTMENT (OUTPATIENT)
Dept: LAB | Facility: CLINIC | Age: 79
End: 2024-04-29
Payer: MEDICARE

## 2024-04-29 DIAGNOSIS — E78.2 MIXED HYPERLIPIDEMIA: ICD-10-CM

## 2024-04-29 DIAGNOSIS — E55.9 VITAMIN D DEFICIENCY: ICD-10-CM

## 2024-04-29 DIAGNOSIS — Z00.6 ENCOUNTER FOR EXAMINATION FOR NORMAL COMPARISON OR CONTROL IN CLINICAL RESEARCH PROGRAM: ICD-10-CM

## 2024-04-29 DIAGNOSIS — M77.8 RIGHT SHOULDER TENDONITIS: ICD-10-CM

## 2024-04-29 DIAGNOSIS — F51.01 PRIMARY INSOMNIA: ICD-10-CM

## 2024-04-29 DIAGNOSIS — S99.292A OTHER PHYSEAL FRACTURE OF PHALANX OF LEFT TOE, INITIAL ENCOUNTER FOR CLOSED FRACTURE: ICD-10-CM

## 2024-04-29 LAB
25(OH)D3 SERPL-MCNC: 45.1 NG/ML (ref 30–100)
ALBUMIN SERPL BCP-MCNC: 4.5 G/DL (ref 3.5–5)
ALP SERPL-CCNC: 33 U/L (ref 34–104)
ALT SERPL W P-5'-P-CCNC: 32 U/L (ref 7–52)
ANION GAP SERPL CALCULATED.3IONS-SCNC: 8 MMOL/L (ref 4–13)
AST SERPL W P-5'-P-CCNC: 22 U/L (ref 13–39)
BASOPHILS # BLD AUTO: 0.02 THOUSANDS/ÂΜL (ref 0–0.1)
BASOPHILS NFR BLD AUTO: 0 % (ref 0–1)
BILIRUB SERPL-MCNC: 0.53 MG/DL (ref 0.2–1)
BUN SERPL-MCNC: 17 MG/DL (ref 5–25)
CALCIUM SERPL-MCNC: 9.1 MG/DL (ref 8.4–10.2)
CHLORIDE SERPL-SCNC: 108 MMOL/L (ref 96–108)
CHOLEST SERPL-MCNC: 169 MG/DL
CO2 SERPL-SCNC: 27 MMOL/L (ref 21–32)
CREAT SERPL-MCNC: 0.7 MG/DL (ref 0.6–1.3)
EOSINOPHIL # BLD AUTO: 0.1 THOUSAND/ÂΜL (ref 0–0.61)
EOSINOPHIL NFR BLD AUTO: 2 % (ref 0–6)
ERYTHROCYTE [DISTWIDTH] IN BLOOD BY AUTOMATED COUNT: 15.9 % (ref 11.6–15.1)
GFR SERPL CREATININE-BSD FRML MDRD: 83 ML/MIN/1.73SQ M
GLUCOSE P FAST SERPL-MCNC: 96 MG/DL (ref 65–99)
HCT VFR BLD AUTO: 44.2 % (ref 34.8–46.1)
HDLC SERPL-MCNC: 77 MG/DL
HGB BLD-MCNC: 14.4 G/DL (ref 11.5–15.4)
IMM GRANULOCYTES # BLD AUTO: 0.01 THOUSAND/UL (ref 0–0.2)
IMM GRANULOCYTES NFR BLD AUTO: 0 % (ref 0–2)
LDLC SERPL CALC-MCNC: 68 MG/DL (ref 0–100)
LYMPHOCYTES # BLD AUTO: 1.54 THOUSANDS/ÂΜL (ref 0.6–4.47)
LYMPHOCYTES NFR BLD AUTO: 29 % (ref 14–44)
MCH RBC QN AUTO: 30.4 PG (ref 26.8–34.3)
MCHC RBC AUTO-ENTMCNC: 32.6 G/DL (ref 31.4–37.4)
MCV RBC AUTO: 93 FL (ref 82–98)
MONOCYTES # BLD AUTO: 0.32 THOUSAND/ÂΜL (ref 0.17–1.22)
MONOCYTES NFR BLD AUTO: 6 % (ref 4–12)
NEUTROPHILS # BLD AUTO: 3.37 THOUSANDS/ÂΜL (ref 1.85–7.62)
NEUTS SEG NFR BLD AUTO: 63 % (ref 43–75)
NRBC BLD AUTO-RTO: 0 /100 WBCS
PLATELET # BLD AUTO: 301 THOUSANDS/UL (ref 149–390)
PMV BLD AUTO: 10.5 FL (ref 8.9–12.7)
POTASSIUM SERPL-SCNC: 3.9 MMOL/L (ref 3.5–5.3)
PROT SERPL-MCNC: 6.9 G/DL (ref 6.4–8.4)
RBC # BLD AUTO: 4.74 MILLION/UL (ref 3.81–5.12)
SODIUM SERPL-SCNC: 143 MMOL/L (ref 135–147)
TRIGL SERPL-MCNC: 120 MG/DL
WBC # BLD AUTO: 5.36 THOUSAND/UL (ref 4.31–10.16)

## 2024-04-29 PROCEDURE — 85025 COMPLETE CBC W/AUTO DIFF WBC: CPT

## 2024-04-29 PROCEDURE — 82306 VITAMIN D 25 HYDROXY: CPT

## 2024-04-29 PROCEDURE — 80053 COMPREHEN METABOLIC PANEL: CPT

## 2024-04-29 PROCEDURE — 36415 COLL VENOUS BLD VENIPUNCTURE: CPT

## 2024-04-29 PROCEDURE — 80061 LIPID PANEL: CPT

## 2024-05-02 ENCOUNTER — RA CDI HCC (OUTPATIENT)
Dept: OTHER | Facility: HOSPITAL | Age: 79
End: 2024-05-02

## 2024-05-07 ENCOUNTER — OFFICE VISIT (OUTPATIENT)
Dept: FAMILY MEDICINE CLINIC | Facility: HOSPITAL | Age: 79
End: 2024-05-07
Payer: MEDICARE

## 2024-05-07 VITALS
SYSTOLIC BLOOD PRESSURE: 118 MMHG | HEART RATE: 49 BPM | HEIGHT: 58 IN | OXYGEN SATURATION: 96 % | BODY MASS INDEX: 26.45 KG/M2 | DIASTOLIC BLOOD PRESSURE: 70 MMHG | WEIGHT: 126 LBS

## 2024-05-07 DIAGNOSIS — R63.8 ABNORMAL CRAVING: ICD-10-CM

## 2024-05-07 DIAGNOSIS — R74.8 LOW SERUM ALKALINE PHOSPHATASE: ICD-10-CM

## 2024-05-07 DIAGNOSIS — F51.01 PRIMARY INSOMNIA: Primary | ICD-10-CM

## 2024-05-07 DIAGNOSIS — I10 ESSENTIAL HYPERTENSION: ICD-10-CM

## 2024-05-07 DIAGNOSIS — M48.062 SPINAL STENOSIS OF LUMBAR REGION WITH NEUROGENIC CLAUDICATION: ICD-10-CM

## 2024-05-07 DIAGNOSIS — M19.011 OSTEOARTHRITIS OF RIGHT ACROMIOCLAVICULAR JOINT: ICD-10-CM

## 2024-05-07 DIAGNOSIS — E66.3 OVERWEIGHT: ICD-10-CM

## 2024-05-07 DIAGNOSIS — Z71.84 TRAVEL ADVICE ENCOUNTER: ICD-10-CM

## 2024-05-07 DIAGNOSIS — E78.2 MIXED HYPERLIPIDEMIA: ICD-10-CM

## 2024-05-07 PROBLEM — F41.9 ANXIETY: Status: RESOLVED | Noted: 2022-02-17 | Resolved: 2024-05-07

## 2024-05-07 PROBLEM — S92.912A TOE FRACTURE, LEFT: Status: RESOLVED | Noted: 2023-11-16 | Resolved: 2024-05-07

## 2024-05-07 PROCEDURE — 99203 OFFICE O/P NEW LOW 30 MIN: CPT | Performed by: INTERNAL MEDICINE

## 2024-05-07 RX ORDER — ZOLPIDEM TARTRATE 5 MG/1
5 TABLET ORAL
Qty: 30 TABLET | Refills: 0 | Status: SHIPPED | OUTPATIENT
Start: 2024-05-07

## 2024-05-07 RX ORDER — ONDANSETRON 4 MG/1
4 TABLET, FILM COATED ORAL EVERY 8 HOURS PRN
Qty: 20 TABLET | Refills: 0 | Status: SHIPPED | OUTPATIENT
Start: 2024-05-07

## 2024-05-07 RX ORDER — MELOXICAM 15 MG/1
15 TABLET ORAL DAILY
COMMUNITY
Start: 2024-04-26 | End: 2024-05-07 | Stop reason: SDUPTHER

## 2024-05-07 RX ORDER — MELOXICAM 15 MG/1
15 TABLET ORAL DAILY
Qty: 90 TABLET | Refills: 3 | Status: SHIPPED | OUTPATIENT
Start: 2024-05-07

## 2024-05-07 RX ORDER — TOPIRAMATE 25 MG/1
TABLET ORAL
Qty: 180 TABLET | Refills: 1 | Status: SHIPPED | OUTPATIENT
Start: 2024-05-07

## 2024-05-07 RX ORDER — AZITHROMYCIN 250 MG/1
250 TABLET, FILM COATED ORAL DAILY
Qty: 6 TABLET | Refills: 0 | Status: SHIPPED | OUTPATIENT
Start: 2024-05-07 | End: 2024-05-12

## 2024-05-07 RX ORDER — ATORVASTATIN CALCIUM 10 MG/1
10 TABLET, FILM COATED ORAL DAILY
Qty: 90 TABLET | Refills: 3 | Status: SHIPPED | OUTPATIENT
Start: 2024-05-07

## 2024-05-07 NOTE — PROGRESS NOTES
Assessment/Plan:     Diagnosis ICD-10-CM Associated Orders   1. Primary insomnia  F51.01 zolpidem (AMBIEN) 5 mg tablet      2. Spinal stenosis of lumbar region with neurogenic claudication  M48.062       3. Osteoarthritis of right acromioclavicular joint  M19.011 meloxicam (MOBIC) 15 mg tablet      4. Mixed hyperlipidemia  E78.2 atorvastatin (LIPITOR) 10 mg tablet     Lipid Panel with Direct LDL reflex      5. Overweight  E66.3 topiramate (Topamax) 25 mg tablet      6. Abnormal craving  R63.8 topiramate (Topamax) 25 mg tablet      7. Essential hypertension  I10 topiramate (Topamax) 25 mg tablet     CBC and differential     Comprehensive metabolic panel     Lipid Panel with Direct LDL reflex      8. Low serum alkaline phosphatase  R74.8 Comprehensive metabolic panel      9. Travel advice encounter  Z71.84 azithromycin (ZITHROMAX) 250 mg tablet     ondansetron (ZOFRAN) 4 mg tablet          Problem List Items Addressed This Visit        Surgery/Wound/Pain    Spinal stenosis of lumbar region with neurogenic claudication       Neurology/Sleep    Primary insomnia - Primary     Will be traveling to  [place] and [facility] in September  She is wishing assist to help with sleep- will try low dose ambien         Relevant Medications    zolpidem (AMBIEN) 5 mg tablet       Other    Overweight    Relevant Medications    topiramate (Topamax) 25 mg tablet    Hyperlipidemia     On atorvastatin 10 mg daily         Relevant Medications    atorvastatin (LIPITOR) 10 mg tablet    Other Relevant Orders    Lipid Panel with Direct LDL reflex   Other Visit Diagnoses     Osteoarthritis of right acromioclavicular joint        Relevant Medications    meloxicam (MOBIC) 15 mg tablet    Abnormal craving        Relevant Medications    topiramate (Topamax) 25 mg tablet    Essential hypertension        Relevant Medications    topiramate (Topamax) 25 mg tablet    Other Relevant Orders    CBC and differential    Comprehensive metabolic panel     Lipid Panel with Direct LDL reflex    Low serum alkaline phosphatase        Relevant Orders    Comprehensive metabolic panel    Travel advice encounter        Relevant Medications    azithromycin (ZITHROMAX) 250 mg tablet    ondansetron (ZOFRAN) 4 mg tablet            Return in about 6 months (around 11/7/2024) for Annual Medicare Wellness.      Subjective:    Patient ID: Yuni Keys is a 78 y.o. female    First office appt for patient- switching to our practice after her [facility] physician had retired. I have reviewed those records   Recent labs are  reviewed- will repeat in 6 months  Will be traveling to [place] in fall  Will give rx for Azithromycin   Discussed immunizations for covid, rsv and flu before traveling   1. Insomnia issues- see porch note  2. Active lifestyle- walks regularly    Has been doing [facility] since dec 2021- has lost over 50 lbs and kept off successfully.  3. Bradycardia-longstanding- hr 49 currently. No dizziness or light headedness noted   Had echo in 2022- good lv function- mild aortic and tricuspid regurgitation- no concerns          The following portions of the patient's history were reviewed and updated as appropriate: allergies, current medications and problem list.     Review of Systems   Constitutional:  Negative for activity change and fatigue.   HENT:  Negative for postnasal drip.    Eyes:  Negative for visual disturbance.   Respiratory:  Negative for chest tightness.    Cardiovascular:  Positive for palpitations.   Skin:  Negative for rash and wound.   Psychiatric/Behavioral:  Negative for dysphoric mood. The patient is not nervous/anxious.    All other systems reviewed and are negative.        Objective:      Current Outpatient Medications:   •  atorvastatin (LIPITOR) 10 mg tablet, Take 1 tablet (10 mg total) by mouth daily, Disp: 90 tablet, Rfl: 3  •  azithromycin (ZITHROMAX) 250 mg tablet, Take 1 tablet (250 mg total) by mouth daily for 5  "days 2 pills today, then one daily for 4 more days, Disp: 6 tablet, Rfl: 0  •  calcium polycarbophil (FIBERCON) 625 mg tablet, Take 625 mg by mouth daily, Disp: , Rfl:   •  meloxicam (MOBIC) 15 mg tablet, Take 1 tablet (15 mg total) by mouth daily, Disp: 90 tablet, Rfl: 3  •  Multiple Vitamins-Minerals (MULTIVITAMIN WITH MINERALS) tablet, Take 1 tablet by mouth daily, Disp: , Rfl:   •  ondansetron (ZOFRAN) 4 mg tablet, Take 1 tablet (4 mg total) by mouth every 8 (eight) hours as needed for nausea or vomiting, Disp: 20 tablet, Rfl: 0  •  topiramate (Topamax) 25 mg tablet, Take 1 pill in the night for a week and then take 1 pill in the morning and night, Disp: 180 tablet, Rfl: 1  •  zolpidem (AMBIEN) 5 mg tablet, Take 1 tablet (5 mg total) by mouth daily at bedtime as needed for sleep, Disp: 30 tablet, Rfl: 0    Blood pressure 118/70, pulse (!) 49, height 4' 10\" (1.473 m), weight 57.2 kg (126 lb), SpO2 96%.     Physical Exam   "

## 2024-05-07 NOTE — ASSESSMENT & PLAN NOTE
Will be traveling to  [place] and [place] in September  She is wishing assist to help with sleep- will try low dose ambien

## 2024-05-15 ENCOUNTER — OFFICE VISIT (OUTPATIENT)
Dept: URGENT CARE | Facility: CLINIC | Age: 79
End: 2024-05-15
Payer: MEDICARE

## 2024-05-15 VITALS
WEIGHT: 124 LBS | OXYGEN SATURATION: 97 % | HEART RATE: 68 BPM | DIASTOLIC BLOOD PRESSURE: 70 MMHG | BODY MASS INDEX: 26.03 KG/M2 | SYSTOLIC BLOOD PRESSURE: 122 MMHG | RESPIRATION RATE: 16 BRPM | HEIGHT: 58 IN | TEMPERATURE: 98.1 F

## 2024-05-15 DIAGNOSIS — J06.9 UPPER RESPIRATORY TRACT INFECTION, UNSPECIFIED TYPE: Primary | ICD-10-CM

## 2024-05-15 LAB
APOB+LDLR+PCSK9 GENE MUT ANL BLD/T: NOT DETECTED
BRCA1+BRCA2 DEL+DUP + FULL MUT ANL BLD/T: NOT DETECTED
MLH1+MSH2+MSH6+PMS2 GN DEL+DUP+FUL M: NOT DETECTED

## 2024-05-15 PROCEDURE — 99213 OFFICE O/P EST LOW 20 MIN: CPT | Performed by: NURSE PRACTITIONER

## 2024-05-15 PROCEDURE — G0463 HOSPITAL OUTPT CLINIC VISIT: HCPCS | Performed by: NURSE PRACTITIONER

## 2024-05-15 NOTE — PROGRESS NOTES
[facility]        NAME: Yuni Keys is a 78 y.o. female  : 1945    MRN: [id number]  DATE: May 15, 2024  TIME: 6:49 PM    Assessment and Plan   Upper respiratory tract infection, unspecified type [J06.9]  1. Upper respiratory tract infection, unspecified type              Patient Instructions     Take coricidin HBP for cold and congestion x 5 days  Follow up with PCP in 3-5 days.  Proceed to  ER if symptoms worsen.    If tests have been performed at [facility], our office will contact you with results if changes need to be made to the care plan discussed with you at the visit.  You can review your full results on [facility].    Chief Complaint     Chief Complaint   Patient presents with    Nasal Congestion     PT presents with runny nose, sore throat, bilateral ear pain and pressure, cough with intermittent mucous production, and headache x 6 days.  She did a covid test at home and result was negative.  No fevers.  She feels like she is getting worse instead of better since onset.         History of Present Illness       HPI  Reports cold symptoms for about 5 days. Includes runny nose, coughing, sore throat, clogged ears and a headache. Took some sudafed, which is not improving.     Review of Systems   Review of Systems   Constitutional:  Negative for chills and fever.   HENT:  Positive for congestion, ear pain (and ears also feel clogged), rhinorrhea and sore throat. Negative for sneezing.    Respiratory:  Positive for cough. Negative for chest tightness, shortness of breath and wheezing.    Cardiovascular:  Negative for chest pain.   Gastrointestinal:  Negative for nausea and vomiting.   Neurological:  Positive for headaches.         Current Medications       Current Outpatient Medications:     atorvastatin (LIPITOR) 10 mg tablet, Take 1 tablet (10 mg total) by mouth daily, Disp: 90 tablet, Rfl: 3    calcium polycarbophil (FIBERCON) 625 mg tablet, Take 625 mg by mouth daily, Disp: , Rfl:      Multiple Vitamins-Minerals (MULTIVITAMIN WITH MINERALS) tablet, Take 1 tablet by mouth daily, Disp: , Rfl:     meloxicam (MOBIC) 15 mg tablet, Take 1 tablet (15 mg total) by mouth daily (Patient not taking: Reported on 5/15/2024), Disp: 90 tablet, Rfl: 3    ondansetron (ZOFRAN) 4 mg tablet, Take 1 tablet (4 mg total) by mouth every 8 (eight) hours as needed for nausea or vomiting (Patient not taking: Reported on 5/15/2024), Disp: 20 tablet, Rfl: 0    topiramate (Topamax) 25 mg tablet, Take 1 pill in the night for a week and then take 1 pill in the morning and night (Patient not taking: Reported on 5/15/2024), Disp: 180 tablet, Rfl: 1    zolpidem (AMBIEN) 5 mg tablet, Take 1 tablet (5 mg total) by mouth daily at bedtime as needed for sleep (Patient not taking: Reported on 5/15/2024), Disp: 30 tablet, Rfl: 0    Current Allergies     Allergies as of 05/15/2024 - Reviewed 05/15/2024   Allergen Reaction Noted    Imodium [loperamide] Headache 02/17/2022    Pedi-pre tape spray [wound dressing adhesive] Itching and Rash 02/17/2022    Tilactase Diarrhea 03/24/2022    Bupropion Rash 12/10/2017            The following portions of the patient's history were reviewed and updated as appropriate: allergies, current medications, past family history, past medical history, past social history, past surgical history and problem list.     Past Medical History:   Diagnosis Date    Arthritis     Edema of both lower legs 11/01/2018    Dr. Thang Otero; faxed records.    Hx of skin cancer, basal cell     Hyperlipidemia     Hypertension     Impingement syndrome of left shoulder 04/10/2018    Dr. Thang Otero, faxed patient records.    Migraines     Osteopenia     Overactive bladder     Toe fracture, left 11/16/2023       Past Surgical History:   Procedure Laterality Date    ACHILLES TENDON REPAIR      CATARACT EXTRACTION, BILATERAL      COLPOPEXY VAGINAL EXTRAPERITONEAL (VEC) WITH INSERTION PUBOVAGINAL SLING      CYST REMOVAL    "   HERNIA REPAIR      HYSTERECTOMY      JOINT REPLACEMENT Right     REPLACEMENT TOTAL KNEE      TONSILLECTOMY  1949       Family History   Problem Relation Age of Onset    Heart disease Father     Ovarian cancer Maternal Aunt     Breast cancer Paternal Aunt     Hypertension Paternal Grandfather     Breast cancer Maternal Grandmother     Heart disease Paternal Grandmother     Diabetes Neg Hx     Stroke Neg Hx     Thyroid disease Neg Hx          Medications have been verified.        Objective   /70   Pulse 68   Temp 98.1 °F (36.7 °C)   Resp 16   Ht 4' 10\" (1.473 m)   Wt 56.2 kg (124 lb)   SpO2 97%   BMI 25.92 kg/m²   No LMP recorded. Patient is perimenopausal.       Physical Exam     Physical Exam  Constitutional:       Appearance: She is not ill-appearing.   HENT:      Head:      Comments: NTTP of the sinuses     Right Ear: Tympanic membrane normal.      Nose: Congestion and rhinorrhea present.      Mouth/Throat:      Pharynx: No posterior oropharyngeal erythema.      Comments: Post nasal drip  Cardiovascular:      Rate and Rhythm: Regular rhythm.      Heart sounds: Normal heart sounds.   Pulmonary:      Effort: Pulmonary effort is normal.      Breath sounds: Normal breath sounds.   Musculoskeletal:      Cervical back: No tenderness.   Lymphadenopathy:      Cervical: No cervical adenopathy.                   "

## 2024-05-22 NOTE — PROGRESS NOTES
"Weight Management Medical Nutrition Assessment  Alley is here for healthy ways f/u.   Current wt:    120.6  lbs. She has lost 4.1  lbs   in the last ~5 wks and has maintained a weight loss of   57.1   lbs since December 2021. She agrees that maintenance is her goal at this time and we reviewed calorie needs. She met with therapist for 5 sessions to discuss binging issues but no longer feels this is necessary and has had no issues.  Keep up the great work! She will continue to f/u in biweekly Healthy Ways.       Patient seen by Medical Provider in past 6 months:  no  Requested to schedule appointment with Medical Provider: No    Anthropometric Measurements  Start Weight (#): 177.7 lb 12/28/21  Current Weight (#):  120.6 lbs  TBW % Change from start weight:32.1%  Ideal Body Weight (#): 119.1 lbs BMI 25 (58\")  Goal Weight (#):  lbs    Highest: 183 lbs  Lowest: 97 lbs mid 20's    Weight Loss History  Previous weight loss attempts: Commercial Programs (Weight Watchers, [employer], etc.)  Exercise  Self Created Diets (Portion Control, Healthy Food Choices, etc.)    Food and Nutrition Related History  Wake up:  6:00   Bed Time: 10    Food Recall  Breakfast: 8:00: decaf coffee w/2 tbsp h/h, pure protein bar OR just crack an egg OR cottage cheese or yogurt, fruit OR oatmeal or cherrios w/fairlife  Snack:   string cheese OR fruit OR protein bar   Lunch: 12:00:   2.5-3 oz deli turkey or chicken or tuna or egg, mission low carb or flatout wrap or banana, light batres OR healthy choice meal OR shrimp salsa, string cheese   OR egg salad (1 whole, 2 white), light batres OR Healthy Choice or Progresso light soup  Snack: 4:00: protein shake OR Decaf coffee w/2 tbsp h/h, fruit, string cheese OR occasional chocolate OR skinny pop  Dinner: 5:00:~4 oz chicken/fish/hamburger/salmon, 1 1/2 cup veggies, corn/peas OR  healthy choice meal or lean cuisine   Snack:  Skip     Beverages: water, decaf coffee/tea, diet soda and alcohol " (rare)  Volume of beverage intake: 32oz water/decaf coffee    Weekends: Same  Cravings:  carbs   Trouble area of day: late afternoon    Frequency of Eating out: 1x every other week  Food restrictions: lactose?   Cooking: self   Food Shopping: self    Physical Activity Intake  Activity:Walking,  Frequency:walking daily  Physical limitations/barriers to exercise:   shoulder pain    Estimated Needs  Energy   [employer] Energy Needs: BMR : 917    Maintenance sedentary: 1100          Maintenance lightly active: 1261  Protein: 55-68 gm      (1.2-1.5g/kg IBW)  Fluid: 53 oz     (35mL/kg IBW)    Nutrition Diagnosis  No, resolved    Nutrition Intervention    Nutrition Prescription  Calories:5911-9780  Protein: 55-70 gm    Meal Plan (Stewart/Pro)  Breakfast: 250-350, 15   Snack:  150  Lunch: 300, 15-20  Snack:  150, 5-10  Dinner: 300-350, 20-25  Snack: skip     Nutrition Education:    Healthy Core Manual  Calorie controlled menu  Lean protein food choices  Healthy snack options  Food journaling tips      Nutrition Counseling:  Strategies: meal planning, portion sizes, healthy snack choices, hydration, fiber intake, protein intake, exercise, food journal      Monitoring and Evaluation:  Evaluation criteria:  Energy Intake  Meet protein needs  Maintain adequate hydration  Monitor weekly weight  Meal planning/preparation  Food journal   Decreased portions at mealtimes and snacks  Physical activity     Barriers to learning:none  Readiness to change: Maintenance: (Maintaining the behavior change)  Comprehension: very good  Expected Compliance: very good

## 2024-05-23 ENCOUNTER — CLINICAL SUPPORT (OUTPATIENT)
Dept: BARIATRICS | Facility: CLINIC | Age: 79
End: 2024-05-23

## 2024-05-23 VITALS — WEIGHT: 119.6 LBS | HEIGHT: 58 IN | BODY MASS INDEX: 25.11 KG/M2

## 2024-05-23 DIAGNOSIS — R63.5 ABNORMAL WEIGHT GAIN: Primary | ICD-10-CM

## 2024-05-23 PROCEDURE — RECHECK

## 2024-05-29 ENCOUNTER — CLINICAL SUPPORT (OUTPATIENT)
Dept: BARIATRICS | Facility: CLINIC | Age: 79
End: 2024-05-29

## 2024-05-29 VITALS — BODY MASS INDEX: 25.31 KG/M2 | HEIGHT: 58 IN | WEIGHT: 120.6 LBS

## 2024-05-29 DIAGNOSIS — R63.5 ABNORMAL WEIGHT GAIN: Primary | ICD-10-CM

## 2024-05-29 PROCEDURE — RECHECK

## 2024-06-06 ENCOUNTER — CLINICAL SUPPORT (OUTPATIENT)
Dept: BARIATRICS | Facility: CLINIC | Age: 79
End: 2024-06-06

## 2024-06-06 VITALS — WEIGHT: 126 LBS | BODY MASS INDEX: 26.45 KG/M2 | HEIGHT: 58 IN

## 2024-06-06 DIAGNOSIS — R63.5 ABNORMAL WEIGHT GAIN: Primary | ICD-10-CM

## 2024-06-06 PROCEDURE — RECHECK

## 2024-06-17 NOTE — PROGRESS NOTES
"Weight Management Medical Nutrition Assessment  Alley is here for healthy ways f/u.   Current wt: 119.2      lbs. She has maintained her weight in the last ~3 wks and has maintained a weight loss of  58.5   lbs since December 2021. Had a few episodes of binging but feeling better now. Upcoming events discussed. She has no concerns at this time. Keep up the great work! She will continue to f/u in biweekly Healthy Ways.       Patient seen by Medical Provider in past 6 months:  no  Requested to schedule appointment with Medical Provider: No    Anthropometric Measurements  Start Weight (#): 177.7 lb 12/28/21  Current Weight (#):  119.2 lbs  TBW % Change from start weight:32.9%  Ideal Body Weight (#): 119.1 lbs BMI 25 (58\")  Goal Weight (#):  lbs    Highest: 183 lbs  Lowest: 97 lbs mid 20's    Weight Loss History  Previous weight loss attempts: Commercial Programs (Weight Watchers, [employer], etc.)  Exercise  Self Created Diets (Portion Control, Healthy Food Choices, etc.)    Food and Nutrition Related History  Wake up:  6:00   Bed Time: 10    Food Recall  Breakfast: 8:00: decaf coffee w/2 tbsp h/h, 2 eziekel w/guac  Snack:   string cheese OR fruit OR protein bar   Lunch: 12:00:   2.5-3 oz deli turkey or chicken or tuna or egg, mission low carb or flatout wrap or banana, light batres OR healthy choice meal OR shrimp salsa, string cheese   OR egg salad (1 whole, 2 white), light batres OR Healthy Choice or Progresso light soup  Snack: 4:00: protein shake OR Decaf coffee w/2 tbsp h/h, fruit, string cheese OR occasional chocolate OR skinny pop  Dinner: 5:00:~4 oz chicken/fish/hamburger/salmon, 1 1/2 cup veggies, corn/peas OR  healthy choice meal or lean cuisine   Snack:  Skip     Beverages: water, decaf coffee/tea, diet soda and alcohol (rare)  Volume of beverage intake: 32oz water/decaf coffee    Weekends: Same  Cravings:  carbs   Trouble area of day: late afternoon    Frequency of Eating out: 1x every other week  Food " restrictions: lactose?   Cooking: self   Food Shopping: self    Physical Activity Intake  Activity:Walking, strength training   Frequency:walking daily  Physical limitations/barriers to exercise:   shoulder pain    Estimated Needs  Energy   [employer] Energy Needs: BMR : 917    Maintenance sedentary: 1100          Maintenance lightly active: 1261  Protein: 55-68 gm      (1.2-1.5g/kg IBW)  Fluid: 53 oz     (35mL/kg IBW)    Nutrition Diagnosis  No, resolved    Nutrition Intervention    Nutrition Prescription  Calories:2768-5821  Protein: 55-70 gm    Meal Plan (Stewart/Pro)  Breakfast: 250-350, 15   Snack:  150  Lunch: 300, 15-20  Snack:  150, 5-10  Dinner: 300-350, 20-25  Snack: skip     Nutrition Education:    Healthy Core Manual  Calorie controlled menu  Lean protein food choices  Healthy snack options  Food journaling tips      Nutrition Counseling:  Strategies: meal planning, portion sizes, healthy snack choices, hydration, fiber intake, protein intake, exercise, food journal      Monitoring and Evaluation:  Evaluation criteria:  Energy Intake  Meet protein needs  Maintain adequate hydration  Monitor weekly weight  Meal planning/preparation  Food journal   Decreased portions at mealtimes and snacks  Physical activity     Barriers to learning:none  Readiness to change: Maintenance: (Maintaining the behavior change)  Comprehension: very good  Expected Compliance: very good

## 2024-06-21 ENCOUNTER — CLINICAL SUPPORT (OUTPATIENT)
Dept: BARIATRICS | Facility: CLINIC | Age: 79
End: 2024-06-21

## 2024-06-21 VITALS — BODY MASS INDEX: 25.02 KG/M2 | HEIGHT: 58 IN | WEIGHT: 119.2 LBS

## 2024-06-21 DIAGNOSIS — R63.5 ABNORMAL WEIGHT GAIN: Primary | ICD-10-CM

## 2024-06-21 PROCEDURE — RECHECK

## 2024-07-02 ENCOUNTER — CLINICAL SUPPORT (OUTPATIENT)
Dept: BARIATRICS | Facility: CLINIC | Age: 79
End: 2024-07-02

## 2024-07-02 VITALS — BODY MASS INDEX: 25.9 KG/M2 | HEIGHT: 58 IN | WEIGHT: 123.4 LBS

## 2024-07-02 DIAGNOSIS — R63.5 ABNORMAL WEIGHT GAIN: Primary | ICD-10-CM

## 2024-07-02 PROCEDURE — RECHECK

## 2024-07-15 NOTE — PROGRESS NOTES
"Weight Management Medical Nutrition Assessment  Alley is here for healthy ways f/u.   Current wt: 120.5      lbs. She has maintained her weight in the last month and has maintained a weight loss of  57.2    lbs since December 2021. Had a recent trip to [place] and did well with the exception of stress eating in the airport. No binging issues at home. Continues to be active on facebook page with monthly challenges. Keep up the great work! She will continue to f/u in biweekly Healthy Ways.       Patient seen by Medical Provider in past 6 months:  no  Requested to schedule appointment with Medical Provider: No    Anthropometric Measurements  Start Weight (#): 177.7 lb 12/28/21  Current Weight (#):  120.5 lbs  TBW % Change from start weight:32.2%  Ideal Body Weight (#): 119.1 lbs BMI 25 (58\")  Goal Weight (#):  lbs    Highest: 183 lbs  Lowest: 97 lbs mid 20's    Weight Loss History  Previous weight loss attempts: Commercial Programs (Weight Watchers, Norah Santoro, etc.)  Exercise  Self Created Diets (Portion Control, Healthy Food Choices, etc.)    Food and Nutrition Related History  Wake up:  6:00   Bed Time: 10    Food Recall  Breakfast: 8:00: decaf coffee w/2 tbsp h/h, 2 eziekel w/guac  Snack:   string cheese OR fruit OR protein bar   Lunch: 12:00:   2.5-3 oz deli turkey or chicken or tuna or egg, mission low carb or flatout wrap or banana, light batres OR healthy choice meal OR shrimp salsa, string cheese   OR egg salad (1 whole, 2 white), light batres OR Healthy Choice or Progresso light soup  Snack: 4:00: protein shake OR Decaf coffee w/2 tbsp h/h, fruit, string cheese OR occasional chocolate OR skinny pop  Dinner: 5:00:~4 oz chicken/fish/hamburger/salmon, 1 1/2 cup veggies, corn/peas OR  healthy choice meal or lean cuisine   Snack:  Skip     Beverages: water, decaf coffee/tea, diet soda and alcohol (rare)  Volume of beverage intake: 32oz water/decaf coffee    Weekends: Same  Cravings:  carbs   Trouble area of " day: late afternoon    Frequency of Eating out: 1x every other week  Food restrictions: lactose?   Cooking: self   Food Shopping: self    Physical Activity Intake  Activity:Walking, strength training   Frequency:walking daily  Physical limitations/barriers to exercise:   shoulder pain    Estimated Needs  Energy   [employer] Energy Needs: BMR : 917    Maintenance sedentary: 1100          Maintenance lightly active: 1261  Protein: 55-68 gm      (1.2-1.5g/kg IBW)  Fluid: 53 oz     (35mL/kg IBW)    Nutrition Diagnosis  No, resolved    Nutrition Intervention    Nutrition Prescription  Calories:6320-3957  Protein: 55-70 gm    Meal Plan (Stewart/Pro)  Breakfast: 250-350, 15   Snack:  150  Lunch: 300, 15-20  Snack:  150, 5-10  Dinner: 300-350, 20-25  Snack: skip     Nutrition Education:    Healthy Core Manual  Calorie controlled menu  Lean protein food choices  Healthy snack options  Food journaling tips      Nutrition Counseling:  Strategies: meal planning, portion sizes, healthy snack choices, hydration, fiber intake, protein intake, exercise, food journal      Monitoring and Evaluation:  Evaluation criteria:  Energy Intake  Meet protein needs  Maintain adequate hydration  Monitor weekly weight  Meal planning/preparation  Food journal   Decreased portions at mealtimes and snacks  Physical activity     Barriers to learning:none  Readiness to change: Maintenance: (Maintaining the behavior change)  Comprehension: very good  Expected Compliance: very good

## 2024-07-24 ENCOUNTER — CLINICAL SUPPORT (OUTPATIENT)
Dept: BARIATRICS | Facility: CLINIC | Age: 79
End: 2024-07-24

## 2024-07-24 VITALS — BODY MASS INDEX: 25.3 KG/M2 | HEIGHT: 58 IN | WEIGHT: 120.5 LBS

## 2024-07-24 DIAGNOSIS — R63.5 ABNORMAL WEIGHT GAIN: Primary | ICD-10-CM

## 2024-07-24 PROCEDURE — RECHECK

## 2024-08-05 ENCOUNTER — TELEPHONE (OUTPATIENT)
Age: 79
End: 2024-08-05

## 2024-08-22 ENCOUNTER — CLINICAL SUPPORT (OUTPATIENT)
Dept: BARIATRICS | Facility: CLINIC | Age: 79
End: 2024-08-22

## 2024-08-22 VITALS — WEIGHT: 123.4 LBS | BODY MASS INDEX: 25.9 KG/M2 | HEIGHT: 58 IN

## 2024-08-22 DIAGNOSIS — R63.5 ABNORMAL WEIGHT GAIN: Primary | ICD-10-CM

## 2024-08-22 PROCEDURE — RECHECK

## 2024-08-28 ENCOUNTER — HOSPITAL ENCOUNTER (EMERGENCY)
Facility: HOSPITAL | Age: 79
Discharge: HOME | End: 2024-08-28
Attending: EMERGENCY MEDICINE
Payer: MEDICARE

## 2024-08-28 ENCOUNTER — APPOINTMENT (EMERGENCY)
Dept: RADIOLOGY | Facility: HOSPITAL | Age: 79
End: 2024-08-28
Payer: MEDICARE

## 2024-08-28 VITALS
OXYGEN SATURATION: 99 % | SYSTOLIC BLOOD PRESSURE: 172 MMHG | TEMPERATURE: 98.2 F | RESPIRATION RATE: 18 BRPM | HEART RATE: 73 BPM | DIASTOLIC BLOOD PRESSURE: 81 MMHG
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
## 2024-08-28 DIAGNOSIS — S32.010A CLOSED COMPRESSION FRACTURE OF BODY OF L1 VERTEBRA (HCC): ICD-10-CM

## 2024-08-28 PROCEDURE — 74176 CT ABD & PELVIS W/O CONTRAST: CPT

## 2024-08-28 PROCEDURE — 99284 EMERGENCY DEPT VISIT MOD MDM: CPT

## 2024-08-28 PROCEDURE — 99284 EMERGENCY DEPT VISIT MOD MDM: CPT | Performed by: EMERGENCY MEDICINE

## 2024-08-28 RX ORDER — LIDOCAINE 50 MG/G
1 PATCH TOPICAL ONCE
Status: DISCONTINUED | OUTPATIENT
Start: 2024-08-28 | End: 2024-08-28 | Stop reason: HOSPADM

## 2024-08-28 RX ORDER — IBUPROFEN 400 MG/1
400 TABLET, FILM COATED ORAL ONCE
Status: COMPLETED | OUTPATIENT
Start: 2024-08-28 | End: 2024-08-28

## 2024-08-28 RX ORDER — LIDOCAINE 50 MG/G
1 PATCH TOPICAL EVERY 24 HOURS
Qty: 14 PATCH | Refills: 0 | Status: SHIPPED | OUTPATIENT
Start: 2024-08-28 | End: 2024-08-29

## 2024-08-28 RX ORDER — ACETAMINOPHEN 325 MG/1
975 TABLET ORAL ONCE
Status: COMPLETED | OUTPATIENT
Start: 2024-08-28 | End: 2024-08-28

## 2024-08-28 RX ADMIN — LIDOCAINE 1 PATCH: 50 PATCH CUTANEOUS at 19:15

## 2024-08-28 RX ADMIN — IBUPROFEN 400 MG: 400 TABLET, FILM COATED ORAL at 19:15

## 2024-08-28 RX ADMIN — ACETAMINOPHEN 975 MG: 325 TABLET ORAL at 19:14

## 2024-08-28 NOTE — ED PROVIDER NOTES
History  Chief Complaint   Patient presents with    Fall     Pt spraying bees on a step ladder and fell off the third step. C/o lower back pain     Patient is a 78-year-old female presenting for evaluation of low back pain status post fall.  Patient reports that she was in her garage spraining 4 wasps on a stepstool when the wasp came out of the nest she attempted to get off the stepstool to get away from the wasps and fell sideways onto her left side.  Complaining of some mid and left-sided low back pain.  Negative head strike negative thinners.  Denies any other pain or any other complaints at this time.          Prior to Admission Medications   Prescriptions Last Dose Informant Patient Reported? Taking?   Multiple Vitamins-Minerals (MULTIVITAMIN WITH MINERALS) tablet  Self Yes No   Sig: Take 1 tablet by mouth daily   atorvastatin (LIPITOR) 10 mg tablet   No No   Sig: Take 1 tablet (10 mg total) by mouth daily   calcium polycarbophil (FIBERCON) 625 mg tablet  Self Yes No   Sig: Take 625 mg by mouth daily   meloxicam (MOBIC) 15 mg tablet   No No   Sig: Take 1 tablet (15 mg total) by mouth daily   Patient not taking: Reported on 5/15/2024   ondansetron (ZOFRAN) 4 mg tablet   No No   Sig: Take 1 tablet (4 mg total) by mouth every 8 (eight) hours as needed for nausea or vomiting   Patient not taking: Reported on 5/15/2024   topiramate (Topamax) 25 mg tablet   No No   Sig: Take 1 pill in the night for a week and then take 1 pill in the morning and night   Patient not taking: Reported on 5/15/2024   zolpidem (AMBIEN) 5 mg tablet   No No   Sig: Take 1 tablet (5 mg total) by mouth daily at bedtime as needed for sleep   Patient not taking: Reported on 5/15/2024      Facility-Administered Medications: None       Past Medical History:   Diagnosis Date    Arthritis     Edema of both lower legs 11/01/2018    Dr. Thang Otero; faxed records.    Hx of skin cancer, basal cell     Hyperlipidemia     Hypertension      Impingement syndrome of left shoulder 04/10/2018    Dr. Thang Otero, faxed patient records.    Migraines     Osteopenia     Overactive bladder     Toe fracture, left 11/16/2023       Past Surgical History:   Procedure Laterality Date    ACHILLES TENDON REPAIR      CATARACT EXTRACTION, BILATERAL      COLPOPEXY VAGINAL EXTRAPERITONEAL (VEC) WITH INSERTION PUBOVAGINAL SLING      CYST REMOVAL      HERNIA REPAIR      HYSTERECTOMY      JOINT REPLACEMENT Right     REPLACEMENT TOTAL KNEE      TONSILLECTOMY  1949       Family History   Problem Relation Age of Onset    Heart disease Father     Ovarian cancer Maternal Aunt     Breast cancer Paternal Aunt     Hypertension Paternal Grandfather     Breast cancer Maternal Grandmother     Heart disease Paternal Grandmother     Diabetes Neg Hx     Stroke Neg Hx     Thyroid disease Neg Hx      I have reviewed and agree with the history as documented.    E-Cigarette/Vaping    E-Cigarette Use Never User      E-Cigarette/Vaping Substances    Nicotine No     THC No     CBD No     Flavoring No     Other No     Unknown No      Social History     Tobacco Use    Smoking status: Former     Types: Cigarettes    Smokeless tobacco: Never   Vaping Use    Vaping status: Never Used   Substance Use Topics    Alcohol use: Yes     Comment: occasional    Drug use: Never        Review of Systems   Constitutional:  Negative for chills and fever.   HENT:  Negative for ear pain and sore throat.    Eyes:  Negative for pain and visual disturbance.   Respiratory:  Negative for cough and shortness of breath.    Cardiovascular:  Negative for chest pain and palpitations.   Gastrointestinal:  Negative for abdominal pain and vomiting.   Genitourinary:  Negative for dysuria and hematuria.   Musculoskeletal:  Positive for back pain. Negative for arthralgias.   Skin:  Negative for color change and rash.   Neurological:  Negative for seizures and syncope.   All other systems reviewed and are  negative.      Physical Exam  ED Triage Vitals [08/28/24 1757]   Temperature Pulse Respirations Blood Pressure SpO2   98.2 °F (36.8 °C) 73 18 (!) 172/81 99 %      Temp Source Heart Rate Source Patient Position - Orthostatic VS BP Location FiO2 (%)   Oral Monitor Lying Right arm --      Pain Score       6             Orthostatic Vital Signs  Vitals:    08/28/24 1757   BP: (!) 172/81   Pulse: 73   Patient Position - Orthostatic VS: Lying       Physical Exam  Vitals and nursing note reviewed.   Constitutional:       General: She is not in acute distress.     Appearance: She is well-developed. She is not ill-appearing or diaphoretic.   HENT:      Head: Normocephalic and atraumatic.      Mouth/Throat:      Mouth: Mucous membranes are moist.   Eyes:      Extraocular Movements: Extraocular movements intact.      Conjunctiva/sclera: Conjunctivae normal.   Cardiovascular:      Rate and Rhythm: Normal rate and regular rhythm.      Heart sounds: No murmur heard.  Pulmonary:      Effort: Pulmonary effort is normal. No respiratory distress.      Breath sounds: Normal breath sounds.   Abdominal:      Palpations: Abdomen is soft.      Tenderness: There is no abdominal tenderness.   Musculoskeletal:         General: No swelling. Normal range of motion.      Cervical back: Normal, normal range of motion and neck supple.      Thoracic back: Normal.      Lumbar back: Tenderness and bony tenderness present.   Skin:     General: Skin is warm and dry.      Capillary Refill: Capillary refill takes less than 2 seconds.   Neurological:      General: No focal deficit present.      Mental Status: She is alert.   Psychiatric:         Mood and Affect: Mood normal.         ED Medications  Medications   acetaminophen (TYLENOL) tablet 975 mg (975 mg Oral Given 8/28/24 1914)   ibuprofen (MOTRIN) tablet 400 mg (400 mg Oral Given 8/28/24 1915)       Diagnostic Studies  Results Reviewed       None                   CT abdomen pelvis wo contrast  "  Final Result by Joey Valentine DO (08/28 2056)   Acute mild compression fracture at L1.   Minimal left hydronephrosis. Punctate nonobstructing left lower pole renal calculus. No visualized obstructing calculus.   This study was marked for \"Immediate\" notification in EPIC.         Workstation performed: [id number]         CT recon only lumbar spine   Final Result by Joey Valentine DO (08/28 2059)   Acute mild L1 compression fracture.            Workstation performed: [id number]               Procedures  Procedures      ED Course                                       Medical Decision Making  Patient is a 78-year-old female presenting for evaluation of low back pain after mechanical fall.  Will assess for bony injury given tenderness.  Will treat symptomatically.  Final dispo pending monitor and reassess.    On reassessment patient feels much improved after analgesia and Lidoderm patch.  Imaging notable for mild compression fracture of L1 superior endplate without bony retropulsion.  Discussed results with patient shared decision making admission versus discharge patient would like to go home her pain is well-controlled there are no other injuries believe this is appropriate.  Strict return precautions were given to patient she is in agreement.  Will send neurosurgery referral for outpatient follow-up.  Patient to follow-up with her PCP outpatient.  Patient hemodynamically stable and cleared for discharge    Amount and/or Complexity of Data Reviewed  Radiology: ordered.    Risk  OTC drugs.  Prescription drug management.          Disposition  Final diagnoses:   Fall, initial encounter   Closed compression fracture of body of L1 vertebra (HCC)     Time reflects when diagnosis was documented in both MDM as applicable and the Disposition within this note       Time User Action Codes Description Comment    8/28/2024  9:32 PM Mario Nolasco Add [W19.XXXA] Fall, initial encounter     8/28/2024  9:32 PM Mario Nolasco Add " [S32.010A] Closed compression fracture of body of L1 vertebra (HCC)           ED Disposition       ED Disposition   Discharge    Condition   Stable    Date/Time   Wed Aug 28, 2024  9:32 PM    Comment   Yuni Keys discharge to home/self care.                   Follow-up Information       Follow up With Specialties Details Why Contact Info    Lisa Fairbanks DO Internal Medicine Call   [facility] [id number] [phone]              Discharge Medication List as of 8/28/2024  9:35 PM        CONTINUE these medications which have NOT CHANGED    Details   atorvastatin (LIPITOR) 10 mg tablet Take 1 tablet (10 mg total) by mouth daily, Starting Tue 5/7/2024, Normal      calcium polycarbophil (FIBERCON) 625 mg tablet Take 625 mg by mouth daily, Historical Med      meloxicam (MOBIC) 15 mg tablet Take 1 tablet (15 mg total) by mouth daily, Starting Tue 5/7/2024, Normal      Multiple Vitamins-Minerals (MULTIVITAMIN WITH MINERALS) tablet Take 1 tablet by mouth daily, Historical Med      ondansetron (ZOFRAN) 4 mg tablet Take 1 tablet (4 mg total) by mouth every 8 (eight) hours as needed for nausea or vomiting, Starting Tue 5/7/2024, Normal      topiramate (Topamax) 25 mg tablet Take 1 pill in the night for a week and then take 1 pill in the morning and night, Normal      zolpidem (AMBIEN) 5 mg tablet Take 1 tablet (5 mg total) by mouth daily at bedtime as needed for sleep, Starting Tue 5/7/2024, Normal               PDMP Review         Value Time User    PDMP Reviewed  Yes 5/7/2024  3:42 PM Lisa Fairbanks DO             ED Provider  Attending physically available and evaluated Yuni Keys. I managed the patient along with the ED Attending.    Electronically Signed by           Mario Nolasco DO  08/29/24 9426

## 2024-08-29 RX ORDER — LIDOCAINE 50 MG/G
1 PATCH TOPICAL EVERY 24 HOURS
Qty: 14 PATCH | Refills: 0 | Status: SHIPPED | OUTPATIENT
Start: 2024-08-29 | End: 2024-09-12

## 2024-08-29 NOTE — PROGRESS NOTES
"Weight Management Medical Nutrition Assessment  Alley is here for healthy ways f/u.   Current wt:   123.1    lbs. She has gained 2.6 lbs  in the last 6 wks and has maintained a weight loss of  54.6     lbs since December 2021. She had reached out last week regarding increased binging. At that time I recommend to resume therapy as she had recently stopped. I did also provide her with a list of resources for eating issues specifically. She reports today that she was able to get an appointment for the end of this month. Currently recovering from fx of vertebra and still has a lot of pain. This has limited her ability to be active but has not had further issues with binging. She will continue to f/u in biweekly Healthy Ways.       Patient seen by Medical Provider in past 6 months:  no  Requested to schedule appointment with Medical Provider: No    Anthropometric Measurements  Start Weight (#): 177.7 lb 12/28/21  Current Weight (#):  123.1 lbs  TBW % Change from start weight:30.1%  Ideal Body Weight (#): 119.1 lbs BMI 25 (58\")  Goal Weight (#):  lbs    Highest: 183 lbs  Lowest: 97 lbs mid 20's    Weight Loss History  Previous weight loss attempts: Commercial Programs (Weight Watchers, [place] 100e, etc.)  Exercise  Self Created Diets (Portion Control, Healthy Food Choices, etc.)    Food and Nutrition Related History  Wake up:  6:00   Bed Time: 10    Food Recall  Breakfast: 8:00: decaf coffee w/2 tbsp h/h, 2 eziekel w/guac  Snack:   string cheese OR fruit OR protein bar   Lunch: 12:00:   2.5-3 oz deli turkey or chicken or tuna or egg, mission low carb or flatout wrap or banana, light batres OR healthy choice meal OR shrimp salsa, string cheese   OR egg salad (1 whole, 2 white), light batres OR Healthy Choice or Progresso light soup  Snack: 4:00: protein shake OR Decaf coffee w/2 tbsp h/h, fruit, string cheese OR occasional chocolate OR skinny pop  Dinner: 5:00:~4 oz chicken/fish/hamburger/salmon, 1 1/2 cup veggies, " corn/peas OR  healthy choice meal or lean cuisine   Snack:  Skip     Beverages: water, decaf coffee/tea, diet soda and alcohol (rare)  Volume of beverage intake: 32oz water/decaf coffee    Weekends: Same  Cravings:  carbs   Trouble area of day: late afternoon    Frequency of Eating out: 1x every other week  Food restrictions: lactose?   Cooking: self   Food Shopping: self    Physical Activity Intake  Activity:Walking, strength training   Frequency:walking daily  Physical limitations/barriers to exercise:   shoulder pain    Estimated Needs  Energy   [place] Energy Needs: BMR : 932    Maintenance sedentary: 1119          Maintenance lightly active: 1282  Protein: 55-68 gm      (1.2-1.5g/kg IBW)  Fluid: 53 oz     (35mL/kg IBW)    Nutrition Diagnosis  No, resolved    Nutrition Intervention    Nutrition Prescription  Calories:2999-3651  Protein: 55-70 gm    Meal Plan (Stewart/Pro)  Breakfast: 250-350, 15   Snack:  150  Lunch: 300, 15-20  Snack:  150, 5-10  Dinner: 300-350, 20-25  Snack: skip     Nutrition Education:    Healthy Core Manual  Calorie controlled menu  Lean protein food choices  Healthy snack options  Food journaling tips      Nutrition Counseling:  Strategies: meal planning, portion sizes, healthy snack choices, hydration, fiber intake, protein intake, exercise, food journal      Monitoring and Evaluation:  Evaluation criteria:  Energy Intake  Meet protein needs  Maintain adequate hydration  Monitor weekly weight  Meal planning/preparation  Food journal   Decreased portions at mealtimes and snacks  Physical activity     Barriers to learning:none  Readiness to change: Maintenance: (Maintaining the behavior change)  Comprehension: very good  Expected Compliance: very good

## 2024-08-29 NOTE — DISCHARGE INSTRUCTIONS
You were seen in the ER after fall. You have L1 compression fracture. Will send lidoderm patches to the pharmacy, would take these with ibuprofen and tylenol. Will send referral to neurosurgery for spine follow up. Follow up with your PCP. If your symptoms worsen or persist or your pain is not well controlled return to the ER. Incidental finding you have a kidney stone in your left kidney

## 2024-08-30 ENCOUNTER — OFFICE VISIT (OUTPATIENT)
Dept: FAMILY MEDICINE CLINIC | Facility: HOSPITAL | Age: 79
End: 2024-08-30
Payer: MEDICARE

## 2024-08-30 VITALS
WEIGHT: 127 LBS | OXYGEN SATURATION: 97 % | SYSTOLIC BLOOD PRESSURE: 130 MMHG | HEIGHT: 58 IN | BODY MASS INDEX: 26.66 KG/M2 | DIASTOLIC BLOOD PRESSURE: 72 MMHG | HEART RATE: 65 BPM

## 2024-08-30 DIAGNOSIS — S32.010A COMPRESSION FRACTURE OF L1 VERTEBRA, INITIAL ENCOUNTER (HCC): Primary | ICD-10-CM

## 2024-08-30 PROCEDURE — G2211 COMPLEX E/M VISIT ADD ON: HCPCS

## 2024-08-30 PROCEDURE — 99213 OFFICE O/P EST LOW 20 MIN: CPT

## 2024-08-30 RX ORDER — TRAMADOL HYDROCHLORIDE 50 MG/1
50 TABLET ORAL EVERY 6 HOURS PRN
Qty: 10 TABLET | Refills: 0 | Status: SHIPPED | OUTPATIENT
Start: 2024-08-30

## 2024-08-30 NOTE — PROGRESS NOTES
Ambulatory Visit  Name: Yuni Keys      : 1945      MRN: [id number]  Encounter Provider: Beatriz Troy DO  Encounter Date: 2024   Encounter department: [facility] [address]    Assessment & Plan   1. Compression fracture of L1 vertebra, initial encounter (HCC)  Assessment & Plan:  - Not doing well since ED 24, still having pain  - Unrelieved with oral analgesics, lidocaine patch  - H/o osteopenia per  DXA scan, reports compliance with vitamin D supplement  - Vitamin D level normal 45.1 in 2024  - Last CMP 2024 shows normal kidney function & LFT's    Plan:  - Trial of tramadol q6prn, pharmacy confirmed, PDMP reviewed  - Pt states she will mainly use at night, which I agree will be safer given her fall  - C/w vitamin D supplement  - Does not wish to establish with neurosurgery at this time  - F/u for next visit with PCP  Orders:  -     traMADol (Ultram) 50 mg tablet; Take 1 tablet (50 mg total) by mouth every 6 (six) hours as needed for moderate pain       History of Present Illness     77yo female presents for ED follow up. Sustained an L1 compression fx while attempting to get down from a ladder quickly after spraying a beehive. Was doing well in ED, but now having more pain toma at night. Taking Aleve & Tylenol together Q8H. Is also applying OTC Lidocaine patches every 8 hours, which aren't helpful. Prior to fall, no issues with ambulation, does weightbearing exercise regularly. Takes a multivitamin with vitamin D. Is requesting other pain medication.        Review of Systems   Respiratory:  Negative for shortness of breath.    Cardiovascular:  Negative for chest pain.   Gastrointestinal:  Negative for abdominal pain, nausea and vomiting.   Musculoskeletal:  Positive for back pain. Negative for gait problem.   Skin:  Negative for rash.   Psychiatric/Behavioral:  Positive for sleep disturbance.      Current Outpatient Medications on File Prior to Visit  "  Medication Sig Dispense Refill    atorvastatin (LIPITOR) 10 mg tablet Take 1 tablet (10 mg total) by mouth daily 90 tablet 3    calcium polycarbophil (FIBERCON) 625 mg tablet Take 625 mg by mouth daily      lidocaine (LIDODERM) 5 % Apply 1 patch topically over 12 hours every 24 hours for 14 days Remove & Discard patch within 12 hours or as directed by MD 14 patch 0    Multiple Vitamins-Minerals (MULTIVITAMIN WITH MINERALS) tablet Take 1 tablet by mouth daily      [DISCONTINUED] meloxicam (MOBIC) 15 mg tablet Take 1 tablet (15 mg total) by mouth daily (Patient not taking: Reported on 5/15/2024) 90 tablet 3    [DISCONTINUED] ondansetron (ZOFRAN) 4 mg tablet Take 1 tablet (4 mg total) by mouth every 8 (eight) hours as needed for nausea or vomiting (Patient not taking: Reported on 5/15/2024) 20 tablet 0    [DISCONTINUED] topiramate (Topamax) 25 mg tablet Take 1 pill in the night for a week and then take 1 pill in the morning and night (Patient not taking: Reported on 5/15/2024) 180 tablet 1    [DISCONTINUED] zolpidem (AMBIEN) 5 mg tablet Take 1 tablet (5 mg total) by mouth daily at bedtime as needed for sleep (Patient not taking: Reported on 5/15/2024) 30 tablet 0     No current facility-administered medications on file prior to visit.      Social History     Tobacco Use    Smoking status: Former     Types: Cigarettes    Smokeless tobacco: Never   Vaping Use    Vaping status: Never Used   Substance and Sexual Activity    Alcohol use: Yes     Comment: occasional    Drug use: Never    Sexual activity: Not Currently     Objective     /72   Pulse 65   Ht 4' 10\" (1.473 m)   Wt 57.6 kg (127 lb)   SpO2 97%   BMI 26.54 kg/m²     Physical Exam  Vitals reviewed.   Constitutional:       General: She is not in acute distress.     Appearance: Normal appearance. She is well-developed. She is not ill-appearing.   HENT:      Head: Normocephalic and atraumatic.   Cardiovascular:      Rate and Rhythm: Normal rate and regular " rhythm.      Heart sounds: Normal heart sounds.   Pulmonary:      Effort: Pulmonary effort is normal.      Breath sounds: Normal breath sounds.   Abdominal:      Palpations: Abdomen is soft.      Tenderness: There is no abdominal tenderness.   Musculoskeletal:         General: Tenderness (L5 midline, S1 midline) present. No deformity. Normal range of motion.   Skin:     General: Skin is warm and dry.   Neurological:      Mental Status: She is alert.      Gait: Gait normal.   Psychiatric:         Mood and Affect: Mood normal.         Behavior: Behavior normal.       Administrative Statements     Beatriz Troy, DO  Family Medicine

## 2024-08-30 NOTE — ASSESSMENT & PLAN NOTE
- Not doing well since ED 8/28/24, still having pain  - Unrelieved with oral analgesics, lidocaine patch  - H/o osteopenia per 2021 DXA scan, reports compliance with vitamin D supplement  - Vitamin D level normal 45.1 in 4/2024  - Last CMP 4/2024 shows normal kidney function & LFT's    Plan:  - Trial of tramadol q6prn, pharmacy confirmed, PDMP reviewed  - Pt states she will mainly use at night, which I agree will be safer given her fall  - C/w vitamin D supplement  - Does not wish to establish with neurosurgery at this time  - F/u for next visit with PCP

## 2024-09-01 NOTE — ED ATTENDING ATTESTATION
8/28/2024  I, Irwin Davila MD, saw and evaluated the patient. I have discussed the patient with the resident/non-physician practitioner and agree with the resident's/non-physician practitioner's findings, Plan of Care, and MDM as documented in the resident's/non-physician practitioner's note, except where noted. All available labs and Radiology studies were reviewed.  I was present for key portions of any procedure(s) performed by the resident/non-physician practitioner and I was immediately available to provide assistance.       At this point I agree with the current assessment done in the Emergency Department.  I have conducted an independent evaluation of this patient a history and physical is as follows:    ED Course     Impression: Low back pain status post fall from ladder  Differential diagnosis: Contusion compression fracture of lumbar spine, pelvis fracture    Patient underwent CT imaging of abdomen pelvis without contrast as well as lumbar spine reconstruction remarkable for acute mild low lung compression fracture.    Results reviewed with patient at bedside discussed through shared decision making patient prefer to go home and follow-up with spine surgery as outpatient.  Return precautions given      Critical Care Time  Procedures

## 2024-09-03 ENCOUNTER — OFFICE VISIT (OUTPATIENT)
Dept: NEUROSURGERY | Facility: CLINIC | Age: 79
End: 2024-09-03
Payer: MEDICARE

## 2024-09-03 VITALS
DIASTOLIC BLOOD PRESSURE: 82 MMHG | TEMPERATURE: 98 F | OXYGEN SATURATION: 97 % | HEART RATE: 52 BPM | RESPIRATION RATE: 18 BRPM | HEIGHT: 58 IN | BODY MASS INDEX: 26.66 KG/M2 | SYSTOLIC BLOOD PRESSURE: 118 MMHG | WEIGHT: 127 LBS

## 2024-09-03 DIAGNOSIS — S32.010A CLOSED COMPRESSION FRACTURE OF BODY OF L1 VERTEBRA (HCC): ICD-10-CM

## 2024-09-03 DIAGNOSIS — S32.010A COMPRESSION FRACTURE OF L1 VERTEBRA, INITIAL ENCOUNTER (HCC): Primary | ICD-10-CM

## 2024-09-03 DIAGNOSIS — S32.010A COMPRESSION FRACTURE OF L1 VERTEBRA, INITIAL ENCOUNTER (HCC): ICD-10-CM

## 2024-09-03 PROCEDURE — 99203 OFFICE O/P NEW LOW 30 MIN: CPT | Performed by: PHYSICIAN ASSISTANT

## 2024-09-03 NOTE — TELEPHONE ENCOUNTER
Medication: tramadol    Dose/Frequency: 50 mg, take 1 tablet by mouth every 6 hours as needed for moderate pain    Quantity: 10    Pharmacy: [id number] - [place] - [address] [address]    Office:   [x] PCP/Provider -   [] Speciality/Provider -     Does the patient have enough for 3 days?   [x] Yes   [] No - Send as HP to POD    Pt stated she is taking the medication as suggested along with Tylenol for the pain and it's not helping, wanted to make Dr. Troy aware in case she would like to recommend anything else. Please advise pt once script has been confirmed.

## 2024-09-03 NOTE — PROGRESS NOTES
Neurosurgery Office Note  Yuni Keys 78 y.o. female MRN: [id number]      Assessment & Plan     Compression fracture of L1 lumbar vertebra (HCC)  Patient presents for consultation for acute traumatic L1 compression fracture status post fall on 8/28/2024.    Imaging  CT lumbar spine 8/28/2024: Acute mild L1 compression fracture.    Plan    Pain control with over-the-counter and prescribed medications as needed.  Recommend TLSO brace when OOB and upright > 45 degrees. Provided TLSO brace in the office as pt did not receive one from the ED.   Cautioned to avoid bending, twisting, and turning the back.  No lifting more than 10 lbs. No driving while being treated in the brace.  At this time, no neurosurgical intervention is anticipated. Recommend that pt continue conservative management.  Pt to follow up with neurosurgery in 2 weeks with Upright Xray of lumbar spine.   Patient to return sooner with any worsening back pain, numbness, tingling, weakness, or bowel or bladder issues.  Patient verbalized understanding and was in agreement with the plan         Diagnoses and all orders for this visit:    Compression fracture of L1 vertebra, initial encounter (HCC)    Closed compression fracture of body of L1 vertebra (HCC)  -     Ambulatory Referral to Neurosurgery  -     XR spine thoracolumbar 2 vw; Future  -     Tlso Back Brace          I have spent a total time of 40 minutes on 09/03/24 in caring for this patient including Diagnostic results, Risks and benefits of tx options, Instructions for management, Patient and family education, Importance of tx compliance, Risk factor reductions, Impressions, Counseling / Coordination of care, Documenting in the medical record, Reviewing / ordering tests, medicine, procedures  , Obtaining or reviewing history  , and Communicating with other healthcare professionals .      CHIEF COMPLAINT    Chief Complaint   Patient presents with    Consult     Consult   L1 Comp Fx  CT  Abdomen/Pelvis  8/28/24  CT Lumbar RECON only  8/28/24          HISTORY    History of Present Illness     78 y.o. year old female     Who presents for consultation for acute L1 compression fracture status post fall on 8/28/2024.  Today patient reports she continues to have back pain that she rates a 7/10 on the pain scale.  She denies radiation of pain down bilateral lower extremities.  Patient denies any new numbness, tingling, or weakness in bilateral upper and lower extremities.  Patient denies gait instability.  She reports that she is able to ambulate independently without any assistive devices.    Patient reports that she has 2 grown children and has grandchildren.  She reports that she lives alone.          REVIEW OF SYSTEMS    Review of Systems   Genitourinary:  Negative for enuresis.   Musculoskeletal:  Positive for back pain (lbp across the back does not radiate). Negative for gait problem and myalgias.   Neurological:  Negative for weakness and numbness.   Hematological:  Does not bruise/bleed easily.   Psychiatric/Behavioral:  Positive for sleep disturbance.    All other systems reviewed and are negative.      ROS obtained by MA. Reviewed. See HPI.     Meds/Allergies     Current Outpatient Medications   Medication Sig Dispense Refill    atorvastatin (LIPITOR) 10 mg tablet Take 1 tablet (10 mg total) by mouth daily 90 tablet 3    calcium polycarbophil (FIBERCON) 625 mg tablet Take 625 mg by mouth daily      Multiple Vitamins-Minerals (MULTIVITAMIN WITH MINERALS) tablet Take 1 tablet by mouth daily      traMADol (Ultram) 50 mg tablet Take 1 tablet (50 mg total) by mouth every 6 (six) hours as needed for moderate pain 10 tablet 0     No current facility-administered medications for this visit.       Allergies   Allergen Reactions    Imodium [Loperamide] Headache     Felt that peripheral vision was impaired and mild headache    Pedi-Pre Tape Spray [Wound Dressing Adhesive] Itching and Rash     Adhesive  "Tape    Tilactase Diarrhea    Bupropion Rash     Around 2010       PAST HISTORY    Past Medical History:   Diagnosis Date    Arthritis     Edema of both lower legs 11/01/2018    Dr. Thang Otero; faxed records.    Hx of skin cancer, basal cell     Hyperlipidemia     Hypertension     Impingement syndrome of left shoulder 04/10/2018    Dr. Thang Otero, faxed patient records.    Migraines     Osteopenia     Overactive bladder     Toe fracture, left 11/16/2023       Past Surgical History:   Procedure Laterality Date    ACHILLES TENDON REPAIR      CATARACT EXTRACTION, BILATERAL      COLPOPEXY VAGINAL EXTRAPERITONEAL (VEC) WITH INSERTION PUBOVAGINAL SLING      CYST REMOVAL      HERNIA REPAIR      HYSTERECTOMY      JOINT REPLACEMENT Right     REPLACEMENT TOTAL KNEE      TONSILLECTOMY  1949       Social History     Tobacco Use    Smoking status: Former     Types: Cigarettes    Smokeless tobacco: Never   Vaping Use    Vaping status: Never Used   Substance Use Topics    Alcohol use: Yes     Comment: occasional    Drug use: Never       Family History   Problem Relation Age of Onset    Heart disease Father     Ovarian cancer Maternal Aunt     Breast cancer Paternal Aunt     Hypertension Paternal Grandfather     Breast cancer Maternal Grandmother     Heart disease Paternal Grandmother     Diabetes Neg Hx     Stroke Neg Hx     Thyroid disease Neg Hx          Above history personally reviewed.       EXAM    Vitals:Blood pressure 118/82, pulse (!) 52, temperature 98 °F (36.7 °C), temperature source Temporal, resp. rate 18, height 4' 10\" (1.473 m), weight 57.6 kg (127 lb), SpO2 97%.,Body mass index is 26.54 kg/m².     Physical Exam  Constitutional:       General: She is not in acute distress.     Appearance: She is well-developed.   HENT:      Head: Normocephalic and atraumatic.   Eyes:      Extraocular Movements: Extraocular movements intact.      Pupils: Pupils are equal, round, and reactive to light.   Neck:      " Trachea: No tracheal deviation.   Cardiovascular:      Rate and Rhythm: Normal rate.   Pulmonary:      Effort: Pulmonary effort is normal.   Abdominal:      Palpations: Abdomen is soft.      Tenderness: There is no abdominal tenderness. There is no guarding.   Musculoskeletal:      Cervical back: Normal range of motion and neck supple.      Comments: TLSO brace well fitted in office.    Skin:     General: Skin is warm and dry.      Coloration: Skin is not pale.   Neurological:      Mental Status: She is alert and oriented to person, place, and time.      Comments: GCS 15, Awake, Alert, Oriented x 3    Motor: MARLOW, strength 5/5 throughout    Sensation:  intact to LT X 4     Reflexes: 2+ and symmetric, no tai's or clonus     Coordination: no drift bilateral upper extremities.          Psychiatric:         Behavior: Behavior normal.         Neurologic Exam     Mental Status   Oriented to person, place, and time.     Cranial Nerves     CN III, IV, VI   Pupils are equal, round, and reactive to light.        MEDICAL DECISION MAKING    Imaging Studies:     CT recon only lumbar spine    Result Date: 8/28/2024  Narrative: CT RECON ONLY LUMBAR SPINE (NO CHARGE) INDICATION:   fall pain in lumbar spine. COMPARISON:  None TECHNIQUE: Axial CT examination of the lumbar spine was obtained utilizing reconstructed images from CT of the chest, abdomen and pelvis performed the same day.  Images were reformatted in the sagittal and coronal planes. This examination, like all CT scans performed in the [facility], was performed utilizing techniques to minimize radiation dose exposure, including the use of iterative reconstruction and automated exposure control. FINDINGS: ALIGNMENT: Normal alignment. No spondylolisthesis.  No scoliosis. VERTEBRAE: Acute compression fracture at L1 with minimal superior endplate loss of height. No significant bony retropulsion. Nonspecific partially imaged right T11 vertebral body lucent  lesion. DEGENERATIVE CHANGES: Degenerative disc and endplate changes at L2-L3. Mild thoracolumbar dextroscoliosis. PREVERTEBRAL AND PARASPINAL SOFT TISSUES: Normal. OTHER: Unremarkable     Impression: Acute mild L1 compression fracture. Workstation performed: [id number]     CT abdomen pelvis wo contrast    Result Date: 8/28/2024  Narrative: CT ABDOMEN AND PELVIS WITHOUT IV CONTRAST INDICATION: fall pain at lumboscarum area. COMPARISON: None. TECHNIQUE: CT examination of the abdomen and pelvis was performed without intravenous contrast. Multiplanar 2D reformatted images were created from the source data. This examination, like all CT scans performed in the [facility], was performed utilizing techniques to minimize radiation dose exposure, including the use of iterative reconstruction and automated exposure control. Radiation dose length product (DLP) for this visit: 513.54 mGy-cm Enteric Contrast: Not administered. FINDINGS: ABDOMEN LOWER CHEST: No clinically significant abnormality in the visualized lower chest. LIVER/BILIARY TREE: Unremarkable. GALLBLADDER: No calcified gallstones. No pericholecystic inflammatory change. SPLEEN: Unremarkable. PANCREAS: Unremarkable. ADRENAL GLANDS: Unremarkable. KIDNEYS/URETERS: Punctate nonobstructing left lower pole renal calculus. Suspected multiple left parapelvic cysts. Minimal left hydronephrosis without visualized obstructing calculus STOMACH AND BOWEL: Unremarkable. APPENDIX: No findings to suggest appendicitis. ABDOMINOPELVIC CAVITY: No ascites. No pneumoperitoneum. No lymphadenopathy. VESSELS: Unremarkable for patient's age. PELVIS REPRODUCTIVE ORGANS: Post hysterectomy. URINARY BLADDER: Unremarkable. ABDOMINAL WALL/INGUINAL REGIONS: Unremarkable. BONES: Acute compression fracture at L1 with minimal superior endplate loss of height. No significant bony retropulsion. Degenerative disc disease at L2-L3. Nonspecific lucent lesion at the right aspect of the  "T11 vertebral body. Mild thoracolumbar dextroscoliosis.     Impression: Acute mild compression fracture at L1. Minimal left hydronephrosis. Punctate nonobstructing left lower pole renal calculus. No visualized obstructing calculus. This study was marked for \"Immediate\" notification in EPIC. Workstation performed: [id number]       I have personally reviewed pertinent reports.   and I have personally reviewed pertinent films in PACS  "

## 2024-09-03 NOTE — ASSESSMENT & PLAN NOTE
Patient presents for consultation for acute traumatic L1 compression fracture status post fall on 8/28/2024.    Imaging  CT lumbar spine 8/28/2024: Acute mild L1 compression fracture.    Plan    Pain control with over-the-counter and prescribed medications as needed.  Recommend TLSO brace when OOB and upright > 45 degrees. Provided TLSO brace in the office as pt did not receive one from the ED.   Cautioned to avoid bending, twisting, and turning the back.  No lifting more than 10 lbs. No driving while being treated in the brace.  At this time, no neurosurgical intervention is anticipated. Recommend that pt continue conservative management.  Pt to follow up with neurosurgery in 2 weeks with Upright Xray of lumbar spine.   Patient to return sooner with any worsening back pain, numbness, tingling, weakness, or bowel or bladder issues.  Patient verbalized understanding and was in agreement with the plan

## 2024-09-05 RX ORDER — TRAMADOL HYDROCHLORIDE 50 MG/1
50 TABLET ORAL EVERY 6 HOURS PRN
Qty: 10 TABLET | Refills: 0 | Status: SHIPPED | OUTPATIENT
Start: 2024-09-05

## 2024-09-06 ENCOUNTER — CLINICAL SUPPORT (OUTPATIENT)
Dept: BARIATRICS | Facility: CLINIC | Age: 79
End: 2024-09-06

## 2024-09-06 VITALS — WEIGHT: 123.1 LBS | HEIGHT: 58 IN | BODY MASS INDEX: 25.84 KG/M2

## 2024-09-06 DIAGNOSIS — R63.5 ABNORMAL WEIGHT GAIN: Primary | ICD-10-CM

## 2024-09-06 PROCEDURE — RECHECK

## 2024-09-09 DIAGNOSIS — M19.011 OSTEOARTHRITIS OF RIGHT ACROMIOCLAVICULAR JOINT: ICD-10-CM

## 2024-09-10 RX ORDER — MELOXICAM 15 MG/1
15 TABLET ORAL DAILY
Qty: 90 TABLET | Refills: 3 | OUTPATIENT
Start: 2024-09-10

## 2024-09-13 ENCOUNTER — TELEPHONE (OUTPATIENT)
Age: 79
End: 2024-09-13

## 2024-09-13 ENCOUNTER — APPOINTMENT (OUTPATIENT)
Dept: RADIOLOGY | Facility: CLINIC | Age: 79
End: 2024-09-13
Payer: MEDICARE

## 2024-09-13 DIAGNOSIS — S32.010A CLOSED COMPRESSION FRACTURE OF BODY OF L1 VERTEBRA (HCC): ICD-10-CM

## 2024-09-13 PROCEDURE — 72080 X-RAY EXAM THORACOLMB 2/> VW: CPT

## 2024-09-13 NOTE — TELEPHONE ENCOUNTER
Pt called in after Xray on 9/13/24 to see if she could move her appt to [facility] office and also if she could come in prior to the 20th because she is having some groin pain that she had been messaging us about in [facility].  , Appt moved to 9/16 in [facility] with Clair

## 2024-09-16 ENCOUNTER — OFFICE VISIT (OUTPATIENT)
Age: 79
End: 2024-09-16
Payer: MEDICARE

## 2024-09-16 VITALS
BODY MASS INDEX: 25.82 KG/M2 | HEART RATE: 60 BPM | DIASTOLIC BLOOD PRESSURE: 78 MMHG | SYSTOLIC BLOOD PRESSURE: 122 MMHG | RESPIRATION RATE: 18 BRPM | OXYGEN SATURATION: 97 % | HEIGHT: 58 IN | TEMPERATURE: 98 F | WEIGHT: 123 LBS

## 2024-09-16 DIAGNOSIS — S32.010A CLOSED COMPRESSION FRACTURE OF BODY OF L1 VERTEBRA (HCC): Primary | ICD-10-CM

## 2024-09-16 PROCEDURE — 99214 OFFICE O/P EST MOD 30 MIN: CPT | Performed by: PHYSICIAN ASSISTANT

## 2024-09-16 RX ORDER — CHOLECALCIFEROL (VITAMIN D3) 50 MCG
2000 TABLET ORAL DAILY
COMMUNITY

## 2024-09-16 RX ORDER — MELOXICAM 15 MG/1
15 TABLET ORAL DAILY
COMMUNITY
Start: 2024-09-10

## 2024-09-16 NOTE — PROGRESS NOTES
Neurosurgery Office Note  Yuni Keys 78 y.o. female MRN: [id number]      Assessment & Plan     Closed compression fracture of body of L1 vertebra (HCC)  Patient presents for follow-up of a L1 compression fracture status post fall on 8/28/2024.  Patient was spraying bees but then  hide off the stepstool but fell.    Since last visit noticing bilateral groin pain which appears focal but severe at times.  No bowel bladder issues.  No leg pain, numbness, tingling or weakness.    Imaging personally reviewed.  CT lumbar spine 8/28/2024: Acute mild L1 compression fracture. Scoliosis noted.  Lumbar Xrays 9/13/24: Mild increased loss of height at L1 with overall maintained alignment.     Plan:  Continue to monitor for any new or progressive symptoms.   Pain control with over-the-counter and prescribed medications as needed.  Discussed follow-up with PCP for further medications if needed.  Given ongoign pain, reviewed considerations for kyphoplasty.   Yasir reviewed procedure and post procedural restrictions.  Discussed need for MRI to determine if fracture would be amendable to kyphoplasty.   MRI lumbar ordered for further evaluation of fracture and new groin pain.   DEXA scan ordered - then informed last DEXA 4/2023 at [facility] - patient will work to obtain report.   States she was on Fosamax prior  Continue TLSO brace when OOB and upright > 45 degrees.   Plan to follow-up with Dr. Robbins, option as joint appointment, after MRI for further consideration of kyphoplasty if ongoing pain.   If pain resolves, can defer kyphoplasty.   Plan to follow up with neurosurgery AP in 4 weeks with repeat upright lumbar Xrays. Order placed.  Patient to return sooner with any worsening back pain, numbness, tingling, weakness, or bowel or bladder issues.  Patient to call the office with any questions or concerns in the interim.       Diagnoses and all orders for this visit:    Closed compression fracture of body of L1 vertebra  (HCC)  -     MRI lumbar spine without contrast; Future  -     DXA bone density spine hip and pelvis; Future  -     X-ray lumbar spine 2 or 3 views; Future    Other orders  -     meloxicam (MOBIC) 15 mg tablet; Take 15 mg by mouth daily  -     Cholecalciferol (Vitamin D) 50 MCG (2000 UT) tablet; Take 2,000 Units by mouth daily          I have spent a total time of 34 minutes on 09/16/24 in caring for this patient including Diagnostic results, Prognosis, Risks and benefits of tx options, Instructions for management, Patient and family education, Impressions, Documenting in the medical record, Reviewing / ordering tests, medicine, procedures  , and Obtaining or reviewing history  .      CHIEF COMPLAINT    Chief Complaint   Patient presents with    Follow-up     2 WEEKS XRAYS       HISTORY    History of Present Illness     This is a 78-year-old female with past medical history significant for hyperlipidemia, osteopenia, migraines, and hypertension presents for follow-up of a L1 compression fracture status post fall on 8/28/2024.  Patient states she was spraying for bees outside when she hurried off a step stool falling backwards.  She was evaluated for back pain and diagnosed with L1 compression fracture.  Given nonfocal exam and no posterior element involvement, patient has been managed conservatively in a brace with serial imaging.      Today, patient reports she continues to have back pain that she rates slightly worse than her last visit.  She has ongoing need for tramadol to assist with sleeping at night/pain.  She denies radiation of pain down bilateral lower extremities.  Patient denies any new numbness, tingling, or weakness in bilateral upper and lower extremities.  Patient denies gait instability.  She reports that she is able to ambulate independently without any assistive devices. Patient exercises regularly including core but now is limited in her exercises.         REVIEW OF SYSTEMS    Review of Systems    Genitourinary:  Negative for enuresis.   Musculoskeletal:  Positive for back pain (lbp across the back, pain on right side of groin, worsened). Negative for gait problem and myalgias.   Neurological:  Negative for weakness and numbness.   Hematological:  Does not bruise/bleed easily.   Psychiatric/Behavioral:  Negative for sleep disturbance.    All other systems reviewed and are negative.      ROS obtained by MA. Reviewed. See HPI.     Meds/Allergies     Current Outpatient Medications   Medication Sig Dispense Refill    atorvastatin (LIPITOR) 10 mg tablet Take 1 tablet (10 mg total) by mouth daily 90 tablet 3    calcium polycarbophil (FIBERCON) 625 mg tablet Take 625 mg by mouth daily      Cholecalciferol (Vitamin D) 50 MCG (2000 UT) tablet Take 2,000 Units by mouth daily      meloxicam (MOBIC) 15 mg tablet Take 15 mg by mouth daily      Multiple Vitamins-Minerals (MULTIVITAMIN WITH MINERALS) tablet Take 1 tablet by mouth daily      traMADol (Ultram) 50 mg tablet Take 1 tablet (50 mg total) by mouth every 6 (six) hours as needed for moderate pain 10 tablet 0     No current facility-administered medications for this visit.       Allergies   Allergen Reactions    Imodium [Loperamide] Headache     Felt that peripheral vision was impaired and mild headache    Pedi-Pre Tape Spray [Wound Dressing Adhesive] Itching and Rash     Adhesive Tape    Tilactase Diarrhea    Bupropion Rash     Around 2010       PAST HISTORY    Past Medical History:   Diagnosis Date    Arthritis     Edema of both lower legs 11/01/2018    Dr. Thang Otero; faxed records.    Hx of skin cancer, basal cell     Hyperlipidemia     Hypertension     Impingement syndrome of left shoulder 04/10/2018    Dr. Thang Otero, faxed patient records.    Migraines     Osteopenia     Overactive bladder     Toe fracture, left 11/16/2023       Past Surgical History:   Procedure Laterality Date    ACHILLES TENDON REPAIR      CATARACT EXTRACTION, BILATERAL       "COLPOPEXY VAGINAL EXTRAPERITONEAL (VEC) WITH INSERTION PUBOVAGINAL SLING      CYST REMOVAL      HERNIA REPAIR      HYSTERECTOMY      JOINT REPLACEMENT Right     REPLACEMENT TOTAL KNEE      TONSILLECTOMY  1949       Social History     Tobacco Use    Smoking status: Former     Types: Cigarettes    Smokeless tobacco: Never   Vaping Use    Vaping status: Never Used   Substance Use Topics    Alcohol use: Yes     Comment: occasional    Drug use: Never       Family History   Problem Relation Age of Onset    Heart disease Father     Ovarian cancer Maternal Aunt     Breast cancer Paternal Aunt     Hypertension Paternal Grandfather     Breast cancer Maternal Grandmother     Heart disease Paternal Grandmother     Diabetes Neg Hx     Stroke Neg Hx     Thyroid disease Neg Hx          Above history personally reviewed.       EXAM    Vitals:Blood pressure 122/78, pulse 60, temperature 98 °F (36.7 °C), temperature source Temporal, resp. rate 18, height 4' 10.25\" (1.48 m), weight 55.8 kg (123 lb), SpO2 97%.,Body mass index is 25.49 kg/m².     Physical Exam  Constitutional:       General: She is not in acute distress.     Appearance: Normal appearance. She is well-developed. She is not ill-appearing.   HENT:      Head: Normocephalic and atraumatic.      Right Ear: External ear normal.      Left Ear: External ear normal.      Nose: Nose normal.      Mouth/Throat:      Mouth: Mucous membranes are moist.   Eyes:      General: No scleral icterus.        Right eye: No discharge.         Left eye: No discharge.      Conjunctiva/sclera: Conjunctivae normal.   Cardiovascular:      Rate and Rhythm: Normal rate.   Pulmonary:      Effort: Pulmonary effort is normal. No respiratory distress.   Musculoskeletal:         General: Tenderness present.   Skin:     General: Skin is warm and dry.      Findings: No rash.   Neurological:      Mental Status: She is alert.      Motor: Motor strength is normal.     Gait: Gait is intact.   Psychiatric:        "  Mood and Affect: Mood normal.         Speech: Speech normal.         Behavior: Behavior normal.         Thought Content: Thought content normal.         Judgment: Judgment normal.       Neurologic Exam     Mental Status   Attention: normal. Concentration: normal.   Speech: speech is normal   Level of consciousness: alert  Knowledge: good.   Normal comprehension.     Cranial Nerves     CN III, IV, VI   Conjugate gaze: present    CN VII   Facial expression full, symmetric.     CN VIII   Hearing: intact    Motor Exam   Muscle bulk: normal  Overall muscle tone: normal  Right arm pronator drift: absent  Left arm pronator drift: absent    Strength   Strength 5/5 throughout.     Sensory Exam   Light touch normal.     Gait, Coordination, and Reflexes     Gait  Gait: normal    Tremor   Resting tremor: absent  Intention tremor: absent  Action tremor: absent    Reflexes   Right Kenney: absent  Left Kenney: absent  Right ankle clonus: absent  Left ankle clonus: absent        MEDICAL DECISION MAKING    Imaging Studies:     XR spine thoracolumbar 2 vw    Result Date: 9/13/2024  Narrative: THORACOLUMBAR SPINE INDICATION:   Wedge compression fracture of first lumbar vertebra, initial encounter for closed fracture. COMPARISON: 8/28/2024. VIEWS:  XR SPINE THORACOLUMBAR 2 VW Images: 2 FINDINGS: Compression fracture of the L1 vertebral body again seen. This is similar to the prior. There is moderate dextrocurvature of the lumbar spine. There is no spondylolisthesis. There is moderate multilevel disc space narrowing with osteophytes throughout the lumbar spine worse at L2-L3. There is moderate-severe facet disease lower lumbar spine.. There is no displacement of the paraspinal line. The pedicles appear intact.     Impression: Redemonstration of L1 compression fracture Moderate multilevel spondylosis worse at L2-L3. Moderate to severe facet disease. Electronically signed: 09/13/2024 03:43 PM Stephane Rachel MD    CT recon only lumbar  spine    Result Date: 8/28/2024  Narrative: CT RECON ONLY LUMBAR SPINE (NO CHARGE) INDICATION:   fall pain in lumbar spine. COMPARISON:  None TECHNIQUE: Axial CT examination of the lumbar spine was obtained utilizing reconstructed images from CT of the chest, abdomen and pelvis performed the same day.  Images were reformatted in the sagittal and coronal planes. This examination, like all CT scans performed in the [facility], was performed utilizing techniques to minimize radiation dose exposure, including the use of iterative reconstruction and automated exposure control. FINDINGS: ALIGNMENT: Normal alignment. No spondylolisthesis.  No scoliosis. VERTEBRAE: Acute compression fracture at L1 with minimal superior endplate loss of height. No significant bony retropulsion. Nonspecific partially imaged right T11 vertebral body lucent lesion. DEGENERATIVE CHANGES: Degenerative disc and endplate changes at L2-L3. Mild thoracolumbar dextroscoliosis. PREVERTEBRAL AND PARASPINAL SOFT TISSUES: Normal. OTHER: Unremarkable     Impression: Acute mild L1 compression fracture. Workstation performed: [id number]     CT abdomen pelvis wo contrast    Result Date: 8/28/2024  Narrative: CT ABDOMEN AND PELVIS WITHOUT IV CONTRAST INDICATION: fall pain at lumboscarum area. COMPARISON: None. TECHNIQUE: CT examination of the abdomen and pelvis was performed without intravenous contrast. Multiplanar 2D reformatted images were created from the source data. This examination, like all CT scans performed in the [facility], was performed utilizing techniques to minimize radiation dose exposure, including the use of iterative reconstruction and automated exposure control. Radiation dose length product (DLP) for this visit: 513.54 mGy-cm Enteric Contrast: Not administered. FINDINGS: ABDOMEN LOWER CHEST: No clinically significant abnormality in the visualized lower chest. LIVER/BILIARY TREE: Unremarkable. GALLBLADDER:  "No calcified gallstones. No pericholecystic inflammatory change. SPLEEN: Unremarkable. PANCREAS: Unremarkable. ADRENAL GLANDS: Unremarkable. KIDNEYS/URETERS: Punctate nonobstructing left lower pole renal calculus. Suspected multiple left parapelvic cysts. Minimal left hydronephrosis without visualized obstructing calculus STOMACH AND BOWEL: Unremarkable. APPENDIX: No findings to suggest appendicitis. ABDOMINOPELVIC CAVITY: No ascites. No pneumoperitoneum. No lymphadenopathy. VESSELS: Unremarkable for patient's age. PELVIS REPRODUCTIVE ORGANS: Post hysterectomy. URINARY BLADDER: Unremarkable. ABDOMINAL WALL/INGUINAL REGIONS: Unremarkable. BONES: Acute compression fracture at L1 with minimal superior endplate loss of height. No significant bony retropulsion. Degenerative disc disease at L2-L3. Nonspecific lucent lesion at the right aspect of the T11 vertebral body. Mild thoracolumbar dextroscoliosis.     Impression: Acute mild compression fracture at L1. Minimal left hydronephrosis. Punctate nonobstructing left lower pole renal calculus. No visualized obstructing calculus. This study was marked for \"Immediate\" notification in EPIC. Workstation performed: [id number]       Personally reviewed the following image studies in PACS and associated radiology reports: CT spine and xray(s). My interpretation of the radiology images/reports is: When compared to CT imaging there does to appear to be some increased loss of height with overall maintained alignment..  "

## 2024-09-16 NOTE — ASSESSMENT & PLAN NOTE
Patient presents for follow-up of a L1 compression fracture status post fall on 8/28/2024.  Patient was spraying bees but then  hide off the stepstool but fell.    Since last visit noticing bilateral groin pain which appears focal but severe at times.  No bowel bladder issues.  No leg pain, numbness, tingling or weakness.    Imaging personally reviewed.  CT lumbar spine 8/28/2024: Acute mild L1 compression fracture. Scoliosis noted.  Lumbar Xrays 9/13/24: Mild increased loss of height at L1 with overall maintained alignment.     Plan:  Continue to monitor for any new or progressive symptoms.   Pain control with over-the-counter and prescribed medications as needed.  Discussed follow-up with PCP for further medications if needed.  Given ongoign pain, reviewed considerations for kyphoplasty.   Yasir reviewed procedure and post procedural restrictions.  Discussed need for MRI to determine if fracture would be amendable to kyphoplasty.   MRI lumbar ordered for further evaluation of fracture and new groin pain.   DEXA scan ordered - then informed last DEXA 4/2023 at [place] - patient will work to obtain report.   States she was on Fosamax prior  Continue TLSO brace when OOB and upright > 45 degrees.   Plan to follow-up with Dr. Robbins, option as joint appointment, after MRI for further consideration of kyphoplasty if ongoing pain.   If pain resolves, can defer kyphoplasty.   Plan to follow up with neurosurgery AP in 4 weeks with repeat upright lumbar Xrays. Order placed.  Patient to return sooner with any worsening back pain, numbness, tingling, weakness, or bowel or bladder issues.  Patient to call the office with any questions or concerns in the interim.

## 2024-09-16 NOTE — PATIENT INSTRUCTIONS
Complete DEXA scan    Complete MRI - appt with Dr. Robbins to discuss possible Kyphoplasty     If no procedure - plan for follow-up in 4 weeks with new standing XRays     Neurosurgery discharge instructions following spine fracture:     TLSO brace to be worn when out of bed of head of bed greater than 45 degrees.  May place brace on while sitting on edge of bed. May be removed for showering.   No bending, twisting or heavy lifting. No strenuous activities.   May drive short [employer]  Follow-up as scheduled in four weeks with repeat spine x-rays to be completed 2-3 days prior to visit.  Prescription has been entered electronically.      **Please notify MD immediately if you have increased back or leg pain. New numbness, tingling, weakness in your legs and/or bowel/bladder incontinence**

## 2024-09-18 ENCOUNTER — PATIENT MESSAGE (OUTPATIENT)
Dept: FAMILY MEDICINE CLINIC | Facility: HOSPITAL | Age: 79
End: 2024-09-18

## 2024-09-18 DIAGNOSIS — S32.010A COMPRESSION FRACTURE OF L1 VERTEBRA, INITIAL ENCOUNTER (HCC): ICD-10-CM

## 2024-09-18 RX ORDER — TRAMADOL HYDROCHLORIDE 50 MG/1
50 TABLET ORAL EVERY 6 HOURS PRN
Qty: 10 TABLET | Refills: 0 | Status: SHIPPED | OUTPATIENT
Start: 2024-09-18

## 2024-09-21 ENCOUNTER — HOSPITAL ENCOUNTER (OUTPATIENT)
Dept: MRI IMAGING | Facility: HOSPITAL | Age: 79
Discharge: HOME | End: 2024-09-21
Payer: MEDICARE

## 2024-09-21 DIAGNOSIS — S32.010A CLOSED COMPRESSION FRACTURE OF BODY OF L1 VERTEBRA (HCC): ICD-10-CM

## 2024-09-21 PROCEDURE — 72148 MRI LUMBAR SPINE W/O DYE: CPT

## 2024-09-23 ENCOUNTER — TELEPHONE (OUTPATIENT)
Age: 79
End: 2024-09-23

## 2024-09-23 NOTE — TELEPHONE ENCOUNTER
Reached out to Yuni to discuss her concerns. She has severe burning pain in her groin. She is scheduled for OV with Dr. Robbins 10/4 but is concerned about the MRI results. Reviewed the schedule to see if there were any cancellations. There happened to be an opening Friday so this visit was moved up. She was very appreciative.

## 2024-09-23 NOTE — TELEPHONE ENCOUNTER
PT CALLED TO CONFIRM FU APPT 10/4/24 MO AND ASKED IF ANY EARLIER APPTS WERE AVAILABLE SINCE MRI WAS COMPLETE AND FINALIZED. INFORMED PT THAT WHILE THERE WERE NO EARLIER APPTS AVAILABLE AT THIS TIME, SHE WAS ON THE WAITLIST FOR [place] IN CASE ANY APPTS BECOME AVAILABLE BEFORE 10/4/24.     PT STATED PAIN HAS NOT SUBSIDED AND SHE ASKED TO SPEAK TO A NURSE IN REGARD TO HER OTHER SYMPTOMS. ATTEMPTED TO TRANSFER PT TO NURSE TO DISCUSS FURTHER AND INSTRUCTED PT TO LEAVE A VM FOR CB IF NO ANSWER.    PT WAS APPRECIATIVE

## 2024-09-24 ENCOUNTER — TELEPHONE (OUTPATIENT)
Dept: NEUROSURGERY | Facility: CLINIC | Age: 79
End: 2024-09-24

## 2024-09-24 ENCOUNTER — DOCUMENTATION (OUTPATIENT)
Dept: FAMILY MEDICINE CLINIC | Facility: HOSPITAL | Age: 79
End: 2024-09-24

## 2024-09-24 DIAGNOSIS — R93.7 ABNORMAL MRI, LUMBAR SPINE: Primary | ICD-10-CM

## 2024-09-24 DIAGNOSIS — C41.2 MALIGNANT NEOPLASM OF VERTEBRAL COLUMN (HCC): ICD-10-CM

## 2024-09-25 ENCOUNTER — E-CONSULT (OUTPATIENT)
Dept: HEMATOLOGY ONCOLOGY | Facility: CLINIC | Age: 79
End: 2024-09-25
Payer: MEDICARE

## 2024-09-25 ENCOUNTER — DOCUMENTATION (OUTPATIENT)
Dept: HEMATOLOGY ONCOLOGY | Facility: CLINIC | Age: 79
End: 2024-09-25

## 2024-09-25 DIAGNOSIS — M89.9 BONE LESION: Primary | ICD-10-CM

## 2024-09-25 DIAGNOSIS — M89.9 LESION OF BONE OF THORACIC SPINE: Primary | ICD-10-CM

## 2024-09-25 DIAGNOSIS — R93.7 ABNORMAL MRI, LUMBAR SPINE: Primary | ICD-10-CM

## 2024-09-25 DIAGNOSIS — C41.2 MALIGNANT NEOPLASM OF VERTEBRAL COLUMN (HCC): ICD-10-CM

## 2024-09-25 PROCEDURE — 99446 NTRPROF PH1/NTRNET/EHR 5-10: CPT | Performed by: PHYSICIAN ASSISTANT

## 2024-09-25 NOTE — PROGRESS NOTES
"E-Consult  Yuni Keys 78 y.o. female MRN: [id number]  Encounter Date: 09/25/24      Reason for Consult / Principal Problem: bone lesion     Consulting Provider: Elis Maki PA-C    Requesting Provider: Lisa Fairbanks DO     ASSESSMENT:  MRI lumbar spine on 9/21/24  \"lesion involving the right posterior aspect of T11 extending into the adjacent pedicle, corresponding to the lucent lesion identified in this region on prior CT, and new when compared to the prior lumbar   spine MRI from 11/24/2019. This raises concern for an aggressive lesion such as metastatic disease or multiple myeloma as this was new compared to 2019, with an atypical hemangioma an alternative but less likely consideration. Recommend correlation for history of primary malignancy, and consider further valuation with PET/CT.\"      RECOMMENDATIONS:  Imaging: NM PET/CT; current order is incorrect. Please place order for: NM PET CT skull base to mid thigh     Labs: CBC, CMP, SPEP, UPEP   If SPEP/UPEP show monoclonal spike, also order free light chain serum and serum IgG/IgM/IgA    Once PET/CT is completed, if additional lesion of concern (ie solid organ/lymph node) place referral for IR biopsy of one of these areas.    If only the single bone lesion is of concern, place order for IR bone biopsy.    Medical oncology consult to be arranged if biopsy consistent with malignancy.     Total time spent 5-10 minutes, >50% of the total time devoted to medical consultative verbal/EMR discussion between providers. Written report will be generated in the EMR. .  "

## 2024-09-25 NOTE — PROGRESS NOTES
Referral received for hematology oncology. Consult will be after tissue diagnosis is obtained. Message sent to referring provider suggesting E consult for any needed input. Referral placed.  Neurosurgery scheduled 9/27/24

## 2024-09-26 ENCOUNTER — APPOINTMENT (OUTPATIENT)
Dept: LAB | Facility: CLINIC | Age: 79
End: 2024-09-26
Payer: MEDICARE

## 2024-09-26 DIAGNOSIS — R74.8 LOW SERUM ALKALINE PHOSPHATASE: ICD-10-CM

## 2024-09-26 DIAGNOSIS — I10 ESSENTIAL HYPERTENSION: ICD-10-CM

## 2024-09-26 DIAGNOSIS — E78.2 MIXED HYPERLIPIDEMIA: ICD-10-CM

## 2024-09-26 DIAGNOSIS — R93.7 ABNORMAL MRI, LUMBAR SPINE: ICD-10-CM

## 2024-09-26 LAB
ALBUMIN SERPL BCG-MCNC: 4.3 G/DL (ref 3.5–5)
ALP SERPL-CCNC: 50 U/L (ref 34–104)
ALT SERPL W P-5'-P-CCNC: 23 U/L (ref 7–52)
ANION GAP SERPL CALCULATED.3IONS-SCNC: 7 MMOL/L (ref 4–13)
AST SERPL W P-5'-P-CCNC: 24 U/L (ref 13–39)
BASOPHILS # BLD AUTO: 0.03 THOUSANDS/ΜL (ref 0–0.1)
BASOPHILS NFR BLD AUTO: 1 % (ref 0–1)
BILIRUB SERPL-MCNC: 0.59 MG/DL (ref 0.2–1)
BUN SERPL-MCNC: 16 MG/DL (ref 5–25)
CALCIUM SERPL-MCNC: 9 MG/DL (ref 8.4–10.2)
CHLORIDE SERPL-SCNC: 103 MMOL/L (ref 96–108)
CHOLEST SERPL-MCNC: 149 MG/DL
CO2 SERPL-SCNC: 29 MMOL/L (ref 21–32)
CREAT SERPL-MCNC: 0.6 MG/DL (ref 0.6–1.3)
EOSINOPHIL # BLD AUTO: 0.19 THOUSAND/ΜL (ref 0–0.61)
EOSINOPHIL NFR BLD AUTO: 4 % (ref 0–6)
ERYTHROCYTE [DISTWIDTH] IN BLOOD BY AUTOMATED COUNT: 14.4 % (ref 11.6–15.1)
GFR SERPL CREATININE-BSD FRML MDRD: 87 ML/MIN/1.73SQ M
GLUCOSE P FAST SERPL-MCNC: 84 MG/DL (ref 65–99)
HCT VFR BLD AUTO: 43.9 % (ref 34.8–46.1)
HDLC SERPL-MCNC: 72 MG/DL
HGB BLD-MCNC: 14.6 G/DL (ref 11.5–15.4)
IMM GRANULOCYTES # BLD AUTO: 0.02 THOUSAND/UL (ref 0–0.2)
IMM GRANULOCYTES NFR BLD AUTO: 0 % (ref 0–2)
LDH SERPL-CCNC: 171 U/L (ref 140–271)
LDLC SERPL CALC-MCNC: 62 MG/DL (ref 0–100)
LYMPHOCYTES # BLD AUTO: 1.65 THOUSANDS/ΜL (ref 0.6–4.47)
LYMPHOCYTES NFR BLD AUTO: 31 % (ref 14–44)
MCH RBC QN AUTO: 30.9 PG (ref 26.8–34.3)
MCHC RBC AUTO-ENTMCNC: 33.3 G/DL (ref 31.4–37.4)
MCV RBC AUTO: 93 FL (ref 82–98)
MONOCYTES # BLD AUTO: 0.37 THOUSAND/ΜL (ref 0.17–1.22)
MONOCYTES NFR BLD AUTO: 7 % (ref 4–12)
NEUTROPHILS # BLD AUTO: 3.11 THOUSANDS/ΜL (ref 1.85–7.62)
NEUTS SEG NFR BLD AUTO: 57 % (ref 43–75)
NRBC BLD AUTO-RTO: 0 /100 WBCS
PLATELET # BLD AUTO: 270 THOUSANDS/UL (ref 149–390)
PMV BLD AUTO: 10.6 FL (ref 8.9–12.7)
POTASSIUM SERPL-SCNC: 3.8 MMOL/L (ref 3.5–5.3)
PROT SERPL-MCNC: 6.9 G/DL (ref 6.4–8.4)
RBC # BLD AUTO: 4.73 MILLION/UL (ref 3.81–5.12)
SODIUM SERPL-SCNC: 139 MMOL/L (ref 135–147)
TRIGL SERPL-MCNC: 74 MG/DL
WBC # BLD AUTO: 5.37 THOUSAND/UL (ref 4.31–10.16)

## 2024-09-26 PROCEDURE — 80053 COMPREHEN METABOLIC PANEL: CPT

## 2024-09-26 PROCEDURE — 85025 COMPLETE CBC W/AUTO DIFF WBC: CPT

## 2024-09-26 PROCEDURE — 84165 PROTEIN E-PHORESIS SERUM: CPT

## 2024-09-26 PROCEDURE — 84166 PROTEIN E-PHORESIS/URINE/CSF: CPT

## 2024-09-26 PROCEDURE — 36415 COLL VENOUS BLD VENIPUNCTURE: CPT

## 2024-09-26 PROCEDURE — 80061 LIPID PANEL: CPT

## 2024-09-26 PROCEDURE — 83615 LACTATE (LD) (LDH) ENZYME: CPT

## 2024-09-27 ENCOUNTER — OFFICE VISIT (OUTPATIENT)
Dept: NEUROSURGERY | Facility: CLINIC | Age: 79
End: 2024-09-27
Payer: MEDICARE

## 2024-09-27 VITALS
DIASTOLIC BLOOD PRESSURE: 78 MMHG | WEIGHT: 118 LBS | OXYGEN SATURATION: 98 % | RESPIRATION RATE: 18 BRPM | SYSTOLIC BLOOD PRESSURE: 142 MMHG | TEMPERATURE: 97.6 F | HEART RATE: 58 BPM | HEIGHT: 58 IN | BODY MASS INDEX: 24.77 KG/M2

## 2024-09-27 DIAGNOSIS — Z79.899 ENCOUNTER FOR LONG-TERM (CURRENT) USE OF OTHER MEDICATIONS: ICD-10-CM

## 2024-09-27 DIAGNOSIS — Z79.01 LONG TERM (CURRENT) USE OF ANTICOAGULANTS: ICD-10-CM

## 2024-09-27 DIAGNOSIS — D49.2 NEOPLASM OF SPINE: ICD-10-CM

## 2024-09-27 DIAGNOSIS — M80.08XA AGE-RELATED OSTEOPOROSIS WITH CURRENT PATHOLOGICAL FRACTURE OF VERTEBRA (HCC): Primary | ICD-10-CM

## 2024-09-27 LAB
ALBUMIN SERPL ELPH-MCNC: 4.09 G/DL (ref 3.2–5.1)
ALBUMIN SERPL ELPH-MCNC: 61.9 % (ref 48–70)
ALBUMIN UR ELPH-MCNC: 100 %
ALPHA1 GLOB MFR UR ELPH: 0 %
ALPHA1 GLOB SERPL ELPH-MCNC: 0.29 G/DL (ref 0.15–0.47)
ALPHA1 GLOB SERPL ELPH-MCNC: 4.4 % (ref 1.8–7)
ALPHA2 GLOB MFR UR ELPH: 0 %
ALPHA2 GLOB SERPL ELPH-MCNC: 0.69 G/DL (ref 0.42–1.04)
ALPHA2 GLOB SERPL ELPH-MCNC: 10.4 % (ref 5.9–14.9)
B-GLOBULIN MFR UR ELPH: 0 %
BETA GLOB ABNORMAL SERPL ELPH-MCNC: 0.39 G/DL (ref 0.31–0.57)
BETA1 GLOB SERPL ELPH-MCNC: 5.9 % (ref 4.7–7.7)
BETA2 GLOB SERPL ELPH-MCNC: 4.6 % (ref 3.1–7.9)
BETA2+GAMMA GLOB SERPL ELPH-MCNC: 0.3 G/DL (ref 0.2–0.58)
GAMMA GLOB ABNORMAL SERPL ELPH-MCNC: 0.84 G/DL (ref 0.4–1.66)
GAMMA GLOB MFR UR ELPH: 0 %
GAMMA GLOB SERPL ELPH-MCNC: 12.8 % (ref 6.9–22.3)
IGG/ALB SER: 1.62 {RATIO} (ref 1.1–1.8)
PROT PATTERN SERPL ELPH-IMP: NORMAL
PROT PATTERN UR ELPH-IMP: NORMAL
PROT SERPL-MCNC: 6.6 G/DL (ref 6.4–8.2)
PROT UR-MCNC: 12.6 MG/DL

## 2024-09-27 PROCEDURE — 99215 OFFICE O/P EST HI 40 MIN: CPT | Performed by: RADIOLOGY

## 2024-09-27 PROCEDURE — 84165 PROTEIN E-PHORESIS SERUM: CPT | Performed by: PATHOLOGY

## 2024-09-27 PROCEDURE — 84166 PROTEIN E-PHORESIS/URINE/CSF: CPT | Performed by: PATHOLOGY

## 2024-09-27 RX ORDER — CEFAZOLIN SODIUM 1 G/50ML
1000 SOLUTION INTRAVENOUS ONCE
OUTPATIENT
Start: 2024-09-27 | End: 2024-09-27

## 2024-09-27 RX ORDER — SODIUM CHLORIDE 9 MG/ML
75 INJECTION, SOLUTION INTRAVENOUS CONTINUOUS
OUTPATIENT
Start: 2024-09-27

## 2024-09-27 NOTE — PROGRESS NOTES
Ambulatory Visit  Name: Yuni Keys      : 1945      MRN: [id number]  Encounter Provider: Donnie Robbins MD  Encounter Date: 2024   Encounter department: [facility] [place]    Assessment & Plan  Age-related osteoporosis with current pathological fracture of vertebra (HCC)  Alley has a symptomatic L1 compression fracture.  This is presumably on the basis of osteoporosis however an underlying lesion is not excluded.  I have recommended kyphoplasty with biopsy of L1.  She also requires biopsy of the T11 lesion.  She will however obtain preoperative MRI with contrast of the thoracic spine.  She requires medical clearance.    Orders:    Ambulatory Referral to Endocrinology; Future    IR KYPHOPLASTY/VERTEBROPLASTY; Future    Neoplasm of spine  See above  Orders:    MRI thoracic spine with and without contrast; Future    IR spine procedure; Future      History of Present Illness   HPI  Ms. Keys returns for follow-up of her back pain in setting of compression fracture.  She states she has ongoing lower back pain associated with right hip pain.  While the lower back pain is tolerable, 3 out of 10 in severity, the right hip pain can be as high as 8 out of 10 in severity.  She has been wearing her brace consistently and it does help with her pain.  She also requires occasional narcotic medication.  No new leg weakness or numbness.  No new bowel or bladder problems.    Review of Systems   Constitutional: Negative.    HENT: Negative.     Eyes: Negative.    Respiratory: Negative.     Cardiovascular: Negative.    Gastrointestinal:  Positive for nausea (with severe pain at times).   Endocrine: Negative.    Genitourinary: Negative.    Musculoskeletal:  Positive for back pain (middle back pain right hip/groin worse when sitting or bending). Negative for gait problem and myalgias.   Skin: Negative.    Allergic/Immunologic: Negative.    Neurological: Negative.  Negative for tremors and  weakness.   Hematological: Negative.    Psychiatric/Behavioral: Negative.       I have personally reviewed the MA's review of systems and made changes as necessary.    Past Medical History   Past Medical History:   Diagnosis Date    Arthritis     Edema of both lower legs 11/01/2018    Dr. Thang Otero; faxed records.    Hx of skin cancer, basal cell     Hyperlipidemia     Hypertension     Impingement syndrome of left shoulder 04/10/2018    Dr. Thang Otero, faxed patient records.    Migraines     Osteopenia     Overactive bladder     Toe fracture, left 11/16/2023     Past Surgical History:   Procedure Laterality Date    ACHILLES TENDON REPAIR      CATARACT EXTRACTION, BILATERAL      COLPOPEXY VAGINAL EXTRAPERITONEAL (VEC) WITH INSERTION PUBOVAGINAL SLING      CYST REMOVAL      HERNIA REPAIR      HYSTERECTOMY      JOINT REPLACEMENT Right     REPLACEMENT TOTAL KNEE      TONSILLECTOMY  1949     Family History   Problem Relation Age of Onset    Heart disease Father     Ovarian cancer Maternal Aunt     Breast cancer Paternal Aunt     Hypertension Paternal Grandfather     Breast cancer Maternal Grandmother     Heart disease Paternal Grandmother     Diabetes Neg Hx     Stroke Neg Hx     Thyroid disease Neg Hx      Current Outpatient Medications on File Prior to Visit   Medication Sig Dispense Refill    atorvastatin (LIPITOR) 10 mg tablet Take 1 tablet (10 mg total) by mouth daily 90 tablet 3    calcium polycarbophil (FIBERCON) 625 mg tablet Take 625 mg by mouth daily      Cholecalciferol (Vitamin D) 50 MCG (2000 UT) tablet Take 2,000 Units by mouth daily      meloxicam (MOBIC) 15 mg tablet Take 15 mg by mouth daily      Multiple Vitamins-Minerals (MULTIVITAMIN WITH MINERALS) tablet Take 1 tablet by mouth daily      traMADol (Ultram) 50 mg tablet Take 1 tablet (50 mg total) by mouth every 6 (six) hours as needed for moderate pain (Patient not taking: Reported on 9/27/2024) 10 tablet 0     No current  "facility-administered medications on file prior to visit.     Allergies   Allergen Reactions    Imodium [Loperamide] Headache     Felt that peripheral vision was impaired and mild headache    Pedi-Pre Tape Spray [Wound Dressing Adhesive] Itching and Rash     Adhesive Tape    Tilactase Diarrhea    Bupropion Rash     Around 2010      Objective     /78 (BP Location: Left arm, Patient Position: Sitting, Cuff Size: Standard)   Pulse 58   Temp 97.6 °F (36.4 °C) (Temporal)   Resp 18   Ht 4' 10\" (1.473 m)   Wt 53.5 kg (118 lb)   SpO2 98%   BMI 24.66 kg/m²     Physical Exam  Constitutional:       Appearance: Normal appearance.   HENT:      Head: Normocephalic and atraumatic.   Eyes:      Pupils: Pupils are equal, round, and reactive to light.   Pulmonary:      Effort: Pulmonary effort is normal.   Musculoskeletal:         General: Tenderness present. Normal range of motion.      Comments: Bilateral hip full ROM, active and passive.   -LISA   Neurological:      General: No focal deficit present.      Mental Status: She is alert and oriented to person, place, and time.   Psychiatric:         Mood and Affect: Mood normal.         Behavior: Behavior normal.         Thought Content: Thought content normal.         Judgment: Judgment normal.       Neurologic Exam     Mental Status   Oriented to person, place, and time.     Cranial Nerves     CN III, IV, VI   Pupils are equal, round, and reactive to light.            "

## 2024-09-30 DIAGNOSIS — S32.010A COMPRESSION FRACTURE OF L1 VERTEBRA, INITIAL ENCOUNTER (HCC): ICD-10-CM

## 2024-09-30 RX ORDER — TRAMADOL HYDROCHLORIDE 50 MG/1
50 TABLET ORAL EVERY 8 HOURS PRN
Qty: 60 TABLET | Refills: 0 | Status: SHIPPED | OUTPATIENT
Start: 2024-09-30

## 2024-09-30 NOTE — PROGRESS NOTES
Telephone Call Documentation    Yuni Keys       1945             Seen by neurosurgery - Dr. Robbins- will be having mri and possible bx   Having increased pain- will increase the tramadol doses with increasing pain issues

## 2024-10-01 ENCOUNTER — OFFICE VISIT (OUTPATIENT)
Dept: FAMILY MEDICINE CLINIC | Facility: HOSPITAL | Age: 79
End: 2024-10-01
Payer: MEDICARE

## 2024-10-01 ENCOUNTER — APPOINTMENT (OUTPATIENT)
Dept: LAB | Facility: CLINIC | Age: 79
End: 2024-10-01
Payer: MEDICARE

## 2024-10-01 VITALS
DIASTOLIC BLOOD PRESSURE: 78 MMHG | HEART RATE: 65 BPM | SYSTOLIC BLOOD PRESSURE: 128 MMHG | WEIGHT: 120 LBS | OXYGEN SATURATION: 97 % | HEIGHT: 58 IN | BODY MASS INDEX: 25.19 KG/M2

## 2024-10-01 DIAGNOSIS — S32.010A CLOSED COMPRESSION FRACTURE OF BODY OF L1 VERTEBRA (HCC): ICD-10-CM

## 2024-10-01 DIAGNOSIS — Z79.01 LONG TERM (CURRENT) USE OF ANTICOAGULANTS: ICD-10-CM

## 2024-10-01 DIAGNOSIS — Z01.810 PREOP CARDIOVASCULAR EXAM: Primary | ICD-10-CM

## 2024-10-01 DIAGNOSIS — M80.08XA AGE-RELATED OSTEOPOROSIS WITH CURRENT PATHOLOGICAL FRACTURE OF VERTEBRA (HCC): ICD-10-CM

## 2024-10-01 DIAGNOSIS — Z79.899 ENCOUNTER FOR LONG-TERM (CURRENT) USE OF OTHER MEDICATIONS: ICD-10-CM

## 2024-10-01 DIAGNOSIS — D49.2 NEOPLASM OF SPINE: ICD-10-CM

## 2024-10-01 DIAGNOSIS — R93.7 ABNORMAL MRI, LUMBAR SPINE: ICD-10-CM

## 2024-10-01 DIAGNOSIS — F41.8 SITUATIONAL ANXIETY: ICD-10-CM

## 2024-10-01 LAB
APTT PPP: 34 SECONDS (ref 23–34)
BACTERIA UR QL AUTO: ABNORMAL /HPF
BILIRUB UR QL STRIP: NEGATIVE
CLARITY UR: CLEAR
COLOR UR: YELLOW
GLUCOSE UR STRIP-MCNC: NEGATIVE MG/DL
HGB UR QL STRIP.AUTO: ABNORMAL
INR PPP: 1.01 (ref 0.85–1.19)
KETONES UR STRIP-MCNC: ABNORMAL MG/DL
LEUKOCYTE ESTERASE UR QL STRIP: NEGATIVE
MUCOUS THREADS UR QL AUTO: ABNORMAL
NITRITE UR QL STRIP: NEGATIVE
NON-SQ EPI CELLS URNS QL MICRO: ABNORMAL /HPF
PH UR STRIP.AUTO: 6.5 [PH]
PROT UR STRIP-MCNC: ABNORMAL MG/DL
PROTHROMBIN TIME: 13.6 SECONDS (ref 12.3–15)
RBC #/AREA URNS AUTO: ABNORMAL /HPF
SP GR UR STRIP.AUTO: 1.02 (ref 1–1.03)
UROBILINOGEN UR STRIP-ACNC: <2 MG/DL
VENTRICULAR RATE: 54 DEGREES
WBC #/AREA URNS AUTO: ABNORMAL /HPF

## 2024-10-01 PROCEDURE — 81001 URINALYSIS AUTO W/SCOPE: CPT

## 2024-10-01 PROCEDURE — 93000 ELECTROCARDIOGRAM COMPLETE: CPT | Performed by: INTERNAL MEDICINE

## 2024-10-01 PROCEDURE — 85610 PROTHROMBIN TIME: CPT

## 2024-10-01 PROCEDURE — 99214 OFFICE O/P EST MOD 30 MIN: CPT | Performed by: INTERNAL MEDICINE

## 2024-10-01 PROCEDURE — 85730 THROMBOPLASTIN TIME PARTIAL: CPT

## 2024-10-01 PROCEDURE — 36415 COLL VENOUS BLD VENIPUNCTURE: CPT

## 2024-10-01 RX ORDER — LORAZEPAM 0.5 MG/1
0.5 TABLET ORAL EVERY 8 HOURS PRN
Qty: 20 TABLET | Refills: 0 | Status: SHIPPED | OUTPATIENT
Start: 2024-10-01

## 2024-10-01 NOTE — ASSESSMENT & PLAN NOTE
Had been on fosamax ( for about 2 years)about 6 years ago - had tooth issues and  stopped the medication   Dr. Amado is referring  to endocrinology about possible prolia injections  To have pet tomorrow and mri next week then  oct 15 is the kyphoplasty and  bone bx

## 2024-10-01 NOTE — PROGRESS NOTES
Assessment/Plan:     Diagnosis ICD-10-CM Associated Orders   1. Closed compression fracture of body of L1 vertebra (HCC)  S32.010A       2. Abnormal MRI, lumbar spine  R93.7       3. Preop cardiovascular exam  Z01.810           Problem List Items Addressed This Visit          Musculoskeletal and Integument    Closed compression fracture of body of L1 vertebra (HCC) - Primary     Had been on fosamax ( for about 2 years)about 6 years ago - had tooth issues and  stopped the medication   Dr. Amado is referring  to endocrinology about possible prolia injections  To have pet tomorrow and mri next week then  oct 15 is the kyphoplasty and  bone bx          Other Visit Diagnoses       Abnormal MRI, lumbar spine        Preop cardiovascular exam                  No follow-ups on file.      Subjective:    Patient ID: Yuni Keys is a 78 y.o. female    Here for preop procedure for bx of  bone and kyphoplasty.  No family hx of osteoporosis- mother lived until 98 and no fractures or osteoporosis known-   Ongoing low back pain- helps to have  brace on.   Now having increased pain in right anterior  hip region- worse with bending over.   Is able to sleep in bed at night- has prn tramadol  Is anxious about upcoming studies- will give prn lorazepam    Labs reviewed- no monoclonal spikes on immunoelectrophoresis and urine electrophoresis    Pre-op Exam    Pre-operative Risk Factors:    History of cerebrovascular disease: No    History of ischemic heart disease: No  Pre-operative treatment with insulin: No  Pre-operative creatinine >2 mg/dL: No    History of congestive heart failure: No      The following portions of the patient's history were reviewed and updated as appropriate: allergies, current medications and problem list.     Review of Systems   Constitutional:  Negative for fatigue.   HENT:  Negative for congestion.    Respiratory:  Negative for chest tightness and shortness of breath.    Cardiovascular:  Negative for chest  "pain and palpitations.   Gastrointestinal:  Negative for abdominal distention and abdominal pain.   Musculoskeletal:  Positive for back pain.   All other systems reviewed and are negative.        Objective:      Current Outpatient Medications:     atorvastatin (LIPITOR) 10 mg tablet, Take 1 tablet (10 mg total) by mouth daily, Disp: 90 tablet, Rfl: 3    calcium polycarbophil (FIBERCON) 625 mg tablet, Take 625 mg by mouth daily, Disp: , Rfl:     Cholecalciferol (Vitamin D) 50 MCG (2000 UT) tablet, Take 2,000 Units by mouth daily, Disp: , Rfl:     meloxicam (MOBIC) 15 mg tablet, Take 15 mg by mouth daily, Disp: , Rfl:     Multiple Vitamins-Minerals (MULTIVITAMIN WITH MINERALS) tablet, Take 1 tablet by mouth daily, Disp: , Rfl:     traMADol (Ultram) 50 mg tablet, Take 1 tablet (50 mg total) by mouth every 8 (eight) hours as needed for moderate pain, Disp: 60 tablet, Rfl: 0    Blood pressure 128/78, pulse 65, height 4' 10\" (1.473 m), weight 54.4 kg (120 lb), SpO2 97%.     Physical Exam  Vitals and nursing note reviewed.   Constitutional:       General: She is not in acute distress.  HENT:      Head: Normocephalic.      Right Ear: Tympanic membrane normal.      Left Ear: Tympanic membrane normal.      Nose: No congestion.      Mouth/Throat:      Pharynx: No posterior oropharyngeal erythema.   Cardiovascular:      Rate and Rhythm: Normal rate and regular rhythm.      Heart sounds: No murmur heard.  Pulmonary:      Breath sounds: No stridor. No wheezing.   Abdominal:      General: There is no distension.      Palpations: Abdomen is soft.   Musculoskeletal:         General: Tenderness present.      Right lower leg: No edema.      Left lower leg: No edema.      Comments: Right lateral hip tenderness- trochanteric burase region   Lymphadenopathy:      Cervical: No cervical adenopathy.   Skin:     Findings: No erythema or rash.   Neurological:      General: No focal deficit present.      Mental Status: She is alert and " oriented to person, place, and time.   Psychiatric:         Mood and Affect: Mood normal.         Thought Content: Thought content normal.         Judgment: Judgment normal.      Cleared for porcedure- no concerns

## 2024-10-02 ENCOUNTER — HOSPITAL ENCOUNTER (OUTPATIENT)
Dept: NUCLEAR MEDICINE | Facility: HOSPITAL | Age: 79
Discharge: HOME | End: 2024-10-02
Attending: INTERNAL MEDICINE
Payer: MEDICARE

## 2024-10-02 ENCOUNTER — TELEPHONE (OUTPATIENT)
Age: 79
End: 2024-10-02

## 2024-10-02 DIAGNOSIS — R93.7 ABNORMAL MRI, LUMBAR SPINE: ICD-10-CM

## 2024-10-02 DIAGNOSIS — C41.2 MALIGNANT NEOPLASM OF VERTEBRAL COLUMN (HCC): ICD-10-CM

## 2024-10-02 LAB — GLUCOSE SERPL-MCNC: 84 MG/DL (ref 65–140)

## 2024-10-02 PROCEDURE — 78816 PET IMAGE W/CT FULL BODY: CPT

## 2024-10-02 PROCEDURE — A9552 F18 FDG: HCPCS

## 2024-10-02 PROCEDURE — 82948 REAGENT STRIP/BLOOD GLUCOSE: CPT

## 2024-10-02 NOTE — TELEPHONE ENCOUNTER
Please advise, patients imaging has been resulted and she has seen in my mychart, however Dr argueta has not reviewed, can we please have provider review and follow up with patient as she is very concerned

## 2024-10-03 ENCOUNTER — CLINICAL SUPPORT (OUTPATIENT)
Dept: BARIATRICS | Facility: CLINIC | Age: 79
End: 2024-10-03
Payer: MEDICARE

## 2024-10-03 VITALS — WEIGHT: 120 LBS | BODY MASS INDEX: 25.19 KG/M2 | HEIGHT: 58 IN

## 2024-10-03 DIAGNOSIS — R63.5 ABNORMAL WEIGHT GAIN: Primary | ICD-10-CM

## 2024-10-03 DIAGNOSIS — R94.8 ABNORMAL POSITRON EMISSION TOMOGRAPHY (PET) SCAN: ICD-10-CM

## 2024-10-03 DIAGNOSIS — R94.8 ABNORMAL PET SCAN OF COLON: Primary | ICD-10-CM

## 2024-10-03 DIAGNOSIS — N64.59 OTHER SIGNS AND SYMPTOMS IN BREAST: ICD-10-CM

## 2024-10-03 PROCEDURE — RECHECK

## 2024-10-04 ENCOUNTER — HOSPITAL ENCOUNTER (OUTPATIENT)
Dept: MAMMOGRAPHY | Facility: CLINIC | Age: 79
Discharge: HOME | End: 2024-10-04
Payer: MEDICARE

## 2024-10-04 ENCOUNTER — HOSPITAL ENCOUNTER (OUTPATIENT)
Dept: ULTRASOUND IMAGING | Facility: CLINIC | Age: 79
Discharge: HOME | End: 2024-10-04
Payer: MEDICARE

## 2024-10-04 VITALS — HEIGHT: 58 IN | WEIGHT: 120 LBS | BODY MASS INDEX: 25.19 KG/M2

## 2024-10-04 VITALS — SYSTOLIC BLOOD PRESSURE: 163 MMHG | DIASTOLIC BLOOD PRESSURE: 68 MMHG | HEART RATE: 56 BPM

## 2024-10-04 DIAGNOSIS — Z98.890 STATUS POST BIOPSY: ICD-10-CM

## 2024-10-04 DIAGNOSIS — R94.8 ABNORMAL POSITRON EMISSION TOMOGRAPHY (PET) SCAN: ICD-10-CM

## 2024-10-04 DIAGNOSIS — R92.8 ABNORMAL ULTRASOUND OF BREAST: ICD-10-CM

## 2024-10-04 DIAGNOSIS — N64.59 OTHER SIGNS AND SYMPTOMS IN BREAST: ICD-10-CM

## 2024-10-04 PROCEDURE — 19083 BX BREAST 1ST LESION US IMAG: CPT

## 2024-10-04 PROCEDURE — 88360 TUMOR IMMUNOHISTOCHEM/MANUAL: CPT | Performed by: PATHOLOGY

## 2024-10-04 PROCEDURE — 88305 TISSUE EXAM BY PATHOLOGIST: CPT | Performed by: PATHOLOGY

## 2024-10-04 PROCEDURE — 77065 DX MAMMO INCL CAD UNI: CPT

## 2024-10-04 PROCEDURE — 88341 IMHCHEM/IMCYTCHM EA ADD ANTB: CPT | Performed by: PATHOLOGY

## 2024-10-04 PROCEDURE — G0279 TOMOSYNTHESIS, MAMMO: HCPCS

## 2024-10-04 PROCEDURE — A4648 IMPLANTABLE TISSUE MARKER: HCPCS

## 2024-10-04 PROCEDURE — 88342 IMHCHEM/IMCYTCHM 1ST ANTB: CPT | Performed by: PATHOLOGY

## 2024-10-04 PROCEDURE — 19084 BX BREAST ADD LESION US IMAG: CPT

## 2024-10-04 PROCEDURE — 76642 ULTRASOUND BREAST LIMITED: CPT

## 2024-10-04 RX ORDER — LIDOCAINE HYDROCHLORIDE 10 MG/ML
5 INJECTION, SOLUTION EPIDURAL; INFILTRATION; INTRACAUDAL; PERINEURAL ONCE
Status: COMPLETED | OUTPATIENT
Start: 2024-10-04 | End: 2024-10-04

## 2024-10-04 RX ADMIN — LIDOCAINE HYDROCHLORIDE 5 ML: 10 INJECTION, SOLUTION EPIDURAL; INFILTRATION; INTRACAUDAL; PERINEURAL at 14:40

## 2024-10-04 RX ADMIN — LIDOCAINE HYDROCHLORIDE 5 ML: 10 INJECTION, SOLUTION EPIDURAL; INFILTRATION; INTRACAUDAL; PERINEURAL at 14:28

## 2024-10-04 NOTE — PROGRESS NOTES
Met with patient and Dr. Mukherjee     regarding recommendation for;      _____ RIGHT __x2____LEFT      _x____Ultrasound guided  ______Stereotactic  Breast biopsy.      __x___Verbalized understanding.      Blood thinners:  _____yes __x___no    Date stopped: ____x_______    Biopsy teaching sheet given:  ____x___yes ______no    Medical history reviewed. Same Day Biopsy

## 2024-10-04 NOTE — PROGRESS NOTES
Procedure type: Site 1    __x___ultrasound guided _____stereotactic    Breast:    ___x__Left _____Right    Location: 3 o'clock jada    Needle: 14g    # of passes: 5    Clip: Mini Kimberly    Procedure type: Site 2    __x___ultrasound guided _____stereotactic    Breast:    ___x__Left _____Right    Location: 11 o'clock 7 cmfn     Needle: 12g    # of passes: 4    Clip: 10cm kimberly    Performed by: Dr. Mukherjee    Pressure held for 5 minutes by: Susanna Mccoy Strips:    ___X__yes _____no    Band aid:    __X___yes_____no    Tolerated procedure:    __X___yes _____no

## 2024-10-07 NOTE — PROGRESS NOTES
"Weight Management Medical Nutrition Assessment  Alley is here for healthy ways f/u.   Current wt:  115.6     lbs. She has lost 7.5  lbs  in the last 5 wks and has maintained a weight loss of  62.1     lbs since December 2021. Alley has notified me that she has been diagnosed with breast cancer and has upcoming bone biopsies. She is most interested in discussing calorie and macro goals today. Reviewed maintenance goal of 5411-0653 calories/day with at least 65 gm protein per day. Advised that she should request to speak with the oncology dietitian for more specific recommendations. At this time she will not be following up until after completion of treatment. Support and encouragement provided.      Patient seen by Medical Provider in past 6 months:  no  Requested to schedule appointment with Medical Provider: No    Anthropometric Measurements  Start Weight (#): 177.7 lb 12/28/21  Current Weight (#):  115.6 lbs  TBW % Change from start weight:34.9%  Ideal Body Weight (#): 119.1 lbs BMI 25 (58\")  Goal Weight (#):  lbs    Highest: 183 lbs  Lowest: 97 lbs mid 20's    Weight Loss History  Previous weight loss attempts: Commercial Programs (Weight Watchers, [employer], etc.)  Exercise  Self Created Diets (Portion Control, Healthy Food Choices, etc.)    Food and Nutrition Related History  Wake up:  6:00   Bed Time: 10    Food Recall NOT COMPLETED 10/14/24  Breakfast: 8:00: decaf coffee w/2 tbsp h/h, 2 eziekel w/guac  Snack:   string cheese OR fruit OR protein bar   Lunch: 12:00:   2.5-3 oz deli turkey or chicken or tuna or egg, mission low carb or flatout wrap or banana, light batres OR healthy choice meal OR shrimp salsa, string cheese   OR egg salad (1 whole, 2 white), light batres OR Healthy Choice or Progresso light soup  Snack: 4:00: protein shake OR Decaf coffee w/2 tbsp h/h, fruit, string cheese OR occasional chocolate OR skinny pop  Dinner: 5:00:~4 oz chicken/fish/hamburger/salmon, 1 1/2 cup veggies, corn/peas " OR  healthy choice meal or lean cuisine   Snack:  Skip     Beverages: water, decaf coffee/tea, diet soda and alcohol (rare)  Volume of beverage intake: 32oz water/decaf coffee    Weekends: Same  Cravings:  carbs   Trouble area of day: late afternoon    Frequency of Eating out: 1x every other week  Food restrictions: lactose?   Cooking: self   Food Shopping: self    Physical Activity Intake  Activity:Walking, strength training   Frequency:walking daily  Physical limitations/barriers to exercise:   shoulder pain    Estimated Needs  Energy   [employer] Energy Needs: BMR : 898    Maintenance sedentary: 1078         Maintenance lightly active: 1235  Protein: 55-68 gm      (1.2-1.5g/kg IBW)  Fluid: 53 oz     (35mL/kg IBW)    Nutrition Diagnosis  No, resolved    Nutrition Intervention    Nutrition Prescription  Calories:7385-7095  Protein: 55-70 gm    Meal Plan (Stewart/Pro)  Breakfast: 250-350, 15   Snack:  150  Lunch: 300, 15-20  Snack:  150, 5-10  Dinner: 300-350, 20-25  Snack: skip     Nutrition Education:    Healthy Core Manual  Calorie controlled menu  Lean protein food choices  Healthy snack options  Food journaling tips      Nutrition Counseling:  Strategies: meal planning, portion sizes, healthy snack choices, hydration, fiber intake, protein intake, exercise, food journal      Monitoring and Evaluation:  Evaluation criteria:  Energy Intake  Meet protein needs  Maintain adequate hydration  Monitor weekly weight  Meal planning/preparation  Food journal   Decreased portions at mealtimes and snacks  Physical activity     Barriers to learning:none  Readiness to change: Maintenance: (Maintaining the behavior change)  Comprehension: very good  Expected Compliance: very good

## 2024-10-07 NOTE — PROGRESS NOTES
Post procedure call completed    Bleeding: _____yes __X___no (Pt denies)    Pain: _____yes ___X___no (Pt denies)    Redness/Swelling: ______yes ___X___no (Pt denies)    Band aid removed: __X___yes _____no     Steri-Strips intact: ___X___yes _____no (discussed with patient to remove steri strips on Wednesday if they have not come off on their own)    Pt with no questions at this time, adv will call when results available, adv to call with any questions or concerns, has name/# for contact

## 2024-10-08 ENCOUNTER — TELEPHONE (OUTPATIENT)
Dept: RADIOLOGY | Facility: HOSPITAL | Age: 79
End: 2024-10-08

## 2024-10-08 ENCOUNTER — TELEPHONE (OUTPATIENT)
Dept: MAMMOGRAPHY | Facility: CLINIC | Age: 79
End: 2024-10-08

## 2024-10-08 ENCOUNTER — HOSPITAL ENCOUNTER (OUTPATIENT)
Dept: RADIOLOGY | Facility: HOSPITAL | Age: 79
Discharge: HOME | End: 2024-10-08
Attending: RADIOLOGY
Payer: MEDICARE

## 2024-10-08 ENCOUNTER — TELEPHONE (OUTPATIENT)
Age: 79
End: 2024-10-08

## 2024-10-08 ENCOUNTER — APPOINTMENT (OUTPATIENT)
Dept: RADIOLOGY | Facility: CLINIC | Age: 79
End: 2024-10-08
Payer: MEDICARE

## 2024-10-08 ENCOUNTER — OFFICE VISIT (OUTPATIENT)
Dept: FAMILY MEDICINE CLINIC | Facility: HOSPITAL | Age: 79
End: 2024-10-08
Payer: MEDICARE

## 2024-10-08 VITALS
OXYGEN SATURATION: 97 % | WEIGHT: 120 LBS | BODY MASS INDEX: 25.19 KG/M2 | SYSTOLIC BLOOD PRESSURE: 140 MMHG | DIASTOLIC BLOOD PRESSURE: 80 MMHG | HEIGHT: 58 IN | HEART RATE: 61 BPM

## 2024-10-08 DIAGNOSIS — M89.8X1 PAIN OF RIGHT SCAPULA: ICD-10-CM

## 2024-10-08 DIAGNOSIS — C50.912 MALIGNANT NEOPLASM OF LEFT FEMALE BREAST, UNSPECIFIED ESTROGEN RECEPTOR STATUS, UNSPECIFIED SITE OF BREAST (HCC): Primary | ICD-10-CM

## 2024-10-08 DIAGNOSIS — C50.919 INVASIVE CARCINOMA OF BREAST (HCC): Primary | ICD-10-CM

## 2024-10-08 DIAGNOSIS — R94.8 ABNORMAL POSITRON EMISSION TOMOGRAPHY (PET) SCAN: ICD-10-CM

## 2024-10-08 DIAGNOSIS — D49.2 NEOPLASM OF SPINE: ICD-10-CM

## 2024-10-08 PROCEDURE — 72157 MRI CHEST SPINE W/O & W/DYE: CPT

## 2024-10-08 PROCEDURE — 73030 X-RAY EXAM OF SHOULDER: CPT

## 2024-10-08 PROCEDURE — 99213 OFFICE O/P EST LOW 20 MIN: CPT | Performed by: INTERNAL MEDICINE

## 2024-10-08 PROCEDURE — A9585 GADOBUTROL INJECTION: HCPCS | Performed by: RADIOLOGY

## 2024-10-08 PROCEDURE — G2211 COMPLEX E/M VISIT ADD ON: HCPCS | Performed by: INTERNAL MEDICINE

## 2024-10-08 RX ORDER — GADOBUTROL 604.72 MG/ML
5 INJECTION INTRAVENOUS
Status: COMPLETED | OUTPATIENT
Start: 2024-10-08 | End: 2024-10-08

## 2024-10-08 RX ADMIN — GADOBUTROL 5 ML: 604.72 INJECTION INTRAVENOUS at 04:12

## 2024-10-08 NOTE — PRE-PROCEDURE INSTRUCTIONS
Pre-procedure Instructions for Interventional Radiology  [address] [address]  INTERVENTIONAL RADIOLOGY [phone]    You are scheduled for a/an L1 biopsy/kyphoplasty and T11 biopsy.    On Tuesday 10-15-24.    Your tentative arrival time is 7am.  Short stay will notify you the day before your procedure with the exact arrival time and the location to arrive.    To prepare for your procedure:  Please arrange for someone to drive you home after the procedure and stay with you until the next morning if you are instructed to do so.  This is typically for patients receiving some type of sedative or anesthetic for the procedure.  DO NOT EAT OR DRINK ANYTHING after midnight on the evening before your procedure including candy & gum.  ONLY SIPS OF WATER with your medications are allowed on the morning of your procedure.  TAKE ALL OF YOUR REGULAR MEDICATIONS THE MORNING OF YOUR PROCEDURE with sips of water!  We may call you to stop some of your blood sugar, blood pressure and blood thinning medications depending on the procedure.  Please take all of these medications unless we instruct you to stop them.  If you have an allergy to x-ray dye, please contact Interventional Radiology for an x-ray dye preparation which usually consists of an oral steroid and Benadryl.    The day of your procedure:  Bring a list of the medications you take at home.  Bring medications you take for breathing problems (such as inhalers), medications for chest pain, or both.  Bring a case for your glasses or contacts.  Bring your insurance card and a form of photo ID.  Please leave all valuables such as credit cards and jewelry at home.  Report to the admitting office to the left of the registration desk in the main lobby at the [facility], Entrance B.  You will then be directed to the Short Stay Center.  While your procedure is being performed, your family may wait in the Radiology Waiting Room on  the 1st floor in Radiology.  if they need to leave, they may provide a number to be called following the procedure.   Be prepared to stay overnight just in case. Sometimes procedures will indicate the need for further observation or treatment.   If you are scheduled for a follow-up visit with the Interventional Radiologist after your procedure, you will be called with a date and time.    Special Instructions (Medications to stop taking before your procedure etc.):

## 2024-10-08 NOTE — PROGRESS NOTES
Assessment/Plan:     Diagnosis ICD-10-CM Associated Orders   1. Malignant neoplasm of left female breast, unspecified estrogen receptor status, unspecified site of breast (HCC)  C50.912 Ambulatory Referral to Hematology / Oncology     Ambulatory Referral to Surgical Oncology     Ambulatory Referral to Radiation Oncology      2. Abnormal positron emission tomography (PET) scan  R94.8 Ambulatory Referral to Surgical Oncology      3. Pain of right scapula  M89.8X1 XR shoulder 2+ vw right          Problem List Items Addressed This Visit          Other    Abnormal positron emission tomography (PET) scan    Relevant Orders    Ambulatory Referral to Surgical Oncology     Other Visit Diagnoses       Malignant neoplasm of left female breast, unspecified estrogen receptor status, unspecified site of breast (HCC)    -  Primary    Relevant Orders    Ambulatory Referral to Hematology / Oncology    Ambulatory Referral to Surgical Oncology    Ambulatory Referral to Radiation Oncology    Pain of right scapula        Relevant Orders    XR shoulder 2+ vw right              No follow-ups on file.      Subjective:    Patient ID: Yuni Keys is a 78 y.o. female    Patient here for follow-up with preliminary biopsy of her left breast showing carcinoma with mixed features.  ER/LA and H ER2 receptor testing was sent for but not available as of yet.  Patient reports in her 20s she had lumpectomies for noncancerous masses several times living in [place].  I have placed a referral to the oncology surgery team as well as radiation oncology and medical oncology as she does have evidence of any abnormalities which indeed be metastatic.  She is scheduled next week for kyphoplasty and bone biopsy which will give us some more complete picture.  She is using the tramadol for pain at night because she feels that she needs to be able to drive during the day and does not want to feel sedated..  We may wish to consult palliative care if pain is not  "controlled for her.        The following portions of the patient's history were reviewed and updated as appropriate: allergies, current medications and problem list.     Review of Systems   Constitutional:  Negative for chills and fever.   Respiratory:  Negative for shortness of breath.    Musculoskeletal:         Right shoulder pain more posterior the papular area surrounding trigger points.  Her PET scan did show acromial area take as well as the known T8 T11 and L1 fractures         Objective:      Current Outpatient Medications:     atorvastatin (LIPITOR) 10 mg tablet, Take 1 tablet (10 mg total) by mouth daily, Disp: 90 tablet, Rfl: 3    calcium polycarbophil (FIBERCON) 625 mg tablet, Take 625 mg by mouth daily, Disp: , Rfl:     Cholecalciferol (Vitamin D) 50 MCG (2000 UT) tablet, Take 2,000 Units by mouth daily, Disp: , Rfl:     LORazepam (Ativan) 0.5 mg tablet, Take 1 tablet (0.5 mg total) by mouth every 8 (eight) hours as needed for anxiety, Disp: 20 tablet, Rfl: 0    meloxicam (MOBIC) 15 mg tablet, Take 15 mg by mouth daily, Disp: , Rfl:     Multiple Vitamins-Minerals (MULTIVITAMIN WITH MINERALS) tablet, Take 1 tablet by mouth daily, Disp: , Rfl:     traMADol (Ultram) 50 mg tablet, Take 1 tablet (50 mg total) by mouth every 8 (eight) hours as needed for moderate pain, Disp: 60 tablet, Rfl: 0  No current facility-administered medications for this visit.    Blood pressure 140/80, pulse 61, height 4' 10\" (1.473 m), weight 54.4 kg (120 lb), SpO2 97%.     Physical Exam  Vitals and nursing note reviewed.   Constitutional:       General: She is not in acute distress.     Appearance: She is not toxic-appearing.   HENT:      Head: Normocephalic.      Nose: No congestion.   Eyes:      General:         Right eye: No discharge.         Left eye: No discharge.      Conjunctiva/sclera: Conjunctivae normal.   Cardiovascular:      Rate and Rhythm: Normal rate and regular rhythm.      Heart sounds: No murmur heard.     No " gallop.   Pulmonary:      Breath sounds: No wheezing or rhonchi.   Abdominal:      General: There is no distension.      Palpations: Abdomen is soft.      Tenderness: There is no abdominal tenderness.   Musculoskeletal:         General: Tenderness present.      Cervical back: No tenderness.      Comments: Tenderness over the lateral scapula and trigger points in the acromioclavicular region as well as posterior scapular area   Lymphadenopathy:      Cervical: No cervical adenopathy.   Skin:     Findings: No erythema or rash.   Neurological:      Mental Status: She is alert and oriented to person, place, and time.

## 2024-10-08 NOTE — TELEPHONE ENCOUNTER
Call placed to patient after patient given malignant breast biopsy results from the radiologist. Support offered, questions answered. Advised patient the next step is to set patient up with a surgeon. Options discussed and patient wanted the first available appointment and would like to be seen either in [place] or [place]. Informed patient I will place a referral to Surgical Oncology a  will reach out to her directly with an appointment date. Patient was also provided with the number to the [facility] if she wishes to make her own appointment. Patient has my name/# for any further needs. Journal mailed.

## 2024-10-08 NOTE — TELEPHONE ENCOUNTER
Pt called or appt with Dr Fairbanks to discuss results of breast biopsy results. Nothing avail until 11/27. Pt would like Dr Fairbanks yo call her when the results are in for she is very concerned. Also pt states her right arm is very painful and she can not raise her arm above her head for the past few days. Please advise. Thank you

## 2024-10-09 ENCOUNTER — PATIENT OUTREACH (OUTPATIENT)
Dept: HEMATOLOGY ONCOLOGY | Facility: CLINIC | Age: 79
End: 2024-10-09

## 2024-10-09 ENCOUNTER — DOCUMENTATION (OUTPATIENT)
Dept: HEMATOLOGY ONCOLOGY | Facility: CLINIC | Age: 79
End: 2024-10-09

## 2024-10-09 DIAGNOSIS — C50.912 MALIGNANT NEOPLASM OF LEFT BREAST IN FEMALE, ESTROGEN RECEPTOR POSITIVE, UNSPECIFIED SITE OF BREAST (HCC): Primary | ICD-10-CM

## 2024-10-09 DIAGNOSIS — Z17.0 MALIGNANT NEOPLASM OF LEFT BREAST IN FEMALE, ESTROGEN RECEPTOR POSITIVE, UNSPECIFIED SITE OF BREAST (HCC): Primary | ICD-10-CM

## 2024-10-09 NOTE — PROGRESS NOTES
All records needed are in patients chart. No records retrieval needed at this time.     Referral received/ Chart reviewed for work up completed     Imaging completed: at [facility]   [x] PET/CT   [x] MRI   [] CT   [x] US   [x] Mammo   [] Bone scan   [] N/A    Pathology completed:    Date: 10/4/24   Location: [facility]   []N/A    Additional records needed:   [] Genomic report   [] Genetic testing results   [] Office Note   [] Procedure/ Operative note   [] Lab results   [x] N/A      [] Radiation Oncology records retrieval needed (PN to route to rad/onc clerical pool once scheduled) n/a  Date:  Location:

## 2024-10-09 NOTE — PROGRESS NOTES
Breast Cancer Nurse Navigator    Chart reviewed for prepping for initial outreach by nurse navigator.    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   ER+ MA- HER-2 -    Recent Imaging:    10/2/24-PET CT  10/4/24-Mammogram diagnostic left breast  10/4/24-US diagnostic left breast  10/4/24-US guided left breast biopsy    Recent Pathology:   10/4/24-tissue exam-US guided left breast biopsy    Previous Breast Cancer Diagnosis:  No    Does patient have a MIKE ? Yes, mini MIKE in benign biopsy and MIKE  in malignancy    Is patient diabetic?  YES/NO: no  Controlled? n/a    Is patient on a blood thinner?  no    Surgical Oncology: Dr Cardarelli 10/10/24    Medical Oncology: TBD    Radiation Oncology: TBD    Genetic testing: TBD    Family history of cancer: Maternal grandmother and paternal aunt had breast cancer.  Maternal aunt had ovarian cancer.    Any further needs: Referrals also placed to medical oncology and radiation oncology.  Patient scheduled for spinal surgery 10/15/24, to include IR guided biopsies of L1 and T11      Information forwarded to Out[facility]      Nurse Navigator will call patient for initial outreach.      Will have Patient Navigator outreach patient after surgical oncology consultation.  Any clinical questions to be directed to ONN or office nurse.

## 2024-10-09 NOTE — PROGRESS NOTES
Breast Oncology Nurse Navigator    Called patient for initial outreach from nurse navigator.  Introduced myself and my role as well as members of the navigation team.  Oncology Nurse Navigator will follow patient until they are seen by surgical oncology, after which the patient navigator initiate outreach and follow the patient to survivorship.      Referral received from [facility].  Breast cancer diagnosis was made after patient fell off a step stool on 8/28/24 outside.  She was taken to the ED and diagnostics were performed.  Patient states she fractured a vertebrae.  Breast mass was found incidentally with imaging.  Patient states basic understanding/awareness of diagnosis.  She did have some questions.  Anxious about diagnosis and life expectancy.  Support offered throughout conversation.  Patient asked about receptors.  States she had a hysterectomy and oophorectomy over 25 years ago.  Wonders how she could have estrogen receptor positive disease.  Brief explanation provided.  Patient asked about the referrals to medical oncology and radiation oncology.  Advised that they were sent to be scheduled but Dr Cardarelli could give her a better idea tomorrow regarding when to see each provider.  Patient will have spinal biopsies on 10/15/24    Any other issues/diagnoses?  yes.  Patient has pain in her right hip/groin area and right shoulder.  States right shoulder pain is new, with onset in the past week.  She is awaiting results of a shoulder xray performed yesterday.  Results are still pending.  Patient takes Tramadol at bedtime as needed for pain.    Confirmed upcoming appointment with Dr. Cardarelli, including date, time and location.  Patient will be accompanied by her friend.  Provided brief explanation of what to expect at this visit.  Patient has transportation to appointments at this point.  Has some friends locally and patient does drive.  Patient states she had a trip to [place] scheduled in  Called pt to f/u on [employer]. Pt didn't want to talk said she was too busy. Bad timing.    five weeks.  She canceled due to recent health issues.    Is patient a current smoker: no, former smoker but quit over 40 years ago.    Patient lives alone.  Support system includes: friends locally.  She has two daughters and one step daughter.  Referral placed to oncology social worker: no    Cancer family history includes: Maternal grandmother and paternal aunt had breast cancer. Maternal aunt had ovarian cancer.   Referral to oncology genetics: yes, STAT referral placed.  Brief instructions provided.  Patient was concerned about the costs involved but has two daughters and has granddaughters, too.  Does patient have a prior cancer diagnosis?  None known  Any previous radiation treatment to the chest?  None known  Is patient pre/post menopausal?  Post.  Had hysterectomy and oophorectomy over 25 years ago.    Discussed the Cancer Support Community and some of the programs and services offered, in person and virtually.  Discussed Breast Cancer Support group that meets [facility] at [facility].  Information sent via [employer].    Patient has my contact information and knows she can reach out with questions.  Office hours provided.  Patient provided with the phone number for [phone].  General assessment completed.  Patient does have [employer] set up  [employer] message sent with our team's contact information.  Spoke for 25 minutes.

## 2024-10-10 ENCOUNTER — OFFICE VISIT (OUTPATIENT)
Dept: SURGICAL ONCOLOGY | Facility: CLINIC | Age: 79
End: 2024-10-10
Payer: MEDICARE

## 2024-10-10 ENCOUNTER — APPOINTMENT (OUTPATIENT)
Dept: LAB | Facility: CLINIC | Age: 79
End: 2024-10-10
Payer: MEDICARE

## 2024-10-10 ENCOUNTER — TELEPHONE (OUTPATIENT)
Dept: GENETICS | Facility: CLINIC | Age: 79
End: 2024-10-10

## 2024-10-10 VITALS
TEMPERATURE: 97.6 F | HEIGHT: 58 IN | OXYGEN SATURATION: 97 % | BODY MASS INDEX: 24.77 KG/M2 | RESPIRATION RATE: 15 BRPM | WEIGHT: 118 LBS | DIASTOLIC BLOOD PRESSURE: 78 MMHG | HEART RATE: 75 BPM | SYSTOLIC BLOOD PRESSURE: 124 MMHG

## 2024-10-10 DIAGNOSIS — Z80.8 FAMILY HISTORY OF MALIGNANT NEOPLASM OF THYROID: ICD-10-CM

## 2024-10-10 DIAGNOSIS — D49.2 BONE NEOPLASM: ICD-10-CM

## 2024-10-10 DIAGNOSIS — R94.8 ABNORMAL POSITRON EMISSION TOMOGRAPHY (PET) SCAN: ICD-10-CM

## 2024-10-10 DIAGNOSIS — Z80.3 FAMILY HISTORY OF MALIGNANT NEOPLASM OF BREAST: ICD-10-CM

## 2024-10-10 DIAGNOSIS — Z17.0 MALIGNANT NEOPLASM OF UPPER-INNER QUADRANT OF LEFT BREAST IN FEMALE, ESTROGEN RECEPTOR POSITIVE (HCC): Primary | ICD-10-CM

## 2024-10-10 DIAGNOSIS — Z80.3 FAMILY HISTORY OF BREAST CANCER: ICD-10-CM

## 2024-10-10 DIAGNOSIS — C50.919 INVASIVE CARCINOMA OF BREAST (HCC): ICD-10-CM

## 2024-10-10 DIAGNOSIS — C50.212 MALIGNANT NEOPLASM OF UPPER-INNER QUADRANT OF LEFT BREAST IN FEMALE, ESTROGEN RECEPTOR POSITIVE (HCC): Primary | ICD-10-CM

## 2024-10-10 DIAGNOSIS — Z80.41 FAMILY HISTORY OF MALIGNANT NEOPLASM OF OVARY: ICD-10-CM

## 2024-10-10 DIAGNOSIS — D05.12 INTRADUCTAL CARCINOMA IN SITU OF LEFT BREAST: ICD-10-CM

## 2024-10-10 PROBLEM — D24.2 FIBROADENOMA OF BREAST, LEFT: Status: RESOLVED | Noted: 2023-05-04 | Resolved: 2024-10-10

## 2024-10-10 PROCEDURE — 36415 COLL VENOUS BLD VENIPUNCTURE: CPT

## 2024-10-10 PROCEDURE — 99205 OFFICE O/P NEW HI 60 MIN: CPT | Performed by: STUDENT IN AN ORGANIZED HEALTH CARE EDUCATION/TRAINING PROGRAM

## 2024-10-10 RX ORDER — SODIUM FLUORIDE 6 MG/ML
PASTE, DENTIFRICE DENTAL
COMMUNITY
Start: 2024-09-23

## 2024-10-10 NOTE — TELEPHONE ENCOUNTER
I introduced myself to Yuni and let her know that her nurse navigator reached out to the cancer risk and genetics program on her behalf.    I reviewed the following with Yuni:    While the majority of cancer occurs by chance, approximately 5-10% of breast cancer has an underlying genetic cause. Genetic testing is available which can determine if there is an underlying genetic cause to your cancer.  Understanding if there is an underlying genetic cause can:  Provide your surgeon with additional information to help with surgical decisions, treatment decisions and eligibility for clinical trials.    It can determine if you have an increased risk for any additional cancers.  Help family members understand their cancer risk.       We work closely with the [facility] breast surgeons and are reaching out to see if you have interest in genetic testing. The reason we are reaching out at this time is that this result may help your surgeon determine the appropriate type of surgery (i.e. lumpectomy vs mastectomy). This test is not a requirement but can take 5-10 days to complete so we would like to start the process as soon as possible so the results are ready for your appointment with your surgeon.       If you are interested in genetic testing, I can collect your family history and initiate genetic testing for you.        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 Yes  If yes type/age of diagnosis: BCC age 75 (Chest)    Family history:   Do you have Ashkenazi Anglican ancestry? Yes  If yes, maternal, paternal, or both? Both    Do you have any children? Yes  How many sons? 0  How many daughters? 2  Do any of your children have a history of cancer? Yes  If yes type/age of diagnosis:   Daughter - Melanoma age 50 (Leg, Back)  Daughter - Melanoma age 48  (Arms)    Do you have any brothers or sisters? Yes  How many brothers? 1  How many sisters? 0  Are they from the same parents? Yes  If no how maternal/paternal half-siblings:  Do any of your brothers or sisters have a history of cancer? No  If yes who and the type/age of diagnosis:           Do you have nieces or nephews? Yes  Do any of them have a history of cancer? No  If yes type/age of diagnosis:    Does your mother have a history of cancer? No  If yes, cancer type and age of diagnosis:   Is your mother still living? No  Age/Age of death: 98    Thinking about your mother's family (aunts, uncles, cousins, grandparents) is there anyone with a history of cancer? Yes  If yes, list relationship, cancer type and age of diagnosis:  MGM - Breast Cancer age 60 (D. 90s)  Maternal Aunt - Ovarian Cancer age 55 (d. 58)    Does your father have a history of cancer? No  If yes, cancer type and age of diagnosis:   Is your father still living? No  Age/age of death: 41    Thinking about your father's family (aunts, uncles, cousins, grandparents) is there anyone with a history of cancer? Yes  If yes, list relationship, cancer type and age of diagnosis:   Paternal Aunt - Breast Cancer age 60s ()     Types of Results  Positive Result - May explain personal diagnosis/family history. Can give surgeon information on treatment plan, inform future screening/management or tell a person about other possible risks. Positive results can initiate testing for other family members who may be at risk (children, siblings, etc)  Negative Result - Does not give an explanation. Surgical/treatment plan will be based on clinical presentation and will be part of discussion with surgeon. Negative result cannot be passed down to children, but they are still at elevated risk.  Uncertain Result - Common, but treated like a negative result clinically. 90% are downgraded over time.     [facility]'s billing policy   Most insurance plans cover the  cost of genetic testing. The out-of-pocket cost varies due to the differences in deductibles, co-payments and co-insurance defined by individual plans but 90% of people pay $100 or less for a genetic test     A blood kit will be mailed to you overnight. Please take the blood kit along with packet of paperwork to any [facility] lab to have your blood drawn.    We have genetic counselors available, if you have any additional questions or would like to speak with them we can schedule you a 15 minute appointment.      Plan:  A blood kit was mailed out on 10/10/2024 along with information on genetic testing and the lab's billing policy.     [facility] (13 genes): PETER, BARD1, BRCA1, BRCA2, CDH1, CHEK2, NF1, PALB2, PTEN, RAD51C, RAD51D, STK11, TP53 with reflex to [employer]: Cancer+RNAinsight (59 genes): APC, PETER, AXIN2, BAP1, BARD1, BMPR1A, BRCA1, BRCA2, BRIP1, CDH1, CDK4, CDKN1B, CDKN2A, CHEK2, CTNNA1, DICER1, EGLN1, EPCAM, FH, FLCN, GREM1, HOXB13, KIF1B, KIT, MAX, MEN1, MET, MITF, MLH1, MSH2, MSH3, MSH6, MUTYH, NF1, NTHL1, PALB2, PDGFRA PMS2, POLD1, POLE, POT1, PTEN, RAD51C, RAD51D, RB1, RET, SDHA, SDHAF2, SDHB, SDHC, SDHD, SMAD4, SMARCA4, STK11, [id number], TP53, TSC1, TSC2, VHL     Result Call Information:  I confirmed the patient's mobile number on file as the best number to call with results  I confirmed with the patient that we can leave a voicemail on the provided numbers    Initial results will take approximately 5-12 days to return     Additional results may take up to 2-3 weeks to complete once test is started.    Patient does not have any further questions, declined meeting with genetic counselor    When your results are ready, someone from the genetics team will call you, review the results, and contact your breast surgeon. You will be contacted with any type of result- positive, negative, or uncertain.

## 2024-10-10 NOTE — PROGRESS NOTES
Breast Consultation-Surgical Oncology     [facility]  [facility] [place]  [address]  [place] [address]    Name:  Yuni Keys  YOB: 1945  MRN:  [id number]    Assessment & Plan  Malignant neoplasm of upper-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, estrogen receptor positive (HCC)  Diagnosis invasive breast cancer    We began by discussing the natural history of breast cancer, and the rationale for both locoregional and systemic therapy, which is multi-step and multidisciplinary.     Patient was provided a copy of her pathology report.  We reviewed this in detail.  Explained to patient that based on the imaging she has obtained thus far there is concern for distant metastatic disease.  I explained that this was disease outside of the breast and ipsilateral lymph nodes.  At this time, plan for presentation at multidisciplinary tumor board on Monday.  Patient also has biopsy scheduled for next Tuesday of potential bony metastases.    We will contact patient with discussion from tumor board.          Bone neoplasm  A secure chat message was sent to patient's neurosurgeon relaying her new diagnosis of breast cancer in the setting of possible herson metastasis. She will undergo biopsy of herson lesion next week.        Abnormal positron emission tomography (PET) scan         Family history of breast cancer  4/2024 Hel[facility] screening negative. Oncology genetics referral placed 10/09.        Invasive carcinoma of breast (HCC)    Orders:    Ambulatory referral to Surgical Oncology      CHIEF COMPLAINT: This is an initial consultation visit for breast cancer with concern for metastatic disease     HPI:  Yuni Keys is a 78 y.o. year old female who presents with after diagnosis of left breast cancer. Patient had a fall on August 28, 2020 fear.  CT scan obtained at that time showed acute mild L1 compression fracture.  This was followed up with an MRI of the  lumbar spine which demonstrated concern for hyperintense lesion that has grown in the interim with comparison to a previous MRI.  There was concern at this time for an aggressive lesion such as metastatic disease or multiple myeloma.  Patient had medical workup for multiple myeloma which was reportedly negative.  This is followed by PET scan which demonstrated focal radiotracer uptake in the left breast as well as FDG avid left axillary, subpectoral nodes as well as scattered FDG avid lytic lesions compatible with metastases.  Patient then underwent diagnostic workup of the left breast mass which revealed diagnostic imaging of the left breast along with biopsy which revealed left breast cancer.     Patient reports prior to the fall that she was feeling well and was planning an international trip. She denied any unintentional weight loss, new onset headaches, bone pain (prior to fall), abdominal pain or night sweats. She denies any breast symptoms prior to diagnosis.     She reports since the fall she has lower back pain, right hip pain which prevents her from sitting long and right shoulder pain.       Oncology History:    Oncology History   Malignant neoplasm of upper-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, estrogen receptor positive (HCC)   10/2/2024 Observation    NM PET/CT IMPRESSION:   1. Mild focal radiotracer uptake at a left breast nodular density medially. Underlying primary breast malignancy should be excluded.  2. Several small FDG avid left axillary/subpectoral lymph nodes would raise suspicion for metastasis.  3. Scattered FDG avid lytic lesions compatible with metastasis.     10/4/2024 Biopsy    Left breast ultrasound-guided biopsy  A. 3 o'clock, periareolar  Benign breast tissue with fibroadenomatoid nodule    B. 11 o'clock, 7 cm from nipple  Invasive mammary carcinoma with mixed ductal and lobular features  Grade 1-2  ER , ME <1, HER2 1+  Lymphovascular invasion not definitively  identified    Concordant. Malignancy is poorly defined on mammo, appearing as a developing asymmetry rather than a discrete mass; this measures up to 5.8 cm. On US, mass measures up to 4.1cm. Subtle asymmetry with possible distortion slightly superior and medial which could represent a second site of disease. Further characterization is indication. Right breast imaging performed on 4/12/2024; more recent mammogram may be reasonable. CESM or MRI is recommended for evaluation of extent of disease in left breast. The patient had abnormal axillary lymph nodes seen on PET/CT. 1 of these lymph nodes was visualized on US; however, it is not amenable to US-guided core needle biopsy.         Review of Systems:  Review of Systems   Constitutional:  Positive for appetite change. Negative for chills and fever.        Stress eating    HENT:  Negative for ear pain and sore throat.    Eyes:  Negative for pain and visual disturbance.   Respiratory:  Negative for cough and shortness of breath.    Cardiovascular:  Negative for chest pain and palpitations.   Gastrointestinal:  Negative for abdominal pain and vomiting.   Genitourinary:  Negative for dysuria and hematuria.   Musculoskeletal:  Positive for back pain. Negative for arthralgias.        Report RIGHT shoulder and HIP pain    Skin:  Negative for color change and rash.   Neurological:  Negative for seizures and syncope.   All other systems reviewed and are negative.      Physical Exam:  Physical Exam  Vitals reviewed.   Constitutional:       Appearance: Normal appearance.   HENT:      Head: Normocephalic and atraumatic.      Mouth/Throat:      Mouth: Mucous membranes are moist.   Cardiovascular:      Rate and Rhythm: Normal rate and regular rhythm.      Pulses: Normal pulses.      Heart sounds: Normal heart sounds.   Pulmonary:      Effort: Pulmonary effort is normal.      Breath sounds: Normal breath sounds.   Chest:   Breasts:     Right: Normal.      Left: Normal.           Comments: The left breast was examined in the sitting and supine position.  There are no worrisome skin changes, tenderness, inverted nipple, nipple discharge, swelling.   Bernice survey demonstrated no evidence of any clinically obvious axillary, pectoral or paraclavicular lymph nodes. The breast tissue was nodular and mass was appreciated at 11olclock position in the left breast.       The right breast was examined in the sitting and supine position.  There are no worrisome skin changes, tenderness, inverted nipple, nipple discharge, swelling, bleeding or evidence of a mass in any quadrant.  Bernice survey demonstrated no evidence of any clinically suspicious axillary, pectoral or paraclavicular lymph nodes.     The bilateral breasts are very ptotic.   Abdominal:      General: Abdomen is flat.      Palpations: Abdomen is soft.   Musculoskeletal:      Right lower leg: No edema.      Left lower leg: No edema.   Lymphadenopathy:      Upper Body:      Right upper body: No supraclavicular, axillary or pectoral adenopathy.      Left upper body: No supraclavicular, axillary or pectoral adenopathy.   Skin:     General: Skin is warm.   Neurological:      General: No focal deficit present.      Mental Status: She is alert and oriented to person, place, and time.   Psychiatric:         Mood and Affect: Mood normal.         Behavior: Behavior normal.         Thought Content: Thought content normal.           Pathology revealed:   PATHOLOGY RESULTS:   2) Mass [B] (Left; Retroareolar; 3 o'clock)  The pathology results indicate a Benign finding with benign breast tissue and fibroadenomatoid nodule.  Radiology and pathology are concordant.  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  Please refer to the full pathology report.  Radiology and pathology  are concordant.    Staging: Cancer Staging   No matching staging information was found for the patient.      Imaging:  US guided breast biopsy left complete, US guidance breast biopsy left each additional, Mammo post biopsy left    Addendum Date: 10/10/2024 Addendum:   ADDENDUM: PATHOLOGY RESULTS: 2) Mass [B] (Left; Retroareolar; 3 o'clock) The pathology results indicate a Benign finding with benign breast tissue and fibroadenomatoid nodule. Radiology and pathology are concordant. 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  Please refer to the full pathology report. Radiology and pathology are concordant. In review of the patient’s recent imaging, the left malignancy is poorly defined on mammogram, appearing as a developing asymmetry rather than a discrete mass.  This measures approximately 47 mm AP by 31 mm transverse on the cc view and measures approximately 58 mm AP by 35 mm craniocaudal on the MLO view.  The difference in AP dimension between the 2 views could be related to the obliquity of the images.  On ultrasound the mass measures 41 x 13 x 41 mm.  There is a subtle asymmetry with possible distortion slightly superior and medial (at 12:00) which could represent a second site of disease.  Further characterization is indicated. The patient's most recent mammogram of the contralateral right breast is from an outside institution performed 4/12/2024.  It may be reasonable to obtain a more recent mammogram prior to treatment for the malignancy.  Contrast-enhanced mammography or MRI is recommended for additional screening and evaluation of extent of disease in the left breast.  This is due to the subtle mammographic appearance of the malignancy in the breast density.  If the patient cannot have contrast-enhanced imaging, a more  updated right mammogram is recommended as well as ultrasound and possible mammogram of the left breast to evaluate the asymmetry described above. The patient does have an abnormal PET/CT which demonstrates lymphadenopathy that was not accessible for ultrasound-guided core needle biopsy. The patient was notified of the malignant pathology results on 10/8/2024 by Dr. Andino. RECOMMENDATION:      - Surgical Consultation for the left breast.      - Breast MRI for both breasts. Signed by:  Ambreen Mukherjee MD END ADDENDUM    Result Date: 10/10/2024  Narrative: DIAGNOSIS: Status post biopsy INDICATION: Yuni Keys is a 78 y.o. female presenting for left breast biopsy, 2 locations. . Prior to the procedure, previous imaging was reviewed.  The procedure was explained to the patient in detail.  All questions were answered.  Written and verbal informed consent was obtained.  The left breast was marked. FINDINGS: Limited left breast ultrasound was performed with grayscale and color imaging.  The procedure sites were marked.  A timeout was performed.  The skin was cleansed and draped in the usual fashion.  Both biopsies were performed under continuous ultrasound guidance from a lateral approach. LEFT 2) MASS B at 3:00, retroareolar region: Anesthesia was administered with 5 mL of lidocaine 1%. A 13-gauge introducer was placed.  A 14 gauge spring-loaded biopsy device was used to obtain 6 samples under direct ultrasound guidance.  A mini Kimberly  reflector was placed in the biopsy cavity under direct ultrasound guidance.  The Kimberly  probe was held over the procedure site and audible clicking was heard.  1) MASS A at 11:00, 7 cm from the nipple: Anesthesia was administered with 5 mL of lidocaine 1%. An 11-gauge introducer was placed.  A 12 gauge spring-loaded biopsy device was used to obtain 4 samples under direct ultrasound guidance.  There was a small central hypoechoic component to the mass.  Due to  vascularity at this location, sampling could not be obtained at that hypoechoic area.  A Kimberly  reflector was placed in the biopsy cavity under direct ultrasound guidance.  The Kimberly  probe was held over the procedure site and audible clicking was heard.  Hemostasis was obtained with direct pressure on the biopsy sites.  The patient had minimal bleeding.  The patient tolerated the procedure well. There were no immediate complications.   After each site was biopsied, the Kimberly  probe was held over the procedure site and audible clicking could be heard. Post biopsy mammogram: Left 2D CC and lateral views were obtained.  The mini Kimberly  reflector projects in the expected location at 3:00, retroareolar region (this mass was not seen mammographically as it is obscured by dense breast parenchyma). The Kimberly  reflector projects within the mass at 11:00.     Impression: 1.  Technically successful ultrasound-guided core needle biopsy of a mass in the left breast at 3:00, retroareolar region.  The procedure site was marked for possible surgery with placement of a mini Kimberly  reflector. 2.  Technically successful ultrasound-guided core needle biopsy of a mass in the left breast at 11:00, 7 cm from the nipple.  The procedure site was marked for possible surgery with placement of a Kimberly  reflector. 3.  After each site was biopsied, the Kimberly  probe was held over the procedure site and audible clicking could be heard. 4.  Management recommendations will be made once the pathology results are available. ASSESSMENT/BI-RADS CATEGORY: Left: Post-Procedure Mammogram for Marker Placement Overall: Post-Procedure Mammogram for Marker Placement RECOMMENDATION:      - Waiting for pathology for the left breast. Workstation ID: [id number] Signed by:  Ambreen Mukherjee MD     MRI thoracic spine with and without contrast    Result Date: 10/9/2024  Narrative: MRI THORACIC SPINE WITH AND WITHOUT  CONTRAST INDICATION: D49.2: Neoplasm of unspecified behavior of bone, soft tissue, and skin. COMPARISON: PET/CT dated 10/2/2024. MRI lumbar spine dated 9/21/2024. TECHNIQUE:  Multiplanar, multisequence imaging of the thoracic spine was performed before and after gadolinium administration. . IV Contrast:  5 mL of Gadobutrol injection (SINGLE-DOSE) IMAGE QUALITY:  Diagnostic. FINDINGS: ALIGNMENT: Mild, smooth exaggeration of the mid thoracic kyphosis. There is a moderate L1 compression fracture as seen on recent MRI with similar configuration. Approximately 50 to 60% loss of height of the mid and anterior aspect of the vertebral body with bony retropulsion of the posterior superior margin of the vertebral body into the anterior epidural space. No thoracic compression fractures are noted. MARROW SIGNAL: The L1 compression fracture demonstrates diffuse marrow edema without extension into the posterior elements of this vertebral body segment. There is a focal marrow replacing lesion within the posterior aspect of the T11 vertebral body on the right with extension into the pedicle. There is a marrow replacing lesion identified within the T8 vertebral body somewhat diffusely. There is some hyperintense signal on T1-weighted imaging in the posterior aspect of the vertebral body likely representing a hemangioma superimposed upon the diffuse marrow replacement. There is a small marrow replacing lesion within the T5 vertebral body. THORACIC CORD: No cord compression. Normal signal within the thoracic cord. PREVERTEBRAL AND PARASPINAL SOFT TISSUES:   Small right-sided pleural effusion layering within the posterior aspect of the chest. THORACIC DEGENERATIVE CHANGE: Mild canal stenosis at the T12-L1 level as a result of POSTCONTRAST:  No abnormal enhancement. OTHER FINDINGS:  None.     Impression: Normal enhanced MRI of the thoracic spine. Workstation performed: [id number]     Mammo diagnostic left w 3d and cad, US breast left  limited (diagnostic)    Result Date: 10/4/2024  Narrative: DIAGNOSIS: Abnormal positron emission tomography (PET) scan; Other signs and symptoms in breast TECHNIQUE: Digital diagnostic mammography was performed. Computer Aided Detection (CAD) analyzed all applicable images. Left breast ultrasound was performed. COMPARISONS: Prior breast imaging dated: 04/12/2024, 04/26/2023, 04/07/2023, 02/01/2023, 03/22/2022, 03/19/2021, and 02/20/2020 RELEVANT HISTORY: Family Breast Cancer History: History of breast cancer in Maternal Grandmother, Paternal Aunt. Family Medical History: Family medical history includes breast cancer in 2 relatives (maternal grandmother, paternal aunt) and ovarian cancer in maternal aunt. Personal History: No known relevant hormone history. Surgical history includes breast biopsy, breast excisional biopsy, and hysterectomy. No known relevant medical history. RISK ASSESSMENT: 5 Year Tyrer-Cuzick: 11.31% 10 Year Tyrer-Cuzick: No Score Lifetime Tyrer-Cuzick: 15.78% TISSUE DENSITY: The breasts are extremely dense, which lowers the sensitivity of mammography. INDICATION: Yuni Keys is a 78 y.o. female presenting for abnormal pet scan.  Patient had a PET/CT with findings suspicious for osseous metastases.  There was FDG avidity in the medial left breast and FDG avid left axillary/subpectoral lymph nodes. FINDINGS: LEFT 1) MASS [A] Mammo diagnostic left w 3d and cad: There is an equal density, irregularly shaped mass with indistinct margins seen in the left breast at 11 o'clock in the middle depth.  On the cc view the mass measures approximately 47 mm AP by 31 mm transverse.  On the MLO view the mass may measure up to 58 mm AP by 35 mm craniocaudal.  However, the exact margins of the mass are difficult to delineate on mammography.  This does correspond with the area of FDG avidity in the medial breast on the PET/CT. US breast left limited (diagnostic): There is a 41 mm x 13 mm x 41 mm irregularly shaped,  hyperechoic mass with indistinct margins seen in the left breast at 11 o'clock, 7 cm from the nipple.  The margins of the mass are indistinct and the mass has an irregular shape, limiting accurate measurement.  The mass correlates with the mass seen on the mammogram.  This mass is highly suggestive of malignancy. 2) MASS [B] Mammo diagnostic left w 3d and cad: There is no corresponding mass seen on this modality. US breast left limited (diagnostic): There is a 14 mm x 4 mm x 8 mm oval, parallel, hypoechoic mass seen in the retroareolar region of the left breast at 3 o'clock.  This appears to be intraductal.  This is suspicious. 3) MASS [C] Mammo diagnostic left w 3d and cad: There is a 6 mm oval mass seen in the left breast at 6 o'clock in the middle depth.  This is mammographically stable compared to the oldest mammogram available for comparison from 2020. US breast left limited (diagnostic): There is a 6 mm x 5 mm x 3 mm oval, parallel, hypoechoic mass with circumscribed margins seen in the retroareolar region of the left breast at 6 o'clock.  This is likely benign given the mammographic stability. 4) LYMPH NODE [D] Mammo diagnostic left w 3d and cad: There is no corresponding lymph node seen on this modality.  The PET/CT demonstrated abnormal lymph nodes. US breast left limited (diagnostic): Targeted ultrasound of the axilla was performed.  There are some morphologically benign axillary lymph nodes.  There is also a level 2 axillary lymph node which is only visible deep to the pectoralis muscles during certain phases of respiration.  This has a cortical thickness of 4 mm.  This is suspicious but is not amenable to ultrasound-guided core needle biopsy.     Impression: 1.  Irregular mass in the medial left breast corresponds with the area of FDG avidity on the PET/CT.  This is highly suggestive of malignancy.  Appropriate action should be taken.  Ultrasound-guided core needle biopsy is recommended and was performed  today.  Please see the separate dictation. 2.  Oval intraductal mass in the left breast at 3:00, retroareolar region is suspicious.  Ultrasound-guided core needle biopsy is recommended. 3.  There is a 6 mm oval mass at 6:00 which is mammographically stable for 4 years.  This is likely benign. 4.  The patient had abnormal axillary lymph nodes seen on PET/CT.  1 of these lymph nodes is visualized on ultrasound, however, it is not amenable to ultrasound-guided core needle biopsy. ASSESSMENT/BI-RADS CATEGORY: Left: 5 - Highly Suggestive of Malignancy Overall: 5 - Highly Suggestive of Malignancy RECOMMENDATION:      - Ultrasound-guided breast biopsy for the left breast. Workstation ID: [id number] Signed by:  Ambreen Mukherjee MD     NM PET CT tumor imaging whole body    Result Date: 10/2/2024  Narrative: WHOLE-BODY PET/CT SCAN INDICATION: Abnormal MRI lumbar spine. 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 Multiplanar attenuation corrected and non attenuation corrected PET images were acquired 60 minutes post injection. Contiguous, low dose, axial CT sections were obtained from the vertex through the feet.  Intravenous contrast material was not utilized.  This examination, like all CT scans performed in the [facility], was performed utilizing techniques to minimize radiation dose exposure, including the use of iterative reconstruction and automated exposure control. Fasting serum glucose: 84 mg/dl FINDINGS: VISUALIZED BRAIN: No acute abnormalities are seen. HEAD/NECK: There is a physiologic distribution of FDG. No FDG avid cervical adenopathy is seen. CT images: Unremarkable CHEST: There are several small FDG avid lymph nodes in the left axilla/subpectoral region.  A lymph node here demonstrates SUV max of 6.0. This measures 6 mm short axis image 68 series 4. Slightly focal FDG uptake at the left breast medially where there is suggestion of a nodular density on CT, SUV max of 2.6. This measures up to 1.3 cm image 83 series 4. See fusion image 79 series 603. CT images: Moderate coronary artery calcifications. Heart is enlarged. Trace pleural effusions suggested. ABDOMEN: No FDG avid soft tissue lesions are seen. CT images: Parapelvic renal cysts on the left. Colonic diverticulosis. Suggestion of calcified phleboliths along the right psoas margin. PELVIS: No FDG avid soft tissue lesions are seen. CT images: Prior hysterectomy. OSSEOUS STRUCTURES/EXTREMITIES: Scattered FDG-avid osseous lesions. For example: *  Heterogeneous activity at the right acromion where there is a lytic process on CT, SUV max of 4.8. *  T8 vertebral body demonstrates SUV max 3.8. *  T11 vertebral body lytic lesion on the right demonstrates SUV max of 6.0. *  Linear activity at the compressed L1 vertebral body demonstrates SUV max of 5.9. Activity could be related to compression fracture with underlying pathologic lesion not excluded. *  Small lucent lesion at the left ischial tuberosity demonstrates SUV max of 4.7. CT images: Multilevel degenerative changes of the spine. Curvature of the lumbar spine to the right. Bilateral knee arthroplasties.     Impression: 1. Mild focal radiotracer uptake at a left breast nodular density medially. Underlying primary breast malignancy should be excluded. 2. Several small FDG avid left axillary/subpectoral lymph nodes would raise suspicion for metastasis. 3. Scattered FDG avid lytic lesions compatible with metastasis. The study was marked in EPIC for significant notification. Workstation performed: [id number]     MRI lumbar spine without contrast    Result Date: 9/23/2024  Narrative: MRI LUMBAR SPINE WITHOUT CONTRAST INDICATION: S32.010A: Wedge compression fracture of  first lumbar vertebra, initial encounter for closed fracture. COMPARISON: Lumbar spine radiograph from 9/13/2024, CT of the lumbar spine and abdomen and pelvis from 8/28/2024, MR lumbar spine from 11/24/2019 TECHNIQUE:  Multiplanar, multisequence imaging of the lumbar spine was performed. . IMAGE QUALITY:  Diagnostic FINDINGS: VERTEBRAL BODIES:  There are 5 lumbar type vertebral bodies. There is dextroscoliosis of the lumbar spine, centered at L2, with right lateral listhesis of L3 on L4, L4 on L5, and grade 1 anterolisthesis of L4 and L5 due to moderate facet arthropathy. There is a T1 hypointense, STIR hyperintense marrow replacing lesion involving the posterior aspect of the T11 vertebral body to the right of midline extending into the pedicle, corresponding to the lucent lesion identified on CT in this region. This is new when compared to prior lumbar spine MRI from 11/24/2019. There is a worsening acute or subacute compression fracture involving the superior endplate of L1, with approximately 50% height loss noted, and associated bone marrow edema on STIR images. There is mild worsening retropulsion of the superior endplate of  the central canal. No significant associated epidural hematoma or obvious paraspinal epidural mass is identified. There is mild STIR signal hyperintensity involving the posterior aspect of the T12 inferior endplate to the left of midline in particular, without significant height loss, which may represent a microtrabecular injury in this region. Remaining lumbar vertebral body heights appear grossly preserved. There are additional degenerative endplate changes at multiple levels most pronounced at L2-L3, where there are Modic type III degenerative endplate changes noted an associated sclerosis on recent CT. There are also Modic type I degenerative endplate changes posteriorly at the level of L3-L4. SACRUM:  Normal signal within the sacrum. No evidence of insufficiency or stress fracture.  DISTAL CORD AND CONUS:  Normal size and signal within the distal cord and conus. The conus terminates at T12. PARASPINAL SOFT TISSUES:  Paraspinal soft tissues are unremarkable. LOWER THORACIC DISC SPACES:  Normal disc height and signal.  No disc herniation, canal stenosis or foraminal narrowing. LUMBAR DISC SPACES: L1-L2: Disc desiccation, diffuse disc bulge with superimposed small left foraminal disc protrusion. Mild ligamentum flavum hypertrophy. Facet joints appear grossly normal. No canal stenosis. Mild left neural foraminal stenosis. Findings appear unchanged. L2-L3: Disc height loss, disc osteophyte complex asymmetric to the left, with superimposed small right foraminal disc protrusion. Mild facet arthropathy. Ligamentum flavum hypertrophy. Mild canal stenosis. Left greater than right lateral recess stenosis.  Moderate left neural foraminal stenosis. Findings appear grossly unchanged. L3-L4: Diffuse disc bulge with superimposed left foraminal disc protrusion. Bilateral facet arthropathy and ligamentum flavum hypertrophy. Mild canal stenosis. Moderate left and mild right neural foraminal stenosis. Findings appear similar when compared to the prior study. L4-L5: Diffuse disc bulge. Bilateral facet arthropathy and ligamentum flavum hypertrophy. Mild canal stenosis. Mild right neuroforaminal stenosis. Findings appear unchanged. L5-S1: Disc bulge. Moderate facet arthropathy. No canal stenosis. Mild right neural foraminal stenosis. OTHER FINDINGS: Mild left hydronephrosis as seen on prior CT of the abdomen pelvis from 8/28/2024.     Impression: 1. Indeterminate T1 hypointense, STIR hyperintense lesion involving the right posterior aspect of T11 extending into the adjacent pedicle, corresponding to the lucent lesion identified in this region on prior CT, and new when compared to the prior lumbar  spine MRI from 11/24/2019. This raises concern for an aggressive lesion such as metastatic disease or multiple myeloma as  this was new compared to 2019, with an atypical hemangioma an alternative but less likely consideration. Recommend correlation for history of primary malignancy, and consider further valuation with PET/CT. 2. Worsening acute to subacute compression fracture at L1, with now approximately 50% height loss, associated bone marrow edema, and mild retropulsion of the posterior vertebral body cortex of the central canal. No associated epidural hematoma or obvious  associated mass at this level. This may be a benign compression fracture, but given the lesion at T11, a pathologic compression fracture cannot be entirely excluded. 3. Minimal edema involving the posterior aspect of the inferior endplate of T12, without height loss in this region, which may represent a area of focal microtrabecular injury. 4. Multilevel lumbar spondylosis, as described above, contributing to at most mild canal stenosis at L2-L5, and multilevel neural foraminal stenosis, worst on the left at L2-L4. 5. Mild left-sided hydronephrosis again demonstrated, incompletely evaluated in this examination. The study was marked in EPIC for immediate notification. Workstation performed: [id number]     XR spine thoracolumbar 2 vw    Result Date: 9/13/2024  Narrative: THORACOLUMBAR SPINE INDICATION:   Wedge compression fracture of first lumbar vertebra, initial encounter for closed fracture. COMPARISON: 8/28/2024. VIEWS:  XR SPINE THORACOLUMBAR 2 VW Images: 2 FINDINGS: Compression fracture of the L1 vertebral body again seen. This is similar to the prior. There is moderate dextrocurvature of the lumbar spine. There is no spondylolisthesis. There is moderate multilevel disc space narrowing with osteophytes throughout the lumbar spine worse at L2-L3. There is moderate-severe facet disease lower lumbar spine.. There is no displacement of the paraspinal line. The pedicles appear intact.     Impression: Redemonstration of L1 compression fracture Moderate multilevel  spondylosis worse at L2-L3. Moderate to severe facet disease. Electronically signed: 2024 03:43 PM Stephane Rachel MD     The following portions of the patient's history were reviewed and updated as appropriate: allergies, current medications, past family history, past medical history, past social history, past surgical history, and problem list.      OB History               Para        Term                AB        Living             SAB        IAB        Ectopic        Multiple        Live Births   2                   Problem List:   Patient Active Problem List   Diagnosis    Lumbar disc herniation    Lumbar radiculopathy    Spinal stenosis of lumbar region with neurogenic claudication    Retrocalcaneal bursitis (back of heel), right    Overweight    Hyperlipidemia    MCI (mild cognitive impairment)    Family history of early CAD    History of abnormal uterine bleeding    History of hysterectomy    Primary insomnia    Compression fracture of L1 lumbar vertebra (HCC)    Closed compression fracture of body of L1 vertebra (HCC)    Bone neoplasm    Abnormal positron emission tomography (PET) scan    Malignant neoplasm of upper-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, estrogen receptor positive (HCC)     Past Medical History:   Diagnosis Date    Arthritis     Breast mass     Edema of both lower legs 2018    Dr. Thang Otero; faxed records.    Hx of skin cancer, basal cell     Hyperlipidemia     Hypertension     Impingement syndrome of left shoulder 04/10/2018    Dr. Thang Otero, faxed patient records.    Migraines     Osteopenia     Overactive bladder     Skin cancer     Basal cell    Toe fracture, left 2023     Past Surgical History:   Procedure Laterality Date    ACHILLES TENDON REPAIR      BREAST BIOPSY Left 10/04/2024    BREAST CYST EXCISION Bilateral     1972    BREAST LUMPECTOMY      CATARACT EXTRACTION, BILATERAL      COLONOSCOPY      COLPOPEXY VAGINAL  EXTRAPERITONEAL (VEC) WITH INSERTION PUBOVAGINAL SLING      CYST REMOVAL      HERNIA REPAIR      HYSTERECTOMY      JOINT REPLACEMENT Right     REPLACEMENT TOTAL KNEE      TONSILLECTOMY  89    US GUIDANCE BREAST BIOPSY LEFT EACH ADDITIONAL Left 10/04/2024    US GUIDED BREAST BIOPSY LEFT COMPLETE Left 10/04/2024     Family History   Problem Relation Age of Onset    Heart disease Father     Breast cancer Maternal Grandmother     Cancer Maternal Grandmother     Heart disease Paternal Grandmother     Hypertension Paternal Grandfather     Ovarian cancer Maternal Aunt     Breast cancer Paternal Aunt     Breast cancer Paternal Aunt     Cancer Paternal Aunt     Ovarian cancer Maternal Aunt      Social History     Socioeconomic History    Marital status:      Spouse name: Not on file    Number of children: 2    Years of education: 18    Highest education level: Master's degree (e.g., MA, MS, Derek, MEd, MSW, CHANELL)   Occupational History    Not on file   Tobacco Use    Smoking status: Former     Current packs/day: 0.00     Average packs/day: 0.5 packs/day for 15.0 years (7.5 ttl pk-yrs)     Types: Cigarettes     Start date: 1966     Quit date: 1977     Years since quittin.9    Smokeless tobacco: Never   Vaping Use    Vaping status: Never Used   Substance and Sexual Activity    Alcohol use: Yes     Alcohol/week: 1.0 standard drink of alcohol     Types: 1 Glasses of wine per week     Comment: occasional    Drug use: Never    Sexual activity: Not Currently     Partners: Male     Birth control/protection: Abstinence, Post-menopausal, Female Sterilization   Other Topics Concern    Not on file   Social History Narrative    Not on file     Social Determinants of Health     Financial Resource Strain: Patient Declined (2023)    Overall Financial Resource Strain (CARDIA)     Difficulty of Paying Living Expenses: Patient declined   Food Insecurity: No Food Insecurity (3/24/2022)    Hunger Vital Sign     Worried  About Running Out of Food in the Last Year: Never true     Ran Out of Food in the Last Year: Never true   Transportation Needs: Patient Declined (11/6/2023)    PRAPARE - Transportation     Lack of Transportation (Medical): Patient declined     Lack of Transportation (Non-Medical): Patient declined   Physical Activity: Sufficiently Active (3/24/2022)    Exercise Vital Sign     Days of Exercise per Week: 4 days     Minutes of Exercise per Session: 60 min   Stress: Stress Concern Present (3/24/2022)    [employer] - Occupational Stress Questionnaire     Feeling of Stress : Rather much   Social Connections: Socially Isolated (3/24/2022)    Social Connection and Isolation Panel [NHANES]     Frequency of Communication with Friends and Family: More than three times a week     Frequency of Social Gatherings with Friends and Family: Three times a week     Attends Temple Services: Never     Active Member of Clubs or Organizations: No     Attends Club or Organization Meetings: Never     Marital Status:    Intimate Partner Violence: Not At Risk (3/24/2022)    Humiliation, Afraid, Rape, and Kick questionnaire     Fear of Current or Ex-Partner: No     Emotionally Abused: No     Physically Abused: No     Sexually Abused: No   Housing Stability: Not on file     Current Outpatient Medications   Medication Sig Dispense Refill    atorvastatin (LIPITOR) 10 mg tablet Take 1 tablet (10 mg total) by mouth daily 90 tablet 3    calcium polycarbophil (FIBERCON) 625 mg tablet Take 625 mg by mouth daily      Cholecalciferol (Vitamin D) 50 MCG (2000 UT) tablet Take 2,000 Units by mouth daily      LORazepam (Ativan) 0.5 mg tablet Take 1 tablet (0.5 mg total) by mouth every 8 (eight) hours as needed for anxiety 20 tablet 0    meloxicam (MOBIC) 15 mg tablet Take 15 mg by mouth daily      Multiple Vitamins-Minerals (MULTIVITAMIN WITH MINERALS) tablet Take 1 tablet by mouth daily      Sodium Fluoride 5000 PPM  1.1 % PSTE APPLY TIHN RIBBON TO TOOTHBRUSH, BRUSH X 2MIN AT BEDTIME, SPIT, DONT RINSE      traMADol (Ultram) 50 mg tablet Take 1 tablet (50 mg total) by mouth every 8 (eight) hours as needed for moderate pain 60 tablet 0     No current facility-administered medications for this visit.     Allergies   Allergen Reactions    Imodium [Loperamide] Headache     Felt that peripheral vision was impaired and mild headache    Pedi-Pre Tape Spray [Wound Dressing Adhesive] Itching and Rash     Adhesive Tape    Tilactase Diarrhea    Bupropion Rash     Around 2010

## 2024-10-10 NOTE — ASSESSMENT & PLAN NOTE
A secure chat message was sent to patient's neurosurgeon relaying her new diagnosis of breast cancer in the setting of possible herson metastasis. She will undergo biopsy of herosn lesion next week.

## 2024-10-10 NOTE — ASSESSMENT & PLAN NOTE
Diagnosis invasive breast cancer    We began by discussing the natural history of breast cancer, and the rationale for both locoregional and systemic therapy, which is multi-step and multidisciplinary.     Patient was provided a copy of her pathology report.  We reviewed this in detail.  Explained to patient that based on the imaging she has obtained thus far there is concern for distant metastatic disease.  I explained that this was disease outside of the breast and ipsilateral lymph nodes.  At this time, plan for presentation at multidisciplinary tumor board on Monday.  Patient also has biopsy scheduled for next Tuesday of potential bony metastases.    We will contact patient with discussion from tumor board.

## 2024-10-11 ENCOUNTER — PATIENT OUTREACH (OUTPATIENT)
Dept: HEMATOLOGY ONCOLOGY | Facility: CLINIC | Age: 79
End: 2024-10-11

## 2024-10-11 DIAGNOSIS — Z17.0 MALIGNANT NEOPLASM OF UPPER-INNER QUADRANT OF LEFT BREAST IN FEMALE, ESTROGEN RECEPTOR POSITIVE (HCC): Primary | ICD-10-CM

## 2024-10-11 DIAGNOSIS — C50.212 MALIGNANT NEOPLASM OF UPPER-INNER QUADRANT OF LEFT BREAST IN FEMALE, ESTROGEN RECEPTOR POSITIVE (HCC): Primary | ICD-10-CM

## 2024-10-11 LAB — MISCELLANEOUS LAB TEST RESULT: NORMAL

## 2024-10-11 NOTE — PROGRESS NOTES
I reached out and spoke with Alley now that consults have been completed with the oncology teams to review for any barriers to care and offer supportive services as needed. Distress Thermometer completed at this time. Patient scored 8/10. Referral to  placed. I reviewed and updated the members assigned to the care team in [facility].   She knows the members of the care team as well as how and when to contact them with any needs.   She verbalizes managing the schedules well.   She is currently able to drive and denies any transportation needs.    She denies any uncontrolled symptoms. Discussed role of Palliative Care in symptom and side effect management. Declined referral at this time.  Patients states that she is eating and drinking as per usual with no unintentional weight loss.     Patient does not smoke.   Patient states she is well supported by family and friends.    Patient feels she has adequate insurance coverage and denies any financial concerns at this time.     Based on individual needs I will follow up in about 4-6 weeks.   I have provided my direct contact information and welcome them to contact me if their needs as discussed above change. They were appreciative for the call.

## 2024-10-12 PROBLEM — C50.911 BREAST CANCER METASTASIZED TO BONE, RIGHT (HCC): Chronic | Status: ACTIVE | Noted: 2024-09-27

## 2024-10-12 PROBLEM — C79.51 BREAST CANCER METASTASIZED TO BONE, RIGHT (HCC): Chronic | Status: ACTIVE | Noted: 2024-09-27

## 2024-10-12 NOTE — PROGRESS NOTES
Hematology/Oncology Outpatient Office Note    Date of Service: 10/22/2024    [facility] [place] [id number]  [phone]    Reason for Consultation:   Chief Complaint   Patient presents with    Consult       Cancer Stage at diagnosis: IV    Referral Physician: Lisa Fairbanks DO    Primary Care Physician:  Lisa Fairbanks DO     Nickname: Alley    Lives alone    Daughter here: James    Original ECO    Today's ECO    Goals and Barriers:  Current Goal: Minimize effects of disease burden, extend life.   Barriers to accomplishing this: making the bed (shoulder pain)    Patient's Capacity to Self Care:  Patient is able to self care      ASSESSMENT & PLAN      Diagnosis ICD-10-CM Associated Orders   1. Breast cancer metastasized to bone, right (HCC)  C50.911     C79.51       2. Malignant neoplasm of upper-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, estrogen receptor positive (HCC)  C50.212     Z17.0       3. Long term (current) use of aromatase inhibitors  Z79.811       4. Malignant neoplasm of left female breast, unspecified estrogen receptor status, unspecified site of breast (HCC)  C50.912 Ambulatory Referral to Hematology / Oncology            This is a 78 y.o. c PMHx notable for Arthritis, hyperlipidemia, hypertension, migraines, osteopenia, basal cell skin cancer, being seen in consultation for likely recurrent HR+ L Breast Cancer to the bone    She is s/p hysterectomy and oophorectomy in her early 50's.    Discussion of decision making  Oncology history updated, accordingly, during this visit  Goals of care/patient communication  I discussed with the patient the clinical course leading up to their cancer diagnosis. I reviewed relevant office notes, imaging reports and pathology result as well.  I told the patient that this is a case of incurable disease and what this means. We discussed that the goal of anti-cancer therapy is to  "provide best quality of life, extend overall survival, and progression free survival as shown in clinical trials. We also discussed that there might be a point when the cancer will no longer respond to this anti-neoplastic therapy. As a result, we also discussed the role of the palliative care team being introduced early in the treatment course. We will be making this referral  I explained the risks/benefits of the proposed cancer therapy: Letrozole + Ribociclib and after discussion including understanding risks of possible life-threatening complications and therapy-related malignancy development, informed consent for blood products and treatment has been signed and obtained.  TNM/Staging At Diagnosis  Cancer Staging   Breast cancer metastasized to bone, right (HCC)  Staging form: Breast, AJCC 8th Edition  - Clinical: Stage IV (cM1) - Signed by Fam Amanda MD on 10/12/2024    Malignant neoplasm of upper-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, estrogen receptor positive (HCC)  Staging form: Breast, AJCC 8th Edition  - Clinical: Stage IV (cM1) - Signed by Fam Amanda MD on 10/12/2024    Disease Features/Tumor Markers/Genetics  Tumor Marker: n/a  Notable Path Features:   10/4/2024 Breast: ER 90%, AL negative, HER-2 +1  10/15/2024: Spine, \"Vertebra, T8,\" Biopsy: Metastatic carcinoma, most compatible with known breast primary  Treatment: Letrozole + Ribociclib   Other Supportive care:   Treatment Team Members  Surgeon  Rad Onc  Palliative: referral made  Labs  Diagnostics  10/1/2024 EK ms QTC from   10/2/2024 PET/CT: Mild focal radiotracer uptake at a left breast nodular density medially. Several small FDG avid left axillary/subpectoral lymph nodes would raise suspicion for metastasis. Scattered FDG avid lytic lesions compatible with metastasis. T-8 and T-11 spine lesions  10/8/2024 MRI T-spine w/wo c: Normal enhanced MRI of the thoracic spine  There is a DEXA from , next can get it done " 4/2025    Discussion of decision making    I personally reviewed the following lab results, the image studies, pathology, other specialty/physicians consult notes and recommendations, and outside medical records. I had a lengthy discussion with the patient and shared the work-up findings. We discussed the diagnosis and management plan as below. I spent 61 minutes reviewing the records (labs, clinician notes, outside records, medical history, ordering medicine/tests/procedures, monitoring of anti-neoplastic toxicities, interpreting the imaging/labs previously done) and coordination of care as well as direct time with the patient today, of which greater than 50% of the time was spent in counseling and coordination of care with the patient/family.    Plan/Labs  EKG at baseline and q2 weeks for 2 times  Palliative care referral  Guardant NGS to assess for actionable biomarkers  I am ordering Letrozole 2.5 mg daily + Ribociclib 600 mg daily 21 days on and 7 days off  Baseline DEXA due 4/2025 and would be covered by the insurance   Prolia planned by her PCP  Rad Onc referral coming up for her symptomatic bone mets  Oncology dietitian   CBC, CMP q4 weeks ordered as standing until 11/2025  Restaging CT CAP w/c in 3 months        Follow Up: q4 weeks while on therapy    All questions were answered to the patient's satisfaction during this encounter. The patient knows the contact information for our office and knows to reach out for any relevant concerns related to this encounter. They are to call for any temperature 100.4 or higher, new symptoms including but not restricted to shaking chills, decreased appetite, nausea, vomiting, diarrhea, increased fatigue, shortness of breath or chest pain, confusion, and not feeling the strength to come to the clinic. For all other listed problems and medical diagnosis in their chart - they are managed by PCP and/or other specialists, which the patient acknowledges. Thank you very much for  your consultation and making us a part of this patient's care. We are continuing to follow closely with you. Please do not hesitate to reach out to me with any additional questions or concerns.    Fam Amanda MD  Hematology & Medical Oncology Staff Physician             Disclaimer: This document was prepared using [employer] Direct technology. If a word or phrase is confusing, or does not make sense, this is likely due to recognition error which was not discovered during this clinician's review. If you believe an error has occurred, please contact me through [employer] service line for [email or web address].      ONCOLOGY HISTORY OF PRESENT ILLNESS        Oncology History Overview Note   With metastatic breast cancer. She presents today for radiation oncology consult.    She developed an L1 compression fracture s/p fall 8/28/24. She was given pain medication and TLSO brace. She was following with neurosurgery and had persistent pain so she had an MRI of the lumbar spine 9/21/24 which was concerning for an aggressive lesion. PET scan 10/2/24 showed uptake at left breast nodular density, several small FDG avid left axillary/subpectoral lymph nodes, and scattered FDG avid lytic lesions. Breat biopsy 10/4/24 at 11 o'clock 7 cmfn showed invasive mammary carcinoma mixed ductal and lobular features, grade 1-2, ER+ MN<1 HER2 1+. She had a kyphoplasty with biopsy of T8 and L1, T8 revealed metastatic carcinoma. She reported having lumpectomies for noncancerous masses in her 20s.      9/21/24 MRI lumbar spine-  1. Indeterminate T1 hypointense, STIR hyperintense lesion involving the right posterior aspect of T11 extending into the adjacent pedicle, corresponding to the lucent lesion identified in this region on prior CT, and new when compared to the prior lumbar   spine MRI from 11/24/2019. This raises concern for an aggressive lesion such as metastatic disease or multiple myeloma as this was new compared to 2019, with an  atypical hemangioma an alternative but less likely consideration. Recommend correlation for   history of primary malignancy, and consider further valuation with PET/CT.  2. Worsening acute to subacute compression fracture at L1, with now approximately 50% height loss, associated bone marrow edema, and mild retropulsion of the posterior vertebral body cortex of the central canal. No associated epidural hematoma or obvious   associated mass at this level. This may be a benign compression fracture, but given the lesion at T11, a pathologic compression fracture cannot be entirely excluded.  3. Minimal edema involving the posterior aspect of the inferior endplate of T12, without height loss in this region, which may represent a area of focal microtrabecular injury.  4. Multilevel lumbar spondylosis, as described above, contributing to at most mild canal stenosis at L2-L5, and multilevel neural foraminal stenosis, worst on the left at L2-L4.  5. Mild left-sided hydronephrosis again demonstrated, incompletely evaluated in this examination      10/2/24 PET scan-  1. Mild focal radiotracer uptake at a left breast nodular density medially. Underlying primary breast malignancy should be excluded.  2. Several small FDG avid left axillary/subpectoral lymph nodes would raise suspicion for metastasis.  3. Scattered FDG avid lytic lesions compatible with metastasis.      10/4/24 diagnostic left mammogram and US-  1.  Irregular mass in the medial left breast corresponds with the area of FDG avidity on the PET/CT.  This is highly suggestive of malignancy.  Appropriate action should be taken.  Ultrasound-guided core needle biopsy is recommended and was performed today.  Please see the separate dictation.  2.  Oval intraductal mass in the left breast at 3:00, retroareolar region is suspicious.  Ultrasound-guided core needle biopsy is recommended.  3.  There is a 6 mm oval mass at 6:00 which is mammographically stable for 4 years.  This  is likely benign.  4.  The patient had abnormal axillary lymph nodes seen on PET/CT.  1 of these lymph nodes is visualized on ultrasound, however, it is not amenable to ultrasound-guided core needle biopsy.  ASSESSMENT/BI-RADS CATEGORY:  Left: 5 - Highly Suggestive of Malignancy  Overall: 5 - Highly Suggestive of Malignancy      10/4/24 Left breast ultrasound-guided biopsy  A. 3 o'clock, periareolar  Benign breast tissue with fibroadenomatoid nodule  B. 11 o'clock, 7 cm from nipple  Invasive mammary carcinoma with mixed ductal and lobular features  Grade 1-2  ER , PA <1, HER2 1+  Lymphovascular invasion not definitively identified      10/8/24 MRI thoracic spine-  -Subacute L1 compression fracture with stable configuration compared to MRI of the lumbar spine dated 9/21/2024.  -Multiple marrow replacing lesions are identified within the thoracic spine including T5, T8 and T11 consistent with osseous metastasis with no corresponding pathologic compression fractures. Disease appears contained within the bone with no extraosseous   extension into the epidural or paraspinal soft tissues.  -Small pleural effusion.      10/8/24 Xray right shoulder-  Question acromial nondisplaced pathological fracture.       10/15/24 T8 and L1 vertebral body RF ablation and augmentation   T8 - metastatic carcinoma, most compatible with known breast primary  ~RECEPTORS PENDING~  L1 - negative for carcinoma      10/15/24 Dr. Fairbanks internal medicine phone note- Discussed with patient results of shoulder x-ray which showed evidence of likely bone metastasis at the acromioclavicular area. Appt with rad onc next week. Increase tramadol to every 6 hours as needed      10/22/24 tumor board discussion-  Recommend referral to medical oncology.  Already scheduled.  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  Already scheduled.  Consider palliative treatment to metastatic  lesions.  Patient is symptomatic with metastatic lesions.    10/22/24 Dr. Amanda      Upcomin24 neurosurgery     Breast cancer metastasized to bone, right (HCC)   2024 Initial Diagnosis    Breast cancer metastasized to bone, right (HCC)     10/12/2024 -  Cancer Staged    Staging form: Breast, AJCC 8th Edition  - Clinical: Stage IV (cM1) - Signed by Fam Amanda MD on 10/12/2024       Malignant neoplasm of upper-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, estrogen receptor positive (HCC)   10/2/2024 Observation    NM PET/CT IMPRESSION:   1. Mild focal radiotracer uptake at a left breast nodular density medially. Underlying primary breast malignancy should be excluded.  2. Several small FDG avid left axillary/subpectoral lymph nodes would raise suspicion for metastasis.  3. Scattered FDG avid lytic lesions compatible with metastasis.     10/4/2024 Biopsy    Left breast ultrasound-guided biopsy  A. 3 o'clock, periareolar  Benign breast tissue with fibroadenomatoid nodule    B. 11 o'clock, 7 cm from nipple  Invasive mammary carcinoma with mixed ductal and lobular features  Grade 1-2  ER , MI <1, HER2 1+  Lymphovascular invasion not definitively identified    Concordant. Malignancy is poorly defined on mammo, appearing as a developing asymmetry rather than a discrete mass; this measures up to 5.8 cm. On US, mass measures up to 4.1cm. Subtle asymmetry with possible distortion slightly superior and medial which could represent a second site of disease. Further characterization is indication. Right breast imaging performed on 2024; more recent mammogram may be reasonable. CESM or MRI is recommended for evaluation of extent of disease in left breast. The patient had abnormal axillary lymph nodes seen on PET/CT. 1 of these lymph nodes was visualized on US; however, it is not amenable to US-guided core needle biopsy.     10/12/2024 -  Cancer Staged    Staging form: Breast, AJCC 8th Edition  -  Clinical: Stage IV (cM1) - Signed by Fam Amanda MD on 10/12/2024       10/15/2024 Biopsy    IR-guided biopsy    T8 - metastatic carcinoma, most compatible with known breast primary  ~RECEPTORS PENDING~    L1 - negative for carcinoma           SUBJECTIVE  (INTERVAL HISTORY)      Clotting History None   Bleeding History Severe menstrual bleeding leading to hysterectomy   Cancer History L Breast, basal cell skin cancer   Family Cancer History None   H/O Blood/Plt Transfusion None   Tobacco/etoh/drug abuse 1/2 PPD x 15 years, quit in 1977, no etoh abuse or rec drug use           Occupation Retired (CPA)     Pain: shoulder, middle/lower back, right hip since she fell 8/28/2024 after losing her balance on her ladder when spraying bees.      I have reviewed the relevant past medical, surgical, social and family history. I have also reviewed allergies and medications for this patient.    Review of Systems    Baseline weight: 115-120 lbs    Denies F/C, N/V, SOB, CP, LH, HA, rash, gen weakness, melena, hematuria, hematochezia, recurrent falls, diarrhea, or constipation       A 10-point review of system was performed, pertinent positive and negative were detailed as above. Otherwise, the 10-point review of system was negative.      Past Medical History:   Diagnosis Date    Arthritis     Breast mass     Edema of both lower legs 11/01/2018    Dr. Thang Otero; faxed records.    Hx of skin cancer, basal cell     Hyperlipidemia     Hypertension     Impingement syndrome of left shoulder 04/10/2018    Dr. Thang Otero, faxed patient records.    Migraines     Osteopenia     Overactive bladder     Skin cancer 2022    Basal cell    Toe fracture, left 11/16/2023       Past Surgical History:   Procedure Laterality Date    ACHILLES TENDON REPAIR      BREAST BIOPSY Left 10/04/2024    BREAST CYST EXCISION Bilateral     1972    BREAST LUMPECTOMY      CATARACT EXTRACTION, BILATERAL      COLONOSCOPY      COLPOPEXY VAGINAL  EXTRAPERITONEAL (VEC) WITH INSERTION PUBOVAGINAL SLING      CYST REMOVAL      HERNIA REPAIR      HYSTERECTOMY      IR KYPHOPLASTY/VERTEBROPLASTY WITH ABLATION  10/15/2024    JOINT REPLACEMENT Right     REPLACEMENT TOTAL KNEE      TONSILLECTOMY  194    US GUIDANCE BREAST BIOPSY LEFT EACH ADDITIONAL Left 10/04/2024    US GUIDED BREAST BIOPSY LEFT COMPLETE Left 10/04/2024       Family History   Problem Relation Age of Onset    Heart disease Father     Melanoma Daughter         40s    Melanoma Daughter         50s    Ovarian cancer Maternal Aunt     Breast cancer Paternal Aunt     Breast cancer Maternal Grandmother     Heart disease Paternal Grandmother     Hypertension Paternal Grandfather        Social History     Socioeconomic History    Marital status:      Spouse name: Not on file    Number of children: 2    Years of education: 18    Highest education level: Master's degree (e.g., MA, MS, Derek, MEd, MSW, CHANELL)   Occupational History    Not on file   Tobacco Use    Smoking status: Former     Current packs/day: 0.00     Average packs/day: 0.5 packs/day for 15.0 years (7.5 ttl pk-yrs)     Types: Cigarettes     Quit date: 1977     Years since quittin.9     Passive exposure: Past    Smokeless tobacco: Never   Vaping Use    Vaping status: Never Used   Substance and Sexual Activity    Alcohol use: Yes     Alcohol/week: 1.0 standard drink of alcohol     Types: 1 Glasses of wine per week     Comment: occasional    Drug use: Never    Sexual activity: Not Currently     Partners: Male     Birth control/protection: Abstinence, Post-menopausal, Female Sterilization   Other Topics Concern    Not on file   Social History Narrative    Not on file     Social Determinants of Health     Financial Resource Strain: Patient Declined (2023)    Overall Financial Resource Strain (CARDIA)     Difficulty of Paying Living Expenses: Patient declined   Food Insecurity: No Food Insecurity (3/24/2022)    Hunger Vital Sign      Worried About Running Out of Food in the Last Year: Never true     Ran Out of Food in the Last Year: Never true   Transportation Needs: Patient Declined (11/6/2023)    PRAPARE - Transportation     Lack of Transportation (Medical): Patient declined     Lack of Transportation (Non-Medical): Patient declined   Physical Activity: Sufficiently Active (3/24/2022)    Exercise Vital Sign     Days of Exercise per Week: 4 days     Minutes of Exercise per Session: 60 min   Stress: Stress Concern Present (3/24/2022)    [employer] [place] [employer] - Occupational Stress Questionnaire     Feeling of Stress : Rather much   Social Connections: Socially Isolated (3/24/2022)    Social Connection and Isolation Panel [NHANES]     Frequency of Communication with Friends and Family: More than three times a week     Frequency of Social Gatherings with Friends and Family: Three times a week     Attends Spiritism Services: Never     Active Member of Clubs or Organizations: No     Attends Club or Organization Meetings: Never     Marital Status:    Intimate Partner Violence: Not At Risk (3/24/2022)    Humiliation, Afraid, Rape, and Kick questionnaire     Fear of Current or Ex-Partner: No     Emotionally Abused: No     Physically Abused: No     Sexually Abused: No   Housing Stability: Not on file       Allergies   Allergen Reactions    Adhesive [Medical Tape] Hives, Itching and Blisters    Lactose - Food Allergy Diarrhea and GI Intolerance    Bupropion Rash     Around 2010       Current Outpatient Medications   Medication Sig Dispense Refill    atorvastatin (LIPITOR) 10 mg tablet Take 1 tablet (10 mg total) by mouth daily 90 tablet 3    calcium polycarbophil (FIBERCON) 625 mg tablet Take 625 mg by mouth daily      Cholecalciferol (Vitamin D) 50 MCG (2000 UT) tablet Take 2,000 Units by mouth daily      LORazepam (Ativan) 0.5 mg tablet Take 1 tablet (0.5 mg total) by mouth every 8 (eight) hours as needed for anxiety 20 tablet  "0    Multiple Vitamins-Minerals (MULTIVITAMIN WITH MINERALS) tablet Take 1 tablet by mouth daily      Sodium Fluoride 5000 PPM 1.1 % PSTE APPLY TIHN RIBBON TO TOOTHBRUSH, BRUSH X 2MIN AT BEDTIME, SPIT, DONT RINSE      traMADol (Ultram) 50 mg tablet Take 1 tablet (50 mg total) by mouth every 6 (six) hours as needed for moderate pain       No current facility-administered medications for this visit.       (Not in a hospital admission)      Objective:     24 Hour Vitals Assessment:     Vitals:    10/22/24 1315   BP: 126/68   Pulse: 65   Resp: 14   Temp: (!) 97.3 °F (36.3 °C)   SpO2: 98%       PHYSICIAN EXAM:    General: Appearance: alert, cooperative, no distress.  HEENT: Normocephalic, atraumatic. No scleral icterus. conjunctivae clear. EOMI.  Chest: No tenderness to palpation. No open wound noted.  Lungs: Clear to auscultation bilaterally, Respirations unlabored.  Cardiac: Regular rate, +S1and S2  Abdomen: Soft, non-tender, non-distended. Bowel sounds are normal  Extremities:  No edema, cyanosis, clubbing.  Skin: Skin color, turgor are normal. No rashes.  Lymphatics: no palpable supra-cervical, axillary, or inguinal adenopathy  Neurologic: Awake, Alert, and oriented, no gross focal deficits noted b/l.       DATA REVIEW:    Pathology Result:    Final Diagnosis   Date Value Ref Range Status   10/15/2024   Final    A. Spine, \"L1 vertebra,\" Biopsy:  - Fragment of bone   - Negative for carcinoma     10/15/2024   Final    A. Spine, \"Vertebra, T8,\" Biopsy:  - Metastatic carcinoma, most compatible with known breast primary     10/04/2024   Final    A.  Left breast, 3:00, periareolar (ultrasound-guided 14-gauge marquee core biopsy, 6 passes):     - Benign breast tissue with fibroadenomatoid nodule.    B.  Left breast, 11:00, 7 cm from nipple (ultrasound-guided 12-gauge marquee core biopsy, 4 passes):     - Invasive mammary carcinoma with mixed ductal and lobular features.       -- Tumor present in 4 of 4 tissue cores with " largest measurable size of 11 mm.       -- mBR Grade:  Grade 1-2 of 3         - Duct formation: Score 2-3 of 3         - Nuclear grade: Score 1-2 of 3         - Mitotic rate: Score 1 of 3     - In situ component:  Focal suggestion of low-grade DCIS     - Lymph-vascular invasion:  Not definitively identified     Comment:  Block B2 forwarded for ER/RI/HER2 analysis with the results to be given in a supplemental report.     01/27/2023   Final    A. Polyp, rectum, polypectomy:  -   Colorectal mucosa with lymphoid aggregates, otherwise no significant histopathologic change.     Interpretation performed at [facility] [address] [place] [id number]           Image Results:   Image result are reviewed and documented in Hematology/Oncology history    IR kyphoplasty/vertebroplasty with ablation  Narrative: PROCEDURE: T8 and L1 vertebral body RF ablation and augmentation.    HISTORY: Pathologic compression fractures    COMPARISON: MRI on 10/8/2024    OPERATORS: Itzel.    ANTIBIOTIC MEDICATION: 1g of Ancef given IV.    COMPLICATIONS: None.    ANESTHESIA: The patient was administered general endotracheal anesthesia under the care and supervision of the attending anesthesiologist.    TECHNIQUE:  The procedure, its risks, benefits, and alternatives were discussed in detail with the patient. Risks discussed included, but were not limited to, bleeding, infection, nerve root injury, spinal cord injury, and nontarget embolization of   methylmethacrylate. All questions were answered and no guarantees were provided. After informed consent, the patient was brought into the angiography suite and the vertebral body levels of interest were identified via palpation and correlation made to   prior imaging.    The left T8 pedicle was imaged along its axis utilizing biplane fluoroscopy. Superficial, deep and periosteal Sensorcaine anesthetic was administered over the site of dermatotomy. Under fluoroscopic guidance, an  introducer needle was then directed into   the T8 vertebral body via a left transpedicular approach. A core biopsy was performed. Cavity creation was then performed through the introducer needle using the DFine osteotome needle. The osteotome needle was then removed. The introducer needle   remained in place with its stylet. Next, the RF probe was inserted through the guide needle and RF ablation of the tumor was performed. The RF probe was removed and the stylet was reinserted into the introducer needle.  Following preparation of a   polymethylmethacrylate solution, bone cement was administered into the T8 vertebral body.    The left L1 pedicle was imaged along its axis utilizing biplane fluoroscopy. Superficial, deep and periosteal Sensorcaine anesthetic was administered over the site of dermatotomy. Under fluoroscopic guidance, an introducer needle was then directed into   the L1 vertebral body via a left transpedicular approach. A core biopsy was performed. Cavity creation was then performed through the introducer needle using the DFine osteotome needle. The osteotome needle was then removed. The introducer needle   remained in place with its stylet. Next, the RF probe was inserted through the guide needle and RF ablation of the tumor was performed. The RF probe was removed and the stylet was reinserted into the introducer needle.  Following preparation of a   polymethylmethacrylate solution, bone cement was administered into the L1 vertebral body.    The introducer needle was removed. Manual pressure was applied to the access site until hemostasis was obtained. A sterile dressing was then applied.    No new neurological deficits or complications were encountered during or immediately following the procedure.  Impression: FINDINGS/IMPRESSION:  Successful T8 and L1 vertebral body biopsy, RF ablation and augmentation as detailed.    Workstation performed: [id number]      LABS:  Lab data are reviewed and  "documented in HemOnc history.       Lab Results   Component Value Date    HGB 14.6 09/26/2024    HCT 43.9 09/26/2024    MCV 93 09/26/2024     09/26/2024    WBC 5.37 09/26/2024    NRBC 0 09/26/2024     Lab Results   Component Value Date    K 3.8 09/26/2024     09/26/2024    CO2 29 09/26/2024    BUN 16 09/26/2024    CREATININE 0.60 09/26/2024    GLUF 84 09/26/2024    CALCIUM 9.0 09/26/2024    CORRECTEDCA 10.0 10/17/2022    AST 24 09/26/2024    ALT 23 09/26/2024    ALKPHOS 50 09/26/2024    EGFR 87 09/26/2024       No results found for: \"IRON\", \"TIBC\", \"FERRITIN\"    No results found for: \"FOYKHGMU68\"    No results for input(s): \"WBC\", \"CREAT\", \"PLT\" in the last 72 hours.    By:  Fam Amanda MD, 10/22/2024, 1:28 PM                                  "

## 2024-10-14 ENCOUNTER — CLINICAL SUPPORT (OUTPATIENT)
Dept: BARIATRICS | Facility: CLINIC | Age: 79
End: 2024-10-14

## 2024-10-14 ENCOUNTER — TELEPHONE (OUTPATIENT)
Dept: SURGICAL ONCOLOGY | Facility: CLINIC | Age: 79
End: 2024-10-14

## 2024-10-14 ENCOUNTER — PATIENT OUTREACH (OUTPATIENT)
Dept: CASE MANAGEMENT | Facility: OTHER | Age: 79
End: 2024-10-14

## 2024-10-14 ENCOUNTER — TELEPHONE (OUTPATIENT)
Age: 79
End: 2024-10-14

## 2024-10-14 ENCOUNTER — TELEPHONE (OUTPATIENT)
Dept: RADIOLOGY | Facility: HOSPITAL | Age: 79
End: 2024-10-14

## 2024-10-14 VITALS — HEIGHT: 58 IN | BODY MASS INDEX: 24.27 KG/M2 | WEIGHT: 115.6 LBS

## 2024-10-14 DIAGNOSIS — R63.5 ABNORMAL WEIGHT GAIN: Primary | ICD-10-CM

## 2024-10-14 PROCEDURE — RECHECK

## 2024-10-14 NOTE — TELEPHONE ENCOUNTER
Chris calling from [facility] reading room with immediate findings on patient's shoulder xray.

## 2024-10-14 NOTE — TELEPHONE ENCOUNTER
Called the patient to follow up and let her know that her case was not presented at tumor board this morning due to time constraints. The patient is aware that she will be presented next week instead. She has consults with Dr. Amanda and Dr. Siddiqi next week as well; which she was instructed to keep as scheduled for now. The patient was appreciative of the call and denied any questions at this time. Will follow up after tumor board to coordinate any follow up with Dr. Cardarelli as recommended.

## 2024-10-15 ENCOUNTER — ANESTHESIA EVENT (OUTPATIENT)
Dept: RADIOLOGY | Facility: HOSPITAL | Age: 79
End: 2024-10-15
Payer: MEDICARE

## 2024-10-15 ENCOUNTER — DOCUMENTATION (OUTPATIENT)
Dept: HEMATOLOGY ONCOLOGY | Facility: CLINIC | Age: 79
End: 2024-10-15

## 2024-10-15 ENCOUNTER — APPOINTMENT (OUTPATIENT)
Dept: RADIOLOGY | Facility: HOSPITAL | Age: 79
End: 2024-10-15
Attending: RADIOLOGY
Payer: MEDICARE

## 2024-10-15 ENCOUNTER — HOSPITAL ENCOUNTER (OUTPATIENT)
Dept: RADIOLOGY | Facility: HOSPITAL | Age: 79
Discharge: HOME | End: 2024-10-15
Attending: RADIOLOGY
Payer: MEDICARE

## 2024-10-15 ENCOUNTER — ANESTHESIA (OUTPATIENT)
Dept: RADIOLOGY | Facility: HOSPITAL | Age: 79
End: 2024-10-15
Payer: MEDICARE

## 2024-10-15 VITALS
HEIGHT: 58 IN | DIASTOLIC BLOOD PRESSURE: 64 MMHG | HEART RATE: 67 BPM | BODY MASS INDEX: 23.93 KG/M2 | SYSTOLIC BLOOD PRESSURE: 147 MMHG | RESPIRATION RATE: 18 BRPM | WEIGHT: 114 LBS | TEMPERATURE: 97.6 F | OXYGEN SATURATION: 98 %

## 2024-10-15 DIAGNOSIS — C79.51 PAIN FROM BONE METASTASES (HCC): Primary | ICD-10-CM

## 2024-10-15 DIAGNOSIS — D49.2 NEOPLASM OF SPINE: ICD-10-CM

## 2024-10-15 DIAGNOSIS — M84.58XS PATHOLOGICAL FRACTURE IN NEOPLASTIC DISEASE, OTHER SPECIFIED SITE, SEQUELA: ICD-10-CM

## 2024-10-15 DIAGNOSIS — M80.08XA AGE-RELATED OSTEOPOROSIS WITH CURRENT PATHOLOGICAL FRACTURE OF VERTEBRA (HCC): ICD-10-CM

## 2024-10-15 DIAGNOSIS — S32.010A COMPRESSION FRACTURE OF L1 VERTEBRA, INITIAL ENCOUNTER (HCC): ICD-10-CM

## 2024-10-15 DIAGNOSIS — G89.3 PAIN FROM BONE METASTASES (HCC): Primary | ICD-10-CM

## 2024-10-15 PROCEDURE — 22513 PERQ VERTEBRAL AUGMENTATION: CPT | Performed by: RADIOLOGY

## 2024-10-15 PROCEDURE — 88305 TISSUE EXAM BY PATHOLOGIST: CPT | Performed by: PATHOLOGY

## 2024-10-15 PROCEDURE — 22515 PERQ VERTEBRAL AUGMENTATION: CPT | Performed by: RADIOLOGY

## 2024-10-15 PROCEDURE — 20225 BONE BIOPSY TROCAR/NDL DEEP: CPT

## 2024-10-15 PROCEDURE — 20982 ABLATE BONE TUMOR(S) PERQ: CPT | Performed by: RADIOLOGY

## 2024-10-15 PROCEDURE — 77002 NEEDLE LOCALIZATION BY XRAY: CPT

## 2024-10-15 PROCEDURE — 22513 PERQ VERTEBRAL AUGMENTATION: CPT

## 2024-10-15 PROCEDURE — 22514 PERQ VERTEBRAL AUGMENTATION: CPT

## 2024-10-15 PROCEDURE — 88333 PATH CONSLTJ SURG CYTO XM 1: CPT | Performed by: PATHOLOGY

## 2024-10-15 PROCEDURE — 88342 IMHCHEM/IMCYTCHM 1ST ANTB: CPT | Performed by: PATHOLOGY

## 2024-10-15 PROCEDURE — 88360 TUMOR IMMUNOHISTOCHEM/MANUAL: CPT | Performed by: PATHOLOGY

## 2024-10-15 PROCEDURE — C1713 ANCHOR/SCREW BN/BN,TIS/BN: HCPCS

## 2024-10-15 PROCEDURE — 88341 IMHCHEM/IMCYTCHM EA ADD ANTB: CPT | Performed by: PATHOLOGY

## 2024-10-15 RX ORDER — BUPIVACAINE HYDROCHLORIDE 5 MG/ML
INJECTION, SOLUTION EPIDURAL; INTRACAUDAL AS NEEDED
Status: COMPLETED | OUTPATIENT
Start: 2024-10-15 | End: 2024-10-15

## 2024-10-15 RX ORDER — CEFAZOLIN SODIUM 1 G/50ML
1000 SOLUTION INTRAVENOUS ONCE
Status: COMPLETED | OUTPATIENT
Start: 2024-10-15 | End: 2024-10-15

## 2024-10-15 RX ORDER — PHENYLEPHRINE HCL IN 0.9% NACL 1 MG/10 ML
SYRINGE (ML) INTRAVENOUS AS NEEDED
Status: DISCONTINUED | OUTPATIENT
Start: 2024-10-15 | End: 2024-10-15

## 2024-10-15 RX ORDER — MIDAZOLAM HYDROCHLORIDE 2 MG/2ML
INJECTION, SOLUTION INTRAMUSCULAR; INTRAVENOUS AS NEEDED
Status: DISCONTINUED | OUTPATIENT
Start: 2024-10-15 | End: 2024-10-15

## 2024-10-15 RX ORDER — HYDROMORPHONE HCL/PF 1 MG/ML
0.5 SYRINGE (ML) INJECTION
Status: DISCONTINUED | OUTPATIENT
Start: 2024-10-15 | End: 2024-10-15 | Stop reason: HOSPADM

## 2024-10-15 RX ORDER — SODIUM CHLORIDE 9 MG/ML
INJECTION, SOLUTION INTRAVENOUS CONTINUOUS PRN
Status: DISCONTINUED | OUTPATIENT
Start: 2024-10-15 | End: 2024-10-15

## 2024-10-15 RX ORDER — LIDOCAINE HYDROCHLORIDE AND EPINEPHRINE 10; 10 MG/ML; UG/ML
INJECTION, SOLUTION INFILTRATION; PERINEURAL AS NEEDED
Status: COMPLETED | OUTPATIENT
Start: 2024-10-15 | End: 2024-10-15

## 2024-10-15 RX ORDER — SODIUM CHLORIDE 9 MG/ML
75 INJECTION, SOLUTION INTRAVENOUS CONTINUOUS
Status: DISCONTINUED | OUTPATIENT
Start: 2024-10-15 | End: 2024-10-16 | Stop reason: HOSPADM

## 2024-10-15 RX ORDER — ONDANSETRON 2 MG/ML
INJECTION INTRAMUSCULAR; INTRAVENOUS AS NEEDED
Status: DISCONTINUED | OUTPATIENT
Start: 2024-10-15 | End: 2024-10-15

## 2024-10-15 RX ORDER — LIDOCAINE HYDROCHLORIDE 10 MG/ML
INJECTION, SOLUTION EPIDURAL; INFILTRATION; INTRACAUDAL; PERINEURAL AS NEEDED
Status: DISCONTINUED | OUTPATIENT
Start: 2024-10-15 | End: 2024-10-15

## 2024-10-15 RX ORDER — FENTANYL CITRATE/PF 50 MCG/ML
50 SYRINGE (ML) INJECTION
Status: DISCONTINUED | OUTPATIENT
Start: 2024-10-15 | End: 2024-10-15 | Stop reason: HOSPADM

## 2024-10-15 RX ORDER — FENTANYL CITRATE 50 UG/ML
INJECTION, SOLUTION INTRAMUSCULAR; INTRAVENOUS AS NEEDED
Status: DISCONTINUED | OUTPATIENT
Start: 2024-10-15 | End: 2024-10-15

## 2024-10-15 RX ORDER — ONDANSETRON 2 MG/ML
4 INJECTION INTRAMUSCULAR; INTRAVENOUS ONCE AS NEEDED
Status: DISCONTINUED | OUTPATIENT
Start: 2024-10-15 | End: 2024-10-15 | Stop reason: HOSPADM

## 2024-10-15 RX ORDER — TRAMADOL HYDROCHLORIDE 50 MG/1
50 TABLET ORAL EVERY 6 HOURS PRN
Start: 2024-10-15

## 2024-10-15 RX ORDER — PROPOFOL 10 MG/ML
INJECTION, EMULSION INTRAVENOUS AS NEEDED
Status: DISCONTINUED | OUTPATIENT
Start: 2024-10-15 | End: 2024-10-15

## 2024-10-15 RX ORDER — ROCURONIUM BROMIDE 10 MG/ML
INJECTION, SOLUTION INTRAVENOUS AS NEEDED
Status: DISCONTINUED | OUTPATIENT
Start: 2024-10-15 | End: 2024-10-15

## 2024-10-15 RX ADMIN — IOHEXOL 5 ML: 300 INJECTION, SOLUTION INTRAVENOUS at 10:48

## 2024-10-15 RX ADMIN — SUGAMMADEX 200 MG: 100 INJECTION, SOLUTION INTRAVENOUS at 10:22

## 2024-10-15 RX ADMIN — SODIUM CHLORIDE: 0.9 INJECTION, SOLUTION INTRAVENOUS at 08:10

## 2024-10-15 RX ADMIN — CEFAZOLIN SODIUM 1000 MG: 1 SOLUTION INTRAVENOUS at 08:31

## 2024-10-15 RX ADMIN — LIDOCAINE HYDROCHLORIDE 50 MG: 10 INJECTION, SOLUTION EPIDURAL; INFILTRATION; INTRACAUDAL; PERINEURAL at 08:14

## 2024-10-15 RX ADMIN — PROPOFOL 150 MG: 10 INJECTION, EMULSION INTRAVENOUS at 08:14

## 2024-10-15 RX ADMIN — ONDANSETRON 4 MG: 2 INJECTION INTRAMUSCULAR; INTRAVENOUS at 10:22

## 2024-10-15 RX ADMIN — Medication 50 MCG: at 09:25

## 2024-10-15 RX ADMIN — MIDAZOLAM 2 MG: 1 INJECTION INTRAMUSCULAR; INTRAVENOUS at 08:08

## 2024-10-15 RX ADMIN — ROCURONIUM BROMIDE 10 MG: 10 INJECTION, SOLUTION INTRAVENOUS at 09:15

## 2024-10-15 RX ADMIN — FENTANYL CITRATE 50 MCG: 50 INJECTION INTRAMUSCULAR; INTRAVENOUS at 08:14

## 2024-10-15 RX ADMIN — SODIUM CHLORIDE 75 ML/HR: 0.9 INJECTION, SOLUTION INTRAVENOUS at 07:16

## 2024-10-15 RX ADMIN — LIDOCAINE HYDROCHLORIDE,EPINEPHRINE BITARTRATE 10 ML: 10; .01 INJECTION, SOLUTION INFILTRATION; PERINEURAL at 08:56

## 2024-10-15 RX ADMIN — ROCURONIUM BROMIDE 30 MG: 10 INJECTION, SOLUTION INTRAVENOUS at 08:15

## 2024-10-15 RX ADMIN — BUPIVACAINE HYDROCHLORIDE 10 ML: 5 INJECTION, SOLUTION EPIDURAL; INTRACAUDAL; PERINEURAL at 08:56

## 2024-10-15 RX ADMIN — Medication 100 MCG: at 09:07

## 2024-10-15 RX ADMIN — FENTANYL CITRATE 50 MCG: 50 INJECTION INTRAMUSCULAR; INTRAVENOUS at 08:33

## 2024-10-15 NOTE — PROGRESS NOTES
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  She does have an appointment with radiation oncology next week.  I have asked her to avoid any heavy lifting and if she is having pain excess movement of the shoulder.  Will increase her tramadol to every 6 hours as needed for pain relief.  She did undergo lumbar and thoracic bone biopsies with interventional radiology today and is having some discomfort

## 2024-10-15 NOTE — ANESTHESIA PREPROCEDURE EVALUATION
Procedure:  IR KYPHOPLASTY/VERTEBROPLASTY WITH ABLATION    Relevant Problems   CARDIO   (+) Hyperlipidemia      GYN   (+) History of hysterectomy   (+) Malignant neoplasm of upper-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, estrogen receptor positive (HCC)      Neurology/Sleep   (+) MCI (mild cognitive impairment)        Physical Exam    Airway    Mallampati score: II  TM Distance: >3 FB  Neck ROM: full     Dental   Comment: Multiple caps/crowns, pt is very anxious about having her teeth damaged, denies loose teeth     Cardiovascular      Pulmonary      Other Findings  post-pubertal.      Anesthesia Plan  ASA Score- 2     Anesthesia Type- general with ASA Monitors.         Additional Monitors:     Airway Plan: ETT.           Plan Factors-Exercise tolerance (METS): >4 METS.    Chart reviewed.    Patient summary reviewed.    Patient is not a current smoker.              Induction- intravenous.    Postoperative Plan- . Planned trial extubation        Informed Consent- Anesthetic plan and risks discussed with patient (paper consent).  I personally reviewed this patient with the CRNA. Discussed and agreed on the Anesthesia Plan with the CRNA..

## 2024-10-15 NOTE — ANESTHESIA POSTPROCEDURE EVALUATION
Post-Op Assessment Note    CV Status:  Stable  Pain Score: 0    Pain management: adequate       Mental Status:  Awake and sleepy   Hydration Status:  Stable   PONV Controlled:  None   Airway Patency:  Patent     Post Op Vitals Reviewed: Yes    No anethesia notable event occurred.    Staff: CRNA           Last Filed PACU Vitals:  Vitals Value Taken Time   Temp 97.9 °F (36.6 °C) 10/15/24 1040   Pulse 74 10/15/24 1040   /65 10/15/24 1039   Resp 11 10/15/24 1040   SpO2 100 % 10/15/24 1040   Vitals shown include unfiled device data.    Modified Luke:  Activity: 2 (10/15/2024 10:28 AM)  Respiration: 2 (10/15/2024 10:28 AM)  Circulation: 2 (10/15/2024 10:28 AM)  Consciousness: 2 (10/15/2024 10:28 AM)  Oxygen Saturation: 1 (10/15/2024 10:28 AM)  Modified Luke Score: 9 (10/15/2024 10:28 AM)

## 2024-10-15 NOTE — SEDATION DOCUMENTATION
T8 and L1 biopsy and kyphoplasty with ablation performed by Dr Robbins. Procedure tolerated well, anesthesia present throughout the case. IR Procedure Bedrest Start Time is 1030. Patient being transported to PACU.

## 2024-10-15 NOTE — PROGRESS NOTES
In-basket message received from Ryanne Larkin RN to add patient to the breast MDCC on 10/21/2024. Chart reviewed and prep completed.

## 2024-10-16 NOTE — ANESTHESIA POSTPROCEDURE EVALUATION
Post-Op Assessment Note             Post Op Vitals Reviewed: Yes    No anethesia notable event occurred.    Staff: Anesthesiologist, CRNA           Last Filed PACU Vitals:  Vitals Value Taken Time   Temp 98.1 °F (36.7 °C) 10/15/24 1115   Pulse 68 10/15/24 1135   /67 10/15/24 1130   Resp 17 10/15/24 1135   SpO2 96 % 10/15/24 1135   Vitals shown include unfiled device data.    Modified Luke:  Activity: 2 (10/15/2024 11:30 AM)  Respiration: 2 (10/15/2024 11:30 AM)  Circulation: 2 (10/15/2024 11:30 AM)  Consciousness: 2 (10/15/2024 11:30 AM)  Oxygen Saturation: 2 (10/15/2024 11:30 AM)  Modified Luke Score: 10 (10/15/2024 11:30 AM)

## 2024-10-17 ENCOUNTER — PATIENT OUTREACH (OUTPATIENT)
Dept: CASE MANAGEMENT | Facility: OTHER | Age: 79
End: 2024-10-17

## 2024-10-17 ENCOUNTER — TELEPHONE (OUTPATIENT)
Dept: NEUROSURGERY | Facility: CLINIC | Age: 79
End: 2024-10-17

## 2024-10-17 NOTE — TELEPHONE ENCOUNTER
Spoke with Yuni Keys to see how she is doing after kyphoplasty/vertebroplasty  10/15/2024 . She reports that she is doing well overall and denies any incisional issues or fevers.     Advised that after 24 hours she may take a shower and allow soapy water to wash over the surgical site, but do not submerge in water for the first 48 hours. Went over incision care with patient including keeping it clean and dry and not to apply any creams or ointments to the site. The site should be monitored for s/s of infection such as swelling, redness, or drainage. Call the office if any of these develop.     Advised that she should take it easy for the first few days and avoid any strenuous activity or heavy lifting >10 pounds.    If needed Tylenol can be used for pain following the procedure.     Verified date/time/location of her upcoming POV on 11/1/2024 and advised her to call the office with any further questions or concerns, or if any incisional issues or fevers would arise.     Patient was appreciative for the call.

## 2024-10-17 NOTE — PROGRESS NOTES
Biopsychosocial and Barriers Assessment    Type of Cancer: Breast  Treatment plan: pending at this time  Noted barriers to care: none  Cultural/Anabaptist concerns: none  Hair Loss/ Wig resources needed: N/A at this time    DT completed: 10/11/2024  DT score: 8/10  Issues noted: memory/concentration, loss or changes in physical abilities, sadness/depression, feelings of worthlessness or being a burden    Marital status/Lives with: alone ()  Pt's support system: family and friends/neighbors  Mental Health history: Endorses anxiety-PCP ordered Ativan  Substance Abuse:  none    Employment/income source: retired for age  Concerns with bills (treatment vs household ): none  Noted issues with home:none    Narrative note:   OSW spoke with Mrs. Keys today. Introduced self and explained role of OSW team. Mrs. Keys stated she is having anxiety waiting for things to get done. She had another biopsy and results are pending. She will see medical oncology next week as well. Mrs. Keys stated she has excellent support from her family, neighbors and friends. Her daughter is taking her to the appointment next week.   She is not sleeping well. Her family doctor prescribed Ativan but has not tried it yet. Provided emotional support. OSW briefly discussed emotional support from the [facility], [facility], and [facility]. She will think about these options. Offered to call her in 2 weeks and she is very appreciative.

## 2024-10-18 ENCOUNTER — TELEPHONE (OUTPATIENT)
Age: 79
End: 2024-10-18

## 2024-10-18 PROCEDURE — 88305 TISSUE EXAM BY PATHOLOGIST: CPT | Performed by: PATHOLOGY

## 2024-10-18 PROCEDURE — 88360 TUMOR IMMUNOHISTOCHEM/MANUAL: CPT | Performed by: PATHOLOGY

## 2024-10-18 PROCEDURE — 88342 IMHCHEM/IMCYTCHM 1ST ANTB: CPT | Performed by: PATHOLOGY

## 2024-10-18 PROCEDURE — 88333 PATH CONSLTJ SURG CYTO XM 1: CPT | Performed by: PATHOLOGY

## 2024-10-18 PROCEDURE — 88341 IMHCHEM/IMCYTCHM EA ADD ANTB: CPT | Performed by: PATHOLOGY

## 2024-10-18 NOTE — TELEPHONE ENCOUNTER
Patient called in to alert PCP of findings of malignant pathology left breast 10/4/24. She wasn't sure if her PCP would be notified.

## 2024-10-19 ENCOUNTER — TELEPHONE (OUTPATIENT)
Dept: SURGICAL ONCOLOGY | Facility: CLINIC | Age: 79
End: 2024-10-19
Payer: MEDICARE

## 2024-10-19 PROCEDURE — 99442 PR PHYS/QHP TELEPHONE EVALUATION 11-20 MIN: CPT | Performed by: STUDENT IN AN ORGANIZED HEALTH CARE EDUCATION/TRAINING PROGRAM

## 2024-10-19 NOTE — TELEPHONE ENCOUNTER
"I contacted patient on 10/19/2024 at 10:42 AM.  We reviewed her recent biopsy results.  Patient had been aware of the results as she has access to her [employer].  Patient is aware that her T8 vertebral body demonstrated metastatic carcinoma.  There are T1 did not demonstrate any evidence of carcinoma.  She is aware that at this time the medical oncology team will take over her care as well as the radiation oncology team.  Patient has follow-up appointments on 10/22 and 10/24 with the above.  All questions and concerns were addressed at the time of the phone call.  Patient has my contact information should any further concerns arise.  Patient is aware she does not need follow-up with breast surgical oncology.    A. Spine, \"Vertebra, T8,\" Biopsy:  - Metastatic carcinoma, most compatible with known breast primary    A. Spine, \"L1 vertebra,\" Biopsy:  - Fragment of bone   - Negative for carcinoma       Cassandra L. Cardarelli MD FACS   Breast Surgical Oncology   Attending [facility]    "

## 2024-10-21 ENCOUNTER — DOCUMENTATION (OUTPATIENT)
Dept: HEMATOLOGY ONCOLOGY | Facility: CLINIC | Age: 79
End: 2024-10-21

## 2024-10-21 DIAGNOSIS — F41.8 SITUATIONAL ANXIETY: ICD-10-CM

## 2024-10-21 NOTE — PROGRESS NOTES
BREAST CANCER MULTIDISCIPLINARY CASE CONFERENCE    DATE: 10/21/24      PRESENTING DOCTOR: Dr Cassandra Cardarelli  OTHER RELEVANT PROVIDERS: Dr. Fam Amanda, Dr. 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 female who was presented at the Breast Multidisciplinary Case Conference today to discuss management of her recent metastatic breast cancer diagnosis.  Diagnosis was made after patient fell off a step stool on 8/28/24 outside. She was taken to the ED and diagnostics were performed. Patient states she fractured a vertebrae. Breast mass was found incidentally with imaging. Patient also has pain to right shoulder and right hip/groin area.  She had recent biopsies of left breast mass and vertebrae.      GENETICS:  Ordered, drawn 10/10/24      IMAGING REVIEWED:   10/4/24-Mammogram diagnostic left breast  10/4/24-US left breast diagnostic  10/2/24-PET CT      PATHOLOGY REVIEWED:  10/15/24-tissue exam-T8 and L1 vertebral biopsies  10/4/24-tissue exam-left breast US guided biopsy-slides shown      PHYSICIAN RECOMMENDED PLAN:  Recommend referral to medical oncology.  Already scheduled.  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  Already scheduled.  Consider palliative treatment to metastatic lesions.  Patient is symptomatic with metastatic lesions.      FOLLOW UP APPOINTMENTS:    Future Appointments   Date Time Provider Department Center   10/22/2024  3:30 PM Fam Amanda MD HEM ONC [facility]   10/24/2024  1:00 PM Olimpia Siddiqi MD [facility] Rad Onc [facility]   11/1/2024  8:15 AM Donnie Robbins MD [facility]   11/8/2024  1:00 PM [place] [id number] [facility]   12/9/2024  1:00  PM Maricruz Coleman MD DIAB [facility]        Team agreed to plan.    The final treatment plan will be left to the discretion of the patient and the treating physician.     DISCLAIMERS:  TO THE TREATING PHYSICIAN:  This conference is a meeting of clinicians from various specialty areas who evaluate and discuss patients for whom a multidisciplinary treatment approach is being considered. Please note that the above opinion was a consensus of the conference attendees and is intended only to assist in quality care of the discussed patient.  The responsibility for follow up on the input given during the conference, along with any final decisions regarding plan of care, is that of the patient and the patient's provider. Accordingly, appointments have only been recommended based on this information and have NOT been scheduled unless otherwise noted.      TO THE PATIENT:  This summary is a brief record of major aspects of your cancer treatment. You may choose to share a copy with any of your doctors or nurses. However, this is not a detailed or comprehensive record of your care.      NCCN guidelines were readily available for review at this discussion

## 2024-10-22 ENCOUNTER — TELEPHONE (OUTPATIENT)
Age: 79
End: 2024-10-22

## 2024-10-22 ENCOUNTER — CONSULT (OUTPATIENT)
Dept: HEMATOLOGY ONCOLOGY | Facility: CLINIC | Age: 79
End: 2024-10-22
Payer: MEDICARE

## 2024-10-22 VITALS
RESPIRATION RATE: 14 BRPM | WEIGHT: 118.5 LBS | SYSTOLIC BLOOD PRESSURE: 126 MMHG | BODY MASS INDEX: 24.87 KG/M2 | HEART RATE: 65 BPM | TEMPERATURE: 97.3 F | DIASTOLIC BLOOD PRESSURE: 68 MMHG | OXYGEN SATURATION: 98 % | HEIGHT: 58 IN

## 2024-10-22 DIAGNOSIS — Z79.811 LONG TERM (CURRENT) USE OF AROMATASE INHIBITORS: ICD-10-CM

## 2024-10-22 DIAGNOSIS — Z17.0 MALIGNANT NEOPLASM OF UPPER-INNER QUADRANT OF LEFT BREAST IN FEMALE, ESTROGEN RECEPTOR POSITIVE (HCC): ICD-10-CM

## 2024-10-22 DIAGNOSIS — C79.51 BREAST CANCER METASTASIZED TO BONE, RIGHT (HCC): ICD-10-CM

## 2024-10-22 DIAGNOSIS — C50.911 BREAST CANCER METASTASIZED TO BONE, RIGHT (HCC): ICD-10-CM

## 2024-10-22 DIAGNOSIS — C50.911 BREAST CANCER METASTASIZED TO BONE, RIGHT (HCC): Primary | Chronic | ICD-10-CM

## 2024-10-22 DIAGNOSIS — C50.911 BREAST CANCER METASTASIZED TO BONE, RIGHT (HCC): Primary | ICD-10-CM

## 2024-10-22 DIAGNOSIS — C50.212 MALIGNANT NEOPLASM OF UPPER-INNER QUADRANT OF LEFT BREAST IN FEMALE, ESTROGEN RECEPTOR POSITIVE (HCC): ICD-10-CM

## 2024-10-22 DIAGNOSIS — C50.912 MALIGNANT NEOPLASM OF LEFT FEMALE BREAST, UNSPECIFIED ESTROGEN RECEPTOR STATUS, UNSPECIFIED SITE OF BREAST (HCC): ICD-10-CM

## 2024-10-22 DIAGNOSIS — C79.51 BREAST CANCER METASTASIZED TO BONE, RIGHT (HCC): Primary | Chronic | ICD-10-CM

## 2024-10-22 DIAGNOSIS — C79.51 BREAST CANCER METASTASIZED TO BONE, RIGHT (HCC): Primary | ICD-10-CM

## 2024-10-22 DIAGNOSIS — R11.2 NAUSEA AND VOMITING, UNSPECIFIED VOMITING TYPE: Primary | ICD-10-CM

## 2024-10-22 LAB
GENE DIS ANL INTERP-IMP: NORMAL
GENES NOT REPORTED: NORMAL
INTERPRETATION: NORMAL

## 2024-10-22 PROCEDURE — 99205 OFFICE O/P NEW HI 60 MIN: CPT | Performed by: INTERNAL MEDICINE

## 2024-10-22 RX ORDER — RIBOCICLIB 200 MG/1
600 TABLET, FILM COATED ORAL DAILY
Qty: 63 EACH | Refills: 5 | Status: SHIPPED | OUTPATIENT
Start: 2024-10-22

## 2024-10-22 RX ORDER — LETROZOLE 2.5 MG/1
2.5 TABLET, FILM COATED ORAL DAILY
Qty: 90 TABLET | Refills: 3 | Status: SHIPPED | OUTPATIENT
Start: 2024-10-22

## 2024-10-22 RX ORDER — ONDANSETRON 8 MG/1
8 TABLET, ORALLY DISINTEGRATING ORAL EVERY 8 HOURS PRN
Qty: 20 TABLET | Refills: 0 | Status: SHIPPED | OUTPATIENT
Start: 2024-10-22

## 2024-10-22 RX ORDER — LORAZEPAM 0.5 MG/1
0.5 TABLET ORAL EVERY 8 HOURS PRN
Qty: 20 TABLET | Refills: 0 | Status: SHIPPED | OUTPATIENT
Start: 2024-10-22

## 2024-10-22 NOTE — PROGRESS NOTES
Reviewed [facility] printout for Letrazole and Ribociclib. Reviewed oral chemotherapy process with possible side effects. Reviewed lab recommendations.    Office handouts reviewed.     Consent obtained. Copy given to patient and uploaded into patient chart.    Reviewed purpose of [employer]. Form reviewed and signed by Dr. Amanda.    Email sent to Oral chemotherapy team for Ribociclib 600mg daily 3 weeks on 1 week off.

## 2024-10-22 NOTE — TELEPHONE ENCOUNTER
Returned the patient's phone call to further discuss. Explained that her recent vertebral biopsy results were reviewed and it was recommended that she see medical and radiation oncology for discussion of additional treatment. The patient already has consults with medical and radiation oncology scheduled and was instructed to keep those appointments. She is aware that specific treatment recommendations will come from those providers at her visit. The patient was appreciative of the call back and she denied any questions at this time.

## 2024-10-23 ENCOUNTER — TELEPHONE (OUTPATIENT)
Dept: GENETICS | Facility: CLINIC | Age: 79
End: 2024-10-23

## 2024-10-23 ENCOUNTER — TELEPHONE (OUTPATIENT)
Dept: NUTRITION | Facility: HOSPITAL | Age: 79
End: 2024-10-23

## 2024-10-23 ENCOUNTER — APPOINTMENT (OUTPATIENT)
Dept: LAB | Facility: CLINIC | Age: 79
End: 2024-10-23
Payer: MEDICARE

## 2024-10-23 DIAGNOSIS — C79.51 BREAST CANCER METASTASIZED TO BONE, RIGHT (HCC): Chronic | ICD-10-CM

## 2024-10-23 DIAGNOSIS — C50.911 BREAST CANCER METASTASIZED TO BONE, RIGHT (HCC): Primary | ICD-10-CM

## 2024-10-23 DIAGNOSIS — C50.911 BREAST CANCER METASTASIZED TO BONE, RIGHT (HCC): Chronic | ICD-10-CM

## 2024-10-23 DIAGNOSIS — C79.51 BREAST CANCER METASTASIZED TO BONE, RIGHT (HCC): Primary | ICD-10-CM

## 2024-10-23 DIAGNOSIS — C50.912 MALIGNANT NEOPLASM OF LEFT FEMALE BREAST, UNSPECIFIED ESTROGEN RECEPTOR STATUS, UNSPECIFIED SITE OF BREAST (HCC): ICD-10-CM

## 2024-10-23 DIAGNOSIS — Z79.811 LONG TERM (CURRENT) USE OF AROMATASE INHIBITORS: ICD-10-CM

## 2024-10-23 DIAGNOSIS — C50.212 MALIGNANT NEOPLASM OF UPPER-INNER QUADRANT OF LEFT BREAST IN FEMALE, ESTROGEN RECEPTOR POSITIVE (HCC): ICD-10-CM

## 2024-10-23 DIAGNOSIS — Z17.0 MALIGNANT NEOPLASM OF UPPER-INNER QUADRANT OF LEFT BREAST IN FEMALE, ESTROGEN RECEPTOR POSITIVE (HCC): ICD-10-CM

## 2024-10-23 DIAGNOSIS — Z17.0 MALIGNANT NEOPLASM OF LEFT BREAST IN FEMALE, ESTROGEN RECEPTOR POSITIVE, UNSPECIFIED SITE OF BREAST (HCC): ICD-10-CM

## 2024-10-23 DIAGNOSIS — C50.912 MALIGNANT NEOPLASM OF LEFT BREAST IN FEMALE, ESTROGEN RECEPTOR POSITIVE, UNSPECIFIED SITE OF BREAST (HCC): ICD-10-CM

## 2024-10-23 LAB
ALBUMIN SERPL BCG-MCNC: 4.3 G/DL (ref 3.5–5)
ALP SERPL-CCNC: 42 U/L (ref 34–104)
ALT SERPL W P-5'-P-CCNC: 29 U/L (ref 7–52)
ANION GAP SERPL CALCULATED.3IONS-SCNC: 8 MMOL/L (ref 4–13)
AST SERPL W P-5'-P-CCNC: 26 U/L (ref 13–39)
BASOPHILS # BLD AUTO: 0.03 THOUSANDS/ΜL (ref 0–0.1)
BASOPHILS NFR BLD AUTO: 1 % (ref 0–1)
BILIRUB SERPL-MCNC: 0.41 MG/DL (ref 0.2–1)
BUN SERPL-MCNC: 17 MG/DL (ref 5–25)
CALCIUM SERPL-MCNC: 9.3 MG/DL (ref 8.4–10.2)
CHLORIDE SERPL-SCNC: 102 MMOL/L (ref 96–108)
CO2 SERPL-SCNC: 32 MMOL/L (ref 21–32)
CREAT SERPL-MCNC: 0.55 MG/DL (ref 0.6–1.3)
EOSINOPHIL # BLD AUTO: 0.1 THOUSAND/ΜL (ref 0–0.61)
EOSINOPHIL NFR BLD AUTO: 2 % (ref 0–6)
ERYTHROCYTE [DISTWIDTH] IN BLOOD BY AUTOMATED COUNT: 14.9 % (ref 11.6–15.1)
GFR SERPL CREATININE-BSD FRML MDRD: 90 ML/MIN/1.73SQ M
GLUCOSE SERPL-MCNC: 82 MG/DL (ref 65–140)
HCT VFR BLD AUTO: 40.8 % (ref 34.8–46.1)
HGB BLD-MCNC: 13.3 G/DL (ref 11.5–15.4)
IMM GRANULOCYTES # BLD AUTO: 0.01 THOUSAND/UL (ref 0–0.2)
IMM GRANULOCYTES NFR BLD AUTO: 0 % (ref 0–2)
LYMPHOCYTES # BLD AUTO: 1.49 THOUSANDS/ΜL (ref 0.6–4.47)
LYMPHOCYTES NFR BLD AUTO: 25 % (ref 14–44)
MCH RBC QN AUTO: 29.7 PG (ref 26.8–34.3)
MCHC RBC AUTO-ENTMCNC: 32.6 G/DL (ref 31.4–37.4)
MCV RBC AUTO: 91 FL (ref 82–98)
MONOCYTES # BLD AUTO: 0.33 THOUSAND/ΜL (ref 0.17–1.22)
MONOCYTES NFR BLD AUTO: 5 % (ref 4–12)
NEUTROPHILS # BLD AUTO: 4.13 THOUSANDS/ΜL (ref 1.85–7.62)
NEUTS SEG NFR BLD AUTO: 67 % (ref 43–75)
NRBC BLD AUTO-RTO: 0 /100 WBCS
PLATELET # BLD AUTO: 315 THOUSANDS/UL (ref 149–390)
PMV BLD AUTO: 10.1 FL (ref 8.9–12.7)
POTASSIUM SERPL-SCNC: 3.8 MMOL/L (ref 3.5–5.3)
PROT SERPL-MCNC: 6.8 G/DL (ref 6.4–8.4)
RBC # BLD AUTO: 4.48 MILLION/UL (ref 3.81–5.12)
SODIUM SERPL-SCNC: 142 MMOL/L (ref 135–147)
WBC # BLD AUTO: 6.09 THOUSAND/UL (ref 4.31–10.16)

## 2024-10-23 PROCEDURE — 80053 COMPREHEN METABOLIC PANEL: CPT

## 2024-10-23 PROCEDURE — 85025 COMPLETE CBC W/AUTO DIFF WBC: CPT

## 2024-10-23 PROCEDURE — 36415 COLL VENOUS BLD VENIPUNCTURE: CPT

## 2024-10-23 NOTE — TELEPHONE ENCOUNTER
Received notification by  Claire Downs RN  on 10/23 that pt has triggered for oncology nutrition care (reason for referral: Ambulatory referral for oncology nutrition services ).    Contacted Yuni and introduced self and explained the reason for today's call.      Discussed oncology nutrition services available (options for in-person and phone consultation) and the benefits of meeting for a consultation.    Initial RD in person consultation set up for 10/29 at 2:30pm.      Provided this RD’s contact information asking that Yuni reach out prn.  All questions/concerns addressed at this time.

## 2024-10-23 NOTE — TELEPHONE ENCOUNTER
STAT Genetic Test Result    Summary:    I spoke with Yuni to review the result of her STAT genetic test.    Initial Test: [facility] BRCAplus STAT Panel (13 genes): PETER, BARD1, BRCA1, BRCA2, CDH1, CHEK2, NF1, PALB2, PTEN, RAD51C, RAD51D, STK11, TP53     Result: Negative - No Clinically Significant Variants Detected      Assessment:     Negative   A negative result significantly reduces the likelihood that Yuni has a hereditary cancer syndrome related to the high-risk breast cancer genes listed above.      This result does not have surgical implications and surgical options should be discussed with her healthcare provider.        Additional Testing:  [facility]: Cancer+RNAinsight (59 genes): APC, PETER, AXIN2, BAP1, BARD1, BMPR1A, BRCA1, BRCA2, BRIP1, CDH1, CDK4, CDKN1B, CDKN2A, CHEK2, CTNNA1, DICER1, EGLN1, EPCAM, FH, FLCN, GREM1, HOXB13, KIF1B, KIT, MAX, MEN1, MET, MITF, MLH1, MSH2, MSH3, MSH6, MUTYH, NF1, NTHL1, PALB2, PDGFRA PMS2, POLD1, POLE, POT1, PTEN, RAD51C, RAD51D, RB1, RET, SDHA, SDHAF2, SDHB, SDHC, SDHD, SMAD4, SMARCA4, STK11, FZFU753, TP53, TSC1, TSC2, VHL test is pending.  We will contact Yuni once results are available.  Additional recommendations for surveillance/medical management will be made upon final genetic test result.         Yuni's breast surgeon Dr. Cardarelli was made aware of this test result.

## 2024-10-24 ENCOUNTER — CONSULT (OUTPATIENT)
Facility: HOSPITAL | Age: 79
End: 2024-10-24
Attending: INTERNAL MEDICINE
Payer: MEDICARE

## 2024-10-24 VITALS
BODY MASS INDEX: 24.68 KG/M2 | OXYGEN SATURATION: 98 % | RESPIRATION RATE: 16 BRPM | DIASTOLIC BLOOD PRESSURE: 80 MMHG | HEART RATE: 76 BPM | WEIGHT: 117.6 LBS | TEMPERATURE: 96.7 F | HEIGHT: 58 IN | SYSTOLIC BLOOD PRESSURE: 157 MMHG

## 2024-10-24 DIAGNOSIS — Z17.0 MALIGNANT NEOPLASM OF UPPER-INNER QUADRANT OF LEFT BREAST IN FEMALE, ESTROGEN RECEPTOR POSITIVE (HCC): ICD-10-CM

## 2024-10-24 DIAGNOSIS — C50.212 MALIGNANT NEOPLASM OF UPPER-INNER QUADRANT OF LEFT BREAST IN FEMALE, ESTROGEN RECEPTOR POSITIVE (HCC): ICD-10-CM

## 2024-10-24 DIAGNOSIS — C50.912 MALIGNANT NEOPLASM OF LEFT FEMALE BREAST, UNSPECIFIED ESTROGEN RECEPTOR STATUS, UNSPECIFIED SITE OF BREAST (HCC): ICD-10-CM

## 2024-10-24 DIAGNOSIS — C50.911 BREAST CANCER METASTASIZED TO BONE, RIGHT (HCC): Primary | Chronic | ICD-10-CM

## 2024-10-24 DIAGNOSIS — C79.51 BREAST CANCER METASTASIZED TO BONE, RIGHT (HCC): Primary | Chronic | ICD-10-CM

## 2024-10-24 PROCEDURE — 99211 OFF/OP EST MAY X REQ PHY/QHP: CPT | Performed by: RADIOLOGY

## 2024-10-24 PROCEDURE — 99205 OFFICE O/P NEW HI 60 MIN: CPT | Performed by: RADIOLOGY

## 2024-10-24 NOTE — PROGRESS NOTES
Yuni Keys 1945 is a 78 y.o. female with metastatic breast cancer. She presents today for radiation oncology consult.    She developed an L1 compression fracture s/p fall 8/28/24. She was given pain medication and TLSO brace. She was following with neurosurgery and had persistent pain so she had an MRI of the lumbar spine 9/21/24 which was concerning for an aggressive lesion. PET scan 10/2/24 showed uptake at left breast nodular density, several small FDG avid left axillary/subpectoral lymph nodes, and scattered FDG avid lytic lesions. Breat biopsy 10/4/24 at 11 o'clock 7 cmfn showed invasive mammary carcinoma mixed ductal and lobular features, grade 1-2, ER+ RI<1 HER2 1+. She had a kyphoplasty with biopsy of T8 and L1, T8 revealed metastatic carcinoma. She reported having lumpectomies for noncancerous masses in her 20s.      9/21/24 MRI lumbar spine-  1. Indeterminate T1 hypointense, STIR hyperintense lesion involving the right posterior aspect of T11 extending into the adjacent pedicle, corresponding to the lucent lesion identified in this region on prior CT, and new when compared to the prior lumbar   spine MRI from 11/24/2019. This raises concern for an aggressive lesion such as metastatic disease or multiple myeloma as this was new compared to 2019, with an atypical hemangioma an alternative but less likely consideration. Recommend correlation for   history of primary malignancy, and consider further valuation with PET/CT.  2. Worsening acute to subacute compression fracture at L1, with now approximately 50% height loss, associated bone marrow edema, and mild retropulsion of the posterior vertebral body cortex of the central canal. No associated epidural hematoma or obvious   associated mass at this level. This may be a benign compression fracture, but given the lesion at T11, a pathologic compression fracture cannot be entirely excluded.  3. Minimal edema involving the posterior aspect of the inferior  endplate of T12, without height loss in this region, which may represent a area of focal microtrabecular injury.  4. Multilevel lumbar spondylosis, as described above, contributing to at most mild canal stenosis at L2-L5, and multilevel neural foraminal stenosis, worst on the left at L2-L4.  5. Mild left-sided hydronephrosis again demonstrated, incompletely evaluated in this examination      10/2/24 PET scan-  1. Mild focal radiotracer uptake at a left breast nodular density medially. Underlying primary breast malignancy should be excluded.  2. Several small FDG avid left axillary/subpectoral lymph nodes would raise suspicion for metastasis.  3. Scattered FDG avid lytic lesions compatible with metastasis.      10/4/24 diagnostic left mammogram and US-  1.  Irregular mass in the medial left breast corresponds with the area of FDG avidity on the PET/CT.  This is highly suggestive of malignancy.  Appropriate action should be taken.  Ultrasound-guided core needle biopsy is recommended and was performed today.  Please see the separate dictation.  2.  Oval intraductal mass in the left breast at 3:00, retroareolar region is suspicious.  Ultrasound-guided core needle biopsy is recommended.  3.  There is a 6 mm oval mass at 6:00 which is mammographically stable for 4 years.  This is likely benign.  4.  The patient had abnormal axillary lymph nodes seen on PET/CT.  1 of these lymph nodes is visualized on ultrasound, however, it is not amenable to ultrasound-guided core needle biopsy.  ASSESSMENT/BI-RADS CATEGORY:  Left: 5 - Highly Suggestive of Malignancy  Overall: 5 - Highly Suggestive of Malignancy      10/4/24 Left breast ultrasound-guided biopsy  A. 3 o'clock, periareolar  Benign breast tissue with fibroadenomatoid nodule  B. 11 o'clock, 7 cm from nipple  Invasive mammary carcinoma with mixed ductal and lobular features  Grade 1-2  ER , NE <1, HER2 1+  Lymphovascular invasion not definitively identified      10/8/24  MRI thoracic spine-  -Subacute L1 compression fracture with stable configuration compared to MRI of the lumbar spine dated 9/21/2024.  -Multiple marrow replacing lesions are identified within the thoracic spine including T5, T8 and T11 consistent with osseous metastasis with no corresponding pathologic compression fractures. Disease appears contained within the bone with no extraosseous   extension into the epidural or paraspinal soft tissues.  -Small pleural effusion.      10/8/24 Xray right shoulder-  Question acromial nondisplaced pathological fracture.       10/15/24 T8 and L1 vertebral body RF ablation and augmentation   T8 - metastatic carcinoma, most compatible with known breast primary  ~RECEPTORS PENDING~  L1 - negative for carcinoma      10/15/24 Dr. Fairbanks internal medicine phone note- Discussed with patient results of shoulder x-ray which showed evidence of likely bone metastasis at the acromioclavicular area. Appt with rad onc next week. Increase tramadol to every 6 hours as needed      10/22/24 tumor board discussion-  Recommend referral to medical oncology.  Already scheduled.  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  Already scheduled.  Consider palliative treatment to metastatic lesions.  Patient is symptomatic with metastatic lesions.    10/22/24 Dr. Amanda- Plan for Letrozole + Ribociclib 600 mg daily 21 days on 7 days off. Refer to palliative care. Baseline DEXA due 4/2025. Prolia planned by pcp. Restaging CT CAP in 3 months      Upcoming:  10/28/24 palliative care  11/1/24 neurosurgery  11/21/24 Dr. Amanda  1/13/25 CT C/A/P  1/14/24 Dr. Amanda    Oncology History   Breast cancer metastasized to bone, right (HCC)   9/27/2024 Initial Diagnosis    Breast cancer metastasized to bone, right (HCC)     10/12/2024 -  Cancer Staged    Staging form: Breast, AJCC 8th Edition  - Clinical: Stage IV (cM1) - Signed by Fam SAN  MD Vasiliy on 10/12/2024       Malignant neoplasm of upper-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, estrogen receptor positive (HCC)   10/2/2024 Observation    NM PET/CT IMPRESSION:   1. Mild focal radiotracer uptake at a left breast nodular density medially. Underlying primary breast malignancy should be excluded.  2. Several small FDG avid left axillary/subpectoral lymph nodes would raise suspicion for metastasis.  3. Scattered FDG avid lytic lesions compatible with metastasis.     10/4/2024 Biopsy    Left breast ultrasound-guided biopsy  A. 3 o'clock, periareolar  Benign breast tissue with fibroadenomatoid nodule    B. 11 o'clock, 7 cm from nipple  Invasive mammary carcinoma with mixed ductal and lobular features  Grade 1-2  ER , FL <1, HER2 1+  Lymphovascular invasion not definitively identified    Concordant. Malignancy is poorly defined on mammo, appearing as a developing asymmetry rather than a discrete mass; this measures up to 5.8 cm. On US, mass measures up to 4.1cm. Subtle asymmetry with possible distortion slightly superior and medial which could represent a second site of disease. Further characterization is indication. Right breast imaging performed on 4/12/2024; more recent mammogram may be reasonable. CESM or MRI is recommended for evaluation of extent of disease in left breast. The patient had abnormal axillary lymph nodes seen on PET/CT. 1 of these lymph nodes was visualized on US; however, it is not amenable to US-guided core needle biopsy.     10/12/2024 -  Cancer Staged    Staging form: Breast, AJCC 8th Edition  - Clinical: Stage IV (cM1) - Signed by Fam Amanda MD on 10/12/2024       10/15/2024 Biopsy    IR-guided biopsy    T8 - metastatic carcinoma, most compatible with known breast primary  ~RECEPTORS PENDING~    L1 - negative for carcinoma         Review of Systems:  Review of Systems   Constitutional:  Positive for fatigue.   HENT: Negative.     Eyes: Negative.     Respiratory: Negative.     Cardiovascular: Negative.    Gastrointestinal:  Positive for constipation (d/t tramadol).   Endocrine: Negative.    Genitourinary: Negative.    Musculoskeletal:  Positive for arthralgias and back pain.        Back, right shoulder, right hip pain. Uncontrolled by Tramadol   Skin: Negative.    Allergic/Immunologic: Negative.    Neurological:  Positive for dizziness (one episode d/t Tramadol).   Hematological: Negative.    Psychiatric/Behavioral:  Positive for sleep disturbance. The patient is nervous/anxious.        Clinical Trial: no    OB/GYN History:  The patient underwent menarche at 12 years  Menopause Status Post  No LMP recorded. Patient is postmenopausal.  Menopause at 53 years.  Menopause Reason surgical  Hormone replacement therapy: yes.  Years used about 3   2   Para 2   Age at first delivery being 23 years.   Nursing: not applicable.   Birth control pills: yes.  Years used <1    Pregnancy test needed:  no    Prior Radiation no    Teaching NCI RT packet given    Implantable Devices (Port, Pacemaker, pain stimulator) no    Hip Replacement no      Health Maintenance   Topic Date Due    Hepatitis C Screening  Never done    Pneumococcal Vaccine: 65+ Years (1 of 2 - PCV) Never done    Zoster Vaccine (1 of 2) Never done    PT PLAN OF CARE  2023    Breast Cancer Screening: Mammogram  2024    COVID-19 Vaccine (2023- season) 2024    Medicare Annual Wellness Visit (AWV)  2024    Fall Risk  01/15/2025    Urinary Incontinence Screening  2024    Depression Screening  2025    BMI: Adult  10/22/2025    Colorectal Cancer Screening  2028    DXA SCAN  2028    Osteoporosis Screening  Completed    RSV Vaccine Age 60+ Years  Completed    Influenza Vaccine  Completed    RSV Vaccine age 0-20 Months  Aged Out    HIB Vaccine  Aged Out    IPV Vaccine  Aged Out    Hepatitis A Vaccine  Aged Out    Meningococcal ACWY Vaccine  Aged Out    HPV  Vaccine  Aged Out     Past Medical History:   Diagnosis Date    Arthritis     Breast mass     Edema of both lower legs 2018    Dr. Thang Otero; faxed records.    Hx of skin cancer, basal cell     Hyperlipidemia     Hypertension     Impingement syndrome of left shoulder 04/10/2018    Dr. Thang Otero, faxed patient records.    Migraines     Osteopenia     Overactive bladder     Skin cancer     Basal cell    Toe fracture, left 2023     Past Surgical History:   Procedure Laterality Date    ACHILLES TENDON REPAIR      BREAST BIOPSY Left 10/04/2024    BREAST CYST EXCISION Bilateral     1972    BREAST LUMPECTOMY      CATARACT EXTRACTION, BILATERAL      COLONOSCOPY      COLPOPEXY VAGINAL EXTRAPERITONEAL (VEC) WITH INSERTION PUBOVAGINAL SLING      CYST REMOVAL      HERNIA REPAIR      HYSTERECTOMY      IR KYPHOPLASTY/VERTEBROPLASTY WITH ABLATION  10/15/2024    JOINT REPLACEMENT Right     REPLACEMENT TOTAL KNEE Left     TONSILLECTOMY      US GUIDANCE BREAST BIOPSY LEFT EACH ADDITIONAL Left 10/04/2024    US GUIDED BREAST BIOPSY LEFT COMPLETE Left 10/04/2024     Family History   Problem Relation Age of Onset    Heart disease Father     Melanoma Daughter         40s    Melanoma Daughter         50s    Ovarian cancer Maternal Aunt     Breast cancer Paternal Aunt     Breast cancer Maternal Grandmother     Heart disease Paternal Grandmother     Hypertension Paternal Grandfather      Social History     Tobacco Use    Smoking status: Former     Current packs/day: 0.00     Average packs/day: 0.5 packs/day for 15.0 years (7.5 ttl pk-yrs)     Types: Cigarettes     Quit date: 1977     Years since quittin.9     Passive exposure: Past    Smokeless tobacco: Never   Vaping Use    Vaping status: Never Used   Substance Use Topics    Alcohol use: Yes     Alcohol/week: 1.0 standard drink of alcohol     Types: 1 Glasses of wine per week     Comment: occasional    Drug use: Never        Current Outpatient  "Medications:     atorvastatin (LIPITOR) 10 mg tablet, Take 1 tablet (10 mg total) by mouth daily, Disp: 90 tablet, Rfl: 3    calcium polycarbophil (FIBERCON) 625 mg tablet, Take 625 mg by mouth daily, Disp: , Rfl:     Cholecalciferol (Vitamin D) 50 MCG (2000 UT) tablet, Take 2,000 Units by mouth daily, Disp: , Rfl:     letrozole (FEMARA) 2.5 mg tablet, Take 1 tablet (2.5 mg total) by mouth daily, Disp: 90 tablet, Rfl: 3    LORazepam (Ativan) 0.5 mg tablet, Take 1 tablet (0.5 mg total) by mouth every 8 (eight) hours as needed for anxiety, Disp: 20 tablet, Rfl: 0    Multiple Vitamins-Minerals (MULTIVITAMIN WITH MINERALS) tablet, Take 1 tablet by mouth daily, Disp: , Rfl:     Sodium Fluoride 5000 PPM 1.1 % PSTE, APPLY TIHN RIBBON TO TOOTHBRUSH, BRUSH X 2MIN AT BEDTIME, SPIT, DONT RINSE, Disp: , Rfl:     traMADol (Ultram) 50 mg tablet, Take 1 tablet (50 mg total) by mouth every 6 (six) hours as needed for moderate pain, Disp: , Rfl:     ondansetron (ZOFRAN-ODT) 8 mg disintegrating tablet, Take 1 tablet (8 mg total) by mouth every 8 (eight) hours as needed for nausea or vomiting (Patient not taking: Reported on 10/24/2024), Disp: 20 tablet, Rfl: 0    Ribociclib Succ, 600 MG Dose, (Kisqali, 600 MG Dose,) 200 MG TBPK, Take 600 mg by mouth daily 21 days on, followed by 7 days off (Patient not taking: Reported on 10/24/2024), Disp: 63 each, Rfl: 5  Allergies   Allergen Reactions    Adhesive [Medical Tape] Hives, Itching and Blisters    Lactose - Food Allergy Diarrhea and GI Intolerance    Bupropion Rash     Around 2010      Vitals:    10/24/24 1252   BP: 157/80   Pulse: 76   Resp: 16   Temp: (!) 96.7 °F (35.9 °C)   SpO2: 98%   Weight: 53.3 kg (117 lb 9.6 oz)   Height: 4' 10\" (1.473 m)     Pain Score:   7  "

## 2024-10-25 ENCOUNTER — APPOINTMENT (OUTPATIENT)
Dept: RADIOLOGY | Facility: CLINIC | Age: 79
End: 2024-10-25
Payer: MEDICARE

## 2024-10-25 DIAGNOSIS — M25.551 PAIN OF RIGHT HIP: ICD-10-CM

## 2024-10-25 DIAGNOSIS — M25.551 PAIN OF RIGHT HIP: Primary | ICD-10-CM

## 2024-10-25 DIAGNOSIS — C50.912 MALIGNANT NEOPLASM OF LEFT FEMALE BREAST, UNSPECIFIED ESTROGEN RECEPTOR STATUS, UNSPECIFIED SITE OF BREAST (HCC): ICD-10-CM

## 2024-10-25 PROCEDURE — 73502 X-RAY EXAM HIP UNI 2-3 VIEWS: CPT

## 2024-10-25 NOTE — PROGRESS NOTES
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  In reviewing the imaging of the spine and the PET/CT, I recommend simultaneous treatment to the right shoulder area of destructive metastasis and to the mid thoracic spine from T8-L1 to encompass the areas of compression fracture and most significant vertebral body metastases.  These areas would be treated in 2 separate field arrangements.  The right shoulder area may be treated in 4-5 fractions though I recommend 10 fractions to the spine given the larger volume of treatment.  Spinal lesions are in close proximity to the kidney, therefore an AP-PA technique will be required.  Side effects are related to the area of the body being treated in the adjacent normal organs.  There is a significant amount of the left lobe of the liver overlying the mid to lower thoracic spine as well as some bowel.  Therefore she could have a temporary increase in liver enzymes, bowel disturbance such as nausea or diarrhea, fatigue and localized skin irritation.  In the shoulder area she will likely only experience some skin reaction.  Patient asked a number pertinent questions answered to her satisfaction.  Informed consents were reviewed by me and signed by the patient today.  CT simulation was scheduled for next week.    History of present illness:    Ms. Keys is a 78-year-old postmenopausal woman who reports that she had a fall off of a ladder in August 2024.  She subsequently had significant back pain therefore presented to the emergency department on August 28, 2024 where a CT of the abdomen pelvis without contrast was performed.   This study showed an acute compression fracture of L1 with no retropulsed bone and a lucent lesion in the right T11 vertebral body.  She was seen by neurosurgery on September 3 who obtained an x-ray of the spine that showed again a compression fracture of the L1 vertebral body, multilevel disc space narrowing.  Neurosurgery then obtained an MRI of the lumbar spine on September 21.  This study showed indeterminate hyperintense lesion in the posterior aspect of T11 extending to the pedicle concerning for malignancy, worsening compression fracture at L1 with 50% height loss and associated bone marrow edema now with mild retropulsion of bone, multilevel spondylosis.  Patient subsequently underwent a PET/CT on October 2, 2024.  This showed uptake in a left breast nodular density medially, small FDG avid left axillary and subpectoral nodes, uptake in the right acromion with a corresponding lytic process, scattered FDG avid lytic lesions in T8, T11, L1 and the ischial tuberosity compatible with metastases.  She had mammograms on October 4, 2004 that showed an irregular mass in the medial left breast corresponding to the PET finding, an oval intraductal mass in the left breast 3 o'clock position and another 6 mm stable mass at 6:00, 1 lymph node visualized on ultrasound was not in a position amenable to biopsy.  The left breast mass was biopsied on that date and showed grade 1-2 invasive carcinoma with mixed ductal lobular features, estrogen receptor 100%, progesterone receptor negative, HER2 negative.  She underwent kyphoplasty and vertebroplasty of T8 and L1 on October 15, pathology from T8 vertebroplasty shows metastatic carcinoma consistent with her breast primary.  Pathology from the L1 vertebroplasty showed only fragment of bone.  She states she is doing well postoperatively but continues to have back pain.  She also reports right shoulder pain with decreased range of motion.  She was seen in consultation by   Vasiliy in medical oncology who prescribed letrozole and recommended ribociclib, which is under going insurance authorization reviewed currently.  She is referred for consideration of palliative radiation for her areas of painful bony metastases in the spine and shoulder with maximal pain score of 7.    Oncology History   Breast cancer metastasized to bone, right (HCC)   9/27/2024 Initial Diagnosis    Breast cancer metastasized to bone, right (HCC)     10/12/2024 -  Cancer Staged    Staging form: Breast, AJCC 8th Edition  - Clinical: Stage IV (cM1) - Signed by Fam Amanda MD on 10/12/2024       Malignant neoplasm of upper-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, estrogen receptor positive (HCC)   10/2/2024 Observation    NM PET/CT IMPRESSION:   1. Mild focal radiotracer uptake at a left breast nodular density medially. Underlying primary breast malignancy should be excluded.  2. Several small FDG avid left axillary/subpectoral lymph nodes would raise suspicion for metastasis.  3. Scattered FDG avid lytic lesions compatible with metastasis.     10/4/2024 Biopsy    Left breast ultrasound-guided biopsy  A. 3 o'clock, periareolar  Benign breast tissue with fibroadenomatoid nodule    B. 11 o'clock, 7 cm from nipple  Invasive mammary carcinoma with mixed ductal and lobular features  Grade 1-2  ER , LA <1, HER2 1+  Lymphovascular invasion not definitively identified    Concordant. Malignancy is poorly defined on mammo, appearing as a developing asymmetry rather than a discrete mass; this measures up to 5.8 cm. On US, mass measures up to 4.1cm. Subtle asymmetry with possible distortion slightly superior and medial which could represent a second site of disease. Further characterization is indication. Right breast imaging performed on 4/12/2024; more recent mammogram may be reasonable. CESM or MRI is recommended for evaluation of extent of disease in left breast. The patient had abnormal axillary lymph nodes  seen on PET/CT. 1 of these lymph nodes was visualized on US; however, it is not amenable to US-guided core needle biopsy.     10/12/2024 -  Cancer Staged    Staging form: Breast, AJCC 8th Edition  - Clinical: Stage IV (cM1) - Signed by Fam Amanda MD on 10/12/2024       10/15/2024 Biopsy    IR-guided biopsy    T8 - metastatic carcinoma, most compatible with known breast primary  ~RECEPTORS PENDING~    L1 - negative for carcinoma         Patient Active Problem List   Diagnosis    Lumbar disc herniation    Lumbar radiculopathy    Spinal stenosis of lumbar region with neurogenic claudication    Retrocalcaneal bursitis (back of heel), right    Overweight    Hyperlipidemia    MCI (mild cognitive impairment)    Family history of early CAD    History of abnormal uterine bleeding    History of hysterectomy    Primary insomnia    Compression fracture of L1 lumbar vertebra (HCC)    Closed compression fracture of body of L1 vertebra (HCC)    Breast cancer metastasized to bone, right (HCC)    Abnormal positron emission tomography (PET) scan    Malignant neoplasm of upper-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, estrogen receptor positive (HCC)    Cancer Staging   Breast cancer metastasized to bone, right (HCC)  Staging form: Breast, AJCC 8th Edition  - Clinical: Stage IV (cM1) - Signed by Fam Amanda MD on 10/12/2024    Malignant neoplasm of upper-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, estrogen receptor positive (HCC)  Staging form: Breast, AJCC 8th Edition  - Clinical: Stage IV (cM1) - Signed by Fam Amanda MD on 10/12/2024    Past Medical History:   Diagnosis Date    Arthritis     Breast mass     Edema of both lower legs 11/01/2018    Dr. Thang Otero; faxed records.    Hx of skin cancer, basal cell     Hyperlipidemia     Hypertension     Impingement syndrome of left shoulder 04/10/2018    Dr. Thang Otero, faxed patient records.    Migraines     Osteopenia     Overactive bladder     Skin cancer      Basal cell    Toe fracture, left 2023     Social History     Socioeconomic History    Marital status:      Spouse name: Not on file    Number of children: 2    Years of education: 18    Highest education level: Master's degree (e.g., MA, MS, Derek, MEd, MSW, CHANELL)   Occupational History    Not on file   Tobacco Use    Smoking status: Former     Current packs/day: 0.00     Average packs/day: 0.5 packs/day for 15.0 years (7.5 ttl pk-yrs)     Types: Cigarettes     Quit date: 1977     Years since quittin.9     Passive exposure: Past    Smokeless tobacco: Never   Vaping Use    Vaping status: Never Used   Substance and Sexual Activity    Alcohol use: Yes     Alcohol/week: 1.0 standard drink of alcohol     Types: 1 Glasses of wine per week     Comment: occasional    Drug use: Never    Sexual activity: Not Currently     Partners: Male     Birth control/protection: Abstinence, Post-menopausal, Female Sterilization   Other Topics Concern    Not on file   Social History Narrative    Not on file     Social Determinants of Health     Financial Resource Strain: Patient Declined (2023)    Overall Financial Resource Strain (CARDIA)     Difficulty of Paying Living Expenses: Patient declined   Food Insecurity: No Food Insecurity (3/24/2022)    Hunger Vital Sign     Worried About Running Out of Food in the Last Year: Never true     Ran Out of Food in the Last Year: Never true   Transportation Needs: Patient Declined (2023)    PRAPARE - Transportation     Lack of Transportation (Medical): Patient declined     Lack of Transportation (Non-Medical): Patient declined   Physical Activity: Sufficiently Active (3/24/2022)    Exercise Vital Sign     Days of Exercise per Week: 4 days     Minutes of Exercise per Session: 60 min   Stress: Stress Concern Present (3/24/2022)    [employer] - Occupational Stress Questionnaire     Feeling of Stress : Rather much   Social  Connections: Socially Isolated (3/24/2022)    Social Connection and Isolation Panel [NHANES]     Frequency of Communication with Friends and Family: More than three times a week     Frequency of Social Gatherings with Friends and Family: Three times a week     Attends Spiritism Services: Never     Active Member of Clubs or Organizations: No     Attends Club or Organization Meetings: Never     Marital Status:    Intimate Partner Violence: Not At Risk (3/24/2022)    Humiliation, Afraid, Rape, and Kick questionnaire     Fear of Current or Ex-Partner: No     Emotionally Abused: No     Physically Abused: No     Sexually Abused: No   Housing Stability: Not on file      Family History   Problem Relation Age of Onset    Heart disease Father     Melanoma Daughter         40s    Melanoma Daughter         50s    Ovarian cancer Maternal Aunt     Breast cancer Paternal Aunt     Breast cancer Maternal Grandmother     Heart disease Paternal Grandmother     Hypertension Paternal Grandfather      Past Surgical History:   Procedure Laterality Date    ACHILLES TENDON REPAIR      BREAST BIOPSY Left 10/04/2024    BREAST CYST EXCISION Bilateral     1972    BREAST LUMPECTOMY      CATARACT EXTRACTION, BILATERAL      COLONOSCOPY      COLPOPEXY VAGINAL EXTRAPERITONEAL (VEC) WITH INSERTION PUBOVAGINAL SLING      CYST REMOVAL      HERNIA REPAIR      HYSTERECTOMY      IR KYPHOPLASTY/VERTEBROPLASTY WITH ABLATION  10/15/2024    JOINT REPLACEMENT Right     REPLACEMENT TOTAL KNEE Left     TONSILLECTOMY  1949    US GUIDANCE BREAST BIOPSY LEFT EACH ADDITIONAL Left 10/04/2024    US GUIDED BREAST BIOPSY LEFT COMPLETE Left 10/04/2024       Current Outpatient Medications:     atorvastatin (LIPITOR) 10 mg tablet, Take 1 tablet (10 mg total) by mouth daily, Disp: 90 tablet, Rfl: 3    calcium polycarbophil (FIBERCON) 625 mg tablet, Take 625 mg by mouth daily, Disp: , Rfl:     Cholecalciferol (Vitamin D) 50 MCG (2000 UT) tablet, Take 2,000 Units by  mouth daily, Disp: , Rfl:     letrozole (FEMARA) 2.5 mg tablet, Take 1 tablet (2.5 mg total) by mouth daily, Disp: 90 tablet, Rfl: 3    LORazepam (Ativan) 0.5 mg tablet, Take 1 tablet (0.5 mg total) by mouth every 8 (eight) hours as needed for anxiety, Disp: 20 tablet, Rfl: 0    Multiple Vitamins-Minerals (MULTIVITAMIN WITH MINERALS) tablet, Take 1 tablet by mouth daily, Disp: , Rfl:     Sodium Fluoride 5000 PPM 1.1 % PSTE, APPLY TIHN RIBBON TO TOOTHBRUSH, BRUSH X 2MIN AT BEDTIME, SPIT, DONT RINSE, Disp: , Rfl:     traMADol (Ultram) 50 mg tablet, Take 1 tablet (50 mg total) by mouth every 6 (six) hours as needed for moderate pain, Disp: , Rfl:     ondansetron (ZOFRAN-ODT) 8 mg disintegrating tablet, Take 1 tablet (8 mg total) by mouth every 8 (eight) hours as needed for nausea or vomiting (Patient not taking: Reported on 10/24/2024), Disp: 20 tablet, Rfl: 0    Ribociclib Succ, 600 MG Dose, (Kisqali, 600 MG Dose,) 200 MG TBPK, Take 600 mg by mouth daily 21 days on, followed by 7 days off (Patient not taking: Reported on 10/24/2024), Disp: 63 each, Rfl: 5  Allergies   Allergen Reactions    Adhesive [Medical Tape] Hives, Itching and Blisters    Lactose - Food Allergy Diarrhea and GI Intolerance    Bupropion Rash     Around 2010       Review of Systems:  Review of Systems   Constitutional:  Positive for fatigue.   HENT: Negative.     Eyes: Negative.    Respiratory: Negative.     Cardiovascular: Negative.    Gastrointestinal:  Positive for constipation (d/t tramadol).   Endocrine: Negative.    Genitourinary: Negative.    Musculoskeletal:  Positive for arthralgias and back pain.        Back, right shoulder, right hip pain. Uncontrolled by Tramadol   Skin: Negative.    Allergic/Immunologic: Negative.    Neurological:  Positive for dizziness (one episode d/t Tramadol).   Hematological: Negative.    Psychiatric/Behavioral:  Positive for sleep disturbance. The patient is nervous/anxious.         OB/GYN History:  The patient  underwent menarche at 12 years  Menopause Status Post  No LMP recorded. Patient is postmenopausal.  Menopause at 53 years.  Menopause Reason surgical  Hormone replacement therapy: yes.  Years used about 3   2   Para 2   Age at first delivery being 23 years.   Nursing: not applicable.   Birth control pills: yes.  Years used <1    Physical Exam:  Physical Exam  Vitals and nursing note reviewed.   Constitutional:       General: She is not in acute distress.     Appearance: Normal appearance.   HENT:      Nose: No congestion.   Eyes:      General: No scleral icterus.     Extraocular Movements: Extraocular movements intact.      Pupils: Pupils are equal, round, and reactive to light.   Pulmonary:      Effort: Pulmonary effort is normal. No respiratory distress.   Musculoskeletal:         General: No swelling. Normal range of motion.      Cervical back: Normal range of motion.   Skin:     General: Skin is warm and dry.   Neurological:      General: No focal deficit present.      Mental Status: She is alert and oriented to person, place, and time.   Psychiatric:         Mood and Affect: Mood normal.         Thought Content: Thought content normal.         Judgment: Judgment normal.         LABS:    CBC  Diff   Lab Results   Component Value Date/Time    WBC 6.09 10/23/2024 10:07 AM    HGB 13.3 10/23/2024 10:07 AM    HCT 40.8 10/23/2024 10:07 AM    RBC 4.48 10/23/2024 10:07 AM    MCV 91 10/23/2024 10:07 AM    MCHC 32.6 10/23/2024 10:07 AM    MCH 29.7 10/23/2024 10:07 AM    RDW 14.9 10/23/2024 10:07 AM    MPV 10.1 10/23/2024 10:07 AM    Lab Results   Component Value Date/Time    LYMPHSABS 1.49 10/23/2024 10:07 AM    EOSABS 0.10 10/23/2024 10:07 AM    MONOSABS 0.33 10/23/2024 10:07 AM    BASOSABS 0.03 10/23/2024 10:07 AM        Basic Metabolic Profile    Lab Results   Component Value Date/Time    SODIUM 142 10/23/2024 10:07 AM    CO2 32 10/23/2024 10:07 AM    Lab Results   Component Value Date/Time    BUN 17 10/23/2024  10:07 AM    CREATININE 0.55 (L) 10/23/2024 10:07 AM          I spent 60 minutes reviewing the medical record including multiple imaging studies and the reports, pathology and laboratory reports, provider encounter notes, discussing treatment recommendations with the patient and her  and performing limited physical examination.

## 2024-10-28 ENCOUNTER — TELEPHONE (OUTPATIENT)
Dept: HEMATOLOGY ONCOLOGY | Facility: CLINIC | Age: 79
End: 2024-10-28

## 2024-10-28 ENCOUNTER — TELEPHONE (OUTPATIENT)
Age: 79
End: 2024-10-28

## 2024-10-28 ENCOUNTER — CONSULT (OUTPATIENT)
Dept: PALLIATIVE MEDICINE | Facility: CLINIC | Age: 79
End: 2024-10-28
Payer: MEDICARE

## 2024-10-28 VITALS
BODY MASS INDEX: 24.6 KG/M2 | OXYGEN SATURATION: 91 % | RESPIRATION RATE: 20 BRPM | SYSTOLIC BLOOD PRESSURE: 160 MMHG | TEMPERATURE: 98.2 F | WEIGHT: 117.73 LBS | HEART RATE: 89 BPM | DIASTOLIC BLOOD PRESSURE: 70 MMHG

## 2024-10-28 DIAGNOSIS — G89.3 CANCER RELATED PAIN: ICD-10-CM

## 2024-10-28 DIAGNOSIS — C79.51 BREAST CANCER METASTASIZED TO BONE, RIGHT (HCC): Chronic | ICD-10-CM

## 2024-10-28 DIAGNOSIS — Z51.5 PALLIATIVE CARE BY SPECIALIST: Primary | ICD-10-CM

## 2024-10-28 DIAGNOSIS — Z17.0 MALIGNANT NEOPLASM OF UPPER-INNER QUADRANT OF LEFT BREAST IN FEMALE, ESTROGEN RECEPTOR POSITIVE (HCC): ICD-10-CM

## 2024-10-28 DIAGNOSIS — C50.911 BREAST CANCER METASTASIZED TO BONE, RIGHT (HCC): Chronic | ICD-10-CM

## 2024-10-28 DIAGNOSIS — C50.212 MALIGNANT NEOPLASM OF UPPER-INNER QUADRANT OF LEFT BREAST IN FEMALE, ESTROGEN RECEPTOR POSITIVE (HCC): ICD-10-CM

## 2024-10-28 PROBLEM — E66.3 OVERWEIGHT: Status: RESOLVED | Noted: 2021-12-16 | Resolved: 2024-10-28

## 2024-10-28 LAB
GENE DIS ANL INTERP-IMP: NORMAL
INTERPRETATION: NORMAL

## 2024-10-28 PROCEDURE — 99204 OFFICE O/P NEW MOD 45 MIN: CPT | Performed by: FAMILY MEDICINE

## 2024-10-28 RX ORDER — HYDROCODONE BITARTRATE AND ACETAMINOPHEN 5; 325 MG/1; MG/1
1 TABLET ORAL 2 TIMES DAILY PRN
Qty: 40 TABLET | Refills: 0 | Status: SHIPPED | OUTPATIENT
Start: 2024-10-28 | End: 2024-10-30

## 2024-10-28 RX ORDER — PREDNISONE 10 MG/1
10 TABLET ORAL DAILY
Qty: 7 TABLET | Refills: 0 | Status: ON HOLD | OUTPATIENT
Start: 2024-10-28

## 2024-10-28 NOTE — PROGRESS NOTES
Ambulatory Visit - Consult to Palliative and Supportive Care   Name: Yuni Kesy      : 1945      MRN: [id number]  Encounter Provider: Sekou Bernard MD  Encounter Date: 10/28/2024   Encounter department: [facility] [place]    Assessment & Plan  Breast cancer metastasized to bone, right (HCC)    Orders:    Ambulatory referral to Palliative Care    HYDROcodone-acetaminophen (Norco) 5-325 mg per tablet; Take 1 tablet by mouth 2 (two) times a day as needed for pain Max Daily Amount: 2 tablets    predniSONE 10 mg tablet; Take 1 tablet (10 mg total) by mouth daily With breakfast or lunch; no more than 1 tab / day.    Malignant neoplasm of upper-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, estrogen receptor positive (HCC)    Orders:    Ambulatory referral to Palliative Care    HYDROcodone-acetaminophen (Norco) 5-325 mg per tablet; Take 1 tablet by mouth 2 (two) times a day as needed for pain Max Daily Amount: 2 tablets    predniSONE 10 mg tablet; Take 1 tablet (10 mg total) by mouth daily With breakfast or lunch; no more than 1 tab / day.    Palliative care by specialist    Orders:    HYDROcodone-acetaminophen (Norco) 5-325 mg per tablet; Take 1 tablet by mouth 2 (two) times a day as needed for pain Max Daily Amount: 2 tablets    predniSONE 10 mg tablet; Take 1 tablet (10 mg total) by mouth daily With breakfast or lunch; no more than 1 tab / day.    Cancer related pain    Orders:    HYDROcodone-acetaminophen (Norco) 5-325 mg per tablet; Take 1 tablet by mouth 2 (two) times a day as needed for pain Max Daily Amount: 2 tablets    predniSONE 10 mg tablet; Take 1 tablet (10 mg total) by mouth daily With breakfast or lunch; no more than 1 tab / day.      Narrative rationale for today's treatments:  - pain management as above  - trial prednisone for poor energy and malignant bone pain  - Return to clinic: 1mo     PDMP Review: I have reviewed the patient's controlled substance dispensing history in  the Prescription Drug Monitoring Program in compliance with the [place] regulations before prescribing any controlled substances.    Sekou Bernard MD  Palliative and Supportive Care  Clinic/Answering Service: [phone]  You can find me on [employer] Secure Chat!       History of Present Illness     Yuni Keys is a 78 y.o. female who presents for consultation for illness related to Stage IV ER+ breast cancer, mets to bone.  Pt also has dxs of mild cognitive impairment; lumbar stenosis with neurogenic claudication.  She has been recently given tramadol for pain by a hospital provider.      Today in office,   She has tramadol that is not helpful to her.  She has very old Norco affter a knee surgery, and has found these more useful, though they are constipating.      Pain limits her from getting into bed, lying down, and sitting.  She struggles to walk d/t pain in the R hip; she has pain in the R shoulder as well (she is R hand dominant.      Her treatment plan is to use targeted therapies as per Dr. Amanda, as well as XRT to lesions in the spine and shoulder that are most painful.      She reports that she was completely surprised by her cancer dx, and that she is not the kind of person to take any meds at all.      Past medical, surgical, social, and family histories are reviewed and pertinent updates and considerations are included in the above narrative.        Objective     /70 (BP Location: Right arm, Patient Position: Sitting, Cuff Size: Standard)   Pulse 89   Temp 98.2 °F (36.8 °C) (Temporal)   Resp 20   Wt 53.4 kg (117 lb 11.6 oz)   SpO2 91%   BMI 24.60 kg/m²     Physical Exam  Constitutional:       General: She is in acute distress (splinting her back, unable to sit comfortably in chair).      Appearance: She is well-developed. She is not diaphoretic.   HENT:      Head: Normocephalic and atraumatic.      Right Ear: External ear normal.      Left Ear: External ear normal.   Eyes:      General:          Right eye: No discharge.         Left eye: No discharge.      Conjunctiva/sclera: Conjunctivae normal.      Pupils: Pupils are equal, round, and reactive to light.   Neck:      Trachea: No tracheal deviation.   Cardiovascular:      Rate and Rhythm: Normal rate and regular rhythm.   Pulmonary:      Effort: Pulmonary effort is normal. No respiratory distress.      Breath sounds: No stridor.   Abdominal:      General: There is no distension.      Palpations: Abdomen is soft.   Musculoskeletal:         General: Tenderness (spasm in bilat lumbar paraspinals) present. No deformity.      Cervical back: Normal range of motion.   Skin:     General: Skin is warm and dry.      Findings: No erythema or rash.   Neurological:      General: No focal deficit present.      Mental Status: She is alert and oriented to person, place, and time. Mental status is at baseline.      Cranial Nerves: No cranial nerve deficit.   Psychiatric:         Mood and Affect: Mood normal.         Behavior: Behavior normal.         Thought Content: Thought content normal.         Judgment: Judgment normal.         Recent data: none new; reviewed historical reports of imaging      Administrative Statements   I have spent a total time of 45+ minutes in caring for this patient on the day of the visit/encounter including  .chart review; symptom pursuit; supportive listening; anticipatory guidance.

## 2024-10-28 NOTE — TELEPHONE ENCOUNTER
Micheller spoke with PT on 10/28/24 @ 4:30 PM to obtain updates regarding her concerns over her Kisqali medication co-pay. PT informed writer that she has been denied by [employer] for the free drug program due to exceeding the income limits. Writer informed PT that [facility] pharmacy is currently investigating to see if samples can be given to get Pt through remainder of his year. PT informed writer that she can afford the co-pay cost of the Kisqali if a payment plan was an option. Micheller informed PT of the Medicare part D changes coming in 2025 and PT verbalized understanding. Writer stated that the pharmacy would be in touch with her regarding the Kisqali samples. PT verbalized understanding and was grateful for the call. Micheller provided contact information to PT for future use.                  Chidi Enriquez  Phone:[phone]  Email:[email or web address]

## 2024-10-28 NOTE — ASSESSMENT & PLAN NOTE
Orders:    Ambulatory referral to Palliative Care    HYDROcodone-acetaminophen (Norco) 5-325 mg per tablet; Take 1 tablet by mouth 2 (two) times a day as needed for pain Max Daily Amount: 2 tablets    predniSONE 10 mg tablet; Take 1 tablet (10 mg total) by mouth daily With breakfast or lunch; no more than 1 tab / day.

## 2024-10-28 NOTE — Clinical Note
Trialling low-potency opioids for cancer pain; pt is not a fan of meds that may affect her cognition, but she has been severely limited by cancer-related low-back pain recently.  We compromised on steroids, which are non-sedating, and Norco, which she has used previously.  Thank you for consult.

## 2024-10-28 NOTE — TELEPHONE ENCOUNTER
Pt called in stating that her insurance has a co pay for Ribociclib at $3200 a month and she would like to know an alternative medication. Pt would like a call back to discuss at [phone]. Thank you.

## 2024-10-29 ENCOUNTER — NUTRITION (OUTPATIENT)
Dept: NUTRITION | Facility: HOSPITAL | Age: 79
End: 2024-10-29

## 2024-10-29 DIAGNOSIS — Z71.3 NUTRITIONAL COUNSELING: Primary | ICD-10-CM

## 2024-10-29 NOTE — PATIENT INSTRUCTIONS
Nutrition Rx & Recommendations:  Diet: High Calorie, High Protein (for high calorie foods see pages 52-53, and for high protein foods see pages 49-51 in your Eating Hints book)  Small, frequent meals/snacks may be easier to tolerate than 3 large daily meals.  Aim for 5-6 small meals per day (every 2-3 hours).  Include protein at all meals/snacks.  Stay hydrated by sipping fluids of choice/tolerance throughout the day.  Liquid nutrition may be better tolerated than solids at times.  Alter food choices and eating patterns to accommodate changing needs.  Refer to Eating Hints book for other meal/snack ideas and symptom management.  For constipation: drink plenty of fluids (>64 oz/day); drink hot liquids; eat high-fiber foods as tolerated (whole grains, beans, peas, nuts, seeds, fruits, vegetables, etc); increase physical activity as tolerated.  Avoid increasing fiber intake too quickly, add fiber into your diet slowly; keep a record of your bowel movements (see pages 13-14 in you Eating Hints book).  Weigh yourself regularly. If you notice weight loss, make an effort to increase your daily food/calorie intake. If you continue to notice loss after these efforts, reach out to your dietitian to establish a plan to stabilize weight.  Choose protein rich snacks: Cottage cheese, Greek yogurt, String cheese + Hard boiled egg, Peanut butter crackers, Trail mix, Protein shake    Follow Up Plan: TBD will call when RT scheduled to coordinate in person visit  Recommend Referral to Other Providers: none at this time

## 2024-10-29 NOTE — PROGRESS NOTES
Outpatient Oncology Nutrition Consultation   Type of Consult: Initial Consult  Care Location: Office Visit    Reason for referral: Received notification by  Claire Downs RN  on 10/23 that pt has triggered for oncology nutrition care (reason for referral: Ambulatory referral for oncology nutrition services ).     Nutrition Assessment:   Oncology Diagnosis & Treatments:   9/27/2024 Stage IV ER+ breast cancer, metastatic to bone   10/22/24 Dr. Amanda- Plan for Letrozole + Ribociclib   10/24/24 Dr Siddiqi- Plan to palliative RT to spine and shoulder  Oncology History   Breast cancer metastasized to bone, right (HCC)   9/27/2024 Initial Diagnosis    Breast cancer metastasized to bone, right (HCC)     10/12/2024 -  Cancer Staged    Staging form: Breast, AJCC 8th Edition  - Clinical: Stage IV (cM1) - Signed by Fam Amanda MD on 10/12/2024       Malignant neoplasm of upper-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, estrogen receptor positive (HCC)   10/2/2024 Observation    NM PET/CT IMPRESSION:   1. Mild focal radiotracer uptake at a left breast nodular density medially. Underlying primary breast malignancy should be excluded.  2. Several small FDG avid left axillary/subpectoral lymph nodes would raise suspicion for metastasis.  3. Scattered FDG avid lytic lesions compatible with metastasis.     10/4/2024 Biopsy    Left breast ultrasound-guided biopsy  A. 3 o'clock, periareolar  Benign breast tissue with fibroadenomatoid nodule    B. 11 o'clock, 7 cm from nipple  Invasive mammary carcinoma with mixed ductal and lobular features  Grade 1-2  ER , NM <1, HER2 1+  Lymphovascular invasion not definitively identified    Concordant. Malignancy is poorly defined on mammo, appearing as a developing asymmetry rather than a discrete mass; this measures up to 5.8 cm. On US, mass measures up to 4.1cm. Subtle asymmetry with possible distortion slightly superior and medial which could represent a second site of  disease. Further characterization is indication. Right breast imaging performed on 4/12/2024; more recent mammogram may be reasonable. CESM or MRI is recommended for evaluation of extent of disease in left breast. The patient had abnormal axillary lymph nodes seen on PET/CT. 1 of these lymph nodes was visualized on US; however, it is not amenable to US-guided core needle biopsy.     10/12/2024 -  Cancer Staged    Staging form: Breast, AJCC 8th Edition  - Clinical: Stage IV (cM1) - Signed by Fam Amanda MD on 10/12/2024       10/15/2024 Biopsy    IR-guided biopsy    T8 - metastatic carcinoma, most compatible with known breast primary  ~RECEPTORS PENDING~    L1 - negative for carcinoma       Past Medical & Surgical Hx:   Patient Active Problem List   Diagnosis    Lumbar disc herniation    Lumbar radiculopathy    Spinal stenosis of lumbar region with neurogenic claudication    Retrocalcaneal bursitis (back of heel), right    Hyperlipidemia    MCI (mild cognitive impairment)    Family history of early CAD    History of abnormal uterine bleeding    History of hysterectomy    Primary insomnia    Compression fracture of L1 lumbar vertebra (HCC)    Closed compression fracture of body of L1 vertebra (HCC)    Breast cancer metastasized to bone, right (HCC)    Abnormal positron emission tomography (PET) scan    Malignant neoplasm of upper-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, estrogen receptor positive (HCC)     Past Medical History:   Diagnosis Date    Arthritis     Breast mass     Edema of both lower legs 11/01/2018    Dr. Thang Otero; faxed records.    Hx of skin cancer, basal cell     Hyperlipidemia     Hypertension     Impingement syndrome of left shoulder 04/10/2018    Dr. Thang Otero, faxed patient records.    Migraines     Osteopenia     Overactive bladder     Overweight 12/16/2021    Skin cancer 2022    Basal cell    Toe fracture, left 11/16/2023     Past Surgical History:   Procedure Laterality  Date    ACHILLES TENDON REPAIR      BREAST BIOPSY Left 10/04/2024    BREAST CYST EXCISION Bilateral     1972    BREAST LUMPECTOMY      CATARACT EXTRACTION, BILATERAL      COLONOSCOPY      COLPOPEXY VAGINAL EXTRAPERITONEAL (VEC) WITH INSERTION PUBOVAGINAL SLING      CYST REMOVAL      HERNIA REPAIR      HYSTERECTOMY      IR KYPHOPLASTY/VERTEBROPLASTY WITH ABLATION  10/15/2024    JOINT REPLACEMENT Right     REPLACEMENT TOTAL KNEE Left     TONSILLECTOMY  1949    US GUIDANCE BREAST BIOPSY LEFT EACH ADDITIONAL Left 10/04/2024    US GUIDED BREAST BIOPSY LEFT COMPLETE Left 10/04/2024       Review of Medications:   Vitamins, Supplements and Herbals: Med List Reviewed & pt is only taking: Multivitamin, Vitamin D, Fibercon (advised to hold due to constipation, unless able to increase water intake)    Current Outpatient Medications:     atorvastatin (LIPITOR) 10 mg tablet, Take 1 tablet (10 mg total) by mouth daily, Disp: 90 tablet, Rfl: 3    calcium polycarbophil (FIBERCON) 625 mg tablet, Take 625 mg by mouth daily, Disp: , Rfl:     Cholecalciferol (Vitamin D) 50 MCG (2000 UT) tablet, Take 2,000 Units by mouth daily, Disp: , Rfl:     HYDROcodone-acetaminophen (Norco) 5-325 mg per tablet, Take 1 tablet by mouth 2 (two) times a day as needed for pain Max Daily Amount: 2 tablets, Disp: 40 tablet, Rfl: 0    letrozole (FEMARA) 2.5 mg tablet, Take 1 tablet (2.5 mg total) by mouth daily, Disp: 90 tablet, Rfl: 3    LORazepam (Ativan) 0.5 mg tablet, Take 1 tablet (0.5 mg total) by mouth every 8 (eight) hours as needed for anxiety, Disp: 20 tablet, Rfl: 0    Multiple Vitamins-Minerals (MULTIVITAMIN WITH MINERALS) tablet, Take 1 tablet by mouth daily, Disp: , Rfl:     ondansetron (ZOFRAN-ODT) 8 mg disintegrating tablet, Take 1 tablet (8 mg total) by mouth every 8 (eight) hours as needed for nausea or vomiting, Disp: 20 tablet, Rfl: 0    predniSONE 10 mg tablet, Take 1 tablet (10 mg total) by mouth daily With breakfast or lunch; no more  "than 1 tab / day., Disp: 7 tablet, Rfl: 0    Ribociclib Succ, 600 MG Dose, (Kisqali, 600 MG Dose,) 200 MG TBPK, Take 600 mg by mouth daily 21 days on, followed by 7 days off (Patient not taking: Reported on 10/24/2024), Disp: 63 each, Rfl: 5    Sodium Fluoride 5000 PPM 1.1 % PSTE, APPLY TIHN RIBBON TO TOOTHBRUSH, BRUSH X 2MIN AT BEDTIME, SPIT, DONT RINSE, Disp: , Rfl:     Most Recent Lab Results:   Lab Results   Component Value Date    WBC 6.09 10/23/2024    NEUTROABS 4.13 10/23/2024    CHOLESTEROL 149 09/26/2024    TRIG 74 09/26/2024    HDL 72 09/26/2024    LDLCALC 62 09/26/2024    ALT 29 10/23/2024    AST 26 10/23/2024    ALB 4.3 10/23/2024    SODIUM 142 10/23/2024    SODIUM 139 09/26/2024    K 3.8 10/23/2024    K 3.8 09/26/2024     10/23/2024    BUN 17 10/23/2024    BUN 16 09/26/2024    CREATININE 0.55 (L) 10/23/2024    CREATININE 0.60 09/26/2024    EGFR 90 10/23/2024    POCGLU 84 10/02/2024    GLUF 84 09/26/2024    GLUF 96 04/29/2024    GLUC 82 10/23/2024    HGBA1C 5.1 10/17/2022    CALCIUM 9.3 10/23/2024       Anthropometric Measurements:   Height: 58\"  Ht Readings from Last 1 Encounters:   10/24/24 4' 10\" (1.473 m)     Wt Readings from Last 20 Encounters:   10/28/24 53.4 kg (117 lb 11.6 oz)   10/24/24 53.3 kg (117 lb 9.6 oz)   10/22/24 53.8 kg (118 lb 8 oz)   10/15/24 51.7 kg (114 lb)   10/14/24 52.4 kg (115 lb 9.6 oz)   10/10/24 53.5 kg (118 lb)   10/08/24 54.4 kg (120 lb)   10/04/24 54.4 kg (120 lb)   10/03/24 54.4 kg (120 lb)   10/01/24 54.4 kg (120 lb)   09/27/24 53.5 kg (118 lb)   09/21/24 55.8 kg (123 lb)   09/16/24 55.8 kg (123 lb)   09/06/24 55.8 kg (123 lb 1.6 oz)   09/03/24 57.6 kg (127 lb)   08/30/24 57.6 kg (127 lb)   08/22/24 56 kg (123 lb 6.4 oz)   07/24/24 54.7 kg (120 lb 8 oz)   07/02/24 56 kg (123 lb 6.4 oz)   06/21/24 54.1 kg (119 lb 3.2 oz)       Weight History:   Usual Weight: Lost 50# about 2 years ago intentionally, worked with weight management team  Reports she has been stable " 115-120# and feels comfortable at     Oncology Nutrition-Anthropometrics      Flowsheet Row Nutrition from 10/29/2024 in [facility] Oncology Dietitian [facility]   Patient age (years): 78 years   Patient (female) height (in): 58 in   Current Weight to be used for anthropometric calculations (lbs) 117.7 lbs   Current Weight to be used for anthropometric calculations (kg) 53.5 kg   BMI: 24.6   IBW female: 90 lbs   IBW (kg) female: 40.9 kg   IBW % (female) 130.8 %   Adjusted BW (female): 96.9 lbs   Adjusted BW kg (female): 44 kg   % weight change after 1 week: -0.7 %   Weight change after 1 week (lbs) -0.8 lbs   % weight change after 1 month: -0.3 %   Weight change after 1 month (lbs) -0.3 lbs   % weight change after 3 months: -2.3 %   Weight change after 3 months (lbs) -2.8 lbs            Nutrition-Focused Physical Findings: none observed    Food/Nutrition-Related History & Client/Social History:    Current Nutrition Impact Symptoms:  [] Nausea  [] Reduced Appetite  [] Acid Reflux    [] Vomiting  [] Unintended Wt Loss  [] Malabsorption    [] Diarrhea  [] Unintended Wt Gain  [] Dumping Syndrome    [] Constipation - managed w/ sennakot; opoid induced  [] Thick Mucous/Secretions  [] Abdominal Pain    [] Dysgeusia (Altered Taste)  [] Xerostomia (Dry Mouth)  [] Gas    [] Dysosmia (Altered Smell)  [] Thrush  [] Difficulty Chewing    [] Oral Mucositis (Sore Mouth)  [] Fatigue  [] Hyperglycemia   Lab Results   Component Value Date    HGBA1C 5.1 10/17/2022      [] Odynophagia  [] Esophagitis  [] Other:    [] Dysphagia  [] Early Satiety  [] No Problems Eating      Food Allergies & Intolerances: yes: lactose intolerance  but tolerates cheese/yogurt    Current Diet: Regular Diet, No Restrictions  Current Nutrition Intake: More than usual  Appetite: Good  Nutrition Route: PO  Oral Care:  brushes daily  Activity level: limited d/t pain     24 Hr Diet Recall:   Breakfast: shredded wheat with [employer] milk  Snack: fruit or string  "cheese  Lunch: peanut butter sandwich  Snack: protein bar  Dinner: salmon with vegetables     Beverages: water, [employer] milk  Supplements:   N/A      Oncology Nutrition-Estimated Needs      Flowsheet Row Nutrition from 10/29/2024 in [facility] Oncology Dietitian [facility]   Weight type used Actual weight   Weight in kilograms (kg) used for estimated needs 53.5 kg   Energy needs formula:  25-30 kcal/kg   Energy needs based on 25 kcal/k kcal   Energy needs based on 30 kcal/k kcal   Protein needs formula: 1.2-1.5 g/kg   Protein needs based on 1.2 g/k g   Protein needs based on 1.5 g/kg 80 g   Fluid needs formula: 25-30 mL/kg   Fluid needs based on 25 mL/kg 1350 mL   Fluid needs in ounces 46 oz   Fluid needs based on 30 mL/kg 1620 mL   Fluid needs in ounces 55 oz             Discussion & Intervention:   Yuni was evaluated today for an initial RD consultation regarding  ambulatory referral for oncology .  Yuni is planned to undergo tx for metastatic breast cancer . RD met with patient in office today. Pt will be starting oral chemo and hormone therapy as well as palliative radiation to spine/shoulder. Pt reports that she has been in pain which does limit her activities. She reports that her appetite is good and she has actually been eating more than usual. She reports that she intentionally lost 50# a year+ ago and has been maintaining current weight. Pt reports she feels comfortable here and would not like to gain more weight. We discussed importance of minimizing loss for best outcomes. Discussed reasons why she may lose weight and need to increase protein/calories to maintain current weight.  Pt does tend to eat quick meals and has been accustomed to \"light\" or \"diet\" type meals. I encouraged to limit these to a few times weekly and balancing with higher calories at later meals/adding extra snack on these days.    Reviewed 24 hour recall, which revealed an suboptimal po intake, and discussed " ways to increase kcal, protein, and fluid intakes and optimize nutrient intake.  Also reviewed the importance of wt management throughout the tx process and the role of a high kcal/ high protein diet in managing wt and overall health.  Based on today's assessment, discussion included: MNT for: weight maintenance, increased kcal/pro, nausea, how to modify foods for anticipated nutrition impact symptoms pt may experience during CA tx, a high kcal/protein diet & food choices to include at all meals & snacks (Examples of high kcal foods: cheese, full-fat dairy products, nut butter, plant-based fats, coconut oil/milk, avocado, butter, cream soup, etc. Examples of high protein foods: eggs, chicken, fish, beans/legumes, nuts/nut butters, bone broth, etc.) , fortifying foods for added kcal and protein (examples include: adding cheese to foods such as eggs, mashed potatoes, casseroles, etc.; Making oatmeal with whole milk rather than water; Making fortified mashed potatoes with cream, butter, dry milk powder, plain Greek yogurt, and cheese.), eating smaller more frequent meals every 2-3 hours (5-6 small meals/day), utilizing oral nutrition supplements, and adding extra fat to foods while cooking such as butter, plant-based oil, coconut oil/milk, avocado, nut butters, etc..   Moving forward, Yuni was encouraged to increase kcal, protein, and fluid intakes .  Materials Provided: [facility] Eating Hints book, Commercial Nutrition Supplements handout, and [employer] samples  All questions and concerns addressed during today’s visit.  Yuni has RD contact information.    Nutrition Diagnosis:   Increased Nutrient Needs (kcal & pro) related to increased demand for nutrients and disease state as evidenced by cancer dx and pt undergoing tx for cancer.  Monitoring & Evaluation:   Goals:  weight maintenance/stabilization  adequate nutrition impact symptom management  pt to meet >/=75% estimated nutrition needs daily    Progress Towards Goals:  Initiated    Nutrition Rx & Recommendations:  Diet: High Calorie, High Protein (for high calorie foods see pages 52-53, and for high protein foods see pages 49-51 in your Eating Hints book)  Small, frequent meals/snacks may be easier to tolerate than 3 large daily meals.  Aim for 5-6 small meals per day (every 2-3 hours).  Include protein at all meals/snacks.  Stay hydrated by sipping fluids of choice/tolerance throughout the day.  Liquid nutrition may be better tolerated than solids at times.  Alter food choices and eating patterns to accommodate changing needs.  Refer to Eating Hints book for other meal/snack ideas and symptom management.  For constipation: drink plenty of fluids (>64 oz/day); drink hot liquids; eat high-fiber foods as tolerated (whole grains, beans, peas, nuts, seeds, fruits, vegetables, etc); increase physical activity as tolerated.  Avoid increasing fiber intake too quickly, add fiber into your diet slowly; keep a record of your bowel movements (see pages 13-14 in you Eating Hints book).  Weigh yourself regularly. If you notice weight loss, make an effort to increase your daily food/calorie intake. If you continue to notice loss after these efforts, reach out to your dietitian to establish a plan to stabilize weight.  Choose protein rich snacks: Cottage cheese, Greek yogurt, String cheese + Hard boiled egg, Peanut butter crackers, Trail mix, Protein shake    Follow Up Plan:  TBD will call when RT scheduled to coordinate in person visit  Recommend Referral to Other Providers: none at this time

## 2024-10-30 ENCOUNTER — PATIENT OUTREACH (OUTPATIENT)
Dept: CASE MANAGEMENT | Facility: OTHER | Age: 79
End: 2024-10-30

## 2024-10-30 ENCOUNTER — TELEPHONE (OUTPATIENT)
Age: 79
End: 2024-10-30

## 2024-10-30 ENCOUNTER — TELEPHONE (OUTPATIENT)
Dept: GENETICS | Facility: CLINIC | Age: 79
End: 2024-10-30

## 2024-10-30 DIAGNOSIS — G89.3 CANCER RELATED PAIN: Primary | ICD-10-CM

## 2024-10-30 DIAGNOSIS — Z51.5 PALLIATIVE CARE BY SPECIALIST: ICD-10-CM

## 2024-10-30 RX ORDER — OXYCODONE HYDROCHLORIDE 5 MG/1
5 TABLET ORAL EVERY 4 HOURS PRN
Qty: 40 TABLET | Refills: 0 | Status: SHIPPED | OUTPATIENT
Start: 2024-10-30 | End: 2024-11-08 | Stop reason: ALTCHOICE

## 2024-10-30 NOTE — TELEPHONE ENCOUNTER
Genetic Test Result Note    Summary:    Result Disclosure:   Today I spoke with Yuni over the phone to review the results of her genetic test for hereditary cancer. She underwent genetic testing through our program on 10/10/2024 due to her recent diagnosis of breast cancer.    A separate report of her STAT genetic test results were disclosed to her on 10/23/2024. This result includes new genes and does not change her initial genetic test result.     Test Performed: [employer] BRCAplus STAT Panel (13 genes): PETER, BARD1, BRCA1, BRCA2, CDH1, CHEK2, NF1, PALB2, PTEN, RAD51C, RAD51D, STK11, TP53 with reflex to [facility]: Cancer+RNAinsight (59 genes): APC, PETER, AXIN2, BAP1, BARD1, BMPR1A, BRCA1, BRCA2, BRIP1, CDH1, CDK4, CDKN1B, CDKN2A, CHEK2, CTNNA1, DICER1, EGLN1, EPCAM, FH, FLCN, GREM1, HOXB13, KIF1B, KIT, MAX, MEN1, MET, MITF, MLH1, MSH2, MSH3, MSH6, MUTYH, NF1, NTHL1, PALB2, PDGFRA PMS2, POLD1, POLE, POT1, PTEN, RAD51C, RAD51D, RB1, RET, SDHA, SDHAF2, SDHB, SDHC, SDHD, SMAD4, SMARCA4, STK11, PDDO386, TP53, TSC1, TSC2, VHL     Result: NEGATIVE: No Clinically Significant Variants Detected    Assessment:   A negative result significantly reduces the likelihood that Yuni has a hereditary cancer syndrome. However, this testing is unable to completely rule out the presence of hereditary cancer. It remains possible that:  There is a variant in an area of a gene which was not tested or there is a variant not detectable due to technical limitations of this test.     There is a variant in another gene that was not included in this test or in a gene not known to be linked to cancer or tumors.   A family member has a genetic variant that the patient did not inherit.   The cancer in the family is sporadic and is related to non-hereditary factors.     Risks and Testing for Family Members:  Yuni was made aware that all of her first-degree relatives are at increased risk to develop breast cancer based on her recent diagnosis  and family history of breast cancer. We recommend that her first-degree relatives make their healthcare providers aware of the family history and discuss their options for screening and risk-reduction.    At this time we do not recommend testing for Yuni 's children based on her negative test result.  Yuni 's children still need to consider the history of cancer on the other side of their family when determining their risks.     If Yuni has any affected family members with a cancer diagnosis, especially at a young age, they may still consider genetic testing. Relatives who wish to pursue genetic testing can reach out to the Cancer Risk and Genetics Program at [phone] to schedule an appointment or visit [email or web address] to identify a local genetic counselor.         Plan:     Negative Result: This result does not have surgical implications and surgical options should be discussed with her healthcare provider.

## 2024-10-30 NOTE — TELEPHONE ENCOUNTER
Pt called to report that she had a follow up visit with Dr. Bernard and was prescribed prednisone and norco for her achy back pain that she rated 8/10 then but will rate it now 7/10 pain. Pt reports that the norco is not helping her pain much at all and would like to know f there is anything she can take instead. Pt reports there is no acute changes of the type or location of the pain, same pain that was assessed during the office visit. Please advise.

## 2024-10-30 NOTE — PROGRESS NOTES
"OSW placed supportive call to Mrs. Keys today. She is endorsing increased back pain causing her distress, anxiety and poor sleep. She established care with palliative team and was prescribed new medications, however she stated she is not finding relief. She will call their office today. Mrs. Keys stated her cancer and pain has prevented her from participating in going to the gym, [place], and walking for exercise. She stated, \"I just sit around, and I can't lay down flat in my bed, it's so uncomfortable.\" OSW provided emotional support. Offered validation of her feelings and explained she is not alone with these thoughts.   She discussed her oral medications that were not approved for financial assistance from the drug company. She worked with oncology  who helped her with samples, and is very appreciative of his support.   She will have her radiation SIM tomorrow at [facility]. OSW offered outreach in about 2 weeks and she is appreciative.   "

## 2024-10-31 ENCOUNTER — APPOINTMENT (OUTPATIENT)
Facility: HOSPITAL | Age: 79
End: 2024-10-31
Attending: RADIOLOGY
Payer: MEDICARE

## 2024-10-31 PROCEDURE — 77334 RADIATION TREATMENT AID(S): CPT | Performed by: RADIOLOGY

## 2024-10-31 PROCEDURE — 77290 THER RAD SIMULAJ FIELD CPLX: CPT | Performed by: RADIOLOGY

## 2024-11-01 ENCOUNTER — OFFICE VISIT (OUTPATIENT)
Dept: NEUROSURGERY | Facility: CLINIC | Age: 79
End: 2024-11-01
Payer: MEDICARE

## 2024-11-01 ENCOUNTER — APPOINTMENT (OUTPATIENT)
Facility: HOSPITAL | Age: 79
End: 2024-11-01
Attending: RADIOLOGY
Payer: MEDICARE

## 2024-11-01 VITALS
HEIGHT: 58 IN | RESPIRATION RATE: 14 BRPM | SYSTOLIC BLOOD PRESSURE: 120 MMHG | DIASTOLIC BLOOD PRESSURE: 58 MMHG | BODY MASS INDEX: 24.56 KG/M2 | WEIGHT: 117 LBS | HEART RATE: 58 BPM | TEMPERATURE: 98.2 F

## 2024-11-01 DIAGNOSIS — M80.08XA AGE-RELATED OSTEOPOROSIS WITH CURRENT PATHOLOGICAL FRACTURE OF VERTEBRA (HCC): Primary | ICD-10-CM

## 2024-11-01 DIAGNOSIS — M84.58XS PATHOLOGICAL FRACTURE IN NEOPLASTIC DISEASE, OTHER SPECIFIED SITE, SEQUELA: ICD-10-CM

## 2024-11-01 PROCEDURE — 99213 OFFICE O/P EST LOW 20 MIN: CPT | Performed by: RADIOLOGY

## 2024-11-01 NOTE — ASSESSMENT & PLAN NOTE
Alley unfortunately continues to have significant back pain related to her compression fracture.  However pain relief is not to be expected so soon following kyphoplasty and may take an additional 2 weeks to start experiencing improvement.  She will be starting radiation soon as well as Prolia which will help with her pain.  Additionally have advised her to go to physical therapy.  Lastly given her radicular type of pain she may benefit from pain management consultation.  Otherwise she will follow-up with our office as needed.  She will contact us in several weeks if her pain worsens or continues to be very severe.  Imaging follow-up will be performed by her oncologist.  Orders:    Ambulatory Referral to Physical Therapy; Future    Ambulatory referral to Spine & Pain Management; Future

## 2024-11-01 NOTE — PROGRESS NOTES
Ambulatory Visit  Name: Yuni Keys      : 1945      MRN: [id number]  Encounter Provider: Donnie Robbins MD  Encounter Date: 2024   Encounter department: [facility]    Assessment & Plan  Age-related osteoporosis with current pathological fracture of vertebra (HCC)  Alley unfortunately continues to have significant back pain related to her compression fracture.  However pain relief is not to be expected so soon following kyphoplasty and may take an additional 2 weeks to start experiencing improvement.  She will be starting radiation soon as well as Prolia which will help with her pain.  Additionally have advised her to go to physical therapy.  Lastly given her radicular type of pain she may benefit from pain management consultation.  Otherwise she will follow-up with our office as needed.  She will contact us in several weeks if her pain worsens or continues to be very severe.  Imaging follow-up will be performed by her oncologist.  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 Keys returns for follow-up of osteoporotic and pathologic compression fractures.  She is approximately 2 weeks status post T8 and L1 vertebral body biopsy, ablation and augmentation.  She reports experiencing good pain relief for the first week following her procedure.  Since then she states her pain has returned to baseline.  It is severe that wraps around her sides bilaterally.  There is difficult getting in and out of a chair or bed.  No incisional redness or drainage.  No fevers.    Review of Systems   Constitutional:  Positive for fatigue (occ).   HENT: Negative.  Negative for trouble swallowing.    Eyes: Negative.   "  Respiratory: Negative.     Cardiovascular: Negative.    Gastrointestinal:  Negative for nausea.   Endocrine: Negative.    Genitourinary: Negative.    Musculoskeletal:  Positive for back pain (mid back pain radiates to right hip, worse when sitting or bending). Negative for gait problem and myalgias.   Skin: Negative.    Allergic/Immunologic: Negative.    Neurological: Negative.  Negative for tremors, facial asymmetry, weakness and numbness.   Hematological: Negative.  Does not bruise/bleed easily.   Psychiatric/Behavioral:  Positive for sleep disturbance (occ).      I have personally reviewed the MA's review of systems and made changes as necessary.      Objective     /58 (BP Location: Left arm, Patient Position: Sitting, Cuff Size: Standard)   Pulse 58   Temp 98.2 °F (36.8 °C) (Temporal)   Resp 14   Ht 4' 10\" (1.473 m)   Wt 53.1 kg (117 lb)   BMI 24.45 kg/m²     Physical Exam  Constitutional:       Appearance: Normal appearance.   Eyes:      Pupils: Pupils are equal, round, and reactive to light.   Pulmonary:      Effort: Pulmonary effort is normal.   Musculoskeletal:         General: Tenderness present.      Comments: Midline spine tenderness over the region of the L1 compression fracture.   Skin:     Findings: No erythema or lesion.   Neurological:      Mental Status: She is alert and oriented to person, place, and time. Mental status is at baseline.   Psychiatric:         Mood and Affect: Mood normal.         Behavior: Behavior normal.         Thought Content: Thought content normal.       Neurologic Exam     Mental Status   Oriented to person, place, and time.     Cranial Nerves     CN III, IV, VI   Pupils are equal, round, and reactive to light.            "

## 2024-11-04 ENCOUNTER — TELEPHONE (OUTPATIENT)
Dept: HEMATOLOGY ONCOLOGY | Facility: CLINIC | Age: 79
End: 2024-11-04

## 2024-11-04 ENCOUNTER — TELEPHONE (OUTPATIENT)
Age: 79
End: 2024-11-04

## 2024-11-04 DIAGNOSIS — C50.911 BREAST CANCER METASTASIZED TO BONE, RIGHT (HCC): Primary | ICD-10-CM

## 2024-11-04 DIAGNOSIS — C50.212 MALIGNANT NEOPLASM OF UPPER-INNER QUADRANT OF LEFT BREAST IN FEMALE, ESTROGEN RECEPTOR POSITIVE (HCC): ICD-10-CM

## 2024-11-04 DIAGNOSIS — Z17.0 MALIGNANT NEOPLASM OF UPPER-INNER QUADRANT OF LEFT BREAST IN FEMALE, ESTROGEN RECEPTOR POSITIVE (HCC): ICD-10-CM

## 2024-11-04 DIAGNOSIS — C79.51 BREAST CANCER METASTASIZED TO BONE, RIGHT (HCC): Primary | ICD-10-CM

## 2024-11-04 LAB
CARIS ANDROGEN RECEPTOR: POSITIVE
CARIS ER: POSITIVE
CARIS GENOMIC LOH - EXOME: NORMAL
CARIS HER2/NEU: NEGATIVE
CARIS HLA-A: NORMAL
CARIS HLA-B: NORMAL
CARIS HLA-C: NORMAL
CARIS MSI - EXOME: NORMAL
CARIS PD-L1 (22C3): NEGATIVE
CARIS PR: NEGATIVE
CARIS PTEN: POSITIVE
CARIS TMB - EXOME: NORMAL

## 2024-11-04 PROCEDURE — 77300 RADIATION THERAPY DOSE PLAN: CPT | Performed by: RADIOLOGY

## 2024-11-04 PROCEDURE — 77334 RADIATION TREATMENT AID(S): CPT | Performed by: RADIOLOGY

## 2024-11-04 PROCEDURE — 77295 3-D RADIOTHERAPY PLAN: CPT | Performed by: RADIOLOGY

## 2024-11-04 NOTE — TELEPHONE ENCOUNTER
Spoke with Alley after receiving [employer] message. Patient reports she took the oxycodone on Saturday and felt very dizzy and nauseous and it made her sleep for 4 hours straight and then she was only able to be up for 2 hours before having to go back to sleep again. She does report receiving the COVID vaccine on Saturday as well so she isn't sure if there was any relation but she is inquiring about whether there is something else she can try for pain. She reports taking oxycodone in the past and had dizziness then too but Saturday it was very severe. She is no longer having any dizziness or nausea.    She took tramadol yesterday which only provides a small amount of relief. She is scheduled to start Radiation tomorrow and is concerned because the pain causes her to have trouble lying down. She does also report a headache since Saturday and she thought she was getting a migraine but it didn't turn into one. She reports having a history of migraines. Requests call back to discuss possibly changing the pain medication so she has something she can take for her treatment tomorrow.

## 2024-11-05 ENCOUNTER — TELEPHONE (OUTPATIENT)
Dept: NUTRITION | Facility: HOSPITAL | Age: 79
End: 2024-11-05

## 2024-11-05 ENCOUNTER — APPOINTMENT (OUTPATIENT)
Facility: HOSPITAL | Age: 79
End: 2024-11-05
Attending: RADIOLOGY
Payer: MEDICARE

## 2024-11-05 PROCEDURE — 77331 SPECIAL RADIATION DOSIMETRY: CPT | Performed by: RADIOLOGY

## 2024-11-05 PROCEDURE — 77280 THER RAD SIMULAJ FIELD SMPL: CPT | Performed by: RADIOLOGY

## 2024-11-05 PROCEDURE — 77412 RADIATION TX DELIVERY LVL 3: CPT | Performed by: RADIOLOGY

## 2024-11-05 PROCEDURE — 77427 RADIATION TX MANAGEMENT X5: CPT | Performed by: RADIOLOGY

## 2024-11-05 NOTE — TELEPHONE ENCOUNTER
Call received from patient. Following up on her phone call from yesterday, said she has not heard from Dr Bernard yet. States she is still having severe pain in her back and is unable to take oxycodone due to side effects she is having from this such as dizziness, nausea and headaches. As of now she said she has nothing to help her pain. Requesting call back on if there is anything else that can be given to her.

## 2024-11-05 NOTE — TELEPHONE ENCOUNTER
11/5/2024 12:32 PM -  pt advised to rotate back to vicodin, and use two tabs instead of one.  She has sufficient supply to trial this approach this week.      If this is unhelpful, then our next step will likely need to be hydromorphone, or Nucynta, or some other pain medicine approach.  If helpful, pt will need a refill.

## 2024-11-05 NOTE — TELEPHONE ENCOUNTER
RD called pt to schedule f/u since RT has been scheduled. Spoke to pt who reports that she will have people driving her to/from RT and would prefer to not make them wait. Pt requests f/u for week of 11/18.  Scheduled for 11/19 in person at 10am. Encouraged pt to call sooner if experiencing nutrition related side effects from tx.     Pt reports she is having nausea today. Reminded her to utilize anti-emetics and provided diet suggestions. Pt reports she forgot she had nausea medicine and was thankful for suggestion.

## 2024-11-06 ENCOUNTER — APPOINTMENT (OUTPATIENT)
Facility: HOSPITAL | Age: 79
End: 2024-11-06
Attending: RADIOLOGY
Payer: MEDICARE

## 2024-11-06 ENCOUNTER — TELEPHONE (OUTPATIENT)
Dept: HEMATOLOGY ONCOLOGY | Facility: CLINIC | Age: 79
End: 2024-11-06

## 2024-11-06 PROCEDURE — 77412 RADIATION TX DELIVERY LVL 3: CPT | Performed by: RADIOLOGY

## 2024-11-06 PROCEDURE — 77387 GUIDANCE FOR RADJ TX DLVR: CPT | Performed by: RADIOLOGY

## 2024-11-06 PROCEDURE — G6002 STEREOSCOPIC X-RAY GUIDANCE: HCPCS | Performed by: RADIOLOGY

## 2024-11-06 NOTE — TELEPHONE ENCOUNTER
[facility] results obtained and uploaded into patient chart.   Appointment with Dr. Amanda on 11/21   (3) adequate (2) probably inadequate

## 2024-11-07 ENCOUNTER — TELEPHONE (OUTPATIENT)
Age: 79
End: 2024-11-07

## 2024-11-07 ENCOUNTER — APPOINTMENT (OUTPATIENT)
Facility: HOSPITAL | Age: 79
End: 2024-11-07
Attending: RADIOLOGY
Payer: MEDICARE

## 2024-11-07 DIAGNOSIS — G89.3 CANCER RELATED PAIN: Primary | ICD-10-CM

## 2024-11-07 PROCEDURE — 77387 GUIDANCE FOR RADJ TX DLVR: CPT | Performed by: STUDENT IN AN ORGANIZED HEALTH CARE EDUCATION/TRAINING PROGRAM

## 2024-11-07 PROCEDURE — 77412 RADIATION TX DELIVERY LVL 3: CPT | Performed by: STUDENT IN AN ORGANIZED HEALTH CARE EDUCATION/TRAINING PROGRAM

## 2024-11-07 PROCEDURE — G6002 STEREOSCOPIC X-RAY GUIDANCE: HCPCS | Performed by: STUDENT IN AN ORGANIZED HEALTH CARE EDUCATION/TRAINING PROGRAM

## 2024-11-07 NOTE — TELEPHONE ENCOUNTER
Call received from patient. Stated she is still in severe pain, if not worse. She has been taking the two tabs of Vicodin that Dr Bernard recommended but said that is not helping her. Says her pain is unbearable now. Questioning what else can be given for her.

## 2024-11-08 ENCOUNTER — APPOINTMENT (OUTPATIENT)
Dept: RADIOLOGY | Facility: CLINIC | Age: 79
End: 2024-11-08
Payer: MEDICARE

## 2024-11-08 ENCOUNTER — OFFICE VISIT (OUTPATIENT)
Dept: FAMILY MEDICINE CLINIC | Facility: HOSPITAL | Age: 79
End: 2024-11-08
Payer: MEDICARE

## 2024-11-08 ENCOUNTER — APPOINTMENT (OUTPATIENT)
Facility: HOSPITAL | Age: 79
End: 2024-11-08
Attending: RADIOLOGY
Payer: MEDICARE

## 2024-11-08 ENCOUNTER — APPOINTMENT (OUTPATIENT)
Dept: LAB | Facility: CLINIC | Age: 79
End: 2024-11-08
Payer: MEDICARE

## 2024-11-08 ENCOUNTER — TELEPHONE (OUTPATIENT)
Dept: PALLIATIVE MEDICINE | Facility: CLINIC | Age: 79
End: 2024-11-08

## 2024-11-08 VITALS
SYSTOLIC BLOOD PRESSURE: 138 MMHG | BODY MASS INDEX: 24.77 KG/M2 | HEIGHT: 58 IN | OXYGEN SATURATION: 93 % | HEART RATE: 65 BPM | WEIGHT: 118 LBS | DIASTOLIC BLOOD PRESSURE: 60 MMHG | TEMPERATURE: 98.4 F

## 2024-11-08 DIAGNOSIS — M84.58XS PATHOLOGICAL FRACTURE IN NEOPLASTIC DISEASE, OTHER SPECIFIED SITE, SEQUELA: ICD-10-CM

## 2024-11-08 DIAGNOSIS — M84.58XS PATHOLOGICAL FRACTURE IN NEOPLASTIC DISEASE, OTHER SPECIFIED SITE, SEQUELA: Primary | ICD-10-CM

## 2024-11-08 DIAGNOSIS — G89.3 CANCER RELATED PAIN: ICD-10-CM

## 2024-11-08 DIAGNOSIS — K59.03 DRUG-INDUCED CONSTIPATION: ICD-10-CM

## 2024-11-08 DIAGNOSIS — R52 UNCONTROLLED PAIN: ICD-10-CM

## 2024-11-08 DIAGNOSIS — S32.010A CLOSED COMPRESSION FRACTURE OF BODY OF L1 VERTEBRA (HCC): ICD-10-CM

## 2024-11-08 DIAGNOSIS — R50.9 FEVER, UNSPECIFIED FEVER CAUSE: ICD-10-CM

## 2024-11-08 DIAGNOSIS — Z51.5 PALLIATIVE CARE BY SPECIALIST: ICD-10-CM

## 2024-11-08 DIAGNOSIS — F33.9 DEPRESSION, RECURRENT (HCC): ICD-10-CM

## 2024-11-08 LAB
ALBUMIN SERPL BCG-MCNC: 4.4 G/DL (ref 3.5–5)
ALP SERPL-CCNC: 47 U/L (ref 34–104)
ALT SERPL W P-5'-P-CCNC: 24 U/L (ref 7–52)
ANION GAP SERPL CALCULATED.3IONS-SCNC: 8 MMOL/L (ref 4–13)
AST SERPL W P-5'-P-CCNC: 21 U/L (ref 13–39)
BACTERIA UR QL AUTO: ABNORMAL /HPF
BASOPHILS # BLD AUTO: 0.02 THOUSANDS/ÂΜL (ref 0–0.1)
BASOPHILS NFR BLD AUTO: 0 % (ref 0–1)
BILIRUB SERPL-MCNC: 0.46 MG/DL (ref 0.2–1)
BILIRUB UR QL STRIP: NEGATIVE
BUN SERPL-MCNC: 13 MG/DL (ref 5–25)
CALCIUM SERPL-MCNC: 9.3 MG/DL (ref 8.4–10.2)
CHLORIDE SERPL-SCNC: 102 MMOL/L (ref 96–108)
CLARITY UR: CLEAR
CO2 SERPL-SCNC: 32 MMOL/L (ref 21–32)
COLOR UR: YELLOW
CREAT SERPL-MCNC: 0.69 MG/DL (ref 0.6–1.3)
EOSINOPHIL # BLD AUTO: 0.11 THOUSAND/ÂΜL (ref 0–0.61)
EOSINOPHIL NFR BLD AUTO: 2 % (ref 0–6)
ERYTHROCYTE [DISTWIDTH] IN BLOOD BY AUTOMATED COUNT: 15 % (ref 11.6–15.1)
GFR SERPL CREATININE-BSD FRML MDRD: 83 ML/MIN/1.73SQ M
GLUCOSE SERPL-MCNC: 100 MG/DL (ref 65–140)
GLUCOSE UR STRIP-MCNC: NEGATIVE MG/DL
HCT VFR BLD AUTO: 41.1 % (ref 34.8–46.1)
HGB BLD-MCNC: 13.7 G/DL (ref 11.5–15.4)
HGB UR QL STRIP.AUTO: NEGATIVE
IMM GRANULOCYTES # BLD AUTO: 0.04 THOUSAND/UL (ref 0–0.2)
IMM GRANULOCYTES NFR BLD AUTO: 1 % (ref 0–2)
KETONES UR STRIP-MCNC: NEGATIVE MG/DL
LEUKOCYTE ESTERASE UR QL STRIP: NEGATIVE
LYMPHOCYTES # BLD AUTO: 0.8 THOUSANDS/ÂΜL (ref 0.6–4.47)
LYMPHOCYTES NFR BLD AUTO: 14 % (ref 14–44)
MCH RBC QN AUTO: 31.2 PG (ref 26.8–34.3)
MCHC RBC AUTO-ENTMCNC: 33.3 G/DL (ref 31.4–37.4)
MCV RBC AUTO: 94 FL (ref 82–98)
MONOCYTES # BLD AUTO: 0.09 THOUSAND/ÂΜL (ref 0.17–1.22)
MONOCYTES NFR BLD AUTO: 2 % (ref 4–12)
MUCOUS THREADS UR QL AUTO: ABNORMAL
NEUTROPHILS # BLD AUTO: 4.64 THOUSANDS/ÂΜL (ref 1.85–7.62)
NEUTS SEG NFR BLD AUTO: 81 % (ref 43–75)
NITRITE UR QL STRIP: NEGATIVE
NON-SQ EPI CELLS URNS QL MICRO: ABNORMAL /HPF
NRBC BLD AUTO-RTO: 0 /100 WBCS
PH UR STRIP.AUTO: 6 [PH]
PLATELET # BLD AUTO: 307 THOUSANDS/UL (ref 149–390)
PMV BLD AUTO: 9.8 FL (ref 8.9–12.7)
POTASSIUM SERPL-SCNC: 4.2 MMOL/L (ref 3.5–5.3)
PROT SERPL-MCNC: 6.8 G/DL (ref 6.4–8.4)
PROT UR STRIP-MCNC: ABNORMAL MG/DL
RBC # BLD AUTO: 4.39 MILLION/UL (ref 3.81–5.12)
RBC #/AREA URNS AUTO: ABNORMAL /HPF
SODIUM SERPL-SCNC: 142 MMOL/L (ref 135–147)
SP GR UR STRIP.AUTO: 1.02 (ref 1–1.03)
UROBILINOGEN UR STRIP-ACNC: <2 MG/DL
WBC # BLD AUTO: 5.7 THOUSAND/UL (ref 4.31–10.16)
WBC #/AREA URNS AUTO: ABNORMAL /HPF

## 2024-11-08 PROCEDURE — 77387 GUIDANCE FOR RADJ TX DLVR: CPT | Performed by: RADIOLOGY

## 2024-11-08 PROCEDURE — 77412 RADIATION TX DELIVERY LVL 3: CPT | Performed by: RADIOLOGY

## 2024-11-08 PROCEDURE — G6002 STEREOSCOPIC X-RAY GUIDANCE: HCPCS | Performed by: RADIOLOGY

## 2024-11-08 PROCEDURE — 80053 COMPREHEN METABOLIC PANEL: CPT

## 2024-11-08 PROCEDURE — 72110 X-RAY EXAM L-2 SPINE 4/>VWS: CPT

## 2024-11-08 PROCEDURE — 36415 COLL VENOUS BLD VENIPUNCTURE: CPT

## 2024-11-08 PROCEDURE — 85025 COMPLETE CBC W/AUTO DIFF WBC: CPT

## 2024-11-08 PROCEDURE — 99214 OFFICE O/P EST MOD 30 MIN: CPT | Performed by: INTERNAL MEDICINE

## 2024-11-08 PROCEDURE — 81001 URINALYSIS AUTO W/SCOPE: CPT

## 2024-11-08 PROCEDURE — G0439 PPPS, SUBSEQ VISIT: HCPCS | Performed by: INTERNAL MEDICINE

## 2024-11-08 RX ORDER — HYDROCODONE BITARTRATE AND ACETAMINOPHEN 5; 325 MG/1; MG/1
TABLET ORAL
COMMUNITY
Start: 2024-11-05 | End: 2024-11-08 | Stop reason: SDUPTHER

## 2024-11-08 RX ORDER — HYDROCODONE BITARTRATE AND ACETAMINOPHEN 5; 325 MG/1; MG/1
1 TABLET ORAL EVERY 6 HOURS PRN
Qty: 30 TABLET | Refills: 0 | Status: SHIPPED | OUTPATIENT
Start: 2024-11-08 | End: 2024-11-11

## 2024-11-08 RX ORDER — HYDROCODONE BITARTRATE AND ACETAMINOPHEN 5; 325 MG/1; MG/1
1 TABLET ORAL EVERY 6 HOURS PRN
Qty: 30 TABLET | Refills: 0 | Status: CANCELLED | OUTPATIENT
Start: 2024-11-08

## 2024-11-08 RX ORDER — POLYETHYLENE GLYCOL 3350 17 G/17G
17 POWDER, FOR SOLUTION ORAL 2 TIMES DAILY
Start: 2024-11-08

## 2024-11-08 RX ORDER — FENTANYL 12.5 UG/1
1 PATCH TRANSDERMAL
Qty: 10 PATCH | Refills: 0 | Status: SHIPPED | OUTPATIENT
Start: 2024-11-08 | End: 2024-11-11

## 2024-11-08 NOTE — PATIENT INSTRUCTIONS
Medicare Preventive Visit Patient Instructions  Thank you for completing your Welcome to Medicare Visit or Medicare Annual Wellness Visit today. Your next wellness visit will be due in one year (11/9/2025).  The screening/preventive services that you may require over the next 5-10 years are detailed below. Some tests may not apply to you based off risk factors and/or age. Screening tests ordered at today's visit but not completed yet may show as past due. Also, please note that scanned in results may not display below.  Preventive Screenings:  Service Recommendations Previous Testing/Comments   Colorectal Cancer Screening  * Colonoscopy    * Fecal Occult Blood Test (FOBT)/Fecal Immunochemical Test (FIT)  * Fecal DNA/Cologuard Test  * Flexible Sigmoidoscopy Age: 45-75 years old   Colonoscopy: every 10 years (may be performed more frequently if at higher risk)  OR  FOBT/FIT: every 1 year  OR  Cologuard: every 3 years  OR  Sigmoidoscopy: every 5 years  Screening may be recommended earlier than age 45 if at higher risk for colorectal cancer. Also, an individualized decision between you and your healthcare provider will decide whether screening between the ages of 76-85 would be appropriate. Colonoscopy: 01/27/2023  FOBT/FIT: Not on file  Cologuard: Not on file  Sigmoidoscopy: Not on file    Screening Current     Breast Cancer Screening Age: 40+ years old  Frequency: every 1-2 years  Not required if history of left and right mastectomy Mammogram: 04/07/2023    History Breast Cancer   Cervical Cancer Screening Between the ages of 21-29, pap smear recommended once every 3 years.   Between the ages of 30-65, can perform pap smear with HPV co-testing every 5 years.   Recommendations may differ for women with a history of total hysterectomy, cervical cancer, or abnormal pap smears in past. Pap Smear: Not on file    Screening Not Indicated   Hepatitis C Screening Once for adults born between 1945 and 1965  More frequently in  patients at high risk for Hepatitis C Hep C Antibody: Not on file        Diabetes Screening 1-2 times per year if you're at risk for diabetes or have pre-diabetes Fasting glucose: 84 mg/dL (9/26/2024)  A1C: 5.1 % (10/17/2022)  Screening Current   Cholesterol Screening Once every 5 years if you don't have a lipid disorder. May order more often based on risk factors. Lipid panel: 09/26/2024    Screening Not Indicated  History Lipid Disorder     Other Preventive Screenings Covered by Medicare:  Abdominal Aortic Aneurysm (AAA) Screening: covered once if your at risk. You're considered to be at risk if you have a family history of AAA.  Lung Cancer Screening: covers low dose CT scan once per year if you meet all of the following conditions: (1) Age 55-77; (2) No signs or symptoms of lung cancer; (3) Current smoker or have quit smoking within the last 15 years; (4) You have a tobacco smoking history of at least 20 pack years (packs per day multiplied by number of years you smoked); (5) You get a written order from a healthcare provider.  Glaucoma Screening: covered annually if you're considered high risk: (1) You have diabetes OR (2) Family history of glaucoma OR (3)  aged 50 and older OR (4)  American aged 65 and older  Osteoporosis Screening: covered every 2 years if you meet one of the following conditions: (1) You're estrogen deficient and at risk for osteoporosis based off medical history and other findings; (2) Have a vertebral abnormality; (3) On glucocorticoid therapy for more than 3 months; (4) Have primary hyperparathyroidism; (5) On osteoporosis medications and need to assess response to drug therapy.   Last bone density test (DXA Scan): 04/07/2023.  HIV Screening: covered annually if you're between the age of 15-65. Also covered annually if you are younger than 15 and older than 65 with risk factors for HIV infection. For pregnant patients, it is covered up to 3 times per  pregnancy.    Immunizations:  Immunization Recommendations   Influenza Vaccine Annual influenza vaccination during flu season is recommended for all persons aged >= 6 months who do not have contraindications   Pneumococcal Vaccine   * Pneumococcal conjugate vaccine = PCV13 (Prevnar 13), PCV15 (Vaxneuvance), PCV20 (Prevnar 20)  * Pneumococcal polysaccharide vaccine = PPSV23 (Pneumovax) Adults 19-65 yo with certain risk factors or if 65+ yo  If never received any pneumonia vaccine: recommend Prevnar 20 (PCV20)  Give PCV20 if previously received 1 dose of PCV13 or PPSV23   Hepatitis B Vaccine 3 dose series if at intermediate or high risk (ex: diabetes, end stage renal disease, liver disease)   Respiratory syncytial virus (RSV) Vaccine - COVERED BY MEDICARE PART D  * RSVPreF3 (Arexvy) CDC recommends that adults 60 years of age and older may receive a single dose of RSV vaccine using shared clinical decision-making (SCDM)   Tetanus (Td) Vaccine - COST NOT COVERED BY MEDICARE PART B Following completion of primary series, a booster dose should be given every 10 years to maintain immunity against tetanus. Td may also be given as tetanus wound prophylaxis.   Tdap Vaccine - COST NOT COVERED BY MEDICARE PART B Recommended at least once for all adults. For pregnant patients, recommended with each pregnancy.   Shingles Vaccine (Shingrix) - COST NOT COVERED BY MEDICARE PART B  2 shot series recommended in those 19 years and older who have or will have weakened immune systems or those 50 years and older     Health Maintenance Due:      Topic Date Due   • Hepatitis C Screening  Never done   • Breast Cancer Screening: Mammogram  04/07/2024   • Colorectal Cancer Screening  01/26/2028   • DXA SCAN  04/07/2028     Immunizations Due:      Topic Date Due   • Pneumococcal Vaccine: 65+ Years (1 of 2 - PCV) Never done     Advance Directives   What are advance directives?  Advance directives are legal documents that state your wishes and  plans for medical care. These plans are made ahead of time in case you lose your ability to make decisions for yourself. Advance directives can apply to any medical decision, such as the treatments you want, and if you want to donate organs.   What are the types of advance directives?  There are many types of advance directives, and each state has rules about how to use them. You may choose a combination of any of the following:  Living will:  This is a written record of the treatment you want. You can also choose which treatments you do not want, which to limit, and which to stop at a certain time. This includes surgery, medicine, IV fluid, and tube feedings.   Durable power of  for healthcare (DPAHC):  This is a written record that states who you want to make healthcare choices for you when you are unable to make them for yourself. This person, called a proxy, is usually a family member or a friend. You may choose more than 1 proxy.  Do not resuscitate (DNR) order:  A DNR order is used in case your heart stops beating or you stop breathing. It is a request not to have certain forms of treatment, such as CPR. A DNR order may be included in other types of advance directives.  Medical directive:  This covers the care that you want if you are in a coma, near death, or unable to make decisions for yourself. You can list the treatments you want for each condition. Treatment may include pain medicine, surgery, blood transfusions, dialysis, IV or tube feedings, and a ventilator (breathing machine).  Values history:  This document has questions about your views, beliefs, and how you feel and think about life. This information can help others choose the care that you would choose.  Why are advance directives important?  An advance directive helps you control your care. Although spoken wishes may be used, it is better to have your wishes written down. Spoken wishes can be misunderstood, or not followed. Treatments  may be given even if you do not want them. An advance directive may make it easier for your family to make difficult choices about your care.   Depression   Depression  is a medical condition that causes feelings of sadness or hopelessness that do not go away. Depression may cause you to lose interest in things you used to enjoy. These feelings may interfere with your daily life.  Call your local emergency number (911 in the ) if:   You think about harming yourself or someone else.  You have done something on purpose to hurt yourself.  The following resources are available at any time to help you, if needed:   National Suicide Prevention Lifeline: 1-324.393.9074 (0-112-781-TALK)   Suicide Hotline: 1-517.450.9039 (3-682-RDVDBUG)   For a list of international numbers: https://save.org/find-help/international-resources/  Treatment for depression may include medicine to relieve depression. Medicine is often used together with therapy. Therapy is a way for you to talk about your feelings and anything that may be causing depression. Therapy can be done alone or in a group. It may also be done with family members or a significant other.  Get regular physical activity.    Create a regular sleep schedule.    Eat a variety of healthy foods.    Do not drink alcohol or use drugs.     Fall Prevention    Fall prevention  includes ways to make your home and other areas safer. It also includes ways you can move more carefully to prevent a fall. Health conditions that cause changes in your blood pressure, vision, or muscle strength and coordination may increase your risk for falls. Medicines may also increase your risk for falls if they make you dizzy, weak, or sleepy.   Fall prevention tips:   Stand or sit up slowly.    Use assistive devices as directed.    Wear shoes that fit well and have soles that .    Wear a personal alarm.    Stay active.    Manage your medical conditions.    Home Safety Tips:  Add items to prevent  falls in the bathroom.    Keep paths clear.    Install bright lights in your home.    Keep items you use often on shelves within reach.    Paint or place reflective tape on the edges of your stairs.    Narcotic (Opioid) Safety    Use narcotics safely:  Take prescribed narcotics exactly as directed  Do not give narcotics to others or take narcotics that belong to someone else  Do not mix narcotics without medicines or alcohol  Do not drive or operate heavy machinery after you take the narcotic  Monitor for side effects and notify your healthcare provider if you experienced side effects such as nausea, sleepiness, itching, or trouble thinking clearly.    Manage constipation:    Constipation is the most common side effect of narcotic medicine. Constipation is when you have hard, dry bowel movements, or you go longer than usual between bowel movements. Tell your healthcare provider about all changes in your bowel movements while you are taking narcotics. He or she may recommend laxative medicine to help you have a bowel movement. He or she may also change the kind of narcotic you are taking, or change when you take it. The following are more ways you can prevent or relieve constipation:    Drink liquids as directed.  You may need to drink extra liquids to help soften and move your bowels. Ask how much liquid to drink each day and which liquids are best for you.  Eat high-fiber foods.  This may help decrease constipation by adding bulk to your bowel movements. High-fiber foods include fruits, vegetables, whole-grain breads and cereals, and beans. Your healthcare provider or dietitian can help you create a high-fiber meal plan. Your provider may also recommend a fiber supplement if you cannot get enough fiber from food.  Exercise regularly.  Regular physical activity can help stimulate your intestines. Walking is a good exercise to prevent or relieve constipation. Ask which exercises are best for you.  Schedule a time  each day to have a bowel movement.  This may help train your body to have regular bowel movements. Bend forward while you are on the toilet to help move the bowel movement out. Sit on the toilet for at least 10 minutes, even if you do not have a bowel movement.    Store narcotics safely:   Store narcotics where others cannot easily get them.  Keep them in a locked cabinet or secure area. Do not  keep them in a purse or other bag you carry with you. A person may be looking for something else and find the narcotics.  Make sure narcotics are stored out of the reach of children.  A child can easily overdose on narcotics. Narcotics may look like candy to a small child.    The best way to dispose of narcotics:      The laws vary by country and area. In the [place], the best way is to return the narcotics through a take-back program. This program is offered by the [place] Drug Enforcement Agency (RK). The following are options for using the program:  Take the narcotics to a RK collection site.  The site is often a law enforcement center. Call your local law enforcement center for scheduled take-back days in your area. You will be given information on where to go if the collection site is in a different location.  Take the narcotics to an approved pharmacy or hospital.  A pharmacy or hospital may be set up as a collection site. You will need to ask if it is a RK collection site if you were not directed there. A pharmacy or doctor's office may not be able to take back narcotics unless it is a RK site.  Use a mail-back system.  This means you are given containers to put the narcotics into. You will then mail them in the containers.  Use a take-back drop box.  This is a place to leave the narcotics at any time. People and animals will not be able to get into the box. Your local law enforcement agency can tell you where to find a drop box in your area.    Other ways to manage pain:   Ask your healthcare provider about  non-narcotic medicines to control pain.  Nonprescription medicines include NSAIDs (such as ibuprofen) and acetaminophen. Prescription medicines include muscle relaxers, antidepressants, and steroids.  Pain may be managed without any medicines.  Some ways to relieve pain include massage, aromatherapy, or meditation. Physical or occupational therapy may also help.    For more information:   Drug Enforcement Administration  [address] [place] [address]  Phone: 0- 681 - [phone]  Web Address: https://www.deadiversion.[facility].gov/drug_disposal/    [place] Food and Drug Administration  [address] [place] [address]  Phone: 8- 224 - 989-6197  Web Address: http://www.fda.gov     © Copyright [employer] 2018 Information is for End User's use only and may not be sold, redistributed or otherwise used for commercial purposes. All illustrations and images included in CareNotes® are the copyrighted property of A.D.A.M., Inc. or [employer]

## 2024-11-08 NOTE — ASSESSMENT & PLAN NOTE
Orders:    CBC and differential; Future    Comprehensive metabolic panel; Future    XR spine lumbar minimum 4 views non injury; Future

## 2024-11-08 NOTE — ASSESSMENT & PLAN NOTE
Depression Screening Follow-up Plan: Patient's depression screening was positive with a PHQ-2 score of 3. Their PHQ-9 score was 6. Patient's depressive symptoms likely due to other medical condition. Would recommend treatment of underlying condition. Will continue to monitor at next office visit.

## 2024-11-08 NOTE — PROGRESS NOTES
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 If no response after 2-3 minutes, give another dose in the other nostril using a new spray.    Palliative care by specialist         Uncontrolled pain    Orders:    fentaNYL (DURAGESIC) 12 mcg/hr TD 72 hr patch; Place 1 patch on the skin over 72 hours every third day Max Daily Amount: 1 patch    Drug-induced constipation    Orders:    polyethylene glycol (GLYCOLAX) 17 GM/SCOOP powder; Take 17 g by mouth 2 (two) times a day    naloxone (NARCAN) 4 mg/0.1 mL nasal spray; Administer 1 spray into a nostril. If no response after 2-3 minutes, give another dose in the other nostril using a new spray.    Fever, unspecified fever cause         Depression, recurrent (HCC)  Depression Screening Follow-up Plan: Patient's depression screening was positive with a PHQ-2 score of 3. Their PHQ-9 score was 6. Patient's depressive symptoms likely due to other medical condition. Would recommend treatment of underlying condition. Will continue to monitor at next office visit.           Depression Screening and Follow-up Plan: Patient's depression screening was positive with a PHQ-2 score of 3. Their PHQ-9 score was 6. Depression likely due to other medical  condition. Will treat underlying condition.       Preventive health issues were discussed with patient, and age appropriate screening tests were ordered as noted in patient's After Visit Summary. Personalized health advice and appropriate referrals for health education or preventive services given if needed, as noted in patient's After Visit Summary.    History of Present Illness     Here with daughter for follow up   Medicare wellness visit also done   Has increased pain in back in waves and has not had bm for 4 days  To have radiation therapy for 10 treatments on back and shoulder for 5 treatments that she started the first treatment 3 days ago  With increasing pain had reached out to palliative care but has not heard back from them yet and apparently they did not have appointments available.  They have her on Vicodin and had also given her Rx for oxycodone but that made her feel very dizzy so she had stopped it after a few doses.  After talking with 13 she was to increase the Vicodin to 2 tablets every 4 hours but that does not appear to be holding her pain  Have sent a message to Dr. Bernard but he is on do not disturb  on haiku.  Start her on a low-dose of fentanyl patch try to get control of her pain # if worsening may need to consider adding additional Vicodin as she is running low with supply       Patient Care Team:  Lisa Fairbanks DO as PCP - General (Internal Medicine)  Eliana Gavin MA (Care Coordination)  Cassandra Lynn Cardarelli, MD as Surgeon (Surgical Oncology)  JANET Nunn as  Care Manager (Oncology)  Fam Amanda MD (Hematology and Oncology)  Pamela Carlson RD (Nutrition)  Sekou Bernard MD (Palliative Care)  Chidi Enriquez as     Review of Systems   Constitutional:  Positive for fever.        Reports she had low-grade temp this morning at 100 but no chills or flushing-would like her to take her temps twice daily-concerned she may be having  some component of tumor fever but will send her over for labs including CBC and blood cultures.    Genitourinary:  Negative for dysuria.     Medical History Reviewed by provider this encounter:       Annual Wellness Visit Questionnaire   Yuni is here for her Subsequent Wellness visit.     Health Risk Assessment:   Patient rates overall health as fair. Patient feels that their physical health rating is much worse. Patient is dissatisfied with their life. Eyesight was rated as same. Hearing was rated as same. Patient feels that their emotional and mental health rating is much worse. Patients states they are never, rarely angry. Patient states they are sometimes unusually tired/fatigued. Pain experienced in the last 7 days has been a lot. Patient's pain rating has been 7/10. Patient states that she has experienced no weight loss or gain in last 6 months.     Depression Screening:   PHQ-2 Score: 3  PHQ-9 Score: 6      Fall Risk Screening:   In the past year, patient has experienced: history of falling in past year      Urinary Incontinence Screening:   Patient has not leaked urine accidently in the last six months.     Home Safety:  Patient does not have trouble with stairs inside or outside of their home. Patient has working smoke alarms and has working carbon monoxide detector. Home safety hazards include: none.     Nutrition:   Current diet is Regular.     Medications:   Patient is currently taking over-the-counter supplements. OTC medications include: see medication list. Patient is able to manage medications.     Activities of Daily Living (ADLs)/Instrumental Activities of Daily Living (IADLs):   Walk and transfer into and out of bed and chair?: Yes  Dress and groom yourself?: Yes    Bathe or shower yourself?: Yes    Feed yourself? Yes  Do your laundry/housekeeping?: Yes  Manage your money, pay your bills and track your expenses?: Yes  Make your own meals?: Yes    Do your own shopping?: Yes    Previous  Hospitalizations:   Any hospitalizations or ED visits within the last 12 months?: Yes    How many hospitalizations have you had in the last year?: 1-2    Advance Care Planning:   Living will: Yes    Durable POA for healthcare: Yes    Advanced directive: Yes      PREVENTIVE SCREENINGS      Cardiovascular Screening:    General: Screening Not Indicated and History Lipid Disorder      Diabetes Screening:     General: Screening Current      Colorectal Cancer Screening:     General: Screening Current      Breast Cancer Screening:     General: History Breast Cancer      Cervical Cancer Screening:    General: Screening Not Indicated      Osteoporosis Screening:    General: Screening Not Indicated and History Osteoporosis      Lung Cancer Screening:     General: Screening Not Indicated    Screening, Brief Intervention, and Referral to Treatment (SBIRT)    Screening  Typical number of drinks in a day: 0  Typical number of drinks in a week: 0  Interpretation: Low risk drinking behavior.    AUDIT-C Screenin) How often did you have a drink containing alcohol in the past year? monthly or less  2) How many drinks did you have on a typical day when you were drinking in the past year? 0  3) How often did you have 6 or more drinks on one occasion in the past year? never    AUDIT-C Score: 1  Interpretation: Score 0-2 (female): Negative screen for alcohol misuse    Single Item Drug Screening:  How often have you used an illegal drug (including marijuana) or a prescription medication for non-medical reasons in the past year? never    Single Item Drug Screen Score: 0  Interpretation: Negative screen for possible drug use disorder    Review of Current Opioid Use    Opioid Risk Tool (ORT) Interpretation: Complete Opioid Risk Tool (ORT)    Social Determinants of Health     Financial Resource Strain: Patient Declined (2023)    Overall Financial Resource Strain (CARDIA)     Difficulty of Paying Living Expenses: Patient declined  "  Food Insecurity: No Food Insecurity (11/1/2024)    Hunger Vital Sign     Worried About Running Out of Food in the Last Year: Never true     Ran Out of Food in the Last Year: Never true   Transportation Needs: Unknown (11/1/2024)    PRAPARE - Transportation     Lack of Transportation (Non-Medical): No   Housing Stability: Unknown (11/1/2024)    Housing Stability Vital Sign     Unable to Pay for Housing in the Last Year: No     Homeless in the Last Year: No   Utilities: Not At Risk (11/1/2024)    [facility] Utilities     Threatened with loss of utilities: No     No results found.    Objective     /60   Pulse 65   Temp 98.4 °F (36.9 °C)   Ht 4' 10\" (1.473 m)   Wt 53.5 kg (118 lb)   SpO2 93%   BMI 24.66 kg/m²     Physical Exam  Vitals and nursing note reviewed.   Constitutional:       General: She is not in acute distress.  HENT:      Head: Normocephalic.      Right Ear: There is no impacted cerumen.      Left Ear: There is no impacted cerumen.      Nose: Congestion present.      Mouth/Throat:      Pharynx: No oropharyngeal exudate or posterior oropharyngeal erythema.   Eyes:      General: No scleral icterus.     Conjunctiva/sclera: Conjunctivae normal.   Neck:      Vascular: No carotid bruit.   Cardiovascular:      Rate and Rhythm: Normal rate and regular rhythm.      Heart sounds: No murmur heard.     No gallop.   Pulmonary:      Breath sounds: No wheezing or rhonchi.   Chest:      Chest wall: No tenderness.   Abdominal:      Palpations: Abdomen is soft.      Tenderness: There is no abdominal tenderness. There is no guarding.   Musculoskeletal:         General: Tenderness present.      Cervical back: No rigidity or tenderness.      Comments: Thoracic and mid lumbar tenderness over spinous process.    Also shoulder and cervical spine tenderness with palpation   Lymphadenopathy:      Cervical: No cervical adenopathy.   Skin:     Findings: No erythema.   Neurological:      General: No focal deficit present.      " Mental Status: She is oriented to person, place, and time.   Psychiatric:         Mood and Affect: Mood normal.

## 2024-11-08 NOTE — TELEPHONE ENCOUNTER
Patient calling back to get information on options for pain management. She reports pain 9/10 to lower back and going down the sides and front of her thighs. She states she has been taking Norco 2 tablets q4h which does not seem to help (currently ordered to take 1 tablet by mouth every 6 hours as needed for pain). She states she has been eating and drinking but has not had a bowel movement since Monday. She is taking Senna and Dulcolax. She reports taking Zofran once daily for mild nausea.    She states she felt warm about an hour ago and took her temperature temporally which was 100.6, it is now 98.8. Denies additional symptoms or exposure to anyone who might be ill.    She states she completed Prednisone on Monday but does not feel that it really helped much with her pain.    She states that she has had ongoing pain but this pain in her lower back and leg pain is new for the past few days. Denies weakness in extremities, just reports achy thighs. Denies issues with urination.    Advised that per notes from yesterday, it may be advisable to seek evaluation in the ER for symptoms. She is requesting to see if any appointments might be possible to be seen today rather than ER visit. If not she will be agreeable to go to to ER for evaluation.

## 2024-11-09 ENCOUNTER — APPOINTMENT (EMERGENCY)
Dept: CT IMAGING | Facility: HOSPITAL | Age: 79
End: 2024-11-09
Payer: MEDICARE

## 2024-11-09 ENCOUNTER — HOSPITAL ENCOUNTER (INPATIENT)
Facility: HOSPITAL | Age: 79
LOS: 2 days | Discharge: HOME | DRG: 543 | End: 2024-11-11
Attending: INTERNAL MEDICINE | Admitting: INTERNAL MEDICINE
Payer: MEDICARE

## 2024-11-09 ENCOUNTER — HOSPITAL ENCOUNTER (EMERGENCY)
Facility: HOSPITAL | Age: 79
End: 2024-11-09
Attending: EMERGENCY MEDICINE | Admitting: EMERGENCY MEDICINE
Payer: MEDICARE

## 2024-11-09 VITALS
RESPIRATION RATE: 16 BRPM | DIASTOLIC BLOOD PRESSURE: 69 MMHG | HEART RATE: 90 BPM | OXYGEN SATURATION: 98 % | TEMPERATURE: 98.4 F | SYSTOLIC BLOOD PRESSURE: 144 MMHG

## 2024-11-09 DIAGNOSIS — C50.911 BREAST CANCER METASTASIZED TO BONE, RIGHT (HCC): Primary | ICD-10-CM

## 2024-11-09 DIAGNOSIS — R93.5 ABNORMAL ABDOMINAL CT SCAN: ICD-10-CM

## 2024-11-09 DIAGNOSIS — M54.9 INTRACTABLE BACK PAIN: ICD-10-CM

## 2024-11-09 DIAGNOSIS — S32.010A CLOSED COMPRESSION FRACTURE OF BODY OF L1 VERTEBRA (HCC): ICD-10-CM

## 2024-11-09 DIAGNOSIS — M84.58XS PATHOLOGICAL FRACTURE IN NEOPLASTIC DISEASE, OTHER SPECIFIED SITE, SEQUELA: Primary | ICD-10-CM

## 2024-11-09 DIAGNOSIS — R93.7 ABNORMAL CT SCAN, LUMBAR SPINE: ICD-10-CM

## 2024-11-09 DIAGNOSIS — M54.59 INTRACTABLE LOW BACK PAIN: ICD-10-CM

## 2024-11-09 DIAGNOSIS — C79.51 BREAST CANCER METASTASIZED TO BONE, RIGHT (HCC): Primary | ICD-10-CM

## 2024-11-09 DIAGNOSIS — N13.30 BILATERAL HYDRONEPHROSIS: ICD-10-CM

## 2024-11-09 DIAGNOSIS — Z51.5 PALLIATIVE CARE BY SPECIALIST: ICD-10-CM

## 2024-11-09 DIAGNOSIS — G89.3 CANCER RELATED PAIN: ICD-10-CM

## 2024-11-09 DIAGNOSIS — S32.010A COMPRESSION FRACTURE OF L1 LUMBAR VERTEBRA (HCC): ICD-10-CM

## 2024-11-09 DIAGNOSIS — R93.7 ABNORMAL CT OF THORACIC SPINE: ICD-10-CM

## 2024-11-09 PROBLEM — K59.00 CONSTIPATION: Status: ACTIVE | Noted: 2024-11-09

## 2024-11-09 LAB
ALBUMIN SERPL BCG-MCNC: 4.3 G/DL (ref 3.5–5)
ALP SERPL-CCNC: 45 U/L (ref 34–104)
ALT SERPL W P-5'-P-CCNC: 21 U/L (ref 7–52)
ANION GAP SERPL CALCULATED.3IONS-SCNC: 5 MMOL/L (ref 4–13)
AST SERPL W P-5'-P-CCNC: 20 U/L (ref 13–39)
BACTERIA UR QL AUTO: ABNORMAL /HPF
BASOPHILS # BLD MANUAL: 0 THOUSAND/UL (ref 0–0.1)
BASOPHILS NFR MAR MANUAL: 0 % (ref 0–1)
BILIRUB SERPL-MCNC: 0.69 MG/DL (ref 0.2–1)
BILIRUB UR QL STRIP: NEGATIVE
BUN SERPL-MCNC: 12 MG/DL (ref 5–25)
CALCIUM SERPL-MCNC: 9.4 MG/DL (ref 8.4–10.2)
CHLORIDE SERPL-SCNC: 103 MMOL/L (ref 96–108)
CLARITY UR: CLEAR
CO2 SERPL-SCNC: 33 MMOL/L (ref 21–32)
COLOR UR: YELLOW
CREAT SERPL-MCNC: 0.62 MG/DL (ref 0.6–1.3)
EOSINOPHIL # BLD MANUAL: 0.05 THOUSAND/UL (ref 0–0.4)
EOSINOPHIL NFR BLD MANUAL: 1 % (ref 0–6)
ERYTHROCYTE [DISTWIDTH] IN BLOOD BY AUTOMATED COUNT: 15.2 % (ref 11.6–15.1)
GFR SERPL CREATININE-BSD FRML MDRD: 86 ML/MIN/1.73SQ M
GLUCOSE SERPL-MCNC: 110 MG/DL (ref 65–140)
GLUCOSE UR STRIP-MCNC: NEGATIVE MG/DL
HCT VFR BLD AUTO: 41.3 % (ref 34.8–46.1)
HGB BLD-MCNC: 13.6 G/DL (ref 11.5–15.4)
HGB UR QL STRIP.AUTO: NEGATIVE
KETONES UR STRIP-MCNC: NEGATIVE MG/DL
LEUKOCYTE ESTERASE UR QL STRIP: NEGATIVE
LIPASE SERPL-CCNC: 54 U/L (ref 11–82)
LYMPHOCYTES # BLD AUTO: 0.59 THOUSAND/UL (ref 0.6–4.47)
LYMPHOCYTES # BLD AUTO: 13 % (ref 14–44)
MAGNESIUM SERPL-MCNC: 2 MG/DL (ref 1.9–2.7)
MCH RBC QN AUTO: 30.2 PG (ref 26.8–34.3)
MCHC RBC AUTO-ENTMCNC: 32.9 G/DL (ref 31.4–37.4)
MCV RBC AUTO: 92 FL (ref 82–98)
MONOCYTES # BLD AUTO: 0.09 THOUSAND/UL (ref 0–1.22)
MONOCYTES NFR BLD: 2 % (ref 4–12)
MUCOUS THREADS UR QL AUTO: ABNORMAL
NEUTROPHILS # BLD MANUAL: 3.84 THOUSAND/UL (ref 1.85–7.62)
NEUTS BAND NFR BLD MANUAL: 1 % (ref 0–8)
NEUTS SEG NFR BLD AUTO: 83 % (ref 43–75)
NITRITE UR QL STRIP: NEGATIVE
NON-SQ EPI CELLS URNS QL MICRO: ABNORMAL /HPF
PH UR STRIP.AUTO: 7 [PH] (ref 4.5–8)
PLATELET # BLD AUTO: 275 THOUSANDS/UL (ref 149–390)
PLATELET BLD QL SMEAR: ADEQUATE
PMV BLD AUTO: 9.1 FL (ref 8.9–12.7)
POTASSIUM SERPL-SCNC: 3.9 MMOL/L (ref 3.5–5.3)
PROT SERPL-MCNC: 6.9 G/DL (ref 6.4–8.4)
PROT UR STRIP-MCNC: ABNORMAL MG/DL
RBC # BLD AUTO: 4.51 MILLION/UL (ref 3.81–5.12)
RBC #/AREA URNS AUTO: ABNORMAL /HPF
RBC MORPH BLD: NORMAL
SODIUM SERPL-SCNC: 141 MMOL/L (ref 135–147)
SP GR UR STRIP.AUTO: 1.02 (ref 1–1.03)
TRI-PHOS CRY URNS QL MICRO: ABNORMAL /HPF
UROBILINOGEN UR QL STRIP.AUTO: 0.2 E.U./DL
WBC # BLD AUTO: 4.57 THOUSAND/UL (ref 4.31–10.16)
WBC #/AREA URNS AUTO: ABNORMAL /HPF

## 2024-11-09 PROCEDURE — 80053 COMPREHEN METABOLIC PANEL: CPT | Performed by: EMERGENCY MEDICINE

## 2024-11-09 PROCEDURE — 96375 TX/PRO/DX INJ NEW DRUG ADDON: CPT

## 2024-11-09 PROCEDURE — 96361 HYDRATE IV INFUSION ADD-ON: CPT

## 2024-11-09 PROCEDURE — 72128 CT CHEST SPINE W/O DYE: CPT

## 2024-11-09 PROCEDURE — 85027 COMPLETE CBC AUTOMATED: CPT | Performed by: EMERGENCY MEDICINE

## 2024-11-09 PROCEDURE — 83690 ASSAY OF LIPASE: CPT | Performed by: EMERGENCY MEDICINE

## 2024-11-09 PROCEDURE — 81001 URINALYSIS AUTO W/SCOPE: CPT

## 2024-11-09 PROCEDURE — 83735 ASSAY OF MAGNESIUM: CPT | Performed by: EMERGENCY MEDICINE

## 2024-11-09 PROCEDURE — 85007 BL SMEAR W/DIFF WBC COUNT: CPT | Performed by: EMERGENCY MEDICINE

## 2024-11-09 PROCEDURE — 99285 EMERGENCY DEPT VISIT HI MDM: CPT | Performed by: EMERGENCY MEDICINE

## 2024-11-09 PROCEDURE — 99284 EMERGENCY DEPT VISIT MOD MDM: CPT

## 2024-11-09 PROCEDURE — 96374 THER/PROPH/DIAG INJ IV PUSH: CPT

## 2024-11-09 PROCEDURE — 96376 TX/PRO/DX INJ SAME DRUG ADON: CPT

## 2024-11-09 PROCEDURE — 36415 COLL VENOUS BLD VENIPUNCTURE: CPT | Performed by: EMERGENCY MEDICINE

## 2024-11-09 PROCEDURE — 74177 CT ABD & PELVIS W/CONTRAST: CPT

## 2024-11-09 RX ORDER — POLYETHYLENE GLYCOL 3350 17 G/17G
17 POWDER, FOR SOLUTION ORAL 2 TIMES DAILY
Status: DISCONTINUED | OUTPATIENT
Start: 2024-11-09 | End: 2024-11-10

## 2024-11-09 RX ORDER — FENTANYL CITRATE 50 UG/ML
25 INJECTION, SOLUTION INTRAMUSCULAR; INTRAVENOUS ONCE
Status: COMPLETED | OUTPATIENT
Start: 2024-11-09 | End: 2024-11-09

## 2024-11-09 RX ORDER — POLYETHYLENE GLYCOL 3350 17 G/17G
17 POWDER, FOR SOLUTION ORAL DAILY PRN
Status: DISCONTINUED | OUTPATIENT
Start: 2024-11-09 | End: 2024-11-09

## 2024-11-09 RX ORDER — ATORVASTATIN CALCIUM 10 MG/1
10 TABLET, FILM COATED ORAL DAILY
Status: DISCONTINUED | OUTPATIENT
Start: 2024-11-10 | End: 2024-11-11 | Stop reason: HOSPADM

## 2024-11-09 RX ORDER — DEXAMETHASONE SODIUM PHOSPHATE 4 MG/ML
4 INJECTION, SOLUTION INTRA-ARTICULAR; INTRALESIONAL; INTRAMUSCULAR; INTRAVENOUS; SOFT TISSUE ONCE
Status: COMPLETED | OUTPATIENT
Start: 2024-11-09 | End: 2024-11-09

## 2024-11-09 RX ORDER — MORPHINE SULFATE 4 MG/ML
4 INJECTION, SOLUTION INTRAMUSCULAR; INTRAVENOUS ONCE
Status: COMPLETED | OUTPATIENT
Start: 2024-11-09 | End: 2024-11-09

## 2024-11-09 RX ORDER — HYDROMORPHONE HCL/PF 1 MG/ML
0.5 SYRINGE (ML) INJECTION EVERY 2 HOUR PRN
Status: DISCONTINUED | OUTPATIENT
Start: 2024-11-09 | End: 2024-11-11 | Stop reason: HOSPADM

## 2024-11-09 RX ORDER — DEXAMETHASONE SODIUM PHOSPHATE 4 MG/ML
4 INJECTION, SOLUTION INTRA-ARTICULAR; INTRALESIONAL; INTRAMUSCULAR; INTRAVENOUS; SOFT TISSUE EVERY 6 HOURS SCHEDULED
Status: COMPLETED | OUTPATIENT
Start: 2024-11-09 | End: 2024-11-09

## 2024-11-09 RX ORDER — MORPHINE SULFATE 4 MG/ML
4 INJECTION, SOLUTION INTRAMUSCULAR; INTRAVENOUS
Status: DISCONTINUED | OUTPATIENT
Start: 2024-11-09 | End: 2024-11-09 | Stop reason: HOSPADM

## 2024-11-09 RX ORDER — OXYCODONE HYDROCHLORIDE 5 MG/1
5 TABLET ORAL EVERY 4 HOURS PRN
Status: DISCONTINUED | OUTPATIENT
Start: 2024-11-09 | End: 2024-11-11 | Stop reason: HOSPADM

## 2024-11-09 RX ORDER — ENOXAPARIN SODIUM 100 MG/ML
40 INJECTION SUBCUTANEOUS DAILY
Status: DISCONTINUED | OUTPATIENT
Start: 2024-11-10 | End: 2024-11-11 | Stop reason: HOSPADM

## 2024-11-09 RX ORDER — ONDANSETRON 2 MG/ML
4 INJECTION INTRAMUSCULAR; INTRAVENOUS EVERY 4 HOURS PRN
Status: DISCONTINUED | OUTPATIENT
Start: 2024-11-09 | End: 2024-11-11 | Stop reason: HOSPADM

## 2024-11-09 RX ORDER — FENTANYL 12.5 UG/1
12 PATCH TRANSDERMAL
Status: DISCONTINUED | OUTPATIENT
Start: 2024-11-09 | End: 2024-11-09

## 2024-11-09 RX ORDER — OXYCODONE HYDROCHLORIDE 10 MG/1
10 TABLET ORAL EVERY 4 HOURS PRN
Status: DISCONTINUED | OUTPATIENT
Start: 2024-11-09 | End: 2024-11-11 | Stop reason: HOSPADM

## 2024-11-09 RX ORDER — LIDOCAINE 50 MG/G
1 PATCH TOPICAL DAILY
Status: DISCONTINUED | OUTPATIENT
Start: 2024-11-10 | End: 2024-11-11 | Stop reason: HOSPADM

## 2024-11-09 RX ORDER — AMOXICILLIN 250 MG
1 CAPSULE ORAL
Status: DISCONTINUED | OUTPATIENT
Start: 2024-11-09 | End: 2024-11-11 | Stop reason: HOSPADM

## 2024-11-09 RX ORDER — DEXAMETHASONE SODIUM PHOSPHATE 4 MG/ML
4 INJECTION, SOLUTION INTRA-ARTICULAR; INTRALESIONAL; INTRAMUSCULAR; INTRAVENOUS; SOFT TISSUE EVERY 6 HOURS SCHEDULED
Status: DISCONTINUED | OUTPATIENT
Start: 2024-11-10 | End: 2024-11-11

## 2024-11-09 RX ORDER — LETROZOLE 2.5 MG/1
2.5 TABLET, FILM COATED ORAL DAILY
Status: DISCONTINUED | OUTPATIENT
Start: 2024-11-10 | End: 2024-11-11 | Stop reason: HOSPADM

## 2024-11-09 RX ORDER — FENTANYL CITRATE 50 UG/ML
50 INJECTION, SOLUTION INTRAMUSCULAR; INTRAVENOUS ONCE
Status: COMPLETED | OUTPATIENT
Start: 2024-11-09 | End: 2024-11-09

## 2024-11-09 RX ORDER — BISACODYL 5 MG/1
5 TABLET, DELAYED RELEASE ORAL DAILY PRN
Status: DISCONTINUED | OUTPATIENT
Start: 2024-11-09 | End: 2024-11-11 | Stop reason: HOSPADM

## 2024-11-09 RX ADMIN — SENNOSIDES AND DOCUSATE SODIUM 1 TABLET: 8.6; 5 TABLET ORAL at 23:11

## 2024-11-09 RX ADMIN — MORPHINE SULFATE 4 MG: 4 INJECTION INTRAVENOUS at 16:26

## 2024-11-09 RX ADMIN — MORPHINE SULFATE 4 MG: 4 INJECTION INTRAVENOUS at 12:29

## 2024-11-09 RX ADMIN — MORPHINE SULFATE 4 MG: 4 INJECTION INTRAVENOUS at 11:22

## 2024-11-09 RX ADMIN — DEXAMETHASONE SODIUM PHOSPHATE 4 MG: 4 INJECTION INTRA-ARTICULAR; INTRALESIONAL; INTRAMUSCULAR; INTRAVENOUS; SOFT TISSUE at 23:11

## 2024-11-09 RX ADMIN — DEXAMETHASONE SODIUM PHOSPHATE 4 MG: 4 INJECTION INTRA-ARTICULAR; INTRALESIONAL; INTRAMUSCULAR; INTRAVENOUS; SOFT TISSUE at 18:56

## 2024-11-09 RX ADMIN — SODIUM CHLORIDE 500 ML: 0.9 INJECTION, SOLUTION INTRAVENOUS at 09:58

## 2024-11-09 RX ADMIN — METHYLNALTREXONE BROMIDE 8 MG: 8 INJECTION, SOLUTION SUBCUTANEOUS at 14:33

## 2024-11-09 RX ADMIN — DEXAMETHASONE SODIUM PHOSPHATE 4 MG: 4 INJECTION INTRA-ARTICULAR; INTRALESIONAL; INTRAMUSCULAR; INTRAVENOUS; SOFT TISSUE at 16:26

## 2024-11-09 RX ADMIN — FENTANYL CITRATE 25 MCG: 50 INJECTION, SOLUTION INTRAMUSCULAR; INTRAVENOUS at 09:57

## 2024-11-09 RX ADMIN — FENTANYL CITRATE 50 MCG: 50 INJECTION, SOLUTION INTRAMUSCULAR; INTRAVENOUS at 10:29

## 2024-11-09 RX ADMIN — MORPHINE SULFATE 4 MG: 4 INJECTION INTRAVENOUS at 13:18

## 2024-11-09 RX ADMIN — POLYETHYLENE GLYCOL 3350 17 G: 17 POWDER, FOR SOLUTION ORAL at 23:32

## 2024-11-09 RX ADMIN — IOHEXOL 85 ML: 350 INJECTION, SOLUTION INTRAVENOUS at 10:50

## 2024-11-09 RX ADMIN — OXYCODONE HYDROCHLORIDE 10 MG: 10 TABLET ORAL at 23:11

## 2024-11-09 NOTE — EMTALA/ACUTE CARE TRANSFER
[facility] EMERGENCY DEPARTMENT  [address] [address]  Dept: [phone]      EMTALA TRANSFER CONSENT    NAME Yuni Keys                                         1945                              MRN [id number]    I have been informed of my rights regarding examination, treatment, and transfer   by Dr. Jesus Jiménez*    Benefits: Specialized equipment and/or services available at the receiving facility (Include comment)________________________, Continuity of care (Radiation treatments, neurosurgery consult, palliative consult)    Risks: Potential for delay in receiving treatment, Loss of IV, Potential deterioration of medical condition, Increased discomfort during transfer      Consent for Transfer:  I acknowledge that my medical condition has been evaluated and explained to me by the emergency department physician or other qualified medical person and/or my attending physician, who has recommended that I be transferred to the service of  Accepting Physician: Dr. Scott at Accepting Facility Name, City & State : [facility]. The above potential benefits of such transfer, the potential risks associated with such transfer, and the probable risks of not being transferred have been explained to me, and I fully understand them.  The doctor has explained that, in my case, the benefits of transfer outweigh the risks.  I agree to be transferred.    I authorize the performance of emergency medical procedures and treatments upon me in both transit and upon arrival at the receiving facility.  Additionally, I authorize the release of any and all medical records to the receiving facility and request they be transported with me, if possible.  I understand that the safest mode of transportation during a medical emergency is an ambulance and that the Hospital advocates the use of this mode of transport. Risks of traveling to the receiving facility by car, including  absence of medical control, life sustaining equipment, such as oxygen, and medical personnel has been explained to me and I fully understand them.    (ANGI CORRECT BOX BELOW)  [  ]  I consent to the stated transfer and to be transported by ambulance/helicopter.  [  ]  I consent to the stated transfer, but refuse transportation by ambulance and accept full responsibility for my transportation by car.  I understand the risks of non-ambulance transfers and I exonerate the Hospital and its staff from any deterioration in my condition that results from this refusal.    X___________________________________________    DATE  24  TIME________  Signature of patient or legally responsible individual signing on patient behalf           RELATIONSHIP TO PATIENT_________________________          Provider Certification    NAME Yuni Keys                                        [employer] 1945                              MRN [id number]    A medical screening exam was performed on the above named patient.  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 Diagnoses of Compression fracture of L1 lumbar vertebra (HCC), Intractable low back pain, Intractable back pain, Bilateral hydronephrosis, Abnormal CT of thoracic spine, Abnormal CT scan, lumbar spine, and Abnormal abdominal CT scan were also pertinent to this visit.    Patient Condition: The patient has been stabilized such that within reasonable medical probability, no material deterioration of the patient condition or the condition of the unborn child(good) is likely to result from the transfer    Reason for Transfer: Level of Care needed not available at this facility    Transfer Requirements: Facility [facility]   Space available and qualified personnel available for treatment as acknowledged by    Agreed to accept transfer and to provide  appropriate medical treatment as acknowledged by       Dr. Scott  Appropriate medical records of the examination and treatment of the patient are provided at the time of transfer   STAFF INITIAL WHEN COMPLETED _______  Transfer will be performed by qualified personnel from    and appropriate transfer equipment as required, including the use of necessary and appropriate life support measures.    Provider Certification: I have examined the patient and explained the following risks and benefits of being transferred/refusing transfer to the patient/family:  General risk, such as traffic hazards, adverse weather conditions, rough terrain or turbulence, possible failure of equipment (including vehicle or aircraft), or consequences of actions of persons outside the control of the transport personnel, Unanticipated needs of medical equipment and personnel during transport, Risk of worsening condition, The possibility of a transport vehicle being unavailable      Based on these reasonable risks and benefits to the patient and/or the unborn child(good), and based upon the information available at the time of the patient’s examination, I certify that the medical benefits reasonably to be expected from the provision of appropriate medical treatments at another medical facility outweigh the increasing risks, if any, to the individual’s medical condition, and in the case of labor to the unborn child, from effecting the transfer.    X____________________________________________ DATE 11/09/24        TIME_______      ORIGINAL - SEND TO MEDICAL RECORDS   COPY - SEND WITH PATIENT DURING TRANSFER

## 2024-11-09 NOTE — ED PROVIDER NOTES
Time reflects when diagnosis was documented in both MDM as applicable and the Disposition within this note       Time User Action Codes Description Comment    11/9/2024  1:26 PM Austyn Gomes [M84.58XS] Pathological fracture in neoplastic disease, other specified site, sequela     11/9/2024  1:27 PM Austyn Gomes [S32.010A] Compression fracture of L1 lumbar vertebra (HCC)     11/9/2024  1:27 PM Austyn Gomes [M54.59] Intractable low back pain     11/9/2024  1:42 PM Jesus Nieto Add [M54.9] Intractable back pain     11/9/2024  1:43 PM Jesus Nieto Add [N13.30] Bilateral hydronephrosis     11/9/2024  1:43 PM Jesus Nieto Add [R93.7] Abnormal CT of thoracic spine     11/9/2024  1:43 PM Jesus Nieto Add [R93.7] Abnormal CT scan, lumbar spine     11/9/2024  1:44 PM Jesus Nieto Add [R93.5] Abnormal abdominal CT scan     11/9/2024  1:44 PM Jesus Nieto Modify [R93.5] Abnormal abdominal CT scan Dilatation of the common bile duct          ED Disposition       ED Disposition   Transfer to Another Facility-In Network    Condition   --    Date/Time   Sat Nov 9, 2024  1:50 PM    Comment   Patient will need to be transferred to [facility].             Assessment & Plan       Medical Decision Making  Amount and/or Complexity of Data Reviewed  Labs: ordered. Decision-making details documented in ED Course.  Radiology: ordered.    Risk  Prescription drug management.    Patient with intractable back pain despite multiple doses of IV narcotic pain medications.  Incidentally has some constipation which we are trying to resolve with Relistor and an enema.  Unclear etiology of the back pain at this point.  We know there are spinal metastases status post kyphoplasty.  There is some ambiguity around some of the findings on the CT scan related to the cancer and kyphoplasty however at the T8 level that seems to be a lot higher than where she is  complaining of her pain.  She is neurologically intact at the moment and does not have a fever to suggest an infection.  Her abdominal exam at this point is benign.  I discussed the case with internal medicine here and it seems that because she also needs radiation treatment for back the best place to send her would be to [facility] as neurosurgery because here there as they discussed this case with the internal medicine doctor and want to see the patient due to this ambiguity.  She does have hydronephrosis but no kidney stone.  She does say the back pain comes in waves and she does get nauseous so perhaps this is something to do with some of her pain although the location of her pain is more in the L3-4-5 area central midline as opposed to the flanks.  Awaiting discussion with the hospitalist at [facility] for transfer.  The patient is agreeable to the transfer.  Perhaps she will also need palliative care for better pain control.    ED Course as of 11/11/24 0855   Sat Nov 09, 2024   1042 Patient still with severe back pain.  While the fentanyl helped a little bit the pain is back so ordering additional pain medication.  Patient standing in the room.   1110 Having more pain despite the additional fentanyl will change pain medications.   1125 Postvoid bladder scan was normal.   1125 CBC and differential(!)  Normal white count no fever to suggest an infection.  Patient is not anemic.  Platelets are normal.   1126 Comprehensive metabolic panel(!)  No major electrolyte abnormalities, LFTs are normal and renal function is normal.   1126 Urine Microscopic(!)  No evidence of UTI.   1225 Patient still with a lot of pain we will order more pain medication.   1349 Discussed with Jaren and patient is to have radiation treatment this week.  We do not do radiation here so we will need to send to [place].  Patient is agreeable to this.   1359 Per the internal medicine physician here who texted with  neurosurgery:  Please let them know to consult neurosurgery when she gets there. Neurosurgery saying that there is a lot of ambiguity to the changes noted and they should probably see her there       Medications   fentaNYL injection 25 mcg (25 mcg Intravenous Given 11/9/24 0957)   sodium chloride 0.9 % bolus 500 mL (0 mL Intravenous Stopped 11/9/24 1230)   fentaNYL injection 50 mcg (50 mcg Intravenous Given 11/9/24 1029)   iohexol (OMNIPAQUE) 350 MG/ML injection (SINGLE-DOSE) 85 mL (85 mL Intravenous Given 11/9/24 1050)   morphine injection 4 mg (4 mg Intravenous Given 11/9/24 1122)   morphine injection 4 mg (4 mg Intravenous Given 11/9/24 1229)   morphine injection 4 mg (4 mg Intravenous Given 11/9/24 1318)   methylnaltrexone (Relistor) subcutaneous injection 8 mg (8 mg Subcutaneous Given 11/9/24 1433)   dexamethasone (DECADRON) injection 4 mg (4 mg Intravenous Given 11/9/24 1626)   morphine injection 4 mg (4 mg Intravenous Given 11/9/24 1626)   dexamethasone (DECADRON) injection 4 mg (4 mg Intravenous Given 11/9/24 1856)       ED Risk Strat Scores                           SBIRT 22yo+      Flowsheet Row Most Recent Value   Initial Alcohol Screen: US AUDIT-C     1. How often do you have a drink containing alcohol? 0 Filed at: 11/09/2024 0919   2. How many drinks containing alcohol do you have on a typical day you are drinking?  0 Filed at: 11/09/2024 0919   3a. Male UNDER 65: How often do you have five or more drinks on one occasion? 0 Filed at: 11/09/2024 0919   3b. FEMALE Any Age, or MALE 65+: How often do you have 4 or more drinks on one occassion? 0 Filed at: 11/09/2024 0919   Audit-C Score 0 Filed at: 11/09/2024 0919   VIDYA: How many times in the past year have you...    Used an illegal drug or used a prescription medication for non-medical reasons? Never Filed at: 11/09/2024 0919                            History of Present Illness       Chief Complaint   Patient presents with    Back Pain     Pt reports  lower back pain since Tuesday that radiates down b/l legs. Denies urinary symptoms.        Past Medical History:   Diagnosis Date    Arthritis     Breast mass     Edema of both lower legs 2018    Dr. Thang Otero; faxed records.    Hx of skin cancer, basal cell     Hyperlipidemia     Hypertension     Impingement syndrome of left shoulder 04/10/2018    Dr. Thang Otero, faxed patient records.    Migraines     Osteopenia     Overactive bladder     Overweight 2021    Skin cancer     Basal cell    Toe fracture, left 2023      Past Surgical History:   Procedure Laterality Date    ACHILLES TENDON REPAIR      BREAST BIOPSY Left 10/04/2024    BREAST CYST EXCISION Bilateral     1972    BREAST LUMPECTOMY      CATARACT EXTRACTION, BILATERAL      COLONOSCOPY      COLPOPEXY VAGINAL EXTRAPERITONEAL (VEC) WITH INSERTION PUBOVAGINAL SLING      CYST REMOVAL      HERNIA REPAIR      HYSTERECTOMY      IR KYPHOPLASTY/VERTEBROPLASTY WITH ABLATION  10/15/2024    JOINT REPLACEMENT Right     REPLACEMENT TOTAL KNEE Left     TONSILLECTOMY      US GUIDANCE BREAST BIOPSY LEFT EACH ADDITIONAL Left 10/04/2024    US GUIDED BREAST BIOPSY LEFT COMPLETE Left 10/04/2024      Family History   Problem Relation Age of Onset    Heart disease Father     Melanoma Daughter         40s    Melanoma Daughter         50s    Ovarian cancer Maternal Aunt     Breast cancer Paternal Aunt     Breast cancer Maternal Grandmother     Heart disease Paternal Grandmother     Hypertension Paternal Grandfather       Social History     Tobacco Use    Smoking status: Former     Current packs/day: 0.00     Average packs/day: 0.5 packs/day for 15.0 years (7.5 ttl pk-yrs)     Types: Cigarettes     Quit date: 1977     Years since quittin.0     Passive exposure: Past    Smokeless tobacco: Never   Vaping Use    Vaping status: Never Used   Substance Use Topics    Alcohol use: Yes     Alcohol/week: 1.0 standard drink of alcohol     Types: 1  Glasses of wine per week     Comment: occasional    Drug use: Never      E-Cigarette/Vaping    E-Cigarette Use Never User       E-Cigarette/Vaping Substances    Nicotine No     THC No     CBD No     Flavoring No     Other No     Unknown No       I have reviewed and agree with the history as documented.     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  This continues to get worse.  She has been unable to see pain management.  They altered her regimen and pain medication and it is not helping.  She was prescribed a fentanyl patch yesterday but did not try it.  She has had no fevers.  She is able to urinate.  Has not had a bowel movement since Monday.  She does have some nausea without vomiting.  No significant abdominal pain.  The pain is in the lower back and while it is not constant and is there a lot pain in certain positions will make it worse especially laying down.  She is able to ambulate.  She is currently taking Vicodin for the pain and it is not helping.  She has had issues with oxycodone in the past.  Review of Systems        Objective       ED Triage Vitals [11/09/24 0915]   Temperature Pulse Blood Pressure Respirations SpO2 Patient Position - Orthostatic VS   98.4 °F (36.9 °C) 72 (!) 195/84 16 98 % --      Temp Source Heart Rate Source BP Location FiO2 (%) Pain Score    Oral Monitor -- -- 9      Vitals      Date and Time Temp Pulse SpO2 Resp BP Pain Score FACES Pain Rating User   11/09/24 1626 -- -- -- -- -- 7 --    11/09/24 1543 -- 90 98 % 16 144/69 -- --    11/09/24 1530 -- 95 98 % -- 144/69 -- -- [facility]   11/09/24 1330 -- 88 94 % 16 168/77 -- --    11/09/24 1300 -- 87 97 % 16 168/72 -- --    11/09/24 1130 -- 93 98 % 16 143/65 -- --    11/09/24 1030 -- 87 95 % 16 174/70 -- --    11/09/24 0915 98.4 °F (36.9 °C) 72 98 % 16 195/84 pt denies  dizziness or headache. reporting 9/10 back pain 9 -- JL            Physical Exam  Vitals and nursing note reviewed.   Constitutional:       General: She is in acute distress.      Appearance: Normal appearance. She is well-developed. She is not ill-appearing, toxic-appearing or diaphoretic.      Comments: Patient is standing in the room and appears quite uncomfortable.   HENT:      Head: Normocephalic and atraumatic.      Right Ear: Hearing normal. No drainage or swelling.      Left Ear: Hearing normal. No drainage or swelling.   Eyes:      General: Lids are normal.         Right eye: No discharge.         Left eye: No discharge.      Conjunctiva/sclera: Conjunctivae normal.   Neck:      Vascular: No JVD.      Trachea: Trachea normal.   Cardiovascular:      Rate and Rhythm: Normal rate and regular rhythm.      Pulses: Normal pulses.      Heart sounds: Normal heart sounds. No murmur heard.     No gallop.   Pulmonary:      Effort: Pulmonary effort is normal. No respiratory distress.      Breath sounds: Normal breath sounds. No stridor. No wheezing or rales.   Abdominal:      General: Abdomen is protuberant.      Palpations: Abdomen is soft.      Tenderness: There is no abdominal tenderness. There is no guarding or rebound.   Musculoskeletal:         General: Normal range of motion.      Cervical back: Normal range of motion and neck supple.      Comments: Patient points to the lower lumbar area around L3-4-5.  Externally the skin appears normal.  I cannot reproduce the symptoms palpating down the thoracic and lumbar spine.   Skin:     General: Skin is warm and dry.      Coloration: Skin is not pale.      Findings: No bruising, erythema or rash.   Neurological:      General: No focal deficit present.      Mental Status: She is alert.      GCS: GCS eye subscore is 4. GCS verbal subscore is 5. GCS motor subscore is 6.      Sensory: Sensation is intact. No sensory deficit.      Motor: Motor function is intact. No weakness  or abnormal muscle tone.      Gait: Gait is intact. Gait normal.      Deep Tendon Reflexes:      Reflex Scores:       Patellar reflexes are 2+ on the right side and 2+ on the left side.       Achilles reflexes are 2+ on the right side and 2+ on the left side.  Psychiatric:         Mood and Affect: Mood normal.         Speech: Speech normal.         Behavior: Behavior is cooperative.         Results Reviewed       Procedure Component Value Units Date/Time    Manual Differential(PHLEBS Do Not Order) [[id number]]  (Abnormal) Collected: 11/09/24 0957    Lab Status: Final result Specimen: Blood from Arm, Left Updated: 11/09/24 1703     Segmented % 83 %      Bands % 1 %      Lymphocytes % 13 %      Monocytes % 2 %      Eosinophils % 1 %      Basophils % 0 %      Absolute Neutrophils 3.84 Thousand/uL      Absolute Lymphocytes 0.59 Thousand/uL      Absolute Monocytes 0.09 Thousand/uL      Absolute Eosinophils 0.05 Thousand/uL      Absolute Basophils 0.00 Thousand/uL      Total Counted --     RBC Morphology Normal     Platelet Estimate Adequate    Urine Microscopic [[id number]]  (Abnormal) Collected: 11/09/24 1003    Lab Status: Final result Specimen: Urine, Clean Catch Updated: 11/09/24 1052     RBC, UA 4-10 /hpf      WBC, UA 2-4 /hpf      Epithelial Cells Occasional /hpf      Bacteria, UA Occasional /hpf      MUCUS THREADS Occasional     Triplep Phos Jacinda, UA Occasional /hpf     Comprehensive metabolic panel [[id number]]  (Abnormal) Collected: 11/09/24 0957    Lab Status: Final result Specimen: Blood from Arm, Left Updated: 11/09/24 1035     Sodium 141 mmol/L      Potassium 3.9 mmol/L      Chloride 103 mmol/L      CO2 33 mmol/L      ANION GAP 5 mmol/L      BUN 12 mg/dL      Creatinine 0.62 mg/dL      Glucose 110 mg/dL      Calcium 9.4 mg/dL      AST 20 U/L      ALT 21 U/L      Alkaline Phosphatase 45 U/L      Total Protein 6.9 g/dL      Albumin 4.3 g/dL      Total Bilirubin 0.69 mg/dL      eGFR 86 ml/min/1.73sq m      Narrative:      National Kid[employer] Disease Foundation guidelines for Chronic Kidney Disease (CKD):     Stage 1 with normal or high GFR (GFR > 90 mL/min/1.73 square meters)    Stage 2 Mild CKD (GFR = 60-89 mL/min/1.73 square meters)    Stage 3A Moderate CKD (GFR = 45-59 mL/min/1.73 square meters)    Stage 3B Moderate CKD (GFR = 30-44 mL/min/1.73 square meters)    Stage 4 Severe CKD (GFR = 15-29 mL/min/1.73 square meters)    Stage 5 End Stage CKD (GFR <15 mL/min/1.73 square meters)  Note: GFR calculation is accurate only with a steady state creatinine    Lipase [[id number]]  (Normal) Collected: 11/09/24 0957    Lab Status: Final result Specimen: Blood from Arm, Left Updated: 11/09/24 1035     Lipase 54 u/L     Magnesium [[id number]]  (Normal) Collected: 11/09/24 0957    Lab Status: Final result Specimen: Blood from Arm, Left Updated: 11/09/24 1035     Magnesium 2.0 mg/dL     CBC and differential [[id number]]  (Abnormal) Collected: 11/09/24 0957    Lab Status: Final result Specimen: Blood from Arm, Left Updated: 11/09/24 1015     WBC 4.57 Thousand/uL      RBC 4.51 Million/uL      Hemoglobin 13.6 g/dL      Hematocrit 41.3 %      MCV 92 fL      MCH 30.2 pg      MCHC 32.9 g/dL      RDW 15.2 %      MPV 9.1 fL      Platelets 275 Thousands/uL     Urine Macroscopic, POC [[id number]]  (Abnormal) Collected: 11/09/24 1003    Lab Status: Final result Specimen: Urine Updated: 11/09/24 1005     Color, UA Yellow     Clarity, UA Clear     pH, UA 7.0     Leukocytes, UA Negative     Nitrite, UA Negative     Protein, UA Trace mg/dl      Glucose, UA Negative mg/dl      Ketones, UA Negative mg/dl      Urobilinogen, UA 0.2 E.U./dl      Bilirubin, UA Negative     Occult Blood, UA Negative     Specific Gravity, UA 1.020    Narrative:      CLINITEK RESULT            CT abdomen pelvis with contrast   Final Interpretation by Mario Underwood MD (11/09 1236)      1.  Mild dilatation of the common bile duct, 9 mm in maximal diameter, etiology  indeterminate. If clinically indicated, nonemergent MRI of the abdomen/MRCP, without and with IV contrast would be helpful to identify or exclude an obstructing lesion of the    CBD. No intrahepatic ductal dilatation.      2.  Bilateral hydronephrosis, moderate on the left and mild on the right with no hydroureter. Most likely, this is due to bilateral UPJ stenoses.      3.  No evidence of metastases in the abdomen or pelvis.      4.  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      5.  Compression fracture of L1, status post kyphoplasty, similar in appearance to lumbar spine radiographs from 11/8/2024.      6.  No evidence of acute abnormality in the abdomen or pelvis.         Workstation performed: [id number]         CT thoracic spine without contrast   Final Interpretation by Mario Underwood MD (11/09 5778)      1.  Osteolytic metastases involving the T8, T11 and L1 vertebral bodies with pathologic fractures treated with kyphoplasty at T8 and L1.      2.  Thickened paravertebral soft tissues at T8. This could be residual edema or hematoma from pathologic fracture and kyphoplasty. However, extraosseous spread of metastasis can have a similar appearance.      3.  Probable extraosseous extension of T8 metastasis into the left anterior epidural space. Less likely, this could represent a streak artifact from adjacent kyphoplasty material. This can be better evaluated with MRI of the thoracic spine, without and    with IV contrast.      4.  No evidence of new thoracic spine metastasis.      5.  Left-sided hydronephrosis and two small nonobstructing left renal calculi.      6.  Small right pleural effusion.      The study was marked in EPIC for immediate notification.      Workstation performed: [id number]         CT recon only lumbar spine (No Charge)   Final Interpretation by Mario Underwood MD (11/09 2927)      1.  Compression fracture of L1, status post kyphoplasty. No new  lumbar vertebral fractures      2.  No evidence of lumbar spine metastasis.      3.  Mild spinal stenosis at L4-L5, unchanged since an MRI from 9/21/2024.      4.  Bilateral hydronephrosis.            Workstation performed: [id number]             Procedures    ED Medication and Procedure Management   Prior to Admission Medications   Prescriptions Last Dose Informant Patient Reported? Taking?   Cholecalciferol (Vitamin D) 50 MCG (2000 UT) tablet  Self Yes No   Sig: Take 2,000 Units by mouth daily   HYDROcodone-acetaminophen (NORCO) 5-325 mg per tablet   No No   Sig: Take 1 tablet by mouth every 6 (six) hours as needed for pain Max Daily Amount: 4 tablets   LORazepam (Ativan) 0.5 mg tablet   No No   Sig: Take 1 tablet (0.5 mg total) by mouth every 8 (eight) hours as needed for anxiety   Patient not taking: Reported on 11/8/2024   Multiple Vitamins-Minerals (MULTIVITAMIN WITH MINERALS) tablet  Self Yes No   Sig: Take 1 tablet by mouth daily   Ribociclib Succ, 600 MG Dose, (Kisqali, 600 MG Dose,) 200 MG TBPK   No No   Sig: Take 600 mg by mouth daily 21 days on, followed by 7 days off   Sodium Fluoride 5000 PPM 1.1 % PSTE  Self Yes No   Sig: APPLY TIHN RIBBON TO TOOTHBRUSH, BRUSH X 2MIN AT BEDTIME, SPIT, DONT RINSE   atorvastatin (LIPITOR) 10 mg tablet  Self No No   Sig: Take 1 tablet (10 mg total) by mouth daily   calcium polycarbophil (FIBERCON) 625 mg tablet  Self Yes No   Sig: Take 625 mg by mouth daily   Patient not taking: Reported on 11/8/2024   fentaNYL (DURAGESIC) 12 mcg/hr TD 72 hr patch   No No   Sig: Place 1 patch on the skin over 72 hours every third day Max Daily Amount: 1 patch   letrozole (FEMARA) 2.5 mg tablet   No No   Sig: Take 1 tablet (2.5 mg total) by mouth daily   naloxone (NARCAN) 4 mg/0.1 mL nasal spray   No No   Sig: Administer 1 spray into a nostril. If no response after 2-3 minutes, give another dose in the other nostril using a new spray.   ondansetron (ZOFRAN-ODT) 8 mg disintegrating tablet    No No   Sig: Take 1 tablet (8 mg total) by mouth every 8 (eight) hours as needed for nausea or vomiting   polyethylene glycol (GLYCOLAX) 17 GM/SCOOP powder   No No   Sig: Take 17 g by mouth 2 (two) times a day   predniSONE 10 mg tablet   No No   Sig: Take 1 tablet (10 mg total) by mouth daily With breakfast or lunch; no more than 1 tab / day.      Facility-Administered Medications: None     Discharge Medication List as of 11/9/2024  8:35 PM        CONTINUE these medications which have NOT CHANGED    Details   atorvastatin (LIPITOR) 10 mg tablet Take 1 tablet (10 mg total) by mouth daily, Starting Tue 5/7/2024, Normal      calcium polycarbophil (FIBERCON) 625 mg tablet Take 625 mg by mouth daily, Historical Med      Cholecalciferol (Vitamin D) 50 MCG (2000 UT) tablet Take 2,000 Units by mouth daily, Historical Med      fentaNYL (DURAGESIC) 12 mcg/hr TD 72 hr patch Place 1 patch on the skin over 72 hours every third day Max Daily Amount: 1 patch, Starting Fri 11/8/2024, Until Sun 12/8/2024, Normal      HYDROcodone-acetaminophen (NORCO) 5-325 mg per tablet Take 1 tablet by mouth every 6 (six) hours as needed for pain Max Daily Amount: 4 tablets, Starting Fri 11/8/2024, Normal      letrozole (FEMARA) 2.5 mg tablet Take 1 tablet (2.5 mg total) by mouth daily, Starting Tue 10/22/2024, Normal      LORazepam (Ativan) 0.5 mg tablet Take 1 tablet (0.5 mg total) by mouth every 8 (eight) hours as needed for anxiety, Starting Tue 10/22/2024, Normal      Multiple Vitamins-Minerals (MULTIVITAMIN WITH MINERALS) tablet Take 1 tablet by mouth daily, Historical Med      naloxone (NARCAN) 4 mg/0.1 mL nasal spray Administer 1 spray into a nostril. If no response after 2-3 minutes, give another dose in the other nostril using a new spray., Normal      ondansetron (ZOFRAN-ODT) 8 mg disintegrating tablet Take 1 tablet (8 mg total) by mouth every 8 (eight) hours as needed for nausea or vomiting, Starting Tue 10/22/2024, Normal       polyethylene glycol (GLYCOLAX) 17 GM/SCOOP powder Take 17 g by mouth 2 (two) times a day, Starting Fri 11/8/2024, No Print      predniSONE 10 mg tablet Take 1 tablet (10 mg total) by mouth daily With breakfast or lunch; no more than 1 tab / day., Starting Mon 10/28/2024, Normal      Ribociclib Succ, 600 MG Dose, (Kisqali, 600 MG Dose,) 200 MG TBPK Take 600 mg by mouth daily 21 days on, followed by 7 days off, Starting Tue 10/22/2024, Normal      Sodium Fluoride 5000 PPM 1.1 % PSTE APPLY TIHN RIBBON TO TOOTHBRUSH, BRUSH X 2MIN AT BEDTIME, SPIT, DONT RINSE, Historical Med           No discharge procedures on file.  ED SEPSIS DOCUMENTATION   Time reflects when diagnosis was documented in both MDM as applicable and the Disposition within this note       Time User Action Codes Description Comment    11/9/2024  1:26 PM Austyn Gomes [M84.58XS] Pathological fracture in neoplastic disease, other specified site, sequela     11/9/2024  1:27 PM Austyn Gomes [S32.010A] Compression fracture of L1 lumbar vertebra (HCC)     11/9/2024  1:27 PM Austyn Gomes Add [M54.59] Intractable low back pain     11/9/2024  1:42 PM Jesus Nieto Add [M54.9] Intractable back pain     11/9/2024  1:43 PM Jesus Nieto Add [N13.30] Bilateral hydronephrosis     11/9/2024  1:43 PM Jesus Nieto Add [R93.7] Abnormal CT of thoracic spine     11/9/2024  1:43 PM Jesus Nieto Add [R93.7] Abnormal CT scan, lumbar spine     11/9/2024  1:44 PM Jesus Nieto Add [R93.5] Abnormal abdominal CT scan     11/9/2024  1:44 PM Jesus Nieto Modify [R93.5] Abnormal abdominal CT scan Dilatation of the common bile duct                 Jesus Nieto MD  11/11/24 0822

## 2024-11-09 NOTE — ED NOTES
Pt reports to have passed a couple small stools from enema      Julia Leschinsky, ROC  11/09/24 6213

## 2024-11-09 NOTE — Clinical Note
Case was discussed with IVAN and the patient's admission status was agreed to be Admission Status: inpatient status to the service of Dr. Gomes .

## 2024-11-10 ENCOUNTER — TELEPHONE (OUTPATIENT)
Dept: OTHER | Facility: OTHER | Age: 79
End: 2024-11-10

## 2024-11-10 ENCOUNTER — APPOINTMENT (INPATIENT)
Dept: MRI IMAGING | Facility: HOSPITAL | Age: 79
DRG: 543 | End: 2024-11-10
Payer: MEDICARE

## 2024-11-10 LAB
ANION GAP SERPL CALCULATED.3IONS-SCNC: 5 MMOL/L (ref 4–13)
BUN SERPL-MCNC: 11 MG/DL (ref 5–25)
CALCIUM SERPL-MCNC: 9.4 MG/DL (ref 8.4–10.2)
CHLORIDE SERPL-SCNC: 104 MMOL/L (ref 96–108)
CO2 SERPL-SCNC: 30 MMOL/L (ref 21–32)
CREAT SERPL-MCNC: 0.55 MG/DL (ref 0.6–1.3)
GFR SERPL CREATININE-BSD FRML MDRD: 90 ML/MIN/1.73SQ M
GLUCOSE SERPL-MCNC: 137 MG/DL (ref 65–140)
POTASSIUM SERPL-SCNC: 4.1 MMOL/L (ref 3.5–5.3)
SODIUM SERPL-SCNC: 139 MMOL/L (ref 135–147)

## 2024-11-10 PROCEDURE — 80048 BASIC METABOLIC PNL TOTAL CA: CPT

## 2024-11-10 PROCEDURE — A9585 GADOBUTROL INJECTION: HCPCS | Performed by: EMERGENCY MEDICINE

## 2024-11-10 PROCEDURE — 72157 MRI CHEST SPINE W/O & W/DYE: CPT

## 2024-11-10 PROCEDURE — 99223 1ST HOSP IP/OBS HIGH 75: CPT | Performed by: INTERNAL MEDICINE

## 2024-11-10 RX ORDER — POLYETHYLENE GLYCOL 3350 17 G/17G
17 POWDER, FOR SOLUTION ORAL DAILY
Status: DISCONTINUED | OUTPATIENT
Start: 2024-11-11 | End: 2024-11-11 | Stop reason: HOSPADM

## 2024-11-10 RX ORDER — GADOBUTROL 604.72 MG/ML
5 INJECTION INTRAVENOUS
Status: COMPLETED | OUTPATIENT
Start: 2024-11-10 | End: 2024-11-10

## 2024-11-10 RX ADMIN — LETROZOLE 2.5 MG: 2.5 TABLET ORAL at 09:05

## 2024-11-10 RX ADMIN — DEXAMETHASONE SODIUM PHOSPHATE 4 MG: 4 INJECTION INTRA-ARTICULAR; INTRALESIONAL; INTRAMUSCULAR; INTRAVENOUS; SOFT TISSUE at 18:08

## 2024-11-10 RX ADMIN — OXYCODONE HYDROCHLORIDE 10 MG: 10 TABLET ORAL at 14:03

## 2024-11-10 RX ADMIN — MULTIPLE VITAMINS W/ MINERALS TAB 1 TABLET: TAB ORAL at 09:05

## 2024-11-10 RX ADMIN — ENOXAPARIN SODIUM 40 MG: 40 INJECTION SUBCUTANEOUS at 08:58

## 2024-11-10 RX ADMIN — Medication 2000 UNITS: at 08:58

## 2024-11-10 RX ADMIN — DEXAMETHASONE SODIUM PHOSPHATE 4 MG: 4 INJECTION INTRA-ARTICULAR; INTRALESIONAL; INTRAMUSCULAR; INTRAVENOUS; SOFT TISSUE at 05:00

## 2024-11-10 RX ADMIN — GADOBUTROL 5 ML: 604.72 INJECTION INTRAVENOUS at 12:56

## 2024-11-10 RX ADMIN — ATORVASTATIN CALCIUM 10 MG: 10 TABLET, FILM COATED ORAL at 08:58

## 2024-11-10 RX ADMIN — DEXAMETHASONE SODIUM PHOSPHATE 4 MG: 4 INJECTION INTRA-ARTICULAR; INTRALESIONAL; INTRAMUSCULAR; INTRAVENOUS; SOFT TISSUE at 14:10

## 2024-11-10 NOTE — TELEMEDICINE
e-Consult (IPC)     Inpatient consult to Neurosurgery  Consult performed by: Noemi Stanton PA-C  Consult ordered by: Miguel Ángel Orr DO           Contacted by BERNICE.    Yuni Keys 78 y.o. female MRN: [id number]  Unit/Bed#: S -01 Encounter: [id number]    Reason for Consult    Per provider report, patient with known history of metastatic breast cancer who comes in with worsening back pain.  She is known to the neurosurgery team status post T8 and T1 vertebral body biopsy ablation and augmentation on 10/15/2024 with Dr. Robbins, last seen on 11/1/2024 where she was continuing to have back pain and informed that it may take an additional 2 weeks to start experiencing improvement from procedure.  She was ultimately transferred to the [facility] for potential radiation and NSX evaluation.  Available past medical history,social history, surgical history, medication list, drug allergies and review of systems were reviewed.    /68   Pulse 78   Temp 97.7 °F (36.5 °C)   Wt 52.8 kg (116 lb 6.5 oz)   SpO2 96%   BMI 24.33 kg/m²      Clinical exam per provider report, nonfocal exam per documentation, complaining of back pain.    Imaging personally reviewed.   CT recon only lumbar spine 11/9/2024: Compression fracture at L1, status post kyphoplasty.  No new lumbar vertebral fractures.  No evidence of lumbar spine metastases.  Mild spinal stenosis at L4-5, unchanged since MRI from 9/21/2024.  CT thoracic spine without contrast 11/9/2024:  Osteolytic metastases involving the T8, T11 and L1 vertebral bodies with pathologic fractures treated with kyphoplasty at T8 and L1.  Thickened paravertebral soft tissues at T8. This could be residual edema or hematoma from pathologic fracture and kyphoplasty. However, extraosseous spread of metastasis can have a similar appearance.  Probable extraosseous extension of T8 metastasis into the left anterior epidural space. Less likely, this could represent a streak artifact  from adjacent kyphoplasty material. This can be better evaluated with MRI of the thoracic spine, without and with IV contrast.  No evidence of new thoracic spine metastasis.    Assessment and Recommendations    Imaging reviewed, no new lumbar fractures in the setting of known L1 compression fracture status post kyphoplasty.  In the thoracic spine she does have metastatic lesions at T8, T11 and L1 with T8 being treated with kyphoplasty.  There is some paravertebral soft tissue thickening at T8 of unclear etiology, residual edema versus hematoma versus extraosseous spread.  At the recommendation of radiology, would recommend MRI of the thoracic spine with and without contrast for further evaluation  She was started on steroids however unclear utility of this, would defer management of this to radiation oncology should they be consulted in the event patient does not start radiation therapy on this admission  Rest of care per primary team  Neurosurgery to review imaging as it becomes available    All questions answered. Provider is in agreement with the course of action. 11-20 minutes, >50% of the total time devoted to medical consultative verbal/EMR discussion between providers. Written report will be generated in the EMR.

## 2024-11-10 NOTE — ASSESSMENT & PLAN NOTE
Patient has opioid induced constipation  Takes Miralax, Senokot, Dulcolax at home    Plan:  Continue PTA meds  Will add enema if needed in AM.

## 2024-11-10 NOTE — H&P
"H&P - Hospitalist   Name: Yuni Keys 78 y.o. 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   She had an appointment with Palliative on Wednesday  Received 75 mcg of Fentanyl as well as 4 doses of 4mg Morphine at [facility] for pain, which finally afforded her some pain control  Also started on 4 mg Decadron q6 hours per neurosurgery, as given in report by [facility] ED.     Plan:  Neurosurgery consult, appreciate recs  Palliative consult, appreciate recs  Started on high dose opioid regimen for pain control.  Bowel regimen started due to opioid induced constipation  Continued 4 mg decadron q6 hours, can alter therapy as per neurosurgery  PRN Zofran for nausea  Continued PTA Femara (11/9/24 was day 11/21) and Ribociclib    Constipation  Patient has opioid induced constipation  Takes Miralax, Senokot, Dulcolax at home    Plan:  Continue PTA meds  Will add enema if needed in AM.   Bilateral hydronephrosis  CT Abdomen Pelvis showed: \"Bilateral hydronephrosis, moderate on the left and mild on the right with no hydroureter. Most likely, this is due to bilateral UPJ stenoses.  Left-sided hydronephrosis and two small nonobstructing left renal calculi.\"  Creatinine is within normal limits    Plan:  Continue PO hydration.  Continue to monitor.         VTE Pharmacologic Prophylaxis: VTE Score: 5 High Risk (Score >/= 5) - Pharmacological DVT Prophylaxis Ordered: enoxaparin (Lovenox). Sequential Compression Devices Ordered.  Code Status: Level 3 - DNAR and DNI discussed with patient  Discussion with family: Patient " declined call to .     Anticipated Length of Stay: Patient will be admitted on an inpatient basis with an anticipated length of stay of greater than 2 midnights secondary to intractable back pain 2/2 .    History of Present Illness   Chief Complaint: Back pain 2/2 breast cancer w/ metastases to spine    Yuni Keys is a 78 y.o. female with a PMH of breast cancer w/ metastasis to spine, constipation, HLD, depression who presents asa transfer from [facility] with severe back pain.  Her back pain was possibly due to to spinal metastases from her breast cancer.  She  was given 75 mcg of fentanyl as well as 4 doses of 4 mg of IV morphine before her pain came under control.  It was ultimately decided that she would be transferred to [place] as neurosurgery could evaluate her here along with possibly getting radiation therapy sometime this week.  Per report, patient was ordered dexamethasone 4 mg every 6 hours from neurosurgery.     When patient seen at [place], she was comfortable and pain was under control.  She was walking around and sat on the bed for interview.  Patient denied any chills, fevers, chest pain, shortness of breath, abdominal pain, vomiting.  She did endorse some nausea occasionally.  She also endorses headaches and lightheadedness, attributing that to her opioid medications.  She also endorsed severe constipation, most likely due to her opioid medications.  She is on a bowel regimen at home which she requested to continue here.  Otherwise, she did not have any other concerns or complaints at this time.    Review of Systems   Constitutional:  Negative for chills, fatigue and fever.   Respiratory:  Negative for shortness of breath and wheezing.    Cardiovascular:  Negative for chest pain, palpitations and leg swelling.   Gastrointestinal:  Positive for nausea. Negative for abdominal pain, constipation, diarrhea and vomiting.   Genitourinary:  Negative for dysuria and hematuria.    Musculoskeletal:  Positive for back pain. Negative for neck pain.   Skin:  Negative for color change.   Neurological:  Positive for light-headedness (occasionally with her opioid medications) and headaches. Negative for dizziness and weakness.   Psychiatric/Behavioral:  Negative for confusion.        Historical Information   Past Medical History:   Diagnosis Date    Arthritis     Breast mass     Edema of both lower legs 2018    Dr. Thang Otero; faxed records.    Hx of skin cancer, basal cell     Hyperlipidemia     Hypertension     Impingement syndrome of left shoulder 04/10/2018    Dr. Thang Otero, faxed patient records.    Migraines     Osteopenia     Overactive bladder     Overweight 2021    Skin cancer     Basal cell    Toe fracture, left 2023     Past Surgical History:   Procedure Laterality Date    ACHILLES TENDON REPAIR      BREAST BIOPSY Left 10/04/2024    BREAST CYST EXCISION Bilateral     1972    BREAST LUMPECTOMY      CATARACT EXTRACTION, BILATERAL      COLONOSCOPY      COLPOPEXY VAGINAL EXTRAPERITONEAL (VEC) WITH INSERTION PUBOVAGINAL SLING      CYST REMOVAL      HERNIA REPAIR      HYSTERECTOMY      IR KYPHOPLASTY/VERTEBROPLASTY WITH ABLATION  10/15/2024    JOINT REPLACEMENT Right     REPLACEMENT TOTAL KNEE Left     TONSILLECTOMY      US GUIDANCE BREAST BIOPSY LEFT EACH ADDITIONAL Left 10/04/2024    US GUIDED BREAST BIOPSY LEFT COMPLETE Left 10/04/2024     Social History     Tobacco Use    Smoking status: Former     Current packs/day: 0.00     Average packs/day: 0.5 packs/day for 15.0 years (7.5 ttl pk-yrs)     Types: Cigarettes     Quit date: 1977     Years since quittin.0     Passive exposure: Past    Smokeless tobacco: Never   Vaping Use    Vaping status: Never Used   Substance and Sexual Activity    Alcohol use: Yes     Alcohol/week: 1.0 standard drink of alcohol     Types: 1 Glasses of wine per week     Comment: occasional    Drug use: Never    Sexual  activity: Not Currently     Partners: Male     Birth control/protection: Abstinence, Post-menopausal, Female Sterilization     E-Cigarette/Vaping    E-Cigarette Use Never User      E-Cigarette/Vaping Substances    Nicotine No     THC No     CBD No     Flavoring No     Other No     Unknown No      Family History   Problem Relation Age of Onset    Heart disease Father     Melanoma Daughter         40s    Melanoma Daughter         50s    Ovarian cancer Maternal Aunt     Breast cancer Paternal Aunt     Breast cancer Maternal Grandmother     Heart disease Paternal Grandmother     Hypertension Paternal Grandfather      Social History:  Marital Status:    Occupation: N/A  Patient Pre-hospital Living Situation: Home  Patient Pre-hospital Level of Mobility: walks  Patient Pre-hospital Diet Restrictions: None    Meds/Allergies   I have reviewed home medications with patient personally.  Prior to Admission medications    Medication Sig Start Date End Date Taking? Authorizing Provider   atorvastatin (LIPITOR) 10 mg tablet Take 1 tablet (10 mg total) by mouth daily 5/7/24   Lisa Fly, DO   calcium polycarbophil (FIBERCON) 625 mg tablet Take 625 mg by mouth daily  Patient not taking: Reported on 11/8/2024    Historical Provider, MD   Cholecalciferol (Vitamin D) 50 MCG (2000 UT) tablet Take 2,000 Units by mouth daily    Historical Provider, MD   fentaNYL (DURAGESIC) 12 mcg/hr TD 72 hr patch Place 1 patch on the skin over 72 hours every third day Max Daily Amount: 1 patch 11/8/24 12/8/24  Lisa Fairbanks,    HYDROcodone-acetaminophen (NORCO) 5-325 mg per tablet Take 1 tablet by mouth every 6 (six) hours as needed for pain Max Daily Amount: 4 tablets 11/8/24   Forrest Harden MD   letrozole (FEMARA) 2.5 mg tablet Take 1 tablet (2.5 mg total) by mouth daily 10/22/24   Fam Amanda MD   LORazepam (Ativan) 0.5 mg tablet Take 1 tablet (0.5 mg total) by mouth every 8 (eight) hours as needed for anxiety  Patient not taking:  Reported on 11/8/2024 10/22/24   Lisa Fly, DO   Multiple Vitamins-Minerals (MULTIVITAMIN WITH MINERALS) tablet Take 1 tablet by mouth daily    Historical Provider, MD   naloxone (NARCAN) 4 mg/0.1 mL nasal spray Administer 1 spray into a nostril. If no response after 2-3 minutes, give another dose in the other nostril using a new spray. 11/8/24 11/8/25  Lisa Fly, DO   ondansetron (ZOFRAN-ODT) 8 mg disintegrating tablet Take 1 tablet (8 mg total) by mouth every 8 (eight) hours as needed for nausea or vomiting 10/22/24   Fam Amanda MD   polyethylene glycol (GLYCOLAX) 17 GM/SCOOP powder Take 17 g by mouth 2 (two) times a day 11/8/24   Lisa Fly, DO   predniSONE 10 mg tablet Take 1 tablet (10 mg total) by mouth daily With breakfast or lunch; no more than 1 tab / day. 10/28/24   Sekou Bernard MD   Ribociclib Succ, 600 MG Dose, (Kisqali, 600 MG Dose,) 200 MG TBPK Take 600 mg by mouth daily 21 days on, followed by 7 days off 10/22/24   Fam Amanda MD   Sodium Fluoride 5000 PPM 1.1 % PSTE APPLY TIHN RIBBON TO TOOTHBRUSH, BRUSH X 2MIN AT BEDTIME, SPIT, DONT RINSE 9/23/24   Historical Provider, MD     Allergies   Allergen Reactions    Adhesive [Medical Tape] Hives, Itching and Blisters    Lactose - Food Allergy Diarrhea and GI Intolerance    Bupropion Rash     Around 2010       Objective :  Temp:  [98 °F (36.7 °C)-98.4 °F (36.9 °C)] 98 °F (36.7 °C)  HR:  [72-95] 73  BP: (143-195)/(65-84) 143/76  Resp:  [16] 16  SpO2:  [94 %-98 %] 94 %  O2 Device: None (Room air)    Physical Exam  Vitals reviewed.   Constitutional:       General: She is not in acute distress.     Appearance: Normal appearance. She is normal weight. She is not ill-appearing, toxic-appearing or diaphoretic.   HENT:      Head: Normocephalic and atraumatic.   Eyes:      General: No scleral icterus.     Extraocular Movements: Extraocular movements intact.   Cardiovascular:      Rate and Rhythm: Normal rate and regular rhythm.      Pulses: Normal  pulses.      Heart sounds: No murmur heard.     No friction rub. No gallop.   Pulmonary:      Effort: Pulmonary effort is normal. No respiratory distress.      Breath sounds: No wheezing, rhonchi or rales.   Abdominal:      General: There is no distension.      Palpations: Abdomen is soft.      Tenderness: There is no abdominal tenderness. There is no guarding or rebound.   Musculoskeletal:      Cervical back: Normal range of motion.      Right lower leg: No edema.      Left lower leg: No edema.   Skin:     General: Skin is warm.      Coloration: Skin is not jaundiced.   Neurological:      General: No focal deficit present.      Mental Status: She is alert and oriented to person, place, and time.   Psychiatric:         Mood and Affect: Mood normal.         Behavior: Behavior normal.         Thought Content: Thought content normal.         Judgment: Judgment normal.          Lines/Drains:            Lab Results: I have reviewed the following results:  Results from last 7 days   Lab Units 11/09/24  0957 11/08/24  1437   WBC Thousand/uL 4.57 5.70   HEMOGLOBIN g/dL 13.6 13.7   HEMATOCRIT % 41.3 41.1   PLATELETS Thousands/uL 275 307   BANDS PCT % 1  --    SEGS PCT %  --  81*   LYMPHO PCT % 13* 14   MONO PCT % 2* 2*   EOS PCT % 1 2     Results from last 7 days   Lab Units 11/09/24  0957   SODIUM mmol/L 141   POTASSIUM mmol/L 3.9   CHLORIDE mmol/L 103   CO2 mmol/L 33*   BUN mg/dL 12   CREATININE mg/dL 0.62   ANION GAP mmol/L 5   CALCIUM mg/dL 9.4   ALBUMIN g/dL 4.3   TOTAL BILIRUBIN mg/dL 0.69   ALK PHOS U/L 45   ALT U/L 21   AST U/L 20   GLUCOSE RANDOM mg/dL 110             Lab Results   Component Value Date    HGBA1C 5.1 10/17/2022           Imaging Results Review: I reviewed radiology reports from this admission including: CT abdomen/pelvis and CT spine.  Other Study Results Review: No additional pertinent studies reviewed.        ** Please Note: This note has been constructed using a voice recognition system. **

## 2024-11-10 NOTE — CONSULTS
e-Consult (IPC) - Oncology-Radiation   Name: Yuni Keys 78 y.o. female I MRN: [id number]  Unit/Bed#: S -01 I Date of Admission: 11/9/2024   Date of Service: 11/10/2024 I Hospital Day: 1   Inpatient consult to Radiation Oncology  Consult performed by: Bora Mariee MD  Consult ordered by: Kwesi Rodriguez MD        Physician Requesting Evaluation: Ambrosio Kirby*   Reason for Evaluation / Principal Problem: Metastatic breast cancer to bone with lower back pain.  E-Consultation Provider: Kwesi Rodriguez MD    ASSESSMENT:  Yuni Keys is a 78-year-old female with metastatic breast carcinoma to bone who is known to radiation oncology having been seen for consultation by Dr. Olimpia Siddiqi followed by simulation on October 31 and radiation therapy that started on November 5, 2024 to the right shoulder, T7-T10 spine and T11-L2 spine.  She has received a total of 4 out of 5 fractions that are planned to the right shoulder and 4 out of 10 fractions to both of her spine fields as of November 8, 2024.  She is known to neurosurgery and is status post T8 and L1 kyphoplasty on October 15, 2024 with T8 vertebral biopsy positive for metastatic carcinoma consistent with breast primary.  She has seen Dr. Amanda from medical oncology and is on letrozole and ribociclib systemic therapy.  She presented to the emergency room with worsening back pain.  She had CAT scans November 9, 2024 of the abdomen and pelvis with contrast, thoracic spine CT without contrast and CT reconstruction of the lumbar spine that revealed:  CT recon only lumbar spine 11/9/2024: Compression fracture at L1, status post kyphoplasty.  No new lumbar vertebral fractures.  No evidence of lumbar spine metastases.  Mild spinal stenosis at L4-5, unchanged since MRI from 9/21/2024.  CT thoracic spine without contrast 11/9/2024:  Osteolytic metastases involving the T8, T11 and L1 vertebral bodies with pathologic fractures treated with  kyphoplasty at T8 and L1.  Thickened paravertebral soft tissues at T8. This could be residual edema or hematoma from pathologic fracture and kyphoplasty. However, extraosseous spread of metastasis can have a similar appearance.  Probable extraosseous extension of T8 metastasis into the left anterior epidural space. Less likely, this could represent a streak artifact from adjacent kyphoplasty material. This can be better evaluated with MRI of the thoracic spine, without and with IV contrast.  No evidence of new thoracic spine metastasis.  CT abdomen pelvis with contrast: Mild dilatation of the common bile duct, 9 mm in maximal diameter, etiology indeterminate. If clinically indicated, nonemergent MRI of the abdomen/MRCP, without and with IV contrast would be helpful to identify or exclude an obstructing lesion of the CBD. No intrahepatic ductal dilatation.  Bilateral hydronephrosis, moderate on the left and mild on the right with no hydroureter. Most likely, this is due to bilateral UPJ stenoses. No evidence of metastases in the abdomen or pelvis. 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 Compression fracture of L1, status post kyphoplasty, similar in appearance to lumbar spine radiographs from 11/8/2024.  No evidence of acute abnormality in the abdomen or pelvis.    She was transferred from the [facility] emergency room to [facility] to be seen for neurosurgery consultation as well as to consider possible radiation therapy as an inpatient.    Clinical exam per Dr. Rodriguez reveals no midline or paraspinal tenderness.  Strength and sensation are intact in the bilateral lower extremities.  Pain level is 4 out of 10 with her ambulating in her room on her current pain medication of oxycodone in addition to Decadron every 6 hours.    RECOMMENDATIONS:  Agree with order for MRI of the thoracic spine as recommended by radiology and neurosurgery for further evaluation.  Continue  on Decadron 4 mg IV every 6 hours which can be converted to same p.o. dose if she is better tomorrow in anticipation of her hopefully being discharged.  Decadron will be managed by radiation oncology as outpatient.  3.   Continue oxycodone and recommendations from palliative care for pain management.  4.  No inpatient radiation therapy is recommended at this time.  She is currently under treatment        at [facility].  She can be reassessed tomorrow and hopefully if her pain is        under control with oral pain medication in addition to oral steroids, she can be discharged        and continue radiation therapy as an outpatient at [facility] on              Tuesday, November 12.  Should she require continued inpatient hospitalization for        pain management at [facility], then her 3 treatment plan plans can be        recalculated for treatment at [facility].     21-30 minutes, >50% of the total time devoted to medical consultative verbal/EMR discussion between providers. Written report will be generated in the EMR.

## 2024-11-10 NOTE — ASSESSMENT & PLAN NOTE
Patient reported 3 watery bowel movements morning of 11/10 s/p enema 11/9.  Patient has opioid induced constipation  Takes Miralax, Senokot, Dulcolax at home    Plan:  Continue PTA meds  Will add enema if needed in AM.

## 2024-11-10 NOTE — CASE MANAGEMENT
Case Management Assessment    Patient name Yuni Keys  Location S /S -01 MRN [id number]  : 1945 Date 11/10/2024       Current Admission Date: 2024  Current Admission Diagnosis:Breast cancer metastasized to bone, right (HCC)   Patient Active Problem List    Diagnosis Date Noted Date Diagnosed    Constipation 2024     Bilateral hydronephrosis 2024     Depression, recurrent (HCC) 2024     Age-related osteoporosis with current pathological fracture of vertebra (HCC) 2024     Pathological fracture in neoplastic disease, other specified site, sequela 2024     Malignant neoplasm of upper-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, estrogen receptor positive (HCC) 10/10/2024     Abnormal positron emission tomography (PET) scan 10/03/2024     Breast cancer metastasized to bone, right (HCC) 2024     Closed compression fracture of body of L1 vertebra (HCC) 2024     Compression fracture of L1 lumbar vertebra (HCC) 2024     Primary insomnia 2024     History of hysterectomy 2023     Family history of early CAD 10/14/2022     History of abnormal uterine bleeding 10/14/2022     MCI (mild cognitive impairment) 2022     Hyperlipidemia 2021     Retrocalcaneal bursitis (back of heel), right 2020     Lumbar disc herniation      Lumbar radiculopathy      Spinal stenosis of lumbar region with neurogenic claudication        LOS (days): 1  Geometric Mean LOS (GMLOS) (days):   Days to GMLOS:     OBJECTIVE:    Risk of Unplanned Readmission Score: 16.43         Current admission status: Inpatient       Preferred Pharmacy:   [id number] [place] - [address] [address]  [address] [address]  Phone: [phone] Fax: [phone]    Primary Care Provider: Lisa Fairbanks DO    Primary Insurance: MEDICARE  Secondary Insurance: AET[employer]    ASSESSMENT:  Active Health Care Proxies       [place], [facility]  Care Representative - Daughter   Primary Phone: [phone] (Home)                                Patient Information  Admitted from:: Home  Mental Status: Alert  During Assessment patient was accompanied by: Not accompanied during assessment  Assessment information provided by:: Patient  Primary Caregiver: Self  Support Systems: Family members, Friends/neighbors, Spouse/significant other  County of Residence: [place]  What city do you live in?: [place]  Home entry access options. Select all that apply.: Stairs  Number of steps to enter home.: 1  Type of Current Residence: [facility]  Living Arrangements: Lives Alone    Activities of Daily Living Prior to Admission  Functional Status: Independent  Completes ADLs independently?: Yes  Ambulates independently?: Yes  Does patient use assisted devices?: No  Does patient currently own DME?: Yes  What DME does the patient currently own?: Walker, Straight Cane, Other (Comment) (knee scooter)  Does patient have a history of Outpatient Therapy (PT/OT)?: Yes  Does the patient have a history of Short-Term Rehab?: No  Does patient have a history of HHC?: Yes  Does patient currently have HHC?: No         Patient Information Continued  Does patient have prescription coverage?: Yes  Does patient receive dialysis treatments?: No         Means of Transportation  Means of Transport to Appts:: Friends      Social Determinants of Health (SDOH)      Flowsheet Row Most Recent Value   Housing Stability    In the last 12 months, was there a time when you were not able to pay the mortgage or rent on time? N   At any time in the past 12 months, were you homeless or living in a shelter (including now)? N   Transportation Needs    In the past 12 months, has lack of transportation kept you from medical appointments or from getting medications? no   In the past 12 months, has lack of transportation kept you from meetings, work, or from getting things needed for daily living? No   Food Insecurity     Within the past 12 months, you worried that your food would run out before you got the money to buy more. Never true   Within the past 12 months, the food you bought just didn't last and you didn't have money to get more. Never true   Utilities    In the past 12 months has the electric, gas, oil, or water company threatened to shut off services in your home? No

## 2024-11-10 NOTE — PROGRESS NOTES
"Progress Note - Hospitalist   Name: Yuni Keys 78 y.o. female I MRN: [id number]  Unit/Bed#: S -01 I Date of Admission: 11/9/2024   Date of Service: 11/10/2024 I Hospital Day: 1    Assessment & Plan  Breast cancer metastasized to bone, right (HCC)  Patient comes in w/ worsening back pain  CT Spine completed today at [facility] showed: \"Probable extraosseous extension of T8 metastasis into the left anterior epidural space\"  She was transferred from [facility] to [facility] for neurosurgery consultation as well as possible radiation therapy.   She had an appointment with Palliative on Wednesday  Received 75 mcg of Fentanyl as well as 4 doses of 4mg Morphine at [facility] for pain, which finally afforded her some pain control  Also started on 4 mg Decadron q6 hours per neurosurgery, as given in report by [facility] ED.   -Patient started RT last Tuesday for a total of 10 days M-F.    Plan:  Neurosurgery consulted  MRI thoracic w/ w/o contrast  Deferring steroid management to rad/onc  Rad/onc consult placed  Spoke with Dr. Mariee of Rad/Onc, he stated patient has RT settings placed for upper [place].  If patient is discharged 11/11 she can continue RT there, if remains inpatient can consider transfer to [facility] or adjusting RT settings to allow for RT at [place].  Will follow up with plan 11/11 pending NSGY plan s/p MRI and palliative recs  Continue IV dexamethasone 4 mg every 6 hours  Palliative consulted, appreciate recs  Started on high dose opioid regimen for pain control.  Bowel regimen started due to opioid induced constipation  Continued 4 mg decadron q6 hours, can alter therapy as per neurosurgery  PRN Zofran for nausea  Continued PTA Femara (11/9/24 was day 11/21) and Ribociclib  Consider rad/onc consult for continued RT while inpatient.    Constipation  Patient reported 3 watery bowel movements morning of 11/10 s/p enema 11/9.  Patient has opioid induced constipation  Takes Miralax, Senokot, Dulcolax at home    Plan:  Continue PTA " "meds  Will add enema if needed in AM.   Bilateral hydronephrosis  CT Abdomen Pelvis showed: \"Bilateral hydronephrosis, moderate on the left and mild on the right with no hydroureter. Most likely, this is due to bilateral UPJ stenoses.  Left-sided hydronephrosis and two small nonobstructing left renal calculi.\"  Creatinine is within normal limits    Plan:  Continue PO hydration.  Continue to monitor.       VTE Pharmacologic Prophylaxis: VTE Score: 5 High Risk (Score >/= 5) - Pharmacological DVT Prophylaxis Ordered: enoxaparin (Lovenox). Sequential Compression Devices Ordered.    Mobility:   Basic Mobility Inpatient Raw Score: 24  JH-HLM Goal: 8: Walk 250 feet or more  JH-HLM Achieved: 8: Walk 250 feet ot more  JH-HLM Goal NOT achieved. Continue with multidisciplinary rounding and encourage appropriate mobility to improve upon JH-HLM goals.    Patient Centered Rounds: I evaluated the patient without nursing staff present due to nurse performing other clinical duties    Discussions with Specialists or Other Care Team Provider: Yes    Education and Discussions with Family / Patient: Patient declined call to .     Current Length of Stay: 1 day(s)  Current Patient Status: Inpatient   Certification Statement: The patient will continue to require additional inpatient hospital stay due to evaluation by neurosurgery and radiation therapy.  Discharge Plan: Anticipate discharge in 24-48 hrs to home.    Code Status: Level 3 - DNAR and DNI    Subjective   Patient was resting comfortably in bed at the time of examination.  She endorses 3 episodes of diarrhea this morning after receiving enema yesterday.  She continues to complain of 4/10 lower back pain at rest, still able to ambulate without concern in room.  She denies fever/chills, nausea/vomiting, cough, shortness of breath, chest pain, abdominal pain, changes in bladder habits.  Discussed the plan for her to be evaluated by neurosurgery and possibly continuing " radiation therapy while inpatient to which patient was agreeable to.    Objective :  Temp:  [97.7 °F (36.5 °C)-98 °F (36.7 °C)] 97.7 °F (36.5 °C)  HR:  [73-95] 78  BP: (139-174)/(65-77) 139/68  Resp:  [16] 16  SpO2:  [94 %-98 %] 96 %  O2 Device: None (Room air)    Body mass index is 24.33 kg/m².     Input and Output Summary (last 24 hours):     Intake/Output Summary (Last 24 hours) at 11/10/2024 0924  Last data filed at 11/10/2024 0713  Gross per 24 hour   Intake 480 ml   Output --   Net 480 ml       Physical Exam  Constitutional:       General: She is not in acute distress.     Appearance: She is not ill-appearing or toxic-appearing.   Cardiovascular:      Rate and Rhythm: Normal rate.      Heart sounds: Murmur heard.      No gallop.   Pulmonary:      Effort: No respiratory distress.      Breath sounds: Normal breath sounds. No wheezing or rales.   Abdominal:      General: Bowel sounds are normal. There is no distension.      Tenderness: There is no abdominal tenderness. There is no right CVA tenderness, left CVA tenderness or guarding.   Musculoskeletal:      Right lower leg: No edema.      Left lower leg: No edema.      Comments: No midline or paraspinal tenderness. Strength and sensation intact in the b/l LE.   Neurological:      Mental Status: She is alert.           Lines/Drains:              Lab Results: I have reviewed the following results:   Results from last 7 days   Lab Units 11/09/24  0957 11/08/24  1437   WBC Thousand/uL 4.57 5.70   HEMOGLOBIN g/dL 13.6 13.7   HEMATOCRIT % 41.3 41.1   PLATELETS Thousands/uL 275 307   BANDS PCT % 1  --    SEGS PCT %  --  81*   LYMPHO PCT % 13* 14   MONO PCT % 2* 2*   EOS PCT % 1 2     Results from last 7 days   Lab Units 11/10/24  0440 11/09/24  0957   SODIUM mmol/L 139 141   POTASSIUM mmol/L 4.1 3.9   CHLORIDE mmol/L 104 103   CO2 mmol/L 30 33*   BUN mg/dL 11 12   CREATININE mg/dL 0.55* 0.62   ANION GAP mmol/L 5 5   CALCIUM mg/dL 9.4 9.4   ALBUMIN g/dL  --  4.3   TOTAL  BILIRUBIN mg/dL  --  0.69   ALK PHOS U/L  --  45   ALT U/L  --  21   AST U/L  --  20   GLUCOSE RANDOM mg/dL 137 110                       Recent Cultures (last 7 days):         Imaging Results Review: I reviewed radiology reports from this admission including: CTAP, CT thoracic spine, CT only lumbar, x-ray lumbar, x-ray hip pelvis.  Other Study Results Review: No additional pertinent studies reviewed.    Last 24 Hours Medication List:     Current Facility-Administered Medications:     atorvastatin (LIPITOR) tablet 10 mg, Daily    bisacodyl (DULCOLAX) EC tablet 5 mg, Daily PRN    Cholecalciferol (VITAMIN D3) tablet 2,000 Units, Daily    dexamethasone (DECADRON) injection 4 mg, Q6H DAVID    enoxaparin (LOVENOX) subcutaneous injection 40 mg, Daily    HYDROmorphone (DILAUDID) injection 0.5 mg, Q2H PRN    letrozole (FEMARA) tablet 2.5 mg, Daily    lidocaine (LIDODERM) 5 % patch 1 patch, Daily    multivitamin-minerals (CENTRUM) tablet 1 tablet, Daily    naloxone (NARCAN) 0.04 mg/mL syringe 0.04 mg, Q1MIN PRN    ondansetron (ZOFRAN) injection 4 mg, Q4H PRN    oxyCODONE (ROXICODONE) IR tablet 5 mg, Q4H PRN **OR** oxyCODONE (ROXICODONE) immediate release tablet 10 mg, Q4H PRN    polyethylene glycol (GLYCOLAX) bowel prep 17 g, BID    Ribociclib Succ (600 MG Dose) TBPK 600 mg, Daily    senna-docusate sodium (SENOKOT S) 8.6-50 mg per tablet 1 tablet, HS    Administrative Statements   Today, Patient Was Seen By: Kwesi Rodriguez MD      **Please Note: This note may have been constructed using a voice recognition system.**

## 2024-11-10 NOTE — ASSESSMENT & PLAN NOTE
"Patient comes in w/ worsening back pain  CT Spine completed today at [facility] showed: \"Probable extraosseous extension of T8 metastasis into the left anterior epidural space\"  She was transferred from [facility] to [facility] for neurosurgery consultation as well as possible radiation therapy.   She had an appointment with Palliative on Wednesday  Received 75 mcg of Fentanyl as well as 4 doses of 4mg Morphine at [facility] for pain, which finally afforded her some pain control  Also started on 4 mg Decadron q6 hours per neurosurgery, as given in report by [facility] ED.   -Patient started RT last Tuesday for a total of 10 days M-F.    Plan:  Neurosurgery consulted  MRI thoracic w/ w/o contrast  Deferring steroid management to rad/onc  Rad/onc consult placed  Spoke with Dr. Mariee of Rad/Onc, he stated patient has RT settings placed for upper [place].  If patient is discharged 11/11 she can continue RT there, if remains inpatient can consider transfer to [facility] or adjusting RT settings to allow for RT at [place].  Will follow up with plan 11/11 pending NSGY plan s/p MRI and palliative recs  Continue IV dexamethasone 4 mg every 6 hours  Palliative consulted, appreciate recs  Started on high dose opioid regimen for pain control.  Bowel regimen started due to opioid induced constipation  Continued 4 mg decadron q6 hours, can alter therapy as per neurosurgery  PRN Zofran for nausea  Continued PTA Femara (11/9/24 was day 11/21) and Ribociclib  Consider rad/onc consult for continued RT while inpatient.    "

## 2024-11-10 NOTE — ASSESSMENT & PLAN NOTE
"Patient comes in w/ worsening back pain  CT Spine completed today at [facility] showed: \"Probable extraosseous extension of T8 metastasis into the left anterior epidural space\"  She was transferred from [facility] to [facility] for neurosurgery consultation as well as possible radiation therapy.   She had an appointment with Palliative on Wednesday  Received 75 mcg of Fentanyl as well as 4 doses of 4mg Morphine at [facility] for pain, which finally afforded her some pain control  Also started on 4 mg Decadron q6 hours per neurosurgery, as given in report by [facility] ED.     Plan:  Neurosurgery consult, appreciate recs  Palliative consult, appreciate recs  Started on high dose opioid regimen for pain control.  Bowel regimen started due to opioid induced constipation  Continued 4 mg decadron q6 hours, can alter therapy as per neurosurgery  PRN Zofran for nausea  Continued PTA Femara (11/9/24 was day 11/21) and Ribociclib    "

## 2024-11-10 NOTE — ASSESSMENT & PLAN NOTE
"CT Abdomen Pelvis showed: \"Bilateral hydronephrosis, moderate on the left and mild on the right with no hydroureter. Most likely, this is due to bilateral UPJ stenoses.  Left-sided hydronephrosis and two small nonobstructing left renal calculi.\"  Creatinine is within normal limits    Plan:  Continue PO hydration.  Continue to monitor.     "

## 2024-11-11 ENCOUNTER — APPOINTMENT (OUTPATIENT)
Facility: HOSPITAL | Age: 79
End: 2024-11-11
Attending: RADIOLOGY
Payer: MEDICARE

## 2024-11-11 ENCOUNTER — TELEPHONE (OUTPATIENT)
Age: 79
End: 2024-11-11

## 2024-11-11 ENCOUNTER — HOSPITAL ENCOUNTER (OUTPATIENT)
Dept: INFUSION CENTER | Facility: HOSPITAL | Age: 79
Discharge: HOME | End: 2024-11-11
Attending: INTERNAL MEDICINE

## 2024-11-11 VITALS
RESPIRATION RATE: 16 BRPM | OXYGEN SATURATION: 98 % | BODY MASS INDEX: 24.33 KG/M2 | WEIGHT: 116.4 LBS | HEART RATE: 74 BPM | SYSTOLIC BLOOD PRESSURE: 116 MMHG | TEMPERATURE: 98.4 F | DIASTOLIC BLOOD PRESSURE: 57 MMHG

## 2024-11-11 DIAGNOSIS — C50.911 BREAST CANCER METASTASIZED TO BONE, RIGHT (HCC): Primary | ICD-10-CM

## 2024-11-11 DIAGNOSIS — C79.51 BREAST CANCER METASTASIZED TO BONE, RIGHT (HCC): Primary | ICD-10-CM

## 2024-11-11 DIAGNOSIS — C50.212 MALIGNANT NEOPLASM OF UPPER-INNER QUADRANT OF LEFT BREAST IN FEMALE, ESTROGEN RECEPTOR POSITIVE (HCC): ICD-10-CM

## 2024-11-11 DIAGNOSIS — Z17.0 MALIGNANT NEOPLASM OF UPPER-INNER QUADRANT OF LEFT BREAST IN FEMALE, ESTROGEN RECEPTOR POSITIVE (HCC): ICD-10-CM

## 2024-11-11 PROBLEM — G89.3 CANCER RELATED PAIN: Status: ACTIVE | Noted: 2024-11-11

## 2024-11-11 PROBLEM — Z51.5 PALLIATIVE CARE BY SPECIALIST: Status: ACTIVE | Noted: 2024-11-11

## 2024-11-11 LAB
ANION GAP SERPL CALCULATED.3IONS-SCNC: 7 MMOL/L (ref 4–13)
BUN SERPL-MCNC: 18 MG/DL (ref 5–25)
CALCIUM SERPL-MCNC: 9 MG/DL (ref 8.4–10.2)
CHLORIDE SERPL-SCNC: 107 MMOL/L (ref 96–108)
CO2 SERPL-SCNC: 28 MMOL/L (ref 21–32)
CREAT SERPL-MCNC: 0.65 MG/DL (ref 0.6–1.3)
GFR SERPL CREATININE-BSD FRML MDRD: 85 ML/MIN/1.73SQ M
GLUCOSE SERPL-MCNC: 142 MG/DL (ref 65–140)
POTASSIUM SERPL-SCNC: 4.1 MMOL/L (ref 3.5–5.3)
SODIUM SERPL-SCNC: 142 MMOL/L (ref 135–147)

## 2024-11-11 PROCEDURE — 80048 BASIC METABOLIC PNL TOTAL CA: CPT

## 2024-11-11 PROCEDURE — 99239 HOSP IP/OBS DSCHRG MGMT >30: CPT | Performed by: INTERNAL MEDICINE

## 2024-11-11 PROCEDURE — NC001 PR NO CHARGE: Performed by: STUDENT IN AN ORGANIZED HEALTH CARE EDUCATION/TRAINING PROGRAM

## 2024-11-11 RX ORDER — DEXAMETHASONE 4 MG/1
4 TABLET ORAL EVERY 6 HOURS SCHEDULED
Qty: 56 TABLET | Refills: 0 | Status: SHIPPED | OUTPATIENT
Start: 2024-11-11 | End: 2024-11-25

## 2024-11-11 RX ORDER — OXYCODONE HYDROCHLORIDE 5 MG/1
5-10 TABLET ORAL EVERY 6 HOURS PRN
Qty: 60 TABLET | Refills: 0 | Status: SHIPPED | OUTPATIENT
Start: 2024-11-11 | End: 2024-11-21

## 2024-11-11 RX ORDER — DEXAMETHASONE 4 MG/1
4 TABLET ORAL EVERY 6 HOURS SCHEDULED
Status: DISCONTINUED | OUTPATIENT
Start: 2024-11-11 | End: 2024-11-11 | Stop reason: HOSPADM

## 2024-11-11 RX ADMIN — MULTIPLE VITAMINS W/ MINERALS TAB 1 TABLET: TAB ORAL at 08:29

## 2024-11-11 RX ADMIN — ENOXAPARIN SODIUM 40 MG: 40 INJECTION SUBCUTANEOUS at 08:30

## 2024-11-11 RX ADMIN — Medication 2000 UNITS: at 08:29

## 2024-11-11 RX ADMIN — ATORVASTATIN CALCIUM 10 MG: 10 TABLET, FILM COATED ORAL at 08:29

## 2024-11-11 RX ADMIN — DEXAMETHASONE SODIUM PHOSPHATE 4 MG: 4 INJECTION INTRA-ARTICULAR; INTRALESIONAL; INTRAMUSCULAR; INTRAVENOUS; SOFT TISSUE at 00:32

## 2024-11-11 RX ADMIN — DEXAMETHASONE 4 MG: 4 TABLET ORAL at 14:02

## 2024-11-11 RX ADMIN — OXYCODONE HYDROCHLORIDE 10 MG: 10 TABLET ORAL at 03:59

## 2024-11-11 RX ADMIN — LETROZOLE 2.5 MG: 2.5 TABLET ORAL at 08:32

## 2024-11-11 RX ADMIN — DEXAMETHASONE SODIUM PHOSPHATE 4 MG: 4 INJECTION INTRA-ARTICULAR; INTRALESIONAL; INTRAMUSCULAR; INTRAVENOUS; SOFT TISSUE at 05:09

## 2024-11-11 NOTE — PROGRESS NOTES
Hematology/Oncology Outpatient Office Note    Date of Service: 2024    [facility]  [address]  [place] [id number]  [phone]    Reason for Consultation:   Chief Complaint   Patient presents with    Follow-up       Cancer Stage at diagnosis: IV    Referral Physician: No ref. provider found    Primary Care Physician:  Lisa Fairbanks DO     Nickname: Alley    Lives alone    Daughter: James    Original ECO    Today's ECO    Goals and Barriers:  Current Goal: Minimize effects of disease burden, extend life.   Barriers to accomplishing this: making the bed (shoulder pain)    Patient's Capacity to Self Care:  Patient is able to self care      ASSESSMENT & PLAN      Diagnosis ICD-10-CM Associated Orders   1. Breast cancer metastasized to bone, right (HCC)  C50.911     C79.51       2. Malignant neoplasm of upper-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, estrogen receptor positive (HCC)  C50.212     Z17.0             This is a 79 y.o. c PMHx notable for Arthritis, hyperlipidemia, hypertension, migraines, osteopenia, basal cell skin cancer, being seen in consultation for likely recurrent HR+ L Breast Cancer to the bone    She is s/p hysterectomy and oophorectomy in her early 50's    G1 Neutropenia 2/2 Kisqali is noted    Discussion of decision making  Oncology history updated, accordingly, during this visit  Goals of care/patient communication  I discussed with the patient the clinical course leading up to their cancer diagnosis. I reviewed relevant office notes, imaging reports and pathology result as well.  I told the patient that this is a case of incurable disease and what this means. We discussed that the goal of anti-cancer therapy is to provide best quality of life, extend overall survival, and progression free survival as shown in clinical trials. We also discussed that there might be a point when the cancer will no longer respond to  "this anti-neoplastic therapy. As a result, we also discussed the role of the palliative care team being introduced early in the treatment course. We will be making this referral  I explained the risks/benefits of the proposed cancer therapy: Letrozole + Ribociclib and after discussion including understanding risks of possible life-threatening complications and therapy-related malignancy development, informed consent for blood products and treatment has been signed and obtained.  TNM/Staging At Diagnosis  Cancer Staging   Breast cancer metastasized to bone, right (HCC)  Staging form: Breast, AJCC 8th Edition  - Clinical: Stage IV (cM1) - Signed by Fam Amanda MD on 10/12/2024    Malignant neoplasm of upper-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, estrogen receptor positive (HCC)  Staging form: Breast, AJCC 8th Edition  - Clinical: Stage IV (cM1) - Signed by Fam Amanda MD on 10/12/2024    Disease Features/Tumor Markers/Genetics  Tumor Marker: n/a  Notable Path Features:   10/4/2024 Breast: ER 90%, AR negative, HER-2 +1  10/15/2024: Spine, \"Vertebra, T8,\" Biopsy: Metastatic carcinoma, most compatible with known breast primary  CARIS NGS: ER+, HER2 0, no actionable biomarkers  Guardant NGS 10 muts/Mb, NF1 mutation  Treatment: Letrozole + Ribociclib   Other Supportive care:   Treatment Team Members  Surgeon  Rad Onc  Palliative: referral made  Labs  Diagnostics  10/1/2024 EK ms QTC   10/2/2024 PET/CT: Mild focal radiotracer uptake at a left breast nodular density medially. Several small FDG avid left axillary/subpectoral lymph nodes would raise suspicion for metastasis. Scattered FDG avid lytic lesions compatible with metastasis. T-8 and T-11 spine lesions  10/8/2024 MRI T-spine w/wo c: Normal enhanced MRI of the thoracic spine  There is a DEXA from , next can get it done 2024:  ms QTC     Discussion of decision making    I personally reviewed the following lab results, the " image studies, pathology, other specialty/physicians consult notes and recommendations, and outside medical records. I had a lengthy discussion with the patient and shared the work-up findings. We discussed the diagnosis and management plan as below. I spent 41 minutes reviewing the records (labs, clinician notes, outside records, medical history, ordering medicine/tests/procedures, monitoring of anti-neoplastic toxicities, interpreting the imaging/labs previously done) and coordination of care as well as direct time with the patient today, of which greater than 50% of the time was spent in counseling and coordination of care with the patient/family.    Plan/Labs  EKG at baseline and q2 weeks for 2 times ordered already  Palliative care referral  Cont Letrozole 2.5 mg daily + Ribociclib 600 mg daily 21 days on and 7 days off  Recommended Calcium 1200 mg supplemental  Can use Keytruda down the road due to TMB high  Baseline DEXA due 4/2025 and would be covered by the insurance   Prolia planned by her PCP  Cont Prolia SC 60 mg q6 months  Rad Onc f/u up for her symptomatic bone mets ongoing RT  Oncology dietitian   Onc Genetics counselor referral to f/u on somatic NF1 mutation detection  CBC, CMP q4 weeks ordered as standing until 11/2025  Restaging CT CAP w/c scheduled 1/13/2025        Follow Up: q4 weeks while on therapy scheduled thru 1/21/2025     All questions were answered to the patient's satisfaction during this encounter. The patient knows the contact information for our office and knows to reach out for any relevant concerns related to this encounter. They are to call for any temperature 100.4 or higher, new symptoms including but not restricted to shaking chills, decreased appetite, nausea, vomiting, diarrhea, increased fatigue, shortness of breath or chest pain, confusion, and not feeling the strength to come to the clinic. For all other listed problems and medical diagnosis in their chart - they are  managed by PCP and/or other specialists, which the patient acknowledges. Thank you very much for your consultation and making us a part of this patient's care. We are continuing to follow closely with you. Please do not hesitate to reach out to me with any additional questions or concerns.    Fam Amanda MD  Hematology & Medical Oncology Staff Physician             Disclaimer: This document was prepared using [employer] technology. If a word or phrase is confusing, or does not make sense, this is likely due to recognition error which was not discovered during this clinician's review. If you believe an error has occurred, please contact me through [facility] [email or web address]?cation.      ONCOLOGY HISTORY OF PRESENT ILLNESS        Oncology History   Breast cancer metastasized to bone, right (HCC)   9/27/2024 Initial Diagnosis    Breast cancer metastasized to bone, right (HCC)     10/12/2024 -  Cancer Staged    Staging form: Breast, AJCC 8th Edition  - Clinical: Stage IV (cM1) - Signed by Fam Amanda MD on 10/12/2024       Malignant neoplasm of upper-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, estrogen receptor positive (HCC)   10/2/2024 Observation    NM PET/CT IMPRESSION:   1. Mild focal radiotracer uptake at a left breast nodular density medially. Underlying primary breast malignancy should be excluded.  2. Several small FDG avid left axillary/subpectoral lymph nodes would raise suspicion for metastasis.  3. Scattered FDG avid lytic lesions compatible with metastasis.     10/4/2024 Biopsy    Left breast ultrasound-guided biopsy  A. 3 o'clock, periareolar  Benign breast tissue with fibroadenomatoid nodule    B. 11 o'clock, 7 cm from nipple  Invasive mammary carcinoma with mixed ductal and lobular features  Grade 1-2  ER , DC <1, HER2 1+  Lymphovascular invasion not definitively identified    Concordant. Malignancy is poorly defined on mammo, appearing as a developing asymmetry rather  than a discrete mass; this measures up to 5.8 cm. On US, mass measures up to 4.1cm. Subtle asymmetry with possible distortion slightly superior and medial which could represent a second site of disease. Further characterization is indication. Right breast imaging performed on 4/12/2024; more recent mammogram may be reasonable. CESM or MRI is recommended for evaluation of extent of disease in left breast. The patient had abnormal axillary lymph nodes seen on PET/CT. 1 of these lymph nodes was visualized on US; however, it is not amenable to US-guided core needle biopsy.     10/12/2024 -  Cancer Staged    Staging form: Breast, AJCC 8th Edition  - Clinical: Stage IV (cM1) - Signed by Fam Amanda MD on 10/12/2024       10/15/2024 Biopsy    IR-guided biopsy    T8 - metastatic carcinoma, most compatible with known breast primary  ~RECEPTORS PENDING~    L1 - negative for carcinoma       11/6/2024: Letrozole + Kisqali started    SUBJECTIVE  (INTERVAL HISTORY)      Clotting History None   Bleeding History Severe menstrual bleeding leading to hysterectomy   Cancer History L Breast, basal cell skin cancer   Family Cancer History None   H/O Blood/Plt Transfusion None   Tobacco/etoh/drug abuse 1/2 PPD x 15 years, quit in 1977, no etoh abuse or rec drug use           Occupation Retired (CPA)     Pain: shoulder, middle/lower back, right hip since she fell 8/28/2024 after losing her balance on her ladder when spraying bees.    Feeling very fatigued since her RT, last session was yesterday.    Several night sweats on Letrozole.    I have reviewed the relevant past medical, surgical, social and family history. I have also reviewed allergies and medications for this patient.    Review of Systems    Baseline weight: 115-120 lbs    Denies F/C, N/V, SOB, CP, LH, HA, rash, gen weakness, melena, hematuria, hematochezia, recurrent falls, diarrhea, or constipation       A 10-point review of system was performed, pertinent positive and  negative were detailed as above. Otherwise, the 10-point review of system was negative.      Past Medical History:   Diagnosis Date    Arthritis     Breast mass     Edema of both lower legs 11/01/2018    Dr. Thang Otero; faxed records.    Hx of skin cancer, basal cell     Hyperlipidemia     Hypertension     Impingement syndrome of left shoulder 04/10/2018    Dr. Thang Otero, faxed patient records.    Migraines     Osteopenia     Overactive bladder     Overweight 12/16/2021    Skin cancer 2022    Basal cell    Toe fracture, left 11/16/2023       Past Surgical History:   Procedure Laterality Date    ACHILLES TENDON REPAIR      BREAST BIOPSY Left 10/04/2024    BREAST CYST EXCISION Bilateral     1972    BREAST LUMPECTOMY      CATARACT EXTRACTION, BILATERAL      COLONOSCOPY      COLPOPEXY VAGINAL EXTRAPERITONEAL (VEC) WITH INSERTION PUBOVAGINAL SLING      CYST REMOVAL      HERNIA REPAIR      HYSTERECTOMY      IR KYPHOPLASTY/VERTEBROPLASTY WITH ABLATION  10/15/2024    JOINT REPLACEMENT Right     REPLACEMENT TOTAL KNEE Left     TONSILLECTOMY  1949    US GUIDANCE BREAST BIOPSY LEFT EACH ADDITIONAL Left 10/04/2024    US GUIDED BREAST BIOPSY LEFT COMPLETE Left 10/04/2024       Family History   Problem Relation Age of Onset    Heart disease Father     Melanoma Daughter         40s    Melanoma Daughter         50s    Ovarian cancer Maternal Aunt     Breast cancer Paternal Aunt     Breast cancer Maternal Grandmother     Heart disease Paternal Grandmother     Hypertension Paternal Grandfather        Social History     Socioeconomic History    Marital status:      Spouse name: Not on file    Number of children: 2    Years of education: 18    Highest education level: Master's degree (e.g., MA, MS, Derek, MEd, MSW, CHANELL)   Occupational History    Not on file   Tobacco Use    Smoking status: Former     Current packs/day: 0.00     Average packs/day: 0.5 packs/day for 15.0 years (7.5 ttl pk-yrs)     Types: Cigarettes      Quit date: 1977     Years since quittin.0     Passive exposure: Past    Smokeless tobacco: Never   Vaping Use    Vaping status: Never Used   Substance and Sexual Activity    Alcohol use: Yes     Alcohol/week: 1.0 standard drink of alcohol     Types: 1 Glasses of wine per week     Comment: occasional    Drug use: Never    Sexual activity: Not Currently     Partners: Male     Birth control/protection: Abstinence, Post-menopausal, Female Sterilization   Other Topics Concern    Not on file   Social History Narrative    Not on file     Social Drivers of Health     Financial Resource Strain: Patient Declined (2023)    Overall Financial Resource Strain (CARDIA)     Difficulty of Paying Living Expenses: Patient declined   Food Insecurity: No Food Insecurity (11/10/2024)    Hunger Vital Sign     Worried About Running Out of Food in the Last Year: Never true     Ran Out of Food in the Last Year: Never true   Transportation Needs: No Transportation Needs (11/10/2024)    PRAPARE - Transportation     Lack of Transportation (Medical): No     Lack of Transportation (Non-Medical): No   Physical Activity: Sufficiently Active (3/24/2022)    Exercise Vital Sign     Days of Exercise per Week: 4 days     Minutes of Exercise per Session: 60 min   Stress: Stress Concern Present (3/24/2022)    [employer] - Occupational Stress Questionnaire     Feeling of Stress : Rather much   Social Connections: Socially Isolated (3/24/2022)    Social Connection and Isolation Panel [NHANES]     Frequency of Communication with Friends and Family: More than three times a week     Frequency of Social Gatherings with Friends and Family: Three times a week     Attends Scientology Services: Never     Active Member of Clubs or Organizations: No     Attends Club or Organization Meetings: Never     Marital Status:    Intimate Partner Violence: Unknown (2024)    Nursing IPS     Feels Physically and Emotionally  Safe: Not on file     Physically Hurt by Someone: Not on file     Humiliated or Emotionally Abused by Someone: Not on file     Physically Hurt by Someone: 2     Hurt or Threatened by Someone: 2   Housing Stability: Unknown (11/10/2024)    Housing Stability Vital Sign     Unable to Pay for Housing in the Last Year: No     Number of Times Moved in the Last Year: Not on file     Homeless in the Last Year: No       Allergies   Allergen Reactions    Adhesive [Medical Tape] Hives, Itching and Blisters    Lactose - Food Allergy Diarrhea and GI Intolerance    Bupropion Rash     Around 2010       Current Outpatient Medications   Medication Sig Dispense Refill    atorvastatin (LIPITOR) 10 mg tablet Take 1 tablet (10 mg total) by mouth daily 90 tablet 3    baclofen 10 mg tablet Take 1 tablet (10 mg total) by mouth 3 (three) times a day as needed for muscle spasms 30 tablet 0    Cholecalciferol (Vitamin D) 50 MCG (2000 UT) tablet Take 2,000 Units by mouth daily      dexamethasone (DECADRON) 4 mg tablet Take 1 tablet (4 mg total) by mouth every 6 (six) hours for 14 days 56 tablet 0    letrozole (FEMARA) 2.5 mg tablet Take 1 tablet (2.5 mg total) by mouth daily 90 tablet 3    Multiple Vitamins-Minerals (MULTIVITAMIN WITH MINERALS) tablet Take 1 tablet by mouth daily      ondansetron (ZOFRAN-ODT) 8 mg disintegrating tablet Take 1 tablet (8 mg total) by mouth every 8 (eight) hours as needed for nausea or vomiting 20 tablet 0    oxyCODONE (ROXICODONE) 5 immediate release tablet Take 1-2 tablets (5-10 mg total) by mouth every 6 (six) hours as needed for moderate pain or severe pain for up to 10 days Max Daily Amount: 40 mg 60 tablet 0    polyethylene glycol (GLYCOLAX) 17 GM/SCOOP powder Take 17 g by mouth 2 (two) times a day      Ribociclib Succ, 600 MG Dose, (Kisqali, 600 MG Dose,) 200 MG TBPK Take 600 mg by mouth daily 21 days on, followed by 7 days off 63 each 5    Sodium Fluoride 5000 PPM 1.1 % PSTE APPLY TIHN RIBBON TO  "TOOTHBRUSH, BRUSH X 2MIN AT BEDTIME, SPIT, DONT RINSE      calcium polycarbophil (FIBERCON) 625 mg tablet Take 625 mg by mouth daily (Patient not taking: Reported on 11/8/2024)      naloxone (NARCAN) 4 mg/0.1 mL nasal spray Administer 1 spray into a nostril. If no response after 2-3 minutes, give another dose in the other nostril using a new spray. (Patient not taking: Reported on 11/13/2024) 1 each 1     No current facility-administered medications for this visit.       (Not in a hospital admission)      Objective:     24 Hour Vitals Assessment:     Vitals:    11/21/24 1322   BP: 110/52   Pulse: 72   Resp: 18   Temp: 97.5 °F (36.4 °C)   SpO2: 98%         PHYSICIAN EXAM:    General: Appearance: alert, cooperative, no distress.  HEENT: Normocephalic, atraumatic. No scleral icterus. conjunctivae clear. EOMI.  Chest: No tenderness to palpation. No open wound noted.  Lungs: Clear to auscultation bilaterally, Respirations unlabored.  Cardiac: Regular rate, +S1and S2  Abdomen: Soft, non-tender, non-distended. Bowel sounds are normal  Extremities:  No edema, cyanosis, clubbing.  Skin: Skin color, turgor are normal. No rashes.  Lymphatics: no palpable supra-cervical, axillary, or inguinal adenopathy  Neurologic: Awake, Alert, and oriented, no gross focal deficits noted b/l.       DATA REVIEW:    Pathology Result:    Final Diagnosis   Date Value Ref Range Status   10/15/2024   Final    A. Spine, \"L1 vertebra,\" Biopsy:  - Fragment of bone   - Negative for carcinoma     10/15/2024   Final    A. Spine, \"Vertebra, T8,\" Biopsy:  - Metastatic carcinoma, most compatible with known breast primary     10/04/2024   Final    A.  Left breast, 3:00, periareolar (ultrasound-guided 14-gauge marquee core biopsy, 6 passes):     - Benign breast tissue with fibroadenomatoid nodule.    B.  Left breast, 11:00, 7 cm from nipple (ultrasound-guided 12-gauge marquee core biopsy, 4 passes):     - Invasive mammary carcinoma with mixed ductal and " lobular features.       -- Tumor present in 4 of 4 tissue cores with largest measurable size of 11 mm.       -- mBR Grade:  Grade 1-2 of 3         - Duct formation: Score 2-3 of 3         - Nuclear grade: Score 1-2 of 3         - Mitotic rate: Score 1 of 3     - In situ component:  Focal suggestion of low-grade DCIS     - Lymph-vascular invasion:  Not definitively identified     Comment:  Block B2 forwarded for ER/WV/HER2 analysis with the results to be given in a supplemental report.     01/27/2023   Final    A. Polyp, rectum, polypectomy:  -   Colorectal mucosa with lymphoid aggregates, otherwise no significant histopathologic change.     Interpretation performed at [facility] [address], [place] [address]           Image Results:   Image result are reviewed and documented in Hematology/Oncology history    X-ray chest 2 views  Narrative: XR CHEST PA AND LATERAL    INDICATION: chest pain.    COMPARISON: None    FINDINGS:    Clear lungs. There is mild elevation of the left hemidiaphragm. No pneumothorax or pleural effusion.    Normal cardiomediastinal silhouette.    Midthoracic and upper lumbar compression fractures status post kyphoplasty.    Normal upper abdomen.  Impression: No acute cardiopulmonary disease.    This report is in agreement with the preliminary interpretation.    Workstation performed: [id number]      LABS:  Lab data are reviewed and documented in HemOn history.       Lab Results   Component Value Date    HGB 14.1 11/20/2024    HCT 40.5 11/20/2024    MCV 91 11/20/2024     11/20/2024    WBC 3.36 (L) 11/20/2024    NRBC 0 11/20/2024    BANDSPCT 1 11/09/2024     Lab Results   Component Value Date    K 3.6 11/20/2024     11/20/2024    CO2 28 11/20/2024    BUN 16 11/20/2024    CREATININE 0.53 (L) 11/20/2024    GLUF 84 09/26/2024    CALCIUM 7.5 (L) 11/20/2024    CORRECTEDCA 10.0 10/17/2022    AST 18 11/20/2024    ALT 26 11/20/2024    ALKPHOS 35 11/20/2024    EGFR 90  "11/20/2024       No results found for: \"IRON\", \"TIBC\", \"FERRITIN\"    No results found for: \"KUDSDANV61\"    Recent Labs     11/20/24  1015 11/20/24  2344   WBC 3.75* 3.36*    169       By:  Fam Amanda MD, 11/21/2024, 1:35 PM                                     "

## 2024-11-11 NOTE — CONSULTS
Visited w/Pt for about 25 minutes on 11/11/24.    Pt stated she feels fine but that she recently fell and fractured a vertebra and was diagnosed with St4 breast CA when in hospital.      Pt stated she will turn 79 in two days and was going to go on a trip to [place] and [place] (now canceled) that she was looking forward to.  Pt states she doesn't know if she can ever travel again (her favorite pastime).    Pt states she is well supported by family and friends and is going home soon.      Pt states her only fear surrounding EoL is regarding pain and suffering.  Pt stated she is on Palliative Service.

## 2024-11-11 NOTE — ASSESSMENT & PLAN NOTE
"CT Abdomen Pelvis (11/9/924): \"Bilateral hydronephrosis, moderate on the left and mild on the right with no hydroureter. Most likely, this is due to bilateral UPJ stenoses.  Left-sided hydronephrosis and two small nonobstructing left renal calculi.\"    "

## 2024-11-11 NOTE — ASSESSMENT & PLAN NOTE
Patient reported 3 watery bowel movements morning of 11/10 s/p enema 11/9.  Patient has opioid induced constipation  Takes Miralax, Senokot, Dulcolax at home  Patient had multiple watery bowel movements the day prior to discharge.    Plan:  MiraLAX, senekot, ducolax as needed for opioid-induced constipation upon discharge.

## 2024-11-11 NOTE — DISCHARGE INSTR - AVS FIRST PAGE
Dear Yuni Keys,     It was our pleasure to care for you here at [facility].  It is our hope that we were always able to exceed the expected standards for your care during your stay.  You were hospitalized due to back pain.  You were cared for on the third floor by Kwesi Rodriguez MD under the service of Ambrosio Bella with the [facility] who covers for your primary care physician (PCP), Lisa Fairbanks DO, while you were hospitalized.  If you have any questions or concerns related to this hospitalization, you may contact us at .  For follow up as well as any medication refills, we recommend that you follow up with your primary care physician.  A registered nurse will reach out to you by phone within a few days after your discharge to answer any additional questions that you may have after going home.  However, at this time we provide for you here, the most important instructions / recommendations at discharge:     Notable Medication Adjustments -   Please STOP taking using fentanyl patch  Please STOP taking Norco (hydrocodone-acetaminophen)  Please STOP taking Prednisone 10mg tablets and START taking Decadron 1 tablet (4 mg) every 6 hours starting 11/11/2024 6 PM.  When you follow-up with your radiation oncologist outpatient they will adjust the dosing accordingly.  Please take oxycodone 1 to 2 tablets every 6 hours as needed for moderate to severe pain.  Please take MiraLAX as needed for constipation given that you are taking opiates for pain and are at risk for constipation.  Please resume all other prior to hospitalization medicine.  Testing Required after Discharge -   None  Important follow up information -   Please follow-up with your primary care physician within 1 week.  Please continue radiation therapy as scheduled.  Please follow-up at your scheduled appointment with palliative care 11/13/2024 1:30 PM  Please follow-up with  radiation oncologist as scheduled.  Please follow-up with your medical oncologist as scheduled.  Other Instructions -   In the event that you have severely worsening back pain, bowel or bladder incontinence, or extreme weakness please seek care in the emergency department.  Please review this entire after visit summary as additional general instructions including medication list, appointments, activity, diet, any pertinent wound care, and other additional recommendations from your care team that may be provided for you.      Sincerely,     Kwesi Rodriguez MD

## 2024-11-11 NOTE — ASSESSMENT & PLAN NOTE
"Patient comes in w/ worsening back pain  CT Spine completed today at [facility] showed: \"Probable extraosseous extension of T8 metastasis into the left anterior epidural space\"  She was transferred from [facility] to [facility] for neurosurgery consultation as well as possible radiation therapy.   She had an appointment with Palliative on Wednesday  Received 75 mcg of Fentanyl as well as 4 doses of 4mg Morphine at [facility] for pain, which finally afforded her some pain control  Also started on 4 mg Decadron q6 hours per neurosurgery, as given in report by [facility] ED.   -Patient started RT last Tuesday for a total of 10 days M-F.  -Acute intractable spinal pain due to malignant neoplasm of bone a/e/b CT of thoracic spine showing thickened paravertebral soft tissues at T8 treated with IV Decadron  -paravertebral thickening could be residual edema or hematoma from pathologic fracture and kyphoplasty. However, extraosseous spread of metastasis can have a similar appearance.  -Neurosurgery evaluated patient recommending no inpatient intervention or outpatient follow-up  -Thoracic MRI:Interval T8 kyphoplasty. Height loss at T8 has progressed slightly since prior from 10/8/2024.Redemonstrated osseous metastases at T5, T8, and T11. Partially imaged L1 compression fracture status post kyphoplasty. No abnormal epidural or leptomeningeal enhancement. Normal cord signal.  Plan:  Continue p.o. dexamethasone 4 mg every 6 hours as per Dr. Mariee of Rad/Onc.  Resume radiation therapy 11/12/2024 at [facility] upon discharge.  Palliative consulted, appreciate recs  Follow-up with radiation oncology and medical oncology outpatient.  Oxycodone 5-10 mg for moderate to severe pain upon discharge as per palliative care  Follow-up with palliative care outpatient    "

## 2024-11-11 NOTE — ASSESSMENT & PLAN NOTE
Admitted for intractable pain in the setting of metastatic breast cancer with mets to the spine. In the ED, received a total of IV Fentanyl 75mcg and IV Morphine 4mg x4 before symptoms were controlled.  Total OMEs used in past 24 hours (8am-8am): Oxycodone 10mg x2 = total 30 OMEs  Current inpatient regimen:   Oxycodone 5-10mg q4hrs PRN   IV Decadron 4mg q6hrs atc    On chart review, the patient had mentioned in previous telephone encounters that oxycodone made her dizzy. Denies any side-effects now and is tolerating well. Tramadol was not effective at all. Was also rotated through Norco 1 tab BID PRN, Vicodin 2 tabs BID PRN, and later Norco 1 tab q6hrs PRN with no improvement (although pt stated she was taking Norco/Vicodin 2 tabs q4hrs).  Was also prescribed Fentanyl patches 12mcg/hr by her PCP, although she never used it.   For discharge, would recommend the regimen below:   Scheduled Tylenol 975mg q8hrs   PO Oxycodone 5-10mg q6hrs PRN for moderate-severe pain  Defer Decadron regimen to radiation oncology  Scripts for oxycodone were sent to the patient's pharmacy

## 2024-11-11 NOTE — DISCHARGE SUMMARY
"Discharge Summary - Hospitalist   Name: Yuni Keys 78 y.o. female I MRN: [id number]  Unit/Bed#: S -01 I Date of Admission: 11/9/2024   Date of Service: 11/11/2024 I Hospital Day: 2     Assessment & Plan  Breast cancer metastasized to bone, right (HCC)  Patient comes in w/ worsening back pain  CT Spine completed today at [facility] showed: \"Probable extraosseous extension of T8 metastasis into the left anterior epidural space\"  She was transferred from [facility] to [facility] for neurosurgery consultation as well as possible radiation therapy.   She had an appointment with Palliative on Wednesday  Received 75 mcg of Fentanyl as well as 4 doses of 4mg Morphine at [facility] for pain, which finally afforded her some pain control  Also started on 4 mg Decadron q6 hours per neurosurgery, as given in report by [facility] ED.   -Patient started RT last Tuesday for a total of 10 days M-F.  -Acute intractable spinal pain due to malignant neoplasm of bone a/e/b CT of thoracic spine showing thickened paravertebral soft tissues at T8 treated with IV Decadron  -paravertebral thickening could be residual edema or hematoma from pathologic fracture and kyphoplasty. However, extraosseous spread of metastasis can have a similar appearance.  -Neurosurgery evaluated patient recommending no inpatient intervention or outpatient follow-up  -Thoracic MRI:Interval T8 kyphoplasty. Height loss at T8 has progressed slightly since prior from 10/8/2024.Redemonstrated osseous metastases at T5, T8, and T11. Partially imaged L1 compression fracture status post kyphoplasty. No abnormal epidural or leptomeningeal enhancement. Normal cord signal.  Plan:  Continue p.o. dexamethasone 4 mg every 6 hours as per Dr. Mariee of Rad/Onc.  Resume radiation therapy 11/12/2024 at [facility] upon discharge.  Palliative consulted, appreciate recs  Follow-up with radiation oncology and medical oncology outpatient.  Oxycodone 5-10 mg for moderate to severe pain upon discharge " "as per palliative care  Follow-up with palliative care outpatient    Constipation  Patient reported 3 watery bowel movements morning of 11/10 s/p enema 11/9.  Patient has opioid induced constipation  Takes Miralax, Senokot, Dulcolax at home  Patient had multiple watery bowel movements the day prior to discharge.    Plan:  MiraLAX, senekot, ducolax as needed for opioid-induced constipation upon discharge.  Bilateral hydronephrosis  CT Abdomen Pelvis showed: \"Bilateral hydronephrosis, moderate on the left and mild on the right with no hydroureter. Most likely, this is due to bilateral UPJ stenoses.  Left-sided hydronephrosis and two small nonobstructing left renal calculi.\"  Creatinine is within normal limits    Plan:  Follow-up with PCP upon discharge    Cancer related pain  Oxycodone 5-10 mg for moderate to severe pain upon discharge as per palliative care  Palliative care by specialist  Oxycodone 5-10 mg for moderate to severe pain upon discharge as per palliative care  Follow-up with palliative care outpatient     Medical Problems       Resolved Problems  Date Reviewed: 11/11/2024   None       Discharging Physician / Practitioner: Kwesi Rodriguez MD  PCP: Lisa Fairbanks DO  Admission Date:   Admission Orders (From admission, onward)       Ordered        11/09/24 2229  Inpatient Admission  Once                          Discharge Date: 11/11/24    Consultations During Hospital Stay:  Pastoral care   Palliative care  Radiation oncology    Procedures Performed:   None    Significant Findings / Test Results:   11/10/2024 thoracic MRI:Interval T8 kyphoplasty. Height loss at T8 has progressed slightly since prior from 10/8/2024. Redemonstrated osseous metastases at T5, T8, and T11. Partially imaged L1 compression fracture status post kyphoplasty. No abnormal epidural or leptomeningeal enhancement. Normal cord signal.    Incidental Findings:   11/9/2024 CT AP with contrast:   1.  Mild dilatation of the common bile duct, " 9 mm in maximal diameter, etiology indeterminate. If clinically indicated, nonemergent MRI of the abdomen/MRCP, without and with IV contrast would be helpful to identify or exclude an obstructing lesion of the CBD. No intrahepatic ductal dilatation.  2.  Bilateral hydronephrosis, moderate on the left and mild on the right with no hydroureter. Most likely, this is due to bilateral UPJ stenoses.  I reviewed the above mentioned incidental findings with the patient and/or family and they expressed understanding.    Test Results Pending at Discharge (will require follow up):   None     Outpatient Tests Requested:  None    Complications:  None    Reason for Admission: severe back pain    Hospital Course:   Yuni Keys is a 78 y.o. female patient who originally presented to the hospital on 11/9/2024 due to severe back pain.  Patient was given extensive pain regimen to get her pain under control.  Extensive imaging including CT abdomen pelvis, MRI thoracic spine, CT thoracic spine, CT lumbar only was obtained. Neurosurgery was consulted and reviewed the images recommending no acute intervention inpatient with no follow-up outpatient necessary.  Because patient was receiving radiation therapy prior to admission radiation oncology was consulted, recommended IV dexamethasone which was then transition to p.o. at the time of discharge.  Patient did not receive radiation while in the hospital, recommended to follow-up with her regularly scheduled radiation therapy appointments once discharged.  Palliative care was consulted and prescribed patient a pain regimen to cover her until next outpatient scheduled appointment.      Please see above list of diagnoses and related plan for additional information.     Condition at Discharge: stable    Discharge Day Visit / Exam:   Subjective: Patient was resting comfortably at bedside during the time of examination. She states back pain is 8/10 earlier this morning prior to receiving pain  medication, this was totally relieved after receiving the pain medication.  Patient states she had multiple loose bowel movements yesterday.  She denies fever/chills, nausea/vomiting, cough, shortness of breath, chest pain, abdominal pain, changes in bladder habits.  Discussed the importance of following up with scheduled radiation therapy, radiation oncology and medical oncology, and palliative care to which patient understood.  Vitals: Blood Pressure: 116/57 (11/11/24 1200)  Pulse: 74 (11/11/24 1200)  Temperature: 98.4 °F (36.9 °C) (11/11/24 1200)  Temp Source: Oral (11/11/24 1200)  Respirations: 16 (11/11/24 1200)  Weight - Scale: 52.8 kg (116 lb 6.5 oz) (11/09/24 2232)  SpO2: 98 % (11/11/24 1200)  Physical Exam  Constitutional:       General: She is not in acute distress.     Appearance: She is not ill-appearing or toxic-appearing.   Cardiovascular:      Rate and Rhythm: Normal rate.      Heart sounds: Normal heart sounds. No murmur heard.     No gallop.   Pulmonary:      Effort: No respiratory distress.      Breath sounds: No wheezing or rales.   Abdominal:      General: Bowel sounds are normal. There is no distension.      Tenderness: There is no abdominal tenderness. There is no guarding.   Musculoskeletal:      Right lower leg: No edema.      Left lower leg: No edema.      Comments: No midline tenderness.  Strength and sensation intact in the bilateral lower extremities.          Discussion with Family: Patient declined call to .     Discharge instructions/Information to patient and family:   See after visit summary for information provided to patient and family.      Provisions for Follow-Up Care:  See after visit summary for information related to follow-up care and any pertinent home health orders.      Mobility at time of Discharge:   Basic Mobility Inpatient Raw Score: 24  JH-HLM Goal: 8: Walk 250 feet or more  JH-HLM Achieved: 3: Sit at edge of bed  HLM Goal achieved. Continue to  encourage appropriate mobility.     Disposition:   Home    Planned Readmission: No    Discharge Medications:  See after visit summary for reconciled discharge medications provided to patient and/or family.      Administrative Statements   Discharge Statement:  I have spent a total time of 20 minutes in caring for this patient on the day of the visit/encounter.    **Please Note: This note may have been constructed using a voice recognition system**

## 2024-11-11 NOTE — ASSESSMENT & PLAN NOTE
Stage IV ER+ breast cancer with mets to the spine.   Confirmed via biopsy on 10/4/24 after mammogram showed an irregular mass in the medial left breast.  Patient follows with Dr. Bernard (palliative care), Dr. Siddiqi (radiation-oncology), Dr. Amanda (med-oncology), and Dr. Robbins (neurosurgery).   Undergoing radiation therapy to bony metastases in the spine and shoulder. Prescribed letrozole and ribociclib by Dr. Amanda.

## 2024-11-11 NOTE — ASSESSMENT & PLAN NOTE
"CT Abdomen Pelvis showed: \"Bilateral hydronephrosis, moderate on the left and mild on the right with no hydroureter. Most likely, this is due to bilateral UPJ stenoses.  Left-sided hydronephrosis and two small nonobstructing left renal calculi.\"  Creatinine is within normal limits    Plan:  Follow-up with PCP upon discharge    "

## 2024-11-11 NOTE — ASSESSMENT & PLAN NOTE
Palliative Diagnosis: Metastatic breast cancer with mets to the spine     Social Support:  Patient's support system:   Supportive listening provided  Normalized experience of patient  Advocated for patient/family with interdisciplinary team    Care Coordination  Case discussed with primary team    Follow-up  We appreciate the opportunity to participate in this patient's care.   We will continue to follow while admitted.    Please do not hesitate to contact our on-call provider through [employer] Secure Chat or contact [phone] should there be an acute change or other symptom control concerns.  Already has appointment scheduled with Dr. Contreras on 11/13/24

## 2024-11-11 NOTE — TELEPHONE ENCOUNTER
Call received from patient. Questioning if she is to be receiving radiation while she is inpatient. Informed her that per Dr Mariee note, no inpatient radiation therapy is recommended at this time. Pt verbalized understanding. She will be calling back to let us know when she will be discharged so we know when she will be coming for treatments.

## 2024-11-11 NOTE — ASSESSMENT & PLAN NOTE
Oxycodone 5-10 mg for moderate to severe pain upon discharge as per palliative care  Follow-up with palliative care outpatient

## 2024-11-11 NOTE — TREATMENT PLAN
Reviewed MRI thoracic spine with Dr. Robbins, this demonstrates interval T8 kyphoplasty with slightly progressed height loss since 10/8/2024.  Redemonstrated osseous metastasis at T5, T8, and T11.  Partially imaged L1 compression fracture status post kyphoplasty.  No abnormal epidural or leptomeningeal enhancement.  Normal cord signal.    Spoke with patient.  She states her pain has improved since her hospitalization.  She currently denies any back pain.  At times she reports low back pain radiating into her anterior thighs.  She states she is walking and her current pain regimen helps with her pain and symptoms.    On exam patients strength is 5/5 throughout with sensation intact      Plan:  Defer steroids to radiation oncology  Radiation oncology consulted, input appreciated  No neurosurgical intervention warranted.  Recommend radiation therapy  Rest of care per primary team  Neurosurgery will sign off, no follow-up needed.  Call with any further question or concerns.

## 2024-11-11 NOTE — CONSULTS
Consultation - Palliative Care   Name: Yuni Keys 78 y.o. female I MRN: [id number]  Unit/Bed#: S -01 I Date of Admission: 11/9/2024   Date of Service: 11/11/2024 I Hospital Day: 2       Inpatient consult to Palliative Care     Date/Time  11/9/2024 9:21 PM     Performed by  Umang Langley MD   Authorized by  Miguel Ángel Orr DO           Physician Requesting Evaluation: Ambrosio Kirby*   Reason for Evaluation / Principal Problem: Symptom management     Assessment & Plan  Palliative care by specialist  Palliative Diagnosis: Metastatic breast cancer with mets to the spine     Social Support:  Patient's support system:   Supportive listening provided  Normalized experience of patient  Advocated for patient/family with interdisciplinary team    Care Coordination  Case discussed with primary team    Follow-up  We appreciate the opportunity to participate in this patient's care.   We will continue to follow while admitted.    Please do not hesitate to contact our on-call provider through [facility] Secure Chat or contact [phone] should there be an acute change or other symptom control concerns.  Already has appointment scheduled with Dr. Contreras on 11/13/24    Cancer related pain  Admitted for intractable pain in the setting of metastatic breast cancer with mets to the spine. In the ED, received a total of IV Fentanyl 75mcg and IV Morphine 4mg x4 before symptoms were controlled.  Total OMEs used in past 24 hours (8am-8am): Oxycodone 10mg x2 = total 30 OMEs  Current inpatient regimen:   Oxycodone 5-10mg q4hrs PRN   IV Decadron 4mg q6hrs atc    On chart review, the patient had mentioned in previous telephone encounters that oxycodone made her dizzy. Denies any side-effects now and is tolerating well. Tramadol was not effective at all. Was also rotated through Norco 1 tab BID PRN, Vicodin 2 tabs BID PRN, and later Norco 1 tab q6hrs PRN with no improvement (although pt stated she was taking Norco/Vicodin 2 tabs q4hrs).   "Was also prescribed Fentanyl patches 12mcg/hr by her PCP, although she never used it.   For discharge, would recommend the regimen below:   Scheduled Tylenol 975mg q8hrs   PO Oxycodone 5-10mg q6hrs PRN for moderate-severe pain  Defer Decadron regimen to radiation oncology  Scripts for oxycodone were sent to the patient's pharmacy  Breast cancer metastasized to bone, right (HCC)  Stage IV ER+ breast cancer with mets to the spine.   Confirmed via biopsy on 10/4/24 after mammogram showed an irregular mass in the medial left breast.  Patient follows with Dr. Bernard (palliative care), Dr. Siddiqi (radiation-oncology), Dr. Amanda (med-oncology), and Dr. Robbins (neurosurgery).   Undergoing radiation therapy to bony metastases in the spine and shoulder. Prescribed letrozole and ribociclib by Dr. Amanda.   Constipation  Continue scheduled bowel regimen with Senokot-S, Miralax and Dulcolax PRN   Bilateral hydronephrosis  CT Abdomen Pelvis (11/9/924): \"Bilateral hydronephrosis, moderate on the left and mild on the right with no hydroureter. Most likely, this is due to bilateral UPJ stenoses.  Left-sided hydronephrosis and two small nonobstructing left renal calculi.\"    I have discussed the above management plan in detail with the primary service.     Decisional apparatus: Patient is competent on my exam today. If competence is lost, patient's substitute decision maker would default to her adult daughters as documented in her ACP forms.   Advance Directive / Living Will / POLST: on file        PDMP Review: I have reviewed the patient's controlled substance dispensing history in the Prescription Drug Monitoring Program in compliance with the [place] regulations before prescribing any controlled substances.    History of Present Illness   HPI: Yuni Keys is a 78 y.o. year old female who presents with intractable back pain and constipation in the setting of Stage IV ER+ breast cancer with spinal metastases (biopsy proven " "10/4/24). PMH is significant for osteoporosis, compression fracture of L1, lumbar stenosis with neurogenic claudication, mild cognitive impairment, HLD, insomnia, and depression. Patient follows with Dr. Bernard (palliative care), Dr. Siddiqi (radiation-oncology), Dr. Amanda (med-oncology), and Dr. Robbins (neurosurgery)    Patient presented to the [place] ED in intractable pain not relieved by any of her home pain medications.  In the ED, labs were unremarkable.  CT AP showed a moderate left-sided hydronephrosis (similar to prior CT from 8/28/2024), a new mild right hydronephrosis, osteolytic metastasis in the T11 vertebral body (unchanged), and a known compression fracture of L1 status post kyphoplasty.  CT thoracic spine showed known osteolytic metastases involving the T8, T11 and L1 vertebral bodies with pathologic fractures treated with kyphoplasty at T8 and L1, thickened paravertebral soft tissues at T8, and possible extraosseous extension of T8 metastasis into the left anterior epidural space.  At this point, the patient received multiple IV opioid pain medications-IV Fentanyl 75mcg and IV Morphine 4mg x4 total which finally improved her pain.  The decided plan was to transfer her to [place] as she was due for radiation treatment and for neurosurgery evaluation. Per neurosurgery recommendations, MRI thoracic spine was completed, which showed \"Interval T8 kyphoplasty. Height loss at T8 has progressed slightly since prior from 10/8/2024. Redemonstrated osseous metastases at T5, T8, and T11. Partially imaged L1 compression fracture status post kyphoplasty. No abnormal epidural or leptomeningeal enhancement. Normal cord signal.\" Based on these findings, no surgical interventions were warranted and NSG signed off.  Radiation oncology was consulted and recommended continuation of Decadron, with radiation therapy to be initiated outpatient at [facility] given her improved symptoms.    This morning, I saw the " patient walking around the unit and appearing comfortable. She states her symptoms have improved significantly and was told she would be continuing radiation treatments in the outpatient setting. In the past 24 hours, she only required PO Oxycodone 10mg x2 and she tolerated these doses without any side-effects like dizziness or lightheadedness.      Review of Systems   Constitutional:  Negative for fever.   Respiratory:  Negative for shortness of breath.    Cardiovascular:  Negative for chest pain and palpitations.   Gastrointestinal:  Positive for constipation. Negative for abdominal pain, nausea and vomiting.   Musculoskeletal:  Positive for back pain. Negative for gait problem.   Neurological:  Negative for weakness.   Psychiatric/Behavioral:  The patient is not nervous/anxious.      I have reviewed the patient's PMH, PSH, Social History, Family History, Meds, and Allergies    Objective :  Temp:  [97.8 °F (36.6 °C)-99 °F (37.2 °C)] 97.8 °F (36.6 °C)  HR:  [51-71] 51  BP: (110-123)/(53-62) 123/62  Resp:  [18] 18  SpO2:  [93 %-97 %] 93 %  O2 Device: None (Room air)    Physical Exam  Vitals reviewed.   Constitutional:       Comments: Was able to see the patient walking around the unit, denies any pain or discomfort at this time.    HENT:      Head: Normocephalic and atraumatic.   Eyes:      Extraocular Movements: Extraocular movements intact.      Conjunctiva/sclera: Conjunctivae normal.   Cardiovascular:      Rate and Rhythm: Normal rate.   Pulmonary:      Effort: Pulmonary effort is normal. No respiratory distress.   Abdominal:      Tenderness: There is no abdominal tenderness.   Musculoskeletal:         General: Normal range of motion.   Skin:     General: Skin is warm and dry.   Neurological:      Mental Status: She is oriented to person, place, and time.   Psychiatric:         Mood and Affect: Mood normal.            Lab Results: I have reviewed the following results:  Lab Results   Component Value Date/Time     SODIUM 142 11/11/2024 04:48 AM    K 4.1 11/11/2024 04:48 AM    BUN 18 11/11/2024 04:48 AM    CREATININE 0.65 11/11/2024 04:48 AM    GLUC 142 (H) 11/11/2024 04:48 AM    CALCIUM 9.0 11/11/2024 04:48 AM    AST 20 11/09/2024 09:57 AM    ALT 21 11/09/2024 09:57 AM    ALB 4.3 11/09/2024 09:57 AM    TP 6.9 11/09/2024 09:57 AM    EGFR 85 11/11/2024 04:48 AM     Lab Results   Component Value Date/Time    HGB 13.6 11/09/2024 09:57 AM    WBC 4.57 11/09/2024 09:57 AM     11/09/2024 09:57 AM    INR 1.01 10/01/2024 08:13 AM    PTT 34 10/01/2024 08:13 AM     Lab Results   Component Value Date/Time    [id number] 4.260 10/17/2022 07:30 AM       Imaging Results Review: I reviewed radiology reports from this admission including: CT abdomen/pelvis, CT spine, and MRI spine.  Other Study Results Review: EKG was reviewed.     Code Status: Level 3 - DNAR and DNI  Advance Directive and Living Will: Yes    Power of :    POLST:      Administrative Statements   I have spent a total time of 30 minutes in caring for this patient on the day of the visit/encounter including Risks and benefits of tx options, Instructions for management, Patient and family education, Importance of tx compliance, Risk factor reductions, Counseling / Coordination of care, Documenting in the medical record, Reviewing / ordering tests, medicine, procedures  , Obtaining or reviewing history  , and Communicating with other healthcare professionals . Topics discussed with the patient / family include symptom assessment and management, medication review, psychosocial support, opioid titration, supportive listening, and anticipatory guidance.

## 2024-11-11 NOTE — INCIDENTAL FINDINGS
The following findings require follow up:  Radiographic finding   Findin2024 CTAP with contrast:   1.  Mild dilatation of the common bile duct, 9 mm in maximal diameter, etiology indeterminate. If clinically indicated, nonemergent MRI of the abdomen/MRCP, without and with IV contrast would be helpful to identify or exclude an obstructing lesion of the CBD. No intrahepatic ductal dilatation.      2.  Bilateral hydronephrosis, moderate on the left and mild on the right with no hydroureter. Most likely, this is due to bilateral UPJ stenoses.   Follow up required: No    Incidental finding results were discussed with the Patient by Kwesi Rodriguez MD on 24.   They expressed understanding and all questions answered.

## 2024-11-12 ENCOUNTER — TELEPHONE (OUTPATIENT)
Dept: FAMILY MEDICINE CLINIC | Facility: HOSPITAL | Age: 79
End: 2024-11-12

## 2024-11-12 ENCOUNTER — APPOINTMENT (OUTPATIENT)
Facility: HOSPITAL | Age: 79
End: 2024-11-12
Attending: RADIOLOGY
Payer: MEDICARE

## 2024-11-12 ENCOUNTER — TRANSITIONAL CARE MANAGEMENT (OUTPATIENT)
Dept: FAMILY MEDICINE CLINIC | Facility: HOSPITAL | Age: 79
End: 2024-11-12

## 2024-11-12 ENCOUNTER — HOSPITAL ENCOUNTER (OUTPATIENT)
Dept: INFUSION CENTER | Facility: HOSPITAL | Age: 79
Discharge: HOME | End: 2024-11-12
Attending: INTERNAL MEDICINE
Payer: MEDICARE

## 2024-11-12 VITALS
DIASTOLIC BLOOD PRESSURE: 67 MMHG | TEMPERATURE: 96.8 F | WEIGHT: 115.96 LBS | BODY MASS INDEX: 24.24 KG/M2 | HEART RATE: 50 BPM | RESPIRATION RATE: 17 BRPM | OXYGEN SATURATION: 100 % | SYSTOLIC BLOOD PRESSURE: 142 MMHG

## 2024-11-12 DIAGNOSIS — C79.51 BREAST CANCER METASTASIZED TO BONE, RIGHT (HCC): ICD-10-CM

## 2024-11-12 DIAGNOSIS — C50.911 BREAST CANCER METASTASIZED TO BONE, RIGHT (HCC): ICD-10-CM

## 2024-11-12 DIAGNOSIS — Z17.0 MALIGNANT NEOPLASM OF UPPER-INNER QUADRANT OF LEFT BREAST IN FEMALE, ESTROGEN RECEPTOR POSITIVE (HCC): Primary | ICD-10-CM

## 2024-11-12 DIAGNOSIS — C50.212 MALIGNANT NEOPLASM OF UPPER-INNER QUADRANT OF LEFT BREAST IN FEMALE, ESTROGEN RECEPTOR POSITIVE (HCC): Primary | ICD-10-CM

## 2024-11-12 PROCEDURE — G6002 STEREOSCOPIC X-RAY GUIDANCE: HCPCS | Performed by: RADIOLOGY

## 2024-11-12 PROCEDURE — 77387 GUIDANCE FOR RADJ TX DLVR: CPT | Performed by: RADIOLOGY

## 2024-11-12 PROCEDURE — 96372 THER/PROPH/DIAG INJ SC/IM: CPT

## 2024-11-12 PROCEDURE — 77412 RADIATION TX DELIVERY LVL 3: CPT | Performed by: RADIOLOGY

## 2024-11-12 PROCEDURE — 77336 RADIATION PHYSICS CONSULT: CPT | Performed by: RADIOLOGY

## 2024-11-12 RX ADMIN — DENOSUMAB 60 MG: 60 INJECTION SUBCUTANEOUS at 11:10

## 2024-11-12 NOTE — TELEPHONE ENCOUNTER
Called to do TCM call with patient.  She was at another apt at the time.  She stated she will call back when she gets home to go over everything and get scheduled to see one of the providers here.     Please transfer to office clinical staff when the call is returned .

## 2024-11-12 NOTE — PROGRESS NOTES
Yuni Keys  tolerated treatment well with no complications. CrCl calculated as 41.2 via ibrahima- graft. Ca 9.4 and WDL. Patient states she is taking Vitamion D. Patient denies any muscle cramping or pain and swelling in mouht or gums and any upcoming or recent dental work.     Yuni Keys is aware of future appt on 5/12 at 0930.     AVS printed and given to Yuni Keys:  Yes

## 2024-11-12 NOTE — TELEPHONE ENCOUNTER
Patient called back and stated that she saw palliative care today. She stated she does not want to come for TCM even though I told her that it is a Transfer of Care management appointment. But she did not want to have the appointment.    Thank you!!

## 2024-11-13 ENCOUNTER — APPOINTMENT (OUTPATIENT)
Dept: URGENT CARE | Facility: CLINIC | Age: 79
End: 2024-11-13
Payer: MEDICARE

## 2024-11-13 ENCOUNTER — OFFICE VISIT (OUTPATIENT)
Dept: LAB | Facility: CLINIC | Age: 79
End: 2024-11-13
Payer: MEDICARE

## 2024-11-13 ENCOUNTER — APPOINTMENT (OUTPATIENT)
Facility: HOSPITAL | Age: 79
End: 2024-11-13
Attending: RADIOLOGY
Payer: MEDICARE

## 2024-11-13 ENCOUNTER — OFFICE VISIT (OUTPATIENT)
Dept: PALLIATIVE MEDICINE | Facility: CLINIC | Age: 79
End: 2024-11-13
Payer: MEDICARE

## 2024-11-13 VITALS
BODY MASS INDEX: 24.37 KG/M2 | DIASTOLIC BLOOD PRESSURE: 60 MMHG | HEART RATE: 56 BPM | TEMPERATURE: 97.2 F | SYSTOLIC BLOOD PRESSURE: 140 MMHG | RESPIRATION RATE: 20 BRPM | OXYGEN SATURATION: 98 % | WEIGHT: 116.62 LBS

## 2024-11-13 DIAGNOSIS — F51.01 PRIMARY INSOMNIA: ICD-10-CM

## 2024-11-13 DIAGNOSIS — C50.911 BREAST CANCER METASTASIZED TO BONE, RIGHT (HCC): Primary | Chronic | ICD-10-CM

## 2024-11-13 DIAGNOSIS — G89.3 CANCER RELATED PAIN: ICD-10-CM

## 2024-11-13 DIAGNOSIS — C50.911 BREAST CANCER METASTASIZED TO BONE, RIGHT (HCC): Chronic | ICD-10-CM

## 2024-11-13 DIAGNOSIS — Z51.5 PALLIATIVE CARE BY SPECIALIST: ICD-10-CM

## 2024-11-13 DIAGNOSIS — C79.51 BREAST CANCER METASTASIZED TO BONE, RIGHT (HCC): Primary | Chronic | ICD-10-CM

## 2024-11-13 DIAGNOSIS — M62.830 BACK MUSCLE SPASM: ICD-10-CM

## 2024-11-13 DIAGNOSIS — C79.51 BREAST CANCER METASTASIZED TO BONE, RIGHT (HCC): Chronic | ICD-10-CM

## 2024-11-13 PROCEDURE — 93005 ELECTROCARDIOGRAM TRACING: CPT

## 2024-11-13 PROCEDURE — 99215 OFFICE O/P EST HI 40 MIN: CPT | Performed by: INTERNAL MEDICINE

## 2024-11-13 PROCEDURE — G2211 COMPLEX E/M VISIT ADD ON: HCPCS | Performed by: INTERNAL MEDICINE

## 2024-11-13 RX ORDER — BACLOFEN 10 MG/1
10 TABLET ORAL 3 TIMES DAILY PRN
Qty: 30 TABLET | Refills: 0 | Status: SHIPPED | OUTPATIENT
Start: 2024-11-13

## 2024-11-13 NOTE — ASSESSMENT & PLAN NOTE
Recent admission for intractable cancer pain    Current regimen:   Oxycodone 5-10mg q4hrs PRN  Mostly using 5 mg tablets 3 to 4x/day   Decadron 4mg q6hrs atc --> wean per rad/onc  Start Baclofen 10 mg TID PRN   Failed medications:  Tramadol  Norco  Patient does have a prescription for fentanyl patches 12 mcg that were prescribed by her PCP.  Discussed that this is likely too strong of an opioid for her at this time but encouraged her to keep them in a safe secure place as there may become a time that we will need to utilize these.    Opioid agreement reviewed and signed- done on 11/13/2024  Bowel regimen to prevent OIC

## 2024-11-13 NOTE — ASSESSMENT & PLAN NOTE
Time spent with patient and daughter providing supportive listening.   All questions and concerns were addressed to their satisfaction.    RTC: 4 to 6 weeks

## 2024-11-13 NOTE — PROGRESS NOTES
Ambulatory Visit  Name: Yuni Kesy      : 1945      MRN: [id number]  Encounter Provider: Lisa Contreras DO  Encounter Date: 2024   Encounter department: [facility] PALLIATIVE C[facility] [place]    Assessment & Plan  Breast cancer metastasized to bone, right (HCC)         Cancer related pain  Recent admission for intractable cancer pain    Current regimen:   Oxycodone 5-10mg q4hrs PRN  Mostly using 5 mg tablets 3 to 4x/day   Decadron 4mg q6hrs atc --> wean per rad/onc  Start Baclofen 10 mg TID PRN   Failed medications:  Tramadol  Norco  Patient does have a prescription for fentanyl patches 12 mcg that were prescribed by her PCP.  Discussed that this is likely too strong of an opioid for her at this time but encouraged her to keep them in a safe secure place as there may become a time that we will need to utilize these.    Opioid agreement reviewed and signed- done on 2024  Bowel regimen to prevent OIC          Primary insomnia         Palliative care by specialist  Time spent with patient and daughter providing supportive listening.   All questions and concerns were addressed to their satisfaction.    RTC: 4 to 6 weeks           Back muscle spasm    Orders:    baclofen 10 mg tablet; Take 1 tablet (10 mg total) by mouth 3 (three) times a day as needed for muscle spasms      Decisional apparatus: Patient is competent on my exam today. If competence is lost, patient's substitute decision maker would default to daughter by [place] Act 169.   Advance Directive / Living Will / POLST: yes     PDMP Review: I have reviewed the patient's controlled substance dispensing history in the Prescription Drug Monitoring Program in compliance with the [facility] regulations before prescribing any controlled substances.    History of Present Illness     Yuni Keys is a 79 y.o. female who presents in hospital follow-up for a palliative diagnosis of ER positive metastatic breast cancer.  She was admitted last week for  intractable back pain and subsequently was seen by radiation oncology and neurosurgery.  It was felt that no neurosurgical intervention was needed at this time and plan was to initiate radiation to L1.  Patient was discharged from the hospital with oxycodone 5 mg tablets and Decadron.    Patient here today with her daughter.  She is long term through her radiation treatments and does feel that she is noticing a difference.  She is taking 3 to 4 tablets of oxycodone 5 mg on a daily basis.  We did discuss just maintaining this dose as it seems to be working for her.  She did note an ED evaluation for acute onset severe and excruciating back pain.  She struggles to describe it but does note that it was rapid onset, waxing waning, found it hard to take deep breaths during this episode.  We discussed this could be related to a muscle spasm from overexertion as she has picked up on her therapy.  We reviewed adding a muscle relaxant to her regimen to try to abort any further acute episodes of pain similar to this.  She states that she is having loose stools so has not been using her bowel regiment but has senna, Dulcolax, MiraLAX available to her.  Spent time reviewing her imaging study and discussing overall diagnosis and prognosis.  Addressing questions and concerns she has about progression of disease and monitoring.  Will plan for ongoing close palliative follow-up to optimize symptom support.    History obtained from : patient  Review of Systems  Medical History Reviewed by provider this encounter:  Meds               Objective     /60 (BP Location: Left arm, Patient Position: Sitting, Cuff Size: Standard)   Pulse 56   Temp (!) 97.2 °F (36.2 °C)   Resp 20   Wt 52.9 kg (116 lb 10 oz)   SpO2 98%   BMI 24.37 kg/m²     Physical Exam  Vitals and nursing note reviewed.   Constitutional:       General: She is not in acute distress.  HENT:      Head: Normocephalic and atraumatic.      Right Ear: External ear  normal.      Left Ear: External ear normal.   Eyes:      General:         Right eye: No discharge.         Left eye: No discharge.   Cardiovascular:      Rate and Rhythm: Normal rate and regular rhythm.      Heart sounds: Murmur heard.   Pulmonary:      Effort: Pulmonary effort is normal. No respiratory distress.   Abdominal:      General: There is no distension.      Palpations: Abdomen is soft.   Skin:     Coloration: Skin is not jaundiced or pale.   Neurological:      General: No focal deficit present.      Mental Status: She is oriented to person, place, and time.   Psychiatric:         Mood and Affect: Mood normal.         Behavior: Behavior normal.         Thought Content: Thought content normal.         Judgment: Judgment normal.         Recent labs:  Lab Results   Component Value Date/Time    SODIUM 142 11/11/2024 04:48 AM    K 4.1 11/11/2024 04:48 AM    BUN 18 11/11/2024 04:48 AM    CREATININE 0.65 11/11/2024 04:48 AM    GLUC 142 (H) 11/11/2024 04:48 AM    CALCIUM 9.0 11/11/2024 04:48 AM    AST 20 11/09/2024 09:57 AM    ALT 21 11/09/2024 09:57 AM    ALB 4.3 11/09/2024 09:57 AM    TP 6.9 11/09/2024 09:57 AM    EGFR 85 11/11/2024 04:48 AM     Lab Results   Component Value Date/Time    HGB 13.6 11/09/2024 09:57 AM    WBC 4.57 11/09/2024 09:57 AM     11/09/2024 09:57 AM    INR 1.01 10/01/2024 08:13 AM    PTT 34 10/01/2024 08:13 AM     Lab Results   Component Value Date/Time    [id number] 4.260 10/17/2022 07:30 AM       Recent Imaging:  Procedure: MRI thoracic spine w wo contrast  Result Date: 11/10/2024  Narrative: MRI THORACIC SPINE WITH AND WITHOUT CONTRAST INDICATION: Pain. COMPARISON: Thoracic spine MRI dated October 8, 2024 TECHNIQUE:  Multiplanar, multisequence imaging of the thoracic spine was performed before and after gadolinium administration. . IV Contrast:  5 mL of Gadobutrol injection (SINGLE-DOSE) IMAGE QUALITY:  Diagnostic. FINDINGS: ALIGNMENT:  Normal alignment of the thoracic spine.   No acute compression fracture. Mild height loss at T8 which has progressed slightly since prior from 10/8/2024. No subluxation.  No evidence of scoliosis. MARROW SIGNAL: Osseous metastases at T5, T8, and T11, similar to prior from 10/8/2024. No new marrow replacing lesions. Interval T8 kyphoplasty. Partially imaged L1 kyphoplasty. THORACIC CORD:  Normal signal within the thoracic cord. PREVERTEBRAL AND PARASPINAL SOFT TISSUES:   Normal. THORACIC DEGENERATIVE CHANGE: Mild multilevel degenerative changes. Mild canal stenosis at T12-L1 secondary to L1 osseous retropulsion. No high-grade canal or foraminal stenosis at any thoracic level. POSTCONTRAST:  No abnormal enhancement. OTHER FINDINGS:  None.     Impression: Interval T8 kyphoplasty. Height loss at T8 has progressed slightly since prior from 10/8/2024. Redemonstrated osseous metastases at T5, T8, and T11. Partially imaged L1 compression fracture status post kyphoplasty. No abnormal epidural or leptomeningeal enhancement. Normal cord signal. Workstation performed: [id number]     Procedure: CT recon only lumbar spine (No Charge)  Result Date: 11/9/2024  Narrative: CT LUMBAR SPINE INDICATION:   metastatic ca. 78-year-old female with history of breast cancer, metastatic to several thoracic and lumbar vertebrae. Back pain. COMPARISON: MRI of the lumbar spine from September 21, 2024. MRI of the thoracic spine from October 8, 2024. Lumbar spine radiographs from November 8, 2024. TECHNIQUE: Axial CT examination of the lumbar spine was obtained utilizing reconstructed images from CT of the chest abdomen and pelvis performed the same day.  This examination, like all CT scans performed in the [facility], was performed utilizing techniques to minimize radiation dose exposure, including the use of iterative reconstruction and automated exposure control.   Images were reformatted in the sagittal and coronal planes. FINDINGS: ALIGNMENT: Upper lumbar scoliosis,  convex to the right. 2 mm of anterolisthesis of L4 on L5, unchanged since 9/21/2024. Alignment otherwise unremarkable. VERTEBRAE: Chronic compression fracture of L1, status post kyphoplasty. No new fracture. No bone destruction or osteoblastic lesion. LOWER THORACIC DISC LEVELS:  Unremarkable. LUMBAR DISC LEVELS:   Degenerative disc disease with vacuum disc phenomenon at L1-L2 and L2-L3. FACET JOINTS: Degenerative facet arthropathy from L3-L4 through L5-S1. 5 FORAMINA:  No significant foraminal stenosis. SPINAL CANAL: Mild canal stenosis at L4-L5 due to facet arthropathy and thickening of the ligamenta flava, similar in appearance to images from the MRI of the lumbar spine from 9/21/2024. Otherwise unremarkable. INCLUDED PORTION OF THE SACRUM:  Normal. PARASPINAL SOFT TISSUES:   Normal. IMAGED PORTIONS OF ABDOMEN AND PELVIS: Bilateral hydronephrosis, better demonstrated on a CT of the abdomen and pelvis from today. Rectum distended with feces, consistent with the patient's history of constipation.     Impression: 1.  Compression fracture of L1, status post kyphoplasty. No new lumbar vertebral fractures 2.  No evidence of lumbar spine metastasis. 3.  Mild spinal stenosis at L4-L5, unchanged since an MRI from 9/21/2024. 4.  Bilateral hydronephrosis. Workstation performed: [id number]     Procedure: CT thoracic spine without contrast  Result Date: 11/9/2024  Narrative: CT THORACIC SPINE INDICATION:   Metastatic breast cancer to spine, back pain. COMPARISON: MRI of the thoracic spine from October 8, 2024. TECHNIQUE:  Contiguous axial images through the thoracic spine were obtained. Sagittal and coronal reconstructions were obtained.  This examination, like all CT scans performed in the [facility], was performed utilizing techniques to minimize radiation dose exposure, including the use of iterative reconstruction and automated exposure control. IMAGE QUALITY:  Diagnostic. FINDINGS: ALIGNMENT: Mild  accentuation of the thoracic kyphosis. Otherwise normal alignment. VERTEBRAE: Chronic compression fractures of, status post kyphoplasties. No new fracture. Osteolytic lesions in the T8, T11 and L1 vertebral bodies. No evidence of new destructive lesion. THORACIC DISC LEVELS:  Unremarkable. FACET JOINTS:  Unremarkable. SPINAL CANAL: Increased attenuation in the left anterior epidural space at the T8 level. Otherwise unremarkable. PARASPINAL SOFT TISSUES: Thickening of the paraspinous soft tissues at the T8 level INCLUDED PORTIONS OF THE LUMBAR SPINE: Pathologic compression fracture of L1, status post kyphoplasty. IMAGED PORTIONS OF CHEST AND ABDOMEN: Small right pleural effusion. Mild to moderate left-sided hydronephrosis, better demonstrated on a CT of the abdomen and pelvis from today. Two calculi in the left kidney, each measuring 1 mm..     Impression: 1.  Osteolytic metastases involving the T8, T11 and L1 vertebral bodies with pathologic fractures treated with kyphoplasty at T8 and L1. 2.  Thickened paravertebral soft tissues at T8. This could be residual edema or hematoma from pathologic fracture and kyphoplasty. However, extraosseous spread of metastasis can have a similar appearance. 3.  Probable extraosseous extension of T8 metastasis into the left anterior epidural space. Less likely, this could represent a streak artifact from adjacent kyphoplasty material. This can be better evaluated with MRI of the thoracic spine, without and with IV contrast. 4.  No evidence of new thoracic spine metastasis. 5.  Left-sided hydronephrosis and two small nonobstructing left renal calculi. 6.  Small right pleural effusion. The study was marked in EPIC for immediate notification. Workstation performed: [id number]     Procedure: CT abdomen pelvis with contrast  Result Date: 11/9/2024  Narrative: CT ABDOMEN AND PELVIS WITH IV CONTRAST INDICATION: Constipated, back pain, metastatic breast cancer to spine.. 78-year-old female.  MRI of the thoracic spine from last month demonstrated metastases at T5, T8 and T11 and a compression fracture of L1. COMPARISON: Unenhanced CT of the abdomen and pelvis from April 28, 2024. MRI of the thoracic spine from October 8, 2024. MRI of the lumbar spine from September 21, 2024. Lumbar spine radiographs from November 8, 2024. TECHNIQUE: CT examination of the abdomen and pelvis was performed. Multiplanar 2D reformatted images were created from the source data. This examination, like all CT scans performed in the [facility], was performed utilizing techniques to minimize radiation dose exposure, including the use of iterative reconstruction and automated exposure control. Radiation dose length product (DLP) for this visit: 395 mGy-cm IV Contrast: 85 mL of iohexol (OMNIPAQUE) Enteric Contrast: Not administered. FINDINGS: ABDOMEN LOWER CHEST: Mild subsegmental atelectasis in the bases of both lower lobes. Small right pleural effusion. LIVER/BILIARY TREE: Elongated right lobe (Riedel's lobe, a normal anatomic variant). Liver otherwise unremarkable. Mild dilatation of the common hepatic and common bile duct, measuring up to 9 mm in diameter. No intrahepatic ductal dilatation. GALLBLADDER: No calcified gallstones. No pericholecystic inflammatory change. SPLEEN: Unremarkable. PANCREAS: Unremarkable. Main pancreatic duct normal in caliber. ADRENAL GLANDS: Unremarkable. KIDNEYS/URETERS: Moderate left-sided hydronephrosis, similar to the CT from 8/28/2024. New mild right hydronephrosis. No hydroureter. Several subcentimeter low attenuation foci in the renal cortices, too small to be accurately characterized, but most likely benign. By ACR guidelines, no additional imaging indicated. STOMACH AND BOWEL: Stomach and small intestine unremarkable. Rectum distended with feces. Colon otherwise unremarkable. APPENDIX: No findings to suggest appendicitis. ABDOMINOPELVIC CAVITY: No lymphadenopathy.  No ascites  or discrete fluid collection.  No extraluminal gas. VESSELS: Atherosclerosis without abdominal aortic aneurysm. Incidental note made of multiple phleboliths in the right ovarian vein, unchanged since 8/28/2024. PELVIS REPRODUCTIVE ORGANS: Post hysterectomy. URINARY BLADDER: Unremarkable. ABDOMINAL WALL/INGUINAL REGIONS: Unremarkable. BONES: Chronic compression fracture of L1, status post kyphoplasty. Osteolytic metastasis in the right side of the T11 vertebral body, little changed since 8/28/2024. No other evidence of fracture or destructive lesion.     Impression: 1.  Mild dilatation of the common bile duct, 9 mm in maximal diameter, etiology indeterminate. If clinically indicated, nonemergent MRI of the abdomen/MRCP, without and with IV contrast would be helpful to identify or exclude an obstructing lesion of the  CBD. No intrahepatic ductal dilatation. 2.  Bilateral hydronephrosis, moderate on the left and mild on the right with no hydroureter. Most likely, this is due to bilateral UPJ stenoses. 3.  No evidence of metastases in the abdomen or pelvis. 4.  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 5.  Compression fracture of L1, status post kyphoplasty, similar in appearance to lumbar spine radiographs from 11/8/2024. 6.  No evidence of acute abnormality in the abdomen or pelvis. Workstation performed: [id number]     Procedure: XR spine lumbar minimum 4 views non injury  Result Date: 11/8/2024  Narrative: XR SPINE LUMBAR MINIMUM 4 VIEWS NON INJURY INDICATION: M84.58XS: Pathological fracture in neoplastic disease, other specified site, sequela G89.3: Neoplasm related pain (acute) (chronic). COMPARISON: Spine radiographs 9/13/2024 FINDINGS: Lumbar dextroscoliosis. Alignment is unchanged. Interval kyphoplasty changes at T8 and L1. Unchanged vertebral body heights. Degenerative changes throughout the visible spine, with disc degeneration most severe at L2-3. Soft tissues unremarkable.  Large colonic fecal burden. Osseous lesions better characterized on MRI.     Impression: T8 and L1 kyphoplasty changes. Stable alignment. Workstation performed: [id number]     Procedure: XR hip/pelv 2-3 vws right if performed  Result Date: 10/25/2024  Narrative: RIGHT HIP INDICATION:   Malignant neoplasm of unspecified site of left female breast. Pain in right hip. COMPARISON:  None. VIEWS:  XR HIP/PELV 2-3 VWS RIGHT W PELVIS IF PERFORMED FINDINGS: There is no acute fracture or dislocation. No significant hip degenerative changes. No lytic or blastic osseous lesion. Soft tissues are unremarkable. Degenerative changes visualized lower lumbar spine.     Impression: No acute osseous abnormality of the right hip Electronically signed: 10/25/2024 05:01 PM Tyler Corona MD    Procedure: IR kyphoplasty/vertebroplasty with ablation  Result Date: 10/15/2024  Narrative: PROCEDURE: T8 and L1 vertebral body RF ablation and augmentation. HISTORY: Pathologic compression fractures COMPARISON: MRI on 10/8/2024 OPERATORS: [employer]. ANTIBIOTIC MEDICATION: 1g of Ancef given IV. COMPLICATIONS: None. ANESTHESIA: The patient was administered general endotracheal anesthesia under the care and supervision of the attending anesthesiologist. TECHNIQUE: The procedure, its risks, benefits, and alternatives were discussed in detail with the patient. Risks discussed included, but were not limited to, bleeding, infection, nerve root injury, spinal cord injury, and nontarget embolization of methylmethacrylate. All questions were answered and no guarantees were provided. After informed consent, the patient was brought into the angiography suite and the vertebral body levels of interest were identified via palpation and correlation made to prior imaging. The left T8 pedicle was imaged along its axis utilizing biplane fluoroscopy. Superficial, deep and periosteal Sensorcaine anesthetic was administered over the site of dermatotomy. Under fluoroscopic  guidance, an introducer needle was then directed into the T8 vertebral body via a left transpedicular approach. A core biopsy was performed. Cavity creation was then performed through the introducer needle using the DFine osteotome needle. The osteotome needle was then removed. The introducer needle remained in place with its stylet. Next, the RF probe was inserted through the guide needle and RF ablation of the tumor was performed. The RF probe was removed and the stylet was reinserted into the introducer needle.  Following preparation of a polymethylmethacrylate solution, bone cement was administered into the T8 vertebral body. The left L1 pedicle was imaged along its axis utilizing biplane fluoroscopy. Superficial, deep and periosteal Sensorcaine anesthetic was administered over the site of dermatotomy. Under fluoroscopic guidance, an introducer needle was then directed into the L1 vertebral body via a left transpedicular approach. A core biopsy was performed. Cavity creation was then performed through the introducer needle using the DFine osteotome needle. The osteotome needle was then removed. The introducer needle remained in place with its stylet. Next, the RF probe was inserted through the guide needle and RF ablation of the tumor was performed. The RF probe was removed and the stylet was reinserted into the introducer needle.  Following preparation of a polymethylmethacrylate solution, bone cement was administered into the L1 vertebral body. The introducer needle was removed. Manual pressure was applied to the access site until hemostasis was obtained. A sterile dressing was then applied. No new neurological deficits or complications were encountered during or immediately following the procedure.     Impression: FINDINGS/IMPRESSION: Successful T8 and L1 vertebral body biopsy, RF ablation and augmentation as detailed. Workstation performed: [id number]     Procedure: XR shoulder 2+ vw right  Result Date:  10/14/2024  Narrative: XR SHOULDER 2+ VW RIGHT INDICATION: M89.8X1: Other specified disorders of bone, shoulder. COMPARISON: PET scan 10/2/2024. 1/29/2024. FINDINGS: Acromial increased lucent/lytic changes with mid acromial irregular linear lucency No acute fracture or dislocation. Degenerative glenohumeral and AC joints. Unremarkable soft tissues.     Impression: Question acromial nondisplaced pathological fracture. The study was marked in EPIC for immediate notification. Workstation performed: [id number]     Procedure: MRI thoracic spine with and without contrast  Addendum Date: 10/14/2024  Addendum: ADDENDUM: The impression was inadvertently omitted from this report and is incorrect. The body of the report describes multiple findings which will be reiterated here in a new impression. IMPRESSION: Subacute L1 compression fracture with stable configuration compared to MRI of the lumbar spine dated 9/21/2024. Multiple marrow replacing lesions are identified within the thoracic spine including T5, T8 and T11 consistent with osseous metastasis with no corresponding pathologic compression fractures. Disease appears contained within the bone with no extraosseous extension into the epidural or paraspinal soft tissues. Small pleural effusion.    Result Date: 10/14/2024  Narrative: MRI THORACIC SPINE WITH AND WITHOUT CONTRAST INDICATION: D49.2: Neoplasm of unspecified behavior of bone, soft tissue, and skin. COMPARISON: PET/CT dated 10/2/2024. MRI lumbar spine dated 9/21/2024. TECHNIQUE:  Multiplanar, multisequence imaging of the thoracic spine was performed before and after gadolinium administration. . IV Contrast:  5 mL of Gadobutrol injection (SINGLE-DOSE) IMAGE QUALITY:  Diagnostic. FINDINGS: ALIGNMENT: Mild, smooth exaggeration of the mid thoracic kyphosis. There is a moderate L1 compression fracture as seen on recent MRI with similar configuration. Approximately 50 to 60% loss of height of the mid and anterior  aspect of the vertebral body with bony retropulsion of the posterior superior margin of the vertebral body into the anterior epidural space. No thoracic compression fractures are noted. MARROW SIGNAL: The L1 compression fracture demonstrates diffuse marrow edema without extension into the posterior elements of this vertebral body segment. There is a focal marrow replacing lesion within the posterior aspect of the T11 vertebral body on the right with extension into the pedicle. There is a marrow replacing lesion identified within the T8 vertebral body somewhat diffusely. There is some hyperintense signal on T1-weighted imaging in the posterior aspect of the vertebral body likely representing a hemangioma superimposed upon the diffuse marrow replacement. There is a small marrow replacing lesion within the T5 vertebral body. THORACIC CORD: No cord compression. Normal signal within the thoracic cord. PREVERTEBRAL AND PARASPINAL SOFT TISSUES:   Small right-sided pleural effusion layering within the posterior aspect of the chest. THORACIC DEGENERATIVE CHANGE: Mild canal stenosis at the T12-L1 level as a result of POSTCONTRAST:  No abnormal enhancement. OTHER FINDINGS:  None.     Impression: Normal enhanced MRI of the thoracic spine. Workstation performed: [id number]     Procedure: US guided breast biopsy left complete  Addendum Date: 10/10/2024  Addendum: ADDENDUM: PATHOLOGY RESULTS: 2) Mass [B] (Left; Retroareolar; 3 o'clock) The pathology results indicate a Benign finding with benign breast tissue and fibroadenomatoid nodule. Radiology and pathology are concordant. 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  Please refer to the full pathology report. Radiology and pathology are concordant. In review of the patient’s recent imaging, the  left malignancy is poorly defined on mammogram, appearing as a developing asymmetry rather than a discrete mass.  This measures approximately 47 mm AP by 31 mm transverse on the cc view and measures approximately 58 mm AP by 35 mm craniocaudal on the MLO view.  The difference in AP dimension between the 2 views could be related to the obliquity of the images.  On ultrasound the mass measures 41 x 13 x 41 mm.  There is a subtle asymmetry with possible distortion slightly superior and medial (at 12:00) which could represent a second site of disease.  Further characterization is indicated. The patient's most recent mammogram of the contralateral right breast is from an outside institution performed 4/12/2024.  It may be reasonable to obtain a more recent mammogram prior to treatment for the malignancy.  Contrast-enhanced mammography or MRI is recommended for additional screening and evaluation of extent of disease in the left breast.  This is due to the subtle mammographic appearance of the malignancy in the breast density.  If the patient cannot have contrast-enhanced imaging, a more updated right mammogram is recommended as well as ultrasound and possible mammogram of the left breast to evaluate the asymmetry described above. The patient does have an abnormal PET/CT which demonstrates lymphadenopathy that was not accessible for ultrasound-guided core needle biopsy. The patient was notified of the malignant pathology results on 10/8/2024 by Dr. Andino. RECOMMENDATION:      - Surgical Consultation for the left breast.      - Breast MRI for both breasts. Signed by:  Ambreen Mukherjee MD END ADDENDUM    Result Date: 10/10/2024  Narrative: DIAGNOSIS: Status post biopsy INDICATION: Yuni Keys is a 78 y.o. female presenting for left breast biopsy, 2 locations. . Prior to the procedure, previous imaging was reviewed.  The procedure was explained to the patient in detail.  All questions were answered.  Written and  verbal informed consent was obtained.  The left breast was marked. FINDINGS: Limited left breast ultrasound was performed with grayscale and color imaging.  The procedure sites were marked.  A timeout was performed.  The skin was cleansed and draped in the usual fashion.  Both biopsies were performed under continuous ultrasound guidance from a lateral approach. LEFT 2) MASS B at 3:00, retroareolar region: Anesthesia was administered with 5 mL of lidocaine 1%. A 13-gauge introducer was placed.  A 14 gauge spring-loaded biopsy device was used to obtain 6 samples under direct ultrasound guidance.  A mini Kimberly  reflector was placed in the biopsy cavity under direct ultrasound guidance.  The Kimberly  probe was held over the procedure site and audible clicking was heard.  1) MASS A at 11:00, 7 cm from the nipple: Anesthesia was administered with 5 mL of lidocaine 1%. An 11-gauge introducer was placed.  A 12 gauge spring-loaded biopsy device was used to obtain 4 samples under direct ultrasound guidance.  There was a small central hypoechoic component to the mass.  Due to vascularity at this location, sampling could not be obtained at that hypoechoic area.  A Kimberly  reflector was placed in the biopsy cavity under direct ultrasound guidance.  The Kimberly  probe was held over the procedure site and audible clicking was heard.  Hemostasis was obtained with direct pressure on the biopsy sites.  The patient had minimal bleeding.  The patient tolerated the procedure well. There were no immediate complications.   After each site was biopsied, the Kimberly  probe was held over the procedure site and audible clicking could be heard. Post biopsy mammogram: Left 2D CC and lateral views were obtained.  The mini Kimberly  reflector projects in the expected location at 3:00, retroareolar region (this mass was not seen mammographically as it is obscured by dense breast parenchyma). The Kimberly  reflector projects within  the mass at 11:00.     Impression: 1.  Technically successful ultrasound-guided core needle biopsy of a mass in the left breast at 3:00, retroareolar region.  The procedure site was marked for possible surgery with placement of a mini Kimberly  reflector. 2.  Technically successful ultrasound-guided core needle biopsy of a mass in the left breast at 11:00, 7 cm from the nipple.  The procedure site was marked for possible surgery with placement of a Kimberly  reflector. 3.  After each site was biopsied, the Kimberly  probe was held over the procedure site and audible clicking could be heard. 4.  Management recommendations will be made once the pathology results are available. ASSESSMENT/BI-RADS CATEGORY: Left: Post-Procedure Mammogram for Marker Placement Overall: Post-Procedure Mammogram for Marker Placement RECOMMENDATION:      - Waiting for pathology for the left breast. Workstation ID: [id number] Signed by:  Ambreen Mukherjee MD     Procedure: US guidance breast biopsy left each additional  Addendum Date: 10/10/2024  Addendum: ADDENDUM: PATHOLOGY RESULTS: 2) Mass [B] (Left; Retroareolar; 3 o'clock) The pathology results indicate a Benign finding with benign breast tissue and fibroadenomatoid nodule. Radiology and pathology are concordant. 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  Please refer to the full pathology report. Radiology and pathology are concordant. In review of the patient’s recent imaging, the left malignancy is poorly defined on mammogram, appearing as a developing asymmetry rather than a discrete mass.  This measures approximately 47 mm AP by 31 mm transverse on the cc view and measures approximately 58 mm AP by 35 mm craniocaudal on the MLO view.  The difference in AP dimension between the 2 views  could be related to the obliquity of the images.  On ultrasound the mass measures 41 x 13 x 41 mm.  There is a subtle asymmetry with possible distortion slightly superior and medial (at 12:00) which could represent a second site of disease.  Further characterization is indicated. The patient's most recent mammogram of the contralateral right breast is from an outside institution performed 4/12/2024.  It may be reasonable to obtain a more recent mammogram prior to treatment for the malignancy.  Contrast-enhanced mammography or MRI is recommended for additional screening and evaluation of extent of disease in the left breast.  This is due to the subtle mammographic appearance of the malignancy in the breast density.  If the patient cannot have contrast-enhanced imaging, a more updated right mammogram is recommended as well as ultrasound and possible mammogram of the left breast to evaluate the asymmetry described above. The patient does have an abnormal PET/CT which demonstrates lymphadenopathy that was not accessible for ultrasound-guided core needle biopsy. The patient was notified of the malignant pathology results on 10/8/2024 by Dr. Andino. RECOMMENDATION:      - Surgical Consultation for the left breast.      - Breast MRI for both breasts. Signed by:  Ambreen Mukherjee MD END ADDENDUM    Result Date: 10/10/2024  Narrative: DIAGNOSIS: Status post biopsy INDICATION: Yuni Keys is a 78 y.o. female presenting for left breast biopsy, 2 locations. . Prior to the procedure, previous imaging was reviewed.  The procedure was explained to the patient in detail.  All questions were answered.  Written and verbal informed consent was obtained.  The left breast was marked. FINDINGS: Limited left breast ultrasound was performed with grayscale and color imaging.  The procedure sites were marked.  A timeout was performed.  The skin was cleansed and draped in the usual fashion.  Both biopsies were performed under  continuous ultrasound guidance from a lateral approach. LEFT 2) MASS B at 3:00, retroareolar region: Anesthesia was administered with 5 mL of lidocaine 1%. A 13-gauge introducer was placed.  A 14 gauge spring-loaded biopsy device was used to obtain 6 samples under direct ultrasound guidance.  A mini Kimberly  reflector was placed in the biopsy cavity under direct ultrasound guidance.  The Kimberly  probe was held over the procedure site and audible clicking was heard.  1) MASS A at 11:00, 7 cm from the nipple: Anesthesia was administered with 5 mL of lidocaine 1%. An 11-gauge introducer was placed.  A 12 gauge spring-loaded biopsy device was used to obtain 4 samples under direct ultrasound guidance.  There was a small central hypoechoic component to the mass.  Due to vascularity at this location, sampling could not be obtained at that hypoechoic area.  A Kimberly  reflector was placed in the biopsy cavity under direct ultrasound guidance.  The Kimberly  probe was held over the procedure site and audible clicking was heard.  Hemostasis was obtained with direct pressure on the biopsy sites.  The patient had minimal bleeding.  The patient tolerated the procedure well. There were no immediate complications.   After each site was biopsied, the Kimberly  probe was held over the procedure site and audible clicking could be heard. Post biopsy mammogram: Left 2D CC and lateral views were obtained.  The mini Kimberly  reflector projects in the expected location at 3:00, retroareolar region (this mass was not seen mammographically as it is obscured by dense breast parenchyma). The Kimberly  reflector projects within the mass at 11:00.     Impression: 1.  Technically successful ultrasound-guided core needle biopsy of a mass in the left breast at 3:00, retroareolar region.  The procedure site was marked for possible surgery with placement of a mini Kimberly  reflector. 2.  Technically successful ultrasound-guided core  needle biopsy of a mass in the left breast at 11:00, 7 cm from the nipple.  The procedure site was marked for possible surgery with placement of a Kimberly  reflector. 3.  After each site was biopsied, the Kimberly  probe was held over the procedure site and audible clicking could be heard. 4.  Management recommendations will be made once the pathology results are available. ASSESSMENT/BI-RADS CATEGORY: Left: Post-Procedure Mammogram for Marker Placement Overall: Post-Procedure Mammogram for Marker Placement RECOMMENDATION:      - Waiting for pathology for the left breast. Workstation ID: [id number] Signed by:  Ambreen Mukherjee MD     Procedure: Mammo post biopsy left  Addendum Date: 10/10/2024  Addendum: ADDENDUM: PATHOLOGY RESULTS: 2) Mass [B] (Left; Retroareolar; 3 o'clock) The pathology results indicate a Benign finding with benign breast tissue and fibroadenomatoid nodule. Radiology and pathology are concordant. 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  Please refer to the full pathology report. Radiology and pathology are concordant. In review of the patient’s recent imaging, the left malignancy is poorly defined on mammogram, appearing as a developing asymmetry rather than a discrete mass.  This measures approximately 47 mm AP by 31 mm transverse on the cc view and measures approximately 58 mm AP by 35 mm craniocaudal on the MLO view.  The difference in AP dimension between the 2 views could be related to the obliquity of the images.  On ultrasound the mass measures 41 x 13 x 41 mm.  There is a subtle asymmetry with possible distortion slightly superior and medial (at 12:00) which could represent a second site of disease.  Further characterization is indicated. The patient's most recent mammogram of the  contralateral right breast is from an outside institution performed 4/12/2024.  It may be reasonable to obtain a more recent mammogram prior to treatment for the malignancy.  Contrast-enhanced mammography or MRI is recommended for additional screening and evaluation of extent of disease in the left breast.  This is due to the subtle mammographic appearance of the malignancy in the breast density.  If the patient cannot have contrast-enhanced imaging, a more updated right mammogram is recommended as well as ultrasound and possible mammogram of the left breast to evaluate the asymmetry described above. The patient does have an abnormal PET/CT which demonstrates lymphadenopathy that was not accessible for ultrasound-guided core needle biopsy. The patient was notified of the malignant pathology results on 10/8/2024 by Dr. Andino. RECOMMENDATION:      - Surgical Consultation for the left breast.      - Breast MRI for both breasts. Signed by:  Ambreen Mukherjee MD END ADDENDUM    Result Date: 10/10/2024  Narrative: DIAGNOSIS: Status post biopsy INDICATION: Yuni Keys is a 78 y.o. female presenting for left breast biopsy, 2 locations. . Prior to the procedure, previous imaging was reviewed.  The procedure was explained to the patient in detail.  All questions were answered.  Written and verbal informed consent was obtained.  The left breast was marked. FINDINGS: Limited left breast ultrasound was performed with grayscale and color imaging.  The procedure sites were marked.  A timeout was performed.  The skin was cleansed and draped in the usual fashion.  Both biopsies were performed under continuous ultrasound guidance from a lateral approach. LEFT 2) MASS B at 3:00, retroareolar region: Anesthesia was administered with 5 mL of lidocaine 1%. A 13-gauge introducer was placed.  A 14 gauge spring-loaded biopsy device was used to obtain 6 samples under direct ultrasound guidance.  A mini Kimberly  reflector was  placed in the biopsy cavity under direct ultrasound guidance.  The Kimberly  probe was held over the procedure site and audible clicking was heard.  1) MASS A at 11:00, 7 cm from the nipple: Anesthesia was administered with 5 mL of lidocaine 1%. An 11-gauge introducer was placed.  A 12 gauge spring-loaded biopsy device was used to obtain 4 samples under direct ultrasound guidance.  There was a small central hypoechoic component to the mass.  Due to vascularity at this location, sampling could not be obtained at that hypoechoic area.  A Kimberly  reflector was placed in the biopsy cavity under direct ultrasound guidance.  The Kimberly  probe was held over the procedure site and audible clicking was heard.  Hemostasis was obtained with direct pressure on the biopsy sites.  The patient had minimal bleeding.  The patient tolerated the procedure well. There were no immediate complications.   After each site was biopsied, the Kimberly  probe was held over the procedure site and audible clicking could be heard. Post biopsy mammogram: Left 2D CC and lateral views were obtained.  The mini Kimberly  reflector projects in the expected location at 3:00, retroareolar region (this mass was not seen mammographically as it is obscured by dense breast parenchyma). The Kimberly  reflector projects within the mass at 11:00.     Impression: 1.  Technically successful ultrasound-guided core needle biopsy of a mass in the left breast at 3:00, retroareolar region.  The procedure site was marked for possible surgery with placement of a mini Kimberly  reflector. 2.  Technically successful ultrasound-guided core needle biopsy of a mass in the left breast at 11:00, 7 cm from the nipple.  The procedure site was marked for possible surgery with placement of a Kimberly  reflector. 3.  After each site was biopsied, the Kimberly  probe was held over the procedure site and audible clicking could be heard. 4.  Management recommendations  will be made once the pathology results are available. ASSESSMENT/BI-RADS CATEGORY: Left: Post-Procedure Mammogram for Marker Placement Overall: Post-Procedure Mammogram for Marker Placement RECOMMENDATION:      - Waiting for pathology for the left breast. Workstation ID: [id number] Signed by:  Ambreen Mkuherjee MD     Procedure: Mammo diagnostic left w 3d and cad  Result Date: 10/4/2024  Narrative: DIAGNOSIS: Abnormal positron emission tomography (PET) scan; Other signs and symptoms in breast TECHNIQUE: Digital diagnostic mammography was performed. Computer Aided Detection (CAD) analyzed all applicable images. Left breast ultrasound was performed. COMPARISONS: Prior breast imaging dated: 04/12/2024, 04/26/2023, 04/07/2023, 02/01/2023, 03/22/2022, 03/19/2021, and 02/20/2020 RELEVANT HISTORY: Family Breast Cancer History: History of breast cancer in Maternal Grandmother, Paternal Aunt. Family Medical History: Family medical history includes breast cancer in 2 relatives (maternal grandmother, paternal aunt) and ovarian cancer in maternal aunt. Personal History: No known relevant hormone history. Surgical history includes breast biopsy, breast excisional biopsy, and hysterectomy. No known relevant medical history. RISK ASSESSMENT: 5 Year Tyrer-Cuzick: 11.31% 10 Year Tyrer-Cuzick: No Score Lifetime Tyrer-Cuzick: 15.78% TISSUE DENSITY: The breasts are extremely dense, which lowers the sensitivity of mammography. INDICATION: Yuni Keys is a 78 y.o. female presenting for abnormal pet scan.  Patient had a PET/CT with findings suspicious for osseous metastases.  There was FDG avidity in the medial left breast and FDG avid left axillary/subpectoral lymph nodes. FINDINGS: LEFT 1) MASS [A] Mammo diagnostic left w 3d and cad: There is an equal density, irregularly shaped mass with indistinct margins seen in the left breast at 11 o'clock in the middle depth.  On the cc view the mass measures approximately 47 mm AP by  31 mm transverse.  On the MLO view the mass may measure up to 58 mm AP by 35 mm craniocaudal.  However, the exact margins of the mass are difficult to delineate on mammography.  This does correspond with the area of FDG avidity in the medial breast on the PET/CT. US breast left limited (diagnostic): There is a 41 mm x 13 mm x 41 mm irregularly shaped, hyperechoic mass with indistinct margins seen in the left breast at 11 o'clock, 7 cm from the nipple.  The margins of the mass are indistinct and the mass has an irregular shape, limiting accurate measurement.  The mass correlates with the mass seen on the mammogram.  This mass is highly suggestive of malignancy. 2) MASS [B] Mammo diagnostic left w 3d and cad: There is no corresponding mass seen on this modality. US breast left limited (diagnostic): There is a 14 mm x 4 mm x 8 mm oval, parallel, hypoechoic mass seen in the retroareolar region of the left breast at 3 o'clock.  This appears to be intraductal.  This is suspicious. 3) MASS [C] Mammo diagnostic left w 3d and cad: There is a 6 mm oval mass seen in the left breast at 6 o'clock in the middle depth.  This is mammographically stable compared to the oldest mammogram available for comparison from 2020. US breast left limited (diagnostic): There is a 6 mm x 5 mm x 3 mm oval, parallel, hypoechoic mass with circumscribed margins seen in the retroareolar region of the left breast at 6 o'clock.  This is likely benign given the mammographic stability. 4) LYMPH NODE [D] Mammo diagnostic left w 3d and cad: There is no corresponding lymph node seen on this modality.  The PET/CT demonstrated abnormal lymph nodes. US breast left limited (diagnostic): Targeted ultrasound of the axilla was performed.  There are some morphologically benign axillary lymph nodes.  There is also a level 2 axillary lymph node which is only visible deep to the pectoralis muscles during certain phases of respiration.  This has a cortical thickness of  4 mm.  This is suspicious but is not amenable to ultrasound-guided core needle biopsy.     Impression: 1.  Irregular mass in the medial left breast corresponds with the area of FDG avidity on the PET/CT.  This is highly suggestive of malignancy.  Appropriate action should be taken.  Ultrasound-guided core needle biopsy is recommended and was performed today.  Please see the separate dictation. 2.  Oval intraductal mass in the left breast at 3:00, retroareolar region is suspicious.  Ultrasound-guided core needle biopsy is recommended. 3.  There is a 6 mm oval mass at 6:00 which is mammographically stable for 4 years.  This is likely benign. 4.  The patient had abnormal axillary lymph nodes seen on PET/CT.  1 of these lymph nodes is visualized on ultrasound, however, it is not amenable to ultrasound-guided core needle biopsy. ASSESSMENT/BI-RADS CATEGORY: Left: 5 - Highly Suggestive of Malignancy Overall: 5 - Highly Suggestive of Malignancy RECOMMENDATION:      - Ultrasound-guided breast biopsy for the left breast. Workstation ID: [id number] Signed by:  Ambreen Mukherjee MD     Procedure: US breast left limited (diagnostic)  Result Date: 10/4/2024  Narrative: DIAGNOSIS: Abnormal positron emission tomography (PET) scan; Other signs and symptoms in breast TECHNIQUE: Digital diagnostic mammography was performed. Computer Aided Detection (CAD) analyzed all applicable images. Left breast ultrasound was performed. COMPARISONS: Prior breast imaging dated: 04/12/2024, 04/26/2023, 04/07/2023, 02/01/2023, 03/22/2022, 03/19/2021, and 02/20/2020 RELEVANT HISTORY: Family Breast Cancer History: History of breast cancer in Maternal Grandmother, Paternal Aunt. Family Medical History: Family medical history includes breast cancer in 2 relatives (maternal grandmother, paternal aunt) and ovarian cancer in maternal aunt. Personal History: No known relevant hormone history. Surgical history includes breast biopsy, breast  excisional biopsy, and hysterectomy. No known relevant medical history. RISK ASSESSMENT: 5 Year Tyrer-Cuzick: 11.31% 10 Year Tyrer-Cuzick: No Score Lifetime Tyrer-Cuzick: 15.78% TISSUE DENSITY: The breasts are extremely dense, which lowers the sensitivity of mammography. INDICATION: Yuni Keys is a 78 y.o. female presenting for abnormal pet scan.  Patient had a PET/CT with findings suspicious for osseous metastases.  There was FDG avidity in the medial left breast and FDG avid left axillary/subpectoral lymph nodes. FINDINGS: LEFT 1) MASS [A] Mammo diagnostic left w 3d and cad: There is an equal density, irregularly shaped mass with indistinct margins seen in the left breast at 11 o'clock in the middle depth.  On the cc view the mass measures approximately 47 mm AP by 31 mm transverse.  On the MLO view the mass may measure up to 58 mm AP by 35 mm craniocaudal.  However, the exact margins of the mass are difficult to delineate on mammography.  This does correspond with the area of FDG avidity in the medial breast on the PET/CT. US breast left limited (diagnostic): There is a 41 mm x 13 mm x 41 mm irregularly shaped, hyperechoic mass with indistinct margins seen in the left breast at 11 o'clock, 7 cm from the nipple.  The margins of the mass are indistinct and the mass has an irregular shape, limiting accurate measurement.  The mass correlates with the mass seen on the mammogram.  This mass is highly suggestive of malignancy. 2) MASS [B] Mammo diagnostic left w 3d and cad: There is no corresponding mass seen on this modality. US breast left limited (diagnostic): There is a 14 mm x 4 mm x 8 mm oval, parallel, hypoechoic mass seen in the retroareolar region of the left breast at 3 o'clock.  This appears to be intraductal.  This is suspicious. 3) MASS [C] Mammo diagnostic left w 3d and cad: There is a 6 mm oval mass seen in the left breast at 6 o'clock in the middle depth.  This is mammographically stable compared to  the oldest mammogram available for comparison from 2020. US breast left limited (diagnostic): There is a 6 mm x 5 mm x 3 mm oval, parallel, hypoechoic mass with circumscribed margins seen in the retroareolar region of the left breast at 6 o'clock.  This is likely benign given the mammographic stability. 4) LYMPH NODE [D] Mammo diagnostic left w 3d and cad: There is no corresponding lymph node seen on this modality.  The PET/CT demonstrated abnormal lymph nodes. US breast left limited (diagnostic): Targeted ultrasound of the axilla was performed.  There are some morphologically benign axillary lymph nodes.  There is also a level 2 axillary lymph node which is only visible deep to the pectoralis muscles during certain phases of respiration.  This has a cortical thickness of 4 mm.  This is suspicious but is not amenable to ultrasound-guided core needle biopsy.     Impression: 1.  Irregular mass in the medial left breast corresponds with the area of FDG avidity on the PET/CT.  This is highly suggestive of malignancy.  Appropriate action should be taken.  Ultrasound-guided core needle biopsy is recommended and was performed today.  Please see the separate dictation. 2.  Oval intraductal mass in the left breast at 3:00, retroareolar region is suspicious.  Ultrasound-guided core needle biopsy is recommended. 3.  There is a 6 mm oval mass at 6:00 which is mammographically stable for 4 years.  This is likely benign. 4.  The patient had abnormal axillary lymph nodes seen on PET/CT.  1 of these lymph nodes is visualized on ultrasound, however, it is not amenable to ultrasound-guided core needle biopsy. ASSESSMENT/BI-RADS CATEGORY: Left: 5 - Highly Suggestive of Malignancy Overall: 5 - Highly Suggestive of Malignancy RECOMMENDATION:      - Ultrasound-guided breast biopsy for the left breast. Workstation ID: [id number] Signed by:  Ambreen Mukherjee MD     Procedure: NM PET CT tumor imaging whole body  Result Date:  10/2/2024  Narrative: WHOLE-BODY PET/CT SCAN INDICATION: Abnormal MRI lumbar spine. 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 Multiplanar attenuation corrected and non attenuation corrected PET images were acquired 60 minutes post injection. Contiguous, low dose, axial CT sections were obtained from the vertex through the feet.  Intravenous contrast material was not utilized.  This examination, like all CT scans performed in the [facility], was performed utilizing techniques to minimize radiation dose exposure, including the use of iterative reconstruction and automated exposure control. Fasting serum glucose: 84 mg/dl FINDINGS: VISUALIZED BRAIN: No acute abnormalities are seen. HEAD/NECK: There is a physiologic distribution of FDG. No FDG avid cervical adenopathy is seen. CT images: Unremarkable CHEST: There are several small FDG avid lymph nodes in the left axilla/subpectoral region. A lymph node here demonstrates SUV max of 6.0. This measures 6 mm short axis image 68 series 4. Slightly focal FDG uptake at the left breast medially where there is suggestion of a nodular density on CT, SUV max of 2.6. This measures up to 1.3 cm image 83 series 4. See fusion image 79 series 603. CT images: Moderate coronary artery calcifications. Heart is enlarged. Trace pleural effusions suggested. ABDOMEN: No FDG avid soft tissue lesions are seen. CT images: Parapelvic renal cysts on the left. Colonic diverticulosis. Suggestion of calcified phleboliths along the right psoas margin. PELVIS: No FDG avid soft tissue lesions are seen. CT images: Prior hysterectomy. OSSEOUS STRUCTURES/EXTREMITIES: Scattered FDG-avid osseous lesions. For example: *  Heterogeneous activity at the right  acromion where there is a lytic process on CT, SUV max of 4.8. *  T8 vertebral body demonstrates SUV max 3.8. *  T11 vertebral body lytic lesion on the right demonstrates SUV max of 6.0. *  Linear activity at the compressed L1 vertebral body demonstrates SUV max of 5.9. Activity could be related to compression fracture with underlying pathologic lesion not excluded. *  Small lucent lesion at the left ischial tuberosity demonstrates SUV max of 4.7. CT images: Multilevel degenerative changes of the spine. Curvature of the lumbar spine to the right. Bilateral knee arthroplasties.     Impression: 1. Mild focal radiotracer uptake at a left breast nodular density medially. Underlying primary breast malignancy should be excluded. 2. Several small FDG avid left axillary/subpectoral lymph nodes would raise suspicion for metastasis. 3. Scattered FDG avid lytic lesions compatible with metastasis. The study was marked in EPIC for significant notification. Workstation performed: [id number]     Procedure: MRI lumbar spine without contrast  Result Date: 9/23/2024  Narrative: MRI LUMBAR SPINE WITHOUT CONTRAST INDICATION: S32.010A: Wedge compression fracture of first lumbar vertebra, initial encounter for closed fracture. COMPARISON: Lumbar spine radiograph from 9/13/2024, CT of the lumbar spine and abdomen and pelvis from 8/28/2024, MR lumbar spine from 11/24/2019 TECHNIQUE:  Multiplanar, multisequence imaging of the lumbar spine was performed. . IMAGE QUALITY:  Diagnostic FINDINGS: VERTEBRAL BODIES:  There are 5 lumbar type vertebral bodies. There is dextroscoliosis of the lumbar spine, centered at L2, with right lateral listhesis of L3 on L4, L4 on L5, and grade 1 anterolisthesis of L4 and L5 due to moderate facet arthropathy. There is a T1 hypointense, STIR hyperintense marrow replacing lesion involving the posterior aspect of the T11 vertebral body to the right of midline extending into the pedicle, corresponding to the  lucent lesion identified on CT in this region. This is new when compared to prior lumbar spine MRI from 11/24/2019. There is a worsening acute or subacute compression fracture involving the superior endplate of L1, with approximately 50% height loss noted, and associated bone marrow edema on STIR images. There is mild worsening retropulsion of the superior endplate of  the central canal. No significant associated epidural hematoma or obvious paraspinal epidural mass is identified. There is mild STIR signal hyperintensity involving the posterior aspect of the T12 inferior endplate to the left of midline in particular, without significant height loss, which may represent a microtrabecular injury in this region. Remaining lumbar vertebral body heights appear grossly preserved. There are additional degenerative endplate changes at multiple levels most pronounced at L2-L3, where there are Modic type III degenerative endplate changes noted an associated sclerosis on recent CT. There are also Modic type I degenerative endplate changes posteriorly at the level of L3-L4. SACRUM:  Normal signal within the sacrum. No evidence of insufficiency or stress fracture. DISTAL CORD AND CONUS:  Normal size and signal within the distal cord and conus. The conus terminates at T12. PARASPINAL SOFT TISSUES:  Paraspinal soft tissues are unremarkable. LOWER THORACIC DISC SPACES:  Normal disc height and signal.  No disc herniation, canal stenosis or foraminal narrowing. LUMBAR DISC SPACES: L1-L2: Disc desiccation, diffuse disc bulge with superimposed small left foraminal disc protrusion. Mild ligamentum flavum hypertrophy. Facet joints appear grossly normal. No canal stenosis. Mild left neural foraminal stenosis. Findings appear unchanged. L2-L3: Disc height loss, disc osteophyte complex asymmetric to the left, with superimposed small right foraminal disc protrusion. Mild facet arthropathy. Ligamentum flavum hypertrophy. Mild canal stenosis.  Left greater than right lateral recess stenosis.  Moderate left neural foraminal stenosis. Findings appear grossly unchanged. L3-L4: Diffuse disc bulge with superimposed left foraminal disc protrusion. Bilateral facet arthropathy and ligamentum flavum hypertrophy. Mild canal stenosis. Moderate left and mild right neural foraminal stenosis. Findings appear similar when compared to the prior study. L4-L5: Diffuse disc bulge. Bilateral facet arthropathy and ligamentum flavum hypertrophy. Mild canal stenosis. Mild right neuroforaminal stenosis. Findings appear unchanged. L5-S1: Disc bulge. Moderate facet arthropathy. No canal stenosis. Mild right neural foraminal stenosis. OTHER FINDINGS: Mild left hydronephrosis as seen on prior CT of the abdomen pelvis from 8/28/2024.     Impression: 1. Indeterminate T1 hypointense, STIR hyperintense lesion involving the right posterior aspect of T11 extending into the adjacent pedicle, corresponding to the lucent lesion identified in this region on prior CT, and new when compared to the prior lumbar  spine MRI from 11/24/2019. This raises concern for an aggressive lesion such as metastatic disease or multiple myeloma as this was new compared to 2019, with an atypical hemangioma an alternative but less likely consideration. Recommend correlation for history of primary malignancy, and consider further valuation with PET/CT. 2. Worsening acute to subacute compression fracture at L1, with now approximately 50% height loss, associated bone marrow edema, and mild retropulsion of the posterior vertebral body cortex of the central canal. No associated epidural hematoma or obvious  associated mass at this level. This may be a benign compression fracture, but given the lesion at T11, a pathologic compression fracture cannot be entirely excluded. 3. Minimal edema involving the posterior aspect of the inferior endplate of T12, without height loss in this region, which may represent a area of  focal microtrabecular injury. 4. Multilevel lumbar spondylosis, as described above, contributing to at most mild canal stenosis at L2-L5, and multilevel neural foraminal stenosis, worst on the left at L2-L4. 5. Mild left-sided hydronephrosis again demonstrated, incompletely evaluated in this examination. The study was marked in EPIC for immediate notification. Workstation performed: [id number]     Procedure: XR spine thoracolumbar 2 vw  Result Date: 9/13/2024  Narrative: THORACOLUMBAR SPINE INDICATION:   Wedge compression fracture of first lumbar vertebra, initial encounter for closed fracture. COMPARISON: 8/28/2024. VIEWS:  XR SPINE THORACOLUMBAR 2 VW Images: 2 FINDINGS: Compression fracture of the L1 vertebral body again seen. This is similar to the prior. There is moderate dextrocurvature of the lumbar spine. There is no spondylolisthesis. There is moderate multilevel disc space narrowing with osteophytes throughout the lumbar spine worse at L2-L3. There is moderate-severe facet disease lower lumbar spine.. There is no displacement of the paraspinal line. The pedicles appear intact.     Impression: Redemonstration of L1 compression fracture Moderate multilevel spondylosis worse at L2-L3. Moderate to severe facet disease. Electronically signed: 09/13/2024 03:43 PM Stephane Rachel MD    Procedure: CT recon only lumbar spine  Result Date: 8/28/2024  Narrative: CT RECON ONLY LUMBAR SPINE (NO CHARGE) INDICATION:   fall pain in lumbar spine. COMPARISON:  None TECHNIQUE: Axial CT examination of the lumbar spine was obtained utilizing reconstructed images from CT of the chest, abdomen and pelvis performed the same day.  Images were reformatted in the sagittal and coronal planes. This examination, like all CT scans performed in the [facility], was performed utilizing techniques to minimize radiation dose exposure, including the use of iterative reconstruction and automated exposure control. FINDINGS:  ALIGNMENT: Normal alignment. No spondylolisthesis.  No scoliosis. VERTEBRAE: Acute compression fracture at L1 with minimal superior endplate loss of height. No significant bony retropulsion. Nonspecific partially imaged right T11 vertebral body lucent lesion. DEGENERATIVE CHANGES: Degenerative disc and endplate changes at L2-L3. Mild thoracolumbar dextroscoliosis. PREVERTEBRAL AND PARASPINAL SOFT TISSUES: Normal. OTHER: Unremarkable     Impression: Acute mild L1 compression fracture. Workstation performed: [id number]     Procedure: CT abdomen pelvis wo contrast  Result Date: 8/28/2024  Narrative: CT ABDOMEN AND PELVIS WITHOUT IV CONTRAST INDICATION: fall pain at lumboscarum area. COMPARISON: None. TECHNIQUE: CT examination of the abdomen and pelvis was performed without intravenous contrast. Multiplanar 2D reformatted images were created from the source data. This examination, like all CT scans performed in the [facility], was performed utilizing techniques to minimize radiation dose exposure, including the use of iterative reconstruction and automated exposure control. Radiation dose length product (DLP) for this visit: 513.54 mGy-cm Enteric Contrast: Not administered. FINDINGS: ABDOMEN LOWER CHEST: No clinically significant abnormality in the visualized lower chest. LIVER/BILIARY TREE: Unremarkable. GALLBLADDER: No calcified gallstones. No pericholecystic inflammatory change. SPLEEN: Unremarkable. PANCREAS: Unremarkable. ADRENAL GLANDS: Unremarkable. KIDNEYS/URETERS: Punctate nonobstructing left lower pole renal calculus. Suspected multiple left parapelvic cysts. Minimal left hydronephrosis without visualized obstructing calculus STOMACH AND BOWEL: Unremarkable. APPENDIX: No findings to suggest appendicitis. ABDOMINOPELVIC CAVITY: No ascites. No pneumoperitoneum. No lymphadenopathy. VESSELS: Unremarkable for patient's age. PELVIS REPRODUCTIVE ORGANS: Post hysterectomy. URINARY BLADDER:  "Unremarkable. ABDOMINAL WALL/INGUINAL REGIONS: Unremarkable. BONES: Acute compression fracture at L1 with minimal superior endplate loss of height. No significant bony retropulsion. Degenerative disc disease at L2-L3. Nonspecific lucent lesion at the right aspect of the T11 vertebral body. Mild thoracolumbar dextroscoliosis.     Impression: Acute mild compression fracture at L1. Minimal left hydronephrosis. Punctate nonobstructing left lower pole renal calculus. No visualized obstructing calculus. This study was marked for \"Immediate\" notification in EPIC. Workstation performed: [id number]       Administrative Statements   I have spent a total time of 40 minutes in caring for this patient on the day of the visit/encounter including Diagnostic results, Risks and benefits of tx options, Instructions for management, Patient and family education, Importance of tx compliance, Risk factor reductions, Impressions, Counseling / Coordination of care, Documenting in the medical record, Reviewing / ordering tests, medicine, procedures  , and Obtaining or reviewing history  . Topics discussed with the patient / family include symptom assessment and management, medication review, medication adjustment, psychosocial support, opioid titration, supportive listening, and anticipatory guidance.      "

## 2024-11-14 ENCOUNTER — APPOINTMENT (OUTPATIENT)
Facility: HOSPITAL | Age: 79
End: 2024-11-14
Attending: RADIOLOGY
Payer: MEDICARE

## 2024-11-14 ENCOUNTER — TELEPHONE (OUTPATIENT)
Age: 79
End: 2024-11-14

## 2024-11-14 LAB
ATRIAL RATE: 48 BPM
ATRIAL RATE: 48 BPM
P AXIS: 67 DEGREES
P AXIS: 67 DEGREES
PR INTERVAL: 142 MS
PR INTERVAL: 142 MS
QRS AXIS: 26 DEGREES
QRS AXIS: 26 DEGREES
QRSD INTERVAL: 76 MS
QRSD INTERVAL: 76 MS
QT INTERVAL: 456 MS
QT INTERVAL: 456 MS
QTC INTERVAL: 408 MS
QTC INTERVAL: 408 MS
T WAVE AXIS: 47 DEGREES
T WAVE AXIS: 47 DEGREES
VENTRICULAR RATE: 48 BPM
VENTRICULAR RATE: 48 BPM

## 2024-11-14 PROCEDURE — 77387 GUIDANCE FOR RADJ TX DLVR: CPT | Performed by: RADIOLOGY

## 2024-11-14 PROCEDURE — G6002 STEREOSCOPIC X-RAY GUIDANCE: HCPCS | Performed by: RADIOLOGY

## 2024-11-14 PROCEDURE — 93010 ELECTROCARDIOGRAM REPORT: CPT | Performed by: INTERNAL MEDICINE

## 2024-11-14 PROCEDURE — 77427 RADIATION TX MANAGEMENT X5: CPT | Performed by: RADIOLOGY

## 2024-11-14 PROCEDURE — 77412 RADIATION TX DELIVERY LVL 3: CPT | Performed by: RADIOLOGY

## 2024-11-14 NOTE — TELEPHONE ENCOUNTER
Returned patient's call. She states she was taking stool softeners last week but had an enema on Sunday after she became constipated. States the enema was successful and she has been having loose stools since. She had an episode of urinary and bowel incontinence overnight the past 2 nights, but has not had these issues during the day. She does not feel constipated now. She has not taken oxycodone yet today. Discussed with Dr. iSddiqi. Informed her she could try one Imodium if she is having more than 3 loose stools a day and that we will give her a handout on low residue diet for diarrhea at the time of her treatment tomorrow. She verbalized understanding.

## 2024-11-14 NOTE — TELEPHONE ENCOUNTER
"Provider: Dr. Siddiqi    DESCRIPTION (describe complaint in short phrase) Patient calling in reporting that she is having some urinary incontinence overnight and some stool leakage overnight, as well. Patient reports she has not had a real bowel movement for a few days, but doesn't feel constipated. She is passing \"some\" gas, is taking oxycodone and weaning off dexamethasone.  She denies abdominal pain/cramping, nausea/vomiting, blood, SOB, chest pain. Patient has not tried anything for diarrhea or constipation, as she isn't sure what to take to assist her. I confirmed with her I would reach out to rad/onc for some guidance. She verbalized understanding and has no additional questions or concerns at this moment.     SEVERITY (mild, moderate, severe) Milk leakage    ONSET (of THIS SPECIFIC complaint) Started a few days ago    CAUSE (what does the patient think is causing this) Unsure    MEDICATIONS (any changes in meds or doses?, Take any meds for relief?) Denies    OTHER SYMPTOMS (yes or no- if no, just write that. If yes, write the symptoms c/o) Hot flashes, hasn't had one in over a week.     "

## 2024-11-15 ENCOUNTER — PATIENT OUTREACH (OUTPATIENT)
Dept: HEMATOLOGY ONCOLOGY | Facility: CLINIC | Age: 79
End: 2024-11-15

## 2024-11-15 ENCOUNTER — APPOINTMENT (OUTPATIENT)
Facility: HOSPITAL | Age: 79
End: 2024-11-15
Attending: RADIOLOGY
Payer: MEDICARE

## 2024-11-15 PROCEDURE — 77412 RADIATION TX DELIVERY LVL 3: CPT | Performed by: RADIOLOGY

## 2024-11-15 PROCEDURE — 77387 GUIDANCE FOR RADJ TX DLVR: CPT | Performed by: RADIOLOGY

## 2024-11-15 PROCEDURE — G6002 STEREOSCOPIC X-RAY GUIDANCE: HCPCS | Performed by: RADIOLOGY

## 2024-11-15 NOTE — PROGRESS NOTES
I reached out and spoke with Alley to follow up and to review for any changes in barriers to care and offer supportive services as needed.    Barriers noted previously; co-morbidities.     Current barriers and interventions provided: co-morbidities.     Pt is currently receiving Radiation treatment and is having some diarrhea and leakage that she has spoken to the Nurse about.   Pt states that she is doing well otherwise    Bases on individual needs I will follow up with them in 4 weeks. I have provided my direct contact information and welcome them to contact me if their needs as discussed above change. They were appreciative for the call.

## 2024-11-18 ENCOUNTER — APPOINTMENT (OUTPATIENT)
Facility: HOSPITAL | Age: 79
End: 2024-11-18
Attending: RADIOLOGY
Payer: MEDICARE

## 2024-11-18 ENCOUNTER — PATIENT OUTREACH (OUTPATIENT)
Dept: CASE MANAGEMENT | Facility: OTHER | Age: 79
End: 2024-11-18

## 2024-11-18 PROCEDURE — G6002 STEREOSCOPIC X-RAY GUIDANCE: HCPCS | Performed by: RADIOLOGY

## 2024-11-18 PROCEDURE — 77387 GUIDANCE FOR RADJ TX DLVR: CPT | Performed by: RADIOLOGY

## 2024-11-18 PROCEDURE — 77412 RADIATION TX DELIVERY LVL 3: CPT | Performed by: RADIOLOGY

## 2024-11-18 NOTE — PROGRESS NOTES
"OSW called Mrs. Keys today for support. She talked about her recent hospitalization for intractable BP, of which she had been experiencing when this writer last spoke with her. Fortunately, this is better. She has 2 more days of radiation. She stated the fatigue she is experiencing is \"awful\" and that she is laying on her couch today. She is able to perform her ADL's and is mobile, but feels overwhelmingly tired. She stated she wasn't aware that the radiation fatigue could last for several months and is worried she may not feel like herself again for a long time. She stated she is \"generally unhappy\" and did not see her life being like this (with cancer). She reflected again on her high activity levels before treatment for her stage IV disease. Significant validation and emotional support provided.  Mrs. Keys stated her daughter flew in from [place] and they went out for dinner for her birthday. She also shared her gratefulness to her neighbors and friends who help her at home and drive her to appointments. OSW offered continued emotional support and explained she is not alone. She is not terribly interested in support groups or connecting with a mentor at this time. Offered to call in 2 weeks and she is agreeable. Will follow.   "

## 2024-11-19 ENCOUNTER — APPOINTMENT (OUTPATIENT)
Facility: HOSPITAL | Age: 79
End: 2024-11-19
Attending: RADIOLOGY
Payer: MEDICARE

## 2024-11-19 ENCOUNTER — DOCUMENTATION (OUTPATIENT)
Dept: HEMATOLOGY ONCOLOGY | Facility: CLINIC | Age: 79
End: 2024-11-19

## 2024-11-19 PROCEDURE — G6002 STEREOSCOPIC X-RAY GUIDANCE: HCPCS | Performed by: RADIOLOGY

## 2024-11-19 PROCEDURE — 77387 GUIDANCE FOR RADJ TX DLVR: CPT | Performed by: RADIOLOGY

## 2024-11-19 PROCEDURE — 77412 RADIATION TX DELIVERY LVL 3: CPT | Performed by: RADIOLOGY

## 2024-11-19 NOTE — PROGRESS NOTES
Late Entry       PT free drug application for Kisqali submitted on 10/25/24            Chidi Enriquez  Phone:[phone]  Email:[email or web address]

## 2024-11-20 ENCOUNTER — HOSPITAL ENCOUNTER (EMERGENCY)
Facility: HOSPITAL | Age: 79
Discharge: HOME | End: 2024-11-21
Attending: EMERGENCY MEDICINE
Payer: MEDICARE

## 2024-11-20 ENCOUNTER — APPOINTMENT (OUTPATIENT)
Dept: LAB | Facility: CLINIC | Age: 79
End: 2024-11-20
Payer: MEDICARE

## 2024-11-20 ENCOUNTER — APPOINTMENT (OUTPATIENT)
Facility: HOSPITAL | Age: 79
End: 2024-11-20
Attending: RADIOLOGY
Payer: MEDICARE

## 2024-11-20 DIAGNOSIS — R07.89 ATYPICAL CHEST PAIN: Primary | ICD-10-CM

## 2024-11-20 DIAGNOSIS — C50.911 BREAST CANCER METASTASIZED TO BONE, RIGHT (HCC): Chronic | ICD-10-CM

## 2024-11-20 DIAGNOSIS — C79.51 BREAST CANCER METASTASIZED TO BONE, RIGHT (HCC): Chronic | ICD-10-CM

## 2024-11-20 LAB
ALBUMIN SERPL BCG-MCNC: 3.9 G/DL (ref 3.5–5)
ALP SERPL-CCNC: 35 U/L (ref 34–104)
ALT SERPL W P-5'-P-CCNC: 26 U/L (ref 7–52)
ANION GAP SERPL CALCULATED.3IONS-SCNC: 8 MMOL/L (ref 4–13)
AST SERPL W P-5'-P-CCNC: 18 U/L (ref 13–39)
BASOPHILS # BLD AUTO: 0 THOUSANDS/ÂΜL (ref 0–0.1)
BASOPHILS # BLD AUTO: 0.01 THOUSANDS/ÂΜL (ref 0–0.1)
BASOPHILS NFR BLD AUTO: 0 % (ref 0–1)
BASOPHILS NFR BLD AUTO: 0 % (ref 0–1)
BILIRUB SERPL-MCNC: 0.86 MG/DL (ref 0.2–1)
BUN SERPL-MCNC: 19 MG/DL (ref 5–25)
CALCIUM SERPL-MCNC: 7.7 MG/DL (ref 8.4–10.2)
CHLORIDE SERPL-SCNC: 102 MMOL/L (ref 96–108)
CO2 SERPL-SCNC: 29 MMOL/L (ref 21–32)
CREAT SERPL-MCNC: 0.61 MG/DL (ref 0.6–1.3)
EOSINOPHIL # BLD AUTO: 0 THOUSAND/ÂΜL (ref 0–0.61)
EOSINOPHIL # BLD AUTO: 0.02 THOUSAND/ÂΜL (ref 0–0.61)
EOSINOPHIL NFR BLD AUTO: 0 % (ref 0–6)
EOSINOPHIL NFR BLD AUTO: 1 % (ref 0–6)
ERYTHROCYTE [DISTWIDTH] IN BLOOD BY AUTOMATED COUNT: 16.3 % (ref 11.6–15.1)
ERYTHROCYTE [DISTWIDTH] IN BLOOD BY AUTOMATED COUNT: 16.6 % (ref 11.6–15.1)
GFR SERPL CREATININE-BSD FRML MDRD: 86 ML/MIN/1.73SQ M
GLUCOSE SERPL-MCNC: 96 MG/DL (ref 65–140)
HCT VFR BLD AUTO: 40.5 % (ref 34.8–46.1)
HCT VFR BLD AUTO: 44.4 % (ref 34.8–46.1)
HGB BLD-MCNC: 14.1 G/DL (ref 11.5–15.4)
HGB BLD-MCNC: 15 G/DL (ref 11.5–15.4)
IMM GRANULOCYTES # BLD AUTO: 0.02 THOUSAND/UL (ref 0–0.2)
IMM GRANULOCYTES # BLD AUTO: 0.02 THOUSAND/UL (ref 0–0.2)
IMM GRANULOCYTES NFR BLD AUTO: 1 % (ref 0–2)
IMM GRANULOCYTES NFR BLD AUTO: 1 % (ref 0–2)
LYMPHOCYTES # BLD AUTO: 0.07 THOUSANDS/ÂΜL (ref 0.6–4.47)
LYMPHOCYTES # BLD AUTO: 0.24 THOUSANDS/ÂΜL (ref 0.6–4.47)
LYMPHOCYTES NFR BLD AUTO: 2 % (ref 14–44)
LYMPHOCYTES NFR BLD AUTO: 7 % (ref 14–44)
MCH RBC QN AUTO: 31.6 PG (ref 26.8–34.3)
MCH RBC QN AUTO: 31.6 PG (ref 26.8–34.3)
MCHC RBC AUTO-ENTMCNC: 33.8 G/DL (ref 31.4–37.4)
MCHC RBC AUTO-ENTMCNC: 34.8 G/DL (ref 31.4–37.4)
MCV RBC AUTO: 91 FL (ref 82–98)
MCV RBC AUTO: 94 FL (ref 82–98)
MONOCYTES # BLD AUTO: 0.04 THOUSAND/ÂΜL (ref 0.17–1.22)
MONOCYTES # BLD AUTO: 0.14 THOUSAND/ÂΜL (ref 0.17–1.22)
MONOCYTES NFR BLD AUTO: 1 % (ref 4–12)
MONOCYTES NFR BLD AUTO: 4 % (ref 4–12)
NEUTROPHILS # BLD AUTO: 2.94 THOUSANDS/ÂΜL (ref 1.85–7.62)
NEUTROPHILS # BLD AUTO: 3.61 THOUSANDS/ÂΜL (ref 1.85–7.62)
NEUTS SEG NFR BLD AUTO: 87 % (ref 43–75)
NEUTS SEG NFR BLD AUTO: 96 % (ref 43–75)
NRBC BLD AUTO-RTO: 0 /100 WBCS
NRBC BLD AUTO-RTO: 0 /100 WBCS
PLATELET # BLD AUTO: 169 THOUSANDS/UL (ref 149–390)
PLATELET # BLD AUTO: 182 THOUSANDS/UL (ref 149–390)
PMV BLD AUTO: 10 FL (ref 8.9–12.7)
PMV BLD AUTO: 9 FL (ref 8.9–12.7)
POTASSIUM SERPL-SCNC: 4.1 MMOL/L (ref 3.5–5.3)
PROT SERPL-MCNC: 6.1 G/DL (ref 6.4–8.4)
RBC # BLD AUTO: 4.46 MILLION/UL (ref 3.81–5.12)
RBC # BLD AUTO: 4.75 MILLION/UL (ref 3.81–5.12)
SODIUM SERPL-SCNC: 139 MMOL/L (ref 135–147)
WBC # BLD AUTO: 3.36 THOUSAND/UL (ref 4.31–10.16)
WBC # BLD AUTO: 3.75 THOUSAND/UL (ref 4.31–10.16)

## 2024-11-20 PROCEDURE — 80053 COMPREHEN METABOLIC PANEL: CPT

## 2024-11-20 PROCEDURE — 85025 COMPLETE CBC W/AUTO DIFF WBC: CPT

## 2024-11-20 PROCEDURE — 77336 RADIATION PHYSICS CONSULT: CPT | Performed by: RADIOLOGY

## 2024-11-20 PROCEDURE — G6002 STEREOSCOPIC X-RAY GUIDANCE: HCPCS | Performed by: RADIOLOGY

## 2024-11-20 PROCEDURE — 99285 EMERGENCY DEPT VISIT HI MDM: CPT

## 2024-11-20 PROCEDURE — 77412 RADIATION TX DELIVERY LVL 3: CPT | Performed by: RADIOLOGY

## 2024-11-20 PROCEDURE — 77387 GUIDANCE FOR RADJ TX DLVR: CPT | Performed by: RADIOLOGY

## 2024-11-20 PROCEDURE — 84484 ASSAY OF TROPONIN QUANT: CPT

## 2024-11-20 PROCEDURE — 99285 EMERGENCY DEPT VISIT HI MDM: CPT | Performed by: EMERGENCY MEDICINE

## 2024-11-20 PROCEDURE — 80048 BASIC METABOLIC PNL TOTAL CA: CPT

## 2024-11-20 PROCEDURE — 93005 ELECTROCARDIOGRAM TRACING: CPT

## 2024-11-20 PROCEDURE — 36415 COLL VENOUS BLD VENIPUNCTURE: CPT

## 2024-11-20 RX ORDER — SODIUM CHLORIDE 9 MG/ML
3 INJECTION INTRAVENOUS
Status: DISCONTINUED | OUTPATIENT
Start: 2024-11-20 | End: 2024-11-21 | Stop reason: HOSPADM

## 2024-11-20 RX ORDER — ASPIRIN 325 MG
325 TABLET ORAL ONCE
Status: COMPLETED | OUTPATIENT
Start: 2024-11-20 | End: 2024-11-20

## 2024-11-20 RX ORDER — ACETAMINOPHEN 325 MG/1
975 TABLET ORAL ONCE
Status: COMPLETED | OUTPATIENT
Start: 2024-11-20 | End: 2024-11-20

## 2024-11-20 RX ADMIN — ACETAMINOPHEN 975 MG: 325 TABLET, FILM COATED ORAL at 23:50

## 2024-11-20 RX ADMIN — ASPIRIN 325 MG ORAL TABLET 325 MG: 325 PILL ORAL at 23:50

## 2024-11-21 ENCOUNTER — OFFICE VISIT (OUTPATIENT)
Dept: HEMATOLOGY ONCOLOGY | Facility: CLINIC | Age: 79
End: 2024-11-21
Payer: MEDICARE

## 2024-11-21 ENCOUNTER — APPOINTMENT (EMERGENCY)
Dept: RADIOLOGY | Facility: HOSPITAL | Age: 79
End: 2024-11-21
Payer: MEDICARE

## 2024-11-21 VITALS
DIASTOLIC BLOOD PRESSURE: 58 MMHG | OXYGEN SATURATION: 97 % | RESPIRATION RATE: 18 BRPM | TEMPERATURE: 97.7 F | SYSTOLIC BLOOD PRESSURE: 125 MMHG | HEART RATE: 52 BPM

## 2024-11-21 VITALS
RESPIRATION RATE: 18 BRPM | BODY MASS INDEX: 23.41 KG/M2 | DIASTOLIC BLOOD PRESSURE: 52 MMHG | OXYGEN SATURATION: 98 % | HEART RATE: 72 BPM | SYSTOLIC BLOOD PRESSURE: 110 MMHG | HEIGHT: 58 IN | TEMPERATURE: 97.5 F | WEIGHT: 111.5 LBS

## 2024-11-21 DIAGNOSIS — C79.51 BREAST CANCER METASTASIZED TO BONE, RIGHT (HCC): Primary | Chronic | ICD-10-CM

## 2024-11-21 DIAGNOSIS — D70.2 OTHER DRUG-INDUCED NEUTROPENIA (HCC): ICD-10-CM

## 2024-11-21 DIAGNOSIS — C50.212 MALIGNANT NEOPLASM OF UPPER-INNER QUADRANT OF LEFT BREAST IN FEMALE, ESTROGEN RECEPTOR POSITIVE (HCC): ICD-10-CM

## 2024-11-21 DIAGNOSIS — Z17.0 MALIGNANT NEOPLASM OF UPPER-INNER QUADRANT OF LEFT BREAST IN FEMALE, ESTROGEN RECEPTOR POSITIVE (HCC): ICD-10-CM

## 2024-11-21 DIAGNOSIS — C50.911 BREAST CANCER METASTASIZED TO BONE, RIGHT (HCC): Primary | Chronic | ICD-10-CM

## 2024-11-21 LAB
ANION GAP SERPL CALCULATED.3IONS-SCNC: 7 MMOL/L (ref 4–13)
ATRIAL RATE: 53 BPM
ATRIAL RATE: 55 BPM
BUN SERPL-MCNC: 16 MG/DL (ref 5–25)
CALCIUM SERPL-MCNC: 7.5 MG/DL (ref 8.4–10.2)
CARDIAC TROPONIN I PNL SERPL HS: 4 NG/L (ref ?–50)
CHLORIDE SERPL-SCNC: 103 MMOL/L (ref 96–108)
CO2 SERPL-SCNC: 28 MMOL/L (ref 21–32)
CREAT SERPL-MCNC: 0.53 MG/DL (ref 0.6–1.3)
GFR SERPL CREATININE-BSD FRML MDRD: 90 ML/MIN/1.73SQ M
GLUCOSE SERPL-MCNC: 94 MG/DL (ref 65–140)
P AXIS: 27 DEGREES
P AXIS: 67 DEGREES
POTASSIUM SERPL-SCNC: 3.6 MMOL/L (ref 3.5–5.3)
PR INTERVAL: 140 MS
PR INTERVAL: 160 MS
QRS AXIS: 71 DEGREES
QRS AXIS: 74 DEGREES
QRSD INTERVAL: 76 MS
QRSD INTERVAL: 78 MS
QT INTERVAL: 434 MS
QT INTERVAL: 440 MS
QTC INTERVAL: 412 MS
QTC INTERVAL: 415 MS
SODIUM SERPL-SCNC: 138 MMOL/L (ref 135–147)
T WAVE AXIS: 43 DEGREES
T WAVE AXIS: 53 DEGREES
VENTRICULAR RATE: 53 BPM
VENTRICULAR RATE: 55 BPM

## 2024-11-21 PROCEDURE — 71046 X-RAY EXAM CHEST 2 VIEWS: CPT

## 2024-11-21 PROCEDURE — 93010 ELECTROCARDIOGRAM REPORT: CPT | Performed by: INTERNAL MEDICINE

## 2024-11-21 PROCEDURE — 93005 ELECTROCARDIOGRAM TRACING: CPT

## 2024-11-21 PROCEDURE — 99215 OFFICE O/P EST HI 40 MIN: CPT | Performed by: INTERNAL MEDICINE

## 2024-11-21 PROCEDURE — G2211 COMPLEX E/M VISIT ADD ON: HCPCS | Performed by: INTERNAL MEDICINE

## 2024-11-21 NOTE — ED ATTENDING ATTESTATION
11/20/2024  IAnam MD, saw and evaluated the patient. I have discussed the patient with the resident/non-physician practitioner and agree with the resident's/non-physician practitioner's findings, Plan of Care, and MDM as documented in the resident's/non-physician practitioner's note, except where noted. All available labs and Radiology studies were reviewed.  I was present for key portions of any procedure(s) performed by the resident/non-physician practitioner and I was immediately available to provide assistance.       At this point I agree with the current assessment done in the Emergency Department.  I have conducted an independent evaluation of this patient a history and physical is as follows:      Final Diagnosis:  1. Atypical chest pain      Chief Complaint   Patient presents with    Chest Pain     Substernal chest pain around 8pm 5/10 non radiating. Denies SOB, pt felt anxious during the chest pain and took ativan around 9. Pt has stage 4 breast cancer and had radiation today. Mild chest pain currently.            A:  -79-year-old female who presents with chest pain.      P:  - Given patient's concerns, will do a cardiac workup.   - Will do an EKG for arrythmia, strain; troponin for same as per protocol for evaluation of ACS.   - CBC for anemia; CMP for kidney function and electrolytes.   - Will check CXR for pneumonia, PTX, fluid overload    HEART score:  History 0=Slightly or non-suspicious   ECG 0=Normal   Age 2= > 65 years   Risk Factors 1= 1 or 2 risk factors   Troponin 0= Less than or equal to 12 ng/L   Total 3         - Disposition per workup.     Past Medical History:   Diagnosis Date    Arthritis     Breast mass     Edema of both lower legs 11/01/2018    Dr. Thang Otero; faxed records.    Hx of skin cancer, basal cell     Hyperlipidemia     Hypertension     Impingement syndrome of left shoulder 04/10/2018    Dr. Thang Otero, faxed patient records.    Migraines     Osteopenia      Overactive bladder     Overweight 12/16/2021    Skin cancer 2022    Basal cell    Toe fracture, left 11/16/2023          H:    79-year-old female presents with chest pain.  Started around 8 PM this evening.  Nonradiating.  No shortness of breath, nausea/vomiting, diaphoresis.  She does have stage IV breast cancer and had radiation earlier today.  Pain has improved since onset.      PMH:  Past Medical History:   Diagnosis Date    Arthritis     Breast mass     Edema of both lower legs 11/01/2018    Dr. Thang Otero; faxed records.    Hx of skin cancer, basal cell     Hyperlipidemia     Hypertension     Impingement syndrome of left shoulder 04/10/2018    Dr. Thang Otero, faxed patient records.    Migraines     Osteopenia     Overactive bladder     Overweight 12/16/2021    Skin cancer 2022    Basal cell    Toe fracture, left 11/16/2023       PSH:  Past Surgical History:   Procedure Laterality Date    ACHILLES TENDON REPAIR      BREAST BIOPSY Left 10/04/2024    BREAST CYST EXCISION Bilateral     1972    BREAST LUMPECTOMY      CATARACT EXTRACTION, BILATERAL      COLONOSCOPY      COLPOPEXY VAGINAL EXTRAPERITONEAL (VEC) WITH INSERTION PUBOVAGINAL SLING      CYST REMOVAL      HERNIA REPAIR      HYSTERECTOMY      IR KYPHOPLASTY/VERTEBROPLASTY WITH ABLATION  10/15/2024    JOINT REPLACEMENT Right     REPLACEMENT TOTAL KNEE Left     TONSILLECTOMY  1949    US GUIDANCE BREAST BIOPSY LEFT EACH ADDITIONAL Left 10/04/2024    US GUIDED BREAST BIOPSY LEFT COMPLETE Left 10/04/2024         PE:   Vitals:    11/20/24 2317 11/21/24 0000 11/21/24 0100 11/21/24 0200   BP: 127/67 127/73 108/57 125/58   BP Location: Right arm Right arm Right arm Right arm   Pulse: 56 (!) 54 (!) 52 (!) 52   Resp: 16 18 18 18   Temp: 97.7 °F (36.5 °C)      TempSrc: Oral      SpO2: 96%  97% 97%         Constitutional: Vital signs are normal. She appears well-developed. She is cooperative. No distress.   Cardiovascular: Normal rate, regular rhythm,  normal heart sounds.   No murmur heard.  Pulmonary/Chest: Effort normal and breath sounds normal.   Abdominal: Soft. Normal appearance and bowel sounds are normal. There is no tenderness. There is no rebound, no guarding.   Neurological: She is alert.   Skin: Skin is warm, dry and intact.   Psychiatric: She has a normal mood and affect. Her speech is normal and behavior is normal. Thought content normal.          - 13 point ROS was performed and all are normal unless stated in the history above.   - Nursing note reviewed. Vitals reviewed.   - Orders placed by myself and/or advanced practitioner / resident.    - Previous chart was reviewed  - No language barrier.   - History obtained from patient.   - There are no limitations to the history obtained. Reasons ROS could not be obtained:  N/A         Medications   sodium chloride (PF) 0.9 % injection 3 mL (has no administration in time range)   aspirin tablet 325 mg (325 mg Oral Given 11/20/24 2350)   acetaminophen (TYLENOL) tablet 975 mg (975 mg Oral Given 11/20/24 2350)     X-ray chest 2 views   ED Interpretation   No acute cardiopulmonary abnormality        Orders Placed This Encounter   Procedures    ED ECG Documentation Only    X-ray chest 2 views    CBC and differential    Basic metabolic panel    HS Troponin 0hr (reflex protocol)    Ambulatory Referral to Cardiology    Continuous cardiac monitoring    Continuous pulse oximetry    Insert peripheral IV    ECG 12 lead    ECG 12 lead repeat Q30 minutes PRN persistent chest pain x 2    ECG 12 lead repeat in 2hrs    ECG 12 lead repeat in 4hrs     Labs Reviewed   CBC AND DIFFERENTIAL - Abnormal       Result Value Ref Range Status    WBC 3.36 (*) 4.31 - 10.16 Thousand/uL Final    RBC 4.46  3.81 - 5.12 Million/uL Final    Hemoglobin 14.1  11.5 - 15.4 g/dL Final    Hematocrit 40.5  34.8 - 46.1 % Final    MCV 91  82 - 98 fL Final    MCH 31.6  26.8 - 34.3 pg Final    MCHC 34.8  31.4 - 37.4 g/dL Final    RDW 16.3 (*) 11.6 -  "15.1 % Final    MPV 9.0  8.9 - 12.7 fL Final    Platelets 169  149 - 390 Thousands/uL Final    nRBC 0  /100 WBCs Final    Segmented % 87 (*) 43 - 75 % Final    Immature Grans % 1  0 - 2 % Final    Lymphocytes % 7 (*) 14 - 44 % Final    Monocytes % 4  4 - 12 % Final    Eosinophils Relative 1  0 - 6 % Final    Basophils Relative 0  0 - 1 % Final    Absolute Neutrophils 2.94  1.85 - 7.62 Thousands/µL Final    Absolute Immature Grans 0.02  0.00 - 0.20 Thousand/uL Final    Absolute Lymphocytes 0.24 (*) 0.60 - 4.47 Thousands/µL Final    Absolute Monocytes 0.14 (*) 0.17 - 1.22 Thousand/µL Final    Eosinophils Absolute 0.02  0.00 - 0.61 Thousand/µL Final    Basophils Absolute 0.00  0.00 - 0.10 Thousands/µL Final   BASIC METABOLIC PANEL - Abnormal    Sodium 138  135 - 147 mmol/L Final    Potassium 3.6  3.5 - 5.3 mmol/L Final    Chloride 103  96 - 108 mmol/L Final    CO2 28  21 - 32 mmol/L Final    ANION GAP 7  4 - 13 mmol/L Final    BUN 16  5 - 25 mg/dL Final    Creatinine 0.53 (*) 0.60 - 1.30 mg/dL Final    Comment: Standardized to IDMS reference method    Glucose 94  65 - 140 mg/dL Final    Comment: If the patient is fasting, the ADA then defines impaired fasting glucose as > 100 mg/dL and diabetes as > or equal to 123 mg/dL.    Calcium 7.5 (*) 8.4 - 10.2 mg/dL Final    eGFR 90  ml/min/1.73sq m Final    Narrative:     National Kidney Disease Foundation guidelines for Chronic Kidney Disease (CKD):     Stage 1 with normal or high GFR (GFR > 90 mL/min/1.73 square meters)    Stage 2 Mild CKD (GFR = 60-89 mL/min/1.73 square meters)    Stage 3A Moderate CKD (GFR = 45-59 mL/min/1.73 square meters)    Stage 3B Moderate CKD (GFR = 30-44 mL/min/1.73 square meters)    Stage 4 Severe CKD (GFR = 15-29 mL/min/1.73 square meters)    Stage 5 End Stage CKD (GFR <15 mL/min/1.73 square meters)  Note: GFR calculation is accurate only with a steady state creatinine   HS TROPONIN I 0HR - Normal    hs TnI 0hr 4  \"Refer to ACS Flowchart\"- see " link ng/L Final    Comment:                                              Initial (time 0) result  If >=50 ng/L, Myocardial injury suggested ;  Type of myocardial injury and treatment strategy  to be determined.  If 5-49 ng/L, a delta result at 2 hours and or 4 hours will be needed to further evaluate.  If <4 ng/L, and chest pain has been >3 hours since onset, patient may qualify for discharge based on the HEART score in the ED.  If <5 ng/L and <3hours since onset of chest pain, a delta result at 2 hours will be needed to further evaluate.    HS Troponin 99th Percentile URL of a Health Population=12 ng/L with a 95% Confidence Interval of 8-18 ng/L.    Second Troponin (time 2 hours)  If calculated delta >= 20 ng/L,  Myocardial injury suggested ; Type of myocardial injury and treatment strategy to be determined.  If 5-49 ng/L and the calculated delta is 5-19 ng/L, consult medical service for evaluation.  Continue evaluation for ischemia on ecg and other possible etiology and repeat hs troponin at 4 hours.  If delta is <5 ng/L at 2 hours, consider discharge based on risk stratification via the HEART score (if in ED), or CHRISTIN risk score in IP/Observation.    HS Troponin 99th Percentile URL of a Health Population=12 ng/L with a 95% Confidence Interval of 8-18 ng/L.     Time reflects when diagnosis was documented in both MDM as applicable and the Disposition within this note       Time User Action Codes Description Comment    11/21/2024  2:12 AM Sekou Vanegas Add [R07.89] Atypical chest pain           ED Disposition       ED Disposition   Discharge    Condition   Stable    Date/Time   Thu Nov 21, 2024  2:12 AM    Comment   Yuni Keys discharge to home/self care.                   Follow-up Information       Follow up With Specialties Details Why Contact Info Additional Information    Lisa Fairbanks, DO Internal Medicine   [address] [place] [address]  [phone]       [facility]  [facility]   [address]  [facility] [address] [phone] [facility], [address], [place], [place], [address] [phone]          Patient's Medications   Discharge Prescriptions    No medications on file       Prior to Admission Medications   Prescriptions Last Dose Informant Patient Reported? Taking?   Cholecalciferol (Vitamin D) 50 MCG (2000 UT) tablet  Self Yes No   Sig: Take 2,000 Units by mouth daily   Multiple Vitamins-Minerals (MULTIVITAMIN WITH MINERALS) tablet  Self Yes No   Sig: Take 1 tablet by mouth daily   Ribociclib Succ, 600 MG Dose, (Kisqali, 600 MG Dose,) 200 MG TBPK   No No   Sig: Take 600 mg by mouth daily 21 days on, followed by 7 days off   Sodium Fluoride 5000 PPM 1.1 % PSTE  Self Yes No   Sig: APPLY TIHN RIBBON TO TOOTHBRUSH, BRUSH X 2MIN AT BEDTIME, SPIT, DONT RINSE   atorvastatin (LIPITOR) 10 mg tablet  Self No No   Sig: Take 1 tablet (10 mg total) by mouth daily   baclofen 10 mg tablet   No No   Sig: Take 1 tablet (10 mg total) by mouth 3 (three) times a day as needed for muscle spasms   calcium polycarbophil (FIBERCON) 625 mg tablet  Self Yes No   Sig: Take 625 mg by mouth daily   Patient not taking: Reported on 11/8/2024   dexamethasone (DECADRON) 4 mg tablet   No No   Sig: Take 1 tablet (4 mg total) by mouth every 6 (six) hours for 14 days   letrozole (FEMARA) 2.5 mg tablet   No No   Sig: Take 1 tablet (2.5 mg total) by mouth daily   naloxone (NARCAN) 4 mg/0.1 mL nasal spray   No No   Sig: Administer 1 spray into a nostril. If no response after 2-3 minutes, give another dose in the other nostril using a new spray.   Patient not taking: Reported on 11/13/2024   ondansetron (ZOFRAN-ODT) 8 mg disintegrating tablet   No No   Sig: Take 1 tablet (8 mg total) by mouth every 8 (eight) hours as needed for nausea or vomiting   oxyCODONE (ROXICODONE) 5 immediate release tablet   No No   Sig: Take 1-2 tablets (5-10 mg total) by  "mouth every 6 (six) hours as needed for moderate pain or severe pain for up to 10 days Max Daily Amount: 40 mg   polyethylene glycol (GLYCOLAX) 17 GM/SCOOP powder   No No   Sig: Take 17 g by mouth 2 (two) times a day      Facility-Administered Medications: None       Portions of the record may have been created with voice recognition software. Occasional wrong word or \"sound a like\" substitutions may have occurred due to the inherent limitations of voice recognition software. Read the chart carefully and recognize, using context, where substitutions have occurred.       ED Course         Critical Care Time  Procedures      "

## 2024-11-21 NOTE — DISCHARGE INSTRUCTIONS
Yuni Keys was seen and evaluated today in the emergency department over your concern of chest pain.  The workup that we performed showed atypical chest pain, no cardiac or pulmonary etiology.  Please return to the emergency department if you experience recurrent chest pain, nausea, vomiting or shortness of breath or any other signs and symptoms that may be concerning to you.  Please follow-up with your primary care doctor within 1 week.  All questions were answered prior to discharge.  Thank you for choosing [facility] for your care.    Follow-up with cardiology.

## 2024-11-21 NOTE — ED PROVIDER NOTES
Time reflects when diagnosis was documented in both MDM as applicable and the Disposition within this note       Time User Action Codes Description Comment    11/21/2024  2:12 AM Sekou Vanegas Add [R07.89] Atypical chest pain           ED Disposition       ED Disposition   Discharge    Condition   Stable    Date/Time   Thu Nov 21, 2024  2:12 AM    Comment   Yuni Keys discharge to home/self care.                   Assessment & Plan       Medical Decision Making  79-year-old female presents emergency department complaining of chest pain    DDx: ACS, dysrhythmia, metabolic or electrolyte abnormality, pneumothorax, pneumonia    Plan: BMP, CBC, troponin, ECG, multimodal pain regimen and likely discharge home    See ED course for further discussion    Chest pain greater than 3 hours.  Chest x-ray unremarkable.  Blood work overall unremarkable.  No cardiac or pulmonary etiology driving chest complaint.  Likely related to cancer.  Will symptomatically treat and discharge home.  Low risk heart score.  Will give patient a cardiologist to follow-up with.    Amount and/or Complexity of Data Reviewed  Labs: ordered. Decision-making details documented in ED Course.  Radiology: ordered and independent interpretation performed. Decision-making details documented in ED Course.    Risk  OTC drugs.  Prescription drug management.        ED Course as of 11/21/24 0634   Wed Nov 20, 2024   2323 Blood Pressure: 127/67   2323 Temperature: 97.7 °F (36.5 °C)   2323 Temp Source: Oral   2323 Pulse: 56   2323 Respirations: 16   2323 SpO2: 96 %   Thu Nov 21, 2024   0002 WBC(!): 3.36   0002 Hemoglobin: 14.1   0002 Hematocrit: 40.5   0002 Platelet Count: 169   0213 X-ray chest 2 views  No acute cardiopulmonary abnormality         Medications   aspirin tablet 325 mg (325 mg Oral Given 11/20/24 2350)   acetaminophen (TYLENOL) tablet 975 mg (975 mg Oral Given 11/20/24 2350)       ED Risk Strat Scores   HEART Risk Score      Flowsheet Row Most  Recent Value   Heart Score Risk Calculator    History 0 Filed at: 11/21/2024 0633   ECG 0 Filed at: 11/21/2024 0633   Age 2 Filed at: 11/21/2024 0633   Risk Factors 1 Filed at: 11/21/2024 0633   Troponin 0 Filed at: 11/21/2024 0633   HEART Score 3 Filed at: 11/21/2024 0633                                                   History of Present Illness       Chief Complaint   Patient presents with    Chest Pain     Substernal chest pain around 8pm 5/10 non radiating. Denies SOB, pt felt anxious during the chest pain and took ativan around 9. Pt has stage 4 breast cancer and had radiation today. Mild chest pain currently.        Past Medical History:   Diagnosis Date    Arthritis     Breast mass     Edema of both lower legs 11/01/2018    Dr. Thang Otero; faxed records.    Hx of skin cancer, basal cell     Hyperlipidemia     Hypertension     Impingement syndrome of left shoulder 04/10/2018    Dr. Thang Otero, faxed patient records.    Migraines     Osteopenia     Overactive bladder     Overweight 12/16/2021    Skin cancer 2022    Basal cell    Toe fracture, left 11/16/2023      Past Surgical History:   Procedure Laterality Date    ACHILLES TENDON REPAIR      BREAST BIOPSY Left 10/04/2024    BREAST CYST EXCISION Bilateral     1972    BREAST LUMPECTOMY      CATARACT EXTRACTION, BILATERAL      COLONOSCOPY      COLPOPEXY VAGINAL EXTRAPERITONEAL (VEC) WITH INSERTION PUBOVAGINAL SLING      CYST REMOVAL      HERNIA REPAIR      HYSTERECTOMY      IR KYPHOPLASTY/VERTEBROPLASTY WITH ABLATION  10/15/2024    JOINT REPLACEMENT Right     REPLACEMENT TOTAL KNEE Left     TONSILLECTOMY  1949    US GUIDANCE BREAST BIOPSY LEFT EACH ADDITIONAL Left 10/04/2024    US GUIDED BREAST BIOPSY LEFT COMPLETE Left 10/04/2024      Family History   Problem Relation Age of Onset    Heart disease Father     Melanoma Daughter         40s    Melanoma Daughter         50s    Ovarian cancer Maternal Aunt     Breast cancer Paternal Aunt     Breast  cancer Maternal Grandmother     Heart disease Paternal Grandmother     Hypertension Paternal Grandfather       Social History     Tobacco Use    Smoking status: Former     Current packs/day: 0.00     Average packs/day: 0.5 packs/day for 15.0 years (7.5 ttl pk-yrs)     Types: Cigarettes     Quit date: 1977     Years since quittin.0     Passive exposure: Past    Smokeless tobacco: Never   Vaping Use    Vaping status: Never Used   Substance Use Topics    Alcohol use: Yes     Alcohol/week: 1.0 standard drink of alcohol     Types: 1 Glasses of wine per week     Comment: occasional    Drug use: Never      E-Cigarette/Vaping    E-Cigarette Use Never User       E-Cigarette/Vaping Substances    Nicotine No     THC No     CBD No     Flavoring No     Other No     Unknown No       I have reviewed and agree with the history as documented.     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  Chest pain is located in the middle of the chest.  Does not radiate.  No nausea or vomiting.  No loss of consciousness.  She states the last time she had radiation, she experienced chest pain as a result.  She mentions she had radiation therapy today.  No shortness of breath.  No hypoxia.  No history of blood clot.        Review of Systems   Cardiovascular:  Positive for chest pain.   All other systems reviewed and are negative.          Objective       ED Triage Vitals   Temperature Pulse Blood Pressure Respirations SpO2 Patient Position - Orthostatic VS   24   97.7 °F (36.5 °C) 56 127/67 16 96 % Lying      Temp Source Heart Rate Source BP Location FiO2 (%) Pain Score    24 -- 24 2350    Oral Monitor Right arm  3      Vitals      Date and Time Temp Pulse SpO2 Resp BP Pain Score FACES Pain Rating User    11/21/24 0200 -- 52 97 % 18 125/58 -- -- MS   11/21/24 0100 -- 52 97 % 18 108/57 -- -- MS   11/21/24 0000 -- 54 -- 18 127/73 -- -- MS   11/20/24 2350 -- -- -- -- -- 3 -- MS   11/20/24 2317 97.7 °F (36.5 °C) 56 96 % 16 127/67 -- -- MS            Physical Exam  Vitals and nursing note reviewed.   Constitutional:       General: She is not in acute distress.     Appearance: She is well-developed.   HENT:      Head: Normocephalic and atraumatic.   Eyes:      Conjunctiva/sclera: Conjunctivae normal.   Cardiovascular:      Rate and Rhythm: Normal rate and regular rhythm.      Heart sounds: No murmur heard.  Pulmonary:      Effort: Pulmonary effort is normal. No respiratory distress.      Breath sounds: Normal breath sounds.   Abdominal:      Palpations: Abdomen is soft.      Tenderness: There is no abdominal tenderness.   Musculoskeletal:         General: No swelling.      Cervical back: Neck supple.   Skin:     General: Skin is warm and dry.      Capillary Refill: Capillary refill takes less than 2 seconds.   Neurological:      General: No focal deficit present.      Mental Status: She is alert and oriented to person, place, and time. Mental status is at baseline.      GCS: GCS eye subscore is 4. GCS verbal subscore is 5. GCS motor subscore is 6.      Cranial Nerves: No cranial nerve deficit.      Sensory: No sensory deficit.      Motor: No weakness.      Coordination: Coordination normal.      Gait: Gait normal.   Psychiatric:         Mood and Affect: Mood normal.         Results Reviewed       Procedure Component Value Units Date/Time    HS Troponin 0hr (reflex protocol) [[id number]]  (Normal) Collected: 11/20/24 2344    Lab Status: Final result Specimen: Blood from Arm, Left Updated: 11/21/24 0018     hs TnI 0hr 4 ng/L     Basic metabolic panel [[id number]]  (Abnormal) Collected: 11/20/24 2344    Lab Status: Final result Specimen: Blood from Arm, Left Updated: 11/21/24 0015     Sodium 138 mmol/L      Potassium 3.6  mmol/L      Chloride 103 mmol/L      CO2 28 mmol/L      ANION GAP 7 mmol/L      BUN 16 mg/dL      Creatinine 0.53 mg/dL      Glucose 94 mg/dL      Calcium 7.5 mg/dL      eGFR 90 ml/min/1.73sq m     Narrative:      [employer] Kid[employer] guidelines for Chronic Kidney Disease (CKD):     Stage 1 with normal or high GFR (GFR > 90 mL/min/1.73 square meters)    Stage 2 Mild CKD (GFR = 60-89 mL/min/1.73 square meters)    Stage 3A Moderate CKD (GFR = 45-59 mL/min/1.73 square meters)    Stage 3B Moderate CKD (GFR = 30-44 mL/min/1.73 square meters)    Stage 4 Severe CKD (GFR = 15-29 mL/min/1.73 square meters)    Stage 5 End Stage CKD (GFR <15 mL/min/1.73 square meters)  Note: GFR calculation is accurate only with a steady state creatinine    CBC and differential [[id number]]  (Abnormal) Collected: 11/20/24 2344    Lab Status: Final result Specimen: Blood from Arm, Left Updated: 11/20/24 2358     WBC 3.36 Thousand/uL      RBC 4.46 Million/uL      Hemoglobin 14.1 g/dL      Hematocrit 40.5 %      MCV 91 fL      MCH 31.6 pg      MCHC 34.8 g/dL      RDW 16.3 %      MPV 9.0 fL      Platelets 169 Thousands/uL      nRBC 0 /100 WBCs      Segmented % 87 %      Immature Grans % 1 %      Lymphocytes % 7 %      Monocytes % 4 %      Eosinophils Relative 1 %      Basophils Relative 0 %      Absolute Neutrophils 2.94 Thousands/µL      Absolute Immature Grans 0.02 Thousand/uL      Absolute Lymphocytes 0.24 Thousands/µL      Absolute Monocytes 0.14 Thousand/µL      Eosinophils Absolute 0.02 Thousand/µL      Basophils Absolute 0.00 Thousands/µL             X-ray chest 2 views   ED Interpretation by Sekou Vanegas DO (11/21 0213)   No acute cardiopulmonary abnormality          ECG 12 Lead Documentation Only    Date/Time: 11/20/2024 11:57 PM    Performed by: Sekou Vanegas DO  Authorized by: Sekou Vanegas DO    Indications / Diagnosis:  Chest pain  ECG reviewed by me, the ED Provider: yes    Patient location:  ED  Previous  ECG:     Previous ECG:  Compared to current    Similarity:  No change    Comparison to cardiac monitor: Yes    Interpretation:     Interpretation: normal    Rate:     ECG rate:  55    ECG rate assessment: bradycardic    Rhythm:     Rhythm: sinus rhythm and sinus bradycardia    Ectopy:     Ectopy: none    QRS:     QRS axis:  Normal    QRS intervals:  Normal  Conduction:     Conduction: normal    ST segments:     ST segments:  Normal  T waves:     T waves: normal        ED Medication and Procedure Management   Prior to Admission Medications   Prescriptions Last Dose Informant Patient Reported? Taking?   Cholecalciferol (Vitamin D) 50 MCG (2000 UT) tablet  Self Yes No   Sig: Take 2,000 Units by mouth daily   Multiple Vitamins-Minerals (MULTIVITAMIN WITH MINERALS) tablet  Self Yes No   Sig: Take 1 tablet by mouth daily   Ribociclib Succ, 600 MG Dose, (Kisqali, 600 MG Dose,) 200 MG TBPK   No No   Sig: Take 600 mg by mouth daily 21 days on, followed by 7 days off   Sodium Fluoride 5000 PPM 1.1 % PSTE  Self Yes No   Sig: APPLY TIHN RIBBON TO TOOTHBRUSH, BRUSH X 2MIN AT BEDTIME, SPIT, DONT RINSE   atorvastatin (LIPITOR) 10 mg tablet  Self No No   Sig: Take 1 tablet (10 mg total) by mouth daily   baclofen 10 mg tablet   No No   Sig: Take 1 tablet (10 mg total) by mouth 3 (three) times a day as needed for muscle spasms   calcium polycarbophil (FIBERCON) 625 mg tablet  Self Yes No   Sig: Take 625 mg by mouth daily   Patient not taking: Reported on 11/8/2024   dexamethasone (DECADRON) 4 mg tablet   No No   Sig: Take 1 tablet (4 mg total) by mouth every 6 (six) hours for 14 days   letrozole (FEMARA) 2.5 mg tablet   No No   Sig: Take 1 tablet (2.5 mg total) by mouth daily   naloxone (NARCAN) 4 mg/0.1 mL nasal spray   No No   Sig: Administer 1 spray into a nostril. If no response after 2-3 minutes, give another dose in the other nostril using a new spray.   Patient not taking: Reported on 11/13/2024   ondansetron (ZOFRAN-ODT) 8 mg  disintegrating tablet   No No   Sig: Take 1 tablet (8 mg total) by mouth every 8 (eight) hours as needed for nausea or vomiting   oxyCODONE (ROXICODONE) 5 immediate release tablet   No No   Sig: Take 1-2 tablets (5-10 mg total) by mouth every 6 (six) hours as needed for moderate pain or severe pain for up to 10 days Max Daily Amount: 40 mg   polyethylene glycol (GLYCOLAX) 17 GM/SCOOP powder   No No   Sig: Take 17 g by mouth 2 (two) times a day      Facility-Administered Medications: None     Discharge Medication List as of 11/21/2024  2:14 AM        CONTINUE these medications which have NOT CHANGED    Details   atorvastatin (LIPITOR) 10 mg tablet Take 1 tablet (10 mg total) by mouth daily, Starting Tue 5/7/2024, Normal      baclofen 10 mg tablet Take 1 tablet (10 mg total) by mouth 3 (three) times a day as needed for muscle spasms, Starting Wed 11/13/2024, Normal      calcium polycarbophil (FIBERCON) 625 mg tablet Take 625 mg by mouth daily, Historical Med      Cholecalciferol (Vitamin D) 50 MCG (2000 UT) tablet Take 2,000 Units by mouth daily, Historical Med      dexamethasone (DECADRON) 4 mg tablet Take 1 tablet (4 mg total) by mouth every 6 (six) hours for 14 days, Starting Mon 11/11/2024, Until Mon 11/25/2024, Normal      letrozole (FEMARA) 2.5 mg tablet Take 1 tablet (2.5 mg total) by mouth daily, Starting Tue 10/22/2024, Normal      Multiple Vitamins-Minerals (MULTIVITAMIN WITH MINERALS) tablet Take 1 tablet by mouth daily, Historical Med      naloxone (NARCAN) 4 mg/0.1 mL nasal spray Administer 1 spray into a nostril. If no response after 2-3 minutes, give another dose in the other nostril using a new spray., Normal      ondansetron (ZOFRAN-ODT) 8 mg disintegrating tablet Take 1 tablet (8 mg total) by mouth every 8 (eight) hours as needed for nausea or vomiting, Starting Tue 10/22/2024, Normal      oxyCODONE (ROXICODONE) 5 immediate release tablet Take 1-2 tablets (5-10 mg total) by mouth every 6 (six) hours  as needed for moderate pain or severe pain for up to 10 days Max Daily Amount: 40 mg, Starting Mon 11/11/2024, Until Thu 11/21/2024 at 2359, Normal      polyethylene glycol (GLYCOLAX) 17 GM/SCOOP powder Take 17 g by mouth 2 (two) times a day, Starting Fri 11/8/2024, No Print      Ribociclib Succ, 600 MG Dose, (Kisqali, 600 MG Dose,) 200 MG TBPK Take 600 mg by mouth daily 21 days on, followed by 7 days off, Starting Tue 10/22/2024, Normal      Sodium Fluoride 5000 PPM 1.1 % PSTE APPLY TIHN RIBBON TO TOOTHBRUSH, BRUSH X 2MIN AT BEDTIME, SPIT, DONT RINSE, Historical Med             ED SEPSIS DOCUMENTATION   Time reflects when diagnosis was documented in both MDM as applicable and the Disposition within this note       Time User Action Codes Description Comment    11/21/2024  2:12 AM Sekou Vanegas Add [R07.89] Atypical chest pain                  Sekou Vanegas,   11/21/24 0634

## 2024-11-25 ENCOUNTER — TELEPHONE (OUTPATIENT)
Age: 79
End: 2024-11-25

## 2024-11-25 ENCOUNTER — TELEPHONE (OUTPATIENT)
Dept: CARDIOLOGY CLINIC | Facility: CLINIC | Age: 79
End: 2024-11-25

## 2024-11-25 DIAGNOSIS — R13.10 DYSPHAGIA, UNSPECIFIED TYPE: Primary | ICD-10-CM

## 2024-11-25 NOTE — TELEPHONE ENCOUNTER
Reviewed with Dr. Siddiqi. Radiation treatment field included lower esophagus but not throat. Could definitely have some dysphagia. Will prescribe Magic mouthwash for patient. She has follow up with Dr. Siddiqi on 12/19/24.

## 2024-11-25 NOTE — TELEPHONE ENCOUNTER
"Received the following message from patient...    Hello,     I have trouble swallowing and am barely drinking.  It feels like I ate something too large or too hot. I am supposed to take calcium, but have trouble taking those pills even cutting them in quarters.  I am eating a little, but still losing a lot of weight.  I'm assuming those issues are the result of radiation, but is there something to ease the pain in the esophagus (?)?  I haven't taken any opioids since I sleep a great majority of the day already and not sure I can swallow Miralax.   Thanks for your help  Alley Keys    Called and spoke with patient.  Patient stated that for the past X5 days, she has been experiencing difficulty when swallowing.  Patient described it as \"swallowing something hard\".  Patient stated that throat only hurts when swallowing.  Patient does not feel that it is reflux, as she denies any burning feeling.  Patient stated that she has not been able to drink anything, but continues to try.  Please advise and call patient with any further recommendations.  Included radiation oncology in message as well.    "

## 2024-11-25 NOTE — TELEPHONE ENCOUNTER
Called pt to schedule New Patient cardio oncology hospital follow up from ED visit. Pt declined stating she spoke with oncologist and is declining appt.

## 2024-11-25 NOTE — TELEPHONE ENCOUNTER
Patient aware that prescription was sent to the pharmacy. Did instruct her to call back if medication not effective or if symptoms worsen.

## 2024-11-26 ENCOUNTER — NUTRITION (OUTPATIENT)
Dept: NUTRITION | Facility: HOSPITAL | Age: 79
End: 2024-11-26

## 2024-11-26 DIAGNOSIS — Z71.3 NUTRITIONAL COUNSELING: Primary | ICD-10-CM

## 2024-11-26 RX ORDER — DIPHENHYDRAMINE HYDROCHLORIDE AND LIDOCAINE HYDROCHLORIDE AND ALUMINUM HYDROXIDE AND MAGNESIUM HYDRO
10 KIT EVERY 4 HOURS PRN
Qty: 237 ML | Refills: 1 | Status: SHIPPED | OUTPATIENT
Start: 2024-11-26 | End: 2024-12-02 | Stop reason: ALTCHOICE

## 2024-11-26 NOTE — PROGRESS NOTES
Outpatient Oncology Nutrition Consultation   Type of Consult: Follow Up  Care Location: Telephone Call    Reason for referral: Received notification by  Claire Downs RN  on 10/23 that pt has triggered for oncology nutrition care (reason for referral: Ambulatory referral for oncology nutrition services ).     Nutrition Assessment:   Oncology Diagnosis & Treatments:   2024 Stage IV ER+ breast cancer, metastatic to bone   10/22/24 Dr. Amanda- Plan for Letrozole + Ribociclib   - palliative RT to spine and shoulder  Oncology History Overview Note   With metastatic breast cancer. She completed radiation to right shoulder, T11-L2 and T7-T10 spine on 24. She is being contacted today for first follow-up.    24 ED visit for chest pain. Likely related to cancer. D/c home    24 Dr. Amanda- Continue Letrozole 2.5 mg daily and ribociclib 600 mg daily 21 days on and 7 days off. Recommend calcium. Can use Keytruda down the road due to TMB high.. DEXA due 2025. Continue Prolia every 6 months. Imaging scheduled 2025.    24 palliative care      Upcomin24 Dr. Amanda  25 CT C/A/P  25 Dr. Amanda     Breast cancer metastasized to bone, right (HCC)   2024 Initial Diagnosis    Breast cancer metastasized to bone, right (HCC)     10/12/2024 -  Cancer Staged    Staging form: Breast, AJCC 8th Edition  - Clinical: Stage IV (cM1) - Signed by Fam Amanda MD on 10/12/2024       2024 - 2024 Radiation      Plan ID Energy Fractions Dose per Fraction (cGy) Dose Correction (cGy) Total Dose Delivered (cGy) Elapsed Days   RT Shoulder 6X 5 /  400 0 2,000 7   T11_L2 Spine 10X/6X 10 / 10 300 0 3,000 15   T7_T10 Spine 6X 10 / 10 300 0 3,000 15         Malignant neoplasm of upper-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, estrogen receptor positive (HCC)   10/2/2024 Observation    NM PET/CT IMPRESSION:   1. Mild focal radiotracer uptake at a left breast  nodular density medially. Underlying primary breast malignancy should be excluded.  2. Several small FDG avid left axillary/subpectoral lymph nodes would raise suspicion for metastasis.  3. Scattered FDG avid lytic lesions compatible with metastasis.     10/4/2024 Biopsy    Left breast ultrasound-guided biopsy  A. 3 o'clock, periareolar  Benign breast tissue with fibroadenomatoid nodule    B. 11 o'clock, 7 cm from nipple  Invasive mammary carcinoma with mixed ductal and lobular features  Grade 1-2  ER , OR <1, HER2 1+  Lymphovascular invasion not definitively identified    Concordant. Malignancy is poorly defined on mammo, appearing as a developing asymmetry rather than a discrete mass; this measures up to 5.8 cm. On US, mass measures up to 4.1cm. Subtle asymmetry with possible distortion slightly superior and medial which could represent a second site of disease. Further characterization is indication. Right breast imaging performed on 4/12/2024; more recent mammogram may be reasonable. CESM or MRI is recommended for evaluation of extent of disease in left breast. The patient had abnormal axillary lymph nodes seen on PET/CT. 1 of these lymph nodes was visualized on US; however, it is not amenable to US-guided core needle biopsy.     10/12/2024 -  Cancer Staged    Staging form: Breast, AJCC 8th Edition  - Clinical: Stage IV (cM1) - Signed by Fam Amanda MD on 10/12/2024       10/15/2024 Biopsy    IR-guided biopsy    T8 - metastatic carcinoma, most compatible with known breast primary  ~RECEPTORS PENDING~    L1 - negative for carcinoma     11/5/2024 - 11/20/2024 Radiation      Plan ID Energy Fractions Dose per Fraction (cGy) Dose Correction (cGy) Total Dose Delivered (cGy) Elapsed Days   RT Shoulder 6X 5 / 5 400 0 2,000 7   T11_L2 Spine 10X/6X 10 / 10 300 0 3,000 15   T7_T10 Spine 6X 10 / 10 300 0 3,000 15           Past Medical & Surgical Hx:   Patient Active Problem List   Diagnosis    Lumbar disc  herniation    Lumbar radiculopathy    Spinal stenosis of lumbar region with neurogenic claudication    Retrocalcaneal bursitis (back of heel), right    Hyperlipidemia    MCI (mild cognitive impairment)    Family history of early CAD    History of abnormal uterine bleeding    History of hysterectomy    Primary insomnia    Compression fracture of L1 lumbar vertebra (HCC)    Closed compression fracture of body of L1 vertebra (HCC)    Breast cancer metastasized to bone, right (HCC)    Abnormal positron emission tomography (PET) scan    Malignant neoplasm of upper-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, estrogen receptor positive (HCC)    Age-related osteoporosis with current pathological fracture of vertebra (HCC)    Pathological fracture in neoplastic disease, other specified site, sequela    Depression, recurrent (HCC)    Constipation    Bilateral hydronephrosis    Cancer related pain    Palliative care by specialist     Past Medical History:   Diagnosis Date    Arthritis     Breast mass     Edema of both lower legs 11/01/2018    Dr. Thang Otero; faxed records.    Hx of skin cancer, basal cell     Hyperlipidemia     Hypertension     Impingement syndrome of left shoulder 04/10/2018    Dr. Thang Otero, faxed patient records.    Migraines     Osteopenia     Overactive bladder     Overweight 12/16/2021    Skin cancer 2022    Basal cell    Toe fracture, left 11/16/2023     Past Surgical History:   Procedure Laterality Date    ACHILLES TENDON REPAIR      BREAST BIOPSY Left 10/04/2024    BREAST CYST EXCISION Bilateral     1972    BREAST LUMPECTOMY      CATARACT EXTRACTION, BILATERAL      COLONOSCOPY      COLPOPEXY VAGINAL EXTRAPERITONEAL (VEC) WITH INSERTION PUBOVAGINAL SLING      CYST REMOVAL      HERNIA REPAIR      HYSTERECTOMY      IR KYPHOPLASTY/VERTEBROPLASTY WITH ABLATION  10/15/2024    JOINT REPLACEMENT Right     REPLACEMENT TOTAL KNEE Left     TONSILLECTOMY  1949    US GUIDANCE BREAST BIOPSY LEFT EACH  ADDITIONAL Left 10/04/2024    US GUIDED BREAST BIOPSY LEFT COMPLETE Left 10/04/2024       Review of Medications:   Vitamins, Supplements and Herbals: Med List Reviewed & pt is only taking: Multivitamin, Vitamin D, Calcium Citrate 12,00mg (started yesterday) taking Citrical petites and difficult swallowing    Current Outpatient Medications:     atorvastatin (LIPITOR) 10 mg tablet, Take 1 tablet (10 mg total) by mouth daily, Disp: 90 tablet, Rfl: 3    baclofen 10 mg tablet, Take 1 tablet (10 mg total) by mouth 3 (three) times a day as needed for muscle spasms, Disp: 30 tablet, Rfl: 0    calcium polycarbophil (FIBERCON) 625 mg tablet, Take 625 mg by mouth daily (Patient not taking: Reported on 11/8/2024), Disp: , Rfl:     Cholecalciferol (Vitamin D) 50 MCG (2000 UT) tablet, Take 2,000 Units by mouth daily, Disp: , Rfl:     diphenhydramine, lidocaine, Al/Mg hydroxide, simethicone (Magic Mouthwash) SUSP, Swish and swallow 10 mL every 4 (four) hours as needed for mouth pain or discomfort, Disp: 237 mL, Rfl: 1    letrozole (FEMARA) 2.5 mg tablet, Take 1 tablet (2.5 mg total) by mouth daily, Disp: 90 tablet, Rfl: 3    Multiple Vitamins-Minerals (MULTIVITAMIN WITH MINERALS) tablet, Take 1 tablet by mouth daily, Disp: , Rfl:     naloxone (NARCAN) 4 mg/0.1 mL nasal spray, Administer 1 spray into a nostril. If no response after 2-3 minutes, give another dose in the other nostril using a new spray. (Patient not taking: Reported on 11/13/2024), Disp: 1 each, Rfl: 1    ondansetron (ZOFRAN-ODT) 8 mg disintegrating tablet, Take 1 tablet (8 mg total) by mouth every 8 (eight) hours as needed for nausea or vomiting, Disp: 20 tablet, Rfl: 0    polyethylene glycol (GLYCOLAX) 17 GM/SCOOP powder, Take 17 g by mouth 2 (two) times a day, Disp: , Rfl:     Ribociclib Succ, 600 MG Dose, (Kisqali, 600 MG Dose,) 200 MG TBPK, Take 600 mg by mouth daily 21 days on, followed by 7 days off, Disp: 63 each, Rfl: 5    Sodium Fluoride 5000 PPM 1.1 %  "PSTE, APPLY TIHN RIBBON TO TOOTHBRUSH, BRUSH X 2MIN AT BEDTIME, SPIT, DONT RINSE, Disp: , Rfl:     Most Recent Lab Results:   Lab Results   Component Value Date    WBC 3.36 (L) 11/20/2024    NEUTROABS 2.94 11/20/2024    CHOLESTEROL 149 09/26/2024    TRIG 74 09/26/2024    HDL 72 09/26/2024    LDLCALC 62 09/26/2024    ALT 26 11/20/2024    AST 18 11/20/2024    ALB 3.9 11/20/2024    SODIUM 138 11/20/2024    SODIUM 139 11/20/2024    K 3.6 11/20/2024    K 4.1 11/20/2024     11/20/2024    BUN 16 11/20/2024    BUN 19 11/20/2024    CREATININE 0.53 (L) 11/20/2024    CREATININE 0.61 11/20/2024    EGFR 90 11/20/2024    POCGLU 84 10/02/2024    GLUF 84 09/26/2024    GLUF 96 04/29/2024    GLUC 94 11/20/2024    HGBA1C 5.1 10/17/2022    CALCIUM 7.5 (L) 11/20/2024    MG 2.0 11/09/2024       Anthropometric Measurements:   Height: 58\"  Ht Readings from Last 1 Encounters:   11/21/24 4' 10\" (1.473 m)     Wt Readings from Last 25 Encounters:   11/21/24 50.6 kg (111 lb 8 oz)   11/13/24 52.9 kg (116 lb 10 oz)   11/12/24 52.6 kg (115 lb 15.4 oz)   11/09/24 52.8 kg (116 lb 6.5 oz)   11/10/24 52.6 kg (116 lb)   11/08/24 53.5 kg (118 lb)   11/01/24 53.1 kg (117 lb)   10/28/24 53.4 kg (117 lb 11.6 oz)   10/24/24 53.3 kg (117 lb 9.6 oz)   10/22/24 53.8 kg (118 lb 8 oz)   10/15/24 51.7 kg (114 lb)   10/14/24 52.4 kg (115 lb 9.6 oz)   10/10/24 53.5 kg (118 lb)   10/08/24 54.4 kg (120 lb)   10/04/24 54.4 kg (120 lb)   10/03/24 54.4 kg (120 lb)   10/01/24 54.4 kg (120 lb)   09/27/24 53.5 kg (118 lb)   09/21/24 55.8 kg (123 lb)   09/16/24 55.8 kg (123 lb)   09/06/24 55.8 kg (123 lb 1.6 oz)   09/03/24 57.6 kg (127 lb)   08/30/24 57.6 kg (127 lb)   08/22/24 56 kg (123 lb 6.4 oz)   07/24/24 54.7 kg (120 lb 8 oz)       Weight History:   Usual Weight: Lost 50# about 2 years ago intentionally, worked with weight management team  Varian: 11/18: 112#  Home Scale: 107# 11/26 no clothes    Oncology Nutrition-Anthropometrics      Flowsheet Row Nutrition " from 11/26/2024 in [facility] Diet[facility] Nutrition from 10/29/2024 in [facility] Oncology [facility]   Patient [age] (years): 79 years 78 years   Patient (female) height (in): 58 in 58 in   Current Weight to be used for anthropometric calculations (lbs) 111.5 lbs 117.7 lbs   Current Weight to be used for anthropometric calculations (kg) 50.7 kg 53.5 kg   BMI: 23.3 24.6   IBW female: 90 lbs 90 lbs   IBW (kg) female: 40.9 kg 40.9 kg   IBW % (female) 123.9 % 130.8 %   Adjusted BW (female): 95.4 lbs 96.9 lbs   Adjusted BW kg (female): 43.4 kg 44 kg   % weight change after 1 week: -4.5 % -0.7 %   Weight change after 1 week (lbs) -5.2 lbs -0.8 lbs   % weight change after 1 month: -5.9 % -0.3 %   Weight change after 1 month (lbs) -7 lbs -0.3 lbs   % weight change after 3 months: -9.6 % -2.3 %   Weight change after 3 months (lbs) -11.9 lbs -2.8 lbs            Nutrition-Focused Physical Findings: none observed    Food/Nutrition-Related History & Client/Social History:    Current Nutrition Impact Symptoms:  [] Nausea  [] Reduced Appetite  [] Acid Reflux    [] Vomiting  [x] Unintended Wt Loss  [] Malabsorption    [] Diarrhea  [] Unintended Wt Gain  [] Dumping Syndrome    [] Constipation - managed w/ sennakot; opoid induced  [] Thick Mucous/Secretions  [] Abdominal Pain    [] Dysgeusia (Altered Taste)  [] Xerostomia (Dry Mouth)  [] Gas    [] Dysosmia (Altered Smell)  [] Thrush  [] Difficulty Chewing    [] Oral Mucositis (Sore Mouth)  [] Fatigue  [] Hyperglycemia   Lab Results   Component Value Date    HGBA1C 5.1 10/17/2022      [] Odynophagia  [] Esophagitis  [] Other:    [x] Dysphagia - mostly liquids   Thicker easier  [] Early Satiety  [] No Problems Eating      Food Allergies & Intolerances: yes: lactose intolerance      Current Diet: Regular Diet, No Restrictions  Current Nutrition Intake: Less than usual   Appetite: Good, Fair   Nutrition Route: PO  Oral Care:  brushes daily  Activity level:  limited d/t pain     24 Hr Diet Recall:   Breakfast: oatmeal with [employer] milk w/ cocoa or [place] pancakes w/ syrup  Lunch: chicken salad  Snack: rice pudding or guacamole  Dinner: whole stouffers mac and cheese    Beverages: water, Fairlife milk (fat free)  Supplements:   Dislikes      Oncology Nutrition-Estimated Needs      Flowsheet Row Nutrition from 10/29/2024 in [facility] Oncology Dietitian [place]   Weight type used Actual weight   Weight in kilograms (kg) used for estimated needs 53.5 kg   Energy needs formula:  25-30 kcal/kg   Energy needs based on 25 kcal/k kcal   Energy needs based on 30 kcal/k kcal   Protein needs formula: 1.2-1.5 g/kg   Protein needs based on 1.2 g/k g   Protein needs based on 1.5 g/kg 80 g   Fluid needs formula: 25-30 mL/kg   Fluid needs based on 25 mL/kg 1350 mL   Fluid needs in ounces 46 oz   Fluid needs based on 30 mL/kg 1620 mL   Fluid needs in ounces 55 oz             Discussion & Intervention:   Yuni was evaluated today for an RD follow up regarding  ambulatory referral for oncology .  Yuni is planned to undergo tx for metastatic breast cancer . RD spoke to patient over the phone today. Pt reports she is very tired. She finished RT a few days ago. She is experiencing some difficulty swallowing. She reports it is mostly with liquids and water. She reports she is still trying to eat. She denies pain, reflux, or regurgitation but feels like there is something stuck/globus sensation. Pt was prescribed magic mouthwash per team. She reports that thicker liquids have been better such as hot chocolate. We reviewed additional ideas for high calorie liquids. Pt also reports she is also losing weight. We reviewed high calorie foods and supplement options.   Reviewed 24 hour recall, which revealed an suboptimal po intake, and discussed ways to increase kcal, protein, and fluid intakes and optimize nutrient intake.  Also reviewed the importance of wt management  throughout the tx process and the role of a high kcal/ high protein diet in managing wt and overall health.  Based on today's assessment, discussion included: MNT for: dysphagia, weight loss, how to modify foods for anticipated nutrition impact symptoms pt may experience during CA tx, a high kcal/protein diet & food choices to include at all meals & snacks (Examples of high kcal foods: cheese, full-fat dairy products, nut butter, plant-based fats, coconut oil/milk, avocado, butter, cream soup, etc. Examples of high protein foods: eggs, chicken, fish, beans/legumes, nuts/nut butters, bone broth, etc.) , fortifying foods for added kcal and protein (examples include: adding cheese to foods such as eggs, mashed potatoes, casseroles, etc.; Making oatmeal with whole milk rather than water; Making fortified mashed potatoes with cream, butter, dry milk powder, plain Greek yogurt, and cheese.), eating smaller more frequent meals every 2-3 hours (5-6 small meals/day), utilizing oral nutrition supplements, and adding extra fat to foods while cooking such as butter, plant-based oil, coconut oil/milk, avocado, nut butters, etc..   Moving forward, Yuni was encouraged to increase kcal, protein, and fluid intakes .  Materials Provided: not applicable  All questions and concerns addressed during today’s visit.  Yuni has RD contact information.    Nutrition Diagnosis:   Inadequate Energy Intake related to physiological causes, disease state and treatment related issues as evidenced by food recall, wt loss and discussion with pt and/or family.  Increased Nutrient Needs (kcal & pro) related to increased demand for nutrients and disease state as evidenced by cancer dx and pt undergoing tx for cancer.  Monitoring & Evaluation:   Goals:  weight maintenance/stabilization  adequate nutrition impact symptom management  pt to meet >/=75% estimated nutrition needs daily    Progress Towards Goals: Progressing    Nutrition Rx &  "Recommendations:  Diet: High Calorie, High Protein (for high calorie foods see pages 52-53, and for high protein foods see pages 49-51 in your Eating Hints book)  Small, frequent meals/snacks may be easier to tolerate than 3 large daily meals.  Aim for 5-6 small meals per day (every 2-3 hours).  Include protein at all meals/snacks.  Liquid nutrition may be better tolerated than solids at times.  Alter food choices and eating patterns to accommodate changing needs.  Refer to Eating Hints book for other meal/snack ideas and symptom management.  For weight loss: monitor your weight at home at least 2x/week, record your weight, start by adding 250-500 extra calories per day, eat 5-6 small scheduled meals every 2-3 hrs, choose foods that are high in protein and calories (see pages 49-53 in your Eating Hints book), drink liquids with calories for example: milkshakes/smoothies/juice/soup/whole milk/chocolate milk, cook with protein fortified milk (see recipe on page 36 in your Eating Hints book), consider ready-to-drink oral nutrition supplements such as Ensure Plus, Ensure Enlive, Boost Plus, or Boost Very High Calorie, avoid \"diet\" and \"light\" foods when possible, avoid drinking too much with meals, contact your dietitian with any continued weight loss over the course of 1 week.  For more info see pages 35-37 in your Eating Hints book.  For trouble swallowing: eat 5-6 small meals/day; choose foods that are easy to swallow; choose foods that are high in protein & calories; cook foods until they are soft/tender; cut food into small pieces; moisten & soften foods (gravy/sauce/broth/yogurt); sip drinks through a straw (as tolerated); avoid foods/drinks that can burn/scrape your throat (hot temperatures, spicy foods, acidic foods, sharp/hard/crunchy foods, alcohol); talk to your doctor about a Speech Therapy referral for a swallowing evaluation (see page 26-28 in your Eating Hints book).  Swap fat free milk for 2% and " eventually whole milk  Try [employer] brand protein shakes- nutrition plan or corepower. If tolerated, include once daily. Can mix w/ ice cream if prefer cold.   High calorie thick liquids: Juice nectar, Lactose free milk or egg nog, Milkshake, drinkable yogurt or smoothie; can dunk cookies, muffins, etc to soften.  Can swap calcium citrate pills for chewable option I.e Celebrate, Barimelts  High calorie soft foods: peanut butter, avocado, pudding, greek yogurt, egg custard or egg salad, moistened tuna/salmon     Follow Up Plan: PRN per patient request  Recommend Referral to Other Providers: none at this time

## 2024-11-26 NOTE — PATIENT INSTRUCTIONS
"Nutrition Rx & Recommendations:  Diet: High Calorie, High Protein (for high calorie foods see pages 52-53, and for high protein foods see pages 49-51 in your Eating Hints book)  Small, frequent meals/snacks may be easier to tolerate than 3 large daily meals.  Aim for 5-6 small meals per day (every 2-3 hours).  Include protein at all meals/snacks.  Liquid nutrition may be better tolerated than solids at times.  Alter food choices and eating patterns to accommodate changing needs.  Refer to Eating Hints book for other meal/snack ideas and symptom management.  For weight loss: monitor your weight at home at least 2x/week, record your weight, start by adding 250-500 extra calories per day, eat 5-6 small scheduled meals every 2-3 hrs, choose foods that are high in protein and calories (see pages 49-53 in your Eating Hints book), drink liquids with calories for example: milkshakes/smoothies/juice/soup/whole milk/chocolate milk, cook with protein fortified milk (see recipe on page 36 in your Eating Hints book), consider ready-to-drink oral nutrition supplements such as Ensure Plus, Ensure Enlive, Boost Plus, or Boost Very High Calorie, avoid \"diet\" and \"light\" foods when possible, avoid drinking too much with meals, contact your dietitian with any continued weight loss over the course of 1 week.  For more info see pages 35-37 in your Eating Hints book.  For trouble swallowing: eat 5-6 small meals/day; choose foods that are easy to swallow; choose foods that are high in protein & calories; cook foods until they are soft/tender; cut food into small pieces; moisten & soften foods (gravy/sauce/broth/yogurt); sip drinks through a straw (as tolerated); avoid foods/drinks that can burn/scrape your throat (hot temperatures, spicy foods, acidic foods, sharp/hard/crunchy foods, alcohol); talk to your doctor about a Speech Therapy referral for a swallowing evaluation (see page 26-28 in your Eating Hints book).  Swap fat free milk for " 2% and eventually whole milk  Try [employer] brand protein shakes- nutrition plan or corepower. If tolerated, include once daily. Can mix w/ ice cream if prefer cold.   High calorie thick liquids: Juice nectar, Lactose free milk or egg nog, Milkshake, drinkable yogurt or smoothie; can dunk cookies, muffins, etc to soften.  Can swap calcium citrate pills for chewable option I.e Celebrate, Barimelts  High calorie soft foods: peanut butter, avocado, pudding, greek yogurt, egg custard or egg salad, moistened tuna/salmon     Follow Up Plan: PRN per patient request  Recommend Referral to Other Providers: none at this time

## 2024-11-27 NOTE — PROGRESS NOTES
Daily Note     Today's date: 10/14/2019  Patient name: Bernadette Bowens  : 1945  MRN: [id number]  Referring provider: Cathie Butts MD  Dx:   Encounter Diagnosis     ICD-10-CM    1  Primary localized osteoarthritis of right knee M17 11                   Subjective:  Pt reports that she is walking better  Min less knee pain  Was able to do leg press at the gym without pain  Objective:  See treatment diary below      Assessment:  Pt presented to outpatient physical therapy at [facility] with complaints of R knee pain  She presented with decreased R quadriceps strength, limited R distal quadriceps flexibility, limited R LE balance, altered gait pattern, decreased tolerance to activity and decreased functional mobility due to Primary localized osteoarthritis of right knee  (primary encounter diagnosis)  She will continue to benefit from skilled PT services in order to address these deficits and reach maximum level of function  Pt has only min tightness in distal R quadriceps now  Good response to ART  Plan:  Continue to focus on balance and STM distal R quad  Possible D/C on 10/17/19  Daily Treatment Diary     Dx:    1   Primary localized osteoarthritis of right knee      POC EXPIRES On:  19  PRECAUTIONS:  None  CO-MORBIDITES:  B TKA  PERSONAL FACTORS:  None    Manual  10/7 10/10 10/14          STM distal R quadriceps sitting 10' 10' 10'          ART distal R quad  5' 5'                                                     Exercise Diary  10/7 10/10 10/14          R LE SLS on foam with small squat 10 no foam Black 20 Black 20          T-band kicks L/R flex/ext Green 15 ea Blue 20 ea Blue 20 ea          Bike  L1 5' L1 5'          Mini squats on bosu dome up + down  20 ea 20 ea          Leg press B  65# 20 75# 2x15 Modalities tested   []  []Not tested    Uncontained Internal rotation  []  []Not tested  []  []Not tested          Abductors  [x] All Neg  [] Not tested   [x] All Neg  [] Not tested    Medius strength  []  []Not tested   []  []Not tested    Minimum strength  []  []Not tested   []  []Not tested    IT band tendonitis  []  []Not tested   []  []Not tested    Trochanteric tenderness  []  []Not tested  []  []Not tested   Sciatic neuropathic pain  []  []Not tested   []  []Not tested           Post-arthroplasty  [] All Neg  [] Not tested   [] All Neg  [] Not tested    Rectus tendonitis  []  []Not tested   []  []Not tested    Iliopsoas tendonitis       Start-up pain  []  []Not tested   []  []Not tested          Imaging    Left Hip: [facility]  Previous Radiographs: xrays were ordered today and reviewed.  AP pelvis, lateral, and false profile.  They demonstrate no evidence of fractures or dislocations.  Lateral center edge angle left 26 and right 26        Procedure:  No orders of the defined types were placed in this encounter.      Assessment and Plan  Michael was seen today for follow-up.    Diagnoses and all orders for this visit:    Femoroacetabular impingement of left hip  -     MRI HIP LEFT WO CONTRAST; Future    Hip dysplasia, acquired, left  -     MRI HIP LEFT WO CONTRAST; Future          Patient does have mild hip dysplasia and RSOA.  Meloxicam and physical therapy were not effective.  I am suspecting minimally retroversion of the acetabulum but also anteversion of the femur.  I will order patient an MRI of the left hip.  I have informed mom that depending on the results of this she may also need a Rypos CT hip map.  I will have the patient follow-up with Dr. French afterwards.    I discussed with Michael Bradley that his history, symptoms, signs, and imaging are most consistent with femoro-acetabular impingement and hip dysplasia    We reviewed the natural history of these conditions and treatment options ranging

## 2024-11-29 ENCOUNTER — NURSE TRIAGE (OUTPATIENT)
Age: 79
End: 2024-11-29

## 2024-11-29 NOTE — TELEPHONE ENCOUNTER
Telephone call spoke with Pt.  She is reporting pain in chest when she lays down in bed and makes it difficult to sleep.  Denies nausea or belching.  She has been eating high calorie/fat foods for supper around 530 pm and does not snack at night.  She did go to ED and neg for cardiac issues.  She is going to stop eating the high fat foods as of today.  She is currenlty on Kisqali/Femara and Prolia. Recommended reporting symptoms to her PCP - sounds like GI issue.  She is wanting to know if ok to take prilosec or nexium otc to see if that would help.    Explained I will send a message to Dr Amanda.

## 2024-11-29 NOTE — TELEPHONE ENCOUNTER
Returned telephone call spoke with Pt.  Per Dr Vasiliy jacobs to take Nexium otc.  Pt states she will pick and start.  She will call the office back next week with an update of how she is feeling.

## 2024-11-29 NOTE — TELEPHONE ENCOUNTER
Called pt to review her [employer] message symptoms. Symptoms started over a week ago. Went to ER for same discomfort and concerns. R/o cardiac , neg for cardiac workup. Laying down symptoms gets worsens. Last meal is around 5:30 pm. Feels more like a pressure pain than a burning pain. Mostly with liquids not solids that pt has this pain this during the day but laying down is worse. Sleeping up even with pillows doesn't relieve the pain. 6/10 pain intermittently. Pt reports trying to eat a lot of different foods to maintain wgt but thinks maybe she is not eating the right things. Believes to be acid reflux. Would like to know if she can take pantoprazole or nexium with the medications she is currently on. Please advise.      Reidi there is no protocol for acid reflux.

## 2024-12-02 ENCOUNTER — OFFICE VISIT (OUTPATIENT)
Dept: FAMILY MEDICINE CLINIC | Facility: HOSPITAL | Age: 79
End: 2024-12-02
Payer: MEDICARE

## 2024-12-02 VITALS
RESPIRATION RATE: 16 BRPM | OXYGEN SATURATION: 98 % | WEIGHT: 113 LBS | HEART RATE: 72 BPM | BODY MASS INDEX: 23.72 KG/M2 | TEMPERATURE: 99.5 F | HEIGHT: 58 IN | SYSTOLIC BLOOD PRESSURE: 108 MMHG | DIASTOLIC BLOOD PRESSURE: 58 MMHG

## 2024-12-02 DIAGNOSIS — K20.90 ESOPHAGITIS: ICD-10-CM

## 2024-12-02 DIAGNOSIS — L58.0 ACUTE RADIATION DERMATITIS: ICD-10-CM

## 2024-12-02 DIAGNOSIS — R13.10 ODYNOPHAGIA: Primary | ICD-10-CM

## 2024-12-02 PROCEDURE — 99214 OFFICE O/P EST MOD 30 MIN: CPT | Performed by: INTERNAL MEDICINE

## 2024-12-02 PROCEDURE — G2211 COMPLEX E/M VISIT ADD ON: HCPCS | Performed by: INTERNAL MEDICINE

## 2024-12-02 RX ORDER — SUCRALFATE ORAL 1 G/10ML
1 SUSPENSION ORAL
Qty: 473 ML | Refills: 0 | Status: SHIPPED | OUTPATIENT
Start: 2024-12-02

## 2024-12-02 RX ORDER — PANTOPRAZOLE SODIUM 40 MG/1
40 TABLET, DELAYED RELEASE ORAL DAILY
Qty: 60 TABLET | Refills: 0 | Status: SHIPPED | OUTPATIENT
Start: 2024-12-02

## 2024-12-02 RX ORDER — CALCIUM CARBONATE/VITAMIN D3 500 MG-2.5
TABLET,CHEWABLE ORAL
COMMUNITY

## 2024-12-02 RX ORDER — BENZOCAINE/MENTHOL 6 MG-10 MG
LOZENGE MUCOUS MEMBRANE 2 TIMES DAILY
Qty: 25 G | Refills: 0 | Status: SHIPPED | OUTPATIENT
Start: 2024-12-02 | End: 2024-12-11

## 2024-12-02 NOTE — ASSESSMENT & PLAN NOTE
Patient presents for chief complaint of retrosternal pain when she swallows liquids and liquids go down her chest into her stomach.  This started about 2 weeks ago.  She sometimes has odynophagia when she swallows solids but mainly liquid such as water.  She denies cough, choking when swallowing.  Denies signs of GI bleeding.  Denies abdominal pain.    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    Her symptoms started before that.    She also noticed temperatures as high as 100.7 at home last 3 days.    Denies nausea, vomiting, abdominal pain, cough, shortness of breath, sore throat, runny nose.    I saw no evidence of oral candidiasis, ulcers, blisters, strep throat in her mouth.  She had no neck masses.  Her lungs were clear to auscultation.  Her abdomen was soft, nontender nondistended.    Suspect her odynophagia is due to esophagitis which could be due to radiation but infectious esophagitis from Candida or herpes simplex also need to be ruled out    She tried to take Magic mouthwash for her odynophagia that did not help.    I prescribed pantoprazole 40 mg p.o. twice daily and sacral fate 1 g 3 times daily at bedtime    I referred patient to GI for EGD    Orders:    Ambulatory referral to Gastroenterology; Future

## 2024-12-02 NOTE — ASSESSMENT & PLAN NOTE
Suspect patient has esophagitis.  She sometimes has retrosternal discomfort when she is resting and not doing anything.  Discomfort is not exertional, not pleuritic.  She does not take NSAIDs, avoids alcohol and caffeine.  I asked her to consume a bland, soft, puréed diet.  Will try pantoprazole twice a day    Orders:    pantoprazole (PROTONIX) 40 mg tablet; Take 1 tablet (40 mg total) by mouth daily    Ambulatory referral to Gastroenterology; Future    sucralfate (CARAFATE) 1 g/10 mL suspension; Take 10 mL (1 g total) by mouth 4 (four) times a day (with meals and at bedtime)

## 2024-12-02 NOTE — PROGRESS NOTES
Name: Yuni Keys      : 1945      MRN: [id number]  Encounter Provider: Sherron Tomas MD  Encounter Date: 2024   Encounter department: [facility] [address]  :  Assessment & Plan  Odynophagia  Patient presents for chief complaint of retrosternal pain when she swallows liquids and liquids go down her chest into her stomach.  This started about 2 weeks ago.  She sometimes has odynophagia when she swallows solids but mainly liquid such as water.  She denies cough, choking when swallowing.  Denies signs of GI bleeding.  Denies abdominal pain.    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    Her symptoms started before that.    She also noticed temperatures as high as 100.7 at home last 3 days.    Denies nausea, vomiting, abdominal pain, cough, shortness of breath, sore throat, runny nose.    I saw no evidence of oral candidiasis, ulcers, blisters, strep throat in her mouth.  She had no neck masses.  Her lungs were clear to auscultation.  Her abdomen was soft, nontender nondistended.    Suspect her odynophagia is due to esophagitis which could be due to radiation but infectious esophagitis from Candida or herpes simplex also need to be ruled out    She tried to take Magic mouthwash for her odynophagia that did not help.    I prescribed pantoprazole 40 mg p.o. twice daily and sacral fate 1 g 3 times daily at bedtime    I referred patient to GI for EGD    Orders:    Ambulatory referral to Gastroenterology; Future    Esophagitis  Suspect patient has esophagitis.  She sometimes has retrosternal discomfort when she is resting and not doing anything.  Discomfort is not exertional, not pleuritic.  She does not take NSAIDs, avoids alcohol and caffeine.  I asked her to consume a bland, soft, puréed diet.  Will try pantoprazole twice a day    Orders:    pantoprazole (PROTONIX) 40 mg tablet; Take 1 tablet (40  "mg total) by mouth daily    Ambulatory referral to Gastroenterology; Future    sucralfate (CARAFATE) 1 g/10 mL suspension; Take 10 mL (1 g total) by mouth 4 (four) times a day (with meals and at bedtime)    Acute radiation dermatitis  She also complains of a mildly pruritic erythematous rash around the right shoulder and the mid section of her back that started after radiation therapy.  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  She has no cellulitis today, she has no herpes zoster.  Will try hydrocortisone cream twice a day for 2 weeks              Orders:    hydrocortisone 1 % cream; Apply topically 2 (two) times a day for 14 days           History of Present Illness     HPI  Review of Systems       Objective   /58   Pulse 72   Temp 99.5 °F (37.5 °C) (Tympanic)   Resp 16   Ht 4' 10\" (1.473 m)   Wt 51.3 kg (113 lb)   SpO2 98%   BMI 23.62 kg/m²      Physical Exam    "

## 2024-12-02 NOTE — ASSESSMENT & PLAN NOTE
She also complains of a mildly pruritic erythematous rash around the right shoulder and the mid section of her back that started after radiation therapy.  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  She has no cellulitis today, she has no herpes zoster.  Will try hydrocortisone cream twice a day for 2 weeks              Orders:    hydrocortisone 1 % cream; Apply topically 2 (two) times a day for 14 days

## 2024-12-03 ENCOUNTER — NURSE TRIAGE (OUTPATIENT)
Age: 79
End: 2024-12-03

## 2024-12-03 DIAGNOSIS — I10 ESSENTIAL HYPERTENSION: ICD-10-CM

## 2024-12-03 DIAGNOSIS — R13.10 DYSPHAGIA, UNSPECIFIED TYPE: ICD-10-CM

## 2024-12-03 DIAGNOSIS — L58.0 ACUTE RADIATION DERMATITIS: ICD-10-CM

## 2024-12-03 DIAGNOSIS — R50.9 FEVER, UNSPECIFIED FEVER CAUSE: Primary | ICD-10-CM

## 2024-12-03 NOTE — PROGRESS NOTES
I spoke with Alley and temp was 101.5 now coming down. Having difficulty with drinking water etc. Will try ice pops   I have placed order for labs for am and urinalysis   She does have an   appt with gi tomorrow

## 2024-12-03 NOTE — TELEPHONE ENCOUNTER
"DESCRIPTION     Patient has been having on and off fevers. Stated recent and highest temp was 101.5. She is having chills as well with this. Denies any other symptoms. Stated she does not feel like she is sick. She did see her PCP yesterday and she was assessed, said PCP is relating this to her rash that Rad Onc is aware of and having her use benadryl cream or hydrocortisone cream for. Denies being around anyone that was sick.     ONSET    Stated fevers have been on and off since saturday      MEDICATIONS    She has been taking tylenol for this when she has a fever, reports it helping but then fevers come back      Reason for Disposition  • Fever present > 3 days (72 hours)    Answer Assessment - Initial Assessment Questions  1. TEMPERATURE: \"What is the most recent temperature?\"  \"How was it measured?\"       101.5    2. ONSET: \"When did the fever start?\"       Stated fevers started on Saturday, off and on    3. CHILLS: \"Do you have chills?\" If yes: \"How bad are they?\"  (e.g., none, mild, moderate, severe)      Has chills    4. OTHER SYMPTOMS: \"Do you have any other symptoms besides the fever?\"  (e.g., abdomen pain, cough, diarrhea, earache, headache, sore throat, urination pain)      Denies any other symptoms, stated she doesn't feel sick    5. CAUSE: If there are no symptoms, ask: \"What do you think is causing the fever?\"       Patient saw PCP yesterday- related it to the rash    6. CONTACTS: \"Does anyone else in the family have an infection?\"      Denies    7. TREATMENT: \"What have you done so far to treat this fever?\" (e.g., OTC fever medicines)      Taking tylenol when she has it which helps    Protocols used: Fever-Adult-[place]    "

## 2024-12-04 ENCOUNTER — PATIENT OUTREACH (OUTPATIENT)
Dept: CASE MANAGEMENT | Facility: OTHER | Age: 79
End: 2024-12-04

## 2024-12-04 ENCOUNTER — OFFICE VISIT (OUTPATIENT)
Dept: GASTROENTEROLOGY | Facility: CLINIC | Age: 79
End: 2024-12-04
Payer: MEDICARE

## 2024-12-04 ENCOUNTER — APPOINTMENT (OUTPATIENT)
Dept: LAB | Facility: CLINIC | Age: 79
End: 2024-12-04
Payer: MEDICARE

## 2024-12-04 VITALS
DIASTOLIC BLOOD PRESSURE: 60 MMHG | HEIGHT: 58 IN | TEMPERATURE: 99.2 F | BODY MASS INDEX: 23.51 KG/M2 | WEIGHT: 112 LBS | OXYGEN SATURATION: 97 % | HEART RATE: 69 BPM | SYSTOLIC BLOOD PRESSURE: 118 MMHG

## 2024-12-04 DIAGNOSIS — R63.4 WEIGHT LOSS, ABNORMAL: Primary | ICD-10-CM

## 2024-12-04 DIAGNOSIS — R07.9 CHEST PAIN, UNSPECIFIED TYPE: ICD-10-CM

## 2024-12-04 DIAGNOSIS — R50.9 FEVER, UNSPECIFIED FEVER CAUSE: ICD-10-CM

## 2024-12-04 DIAGNOSIS — K83.8 DILATED CBD, ACQUIRED: ICD-10-CM

## 2024-12-04 DIAGNOSIS — R13.10 ODYNOPHAGIA: ICD-10-CM

## 2024-12-04 DIAGNOSIS — R13.10 DYSPHAGIA, UNSPECIFIED TYPE: ICD-10-CM

## 2024-12-04 DIAGNOSIS — K20.90 ESOPHAGITIS: ICD-10-CM

## 2024-12-04 LAB
ALBUMIN SERPL BCG-MCNC: 3.7 G/DL (ref 3.5–5)
ALP SERPL-CCNC: 48 U/L (ref 34–104)
ALT SERPL W P-5'-P-CCNC: 30 U/L (ref 7–52)
ANION GAP SERPL CALCULATED.3IONS-SCNC: 9 MMOL/L (ref 4–13)
ANISOCYTOSIS BLD QL SMEAR: PRESENT
AST SERPL W P-5'-P-CCNC: 24 U/L (ref 13–39)
BACTERIA UR QL AUTO: ABNORMAL /HPF
BASOPHILS # BLD MANUAL: 0.02 THOUSAND/UL (ref 0–0.1)
BASOPHILS NFR MAR MANUAL: 1 % (ref 0–1)
BILIRUB SERPL-MCNC: 0.3 MG/DL (ref 0.2–1)
BILIRUB UR QL STRIP: NEGATIVE
BUN SERPL-MCNC: 13 MG/DL (ref 5–25)
CALCIUM SERPL-MCNC: 8.5 MG/DL (ref 8.4–10.2)
CHLORIDE SERPL-SCNC: 103 MMOL/L (ref 96–108)
CLARITY UR: CLEAR
CO2 SERPL-SCNC: 28 MMOL/L (ref 21–32)
COLOR UR: YELLOW
CREAT SERPL-MCNC: 0.59 MG/DL (ref 0.6–1.3)
EOSINOPHIL # BLD MANUAL: 0 THOUSAND/UL (ref 0–0.4)
EOSINOPHIL NFR BLD MANUAL: 0 % (ref 0–6)
ERYTHROCYTE [DISTWIDTH] IN BLOOD BY AUTOMATED COUNT: 18.8 % (ref 11.6–15.1)
GFR SERPL CREATININE-BSD FRML MDRD: 87 ML/MIN/1.73SQ M
GLUCOSE SERPL-MCNC: 87 MG/DL (ref 65–140)
GLUCOSE UR STRIP-MCNC: NEGATIVE MG/DL
HCT VFR BLD AUTO: 40.2 % (ref 34.8–46.1)
HGB BLD-MCNC: 13.2 G/DL (ref 11.5–15.4)
HGB UR QL STRIP.AUTO: NEGATIVE
KETONES UR STRIP-MCNC: NEGATIVE MG/DL
LEUKOCYTE ESTERASE UR QL STRIP: NEGATIVE
LG PLATELETS BLD QL SMEAR: PRESENT
LYMPHOCYTES # BLD AUTO: 0.16 THOUSAND/UL (ref 0.6–4.47)
LYMPHOCYTES # BLD AUTO: 4 % (ref 14–44)
MACROCYTES BLD QL AUTO: PRESENT
MCH RBC QN AUTO: 31.7 PG (ref 26.8–34.3)
MCHC RBC AUTO-ENTMCNC: 32.8 G/DL (ref 31.4–37.4)
MCV RBC AUTO: 96 FL (ref 82–98)
MONOCYTES # BLD AUTO: 0.02 THOUSAND/UL (ref 0–1.22)
MONOCYTES NFR BLD: 1 % (ref 4–12)
MUCOUS THREADS UR QL AUTO: ABNORMAL
NEUTROPHILS # BLD MANUAL: 2.1 THOUSAND/UL (ref 1.85–7.62)
NEUTS BAND NFR BLD MANUAL: 1 % (ref 0–8)
NEUTS SEG NFR BLD AUTO: 90 % (ref 43–75)
NITRITE UR QL STRIP: NEGATIVE
NON-SQ EPI CELLS URNS QL MICRO: ABNORMAL /HPF
OVALOCYTES BLD QL SMEAR: PRESENT
PH UR STRIP.AUTO: 6.5 [PH]
PLATELET # BLD AUTO: 369 THOUSANDS/UL (ref 149–390)
PLATELET BLD QL SMEAR: ADEQUATE
PMV BLD AUTO: 9.3 FL (ref 8.9–12.7)
POIKILOCYTOSIS BLD QL SMEAR: PRESENT
POTASSIUM SERPL-SCNC: 4.1 MMOL/L (ref 3.5–5.3)
PROT SERPL-MCNC: 6.1 G/DL (ref 6.4–8.4)
PROT UR STRIP-MCNC: ABNORMAL MG/DL
RBC # BLD AUTO: 4.17 MILLION/UL (ref 3.81–5.12)
RBC #/AREA URNS AUTO: ABNORMAL /HPF
RBC MORPH BLD: PRESENT
SODIUM SERPL-SCNC: 140 MMOL/L (ref 135–147)
SP GR UR STRIP.AUTO: 1.02 (ref 1–1.03)
T4 FREE SERPL-MCNC: 1.44 NG/DL (ref 0.61–1.12)
TSH SERPL DL<=0.05 MIU/L-ACNC: 5.04 UIU/ML (ref 0.45–4.5)
UROBILINOGEN UR STRIP-ACNC: <2 MG/DL
VARIANT LYMPHS # BLD AUTO: 3 %
WBC # BLD AUTO: 2.31 THOUSAND/UL (ref 4.31–10.16)
WBC #/AREA URNS AUTO: ABNORMAL /HPF

## 2024-12-04 PROCEDURE — 84439 ASSAY OF FREE THYROXINE: CPT

## 2024-12-04 PROCEDURE — 36415 COLL VENOUS BLD VENIPUNCTURE: CPT

## 2024-12-04 PROCEDURE — 81001 URINALYSIS AUTO W/SCOPE: CPT

## 2024-12-04 PROCEDURE — 85007 BL SMEAR W/DIFF WBC COUNT: CPT

## 2024-12-04 PROCEDURE — 85027 COMPLETE CBC AUTOMATED: CPT

## 2024-12-04 PROCEDURE — 84443 ASSAY THYROID STIM HORMONE: CPT

## 2024-12-04 PROCEDURE — 80053 COMPREHEN METABOLIC PANEL: CPT

## 2024-12-04 PROCEDURE — 99204 OFFICE O/P NEW MOD 45 MIN: CPT | Performed by: INTERNAL MEDICINE

## 2024-12-04 RX ORDER — SODIUM CHLORIDE, SODIUM LACTATE, POTASSIUM CHLORIDE, CALCIUM CHLORIDE 600; 310; 30; 20 MG/100ML; MG/100ML; MG/100ML; MG/100ML
125 INJECTION, SOLUTION INTRAVENOUS CONTINUOUS
OUTPATIENT
Start: 2024-12-04

## 2024-12-04 NOTE — PROGRESS NOTES
Name: Yuni Keys      : 1945      MRN: [id number]  Encounter Provider: Agustin Jackson MD  Encounter Date: 2024   Encounter department: [facility] [place]  :  Assessment & Plan  Odynophagia  Patient having chest pain and painful swallowing for the last few weeks.  Could be related to inflammation or ulceration in the esophagus.  She is also had weight loss over the last 1 year.  She has advanced breast cancer.  Patient recently had radiation which can cause injury to the mucosa in the esophagus.  Other differentials include esophageal dysmotility, peptic strictures, rings, tumors.  Discussed with patient about evaluating with EGD.  Patient agreeable.  Offered cardiology referral.  Patient reports that she has had anesthesia recently and tolerated it well for her back surgery and does not feel she needs cardiology evaluation.  She would like to go ahead with EGD.  I ordered the procedure today.  Continue pantoprazole for now.  Orders:    Ambulatory referral to Gastroenterology    EGD; Future    Esophagitis  As above.  Orders:    Ambulatory referral to Gastroenterology    Weight loss, abnormal  As above.  Orders:    EGD; Future    Dilated cbd, acquired  Unclear cause of the dilated bile duct.  Patient agreeable to get MRI.  Ordered the imaging today.  Orders:    MRI abdomen w wo contrast and mrcp; Future    Chest pain, unspecified type  As above.    RTC 6 months.    Agustin Jackson MD  Gastroenterology  [facility]  Date: 2024             History of Present Illness     HPI  Yuni Keys is a 79 y.o. female with history of stage IV breast cancer currently on therapy, had last dose of radiation 2 weeks ago, constipation presents for evaluation.    Patient went to the ER on 2024 for chest pain with painful swallowing.  EKG and troponins were unremarkable except for bradycardia.  GI referral was placed.  Patient was started on  pantoprazole and Carafate which she is currently taking.  Labs done during ER visit were reviewed by me today and showed normal hemoglobin, platelets, renal function and liver tests.    Patient reports that she has been having painful swallowing for the last few weeks.  It usually happens with liquids and not with solids.  She has chest pain intermittently which can occur without swallowing.  No heartburn, nausea, vomiting, abdominal pain.  She has been having intermittent hard to pass stool since she started taking opioids.  MiraLAX helps control the constipation.  No blood in stools.  No family history of colon cancer.  Patient had a colonoscopy with colorectal surgery service in 2023 the note of which was reviewed by me today.  1 polyp was removed.  Repeat was recommended in 5 years.    Patient has CT abdomen done last month the results of which were reviewed by me today and found to have dilated CBD at 9 mm.  MRI was recommended.          Review of Systems   Constitutional:  Negative for chills and fever.   HENT:  Negative for ear pain and sore throat.    Eyes:  Negative for pain and visual disturbance.   Respiratory:  Negative for cough and shortness of breath.    Cardiovascular:  Negative for chest pain and palpitations.   Gastrointestinal:  Negative for abdominal pain and vomiting.   Genitourinary:  Negative for dysuria and hematuria.   Musculoskeletal:  Negative for arthralgias and back pain.   Skin:  Negative for color change and rash.   Neurological:  Negative for seizures and syncope.   All other systems reviewed and are negative.    Constitutional Denies fatigue, fever and night sweats.  HEENT No tearing, No blurred Vision, No nasal drainage.  cardio/vascular Denies chest pain, palpitations.  pulmonary Denies dyspnea, cough.  GI see HPI.   Denies dysuria, frequency, hematuria .  musculoskeletal Denies m/s pain.  neuro No headaches, no dizziness, sleeping ok, no numbness or tingling or weakness.       "  Objective   /60 (BP Location: Right arm, Patient Position: Sitting, Cuff Size: Adult)   Pulse 69   Temp 99.2 °F (37.3 °C) (Tympanic)   Ht 4' 10\" (1.473 m)   Wt 50.8 kg (112 lb)   SpO2 97%   BMI 23.41 kg/m²      Physical Exam  Vitals and nursing note reviewed.   Constitutional:       General: She is not in acute distress.     Appearance: She is well-developed.   HENT:      Head: Normocephalic and atraumatic.   Eyes:      Conjunctiva/sclera: Conjunctivae normal.   Cardiovascular:      Rate and Rhythm: Normal rate and regular rhythm.      Heart sounds: No murmur heard.  Pulmonary:      Effort: Pulmonary effort is normal. No respiratory distress.      Breath sounds: Normal breath sounds.   Abdominal:      Palpations: Abdomen is soft.      Tenderness: There is no abdominal tenderness.   Musculoskeletal:         General: No swelling.      Cervical back: Neck supple.   Skin:     General: Skin is warm and dry.      Capillary Refill: Capillary refill takes less than 2 seconds.   Neurological:      Mental Status: She is alert.   Psychiatric:         Mood and Affect: Mood normal.             "

## 2024-12-04 NOTE — TELEPHONE ENCOUNTER
"Per Dr. Amanda, \"Recommend drawing blood cultures one from each arm and if any urinary frequency or dysuria to get UA reflex to culture\"  "

## 2024-12-04 NOTE — PROGRESS NOTES
"OSW called Mrs. Keys today. She shared she is experiencing fevers and what she thinks she is GERD/reflux symptoms. She stated the pain is worse at night. Fortunately, she saw her PCP and referred her to GI who is scheduled to see her today.   Her radiation completed about 2 weeks ago. She has a rash on her skin from the area that was radiated and continues to have fatigue, but reported this is getting better. She stated, \"I like talking to you, I just wish I didn't have something going on every time we talk.\" OSW provided emotional support and validation. She asked if going through these treatments \"will be worth it\" and will prove to prolong her life. Discussed the anxiety that comes when dealing with the unknown. Her family and friends remain very supportive. Her daughter and her family from [place] are traveling to visit around [place] and she hopes she will be feeling better by then.   She appreciated call today. Will f/u again in a few weeks, however encouraged her to reach out before as needed. Will continue to follow.  "

## 2024-12-04 NOTE — ASSESSMENT & PLAN NOTE
Patient having chest pain and painful swallowing for the last few weeks.  Could be related to inflammation or ulceration in the esophagus.  She is also had weight loss over the last 1 year.  She has advanced breast cancer.  Patient recently had radiation which can cause injury to the mucosa in the esophagus.  Other differentials include esophageal dysmotility, peptic strictures, rings, tumors.  Discussed with patient about evaluating with EGD.  Patient agreeable.  Offered cardiology referral.  Patient reports that she has had anesthesia recently and tolerated it well for her back surgery and does not feel she needs cardiology evaluation.  She would like to go ahead with EGD.  I ordered the procedure today.  Continue pantoprazole for now.  Orders:    Ambulatory referral to Gastroenterology    EGD; Future

## 2024-12-04 NOTE — PATIENT INSTRUCTIONS
Scheduled date of EGD(as of today):12.19.24  Physician performing EGD:DR BRADY  Location of EGD:  Instructions reviewed with patient by:VIRGIL  Clearances: N/A    MRI scheduled 12/29/24 @[facility]

## 2024-12-05 ENCOUNTER — RESULTS FOLLOW-UP (OUTPATIENT)
Dept: FAMILY MEDICINE CLINIC | Facility: HOSPITAL | Age: 79
End: 2024-12-05

## 2024-12-05 DIAGNOSIS — R50.9 FEVER, UNSPECIFIED FEVER CAUSE: Primary | ICD-10-CM

## 2024-12-05 DIAGNOSIS — R79.89 ELEVATED TSH: Primary | ICD-10-CM

## 2024-12-05 DIAGNOSIS — C79.9 SECONDARY MALIGNANT NEOPLASM OF UNSPECIFIED SITE (CODE) (HCC): ICD-10-CM

## 2024-12-06 ENCOUNTER — APPOINTMENT (OUTPATIENT)
Dept: LAB | Facility: CLINIC | Age: 79
End: 2024-12-06
Payer: MEDICARE

## 2024-12-06 ENCOUNTER — TELEPHONE (OUTPATIENT)
Age: 79
End: 2024-12-06

## 2024-12-06 DIAGNOSIS — C79.9 SECONDARY MALIGNANT NEOPLASM OF UNSPECIFIED SITE (CODE) (HCC): ICD-10-CM

## 2024-12-06 DIAGNOSIS — R79.89 ELEVATED TSH: ICD-10-CM

## 2024-12-06 DIAGNOSIS — R50.9 FEVER, UNSPECIFIED FEVER CAUSE: ICD-10-CM

## 2024-12-06 PROCEDURE — 36415 COLL VENOUS BLD VENIPUNCTURE: CPT

## 2024-12-06 PROCEDURE — 87040 BLOOD CULTURE FOR BACTERIA: CPT

## 2024-12-08 LAB
BACTERIA BLD CULT: NORMAL
BACTERIA BLD CULT: NORMAL

## 2024-12-11 ENCOUNTER — OFFICE VISIT (OUTPATIENT)
Dept: PALLIATIVE MEDICINE | Facility: CLINIC | Age: 79
End: 2024-12-11
Payer: MEDICARE

## 2024-12-11 ENCOUNTER — TELEPHONE (OUTPATIENT)
Age: 79
End: 2024-12-11

## 2024-12-11 VITALS
SYSTOLIC BLOOD PRESSURE: 120 MMHG | HEART RATE: 72 BPM | WEIGHT: 116.84 LBS | OXYGEN SATURATION: 95 % | RESPIRATION RATE: 20 BRPM | DIASTOLIC BLOOD PRESSURE: 60 MMHG | TEMPERATURE: 97.4 F | BODY MASS INDEX: 24.42 KG/M2

## 2024-12-11 DIAGNOSIS — C79.51 BREAST CANCER METASTASIZED TO BONE, RIGHT (HCC): Primary | ICD-10-CM

## 2024-12-11 DIAGNOSIS — Z51.5 PALLIATIVE CARE BY SPECIALIST: ICD-10-CM

## 2024-12-11 DIAGNOSIS — Z17.0 MALIGNANT NEOPLASM OF UPPER-INNER QUADRANT OF LEFT BREAST IN FEMALE, ESTROGEN RECEPTOR POSITIVE (HCC): ICD-10-CM

## 2024-12-11 DIAGNOSIS — R23.2 HOT FLASHES: ICD-10-CM

## 2024-12-11 DIAGNOSIS — L58.9 RADIATION DERMATITIS: ICD-10-CM

## 2024-12-11 DIAGNOSIS — Z71.89 GOALS OF CARE, COUNSELING/DISCUSSION: ICD-10-CM

## 2024-12-11 DIAGNOSIS — C50.911 BREAST CANCER METASTASIZED TO BONE, RIGHT (HCC): Primary | ICD-10-CM

## 2024-12-11 DIAGNOSIS — C50.212 MALIGNANT NEOPLASM OF UPPER-INNER QUADRANT OF LEFT BREAST IN FEMALE, ESTROGEN RECEPTOR POSITIVE (HCC): ICD-10-CM

## 2024-12-11 DIAGNOSIS — C50.911 BREAST CANCER METASTASIZED TO BONE, RIGHT (HCC): Primary | Chronic | ICD-10-CM

## 2024-12-11 DIAGNOSIS — M84.58XS PATHOLOGICAL FRACTURE IN NEOPLASTIC DISEASE, OTHER SPECIFIED SITE, SEQUELA: ICD-10-CM

## 2024-12-11 DIAGNOSIS — C79.51 BREAST CANCER METASTASIZED TO BONE, RIGHT (HCC): Primary | Chronic | ICD-10-CM

## 2024-12-11 DIAGNOSIS — R53.0 NEOPLASTIC (MALIGNANT) RELATED FATIGUE: ICD-10-CM

## 2024-12-11 DIAGNOSIS — G89.3 CANCER RELATED PAIN: ICD-10-CM

## 2024-12-11 LAB
BACTERIA BLD CULT: NORMAL
BACTERIA BLD CULT: NORMAL

## 2024-12-11 PROCEDURE — 99497 ADVNCD CARE PLAN 30 MIN: CPT | Performed by: INTERNAL MEDICINE

## 2024-12-11 PROCEDURE — 99215 OFFICE O/P EST HI 40 MIN: CPT | Performed by: INTERNAL MEDICINE

## 2024-12-11 RX ORDER — RIBOCICLIB 200 MG/1
400 TABLET, FILM COATED ORAL DAILY
Qty: 42 EACH | Refills: 5 | Status: SHIPPED | OUTPATIENT
Start: 2024-12-11

## 2024-12-11 RX ORDER — HYDROCORTISONE 25 MG/G
CREAM TOPICAL 4 TIMES DAILY PRN
Qty: 3.5 G | Refills: 1 | Status: SHIPPED | OUTPATIENT
Start: 2024-12-11

## 2024-12-11 RX ORDER — GABAPENTIN 100 MG/1
100 CAPSULE ORAL
Qty: 30 CAPSULE | Refills: 0 | Status: SHIPPED | OUTPATIENT
Start: 2024-12-11

## 2024-12-11 NOTE — TELEPHONE ENCOUNTER
"Called patient in response to [employer] message. States she is having fevers and chills still even though she was told at Dr. Contreras appointment today that her blood cultures were negative. She reports the fever/chills and sweats make her feel fatigued and she has no energy to do anything, doesn't have energy to exercise. She sits and falls asleep watching tv but doesn't have much desire to even watch tv. Reports she is \"just existing.\" She denies feeling depressed but states she just wants to have energy again. She reported rash on her back but states Dr. Contreras prescribed cream for that today and also gabapentin to try for the hot flashes. Reports her temperature goes up to 101/7 but the last time it was that high was a few days ago. States she manages her temperature with tylenol and did take it this morning before going to Dr. Contreras appointment and having normal temperature reading there. States she feels fine otherwise, denies cough, sore throat, etc. States the fever/chills and feeling tired are her biggest concerns.      Requests call back with any additional recommendations Dr. Amanda may have and if there is anything she can do to manage these intermittent fevers.    "

## 2024-12-11 NOTE — Clinical Note
Ambreen- referring to you for therapy. Fam- trying some things to help her symptoms, is it possible for a dose reduction?

## 2024-12-11 NOTE — ASSESSMENT & PLAN NOTE
Time spent with patient and daughter providing supportive listening.   All questions and concerns were addressed to their satisfaction.    RTC: 4 weeks- after CT imaging

## 2024-12-11 NOTE — ASSESSMENT & PLAN NOTE
Follow-up imaging in January- will plan to have follow- up afterwards for ongoing goals of care

## 2024-12-11 NOTE — ASSESSMENT & PLAN NOTE
Recent admission for intractable cancer pain    Current regimen:   Oxycodone 5-10mg q4hrs PRN  Mostly using 5 mg tablets 3 to 4x/day   Start Gabapentin 100 mg HS  Failed medications:  Tramadol  [place]  Baclofen  Patient does have a prescription for fentanyl patches 12 mcg that were prescribed by her PCP.  Discussed that this is likely too strong of an opioid for her at this time but encouraged her to keep them in a safe secure place as there may become a time that we will need to utilize these.    Opioid agreement reviewed and signed- done on 11/13/2024  Bowel regimen to prevent OIC

## 2024-12-11 NOTE — PROGRESS NOTES
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  Discussed that this is likely too strong of an opioid for her at this time but encouraged her to keep them in a safe secure place as there may become a time that we will need to utilize these.    Opioid agreement reviewed and signed- done on 2024  Bowel regimen to prevent OIC            Palliative care by specialist  Time spent with patient and daughter providing supportive listening.   All questions and concerns were addressed to their satisfaction.    RTC: 4 weeks- after CT imaging              Hot flashes  Start Gabapentin 100 mg HS    Orders:    gabapentin (Neurontin) 100 mg capsule; Take 1 capsule (100 mg total) by mouth daily at bedtime    Neoplastic (malignant) related fatigue  Will refer to PT for energy conservation and strength training   Orders:    Ambulatory Referral to Physical Therapy; Future    Radiation dermatitis    Orders:    hydrocortisone 2.5 % cream; Apply topically 4 (four) times a day as needed for rash    Goals of care, counseling/discussion  Goals of care discussion had today.  Patient considering quality of life at this time.  She feels that her overall quality has diminished greatly and she has not been finding alirio  in anything she used to enjoy.  Furthermore, her symptoms have become significantly debilitating to her and we spent time discussing these symptoms at length and will provide some trials of medication and therapy to see if this will help.  We also discussed that she should talk more with her daughters about her quality of life and will plan to do this after the holidays and after her repeat imaging.  Discussed mental health counseling, and will plan to set her up with the palliative LCSW team.  An additional 16+ minutes were spent today in goals of care and advance care planning discussions.             Decisional apparatus: Patient is competent on my exam today. If competence is lost, patient's substitute decision maker would default to adult daughters by [place].   Advance Directive / Living Will / POLST: yes     PDMP Review: I have reviewed the patient's controlled substance dispensing history in the Prescription Drug Monitoring Program in compliance with the [facility] regulations before prescribing any controlled substances.    History of Present Illness   Yuni Keys is a 79 y.o. female who presents in follow-up for symptoms related to metastatic breast cancer.  Since her last visit she has been struggling with her quality of life.  She states the pain is still present but it is not the symptom that most concerns her.  She says that her hot flashes and her fatigue have become debilitating.  She states that she is waking up multiple times at night to change her nightgown or even change her sheets.  And given the weight of her mattress she states that this is very exhausting to her.  She also describes debilitating fatigue.  She states that she sleeps throughout the day and even though she is waking up at night due to the hot flashes she states that she sleeps mostly through the night.  She is no longer finding alirio in her activities.  She is to go for daily walks and she no longer does this.  She used to enjoy  reading she no longer does this either.  She states that she spends most of her time watching TV and sleeping but she does not even enjoy watching TV.  She started to have conversations with her daughters about this but they both live out of state.  We discussed different things we can do to improve quality of life and medication changes and physical therapy.  She is agreeable to trying these changes and seeing how it goes to improve her quality of life.  We also discussed the alternative of stopping cancer treatments and what that would look like with brief discussion on hospice.  At this time our plan will be to try these different techniques and follow-up after her repeat imaging in January..  Medical History Reviewed by provider this encounter:     .     Objective   /60 (BP Location: Left arm, Patient Position: Sitting, Cuff Size: Standard)   Pulse 72   Temp (!) 97.4 °F (36.3 °C) (Temporal)   Resp 20   Wt 53 kg (116 lb 13.5 oz)   SpO2 95%   BMI 24.42 kg/m²     Physical Exam  Vitals and nursing note reviewed.   Constitutional:       General: She is not in acute distress.  HENT:      Head: Normocephalic and atraumatic.      Right Ear: External ear normal.      Left Ear: External ear normal.   Eyes:      General:         Right eye: No discharge.         Left eye: No discharge.   Cardiovascular:      Rate and Rhythm: Normal rate and regular rhythm.      Heart sounds: Murmur heard.   Pulmonary:      Effort: Pulmonary effort is normal. No respiratory distress.      Breath sounds: Normal breath sounds.   Abdominal:      General: There is no distension.      Palpations: Abdomen is soft.   Musculoskeletal:      Right lower leg: No edema.      Left lower leg: No edema.   Skin:     Coloration: Skin is pale.      Findings: Rash present.   Neurological:      Mental Status: Mental status is at baseline.   Psychiatric:         Mood and Affect: Mood normal.         Behavior: Behavior normal.         Recent  labs:  Lab Results   Component Value Date/Time    SODIUM 140 12/04/2024 08:47 AM    K 4.1 12/04/2024 08:47 AM    BUN 13 12/04/2024 08:47 AM    CREATININE 0.59 (L) 12/04/2024 08:47 AM    GLUC 87 12/04/2024 08:47 AM    CALCIUM 8.5 12/04/2024 08:47 AM    AST 24 12/04/2024 08:47 AM    ALT 30 12/04/2024 08:47 AM    ALB 3.7 12/04/2024 08:47 AM    TP 6.1 (L) 12/04/2024 08:47 AM    EGFR 87 12/04/2024 08:47 AM     Lab Results   Component Value Date/Time    HGB 13.2 12/04/2024 08:47 AM    WBC 2.31 (L) 12/04/2024 08:47 AM     12/04/2024 08:47 AM    INR 1.01 10/01/2024 08:13 AM    PTT 34 10/01/2024 08:13 AM     Lab Results   Component Value Date/Time    KCR4CRIHVQLR 5.035 (H) 12/04/2024 08:47 AM       Recent Imaging:  Procedure: X-ray chest 2 views  Result Date: 11/21/2024  Narrative: XR CHEST PA AND LATERAL INDICATION: chest pain. COMPARISON: None FINDINGS: Clear lungs. There is mild elevation of the left hemidiaphragm. No pneumothorax or pleural effusion. Normal cardiomediastinal silhouette. Midthoracic and upper lumbar compression fractures status post kyphoplasty. Normal upper abdomen.     Impression: No acute cardiopulmonary disease. This report is in agreement with the preliminary interpretation. Workstation performed: [id number]     Procedure: MRI thoracic spine w wo contrast  Result Date: 11/10/2024  Narrative: MRI THORACIC SPINE WITH AND WITHOUT CONTRAST INDICATION: Pain. COMPARISON: Thoracic spine MRI dated October 8, 2024 TECHNIQUE:  Multiplanar, multisequence imaging of the thoracic spine was performed before and after gadolinium administration. . IV Contrast:  5 mL of Gadobutrol injection (SINGLE-DOSE) IMAGE QUALITY:  Diagnostic. FINDINGS: ALIGNMENT:  Normal alignment of the thoracic spine.  No acute compression fracture. Mild height loss at T8 which has progressed slightly since prior from 10/8/2024. No subluxation.  No evidence of scoliosis. MARROW SIGNAL: Osseous metastases at T5, T8, and T11, similar  to prior from 10/8/2024. No new marrow replacing lesions. Interval T8 kyphoplasty. Partially imaged L1 kyphoplasty. THORACIC CORD:  Normal signal within the thoracic cord. PREVERTEBRAL AND PARASPINAL SOFT TISSUES:   Normal. THORACIC DEGENERATIVE CHANGE: Mild multilevel degenerative changes. Mild canal stenosis at T12-L1 secondary to L1 osseous retropulsion. No high-grade canal or foraminal stenosis at any thoracic level. POSTCONTRAST:  No abnormal enhancement. OTHER FINDINGS:  None.     Impression: Interval T8 kyphoplasty. Height loss at T8 has progressed slightly since prior from 10/8/2024. Redemonstrated osseous metastases at T5, T8, and T11. Partially imaged L1 compression fracture status post kyphoplasty. No abnormal epidural or leptomeningeal enhancement. Normal cord signal. Workstation performed: [id number]     Procedure: CT recon only lumbar spine (No Charge)  Result Date: 11/9/2024  Narrative: CT LUMBAR SPINE INDICATION:   metastatic ca. 78-year-old female with history of breast cancer, metastatic to several thoracic and lumbar vertebrae. Back pain. COMPARISON: MRI of the lumbar spine from September 21, 2024. MRI of the thoracic spine from October 8, 2024. Lumbar spine radiographs from November 8, 2024. TECHNIQUE: Axial CT examination of the lumbar spine was obtained utilizing reconstructed images from CT of the chest abdomen and pelvis performed the same day.  This examination, like all CT scans performed in the [facility], was performed utilizing techniques to minimize radiation dose exposure, including the use of iterative reconstruction and automated exposure control.   Images were reformatted in the sagittal and coronal planes. FINDINGS: ALIGNMENT: Upper lumbar scoliosis, convex to the right. 2 mm of anterolisthesis of L4 on L5, unchanged since 9/21/2024. Alignment otherwise unremarkable. VERTEBRAE: Chronic compression fracture of L1, status post kyphoplasty. No new fracture. No bone  destruction or osteoblastic lesion. LOWER THORACIC DISC LEVELS:  Unremarkable. LUMBAR DISC LEVELS:   Degenerative disc disease with vacuum disc phenomenon at L1-L2 and L2-L3. FACET JOINTS: Degenerative facet arthropathy from L3-L4 through L5-S1. 5 FORAMINA:  No significant foraminal stenosis. SPINAL CANAL: Mild canal stenosis at L4-L5 due to facet arthropathy and thickening of the ligamenta flava, similar in appearance to images from the MRI of the lumbar spine from 9/21/2024. Otherwise unremarkable. INCLUDED PORTION OF THE SACRUM:  Normal. PARASPINAL SOFT TISSUES:   Normal. IMAGED PORTIONS OF ABDOMEN AND PELVIS: Bilateral hydronephrosis, better demonstrated on a CT of the abdomen and pelvis from today. Rectum distended with feces, consistent with the patient's history of constipation.     Impression: 1.  Compression fracture of L1, status post kyphoplasty. No new lumbar vertebral fractures 2.  No evidence of lumbar spine metastasis. 3.  Mild spinal stenosis at L4-L5, unchanged since an MRI from 9/21/2024. 4.  Bilateral hydronephrosis. Workstation performed: [id number]     Procedure: CT thoracic spine without contrast  Result Date: 11/9/2024  Narrative: CT THORACIC SPINE INDICATION:   Metastatic breast cancer to spine, back pain. COMPARISON: MRI of the thoracic spine from October 8, 2024. TECHNIQUE:  Contiguous axial images through the thoracic spine were obtained. Sagittal and coronal reconstructions were obtained.  This examination, like all CT scans performed in the [facility], was performed utilizing techniques to minimize radiation dose exposure, including the use of iterative reconstruction and automated exposure control. IMAGE QUALITY:  Diagnostic. FINDINGS: ALIGNMENT: Mild accentuation of the thoracic kyphosis. Otherwise normal alignment. VERTEBRAE: Chronic compression fractures of, status post kyphoplasties. No new fracture. Osteolytic lesions in the T8, T11 and L1 vertebral bodies. No  evidence of new destructive lesion. THORACIC DISC LEVELS:  Unremarkable. FACET JOINTS:  Unremarkable. SPINAL CANAL: Increased attenuation in the left anterior epidural space at the T8 level. Otherwise unremarkable. PARASPINAL SOFT TISSUES: Thickening of the paraspinous soft tissues at the T8 level INCLUDED PORTIONS OF THE LUMBAR SPINE: Pathologic compression fracture of L1, status post kyphoplasty. IMAGED PORTIONS OF CHEST AND ABDOMEN: Small right pleural effusion. Mild to moderate left-sided hydronephrosis, better demonstrated on a CT of the abdomen and pelvis from today. Two calculi in the left kidney, each measuring 1 mm..     Impression: 1.  Osteolytic metastases involving the T8, T11 and L1 vertebral bodies with pathologic fractures treated with kyphoplasty at T8 and L1. 2.  Thickened paravertebral soft tissues at T8. This could be residual edema or hematoma from pathologic fracture and kyphoplasty. However, extraosseous spread of metastasis can have a similar appearance. 3.  Probable extraosseous extension of T8 metastasis into the left anterior epidural space. Less likely, this could represent a streak artifact from adjacent kyphoplasty material. This can be better evaluated with MRI of the thoracic spine, without and with IV contrast. 4.  No evidence of new thoracic spine metastasis. 5.  Left-sided hydronephrosis and two small nonobstructing left renal calculi. 6.  Small right pleural effusion. The study was marked in EPIC for immediate notification. Workstation performed: [id number]     Procedure: CT abdomen pelvis with contrast  Result Date: 11/9/2024  Narrative: CT ABDOMEN AND PELVIS WITH IV CONTRAST INDICATION: Constipated, back pain, metastatic breast cancer to spine.. 78-year-old female. MRI of the thoracic spine from last month demonstrated metastases at T5, T8 and T11 and a compression fracture of L1. COMPARISON: Unenhanced CT of the abdomen and pelvis from April 28, 2024. MRI of the thoracic spine  from October 8, 2024. MRI of the lumbar spine from September 21, 2024. Lumbar spine radiographs from November 8, 2024. TECHNIQUE: CT examination of the abdomen and pelvis was performed. Multiplanar 2D reformatted images were created from the source data. This examination, like all CT scans performed in the [facility], was performed utilizing techniques to minimize radiation dose exposure, including the use of iterative reconstruction and automated exposure control. Radiation dose length product (DLP) for this visit: 395 mGy-cm IV Contrast: 85 mL of iohexol (OMNIPAQUE) Enteric Contrast: Not administered. FINDINGS: ABDOMEN LOWER CHEST: Mild subsegmental atelectasis in the bases of both lower lobes. Small right pleural effusion. LIVER/BILIARY TREE: Elongated right lobe (Riedel's lobe, a normal anatomic variant). Liver otherwise unremarkable. Mild dilatation of the common hepatic and common bile duct, measuring up to 9 mm in diameter. No intrahepatic ductal dilatation. GALLBLADDER: No calcified gallstones. No pericholecystic inflammatory change. SPLEEN: Unremarkable. PANCREAS: Unremarkable. Main pancreatic duct normal in caliber. ADRENAL GLANDS: Unremarkable. KIDNEYS/URETERS: Moderate left-sided hydronephrosis, similar to the CT from 8/28/2024. New mild right hydronephrosis. No hydroureter. Several subcentimeter low attenuation foci in the renal cortices, too small to be accurately characterized, but most likely benign. By ACR guidelines, no additional imaging indicated. STOMACH AND BOWEL: Stomach and small intestine unremarkable. Rectum distended with feces. Colon otherwise unremarkable. APPENDIX: No findings to suggest appendicitis. ABDOMINOPELVIC CAVITY: No lymphadenopathy.  No ascites or discrete fluid collection.  No extraluminal gas. VESSELS: Atherosclerosis without abdominal aortic aneurysm. Incidental note made of multiple phleboliths in the right ovarian vein, unchanged since 8/28/2024. PELVIS  REPRODUCTIVE ORGANS: Post hysterectomy. URINARY BLADDER: Unremarkable. ABDOMINAL WALL/INGUINAL REGIONS: Unremarkable. BONES: Chronic compression fracture of L1, status post kyphoplasty. Osteolytic metastasis in the right side of the T11 vertebral body, little changed since 8/28/2024. No other evidence of fracture or destructive lesion.     Impression: 1.  Mild dilatation of the common bile duct, 9 mm in maximal diameter, etiology indeterminate. If clinically indicated, nonemergent MRI of the abdomen/MRCP, without and with IV contrast would be helpful to identify or exclude an obstructing lesion of the  CBD. No intrahepatic ductal dilatation. 2.  Bilateral hydronephrosis, moderate on the left and mild on the right with no hydroureter. Most likely, this is due to bilateral UPJ stenoses. 3.  No evidence of metastases in the abdomen or pelvis. 4.  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 5.  Compression fracture of L1, status post kyphoplasty, similar in appearance to lumbar spine radiographs from 11/8/2024. 6.  No evidence of acute abnormality in the abdomen or pelvis. 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 COMPARISON: Spine radiographs 9/13/2024 FINDINGS: Lumbar dextroscoliosis. Alignment is unchanged. Interval kyphoplasty changes at T8 and L1. Unchanged vertebral body heights. Degenerative changes throughout the visible spine, with disc degeneration most severe at L2-3. Soft tissues unremarkable. Large colonic fecal burden. Osseous lesions better characterized on MRI.     Impression: T8 and L1 kyphoplasty changes. Stable alignment. Workstation performed: [id number]     Procedure: XR hip/pelv 2-3 vws right if  performed  Result Date: 10/25/2024  Narrative: RIGHT HIP INDICATION:   Malignant neoplasm of unspecified site of left female breast. Pain in right hip. COMPARISON:  None. VIEWS:  XR HIP/PELV 2-3 VWS RIGHT W PELVIS IF PERFORMED FINDINGS: There is no acute fracture or dislocation. No significant hip degenerative changes. No lytic or blastic osseous lesion. Soft tissues are unremarkable. Degenerative changes visualized lower lumbar spine.     Impression: No acute osseous abnormality of the right hip Electronically signed: 10/25/2024 05:01 PM Tyler Corona MD    Procedure: IR kyphoplasty/vertebroplasty with ablation  Result Date: 10/15/2024  Narrative: PROCEDURE: T8 and L1 vertebral body RF ablation and augmentation. HISTORY: Pathologic compression fractures COMPARISON: MRI on 10/8/2024 OPERATORS: Itzel. ANTIBIOTIC MEDICATION: 1g of Ancef given IV. COMPLICATIONS: None. ANESTHESIA: The patient was administered general endotracheal anesthesia under the care and supervision of the attending anesthesiologist. TECHNIQUE: The procedure, its risks, benefits, and alternatives were discussed in detail with the patient. Risks discussed included, but were not limited to, bleeding, infection, nerve root injury, spinal cord injury, and nontarget embolization of methylmethacrylate. All questions were answered and no guarantees were provided. After informed consent, the patient was brought into the angiography suite and the vertebral body levels of interest were identified via palpation and correlation made to prior imaging. The left T8 pedicle was imaged along its axis utilizing biplane fluoroscopy. Superficial, deep and periosteal Sensorcaine anesthetic was administered over the site of dermatotomy. Under fluoroscopic guidance, an introducer needle was then directed into the T8 vertebral body via a left transpedicular approach. A core biopsy was performed. Cavity creation was then performed through the introducer needle using  the DFine osteotome needle. The osteotome needle was then removed. The introducer needle remained in place with its stylet. Next, the RF probe was inserted through the guide needle and RF ablation of the tumor was performed. The RF probe was removed and the stylet was reinserted into the introducer needle.  Following preparation of a polymethylmethacrylate solution, bone cement was administered into the T8 vertebral body. The left L1 pedicle was imaged along its axis utilizing biplane fluoroscopy. Superficial, deep and periosteal Sensorcaine anesthetic was administered over the site of dermatotomy. Under fluoroscopic guidance, an introducer needle was then directed into the L1 vertebral body via a left transpedicular approach. A core biopsy was performed. Cavity creation was then performed through the introducer needle using the DFine osteotome needle. The osteotome needle was then removed. The introducer needle remained in place with its stylet. Next, the RF probe was inserted through the guide needle and RF ablation of the tumor was performed. The RF probe was removed and the stylet was reinserted into the introducer needle.  Following preparation of a polymethylmethacrylate solution, bone cement was administered into the L1 vertebral body. The introducer needle was removed. Manual pressure was applied to the access site until hemostasis was obtained. A sterile dressing was then applied. No new neurological deficits or complications were encountered during or immediately following the procedure.     Impression: FINDINGS/IMPRESSION: Successful T8 and L1 vertebral body biopsy, RF ablation and augmentation as detailed. Workstation performed: [id number]     Procedure: XR shoulder 2+ vw right  Result Date: 10/14/2024  Narrative: XR SHOULDER 2+ VW RIGHT INDICATION: M89.8X1: Other specified disorders of bone, shoulder. COMPARISON: PET scan 10/2/2024. 1/29/2024. FINDINGS: Acromial increased lucent/lytic changes with  mid acromial irregular linear lucency No acute fracture or dislocation. Degenerative glenohumeral and AC joints. Unremarkable soft tissues.     Impression: Question acromial nondisplaced pathological fracture. The study was marked in EPIC for immediate notification. Workstation performed: [id number]     Procedure: MRI thoracic spine with and without contrast  Addendum Date: 10/14/2024  Addendum: ADDENDUM: The impression was inadvertently omitted from this report and is incorrect. The body of the report describes multiple findings which will be reiterated here in a new impression. IMPRESSION: Subacute L1 compression fracture with stable configuration compared to MRI of the lumbar spine dated 9/21/2024. Multiple marrow replacing lesions are identified within the thoracic spine including T5, T8 and T11 consistent with osseous metastasis with no corresponding pathologic compression fractures. Disease appears contained within the bone with no extraosseous extension into the epidural or paraspinal soft tissues. Small pleural effusion.    Result Date: 10/14/2024  Narrative: MRI THORACIC SPINE WITH AND WITHOUT CONTRAST INDICATION: D49.2: Neoplasm of unspecified behavior of bone, soft tissue, and skin. COMPARISON: PET/CT dated 10/2/2024. MRI lumbar spine dated 9/21/2024. TECHNIQUE:  Multiplanar, multisequence imaging of the thoracic spine was performed before and after gadolinium administration. . IV Contrast:  5 mL of Gadobutrol injection (SINGLE-DOSE) IMAGE QUALITY:  Diagnostic. FINDINGS: ALIGNMENT: Mild, smooth exaggeration of the mid thoracic kyphosis. There is a moderate L1 compression fracture as seen on recent MRI with similar configuration. Approximately 50 to 60% loss of height of the mid and anterior aspect of the vertebral body with bony retropulsion of the posterior superior margin of the vertebral body into the anterior epidural space. No thoracic compression fractures are noted. MARROW SIGNAL: The L1  compression fracture demonstrates diffuse marrow edema without extension into the posterior elements of this vertebral body segment. There is a focal marrow replacing lesion within the posterior aspect of the T11 vertebral body on the right with extension into the pedicle. There is a marrow replacing lesion identified within the T8 vertebral body somewhat diffusely. There is some hyperintense signal on T1-weighted imaging in the posterior aspect of the vertebral body likely representing a hemangioma superimposed upon the diffuse marrow replacement. There is a small marrow replacing lesion within the T5 vertebral body. THORACIC CORD: No cord compression. Normal signal within the thoracic cord. PREVERTEBRAL AND PARASPINAL SOFT TISSUES:   Small right-sided pleural effusion layering within the posterior aspect of the chest. THORACIC DEGENERATIVE CHANGE: Mild canal stenosis at the T12-L1 level as a result of POSTCONTRAST:  No abnormal enhancement. OTHER FINDINGS:  None.     Impression: Normal enhanced MRI of the thoracic spine. Workstation performed: [id number]     Procedure: US guided breast biopsy left complete  Addendum Date: 10/10/2024  Addendum: ADDENDUM: PATHOLOGY RESULTS: 2) Mass [B] (Left; Retroareolar; 3 o'clock) The pathology results indicate a Benign finding with benign breast tissue and fibroadenomatoid nodule. Radiology and pathology are concordant. 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  Please refer to the full pathology report. Radiology and pathology are concordant. In review of the patient’s recent imaging, the left malignancy is poorly defined on mammogram, appearing as a developing asymmetry rather than a discrete mass.  This measures approximately 47 mm AP by 31 mm transverse on the cc view and measures  approximately 58 mm AP by 35 mm craniocaudal on the MLO view.  The difference in AP dimension between the 2 views could be related to the obliquity of the images.  On ultrasound the mass measures 41 x 13 x 41 mm.  There is a subtle asymmetry with possible distortion slightly superior and medial (at 12:00) which could represent a second site of disease.  Further characterization is indicated. The patient's most recent mammogram of the contralateral right breast is from an outside institution performed 4/12/2024.  It may be reasonable to obtain a more recent mammogram prior to treatment for the malignancy.  Contrast-enhanced mammography or MRI is recommended for additional screening and evaluation of extent of disease in the left breast.  This is due to the subtle mammographic appearance of the malignancy in the breast density.  If the patient cannot have contrast-enhanced imaging, a more updated right mammogram is recommended as well as ultrasound and possible mammogram of the left breast to evaluate the asymmetry described above. The patient does have an abnormal PET/CT which demonstrates lymphadenopathy that was not accessible for ultrasound-guided core needle biopsy. The patient was notified of the malignant pathology results on 10/8/2024 by Dr. Andino. RECOMMENDATION:      - Surgical Consultation for the left breast.      - Breast MRI for both breasts. Signed by:  Ambreen Mukherjee MD END ADDENDUM    Result Date: 10/10/2024  Narrative: DIAGNOSIS: Status post biopsy INDICATION: Yuni Keys is a 78 y.o. female presenting for left breast biopsy, 2 locations. . Prior to the procedure, previous imaging was reviewed.  The procedure was explained to the patient in detail.  All questions were answered.  Written and verbal informed consent was obtained.  The left breast was marked. FINDINGS: Limited left breast ultrasound was performed with grayscale and color imaging.  The procedure sites were marked.  A  timeout was performed.  The skin was cleansed and draped in the usual fashion.  Both biopsies were performed under continuous ultrasound guidance from a lateral approach. LEFT 2) MASS B at 3:00, retroareolar region: Anesthesia was administered with 5 mL of lidocaine 1%. A 13-gauge introducer was placed.  A 14 gauge spring-loaded biopsy device was used to obtain 6 samples under direct ultrasound guidance.  A mini Kimberly  reflector was placed in the biopsy cavity under direct ultrasound guidance.  The Kimberly  probe was held over the procedure site and audible clicking was heard.  1) MASS A at 11:00, 7 cm from the nipple: Anesthesia was administered with 5 mL of lidocaine 1%. An 11-gauge introducer was placed.  A 12 gauge spring-loaded biopsy device was used to obtain 4 samples under direct ultrasound guidance.  There was a small central hypoechoic component to the mass.  Due to vascularity at this location, sampling could not be obtained at that hypoechoic area.  A Kimberly  reflector was placed in the biopsy cavity under direct ultrasound guidance.  The Kimberly  probe was held over the procedure site and audible clicking was heard.  Hemostasis was obtained with direct pressure on the biopsy sites.  The patient had minimal bleeding.  The patient tolerated the procedure well. There were no immediate complications.   After each site was biopsied, the Kimberly  probe was held over the procedure site and audible clicking could be heard. Post biopsy mammogram: Left 2D CC and lateral views were obtained.  The mini Kimberly  reflector projects in the expected location at 3:00, retroareolar region (this mass was not seen mammographically as it is obscured by dense breast parenchyma). The Kimberly  reflector projects within the mass at 11:00.     Impression: 1.  Technically successful ultrasound-guided core needle biopsy of a mass in the left breast at 3:00, retroareolar region.  The procedure site was marked for  possible surgery with placement of a mini Kimberly  reflector. 2.  Technically successful ultrasound-guided core needle biopsy of a mass in the left breast at 11:00, 7 cm from the nipple.  The procedure site was marked for possible surgery with placement of a Kimberly  reflector. 3.  After each site was biopsied, the Kimberly  probe was held over the procedure site and audible clicking could be heard. 4.  Management recommendations will be made once the pathology results are available. ASSESSMENT/BI-RADS CATEGORY: Left: Post-Procedure Mammogram for Marker Placement Overall: Post-Procedure Mammogram for Marker Placement RECOMMENDATION:      - Waiting for pathology for the left breast. Workstation ID: [id number] Signed by:  Ambreen Mukherjee MD     Procedure: US guidance breast biopsy left each additional  Addendum Date: 10/10/2024  Addendum: ADDENDUM: PATHOLOGY RESULTS: 2) Mass [B] (Left; Retroareolar; 3 o'clock) The pathology results indicate a Benign finding with benign breast tissue and fibroadenomatoid nodule. Radiology and pathology are concordant. 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  Please refer to the full pathology report. Radiology and pathology are concordant. In review of the patient’s recent imaging, the left malignancy is poorly defined on mammogram, appearing as a developing asymmetry rather than a discrete mass.  This measures approximately 47 mm AP by 31 mm transverse on the cc view and measures approximately 58 mm AP by 35 mm craniocaudal on the MLO view.  The difference in AP dimension between the 2 views could be related to the obliquity of the images.  On ultrasound the mass measures 41 x 13 x 41 mm.  There is a subtle asymmetry with possible distortion slightly superior and medial (at 12:00)  which could represent a second site of disease.  Further characterization is indicated. The patient's most recent mammogram of the contralateral right breast is from an outside institution performed 4/12/2024.  It may be reasonable to obtain a more recent mammogram prior to treatment for the malignancy.  Contrast-enhanced mammography or MRI is recommended for additional screening and evaluation of extent of disease in the left breast.  This is due to the subtle mammographic appearance of the malignancy in the breast density.  If the patient cannot have contrast-enhanced imaging, a more updated right mammogram is recommended as well as ultrasound and possible mammogram of the left breast to evaluate the asymmetry described above. The patient does have an abnormal PET/CT which demonstrates lymphadenopathy that was not accessible for ultrasound-guided core needle biopsy. The patient was notified of the malignant pathology results on 10/8/2024 by Dr. Andino. RECOMMENDATION:      - Surgical Consultation for the left breast.      - Breast MRI for both breasts. Signed by:  Ambreen Mukherjee MD END ADDENDUM    Result Date: 10/10/2024  Narrative: DIAGNOSIS: Status post biopsy INDICATION: Yuni Keys is a 78 y.o. female presenting for left breast biopsy, 2 locations. . Prior to the procedure, previous imaging was reviewed.  The procedure was explained to the patient in detail.  All questions were answered.  Written and verbal informed consent was obtained.  The left breast was marked. FINDINGS: Limited left breast ultrasound was performed with grayscale and color imaging.  The procedure sites were marked.  A timeout was performed.  The skin was cleansed and draped in the usual fashion.  Both biopsies were performed under continuous ultrasound guidance from a lateral approach. LEFT 2) MASS B at 3:00, retroareolar region: Anesthesia was administered with 5 mL of lidocaine 1%. A 13-gauge introducer was placed.  A 14  gauge spring-loaded biopsy device was used to obtain 6 samples under direct ultrasound guidance.  A mini Kimberly  reflector was placed in the biopsy cavity under direct ultrasound guidance.  The Kimberly  probe was held over the procedure site and audible clicking was heard.  1) MASS A at 11:00, 7 cm from the nipple: Anesthesia was administered with 5 mL of lidocaine 1%. An 11-gauge introducer was placed.  A 12 gauge spring-loaded biopsy device was used to obtain 4 samples under direct ultrasound guidance.  There was a small central hypoechoic component to the mass.  Due to vascularity at this location, sampling could not be obtained at that hypoechoic area.  A Kimberly  reflector was placed in the biopsy cavity under direct ultrasound guidance.  The Kimberly  probe was held over the procedure site and audible clicking was heard.  Hemostasis was obtained with direct pressure on the biopsy sites.  The patient had minimal bleeding.  The patient tolerated the procedure well. There were no immediate complications.   After each site was biopsied, the Kimberly  probe was held over the procedure site and audible clicking could be heard. Post biopsy mammogram: Left 2D CC and lateral views were obtained.  The mini Kimberly  reflector projects in the expected location at 3:00, retroareolar region (this mass was not seen mammographically as it is obscured by dense breast parenchyma). The Kimberly  reflector projects within the mass at 11:00.     Impression: 1.  Technically successful ultrasound-guided core needle biopsy of a mass in the left breast at 3:00, retroareolar region.  The procedure site was marked for possible surgery with placement of a mini Kimberly  reflector. 2.  Technically successful ultrasound-guided core needle biopsy of a mass in the left breast at 11:00, 7 cm from the nipple.  The procedure site was marked for possible surgery with placement of a Kimberly  reflector. 3.  After each site was  biopsied, the Kimberly  probe was held over the procedure site and audible clicking could be heard. 4.  Management recommendations will be made once the pathology results are available. ASSESSMENT/BI-RADS CATEGORY: Left: Post-Procedure Mammogram for Marker Placement Overall: Post-Procedure Mammogram for Marker Placement RECOMMENDATION:      - Waiting for pathology for the left breast. Workstation ID: [id number] Signed by:  Ambreen Mukherjee MD     Procedure: Mammo post biopsy left  Addendum Date: 10/10/2024  Addendum: ADDENDUM: PATHOLOGY RESULTS: 2) Mass [B] (Left; Retroareolar; 3 o'clock) The pathology results indicate a Benign finding with benign breast tissue and fibroadenomatoid nodule. Radiology and pathology are concordant. 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  Please refer to the full pathology report. Radiology and pathology are concordant. In review of the patient’s recent imaging, the left malignancy is poorly defined on mammogram, appearing as a developing asymmetry rather than a discrete mass.  This measures approximately 47 mm AP by 31 mm transverse on the cc view and measures approximately 58 mm AP by 35 mm craniocaudal on the MLO view.  The difference in AP dimension between the 2 views could be related to the obliquity of the images.  On ultrasound the mass measures 41 x 13 x 41 mm.  There is a subtle asymmetry with possible distortion slightly superior and medial (at 12:00) which could represent a second site of disease.  Further characterization is indicated. The patient's most recent mammogram of the contralateral right breast is from an outside institution performed 4/12/2024.  It may be reasonable to obtain a more recent mammogram prior to treatment for the malignancy.  Contrast-enhanced  mammography or MRI is recommended for additional screening and evaluation of extent of disease in the left breast.  This is due to the subtle mammographic appearance of the malignancy in the breast density.  If the patient cannot have contrast-enhanced imaging, a more updated right mammogram is recommended as well as ultrasound and possible mammogram of the left breast to evaluate the asymmetry described above. The patient does have an abnormal PET/CT which demonstrates lymphadenopathy that was not accessible for ultrasound-guided core needle biopsy. The patient was notified of the malignant pathology results on 10/8/2024 by Dr. Andino. RECOMMENDATION:      - Surgical Consultation for the left breast.      - Breast MRI for both breasts. Signed by:  Ambreen Mukherjee MD END ADDENDUM    Result Date: 10/10/2024  Narrative: DIAGNOSIS: Status post biopsy INDICATION: Yuni Keys is a 78 y.o. female presenting for left breast biopsy, 2 locations. . Prior to the procedure, previous imaging was reviewed.  The procedure was explained to the patient in detail.  All questions were answered.  Written and verbal informed consent was obtained.  The left breast was marked. FINDINGS: Limited left breast ultrasound was performed with grayscale and color imaging.  The procedure sites were marked.  A timeout was performed.  The skin was cleansed and draped in the usual fashion.  Both biopsies were performed under continuous ultrasound guidance from a lateral approach. LEFT 2) MASS B at 3:00, retroareolar region: Anesthesia was administered with 5 mL of lidocaine 1%. A 13-gauge introducer was placed.  A 14 gauge spring-loaded biopsy device was used to obtain 6 samples under direct ultrasound guidance.  A mini Kimberly  reflector was placed in the biopsy cavity under direct ultrasound guidance.  The Kimberly  probe was held over the procedure site and audible clicking was heard.  1) MASS A at 11:00, 7 cm from the nipple:  Anesthesia was administered with 5 mL of lidocaine 1%. An 11-gauge introducer was placed.  A 12 gauge spring-loaded biopsy device was used to obtain 4 samples under direct ultrasound guidance.  There was a small central hypoechoic component to the mass.  Due to vascularity at this location, sampling could not be obtained at that hypoechoic area.  A Kimberly  reflector was placed in the biopsy cavity under direct ultrasound guidance.  The Kimberly  probe was held over the procedure site and audible clicking was heard.  Hemostasis was obtained with direct pressure on the biopsy sites.  The patient had minimal bleeding.  The patient tolerated the procedure well. There were no immediate complications.   After each site was biopsied, the Kimberly  probe was held over the procedure site and audible clicking could be heard. Post biopsy mammogram: Left 2D CC and lateral views were obtained.  The mini Kimberly  reflector projects in the expected location at 3:00, retroareolar region (this mass was not seen mammographically as it is obscured by dense breast parenchyma). The Kimberly  reflector projects within the mass at 11:00.     Impression: 1.  Technically successful ultrasound-guided core needle biopsy of a mass in the left breast at 3:00, retroareolar region.  The procedure site was marked for possible surgery with placement of a mini Kimberly  reflector. 2.  Technically successful ultrasound-guided core needle biopsy of a mass in the left breast at 11:00, 7 cm from the nipple.  The procedure site was marked for possible surgery with placement of a Kimberly  reflector. 3.  After each site was biopsied, the Kimberly  probe was held over the procedure site and audible clicking could be heard. 4.  Management recommendations will be made once the pathology results are available. ASSESSMENT/BI-RADS CATEGORY: Left: Post-Procedure Mammogram for Marker Placement Overall: Post-Procedure Mammogram for Marker Placement  RECOMMENDATION:      - Waiting for pathology for the left breast. Workstation ID: [id number] Signed by:  Ambreen Mukherjee MD     Procedure: Mammo diagnostic left w 3d and cad  Result Date: 10/4/2024  Narrative: DIAGNOSIS: Abnormal positron emission tomography (PET) scan; Other signs and symptoms in breast TECHNIQUE: Digital diagnostic mammography was performed. Computer Aided Detection (CAD) analyzed all applicable images. Left breast ultrasound was performed. COMPARISONS: Prior breast imaging dated: 04/12/2024, 04/26/2023, 04/07/2023, 02/01/2023, 03/22/2022, 03/19/2021, and 02/20/2020 RELEVANT HISTORY: Family Breast Cancer History: History of breast cancer in Maternal Grandmother, Paternal Aunt. Family Medical History: Family medical history includes breast cancer in 2 relatives (maternal grandmother, paternal aunt) and ovarian cancer in maternal aunt. Personal History: No known relevant hormone history. Surgical history includes breast biopsy, breast excisional biopsy, and hysterectomy. No known relevant medical history. RISK ASSESSMENT: 5 Year Tyrer-Cuzick: 11.31% 10 Year Tyrer-Cuzick: No Score Lifetime Tyrer-Cuzick: 15.78% TISSUE DENSITY: The breasts are extremely dense, which lowers the sensitivity of mammography. INDICATION: Yuni Keys is a 78 y.o. female presenting for abnormal pet scan.  Patient had a PET/CT with findings suspicious for osseous metastases.  There was FDG avidity in the medial left breast and FDG avid left axillary/subpectoral lymph nodes. FINDINGS: LEFT 1) MASS [A] Mammo diagnostic left w 3d and cad: There is an equal density, irregularly shaped mass with indistinct margins seen in the left breast at 11 o'clock in the middle depth.  On the cc view the mass measures approximately 47 mm AP by 31 mm transverse.  On the MLO view the mass may measure up to 58 mm AP by 35 mm craniocaudal.  However, the exact margins of the mass are difficult to delineate on mammography.  This does  correspond with the area of FDG avidity in the medial breast on the PET/CT. US breast left limited (diagnostic): There is a 41 mm x 13 mm x 41 mm irregularly shaped, hyperechoic mass with indistinct margins seen in the left breast at 11 o'clock, 7 cm from the nipple.  The margins of the mass are indistinct and the mass has an irregular shape, limiting accurate measurement.  The mass correlates with the mass seen on the mammogram.  This mass is highly suggestive of malignancy. 2) MASS [B] Mammo diagnostic left w 3d and cad: There is no corresponding mass seen on this modality. US breast left limited (diagnostic): There is a 14 mm x 4 mm x 8 mm oval, parallel, hypoechoic mass seen in the retroareolar region of the left breast at 3 o'clock.  This appears to be intraductal.  This is suspicious. 3) MASS [C] Mammo diagnostic left w 3d and cad: There is a 6 mm oval mass seen in the left breast at 6 o'clock in the middle depth.  This is mammographically stable compared to the oldest mammogram available for comparison from 2020. US breast left limited (diagnostic): There is a 6 mm x 5 mm x 3 mm oval, parallel, hypoechoic mass with circumscribed margins seen in the retroareolar region of the left breast at 6 o'clock.  This is likely benign given the mammographic stability. 4) LYMPH NODE [D] Mammo diagnostic left w 3d and cad: There is no corresponding lymph node seen on this modality.  The PET/CT demonstrated abnormal lymph nodes. US breast left limited (diagnostic): Targeted ultrasound of the axilla was performed.  There are some morphologically benign axillary lymph nodes.  There is also a level 2 axillary lymph node which is only visible deep to the pectoralis muscles during certain phases of respiration.  This has a cortical thickness of 4 mm.  This is suspicious but is not amenable to ultrasound-guided core needle biopsy.     Impression: 1.  Irregular mass in the medial left breast corresponds with the area of FDG  avidity on the PET/CT.  This is highly suggestive of malignancy.  Appropriate action should be taken.  Ultrasound-guided core needle biopsy is recommended and was performed today.  Please see the separate dictation. 2.  Oval intraductal mass in the left breast at 3:00, retroareolar region is suspicious.  Ultrasound-guided core needle biopsy is recommended. 3.  There is a 6 mm oval mass at 6:00 which is mammographically stable for 4 years.  This is likely benign. 4.  The patient had abnormal axillary lymph nodes seen on PET/CT.  1 of these lymph nodes is visualized on ultrasound, however, it is not amenable to ultrasound-guided core needle biopsy. ASSESSMENT/BI-RADS CATEGORY: Left: 5 - Highly Suggestive of Malignancy Overall: 5 - Highly Suggestive of Malignancy RECOMMENDATION:      - Ultrasound-guided breast biopsy for the left breast. Workstation ID: [id number] Signed by:  Ambreen Mukherjee MD     Procedure: US breast left limited (diagnostic)  Result Date: 10/4/2024  Narrative: DIAGNOSIS: Abnormal positron emission tomography (PET) scan; Other signs and symptoms in breast TECHNIQUE: Digital diagnostic mammography was performed. Computer Aided Detection (CAD) analyzed all applicable images. Left breast ultrasound was performed. COMPARISONS: Prior breast imaging dated: 04/12/2024, 04/26/2023, 04/07/2023, 02/01/2023, 03/22/2022, 03/19/2021, and 02/20/2020 RELEVANT HISTORY: Family Breast Cancer History: History of breast cancer in Maternal Grandmother, Paternal Aunt. Family Medical History: Family medical history includes breast cancer in 2 relatives (maternal grandmother, paternal aunt) and ovarian cancer in maternal aunt. Personal History: No known relevant hormone history. Surgical history includes breast biopsy, breast excisional biopsy, and hysterectomy. No known relevant medical history. RISK ASSESSMENT: 5 Year Tyrer-Cuzick: 11.31% 10 Year Tyrer-Cuzick: No Score Lifetime Tyrer-Cuzick: 15.78% TISSUE  DENSITY: The breasts are extremely dense, which lowers the sensitivity of mammography. INDICATION: Yuni Keys is a 78 y.o. female presenting for abnormal pet scan.  Patient had a PET/CT with findings suspicious for osseous metastases.  There was FDG avidity in the medial left breast and FDG avid left axillary/subpectoral lymph nodes. FINDINGS: LEFT 1) MASS [A] Mammo diagnostic left w 3d and cad: There is an equal density, irregularly shaped mass with indistinct margins seen in the left breast at 11 o'clock in the middle depth.  On the cc view the mass measures approximately 47 mm AP by 31 mm transverse.  On the MLO view the mass may measure up to 58 mm AP by 35 mm craniocaudal.  However, the exact margins of the mass are difficult to delineate on mammography.  This does correspond with the area of FDG avidity in the medial breast on the PET/CT. US breast left limited (diagnostic): There is a 41 mm x 13 mm x 41 mm irregularly shaped, hyperechoic mass with indistinct margins seen in the left breast at 11 o'clock, 7 cm from the nipple.  The margins of the mass are indistinct and the mass has an irregular shape, limiting accurate measurement.  The mass correlates with the mass seen on the mammogram.  This mass is highly suggestive of malignancy. 2) MASS [B] Mammo diagnostic left w 3d and cad: There is no corresponding mass seen on this modality. US breast left limited (diagnostic): There is a 14 mm x 4 mm x 8 mm oval, parallel, hypoechoic mass seen in the retroareolar region of the left breast at 3 o'clock.  This appears to be intraductal.  This is suspicious. 3) MASS [C] Mammo diagnostic left w 3d and cad: There is a 6 mm oval mass seen in the left breast at 6 o'clock in the middle depth.  This is mammographically stable compared to the oldest mammogram available for comparison from 2020. US breast left limited (diagnostic): There is a 6 mm x 5 mm x 3 mm oval, parallel, hypoechoic mass with circumscribed margins  seen in the retroareolar region of the left breast at 6 o'clock.  This is likely benign given the mammographic stability. 4) LYMPH NODE [D] Mammo diagnostic left w 3d and cad: There is no corresponding lymph node seen on this modality.  The PET/CT demonstrated abnormal lymph nodes. US breast left limited (diagnostic): Targeted ultrasound of the axilla was performed.  There are some morphologically benign axillary lymph nodes.  There is also a level 2 axillary lymph node which is only visible deep to the pectoralis muscles during certain phases of respiration.  This has a cortical thickness of 4 mm.  This is suspicious but is not amenable to ultrasound-guided core needle biopsy.     Impression: 1.  Irregular mass in the medial left breast corresponds with the area of FDG avidity on the PET/CT.  This is highly suggestive of malignancy.  Appropriate action should be taken.  Ultrasound-guided core needle biopsy is recommended and was performed today.  Please see the separate dictation. 2.  Oval intraductal mass in the left breast at 3:00, retroareolar region is suspicious.  Ultrasound-guided core needle biopsy is recommended. 3.  There is a 6 mm oval mass at 6:00 which is mammographically stable for 4 years.  This is likely benign. 4.  The patient had abnormal axillary lymph nodes seen on PET/CT.  1 of these lymph nodes is visualized on ultrasound, however, it is not amenable to ultrasound-guided core needle biopsy. ASSESSMENT/BI-RADS CATEGORY: Left: 5 - Highly Suggestive of Malignancy Overall: 5 - Highly Suggestive of Malignancy RECOMMENDATION:      - Ultrasound-guided breast biopsy for the left breast. Workstation ID: [id number] Signed by:  Ambreen Mukherjee MD     Procedure: NM PET CT tumor imaging whole body  Result Date: 10/2/2024  Narrative: WHOLE-BODY PET/CT SCAN INDICATION: Abnormal MRI lumbar spine. 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 Multiplanar attenuation corrected and non attenuation corrected PET images were acquired 60 minutes post injection. Contiguous, low dose, axial CT sections were obtained from the vertex through the feet.  Intravenous contrast material was not utilized.  This examination, like all CT scans performed in the [facility], was performed utilizing techniques to minimize radiation dose exposure, including the use of iterative reconstruction and automated exposure control. Fasting serum glucose: 84 mg/dl FINDINGS: VISUALIZED BRAIN: No acute abnormalities are seen. HEAD/NECK: There is a physiologic distribution of FDG. No FDG avid cervical adenopathy is seen. CT images: Unremarkable CHEST: There are several small FDG avid lymph nodes in the left axilla/subpectoral region. A lymph node here demonstrates SUV max of 6.0. This measures 6 mm short axis image 68 series 4. Slightly focal FDG uptake at the left breast medially where there is suggestion of a nodular density on CT, SUV max of 2.6. This measures up to 1.3 cm image 83 series 4. See fusion image 79 series 603. CT images: Moderate coronary artery calcifications. Heart is enlarged. Trace pleural effusions suggested. ABDOMEN: No FDG avid soft tissue lesions are seen. CT images: Parapelvic renal cysts on the left. Colonic diverticulosis. Suggestion of calcified phleboliths along the right psoas margin. PELVIS: No FDG avid soft tissue lesions are seen. CT images: Prior hysterectomy. OSSEOUS STRUCTURES/EXTREMITIES: Scattered FDG-avid osseous lesions. For example: *  Heterogeneous activity at the right acromion where there is a lytic process on CT, SUV max of 4.8. *  T8 vertebral body demonstrates SUV max 3.8. *  T11 vertebral body lytic lesion on the right demonstrates SUV max of  6.0. *  Linear activity at the compressed L1 vertebral body demonstrates SUV max of 5.9. Activity could be related to compression fracture with underlying pathologic lesion not excluded. *  Small lucent lesion at the left ischial tuberosity demonstrates SUV max of 4.7. CT images: Multilevel degenerative changes of the spine. Curvature of the lumbar spine to the right. Bilateral knee arthroplasties.     Impression: 1. Mild focal radiotracer uptake at a left breast nodular density medially. Underlying primary breast malignancy should be excluded. 2. Several small FDG avid left axillary/subpectoral lymph nodes would raise suspicion for metastasis. 3. Scattered FDG avid lytic lesions compatible with metastasis. The study was marked in EPIC for significant notification. Workstation performed: [id number]     Procedure: MRI lumbar spine without contrast  Result Date: 9/23/2024  Narrative: MRI LUMBAR SPINE WITHOUT CONTRAST INDICATION: S32.010A: Wedge compression fracture of first lumbar vertebra, initial encounter for closed fracture. COMPARISON: Lumbar spine radiograph from 9/13/2024, CT of the lumbar spine and abdomen and pelvis from 8/28/2024, MR lumbar spine from 11/24/2019 TECHNIQUE:  Multiplanar, multisequence imaging of the lumbar spine was performed. . IMAGE QUALITY:  Diagnostic FINDINGS: VERTEBRAL BODIES:  There are 5 lumbar type vertebral bodies. There is dextroscoliosis of the lumbar spine, centered at L2, with right lateral listhesis of L3 on L4, L4 on L5, and grade 1 anterolisthesis of L4 and L5 due to moderate facet arthropathy. There is a T1 hypointense, STIR hyperintense marrow replacing lesion involving the posterior aspect of the T11 vertebral body to the right of midline extending into the pedicle, corresponding to the lucent lesion identified on CT in this region. This is new when compared to prior lumbar spine MRI from 11/24/2019. There is a worsening acute or subacute compression fracture involving  the superior endplate of L1, with approximately 50% height loss noted, and associated bone marrow edema on STIR images. There is mild worsening retropulsion of the superior endplate of  the central canal. No significant associated epidural hematoma or obvious paraspinal epidural mass is identified. There is mild STIR signal hyperintensity involving the posterior aspect of the T12 inferior endplate to the left of midline in particular, without significant height loss, which may represent a microtrabecular injury in this region. Remaining lumbar vertebral body heights appear grossly preserved. There are additional degenerative endplate changes at multiple levels most pronounced at L2-L3, where there are Modic type III degenerative endplate changes noted an associated sclerosis on recent CT. There are also Modic type I degenerative endplate changes posteriorly at the level of L3-L4. SACRUM:  Normal signal within the sacrum. No evidence of insufficiency or stress fracture. DISTAL CORD AND CONUS:  Normal size and signal within the distal cord and conus. The conus terminates at T12. PARASPINAL SOFT TISSUES:  Paraspinal soft tissues are unremarkable. LOWER THORACIC DISC SPACES:  Normal disc height and signal.  No disc herniation, canal stenosis or foraminal narrowing. LUMBAR DISC SPACES: L1-L2: Disc desiccation, diffuse disc bulge with superimposed small left foraminal disc protrusion. Mild ligamentum flavum hypertrophy. Facet joints appear grossly normal. No canal stenosis. Mild left neural foraminal stenosis. Findings appear unchanged. L2-L3: Disc height loss, disc osteophyte complex asymmetric to the left, with superimposed small right foraminal disc protrusion. Mild facet arthropathy. Ligamentum flavum hypertrophy. Mild canal stenosis. Left greater than right lateral recess stenosis.  Moderate left neural foraminal stenosis. Findings appear grossly unchanged. L3-L4: Diffuse disc bulge with superimposed left foraminal  disc protrusion. Bilateral facet arthropathy and ligamentum flavum hypertrophy. Mild canal stenosis. Moderate left and mild right neural foraminal stenosis. Findings appear similar when compared to the prior study. L4-L5: Diffuse disc bulge. Bilateral facet arthropathy and ligamentum flavum hypertrophy. Mild canal stenosis. Mild right neuroforaminal stenosis. Findings appear unchanged. L5-S1: Disc bulge. Moderate facet arthropathy. No canal stenosis. Mild right neural foraminal stenosis. OTHER FINDINGS: Mild left hydronephrosis as seen on prior CT of the abdomen pelvis from 8/28/2024.     Impression: 1. Indeterminate T1 hypointense, STIR hyperintense lesion involving the right posterior aspect of T11 extending into the adjacent pedicle, corresponding to the lucent lesion identified in this region on prior CT, and new when compared to the prior lumbar  spine MRI from 11/24/2019. This raises concern for an aggressive lesion such as metastatic disease or multiple myeloma as this was new compared to 2019, with an atypical hemangioma an alternative but less likely consideration. Recommend correlation for history of primary malignancy, and consider further valuation with PET/CT. 2. Worsening acute to subacute compression fracture at L1, with now approximately 50% height loss, associated bone marrow edema, and mild retropulsion of the posterior vertebral body cortex of the central canal. No associated epidural hematoma or obvious  associated mass at this level. This may be a benign compression fracture, but given the lesion at T11, a pathologic compression fracture cannot be entirely excluded. 3. Minimal edema involving the posterior aspect of the inferior endplate of T12, without height loss in this region, which may represent a area of focal microtrabecular injury. 4. Multilevel lumbar spondylosis, as described above, contributing to at most mild canal stenosis at L2-L5, and multilevel neural foraminal stenosis, worst on  the left at L2-L4. 5. Mild left-sided hydronephrosis again demonstrated, incompletely evaluated in this examination. The study was marked in EPIC for immediate notification. Workstation performed: [id number]     Procedure: XR spine thoracolumbar 2 vw  Result Date: 9/13/2024  Narrative: THORACOLUMBAR SPINE INDICATION:   Wedge compression fracture of first lumbar vertebra, initial encounter for closed fracture. COMPARISON: 8/28/2024. VIEWS:  XR SPINE THORACOLUMBAR 2 VW Images: 2 FINDINGS: Compression fracture of the L1 vertebral body again seen. This is similar to the prior. There is moderate dextrocurvature of the lumbar spine. There is no spondylolisthesis. There is moderate multilevel disc space narrowing with osteophytes throughout the lumbar spine worse at L2-L3. There is moderate-severe facet disease lower lumbar spine.. There is no displacement of the paraspinal line. The pedicles appear intact.     Impression: Redemonstration of L1 compression fracture Moderate multilevel spondylosis worse at L2-L3. Moderate to severe facet disease. Electronically signed: 09/13/2024 03:43 PM Stephane Rachel MD    Procedure: CT recon only lumbar spine  Result Date: 8/28/2024  Narrative: CT RECON ONLY LUMBAR SPINE (NO CHARGE) INDICATION:   fall pain in lumbar spine. COMPARISON:  None TECHNIQUE: Axial CT examination of the lumbar spine was obtained utilizing reconstructed images from CT of the chest, abdomen and pelvis performed the same day.  Images were reformatted in the sagittal and coronal planes. This examination, like all CT scans performed in the [facility], was performed utilizing techniques to minimize radiation dose exposure, including the use of iterative reconstruction and automated exposure control. FINDINGS: ALIGNMENT: Normal alignment. No spondylolisthesis.  No scoliosis. VERTEBRAE: Acute compression fracture at L1 with minimal superior endplate loss of height. No significant bony retropulsion.  Nonspecific partially imaged right T11 vertebral body lucent lesion. DEGENERATIVE CHANGES: Degenerative disc and endplate changes at L2-L3. Mild thoracolumbar dextroscoliosis. PREVERTEBRAL AND PARASPINAL SOFT TISSUES: Normal. OTHER: Unremarkable     Impression: Acute mild L1 compression fracture. Workstation performed: [id number]     Procedure: CT abdomen pelvis wo contrast  Result Date: 8/28/2024  Narrative: CT ABDOMEN AND PELVIS WITHOUT IV CONTRAST INDICATION: fall pain at lumboscarum area. COMPARISON: None. TECHNIQUE: CT examination of the abdomen and pelvis was performed without intravenous contrast. Multiplanar 2D reformatted images were created from the source data. This examination, like all CT scans performed in the [facility], was performed utilizing techniques to minimize radiation dose exposure, including the use of iterative reconstruction and automated exposure control. Radiation dose length product (DLP) for this visit: 513.54 mGy-cm Enteric Contrast: Not administered. FINDINGS: ABDOMEN LOWER CHEST: No clinically significant abnormality in the visualized lower chest. LIVER/BILIARY TREE: Unremarkable. GALLBLADDER: No calcified gallstones. No pericholecystic inflammatory change. SPLEEN: Unremarkable. PANCREAS: Unremarkable. ADRENAL GLANDS: Unremarkable. KIDNEYS/URETERS: Punctate nonobstructing left lower pole renal calculus. Suspected multiple left parapelvic cysts. Minimal left hydronephrosis without visualized obstructing calculus STOMACH AND BOWEL: Unremarkable. APPENDIX: No findings to suggest appendicitis. ABDOMINOPELVIC CAVITY: No ascites. No pneumoperitoneum. No lymphadenopathy. VESSELS: Unremarkable for patient's age. PELVIS REPRODUCTIVE ORGANS: Post hysterectomy. URINARY BLADDER: Unremarkable. ABDOMINAL WALL/INGUINAL REGIONS: Unremarkable. BONES: Acute compression fracture at L1 with minimal superior endplate loss of height. No significant bony retropulsion. Degenerative disc  "disease at L2-L3. Nonspecific lucent lesion at the right aspect of the T11 vertebral body. Mild thoracolumbar dextroscoliosis.     Impression: Acute mild compression fracture at L1. Minimal left hydronephrosis. Punctate nonobstructing left lower pole renal calculus. No visualized obstructing calculus. This study was marked for \"Immediate\" notification in EPIC. Workstation performed: [id number]       Administrative Statements   I have spent a total time of 45 minutes in caring for this patient on the day of the visit/encounter including Diagnostic results, Prognosis, Risks and benefits of tx options, Instructions for management, Patient and family education, Impressions, Counseling / Coordination of care, Documenting in the medical record, Reviewing / ordering tests, medicine, procedures  , Obtaining or reviewing history  , and Communicating with other healthcare professionals . Topics discussed with the patient / family include symptom assessment and management, medication review, medication adjustment, psychosocial support, goals of care, hospice services, supportive listening, and anticipatory guidance.  "

## 2024-12-11 NOTE — ASSESSMENT & PLAN NOTE
Goals of care discussion had today.  Patient considering quality of life at this time.  She feels that her overall quality has diminished greatly and she has not been finding alirio in anything she used to enjoy.  Furthermore, her symptoms have become significantly debilitating to her and we spent time discussing these symptoms at length and will provide some trials of medication and therapy to see if this will help.  We also discussed that she should talk more with her daughters about her quality of life and will plan to do this after the holidays and after her repeat imaging.  Discussed mental health counseling, and will plan to set her up with the palliative LCSW team.  An additional 16+ minutes were spent today in goals of care and advance care planning discussions.

## 2024-12-12 ENCOUNTER — APPOINTMENT (OUTPATIENT)
Dept: LAB | Facility: CLINIC | Age: 79
End: 2024-12-12
Payer: MEDICARE

## 2024-12-12 DIAGNOSIS — C50.911 BREAST CANCER METASTASIZED TO BONE, RIGHT (HCC): Chronic | ICD-10-CM

## 2024-12-12 DIAGNOSIS — C79.51 BREAST CANCER METASTASIZED TO BONE, RIGHT (HCC): Chronic | ICD-10-CM

## 2024-12-12 LAB
ATRIAL RATE: 76 BPM
P AXIS: -3 DEGREES
PR INTERVAL: 150 MS
QRS AXIS: 18 DEGREES
QRSD INTERVAL: 72 MS
QT INTERVAL: 376 MS
QTC INTERVAL: 423 MS
T WAVE AXIS: 22 DEGREES
VENTRICULAR RATE: 76 BPM

## 2024-12-12 PROCEDURE — 93010 ELECTROCARDIOGRAM REPORT: CPT | Performed by: INTERNAL MEDICINE

## 2024-12-13 ENCOUNTER — TELEPHONE (OUTPATIENT)
Dept: HEMATOLOGY ONCOLOGY | Facility: CLINIC | Age: 79
End: 2024-12-13

## 2024-12-13 ENCOUNTER — HOSPITAL ENCOUNTER (OUTPATIENT)
Dept: MRI IMAGING | Facility: HOSPITAL | Age: 79
Discharge: HOME | End: 2024-12-13
Attending: INTERNAL MEDICINE
Payer: MEDICARE

## 2024-12-13 DIAGNOSIS — Z17.0 MALIGNANT NEOPLASM OF UPPER-INNER QUADRANT OF LEFT BREAST IN FEMALE, ESTROGEN RECEPTOR POSITIVE (HCC): ICD-10-CM

## 2024-12-13 DIAGNOSIS — K83.8 DILATED CBD, ACQUIRED: ICD-10-CM

## 2024-12-13 DIAGNOSIS — C50.212 MALIGNANT NEOPLASM OF UPPER-INNER QUADRANT OF LEFT BREAST IN FEMALE, ESTROGEN RECEPTOR POSITIVE (HCC): ICD-10-CM

## 2024-12-13 DIAGNOSIS — C79.51 BREAST CANCER METASTASIZED TO BONE, RIGHT (HCC): Primary | ICD-10-CM

## 2024-12-13 DIAGNOSIS — R94.31 ABNORMAL EKG: ICD-10-CM

## 2024-12-13 DIAGNOSIS — C50.911 BREAST CANCER METASTASIZED TO BONE, RIGHT (HCC): Primary | ICD-10-CM

## 2024-12-13 DIAGNOSIS — Z79.811 LONG TERM (CURRENT) USE OF AROMATASE INHIBITORS: ICD-10-CM

## 2024-12-13 PROCEDURE — A9585 GADOBUTROL INJECTION: HCPCS | Performed by: INTERNAL MEDICINE

## 2024-12-13 PROCEDURE — 74183 MRI ABD W/O CNTR FLWD CNTR: CPT

## 2024-12-13 RX ORDER — GADOBUTROL 604.72 MG/ML
5 INJECTION INTRAVENOUS
Status: COMPLETED | OUTPATIENT
Start: 2024-12-13 | End: 2024-12-13

## 2024-12-13 RX ADMIN — GADOBUTROL 5 ML: 604.72 INJECTION INTRAVENOUS at 08:21

## 2024-12-13 NOTE — TELEPHONE ENCOUNTER
Call out to patient to review Dr. Amanda's recommendations. Reviewed recommendations of ER if patient is experiencing chest pain and cardiology referral if not.    Referral made. Left vm with above and call back number.    My chart message response sent as well.

## 2024-12-16 ENCOUNTER — TELEPHONE (OUTPATIENT)
Age: 79
End: 2024-12-16

## 2024-12-16 ENCOUNTER — TELEPHONE (OUTPATIENT)
Dept: GASTROENTEROLOGY | Facility: CLINIC | Age: 79
End: 2024-12-16

## 2024-12-16 NOTE — TELEPHONE ENCOUNTER
Patient called in and stated she was advised to go to the ED and she does not want to do that. States she wants to stop the femara and the kisqali. States they aren't helping, and if they only might extend her life by a couple years she doesn't want to feel like this all the time. Her biggest concerns are the night sweats, chills, and waking up soaking wet/freezing. Requests call back to discuss whether she can stop these medications. I did reiterate in the meantime if symptoms are worsening to go to the ED since she doesn't want to now. She verbalized an understanding.

## 2024-12-16 NOTE — TELEPHONE ENCOUNTER
"Hello,    The following message was sent via e-mail to the leadership team:     Please advise if you can help facilitate the following overbook request:    Patient Name: Alley Keys     Patient MRN: [id number]    Call back #: [phone]    Insurance: Medicare A & B     Department:Cardiology    Speciality: Cardio-Oncology    Reason for overbook request: STAT REFERRAL    Comments (Write \"N/a\" if no comments): Patient has an asap referral to cardiology. Patient was referred by her Oncologist Dr. Amanda.     Requested doctor and location: Dr. Payne     Date of current appointment: 1/22/25      Thank you.  "

## 2024-12-16 NOTE — TELEPHONE ENCOUNTER
Pt canceled her EGD. Pt needs to see cardio for an abnormal EKG and will call back to r/s in the future

## 2024-12-16 NOTE — TELEPHONE ENCOUNTER
Spoke to pt.  She will call back to schedule when she finds out what is going on after cardio appt

## 2024-12-18 ENCOUNTER — EVALUATION (OUTPATIENT)
Dept: PHYSICAL THERAPY | Facility: REHABILITATION | Age: 79
End: 2024-12-18
Payer: MEDICARE

## 2024-12-18 ENCOUNTER — TELEPHONE (OUTPATIENT)
Dept: CARDIOLOGY CLINIC | Facility: CLINIC | Age: 79
End: 2024-12-18

## 2024-12-18 ENCOUNTER — SOCIAL WORK (OUTPATIENT)
Dept: PALLIATIVE MEDICINE | Facility: CLINIC | Age: 79
End: 2024-12-18

## 2024-12-18 ENCOUNTER — PATIENT OUTREACH (OUTPATIENT)
Dept: CASE MANAGEMENT | Facility: OTHER | Age: 79
End: 2024-12-18

## 2024-12-18 DIAGNOSIS — R53.0 NEOPLASTIC (MALIGNANT) RELATED FATIGUE: ICD-10-CM

## 2024-12-18 DIAGNOSIS — Z71.89 COUNSELING AND COORDINATION OF CARE: Primary | ICD-10-CM

## 2024-12-18 PROCEDURE — 97110 THERAPEUTIC EXERCISES: CPT | Performed by: PHYSICAL THERAPIST

## 2024-12-18 PROCEDURE — 97161 PT EVAL LOW COMPLEX 20 MIN: CPT | Performed by: PHYSICAL THERAPIST

## 2024-12-18 PROCEDURE — NC001 PR NO CHARGE

## 2024-12-18 NOTE — PROGRESS NOTES
OSW called Mrs. Keys today. She shared she is dealing with bed soaking night sweats almost every night since she started Kisqali and Femara. She has since stopped them after talking with med-onc provider. She has a f/u appt with him in 2 weeks.  Her rash from radiation is almost gone. Today she saw PT and is hoping to regain some endurance and strength. She reflected on how active and strong she was before her cancer spread. Mrs. Keys reported she will see palliative SW therapist today. OSW strongly encouraged her to utilize this support.   She talked about her holiday plans and confirmed her daughter is flying in on Monday from [place]. She is looking forward to seeing the rest of her family.    Will continue to follow and provide support. Will plan on outreach again in a few weeks.

## 2024-12-18 NOTE — PROGRESS NOTES
Updated biopsychosocial information relevant to support:    Independent, athlethic, traveler  Widcowed  2nd  9 years  1st  24 yearsd  2 daughters 1 st marriage  She is close with both daughters , one [place] and other [place]  4 grand children    Alley spent the session rapport building and discussing her concerns regarding her diagnosis.    Identified areas of need include:  Developing coping skills   Identifying strengths  Increased positive thinking    Resources provided:    Emotional support    Areas that need future follow-up include:    I have spent 60 minutes with Patient  today in which greater than 50% of this time was spent in counseling/coordination of care     Palliative  will follow-up as requested by patient, family, and primary team.  Please contact with any specific requests

## 2024-12-18 NOTE — PROGRESS NOTES
"PT Evaluation     Today's date: 2024  Patient name: Yuni Keys  : 1945  MRN: [id number]  Referring provider: Lisa Contreras, *  Dx:   Encounter Diagnosis     ICD-10-CM    1. Neoplastic (malignant) related fatigue  R53.0 Ambulatory Referral to Physical Therapy          Start Time: 742          Assessment  Impairments: impaired physical strength and lacks appropriate home exercise program  Other impairment: decreased endurance    Assessment details: Patient has slight loss of strength but lacks endurance and would benefit from strengthening and aerobic endurance    Plan  Patient would benefit from: skilled physical therapy    Planned therapy interventions: strengthening    Frequency: 2x week  Duration in weeks: 6  Treatment plan discussed with: patient  Plan details: Continue strengthening with exercise progression as tolerated        Subjective Evaluation    History of Present Illness  Mechanism of injury: Patient had breast cancer with metastasis to the bone. \"I just sit all day doing nothing because I'm so tired\"  Patient Goals  Patient goals for therapy: increased strength and independence with ADLs/IADLs  Patient goal: walk 1.5 miles  Pain  No pain reported          Objective     Active Range of Motion   Left Shoulder   Normal active range of motion    Right Shoulder   Normal active range of motion    Strength/Myotome Testing     Left Shoulder     Planes of Motion   Flexion: 4   Abduction: 3+     Right Shoulder     Planes of Motion   Flexion: 4   Abduction: 3+     Left Elbow   Flexion: 4    Right Elbow   Flexion: 4    Left Hip   Planes of Motion   Flexion: 4  Abduction: 4  Adduction: 3+    Right Hip   Planes of Motion   Flexion: 4  Abduction: 4  Adduction: 3+    Left Knee   Flexion: 3+  Extension: 4+    Right Knee   Flexion: 3+  Extension: 4+      Flowsheet Rows      Flowsheet Row Most Recent Value   PT/OT G-Codes    Current Score 57   Projected Score 66               Precautions: " cancer      Manuals 12/18                                                                Neuro Re-Ed                                                                                                        Ther Ex             Shoulder flexion X 15            Bicep curls 2# x 20            Shoulder abduction X 15            marching X 20            Hip abduction standing B X 15 B                                                   Ther Activity             Sit to stand No hands x 10            Ube standing 1 min ea            treadmill 1.8 mph x 3 min            Recumbent #6 2 min            Gait Training                                       Modalities

## 2024-12-19 ENCOUNTER — TELEMEDICINE (OUTPATIENT)
Facility: HOSPITAL | Age: 79
End: 2024-12-19
Attending: RADIOLOGY

## 2024-12-19 DIAGNOSIS — C50.911 BREAST CANCER METASTASIZED TO BONE, RIGHT (HCC): Primary | Chronic | ICD-10-CM

## 2024-12-19 DIAGNOSIS — Z17.0 MALIGNANT NEOPLASM OF UPPER-INNER QUADRANT OF LEFT BREAST IN FEMALE, ESTROGEN RECEPTOR POSITIVE (HCC): ICD-10-CM

## 2024-12-19 DIAGNOSIS — C50.212 MALIGNANT NEOPLASM OF UPPER-INNER QUADRANT OF LEFT BREAST IN FEMALE, ESTROGEN RECEPTOR POSITIVE (HCC): ICD-10-CM

## 2024-12-19 DIAGNOSIS — C79.51 BREAST CANCER METASTASIZED TO BONE, RIGHT (HCC): Primary | Chronic | ICD-10-CM

## 2024-12-19 PROCEDURE — 99024 POSTOP FOLLOW-UP VISIT: CPT | Performed by: RADIOLOGY

## 2024-12-19 NOTE — ASSESSMENT & PLAN NOTE
Ms. Keys is a 79-year-old woman who was recently diagnosed with a static breast cancer to bone.  She was treated with kyphoplasty to the T8 and L1 vertebral bodies in October 2024 with palliative radiation to the thoracic spine and right shoulder for pain relief completed on November 20, 2024.  She appears to have had reasonable pain relief with the combination of therapies.  She reports that she has opted to discontinue letrozole and ribociclib due to toxicities.  She has a follow-up with Dr. Amanda to discuss this further on January 21, 2025.  We will schedule her for a telemedicine follow-up in 3 months.  She knows to contact our office should she have any concerns in the interim.

## 2024-12-19 NOTE — PROGRESS NOTES
Virtual Regular Visit    Problem List Items Addressed This Visit    None           Reason for visit is follow up     Encounter provider Olimpia Siddiqi MD    Provider located at [facility] RADIATION ONCOLOGY  [address]  [place] [id number]    Recent Visits  No visits were found meeting these conditions.  Showing recent visits within past 7 days and meeting all other requirements  Today's Visits  Date Type Provider Dept   12/19/24 Telemedicine Olimpia Siddiqi MD [facility]   Showing today's visits and meeting all other requirements  Future Appointments  No visits were found meeting these conditions.  Showing future appointments within next 150 days and meeting all other requirements       After connecting through televideo, the patient was identified by name and date of birth. Yuni Keys was informed that this is a telemedicine visit and that the visit is being conducted through the Epic Embedded platform. She agrees to proceed. which may not be secure and therefore, might not be HIPAA-compliant.  My office door was closed. No one else was in the room.  She acknowledged consent and understanding of privacy and security of the video platform. The patient has agreed to participate and understands they can discontinue the visit at any time.    Subjective  Yuni Keys is a 79 y.o. female With metastatic breast cancer. She completed radiation to right shoulder, T11-L2 and T7-T10 spine on 11/20/24. She is being contacted today for first follow-up.    11/20/24 ED visit for chest pain. Likely related to cancer. D/c home    11/21/24 Dr. Amanda- Continue Letrozole 2.5 mg daily and ribociclib 600 mg daily 21 days on and 7 days off. Recommend calcium. Can use Keytruda down the road due to TMB high.. DEXA due 4/2025. Continue Prolia every 6 months. Imaging scheduled January 2025.    12/11/24 palliative care- She feels that her overall  quality has diminished greatly and she has not been finding alirio in anything she used to enjoy.  Her symptoms have become significantly debilitating to her. We also discussed that she should talk more with her daughters about her quality of life and will plan to do this after the holidays and after her repeat imaging. Oxycodone 5-10 mg q4 hours prn. Start gabapentin 100 mg HS. F/u 4 weeks    24 Patient reported wanting to stop femara and kisqali. States if they only might extend her life by a couple years she does not want to feel like this all the time. Biggest concerns are night sweats, chills, waking up wet and freezing. Per Dr. Amanda, stop medications and discuss further at appt in 2 weeks      Upcomin24 Dr. Amanda  25 CT C/A/P  25 Dr. Amanda    Past Medical History:   Diagnosis Date    Arthritis     Breast mass     Cancer (HCC) 2024    Edema of both lower legs 2018    Dr. Thang Otero; faxed records.    Hx of skin cancer, basal cell     Hyperlipidemia     Hypertension     Impingement syndrome of left shoulder 04/10/2018    Dr. Thang Otero, faxed patient records.    Lactose intolerance     Migraines     Osteopenia     Overactive bladder     Overweight 2021    Skin cancer     Basal cell    Toe fracture, left 2023       Past Surgical History:   Procedure Laterality Date    ACHILLES TENDON REPAIR      BREAST BIOPSY Left 10/04/2024    BREAST CYST EXCISION Bilateral     1972    BREAST LUMPECTOMY      CATARACT EXTRACTION, BILATERAL      COLONOSCOPY      COLPOPEXY VAGINAL EXTRAPERITONEAL (VEC) WITH INSERTION PUBOVAGINAL SLING      CYST REMOVAL      HERNIA REPAIR      HYSTERECTOMY      IR KYPHOPLASTY/VERTEBROPLASTY WITH ABLATION  10/15/2024    JOINT REPLACEMENT Right     REPLACEMENT TOTAL KNEE Left     TONSILLECTOMY  194    US GUIDANCE BREAST BIOPSY LEFT EACH ADDITIONAL Left 10/04/2024    US GUIDED BREAST BIOPSY LEFT COMPLETE Left 10/04/2024        Current Outpatient Medications   Medication Sig Dispense Refill    atorvastatin (LIPITOR) 10 mg tablet Take 1 tablet (10 mg total) by mouth daily 90 tablet 3    Calcium 500-2.5 MG-MCG CHEW Chew 2 chews daily      Cholecalciferol (Vitamin D) 50 MCG (2000 UT) tablet Take 2,000 Units by mouth daily      gabapentin (Neurontin) 100 mg capsule Take 1 capsule (100 mg total) by mouth daily at bedtime 30 capsule 0    Multiple Vitamins-Minerals (MULTIVITAMIN WITH MINERALS) tablet Take 1 tablet by mouth daily      polyethylene glycol (GLYCOLAX) 17 GM/SCOOP powder Take 17 g by mouth 2 (two) times a day      Sodium Fluoride 5000 PPM 1.1 % PSTE APPLY TIHN RIBBON TO TOOTHBRUSH, BRUSH X 2MIN AT BEDTIME, SPIT, DONT RINSE      hydrocortisone 2.5 % cream Apply topically 4 (four) times a day as needed for rash (Patient not taking: Reported on 12/19/2024) 3.5 g 1    letrozole (FEMARA) 2.5 mg tablet Take 1 tablet (2.5 mg total) by mouth daily (Patient not taking: Reported on 12/19/2024) 90 tablet 3    ondansetron (ZOFRAN-ODT) 8 mg disintegrating tablet Take 1 tablet (8 mg total) by mouth every 8 (eight) hours as needed for nausea or vomiting (Patient not taking: Reported on 12/19/2024) 20 tablet 0    pantoprazole (PROTONIX) 40 mg tablet Take 1 tablet (40 mg total) by mouth daily (Patient not taking: Reported on 12/19/2024) 60 tablet 0    Ribociclib Succ, 400 MG Dose, (Kisqali, 400 MG Dose,) 200 MG TBPK Take 400 mg by mouth daily 21 days on, followed by 7 days off (Patient not taking: Reported on 12/19/2024) 42 each 5    sucralfate (CARAFATE) 1 g/10 mL suspension Take 10 mL (1 g total) by mouth 4 (four) times a day (with meals and at bedtime) (Patient not taking: Reported on 12/11/2024) 473 mL 0     No current facility-administered medications for this visit.        Allergies   Allergen Reactions    Adhesive [Medical Tape] Hives, Itching and Blisters    Bupropion Rash     Around 2010       I spent  minutes with the patient during  this visit.    Follow up visit     Oncology History   Breast cancer metastasized to bone, right (HCC)   9/27/2024 Initial Diagnosis    Breast cancer metastasized to bone, right (HCC)     10/12/2024 -  Cancer Staged    Staging form: Breast, AJCC 8th Edition  - Clinical: Stage IV (cM1) - Signed by Fam Amanda MD on 10/12/2024       11/5/2024 - 11/20/2024 Radiation      Plan ID Energy Fractions Dose per Fraction (cGy) Dose Correction (cGy) Total Dose Delivered (cGy) Elapsed Days   RT Shoulder 6X 5 / 5 400 0 2,000 7   T11_L2 Spine 10X/6X 10 / 10 300 0 3,000 15   T7_T10 Spine 6X 10 / 10 300 0 3,000 15         Malignant neoplasm of upper-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, estrogen receptor positive (HCC)   10/2/2024 Observation    NM PET/CT IMPRESSION:   1. Mild focal radiotracer uptake at a left breast nodular density medially. Underlying primary breast malignancy should be excluded.  2. Several small FDG avid left axillary/subpectoral lymph nodes would raise suspicion for metastasis.  3. Scattered FDG avid lytic lesions compatible with metastasis.     10/4/2024 Biopsy    Left breast ultrasound-guided biopsy  A. 3 o'clock, periareolar  Benign breast tissue with fibroadenomatoid nodule    B. 11 o'clock, 7 cm from nipple  Invasive mammary carcinoma with mixed ductal and lobular features  Grade 1-2  ER , IA <1, HER2 1+  Lymphovascular invasion not definitively identified    Concordant. Malignancy is poorly defined on mammo, appearing as a developing asymmetry rather than a discrete mass; this measures up to 5.8 cm. On US, mass measures up to 4.1cm. Subtle asymmetry with possible distortion slightly superior and medial which could represent a second site of disease. Further characterization is indication. Right breast imaging performed on 4/12/2024; more recent mammogram may be reasonable. CESM or MRI is recommended for evaluation of extent of disease in left breast. The patient had abnormal axillary lymph  nodes seen on PET/CT. 1 of these lymph nodes was visualized on US; however, it is not amenable to US-guided core needle biopsy.     10/12/2024 -  Cancer Staged    Staging form: Breast, AJCC 8th Edition  - Clinical: Stage IV (cM1) - Signed by Fam Amanda MD on 10/12/2024       10/15/2024 Biopsy    IR-guided biopsy    T8 - metastatic carcinoma, most compatible with known breast primary  ~RECEPTORS PENDING~    L1 - negative for carcinoma     11/5/2024 - 11/20/2024 Radiation      Plan ID Energy Fractions Dose per Fraction (cGy) Dose Correction (cGy) Total Dose Delivered (cGy) Elapsed Days   RT Shoulder 6X 5 / 5 400 0 2,000 7   T11_L2 Spine 10X/6X 10 / 10 300 0 3,000 15   T7_T10 Spine 6X 10 / 10 300 0 3,000 15             Review of Systems:  Review of Systems   Constitutional:  Positive for appetite change, fatigue and fever (off and on for over a month).   HENT: Negative.     Eyes: Negative.    Respiratory: Negative.     Cardiovascular: Negative.    Gastrointestinal: Negative.    Endocrine: Positive for cold intolerance and heat intolerance (at night).   Genitourinary: Negative.    Musculoskeletal:  Positive for back pain (2/3 out of 10 mid back pain. Intermitent). Negative for gait problem.   Skin: Negative.         Reports rash after RT cleared 2 days ago   Allergic/Immunologic: Negative.    Neurological: Negative.  Negative for numbness.   Hematological: Negative.    Psychiatric/Behavioral:  Positive for sleep disturbance.          Health Maintenance   Topic Date Due    Hepatitis C Screening  Never done    Pneumococcal Vaccine: 65+ Years (1 of 2 - PCV) Never done    Zoster Vaccine (1 of 2) Never done    Falls: Plan of Care  Never done    Breast Cancer Screening: Mammogram  04/07/2024    COVID-19 Vaccine (8 - 2024-25 season) 12/28/2024    PT PLAN OF CARE  01/17/2025    Depression Screening  11/08/2025    Urinary Incontinence Screening  11/08/2025    Medicare Annual Wellness Visit (AWV)  11/08/2025    Depression  Follow-up Plan  11/08/2025    BMI: Adult  12/11/2025    Fall Risk  12/18/2025    Colorectal Cancer Screening  01/26/2028    DXA SCAN  04/07/2028    Osteoporosis Screening  Completed    RSV Vaccine Age 60+ Years  Completed    Influenza Vaccine  Completed    Meningococcal B Vaccine  Aged Out    RSV Vaccine age 0-20 Months  Aged Out    HIB Vaccine  Aged Out    IPV Vaccine  Aged Out    Hepatitis A Vaccine  Aged Out    Meningococcal ACWY Vaccine  Aged Out    HPV Vaccine  Aged Out     Patient Active Problem List   Diagnosis    Lumbar disc herniation    Lumbar radiculopathy    Spinal stenosis of lumbar region with neurogenic claudication    Retrocalcaneal bursitis (back of heel), right    Hyperlipidemia    MCI (mild cognitive impairment)    Family history of early CAD    History of abnormal uterine bleeding    History of hysterectomy    Primary insomnia    Compression fracture of L1 lumbar vertebra (HCC)    Closed compression fracture of body of L1 vertebra (HCC)    Breast cancer metastasized to bone, right (HCC)    Abnormal positron emission tomography (PET) scan    Malignant neoplasm of upper-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, estrogen receptor positive (HCC)    Age-related osteoporosis with current pathological fracture of vertebra (HCC)    Pathological fracture in neoplastic disease, other specified site, sequela    Depression, recurrent (HCC)    Constipation    Bilateral hydronephrosis    Cancer related pain    Palliative care by specialist    Odynophagia    Esophagitis    Acute radiation dermatitis    Goals of care, counseling/discussion     Past Medical History:   Diagnosis Date    Arthritis     Breast mass     Cancer (HCC) 8/28/2024    Edema of both lower legs 11/01/2018    Dr. Thang Otero; faxed records.    Hx of skin cancer, basal cell     Hyperlipidemia     Hypertension     Impingement syndrome of left shoulder 04/10/2018    Dr. Thang Otero, faxed patient records.    Lactose intolerance      Migraines     Osteopenia     Overactive bladder     Overweight 2021    Skin cancer     Basal cell    Toe fracture, left 2023     Past Surgical History:   Procedure Laterality Date    ACHILLES TENDON REPAIR      BREAST BIOPSY Left 10/04/2024    BREAST CYST EXCISION Bilateral     1972    BREAST LUMPECTOMY      CATARACT EXTRACTION, BILATERAL      COLONOSCOPY      COLPOPEXY VAGINAL EXTRAPERITONEAL (VEC) WITH INSERTION PUBOVAGINAL SLING      CYST REMOVAL      HERNIA REPAIR      HYSTERECTOMY      IR KYPHOPLASTY/VERTEBROPLASTY WITH ABLATION  10/15/2024    JOINT REPLACEMENT Right     REPLACEMENT TOTAL KNEE Left     TONSILLECTOMY  194    US GUIDANCE BREAST BIOPSY LEFT EACH ADDITIONAL Left 10/04/2024    US GUIDED BREAST BIOPSY LEFT COMPLETE Left 10/04/2024     Family History   Problem Relation Age of Onset    Heart disease Father     Melanoma Daughter         40s    Melanoma Daughter         50s    Ovarian cancer Maternal Aunt     Cancer Maternal Aunt         Ovarian Cancer    Breast cancer Paternal Aunt     Breast cancer Maternal Grandmother     Heart disease Paternal Grandmother     Hypertension Paternal Grandfather      Social History     Socioeconomic History    Marital status:      Spouse name: Not on file    Number of children: 2    Years of education: 18    Highest education level: Master's degree (e.g., MA, MS, Derek, MEd, MSW, CHANELL)   Occupational History    Not on file   Tobacco Use    Smoking status: Former     Current packs/day: 0.00     Average packs/day: 0.5 packs/day for 15.0 years (7.5 ttl pk-yrs)     Types: Cigarettes     Quit date: 1977     Years since quittin.1     Passive exposure: Past    Smokeless tobacco: Never   Vaping Use    Vaping status: Never Used   Substance and Sexual Activity    Alcohol use: Not Currently     Alcohol/week: 1.0 standard drink of alcohol     Types: 1 Glasses of wine per week     Comment: occasional    Drug use: Never    Sexual activity: Not  Currently     Partners: Male     Birth control/protection: Abstinence, Post-menopausal, Female Sterilization   Other Topics Concern    Not on file   Social History Narrative    Not on file     Social Drivers of Health     Financial Resource Strain: Patient Declined (11/6/2023)    Overall Financial Resource Strain (CARDIA)     Difficulty of Paying Living Expenses: Patient declined   Food Insecurity: No Food Insecurity (11/10/2024)    Hunger Vital Sign     Worried About Running Out of Food in the Last Year: Never true     Ran Out of Food in the Last Year: Never true   Transportation Needs: No Transportation Needs (11/10/2024)    PRAPARE - Transportation     Lack of Transportation (Medical): No     Lack of Transportation (Non-Medical): No   Physical Activity: Sufficiently Active (3/24/2022)    Exercise Vital Sign     Days of Exercise per Week: 4 days     Minutes of Exercise per Session: 60 min   Stress: Stress Concern Present (3/24/2022)    [employer] [place] [employer] - Occupational Stress Questionnaire     Feeling of Stress : Rather much   Social Connections: Socially Isolated (3/24/2022)    Social Connection and Isolation Panel [NHANES]     Frequency of Communication with Friends and Family: More than three times a week     Frequency of Social Gatherings with Friends and Family: Three times a week     Attends Cheondoism Services: Never     Active Member of Clubs or Organizations: No     Attends Club or Organization Meetings: Never     Marital Status:    Intimate Partner Violence: Unknown (11/9/2024)    Nursing IPS     Feels Physically and Emotionally Safe: Not on file     Physically Hurt by Someone: Not on file     Humiliated or Emotionally Abused by Someone: Not on file     Physically Hurt by Someone: 2     Hurt or Threatened by Someone: 2   Housing Stability: Unknown (11/10/2024)    Housing Stability Vital Sign     Unable to Pay for Housing in the Last Year: No     Number of Times Moved in the  Last Year: Not on file     Homeless in the Last Year: No       Current Outpatient Medications:     atorvastatin (LIPITOR) 10 mg tablet, Take 1 tablet (10 mg total) by mouth daily, Disp: 90 tablet, Rfl: 3    Calcium 500-2.5 MG-MCG CHEW, Chew 2 chews daily, Disp: , Rfl:     Cholecalciferol (Vitamin D) 50 MCG (2000 UT) tablet, Take 2,000 Units by mouth daily, Disp: , Rfl:     gabapentin (Neurontin) 100 mg capsule, Take 1 capsule (100 mg total) by mouth daily at bedtime, Disp: 30 capsule, Rfl: 0    Multiple Vitamins-Minerals (MULTIVITAMIN WITH MINERALS) tablet, Take 1 tablet by mouth daily, Disp: , Rfl:     polyethylene glycol (GLYCOLAX) 17 GM/SCOOP powder, Take 17 g by mouth 2 (two) times a day, Disp: , Rfl:     Sodium Fluoride 5000 PPM 1.1 % PSTE, APPLY TIHN RIBBON TO TOOTHBRUSH, BRUSH X 2MIN AT BEDTIME, SPIT, DONT RINSE, Disp: , Rfl:     hydrocortisone 2.5 % cream, Apply topically 4 (four) times a day as needed for rash (Patient not taking: Reported on 12/19/2024), Disp: 3.5 g, Rfl: 1    letrozole (FEMARA) 2.5 mg tablet, Take 1 tablet (2.5 mg total) by mouth daily (Patient not taking: Reported on 12/19/2024), Disp: 90 tablet, Rfl: 3    ondansetron (ZOFRAN-ODT) 8 mg disintegrating tablet, Take 1 tablet (8 mg total) by mouth every 8 (eight) hours as needed for nausea or vomiting (Patient not taking: Reported on 12/19/2024), Disp: 20 tablet, Rfl: 0    pantoprazole (PROTONIX) 40 mg tablet, Take 1 tablet (40 mg total) by mouth daily (Patient not taking: Reported on 12/19/2024), Disp: 60 tablet, Rfl: 0    Ribociclib Succ, 400 MG Dose, (Kisqali, 400 MG Dose,) 200 MG TBPK, Take 400 mg by mouth daily 21 days on, followed by 7 days off (Patient not taking: Reported on 12/19/2024), Disp: 42 each, Rfl: 5    sucralfate (CARAFATE) 1 g/10 mL suspension, Take 10 mL (1 g total) by mouth 4 (four) times a day (with meals and at bedtime) (Patient not taking: Reported on 12/11/2024), Disp: 473 mL, Rfl: 0  Allergies   Allergen Reactions     Adhesive [Medical Tape] Hives, Itching and Blisters    Bupropion Rash     Around 2010     There were no vitals filed for this visit.        Imaging:X-ray chest 2 views  Result Date: 11/21/2024  Narrative: XR CHEST PA AND LATERAL INDICATION: chest pain. COMPARISON: None FINDINGS: Clear lungs. There is mild elevation of the left hemidiaphragm. No pneumothorax or pleural effusion. Normal cardiomediastinal silhouette. Midthoracic and upper lumbar compression fractures status post kyphoplasty. Normal upper abdomen.     Impression: No acute cardiopulmonary disease. This report is in agreement with the preliminary interpretation. Workstation performed: [id number]

## 2024-12-19 NOTE — PROGRESS NOTES
Follow-up Visit   Name: Yuni Keys      : 1945      MRN: [id number]  Encounter Provider: Olimpia Siddiqi MD  Encounter Date: 2024   Encounter department: [facility] RADIATION ONCOLOGY  :    Telemedicine consent    Patient: Yuni Keys  Provider: Olimpia Siddiqi MD  Provider located at [facility] RADIATION ONCOLOGY  [address] [facility] [address] [facility] [address]  Assessment & Plan  Breast cancer metastasized to bone, right (HCC)    Malignant neoplasm of upper-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, estrogen receptor positive (HCC)    Ms. Keys is a 79-year-old woman who was recently diagnosed with a static breast cancer to bone.  She was treated with kyphoplasty to the T8 and L1 vertebral bodies in 2024 with palliative radiation to the thoracic spine and right shoulder for pain relief completed on 2024.  She appears to have had reasonable pain relief with the combination of therapies.  She reports that she has opted to discontinue letrozole and ribociclib due to toxicities.  She has a follow-up with Dr. Amanda to discuss this further on 2025.  We will schedule her for a telemedicine follow-up in 3 months.  She knows to contact our office should she have any concerns in the interim.      History of Present Illness   Chief Complaint   Patient presents with    Follow-up   Pertinent Medical History     Ms. Keys is a 78-year-old postmenopausal woman who reports that she had a fall off of a ladder in 2024.  She subsequently had significant back pain therefore presented to the emergency department on 2024 where a CT of the abdomen pelvis without contrast showed an acute compression fracture of L1 with no retropulsed bone and a lucent lesion in the right T11 vertebral body.  She was seen by neurosurgery on September 3 who obtained an x-ray of the spine that  showed again a compression fracture of the L1 vertebral body, multilevel disc space narrowing.  Neurosurgery then obtained an MRI of the lumbar spine on September 21 which showed indeterminate hyperintense lesion in the posterior aspect of T11 extending to the pedicle concerning for malignancy, worsening compression fracture at L1 with 50% height loss and associated bone marrow edema now with mild retropulsion of bone, multilevel spondylosis.  Patient subsequently underwent a PET/CT on October 2, 2024.  This showed uptake in a left breast nodular density medially, small FDG avid left axillary and subpectoral nodes, uptake in the right acromion with a corresponding lytic process, scattered FDG avid lytic lesions in T8, T11, L1 and the ischial tuberosity compatible with metastases.  She had mammograms on October 4, 2004 that showed an irregular mass in the medial left breast corresponding to the PET finding, an oval intraductal mass in the left breast 3 o'clock position and another 6 mm stable mass at 6:00, 1 lymph node visualized on ultrasound was not in a position amenable to biopsy.  The left breast mass was biopsied on that date and showed grade 1-2 invasive carcinoma with mixed ductal lobular features, estrogen receptor 100%, progesterone receptor negative, HER2 negative.  She underwent kyphoplasty and vertebroplasty of T8 and L1 on October 15, pathology from T8 vertebroplasty shows metastatic carcinoma consistent with her breast primary.  Pathology from the L1 vertebroplasty showed only fragment of bone.  She reported right shoulder pain with decreased range of motion.  Dr. Amanda in medical oncology who prescribed letrozole and ribociclib.  Received palliative radiation to the spine from T7-L2, receiving a dose of 3000 cGy in 10 fractions simultaneously to the right shoulder receiving 2000 cGy in 5 fractions, radiation delivered between November 5 and November 20, 2024.  She is seen for posttreatment  follow-up visit via telemedicine today.    Patient states that she has some residual back pain which is improving.  She is not describing any specific right shoulder pain at this point in time.  She denies any new or persistent bone or joint pain.  She has decided to discontinue letrozole and ribociclib due to significant toxicities.  She had some trouble swallowing after the completion of radiation which has resolved.      Oncology History   Oncology History   Breast cancer metastasized to bone, right (HCC)   9/27/2024 Initial Diagnosis    Breast cancer metastasized to bone, right (HCC)     10/12/2024 -  Cancer Staged    Staging form: Breast, AJCC 8th Edition  - Clinical: Stage IV (cM1) - Signed by Fam Amanda MD on 10/12/2024       11/5/2024 - 11/20/2024 Radiation      Plan ID Energy Fractions Dose per Fraction (cGy) Dose Correction (cGy) Total Dose Delivered (cGy) Elapsed Days   RT Shoulder 6X 5 / 5 400 0 2,000 7   T11_L2 Spine 10X/6X 10 / 10 300 0 3,000 15   T7_T10 Spine 6X 10 / 10 300 0 3,000 15         Malignant neoplasm of upper-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, estrogen receptor positive (HCC)   10/2/2024 Observation    NM PET/CT IMPRESSION:   1. Mild focal radiotracer uptake at a left breast nodular density medially. Underlying primary breast malignancy should be excluded.  2. Several small FDG avid left axillary/subpectoral lymph nodes would raise suspicion for metastasis.  3. Scattered FDG avid lytic lesions compatible with metastasis.     10/4/2024 Biopsy    Left breast ultrasound-guided biopsy  A. 3 o'clock, periareolar  Benign breast tissue with fibroadenomatoid nodule    B. 11 o'clock, 7 cm from nipple  Invasive mammary carcinoma with mixed ductal and lobular features  Grade 1-2  ER , NM <1, HER2 1+  Lymphovascular invasion not definitively identified    Concordant. Malignancy is poorly defined on mammo, appearing as a developing asymmetry rather than a discrete mass; this measures  up to 5.8 cm. On US, mass measures up to 4.1cm. Subtle asymmetry with possible distortion slightly superior and medial which could represent a second site of disease. Further characterization is indication. Right breast imaging performed on 4/12/2024; more recent mammogram may be reasonable. CESM or MRI is recommended for evaluation of extent of disease in left breast. The patient had abnormal axillary lymph nodes seen on PET/CT. 1 of these lymph nodes was visualized on US; however, it is not amenable to US-guided core needle biopsy.     10/12/2024 -  Cancer Staged    Staging form: Breast, AJCC 8th Edition  - Clinical: Stage IV (cM1) - Signed by Fam Amanda MD on 10/12/2024       10/15/2024 Biopsy    IR-guided biopsy    T8 - metastatic carcinoma, most compatible with known breast primary  ~RECEPTORS PENDING~    L1 - negative for carcinoma     11/5/2024 - 11/20/2024 Radiation      Plan ID Energy Fractions Dose per Fraction (cGy) Dose Correction (cGy) Total Dose Delivered (cGy) Elapsed Days   RT Shoulder 6X 5 / 5 400 0 2,000 7   T11_L2 Spine 10X/6X 10 / 10 300 0 3,000 15   T7_T10 Spine 6X 10 / 10 300 0 3,000 15            Review of Systems Refer to nursing note.        Objective   There were no vitals taken for this visit.    Pain Screening:   3  ECOG 2   Physical Exam  Constitutional:       General: She is not in acute distress.     Appearance: Normal appearance.   Neurological:      Mental Status: She is alert.     Telemedicine encounter, no physical examination performed    The patient was identified by name and date of birth. Yuni Keys was informed that this is a telemedicine visit and that the visit is being conducted through the Epic Embedded platform. She agrees to proceed..  My office door was closed. No one else was in the room.  She acknowledged consent and understanding of privacy and security of the video platform. The patient has agreed to participate and understands they can discontinue the  "visit at any time.    Patient is aware this is a billable service.     I spent 20 minutes with the patient during this visit.     Administrative Statements   I have spent a total time of 20 minutes in caring for this patient on the day of the visit/encounter including Impressions, Counseling / Coordination of care, Documenting in the medical record, Reviewing / ordering tests, medicine, procedures  , and Obtaining or reviewing history  . Topics discussed with the patient / family include symptom assessment and management.  Portions of the record may have been created with voice recognition software.  Occasional wrong word or \"sound a like\" substitutions may have occurred due to the inherent limitations of voice recognition software.  Read the chart carefully and recognize, using context, where substitutions have occurred.  "

## 2024-12-20 ENCOUNTER — APPOINTMENT (OUTPATIENT)
Dept: LAB | Facility: CLINIC | Age: 79
End: 2024-12-20
Payer: MEDICARE

## 2024-12-20 ENCOUNTER — TELEPHONE (OUTPATIENT)
Age: 79
End: 2024-12-20

## 2024-12-20 DIAGNOSIS — C79.51 BREAST CANCER METASTASIZED TO BONE, RIGHT (HCC): Chronic | ICD-10-CM

## 2024-12-20 DIAGNOSIS — C50.911 BREAST CANCER METASTASIZED TO BONE, RIGHT (HCC): Chronic | ICD-10-CM

## 2024-12-20 LAB
ALBUMIN SERPL BCG-MCNC: 3.6 G/DL (ref 3.5–5)
ALP SERPL-CCNC: 60 U/L (ref 34–104)
ALT SERPL W P-5'-P-CCNC: 19 U/L (ref 7–52)
ANION GAP SERPL CALCULATED.3IONS-SCNC: 7 MMOL/L (ref 4–13)
AST SERPL W P-5'-P-CCNC: 22 U/L (ref 13–39)
BASOPHILS # BLD AUTO: 0.04 THOUSANDS/ΜL (ref 0–0.1)
BASOPHILS NFR BLD AUTO: 1 % (ref 0–1)
BILIRUB SERPL-MCNC: 0.3 MG/DL (ref 0.2–1)
BUN SERPL-MCNC: 11 MG/DL (ref 5–25)
CALCIUM SERPL-MCNC: 9.1 MG/DL (ref 8.4–10.2)
CHLORIDE SERPL-SCNC: 103 MMOL/L (ref 96–108)
CO2 SERPL-SCNC: 32 MMOL/L (ref 21–32)
CREAT SERPL-MCNC: 0.51 MG/DL (ref 0.6–1.3)
EOSINOPHIL # BLD AUTO: 0.14 THOUSAND/ΜL (ref 0–0.61)
EOSINOPHIL NFR BLD AUTO: 4 % (ref 0–6)
ERYTHROCYTE [DISTWIDTH] IN BLOOD BY AUTOMATED COUNT: 20.1 % (ref 11.6–15.1)
GFR SERPL CREATININE-BSD FRML MDRD: 91 ML/MIN/1.73SQ M
GLUCOSE SERPL-MCNC: 80 MG/DL (ref 65–140)
HCT VFR BLD AUTO: 37 % (ref 34.8–46.1)
HGB BLD-MCNC: 12.5 G/DL (ref 11.5–15.4)
IMM GRANULOCYTES # BLD AUTO: 0.05 THOUSAND/UL (ref 0–0.2)
IMM GRANULOCYTES NFR BLD AUTO: 1 % (ref 0–2)
LYMPHOCYTES # BLD AUTO: 0.27 THOUSANDS/ΜL (ref 0.6–4.47)
LYMPHOCYTES NFR BLD AUTO: 8 % (ref 14–44)
MCH RBC QN AUTO: 32.2 PG (ref 26.8–34.3)
MCHC RBC AUTO-ENTMCNC: 33.8 G/DL (ref 31.4–37.4)
MCV RBC AUTO: 95 FL (ref 82–98)
MONOCYTES # BLD AUTO: 0.34 THOUSAND/ΜL (ref 0.17–1.22)
MONOCYTES NFR BLD AUTO: 10 % (ref 4–12)
NEUTROPHILS # BLD AUTO: 2.64 THOUSANDS/ΜL (ref 1.85–7.62)
NEUTS SEG NFR BLD AUTO: 76 % (ref 43–75)
NRBC BLD AUTO-RTO: 1 /100 WBCS
PLATELET # BLD AUTO: 334 THOUSANDS/UL (ref 149–390)
PMV BLD AUTO: 9.8 FL (ref 8.9–12.7)
POTASSIUM SERPL-SCNC: 3.3 MMOL/L (ref 3.5–5.3)
PROT SERPL-MCNC: 6.3 G/DL (ref 6.4–8.4)
RBC # BLD AUTO: 3.88 MILLION/UL (ref 3.81–5.12)
SODIUM SERPL-SCNC: 142 MMOL/L (ref 135–147)
TSH SERPL DL<=0.05 MIU/L-ACNC: 2.39 UIU/ML (ref 0.45–4.5)
WBC # BLD AUTO: 3.48 THOUSAND/UL (ref 4.31–10.16)

## 2024-12-20 PROCEDURE — 85025 COMPLETE CBC W/AUTO DIFF WBC: CPT

## 2024-12-20 PROCEDURE — 84443 ASSAY THYROID STIM HORMONE: CPT

## 2024-12-20 PROCEDURE — 80053 COMPREHEN METABOLIC PANEL: CPT

## 2024-12-20 PROCEDURE — 36415 COLL VENOUS BLD VENIPUNCTURE: CPT

## 2024-12-20 NOTE — TELEPHONE ENCOUNTER
Patient called to ask Dr Fairbanks, if the night sweats, hot and cold intolerance, disrupted sleep patterns may be related to her thyroid. She is now 10 days off of all other medication and would like know if Dr Fairbanks feels it appropriate at this time to re run the thyroid panel. Please advise and follow up with a phone call to patient thank you

## 2024-12-22 ENCOUNTER — RESULTS FOLLOW-UP (OUTPATIENT)
Dept: GASTROENTEROLOGY | Facility: CLINIC | Age: 79
End: 2024-12-22

## 2024-12-25 DIAGNOSIS — R23.2 HOT FLASHES: ICD-10-CM

## 2024-12-26 ENCOUNTER — TELEPHONE (OUTPATIENT)
Age: 79
End: 2024-12-26

## 2024-12-26 RX ORDER — GABAPENTIN 100 MG/1
100 CAPSULE ORAL
Qty: 30 CAPSULE | Refills: 0 | Status: SHIPPED | OUTPATIENT
Start: 2024-12-26 | End: 2024-12-27 | Stop reason: SDUPTHER

## 2024-12-26 NOTE — PROGRESS NOTES
"Cardio-Oncology / Heart Failure Cardiology Clinic Note    Yuni Keys 79 y.o. female   MRN: [id number]  Encounter: [id number]        Assessment / Plan:    # Cardio-Oncology Pertinent History  Breast cancer  Dx 2024  Metastatic to bone  S/p palliative XRT to spine and shoulder  Current:   letrozole, ribociclib  (patient holding due to perceived toxicities)    # Abnormal EKG  The patient was getting serial EKGs to monitor QTc on ribociclib.  An EKG on December 12th reported sinus rhythm and \"cannot rule out anterior infarct\".   Reviewed EKG - does not appear to definitively meet criteria for anterior infarct  Check echo to ensure normal EF and no wall motion abnormalities    # Cardiology risk assessment prior to EGD  Procedure was postponed for the abnormal EKG  Check echo as above    # Coronary artery calcifications  On statin with good LDL control  Hold off on starting aspirin in setting of possible esophagitis and upcoming EGD  Any chest pain would obtain stress test  (denies chest pain or OLMEDO)    # HTN  Was on medication in the past but taken off with weight loss  BP today 134/70  Continue to monitor off medication    # HLD  Lipitor 10  LDL 62    # High risk medication use  Ribociclib can prolong QTc.  Previous EKGs reviewed and QTc interval has been acceptable.      Today's Plan Summary:  See above assessment/plan for full details of today's plan.  Briefly,     Check echo                Reason For Visit / Chief Complaint:  Referred by Dr. Amanda for a new patient visit for abnormal EKG.    HPI:   Yuni Keys is a 79 y.o.  female with history as noted in the problem list and further detailed in the above assessment and plan.    Referred by Dr. Amanda for a new patient visit for abnormal EKG.    As above, the patient has a history of metastatic breast cancer.  She was on a medication that can prolong QT (ribociclib ) so she was getting serial EKGs.  An EKG on December 12 reported sinus rhythm and \"cannot " "rule out anterior infarct\".  The patient was referred to cardio-oncology.    Today, the patient reports  -  denies chest pain.   No SOB or OLMEDO.   No orthopnea or PND.   No leg edema.  No palpitations, presyncope, or syncope.       Retired.  Was an .   .   Lives by herself.  2 daughters.    Nonsmoker.   No ETOH.   No drugs.          Cardiac Imaging personally reviewed:  EKG 12-12-24  Normal sinus rhythm   Holter or event monitor    Echo 10-31-22  EF 60%.  GLS -21%.  Mild AI         LOGAN    Cardiac MRI    Stress testing    Coronary CTA or [facility]    CPET              Patient Active Problem List    Diagnosis Date Noted   • Coronary artery calcification 12/27/2024   • Goals of care, counseling/discussion 12/11/2024   • Odynophagia 12/02/2024   • Esophagitis 12/02/2024   • Acute radiation dermatitis 12/02/2024   • Cancer related pain 11/11/2024   • Palliative care by specialist 11/11/2024   • Constipation 11/09/2024   • Bilateral hydronephrosis 11/09/2024   • Depression, recurrent ([facility]) 11/08/2024   • Age-related osteoporosis with current pathological fracture of vertebra ([facility]) 11/01/2024   • Pathological fracture in neoplastic disease, other specified site, sequela 11/01/2024   • Malignant neoplasm of upper-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, estrogen receptor positive ([facility]) 10/10/2024   • Abnormal positron emission tomography (PET) scan 10/03/2024   • Breast cancer metastasized to bone, right ([facility]) 09/27/2024   • Closed compression fracture of body of L1 vertebra ([facility]) 09/03/2024   • Compression fracture of L1 lumbar vertebra ([facility]) 08/30/2024   • Primary insomnia 05/07/2024   • History of hysterectomy 01/27/2023   • Family history of early CAD 10/14/2022   • History of abnormal uterine bleeding 10/14/2022   • MCI (mild cognitive impairment) 02/17/2022   • Primary hypertension 12/16/2021   • Hyperlipidemia 12/16/2021   • Retrocalcaneal bursitis (back of heel), right 12/30/2020   • Lumbar disc " herniation    • Lumbar radiculopathy    • Spinal stenosis of lumbar region with neurogenic claudication        Past Medical History:   Diagnosis Date   • Arthritis    • Breast mass    • Cancer (HCC) 8/28/2024   • Edema of both lower legs 11/01/2018    Dr. Thang Otero; faxed records.   • Hx of skin cancer, basal cell    • Hyperlipidemia    • Hypertension    • Impingement syndrome of left shoulder 04/10/2018    Dr. Thang Otero, faxed patient records.   • Lactose intolerance    • Migraines    • Osteopenia    • Overactive bladder    • Overweight 12/16/2021   • Skin cancer 2022    Basal cell   • Toe fracture, left 11/16/2023       Review of Systems   Constitutional:  Positive for diaphoresis. Negative for chills and fever.   HENT:  Negative for nosebleeds.    Gastrointestinal:  Negative for abdominal distention, abdominal pain and blood in stool.   Neurological:  Negative for dizziness and syncope.   Psychiatric/Behavioral:  Negative for confusion.    14-point ROS completed and negative except as stated above and/or in the HPI.    Allergies   Allergen Reactions   • Adhesive [Medical Tape] Hives, Itching and Blisters   • Bupropion Rash     Around 2010       Current Outpatient Medications   Medication Instructions   • atorvastatin (LIPITOR) 10 mg, Oral, Daily   • Calcium 500-2.5 MG-MCG CHEW Chew 2 chews daily   • gabapentin (NEURONTIN) 300 mg, Oral, Daily at bedtime   • Kisqali (400 MG Dose) 400 mg, Oral, Daily, 21 days on, followed by 7 days off   • letrozole (FEMARA) 2.5 mg, Oral, Daily   • Multiple Vitamins-Minerals (MULTIVITAMIN WITH MINERALS) tablet 1 tablet, Daily   • polyethylene glycol (GLYCOLAX) 17 g, Oral, 2 times daily   • Sodium Fluoride 5000 PPM 1.1 % PSTE APPLY TIHN RIBBON TO TOOTHBRUSH, BRUSH X 2MIN AT BEDTIME, SPIT, DONT RINSE   • Vitamin D 2,000 Units, Daily       Social History     Socioeconomic History   • Marital status:      Spouse name: Not on file   • Number of children: 2   • Years  of education: 18   • Highest education level: Master's degree (e.g., MA, MS, Derek, MEd, MSW, CHANELL)   Occupational History   • Not on file   Tobacco Use   • Smoking status: Former     Current packs/day: 0.00     Average packs/day: 0.5 packs/day for 15.0 years (7.5 ttl pk-yrs)     Types: Cigarettes     Quit date: 1977     Years since quittin.1     Passive exposure: Past   • Smokeless tobacco: Never   Vaping Use   • Vaping status: Never Used   Substance and Sexual Activity   • Alcohol use: Not Currently     Alcohol/week: 1.0 standard drink of alcohol     Types: 1 Glasses of wine per week     Comment: occasional   • Drug use: Never   • Sexual activity: Not Currently     Partners: Male     Birth control/protection: Abstinence, Post-menopausal, Female Sterilization   Other Topics Concern   • Not on file   Social History Narrative   • Not on file     Social Drivers of Health     Financial Resource Strain: Patient Declined (2023)    Overall Financial Resource Strain (CARDIA)    • Difficulty of Paying Living Expenses: Patient declined   Food Insecurity: No Food Insecurity (11/10/2024)    Hunger Vital Sign    • Worried About Running Out of Food in the Last Year: Never true    • Ran Out of Food in the Last Year: Never true   Transportation Needs: No Transportation Needs (11/10/2024)    PRAPARE - Transportation    • Lack of Transportation (Medical): No    • Lack of Transportation (Non-Medical): No   Physical Activity: Sufficiently Active (3/24/2022)    Exercise Vital Sign    • Days of Exercise per Week: 4 days    • Minutes of Exercise per Session: 60 min   Stress: Stress Concern Present (3/24/2022)    [place] [employer] - Occupational Stress Questionnaire    • Feeling of Stress : Rather much   Social Connections: Socially Isolated (3/24/2022)    Social Connection and Isolation Panel [NHANES]    • Frequency of Communication with Friends and Family: More than three times a week    • Frequency  "of Social Gatherings with Friends and Family: Three times a week    • Attends Spiritism Services: Never    • Active Member of Clubs or Organizations: No    • Attends Club or Organization Meetings: Never    • Marital Status:    Intimate Partner Violence: Unknown (2024)    Nursing IPS    • Feels Physically and Emotionally Safe: Not on file    • Physically Hurt by Someone: Not on file    • Humiliated or Emotionally Abused by Someone: Not on file    • Physically Hurt by Someone: 2    • Hurt or Threatened by Someone: 2   Housing Stability: Unknown (11/10/2024)    Housing Stability Vital Sign    • Unable to Pay for Housing in the Last Year: No    • Number of Times Moved in the Last Year: Not on file    • Homeless in the Last Year: No       Family History   Problem Relation Age of Onset   • Heart disease Father          of MI age 41   • Breast cancer Maternal Grandmother    • Heart disease Paternal Grandmother    • Hypertension Paternal Grandfather    • Melanoma Daughter         40s   • Melanoma Daughter         50s   • Ovarian cancer Maternal Aunt    • Cancer Maternal Aunt         Ovarian Cancer   • Breast cancer Paternal Aunt        Physical Exam:  Blood pressure 134/70, pulse 76, height 4' 10\" (1.473 m), weight 52.6 kg (116 lb), SpO2 97%.  Body mass index is 24.24 kg/m².  Wt Readings from Last 3 Encounters:   24 52.6 kg (116 lb)   24 53 kg (116 lb 13.5 oz)   24 53 kg (116 lb 13.5 oz)     Physical Exam  Vitals reviewed.   Constitutional:       General: She is not in acute distress.     Appearance: She is not toxic-appearing.   HENT:      Head: Normocephalic and atraumatic.   Eyes:      General: No scleral icterus.     Conjunctiva/sclera: Conjunctivae normal.   Neck:      Vascular: No carotid bruit.      Comments: No JVP elevation  Cardiovascular:      Rate and Rhythm: Normal rate and regular rhythm.      Heart sounds: Murmur (systolic) heard.      No friction rub. No gallop.   Pulmonary: " "     Breath sounds: Normal breath sounds. No wheezing, rhonchi or rales.   Abdominal:      General: There is no distension.      Palpations: Abdomen is soft.      Tenderness: There is no abdominal tenderness. There is no guarding.   Musculoskeletal:      Right lower leg: No edema.      Left lower leg: No edema.   Skin:     Coloration: Skin is not jaundiced or pale.      Findings: No erythema.   Neurological:      Mental Status: She is alert. Mental status is at baseline.   Psychiatric:         Mood and Affect: Mood normal.         Behavior: Behavior normal.         Labs & Results:  Lab Results   Component Value Date    SODIUM 142 12/20/2024    K 3.3 (L) 12/20/2024     12/20/2024    CO2 32 12/20/2024    BUN 11 12/20/2024    CREATININE 0.51 (L) 12/20/2024    GLUC 80 12/20/2024    CALCIUM 9.1 12/20/2024     No results found for: \"NTBNP\"         Thank you for the opportunity to participate in the care of this patient.    Sekou Payne MD, [facility]  Staff Cardiologist  Director of Cardio-Oncology  [facility]  "

## 2024-12-26 NOTE — TELEPHONE ENCOUNTER
"Provider: Dr. Contreras    Patient has been taking gabapentin for about 2 weeks, for night sweats; patient does not believe it is working, as she is still experiencing them. She tried taking Cohosh, per Dr. Amanda's recommendation, but that was giving her one episode of diarrhea/daily- she has stopped taking the Cohosh as of today.    Patient states another option was effexor, but she does not wish to try taking that. She is inquiring what else she can do. I made her aware we would discuss with the provider and someone would return her phone call with further instructions/recommendations.     Alley also states she has 4/10 back pain, around the \"waist area\" that does not radiate anywhere. She said it isn't much of a problem and will alternate ice/heat to see if that will assist with alleviating the pain.     Patient has no additional questions or concerns at this moment.   "

## 2024-12-26 NOTE — TELEPHONE ENCOUNTER
Please have patient increase Gabapentin to 300 mg at bedtime.     Perhaps, we can expedite an appointment to discuss in person.

## 2024-12-27 ENCOUNTER — OFFICE VISIT (OUTPATIENT)
Dept: PALLIATIVE MEDICINE | Facility: CLINIC | Age: 79
End: 2024-12-27
Payer: MEDICARE

## 2024-12-27 ENCOUNTER — OFFICE VISIT (OUTPATIENT)
Dept: CARDIOLOGY CLINIC | Facility: CLINIC | Age: 79
End: 2024-12-27
Payer: MEDICARE

## 2024-12-27 VITALS
WEIGHT: 116.84 LBS | HEART RATE: 74 BPM | TEMPERATURE: 97.5 F | DIASTOLIC BLOOD PRESSURE: 60 MMHG | OXYGEN SATURATION: 95 % | RESPIRATION RATE: 20 BRPM | BODY MASS INDEX: 24.42 KG/M2 | SYSTOLIC BLOOD PRESSURE: 140 MMHG

## 2024-12-27 VITALS
HEIGHT: 58 IN | OXYGEN SATURATION: 97 % | DIASTOLIC BLOOD PRESSURE: 70 MMHG | HEART RATE: 76 BPM | WEIGHT: 116 LBS | SYSTOLIC BLOOD PRESSURE: 134 MMHG | BODY MASS INDEX: 24.35 KG/M2

## 2024-12-27 DIAGNOSIS — C50.911 BREAST CANCER METASTASIZED TO BONE, RIGHT (HCC): Primary | Chronic | ICD-10-CM

## 2024-12-27 DIAGNOSIS — G89.3 CANCER RELATED PAIN: ICD-10-CM

## 2024-12-27 DIAGNOSIS — C50.911 BREAST CANCER METASTASIZED TO BONE, RIGHT (HCC): ICD-10-CM

## 2024-12-27 DIAGNOSIS — C79.51 BREAST CANCER METASTASIZED TO BONE, RIGHT (HCC): ICD-10-CM

## 2024-12-27 DIAGNOSIS — I10 PRIMARY HYPERTENSION: ICD-10-CM

## 2024-12-27 DIAGNOSIS — R94.31 ABNORMAL EKG: Primary | ICD-10-CM

## 2024-12-27 DIAGNOSIS — Z17.0 MALIGNANT NEOPLASM OF UPPER-INNER QUADRANT OF LEFT BREAST IN FEMALE, ESTROGEN RECEPTOR POSITIVE (HCC): ICD-10-CM

## 2024-12-27 DIAGNOSIS — C50.212 MALIGNANT NEOPLASM OF UPPER-INNER QUADRANT OF LEFT BREAST IN FEMALE, ESTROGEN RECEPTOR POSITIVE (HCC): ICD-10-CM

## 2024-12-27 DIAGNOSIS — C79.51 BREAST CANCER METASTASIZED TO BONE, RIGHT (HCC): Primary | Chronic | ICD-10-CM

## 2024-12-27 DIAGNOSIS — Z79.811 LONG TERM (CURRENT) USE OF AROMATASE INHIBITORS: ICD-10-CM

## 2024-12-27 DIAGNOSIS — I25.10 CORONARY ARTERY CALCIFICATION: ICD-10-CM

## 2024-12-27 DIAGNOSIS — R23.2 HOT FLASHES: ICD-10-CM

## 2024-12-27 DIAGNOSIS — Z51.5 PALLIATIVE CARE BY SPECIALIST: ICD-10-CM

## 2024-12-27 DIAGNOSIS — E78.2 MIXED HYPERLIPIDEMIA: ICD-10-CM

## 2024-12-27 DIAGNOSIS — F33.9 DEPRESSION, RECURRENT (HCC): ICD-10-CM

## 2024-12-27 PROCEDURE — 99214 OFFICE O/P EST MOD 30 MIN: CPT

## 2024-12-27 PROCEDURE — G2211 COMPLEX E/M VISIT ADD ON: HCPCS

## 2024-12-27 PROCEDURE — 99204 OFFICE O/P NEW MOD 45 MIN: CPT | Performed by: INTERNAL MEDICINE

## 2024-12-27 RX ORDER — GABAPENTIN 100 MG/1
300 CAPSULE ORAL
Qty: 90 CAPSULE | Refills: 0 | Status: SHIPPED | OUTPATIENT
Start: 2024-12-27

## 2024-12-27 NOTE — Clinical Note
Fam,  The EKG was read as possible anterior infarct (reason for referral).  On my review I think the EKG actually looks normal.  I am going to check an echocardiogram to ensure normal EF and no wall motion abnormalities.  Thanks so much for sending the patient to cardio-oncology clinic.  Sekou

## 2024-12-27 NOTE — ASSESSMENT & PLAN NOTE
Significant fatigue, no longer finding alirio in activities  She is not interested in SSRI/SNRI or other medications  She met with Palliative medicine MSW and has another appointment in January  Denies suicidal ideation/self-harm and denies any plan  Safety plan in place to go to ER if this changes

## 2024-12-27 NOTE — ASSESSMENT & PLAN NOTE
S/p radiation to right shoulder and spine  Stopped taking letrozole and ribociclib due to side effects  Scheduled for follow up imaging in January 2025

## 2024-12-27 NOTE — ASSESSMENT & PLAN NOTE
Previously using oxycodone 5-10mg q4h PRN - reported at previous visit that she has been using 5 mg tablets 3-4x/day  Back pain was improved but has slightly worsened in past week, she will begin using oxycodone again as needed for moderate-severe pain  Current regimen:  Oxycodone 5-10mg q4h PRN for moderate-severe pain  Increase gabapentin to 300 mg HS

## 2024-12-27 NOTE — ASSESSMENT & PLAN NOTE
Psychosocial   Supportive listening provided  Normalized experience of patient/family  Provided anxiety containment     Follow-Up Recommendations  -Follow-up with PCP and current medical specialists  -Follow-up with palliative care: virtual visit for symptom check in 1 week

## 2024-12-27 NOTE — PROGRESS NOTES
Name: Yuni Keys      : 1945      MRN: [id number]  Encounter Provider: KAI Gallardo  Encounter Date: 2024   Encounter department: [facility] [place]  :  Assessment & Plan  Breast cancer metastasized to bone, right (HCC)  S/p radiation to right shoulder and spine  Stopped taking letrozole and ribociclib due to side effects  Scheduled for follow up imaging in 2025       Hot flashes  2-3 episodes of soaking night sweats each night, requiring her to change clothing multiple times  Started early December while taking letrozole/ribociclib, which she stopped around 24 - these have not improved since stopping  Increase gabapentin to 300 mg HS - may need to continue escalating dose beyond this  Orders:    gabapentin (Neurontin) 100 mg capsule; Take 3 capsules (300 mg total) by mouth daily at bedtime    Cancer related pain  Previously using oxycodone 5-10mg q4h PRN - reported at previous visit that she has been using 5 mg tablets 3-4x/day  Back pain was improved but has slightly worsened in past week, she will begin using oxycodone again as needed for moderate-severe pain  Current regimen:  Oxycodone 5-10mg q4h PRN for moderate-severe pain  Increase gabapentin to 300 mg HS       Depression, recurrent (HCC)  Significant fatigue, no longer finding alirio in activities  She is not interested in SSRI/SNRI or other medications  She met with Palliative medicine MSW and has another appointment in January  Denies suicidal ideation/self-harm and denies any plan  Safety plan in place to go to ER if this changes       Palliative care by specialist  Psychosocial   Supportive listening provided  Normalized experience of patient/family  Provided anxiety containment     Follow-Up Recommendations  -Follow-up with PCP and current medical specialists  -Follow-up with palliative care: virtual visit for symptom check in 1 week            Decisional apparatus: Patient is competent  on my exam today. If competence is lost, patient's substitute decision maker would default to daughters  by [place] Act 169. Daughters are named healthcare POAs.    PDMP Review: I have reviewed the patient's controlled substance dispensing history in the Prescription Drug Monitoring Program in compliance with the [facility] regulations before prescribing any controlled substances.  Unable to review PDMP via [employer]GIE[employer] due to system error. Reviewed online PDMP.  Patient Id Prescription # Filled Written Drug Label Qty Days Strength MME** Prescriber Pharmacy Payment REFILL #/Auth State Detail   1 [id number] 11/11/2024 11/11/2024 oxyCODONE HCL (Tablet) 60.0 10 5 MG 45.0 GARRETT MOREL [facility] [place] 0 / 0 [place]    1 [id number] 11/08/2024 11/08/2024 fentaNYL TRANSDERMAL SYSTEM (Patch, Extended Release) 10.0 30 12 MCG/1 HR 28.80 [facility] [place] 0 / 0 [place]    1 [id number] 10/30/2024 10/30/2024 oxyCODONE HCL (Tablet) 40.0 7 5 MG 42.86 [facility] [place] 0 / 0 [place]    1 [id number] 10/28/2024 10/28/2024 HYDROcodone BITARTRATE 5 MG ORAL TABLET/ACETAMINOPHEN 325 MG (Tablet) 40.0 20 325 MG-5 MG 10.0 [facility] [place] 0 / 0 [place]    1 [id number] 10/22/2024 10/22/2024 LORazepam (Tablet) 20.0 6 0.5 MG NA [facility] Medicare 0 / 0 [place]        History of Present Illness     Yuni Keys is a 79 y.o. female who presents for follow up for her palliative diagnosis of HR+ breast cancer, metastatic to the bone. She is s/p palliative radiation to the right shoulder and spine for pain relief.  She has been having significant fatigue and hot flashes/night sweats. She reports that she has been having 2-3 episodes of soaking night sweats every night. She stopped the letrozole and ribociclib because of these side effects. She has not noticed an improvement since stopping the medications. She started gabapentin 100 mg  "every night about two weeks ago, but does not feel that this is providing relief. Yesterday, her dose was increased to 300 mg every night. She does not drink caffeine and does not eat spicy foods, which may worsen hot flashes/night sweats.  At the visit, she reported 3/10 back pain. States this sometimes goes to 5-6/10. She was previously using oxycodone PRN with some relief. She still has pills at home. She stopped using the oxycodone as for some time her pain was better, but she has been noting an increase in this pain in recent weeks. She does use tylenol with some relief for mild pain. She can resume using oxycodone for moderate-severe pain.   She denies fever, coughing, nausea. Radiation-associated dermatitis is resolved.  Overall she reports that she is not finding any alirio in her previous activities. She does not want to continue cancer-directed therapies if they are reducing her quality of life, even if there is some potential for prolonged survival. She says that she is ready to die. She does deny any suicidal ideation or plan, and agrees to go to the ER/contact emergency services if she does develop thoughts of harming herself. We discussed the dual-indication of SSRI/SNRIs with depression and hot flashes, and she is not interested in SSRI/SNRI. She states that she was on an SNRI in the past, and she did not have a good experience with side effects and discontinuing was very difficult. She has met with Palliative social work for therapy and is scheduled for a follow up.  We briefly discussed hospice. She said that she has tried to discuss end-of-life topics with her daughters such as starting  planning, and they have not engaged in these discussions, with statements along the lines of \"it's too early for that\".  She plans to go for CT scans in January and will follow up with oncology after those scans. We will check in for symptoms via virtual appointment in one week following increase of " gabapentin.       Objective   There were no vitals taken for this visit.    Physical Exam  Constitutional:       General: She is awake. She is not in acute distress.     Appearance: Normal appearance. She is not ill-appearing.   HENT:      Head: Normocephalic.   Cardiovascular:      Rate and Rhythm: Normal rate and regular rhythm.   Pulmonary:      Effort: Pulmonary effort is normal. No tachypnea, bradypnea or respiratory distress.   Neurological:      Mental Status: She is alert and oriented to person, place, and time.   Psychiatric:         Attention and Perception: Attention normal.         Mood and Affect: Mood is depressed.         Behavior: Behavior normal.         Thought Content: Thought content normal.         Judgment: Judgment normal.         Recent labs:  Lab Results   Component Value Date/Time    SODIUM 142 12/20/2024 10:34 AM    K 3.3 (L) 12/20/2024 10:34 AM    BUN 11 12/20/2024 10:34 AM    CREATININE 0.51 (L) 12/20/2024 10:34 AM    GLUC 80 12/20/2024 10:34 AM    CALCIUM 9.1 12/20/2024 10:34 AM    AST 22 12/20/2024 10:34 AM    ALT 19 12/20/2024 10:34 AM    ALB 3.6 12/20/2024 10:34 AM    TP 6.3 (L) 12/20/2024 10:34 AM    EGFR 91 12/20/2024 10:34 AM     Lab Results   Component Value Date/Time    HGB 12.5 12/20/2024 10:34 AM    WBC 3.48 (L) 12/20/2024 10:34 AM     12/20/2024 10:34 AM    INR 1.01 10/01/2024 08:13 AM    PTT 34 10/01/2024 08:13 AM     Lab Results   Component Value Date/Time    [id number] 2.391 12/20/2024 10:34 AM           Administrative Statements   I have spent a total time of 35 minutes in caring for this patient on the day of the visit/encounter including Instructions for management, Patient and family education, Counseling / Coordination of care, Documenting in the medical record, Reviewing / ordering tests, medicine, procedures  , and Obtaining or reviewing history  . Topics discussed with the patient / family include symptom assessment and management, medication review,  medication adjustment, psychosocial support, goals of care, supportive listening, and anticipatory guidance.

## 2024-12-27 NOTE — LETTER
"December 27, 2024     Fam Amanda MD  [address]   [address]  [facility] [address]    Patient: Yuni Keys   YOB: 1945   Date of Visit: 12/27/2024       Dear Dr. Amanda:    Thank you for referring Yuni Keys to me for evaluation. Below are my notes for this consultation.    If you have questions, please do not hesitate to call me. I look forward to following your patient along with you.         Sincerely,        Sekou Payne MD        CC: No Recipients    Sekou Payne MD  12/27/2024 11:49 AM  Sign when Signing Visit  Cardio-Oncology / Heart Failure Cardiology Clinic Note    Yuni Keys 79 y.o. female   MRN: [id number]  Encounter: [id number]        Assessment / Plan:    # Cardio-Oncology Pertinent History  Breast cancer  Dx 2024  Metastatic to bone  S/p palliative XRT to spine and shoulder  Current:   letrozole, ribociclib  (patient holding due to perceived toxicities)    # Abnormal EKG  The patient was getting serial EKGs to monitor QTc on ribociclib.  An EKG on December 12th reported sinus rhythm and \"cannot rule out anterior infarct\".   Reviewed EKG - does not appear to definitively meet criteria for anterior infarct  Check echo to ensure normal EF and no wall motion abnormalities    # Cardiology risk assessment prior to EGD  Procedure was postponed for the abnormal EKG  Check echo as above    # Coronary artery calcifications  On statin with good LDL control  Hold off on starting aspirin in setting of possible esophagitis and upcoming EGD  Any chest pain would obtain stress test  (denies chest pain or OLMEDO)    # HTN  Was on medication in the past but taken off with weight loss  BP today 134/70  Continue to monitor off medication    # HLD  Lipitor 10  LDL 62    # High risk medication use  Ribociclib can prolong QTc.  Previous EKGs reviewed and QTc interval has been acceptable.      Today's Plan Summary:  See above assessment/plan for full details of today's plan. " " Briefly,     Check echo                Reason For Visit / Chief Complaint:  Referred by Dr. Amanda for a new patient visit for abnormal EKG.    HPI:   Yuni Keys is a 79 y.o.  female with history as noted in the problem list and further detailed in the above assessment and plan.    Referred by Dr. Amanda for a new patient visit for abnormal EKG.    As above, the patient has a history of metastatic breast cancer.  She was on a medication that can prolong QT (ribociclib ) so she was getting serial EKGs.  An EKG on December 12 reported sinus rhythm and \"cannot rule out anterior infarct\".  The patient was referred to cardio-oncology.    Today, the patient reports  -  denies chest pain.   No SOB or OLMEDO.   No orthopnea or PND.   No leg edema.  No palpitations, presyncope, or syncope.       Retired.  Was an .   .   Lives by herself.  2 daughters.    Nonsmoker.   No ETOH.   No drugs.          Cardiac Imaging personally reviewed:  EKG 12-12-24  Normal sinus rhythm   Holter or event monitor    Echo 10-31-22  EF 60%.  GLS -21%.  Mild AI         LOGAN    Cardiac MRI    Stress testing    Coronary CTA or [facility]    CPET              Patient Active Problem List    Diagnosis Date Noted   • Goals of care, counseling/discussion 12/11/2024   • Odynophagia 12/02/2024   • Esophagitis 12/02/2024   • Acute radiation dermatitis 12/02/2024   • Cancer related pain 11/11/2024   • Palliative care by specialist 11/11/2024   • Constipation 11/09/2024   • Bilateral hydronephrosis 11/09/2024   • Depression, recurrent (HCC) 11/08/2024   • Age-related osteoporosis with current pathological fracture of vertebra (HCC) 11/01/2024   • Pathological fracture in neoplastic disease, other specified site, sequela 11/01/2024   • Malignant neoplasm of upper-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, estrogen receptor positive (HCC) 10/10/2024   • Abnormal positron emission tomography (PET) scan 10/03/2024   • Breast cancer metastasized to " bone, right ([facility]) 09/27/2024   • Closed compression fracture of body of L1 vertebra ([facility]) 09/03/2024   • Compression fracture of L1 lumbar vertebra ([facility]) 08/30/2024   • Primary insomnia 05/07/2024   • History of hysterectomy 01/27/2023   • Family history of early CAD 10/14/2022   • History of abnormal uterine bleeding 10/14/2022   • MCI (mild cognitive impairment) 02/17/2022   • Hyperlipidemia 12/16/2021   • Retrocalcaneal bursitis (back of heel), right 12/30/2020   • Lumbar disc herniation    • Lumbar radiculopathy    • Spinal stenosis of lumbar region with neurogenic claudication        Past Medical History:   Diagnosis Date   • Arthritis    • Breast mass    • Cancer ([facility]) 8/28/2024   • Edema of both lower legs 11/01/2018    Dr. Thang Otero; faxed records.   • Hx of skin cancer, basal cell    • Hyperlipidemia    • Hypertension    • Impingement syndrome of left shoulder 04/10/2018    Dr. Thang Otero, faxed patient records.   • Lactose intolerance    • Migraines    • Osteopenia    • Overactive bladder    • Overweight 12/16/2021   • Skin cancer 2022    Basal cell   • Toe fracture, left 11/16/2023       Review of Systems   Constitutional:  Positive for diaphoresis. Negative for chills and fever.   HENT:  Negative for nosebleeds.    Gastrointestinal:  Negative for abdominal distention, abdominal pain and blood in stool.   Neurological:  Negative for dizziness and syncope.   Psychiatric/Behavioral:  Negative for confusion.    14-point ROS completed and negative except as stated above and/or in the HPI.    Allergies   Allergen Reactions   • Adhesive [Medical Tape] Hives, Itching and Blisters   • Bupropion Rash     Around 2010       Current Outpatient Medications   Medication Instructions   • atorvastatin (LIPITOR) 10 mg, Oral, Daily   • Calcium 500-2.5 MG-MCG CHEW Chew 2 chews daily   • gabapentin (NEURONTIN) 300 mg, Oral, Daily at bedtime   • Kisqali (400 MG Dose) 400 mg, Oral, Daily, 21 days on, followed by 7  days off   • letrozole (FEMARA) 2.5 mg, Oral, Daily   • Multiple Vitamins-Minerals (MULTIVITAMIN WITH MINERALS) tablet 1 tablet, Daily   • polyethylene glycol (GLYCOLAX) 17 g, Oral, 2 times daily   • Sodium Fluoride 5000 PPM 1.1 % PSTE APPLY TIHN RIBBON TO TOOTHBRUSH, BRUSH X 2MIN AT BEDTIME, SPIT, DONT RINSE   • Vitamin D 2,000 Units, Daily       Social History     Socioeconomic History   • Marital status:      Spouse name: Not on file   • Number of children: 2   • Years of education: 18   • Highest education level: Master's degree (e.g., MA, MS, Derek, MEd, MSW, CHANELL)   Occupational History   • Not on file   Tobacco Use   • Smoking status: Former     Current packs/day: 0.00     Average packs/day: 0.5 packs/day for 15.0 years (7.5 ttl pk-yrs)     Types: Cigarettes     Quit date: 1977     Years since quittin.1     Passive exposure: Past   • Smokeless tobacco: Never   Vaping Use   • Vaping status: Never Used   Substance and Sexual Activity   • Alcohol use: Not Currently     Alcohol/week: 1.0 standard drink of alcohol     Types: 1 Glasses of wine per week     Comment: occasional   • Drug use: Never   • Sexual activity: Not Currently     Partners: Male     Birth control/protection: Abstinence, Post-menopausal, Female Sterilization   Other Topics Concern   • Not on file   Social History Narrative   • Not on file     Social Drivers of Health     Financial Resource Strain: Patient Declined (2023)    Overall Financial Resource Strain (CARDIA)    • Difficulty of Paying Living Expenses: Patient declined   Food Insecurity: No Food Insecurity (11/10/2024)    Hunger Vital Sign    • Worried About Running Out of Food in the Last Year: Never true    • Ran Out of Food in the Last Year: Never true   Transportation Needs: No Transportation Needs (11/10/2024)    PRAPARE - Transportation    • Lack of Transportation (Medical): No    • Lack of Transportation (Non-Medical): No   Physical Activity: Sufficiently Active  "(3/24/2022)    Exercise Vital Sign    • Days of Exercise per Week: 4 days    • Minutes of Exercise per Session: 60 min   Stress: Stress Concern Present (3/24/2022)    [employer] [employer] - Occupational Stress Questionnaire    • Feeling of Stress : Rather much   Social Connections: Socially Isolated (3/24/2022)    Social Connection and Isolation Panel [NHANES]    • Frequency of Communication with Friends and Family: More than three times a week    • Frequency of Social Gatherings with Friends and Family: Three times a week    • Attends Sikh Services: Never    • Active Member of Clubs or Organizations: No    • Attends Club or Organization Meetings: Never    • Marital Status:    Intimate Partner Violence: Unknown (2024)    Nursing IPS    • Feels Physically and Emotionally Safe: Not on file    • Physically Hurt by Someone: Not on file    • Humiliated or Emotionally Abused by Someone: Not on file    • Physically Hurt by Someone: 2    • Hurt or Threatened by Someone: 2   Housing Stability: Unknown (11/10/2024)    Housing Stability Vital Sign    • Unable to Pay for Housing in the Last Year: No    • Number of Times Moved in the Last Year: Not on file    • Homeless in the Last Year: No       Family History   Problem Relation Age of Onset   • Heart disease Father          of MI age 41   • Breast cancer Maternal Grandmother    • Heart disease Paternal Grandmother    • Hypertension Paternal Grandfather    • Melanoma Daughter         40s   • Melanoma Daughter         50s   • Ovarian cancer Maternal Aunt    • Cancer Maternal Aunt         Ovarian Cancer   • Breast cancer Paternal Aunt        Physical Exam:  Blood pressure 134/70, pulse 76, height 4' 10\" (1.473 m), weight 52.6 kg (116 lb), SpO2 97%.  Body mass index is 24.24 kg/m².  Wt Readings from Last 3 Encounters:   24 52.6 kg (116 lb)   24 53 kg (116 lb 13.5 oz)   24 53 kg (116 lb 13.5 oz)     Physical Exam  Vitals " "reviewed.   Constitutional:       General: She is not in acute distress.     Appearance: She is not toxic-appearing.   HENT:      Head: Normocephalic and atraumatic.   Eyes:      General: No scleral icterus.     Conjunctiva/sclera: Conjunctivae normal.   Neck:      Vascular: No carotid bruit.      Comments: No JVP elevation  Cardiovascular:      Rate and Rhythm: Normal rate and regular rhythm.      Heart sounds: Murmur (systolic) heard.      No friction rub. No gallop.   Pulmonary:      Breath sounds: Normal breath sounds. No wheezing, rhonchi or rales.   Abdominal:      General: There is no distension.      Palpations: Abdomen is soft.      Tenderness: There is no abdominal tenderness. There is no guarding.   Musculoskeletal:      Right lower leg: No edema.      Left lower leg: No edema.   Skin:     Coloration: Skin is not jaundiced or pale.      Findings: No erythema.   Neurological:      Mental Status: She is alert. Mental status is at baseline.   Psychiatric:         Mood and Affect: Mood normal.         Behavior: Behavior normal.         Labs & Results:  Lab Results   Component Value Date    SODIUM 142 12/20/2024    K 3.3 (L) 12/20/2024     12/20/2024    CO2 32 12/20/2024    BUN 11 12/20/2024    CREATININE 0.51 (L) 12/20/2024    GLUC 80 12/20/2024    CALCIUM 9.1 12/20/2024     No results found for: \"NTBNP\"         Thank you for the opportunity to participate in the care of this patient.    Sekou Payne MD, [facility]  Staff Cardiologist  Director of Cardio-Oncology  [facility]  "

## 2024-12-30 ENCOUNTER — OFFICE VISIT (OUTPATIENT)
Dept: HEMATOLOGY ONCOLOGY | Facility: CLINIC | Age: 79
End: 2024-12-30
Payer: MEDICARE

## 2024-12-30 ENCOUNTER — NURSE TRIAGE (OUTPATIENT)
Age: 79
End: 2024-12-30

## 2024-12-30 ENCOUNTER — OFFICE VISIT (OUTPATIENT)
Dept: PHYSICAL THERAPY | Facility: REHABILITATION | Age: 79
End: 2024-12-30
Payer: MEDICARE

## 2024-12-30 VITALS
SYSTOLIC BLOOD PRESSURE: 122 MMHG | TEMPERATURE: 97.7 F | WEIGHT: 117 LBS | HEIGHT: 58 IN | DIASTOLIC BLOOD PRESSURE: 60 MMHG | RESPIRATION RATE: 20 BRPM | HEART RATE: 62 BPM | OXYGEN SATURATION: 99 % | BODY MASS INDEX: 24.56 KG/M2

## 2024-12-30 DIAGNOSIS — Z79.811 LONG TERM (CURRENT) USE OF AROMATASE INHIBITORS: ICD-10-CM

## 2024-12-30 DIAGNOSIS — C50.911 BREAST CANCER METASTASIZED TO BONE, RIGHT (HCC): Primary | ICD-10-CM

## 2024-12-30 DIAGNOSIS — C50.212 MALIGNANT NEOPLASM OF UPPER-INNER QUADRANT OF LEFT BREAST IN FEMALE, ESTROGEN RECEPTOR POSITIVE (HCC): ICD-10-CM

## 2024-12-30 DIAGNOSIS — M62.81 MUSCLE WEAKNESS: Primary | ICD-10-CM

## 2024-12-30 DIAGNOSIS — Z17.0 MALIGNANT NEOPLASM OF UPPER-INNER QUADRANT OF LEFT BREAST IN FEMALE, ESTROGEN RECEPTOR POSITIVE (HCC): ICD-10-CM

## 2024-12-30 DIAGNOSIS — C79.51 BREAST CANCER METASTASIZED TO BONE, RIGHT (HCC): Primary | ICD-10-CM

## 2024-12-30 PROCEDURE — 99215 OFFICE O/P EST HI 40 MIN: CPT | Performed by: INTERNAL MEDICINE

## 2024-12-30 PROCEDURE — G2211 COMPLEX E/M VISIT ADD ON: HCPCS | Performed by: INTERNAL MEDICINE

## 2024-12-30 PROCEDURE — 97530 THERAPEUTIC ACTIVITIES: CPT | Performed by: PHYSICAL THERAPIST

## 2024-12-30 PROCEDURE — 97110 THERAPEUTIC EXERCISES: CPT | Performed by: PHYSICAL THERAPIST

## 2024-12-30 NOTE — PROGRESS NOTES
Daily Note     Today's date: 2024  Patient name: Yuni Keys  : 1945  MRN: [id number]  Referring provider: Lisa Contreras, *  Dx:   Encounter Diagnosis     ICD-10-CM    1. Muscle weakness  M62.81           Start Time: 1423          Subjective: no new complaints      Objective: See treatment diary below      Assessment: Tolerated treatment well. Patient demonstrated fatigue post treatment      Plan: Continue per plan of care.      Precautions: cancer      Manuals                                                                Neuro Re-Ed                                                                                                        Ther Ex             Shoulder flexion X 15 X 20           Bicep curls 2# x 20 3# x 20           Shoulder abduction X 15 X 20           marching X 20 1# x 20           Hip abduction standing B X 15 B X 20 B           Leg press  65# x 20                                     Ther Activity             Sit to stand No hands x 10 No hands x 10            Ube standing 1 min ea 2 min ea alternating           treadmill 1.8 mph x 3 min 2.0 mph x 4 min           Recumbent #6 2 min With pillow 3 min           Gait Training                                       Modalities

## 2024-12-30 NOTE — TELEPHONE ENCOUNTER
Pt called reporting that she saw hem onc today and reported night sweats in which she was prescribed gabapentin for but will talk to ordering provider because its not helping her and she believes the ordered dose is too strong. Pt would like to know Dr. Contreras recommendation because she would like a antidepressant, not venlafaxine , but one that is specifically for night sweats. Pt reports the depression could be the cause of her night sweats .           Reason for Disposition   Excessive sweating is a chronic symptom (recurrent or ongoing AND present > 4 weeks)    Answer Assessment - Initial Assessment Questions  Has been reporting and reported    Protocols used: Sweating-Adult-AH

## 2024-12-30 NOTE — PROGRESS NOTES
Hematology/Oncology Outpatient Office Note    Date of Service: 2024    [facility] [place] [id number]  [phone]    Reason for Consultation:   Chief Complaint   Patient presents with    Follow-up     Cancer Stage at diagnosis: IV     Referral Physician: No ref. provider found    Primary Care Physician:  Lisa Fairbanks DO     Nickname: Alley    Lives alone     Daughter: James     Original ECO     Today's ECO , night sweats, fatigue, pain (R shoulder and R hip) , depression     Goals and Barriers:  Current Goal: Minimize effects of disease burden, extend life.   Barriers to accomplishing this: cancer related pain and symptoms (night sweats, pain, depression, fatigue) and treatment intolerable , see below.     Patient's Capacity to Self Care:  Patient is able to self care    ASSESSMENT & PLAN      Diagnosis ICD-10-CM Associated Orders   1. Breast cancer metastasized to bone, right (HCC)  C50.911     C79.51       2. Malignant neoplasm of upper-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, estrogen receptor positive (HCC)  C50.212     Z17.0       3. Long term (current) use of aromatase inhibitors  Z79.811             This is a 79 y.o. c PMHx notable for Arthritis, hyperlipidemia, hypertension, migraines, osteopenia, basal cell skin cancer, had hysterectomy and oophorectomy in her early 50's    seen initially in in 10/2024 as consultation for likely recurrent HR+ L Breast Cancer to the bone including to T8 & L1 with pathological fractures s/p Kyphoplasty in 10/2024 with palliative radiation to T spine and R shoulder completed 2024      10/22/2024 initiated on Letrozole 2.5 mg QD and Kisqali initially 600 mg QD 3 weeks on 1 week of . then she tried 400 mg QD early 2024 due to reported side effects.   G1 Neutropenia 2/2 Kisqali was noted    24 visit: patient reported that she self discontinued the  Letrozole/Kisqali around 12/10/2024 because of the fatigue and night sweats, however she still has the same symptoms 3 weeks after discontinuing the therapy with no improvement. Also noted on 12/30 that she started to feel R hip pain and her R shoulder pain which was gone after RT now reoccur. Denies suicidal ideation. Had on and off mild grade fevers past month but not in past week. Eating well and still do some social activities.     Discussion of decision making  Oncology history updated, accordingly, during this visit  Goals of care/patient communication  I discussed with the patient the clinical course leading up to their cancer diagnosis. I reviewed relevant office notes, imaging reports and pathology result as well.  I told the patient that this is a case of incurable disease and what this means. We discussed that the goal of anti-cancer therapy is to provide best quality of life, extend overall survival, and progression free survival as shown in clinical trials. We also discussed that there might be a point when the cancer will no longer respond to this anti-neoplastic therapy. As a result, we also discussed the role of the palliative care team being introduced early in the treatment course. We will be making this referral  I explained the risks/benefits of the proposed cancer therapy: Letrozole + Ribociclib  and after discussion including understanding risks of possible life-threatening complications and therapy-related malignancy development, informed consent for blood products and treatment has been signed and obtained.  TNM/Staging At Diagnosis  Cancer Staging   Breast cancer metastasized to bone, right (HCC)  Staging form: Breast, AJCC 8th Edition  - Clinical: Stage IV (cM1) - Signed by Fam Amanda MD on 10/12/2024    Malignant neoplasm of upper-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, estrogen receptor positive (HCC)  Staging form: Breast, AJCC 8th Edition  - Clinical: Stage IV (cM1) - Signed by  "Fam Amanda MD on 10/12/2024    Disease Features/Tumor Markers/Genetics  Tumor Marker: n/a  Notable Path Features:   10/4/2024 Breast: ER 90%, MD negative, HER-2 +1  10/15/2024: Spine, \"Vertebra, T8,\" Biopsy: Metastatic carcinoma, most compatible with known breast primary  CARIS NGS: ER+, HER2 0, no actionable biomarkers  Guardant NGS 10 muts/Mb, NF1 mutation  Treatment: Letrozole + Ribociclib   Other Supportive care: Prolia, Calcium and Vitamin D   Treatment Team Members  Surgeon: Cassandra Lynn Cardarelli, MD   Rad Onc: Olimpia Siddiqi MD   Palliative: Lisa Contreras, DO   Labs  Diagnostics  10/1/2024 EK ms QTC   10/2/2024 PET/CT: Mild focal radiotracer uptake at a left breast nodular density medially. Several small FDG avid left axillary/subpectoral lymph nodes would raise suspicion for metastasis. Scattered FDG avid lytic lesions compatible with metastasis. T-8 and T-11 spine lesions  10/8/2024 MRI T-spine w/wo c: Normal enhanced MRI of the thoracic spine  There is a DEXA from , next can get it done 2024:  ms QTC   2024 CT AP: No evidence of metastases in the abdomen or pelvis. 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  Compression fracture of L1, s/p Kyphoplasty.   2024 CT T Spine: Osteolytic metastases involving the T8, T11 and L1 vertebral bodies with pathologic fractures treated with kyphoplasty at T8 and L1. Thickened paravertebral soft tissues at T8. This could be residual edema or hematoma ... Probable extraosseous extension of T8 metastasis into the left anterior epidural space.   2024 CT L Spine: Compression fracture of L1, status post kyphoplasty. No new lumbar vertebral fractures. No evidence of lumbar spine metastasis.  11/10/2024 MRI Thoracic Spine: Interval T8 kyphoplasty ... Redemonstrated osseous metastases at T5, T8, and T11. No abnormal epidural or leptomeningeal enhancement. Normal cord " signal.  12/12/2024 EKG QTC interval 423 msec   12/13/2024 MRI Abdomen: Minimally dilated CBD up to 8 mm proximally with smooth distal tapering. No intrahepatic bile duct dilation. No choledocholithiasis, biliary stricture or suspicious mass within the limits of this motion degraded examination.      Discussion of decision making    I personally reviewed the following lab results, the image studies, pathology, other specialty/physicians consult notes and recommendations, and outside medical records. I had a lengthy discussion with the patient and shared the work-up findings. We discussed the diagnosis and management plan as below. I spent 41 minutes reviewing the records (labs, clinician notes, outside records, medical history, ordering medicine/tests/procedures, monitoring of anti-neoplastic toxicities, interpreting the imaging/labs previously done) and coordination of care as well as direct time with the patient today, of which greater than 50% of the time was spent in counseling and coordination of care with the patient/family.    Plan/Labs  EKG at baseline and q2 weeks for 2 times - completed with normal QTC intervals.   To continue follow up with palliative care; further goals of care discussion to follow.  Patient has depression symptoms but no suicidal thoughts or ideations. She initally refused SSRI but advised to reconsider. She is resistant to full therapy and chose to minimize / monotherapy - see below.   To continue follow up with radiation oncology   10/22/24 was initiated on Letrozole 2.5 mg daily + Ribociclib 600 mg daily 21 days on and 7 days off   Reported fatigue and night sweats for which kisqali was decreased to 400 mg daily then patient Self Discontinued the therapy since around 12/10/24 but without improvement of the fatigue or night sweats up to today visit 12/30/24,  Patient refuses to restart Kisqali, but after discussion today she agrees to restart monotherapy Letrozole 2.5 mg daily    recommends Calcium 1200 mg and Vitamin D-3 2,000 units daily supplemental  Can use Keytruda down the road due to TMB high  Baseline DEXA due 4/2025 and would be covered by the insurance   Cont Prolia SC 60 mg q6 months (initiated on 11/12/24)   Oncology dietitian   patient was seen by genetic on 10/30/24 and genetic testing performed including but not limited to NF1, BRCA1 and BRCA2 were are negative with no clinically significant variants detected.   CBC, CMP q4 weeks ordered as standing until 11/2025  Restaging CT CAP w/c scheduled 1/13/2025 , and patient to follow up end of January with Dr. Amanda to review and address results .         Follow Up    All questions were answered to the patient's satisfaction during this encounter. The patient knows the contact information for our office and knows to reach out for any relevant concerns related to this encounter. They are to call for any temperature 100.4 or higher, new symptoms including but not restricted to shaking chills, decreased appetite, nausea, vomiting, diarrhea, increased fatigue, shortness of breath or chest pain, confusion, and not feeling the strength to come to the clinic. For all other listed problems and medical diagnosis in their chart - they are managed by PCP and/or other specialists, which the patient acknowledges. Thank you very much for your consultation and making us a part of this patient's care. We are continuing to follow closely with you. Please do not hesitate to reach out to me with any additional questions or concerns.    Fam Amanda MD  Hematology & Medical Oncology Staff Physician             Disclaimer: This document was prepared using [employer] Direct technology. If a word or phrase is confusing, or does not make sense, this is likely due to recognition error which was not discovered during this clinician's review. If you believe an error has occurred, please contact me through [facility] service line for  [email or web address]?cation.      ONCOLOGY HISTORY OF PRESENT ILLNESS        Oncology History   Breast cancer metastasized to bone, right (HCC)   9/27/2024 Initial Diagnosis    Breast cancer metastasized to bone, right (HCC)     10/12/2024 -  Cancer Staged    Staging form: Breast, AJCC 8th Edition  - Clinical: Stage IV (cM1) - Signed by Fam Amanda MD on 10/12/2024       11/5/2024 - 11/20/2024 Radiation      Plan ID Energy Fractions Dose per Fraction (cGy) Dose Correction (cGy) Total Dose Delivered (cGy) Elapsed Days   RT Shoulder 6X 5 / 5 400 0 2,000 7   T11_L2 Spine 10X/6X 10 / 10 300 0 3,000 15   T7_T10 Spine 6X 10 / 10 300 0 3,000 15         Malignant neoplasm of upper-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, estrogen receptor positive (HCC)   10/2/2024 Observation    NM PET/CT IMPRESSION:   1. Mild focal radiotracer uptake at a left breast nodular density medially. Underlying primary breast malignancy should be excluded.  2. Several small FDG avid left axillary/subpectoral lymph nodes would raise suspicion for metastasis.  3. Scattered FDG avid lytic lesions compatible with metastasis.     10/4/2024 Biopsy    Left breast ultrasound-guided biopsy  A. 3 o'clock, periareolar  Benign breast tissue with fibroadenomatoid nodule    B. 11 o'clock, 7 cm from nipple  Invasive mammary carcinoma with mixed ductal and lobular features  Grade 1-2  ER , OR <1, HER2 1+  Lymphovascular invasion not definitively identified    Concordant. Malignancy is poorly defined on mammo, appearing as a developing asymmetry rather than a discrete mass; this measures up to 5.8 cm. On US, mass measures up to 4.1cm. Subtle asymmetry with possible distortion slightly superior and medial which could represent a second site of disease. Further characterization is indication. Right breast imaging performed on 4/12/2024; more recent mammogram may be reasonable. CESM or MRI is recommended for evaluation of extent of disease in left breast. The patient  had abnormal axillary lymph nodes seen on PET/CT. 1 of these lymph nodes was visualized on US; however, it is not amenable to US-guided core needle biopsy.     10/12/2024 -  Cancer Staged    Staging form: Breast, AJCC 8th Edition  - Clinical: Stage IV (cM1) - Signed by Fam Amanda MD on 10/12/2024       10/15/2024 Biopsy    IR-guided biopsy    T8 - metastatic carcinoma, most compatible with known breast primary  ~RECEPTORS PENDING~    L1 - negative for carcinoma     11/5/2024 - 11/20/2024 Radiation      Plan ID Energy Fractions Dose per Fraction (cGy) Dose Correction (cGy) Total Dose Delivered (cGy) Elapsed Days   RT Shoulder 6X 5 / 5 400 0 2,000 7   T11_L2 Spine 10X/6X 10 / 10 300 0 3,000 15   T7_T10 Spine 6X 10 / 10 300 0 3,000 15               SUBJECTIVE  (INTERVAL HISTORY)     12/30/24 visit: patient reported that she self discontinued the Letrozole/Kisqali around 12/10/2024 because of the fatigue and night sweats, however she still has the same symptoms 3 weeks after discontinuing the therapy with no improvement. Also noted on 12/30 that she started to feel R hip pain and her R shoulder pain which was gone after RT now reoccur. Denies suicidal ideation. Had on and off mild grade fevers past month but not in past week. Eating well and still do some social activities.     Review of Systems  Review of Systems   Constitutional: Positive for fever and night sweats. Negative for decreased appetite and weight loss.        Had occasional on and off mild grade fevers past month but not in the past week as per patient on 12/30/24 . Unimproved fatigue even after she stopped her letrozole & Kisqali three weeks ago. Same, unchanged night sweats nightly or every other night , not improved after therapy discontinuation three weeks ago.    HENT:  Negative for congestion, nosebleeds, sore throat and tinnitus.    Eyes:  Negative for blurred vision, photophobia and visual disturbance.   Cardiovascular:  Negative for chest  pain, dyspnea on exertion, irregular heartbeat, leg swelling and palpitations.   Respiratory:  Negative for cough, shortness of breath and wheezing.    Hematologic/Lymphatic: Negative for adenopathy and bleeding problem.   Skin:  Positive for suspicious lesions. Negative for rash.        Small subcutaneous micronodular on plantar hands /fingers , non painful    Musculoskeletal:  Positive for arthritis.        Reports starting to have occasional R hip pain , and also the R shoulder pain which was well controlled after the radiation therapy now started to reoccur occasionally , not constant    Gastrointestinal:  Positive for diarrhea. Negative for abdominal pain, melena and vomiting.        Had diarrhea while on Black cohosh for the night sweats but noted that diarrhea is now resolved since stopping the use of Black cohosh   Genitourinary:  Negative for hematuria and pelvic pain.   Neurological:  Negative for headaches, loss of balance and numbness.   Psychiatric/Behavioral:  Positive for depression. Negative for altered mental status, hallucinations, substance abuse, suicidal ideas and thoughts of violence. The patient is nervous/anxious. The patient does not have insomnia.         Still eating well, does some social activities and visiting friends but expressing feeling too much stress and anxiety from the cancer diagnosis and related fatigue, pain and night sweats.           Past Medical History:   Diagnosis Date    Arthritis     Breast mass     Cancer (HCC) 8/28/2024    Edema of both lower legs 11/01/2018    Dr. Thang Otero; faxed records.    Hx of skin cancer, basal cell     Hyperlipidemia     Hypertension     Impingement syndrome of left shoulder 04/10/2018    Dr. Thang Otero, faxed patient records.    Lactose intolerance     Migraines     Osteopenia     Overactive bladder     Overweight 12/16/2021    Skin cancer 2022    Basal cell    Toe fracture, left 11/16/2023       Past Surgical History:    Procedure Laterality Date    ACHILLES TENDON REPAIR      BREAST BIOPSY Left 10/04/2024    BREAST CYST EXCISION Bilateral     1972    BREAST LUMPECTOMY      CATARACT EXTRACTION, BILATERAL      COLONOSCOPY      COLPOPEXY VAGINAL EXTRAPERITONEAL (VEC) WITH INSERTION PUBOVAGINAL SLING      CYST REMOVAL      HERNIA REPAIR      HYSTERECTOMY      IR KYPHOPLASTY/VERTEBROPLASTY WITH ABLATION  10/15/2024    JOINT REPLACEMENT Right     REPLACEMENT TOTAL KNEE Left     TONSILLECTOMY      US GUIDANCE BREAST BIOPSY LEFT EACH ADDITIONAL Left 10/04/2024    US GUIDED BREAST BIOPSY LEFT COMPLETE Left 10/04/2024       Family History   Problem Relation Age of Onset    Heart disease Father          of MI age 41    Breast cancer Maternal Grandmother     Heart disease Paternal Grandmother     Hypertension Paternal Grandfather     Melanoma Daughter         40s    Melanoma Daughter         50s    Ovarian cancer Maternal Aunt     Cancer Maternal Aunt         Ovarian Cancer    Breast cancer Paternal Aunt        Social History     Socioeconomic History    Marital status:      Spouse name: Not on file    Number of children: 2    Years of education: 18    Highest education level: Master's degree (e.g., MA, MS, Derek, MEd, MSW, CHANELL)   Occupational History    Not on file   Tobacco Use    Smoking status: Former     Current packs/day: 0.00     Average packs/day: 0.5 packs/day for 15.0 years (7.5 ttl pk-yrs)     Types: Cigarettes     Quit date: 1977     Years since quittin.1     Passive exposure: Past    Smokeless tobacco: Never   Vaping Use    Vaping status: Never Used   Substance and Sexual Activity    Alcohol use: Not Currently     Alcohol/week: 1.0 standard drink of alcohol     Types: 1 Glasses of wine per week     Comment: occasional    Drug use: Never    Sexual activity: Not Currently     Partners: Male     Birth control/protection: Abstinence, Post-menopausal, Female Sterilization   Other Topics Concern    Not on file    Social History Narrative    Not on file     Social Drivers of Health     Financial Resource Strain: Patient Declined (11/6/2023)    Overall Financial Resource Strain (CARDIA)     Difficulty of Paying Living Expenses: Patient declined   Food Insecurity: No Food Insecurity (11/10/2024)    Hunger Vital Sign     Worried About Running Out of Food in the Last Year: Never true     Ran Out of Food in the Last Year: Never true   Transportation Needs: No Transportation Needs (11/10/2024)    PRAPARE - Transportation     Lack of Transportation (Medical): No     Lack of Transportation (Non-Medical): No   Physical Activity: Sufficiently Active (3/24/2022)    Exercise Vital Sign     Days of Exercise per Week: 4 days     Minutes of Exercise per Session: 60 min   Stress: Stress Concern Present (3/24/2022)    [employer] [place] - Occupational Stress Questionnaire     Feeling of Stress : Rather much   Social Connections: Socially Isolated (3/24/2022)    Social Connection and Isolation Panel [NHANES]     Frequency of Communication with Friends and Family: More than three times a week     Frequency of Social Gatherings with Friends and Family: Three times a week     Attends Judaism Services: Never     Active Member of Clubs or Organizations: No     Attends Club or Organization Meetings: Never     Marital Status:    Intimate Partner Violence: Unknown (11/9/2024)    Nursing IPS     Feels Physically and Emotionally Safe: Not on file     Physically Hurt by Someone: Not on file     Humiliated or Emotionally Abused by Someone: Not on file     Physically Hurt by Someone: 2     Hurt or Threatened by Someone: 2   Housing Stability: Unknown (11/10/2024)    Housing Stability Vital Sign     Unable to Pay for Housing in the Last Year: No     Number of Times Moved in the Last Year: Not on file     Homeless in the Last Year: No       Allergies   Allergen Reactions    Adhesive [Medical Tape] Hives, Itching and Blisters     Bupropion Rash     Around 2010       Current Outpatient Medications   Medication Sig Dispense Refill    atorvastatin (LIPITOR) 10 mg tablet Take 1 tablet (10 mg total) by mouth daily 90 tablet 3    Calcium 500-2.5 MG-MCG CHEW Chew 2 chews daily      Cholecalciferol (Vitamin D) 50 MCG (2000 UT) tablet Take 2,000 Units by mouth daily      gabapentin (Neurontin) 100 mg capsule Take 3 capsules (300 mg total) by mouth daily at bedtime 90 capsule 0    Multiple Vitamins-Minerals (MULTIVITAMIN WITH MINERALS) tablet Take 1 tablet by mouth daily      polyethylene glycol (GLYCOLAX) 17 GM/SCOOP powder Take 17 g by mouth 2 (two) times a day      Sodium Fluoride 5000 PPM 1.1 % PSTE APPLY TIHN RIBBON TO TOOTHBRUSH, BRUSH X 2MIN AT BEDTIME, SPIT, DONT RINSE      letrozole (FEMARA) 2.5 mg tablet Take 1 tablet (2.5 mg total) by mouth daily (Patient not taking: Reported on 12/19/2024) 90 tablet 3    Ribociclib Succ, 400 MG Dose, (Kisqali, 400 MG Dose,) 200 MG TBPK Take 400 mg by mouth daily 21 days on, followed by 7 days off (Patient not taking: Reported on 12/19/2024) 42 each 5     No current facility-administered medications for this visit.       (Not in a hospital admission)      Objective:     24 Hour Vitals Assessment:     Vitals:    12/30/24 1037   BP: 122/60   Pulse: 62   Resp: 20   Temp: 97.7 °F (36.5 °C)   SpO2: 99%       PHYSICIAN EXAM (8 organ systems needed)  - GEN: Appears well but somewhat depressed, communicating well however with good eye contact; alert and oriented x 3, pleasant and cooperative, in no acute distress  - HEENT: Anicteric, mucous membranes moist, PERRL and EOMI   - NECK: No lymphadenopathy, JVD or carotid bruits   - HEART: RRR, normal S1 and S2, no murmurs, clicks, gallops or rubs   - LUNGS: Clear to auscultation bilaterally; no wheezes, rales, or rhonchi  - ABDOMEN: Normal bowel sounds, soft, no tenderness, no distention, no organomegaly or masses felt on exam.   - EXTREMITIES: Peripheral pulses  "normal; no clubbing, cyanosis, or edema  - NEURO: No focal findings, CN II-XII are grossly intact.   - Musculoskeletal: 5/5 strength, normal ROM, no swollen or erythematous joints.   - SKIN: nonspecific micronodular like subcutaneous lesions , few , felt on palmar hands /fingers surfaces, non tender, no cyanosis, each about 2-3 mm diameter. Normal without suspicious lesions on exposed skin        DATA REVIEW:    Pathology Result:    Final Diagnosis   Date Value Ref Range Status   10/15/2024   Final    A. Spine, \"L1 vertebra,\" Biopsy:  - Fragment of bone   - Negative for carcinoma     10/15/2024   Final    A. Spine, \"Vertebra, T8,\" Biopsy:  - Metastatic carcinoma, most compatible with known breast primary     10/04/2024   Final    A.  Left breast, 3:00, periareolar (ultrasound-guided 14-gauge marquee core biopsy, 6 passes):     - Benign breast tissue with fibroadenomatoid nodule.    B.  Left breast, 11:00, 7 cm from nipple (ultrasound-guided 12-gauge marquee core biopsy, 4 passes):     - Invasive mammary carcinoma with mixed ductal and lobular features.       -- Tumor present in 4 of 4 tissue cores with largest measurable size of 11 mm.       -- mBR Grade:  Grade 1-2 of 3         - Duct formation: Score 2-3 of 3         - Nuclear grade: Score 1-2 of 3         - Mitotic rate: Score 1 of 3     - In situ component:  Focal suggestion of low-grade DCIS     - Lymph-vascular invasion:  Not definitively identified     Comment:  Block B2 forwarded for ER/IN/HER2 analysis with the results to be given in a supplemental report.     01/27/2023   Final    A. Polyp, rectum, polypectomy:  -   Colorectal mucosa with lymphoid aggregates, otherwise no significant histopathologic change.     Interpretation performed at [facility] [address] [place] [address], [place] [address]           Image Results:   Image result are reviewed and documented in Hematology/Oncology history    MRI abdomen w wo contrast and mrcp  Narrative: MRI OF THE " ABDOMEN WITH AND WITHOUT CONTRAST WITH MRCP    INDICATION: 79 years / Female. K83.8: Other specified diseases of biliary tract. Dilated common bile duct. History of metastatic breast cancer.    COMPARISON: CT abdomen/pelvis 11/9/2024    TECHNIQUE: Multiplanar/multisequence MRI of the abdomen with 3D MRCP was performed before and after administration of contrast.    IV Contrast: 5 mL of Gadobutrol injection (SINGLE-DOSE)    FINDINGS:  Images are degraded by motion artifact.    LOWER CHEST: Unremarkable.    LIVER:  Normal in size and configuration.  No suspicious mass. Subcentimeter cyst adjacent to the intrahepatic IVC (5/12).  Patent hepatic and portal veins.    BILE DUCTS: Minimally dilated common bile duct measuring up to 8 mm proximally with smooth distal tapering. No intrahepatic bile duct dilation. No choledocholithiasis, biliary stricture or suspicious mass within the limits of this motion degraded   examination.    GALLBLADDER: Normal.    PANCREAS: Unremarkable.    ADRENAL GLANDS: Unremarkable.    SPLEEN: Normal.    KIDNEYS/PROXIMAL URETERS: No hydroureteronephrosis. No suspicious renal mass. Bilateral parapelvic cysts.    BOWEL: No dilated loops of bowel.    PERITONEUM/RETROPERITONEUM: No ascites.    LYMPH NODES: No abdominal lymphadenopathy.    VESSELS: No aneurysm.    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 Additional thoracic spine metastases better assessed on recent MRI spine 11/10/2024. Chronic compression deformity of L1 with kyphoplasty change.  Impression: Minimally dilated common bile duct measuring up to 8 mm proximally with smooth distal tapering. No intrahepatic bile duct dilation. No choledocholithiasis, biliary stricture or suspicious mass within the limits of this motion degraded examination.    Workstation performed: [id number]      LABS:  Lab data are reviewed and documented in Hem[facility]c history.       Lab  "Results   Component Value Date    HGB 12.5 12/20/2024    HCT 37.0 12/20/2024    MCV 95 12/20/2024     12/20/2024    WBC 3.48 (L) 12/20/2024    NRBC 1 12/20/2024    BANDSPCT 1 12/04/2024    ATYLMPCT 3 (H) 12/04/2024     Lab Results   Component Value Date    K 3.3 (L) 12/20/2024     12/20/2024    CO2 32 12/20/2024    BUN 11 12/20/2024    CREATININE 0.51 (L) 12/20/2024    GLUF 84 09/26/2024    CALCIUM 9.1 12/20/2024    CORRECTEDCA 10.0 10/17/2022    AST 22 12/20/2024    ALT 19 12/20/2024    ALKPHOS 60 12/20/2024    EGFR 91 12/20/2024       No results found for: \"IRON\", \"TIBC\", \"FERRITIN\"    No results found for: \"IEYBMCLI94\"    No results for input(s): \"WBC\", \"CREAT\", \"PLT\" in the last 72 hours.    By:  Cm Kelley DO, 12/30/2024, 11:28 AM                                    "

## 2024-12-30 NOTE — LETTER
2024     KAI Gallardo  [address]   [address]  [place] [address]    Patient: Yuni Keys   YOB: 1945   Date of Visit: 2024       Dear Dr. Barragan:    I saw Yuni Keys with my attending Dr. Amanda today. She is experiencing fatigue, night sweats, cancer related pain in right hip and right shoulder, depression , but denies suicidal thought or ideation. She discontinued her Letrozole and Kisqali three weeks ago but still with no improvement or changes of these symptoms therefore we offered to restart therapy, but she seemed not interested in full aggressive treatment and only agreed to retry monotherapy Letrozole alone.     We appreciate your help and follow up with her on above, including goals of care discussion down the road as appropriate and hopefully her symptoms get under control. It seems now more cancer related plus depression-related symptoms and less likely due to the treatment.     If you have questions, please do not hesitate to call me. I look forward to following your patient along with you.    Thank you.   Sincerely,  Cm Kelley DO   Hematology and Medical Oncology - PGY V  [facility]       CC: DO Fam Kc MD Mina Aiad, DO  2024 11:50 AM  Incomplete                               Hematology/Oncology Outpatient Office Note    Date of Service: 2024    [facility] [place] [address] [address]  [place] [id number] [phone]    Reason for Consultation:   Chief Complaint   Patient presents with   • Follow-up     Cancer Stage at diagnosis: IV     Referral Physician: No ref. provider found    Primary Care Physician:  Lisa Fairbanks DO     Nickname: Alely    Lives alone     Daughter: James     Original ECO     Today's ECO , night sweats, fatigue, pain (R shoulder and R hip) , depression     Goals and Barriers:  Current Goal:  Minimize effects of disease burden, extend life.   Barriers to accomplishing this: cancer related pain and symptoms (night sweats, pain, depression, fatigue) and treatment intolerable , see below.     Patient's Capacity to Self Care:  Patient is able to self care    ASSESSMENT & PLAN      Diagnosis ICD-10-CM Associated Orders   1. Breast cancer metastasized to bone, right (HCC)  C50.911     C79.51       2. Malignant neoplasm of upper-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, estrogen receptor positive (HCC)  C50.212     Z17.0       3. Long term (current) use of aromatase inhibitors  Z79.811             This is a 79 y.o. c PMHx notable for Arthritis, hyperlipidemia, hypertension, migraines, osteopenia, basal cell skin cancer, had hysterectomy and oophorectomy in her early 50's    seen initially in in 10/2024 as consultation for likely recurrent HR+ L Breast Cancer to the bone including to T8 & L1 with pathological fractures s/p Kyphoplasty in 10/2024 with palliative radiation to T spine and R shoulder completed 11/20/2024      10/22/2024 initiated on Letrozole 2.5 mg QD and Kisqali initially 600 mg QD 3 weeks on 1 week of . then she tried 400 mg QD early December 2024 due to reported side effects.   G1 Neutropenia 2/2 Kisqali was noted    12/30/24 visit: patient reported that she self discontinued the Letrozole/Kisqali around 12/10/2024 because of the fatigue and night sweats, however she still has the same symptoms 3 weeks after discontinuing the therapy with no improvement. Also noted on 12/30 that she started to feel R hip pain and her R shoulder pain which was gone after RT now reoccur. Denies suicidal ideation. Had on and off mild grade fevers past month but not in past week. Eating well and still do some social activities.     Discussion of decision making  Oncology history updated, accordingly, during this visit  Goals of care/patient communication  I discussed with the patient the clinical course leading up to  "their cancer diagnosis. I reviewed relevant office notes, imaging reports and pathology result as well.  I told the patient that this is a case of incurable disease and what this means. We discussed that the goal of anti-cancer therapy is to provide best quality of life, extend overall survival, and progression free survival as shown in clinical trials. We also discussed that there might be a point when the cancer will no longer respond to this anti-neoplastic therapy. As a result, we also discussed the role of the palliative care team being introduced early in the treatment course. We will be making this referral  I explained the risks/benefits of the proposed cancer therapy: Letrozole + Ribociclib  and after discussion including understanding risks of possible life-threatening complications and therapy-related malignancy development, informed consent for blood products and treatment has been signed and obtained.  TNM/Staging At Diagnosis  Cancer Staging   Breast cancer metastasized to bone, right (HCC)  Staging form: Breast, AJCC 8th Edition  - Clinical: Stage IV (cM1) - Signed by Fam Amanda MD on 10/12/2024    Malignant neoplasm of upper-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, estrogen receptor positive (HCC)  Staging form: Breast, AJCC 8th Edition  - Clinical: Stage IV (cM1) - Signed by Fam Amanda MD on 10/12/2024    Disease Features/Tumor Markers/Genetics  Tumor Marker: n/a  Notable Path Features:   10/4/2024 Breast: ER 90%, AR negative, HER-2 +1  10/15/2024: Spine, \"Vertebra, T8,\" Biopsy: Metastatic carcinoma, most compatible with known breast primary  CARIS NGS: ER+, HER2 0, no actionable biomarkers  Guardant NGS 10 muts/Mb, NF1 mutation  Treatment: Letrozole + Ribociclib   Other Supportive care: Prolia, Calcium and Vitamin D   Treatment Team Members  Surgeon: Cassandra Lynn Cardarelli, MD   Rad Onc: Olimpia Siddiqi MD   Palliative: Lisa Contreras, DO   Labs  Diagnostics  10/1/2024 " EK ms QTC   10/2/2024 PET/CT: Mild focal radiotracer uptake at a left breast nodular density medially. Several small FDG avid left axillary/subpectoral lymph nodes would raise suspicion for metastasis. Scattered FDG avid lytic lesions compatible with metastasis. T-8 and T-11 spine lesions  10/8/2024 MRI T-spine w/wo c: Normal enhanced MRI of the thoracic spine  There is a DEXA from , next can get it done 2024:  ms QTC   2024 CT AP: No evidence of metastases in the abdomen or pelvis. 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  Compression fracture of L1, s/p Kyphoplasty.   2024 CT T Spine: Osteolytic metastases involving the T8, T11 and L1 vertebral bodies with pathologic fractures treated with kyphoplasty at T8 and L1. Thickened paravertebral soft tissues at T8. This could be residual edema or hematoma ... Probable extraosseous extension of T8 metastasis into the left anterior epidural space.   2024 CT L Spine: Compression fracture of L1, status post kyphoplasty. No new lumbar vertebral fractures. No evidence of lumbar spine metastasis.  11/10/2024 MRI Thoracic Spine: Interval T8 kyphoplasty ... Redemonstrated osseous metastases at T5, T8, and T11. No abnormal epidural or leptomeningeal enhancement. Normal cord signal.  2024 EKG QTC interval 423 msec   2024 MRI Abdomen: Minimally dilated CBD up to 8 mm proximally with smooth distal tapering. No intrahepatic bile duct dilation. No choledocholithiasis, biliary stricture or suspicious mass within the limits of this motion degraded examination.      Discussion of decision making    I personally reviewed the following lab results, the image studies, pathology, other specialty/physicians consult notes and recommendations, and outside medical records. I had a lengthy discussion with the patient and shared the work-up findings. We discussed the diagnosis and management plan as below. I  spent 45 minutes reviewing the records (labs, clinician notes, outside records, medical history, ordering medicine/tests/procedures, monitoring of anti-neoplastic toxicities, interpreting the imaging/labs previously done) and coordination of care as well as direct time with the patient today, of which greater than 50% of the time was spent in counseling and coordination of care with the patient/family.    Plan/Labs  EKG at baseline and q2 weeks for 2 times - completed with normal QTC intervals.   To continue follow up with palliative care; further goals of care discussion to follow.  Patient has depression symptoms but no suicidal thoughts or ideations. She initally refused SSRI but advised to reconsider. She is resistant to full therapy and chose to minimize / monotherapy - see below.   To continue follow up with radiation oncology   10/22/24 was initiated on Letrozole 2.5 mg daily + Ribociclib 600 mg daily 21 days on and 7 days off   Reported fatigue and night sweats for which kisqali was decreased to 400 mg daily then patient Self Discontinued the therapy since around 12/10/24 but without improvement of the fatigue or night sweats up to today visit 12/30/24,  Patient refuses to restart Kisqali, but after discussion today she agrees to restart monotherapy Letrozole 2.5 mg daily   recommends Calcium 1200 mg and Vitamin D-3 2,000 units daily supplemental  Can use Keytruda down the road due to TMB high  Baseline DEXA due 4/2025 and would be covered by the insurance   Cont Prolia SC 60 mg q6 months (initiated on 11/12/24)   Oncology dietitian   patient was seen by genetic on 10/30/24 and genetic testing performed including but not limited to NF1, BRCA1 and BRCA2 were are negative with no clinically significant variants detected.   CBC, CMP q4 weeks ordered as standing until 11/2025  Restaging CT CAP w/c scheduled 1/13/2025 , and patient to follow up end of January with Dr. Amanda to review and address results .          Follow Up    All questions were answered to the patient's satisfaction during this encounter. The patient knows the contact information for our office and knows to reach out for any relevant concerns related to this encounter. They are to call for any temperature 100.4 or higher, new symptoms including but not restricted to shaking chills, decreased appetite, nausea, vomiting, diarrhea, increased fatigue, shortness of breath or chest pain, confusion, and not feeling the strength to come to the clinic. For all other listed problems and medical diagnosis in their chart - they are managed by PCP and/or other specialists, which the patient acknowledges. Thank you very much for your consultation and making us a part of this patient's care. We are continuing to follow closely with you. Please do not hesitate to reach out to me with any additional questions or concerns.    Fam Amanda MD  Hematology & Medical Oncology Staff Physician             Disclaimer: This document was prepared using [employer] technology. If a word or phrase is confusing, or does not make sense, this is likely due to recognition error which was not discovered during this clinician's review. If you believe an error has occurred, please contact me through "[employer] service line for ermias?cation.      ONCOLOGY HISTORY OF PRESENT ILLNESS        Oncology History   Breast cancer metastasized to bone, right (HCC)   9/27/2024 Initial Diagnosis    Breast cancer metastasized to bone, right (HCC)     10/12/2024 -  Cancer Staged    Staging form: Breast, AJCC 8th Edition  - Clinical: Stage IV (cM1) - Signed by Fam Amanda MD on 10/12/2024       11/5/2024 - 11/20/2024 Radiation      Plan ID Energy Fractions Dose per Fraction (cGy) Dose Correction (cGy) Total Dose Delivered (cGy) Elapsed Days   RT Shoulder 6X 5 / 5 400 0 2,000 7   T11_L2 Spine 10X/6X 10 / 10 300 0 3,000 15   T7_T10 Spine 6X 10 / 10 300 0 3,000 15         Malignant  neoplasm of upper-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, estrogen receptor positive (HCC)   10/2/2024 Observation    NM PET/CT IMPRESSION:   1. Mild focal radiotracer uptake at a left breast nodular density medially. Underlying primary breast malignancy should be excluded.  2. Several small FDG avid left axillary/subpectoral lymph nodes would raise suspicion for metastasis.  3. Scattered FDG avid lytic lesions compatible with metastasis.     10/4/2024 Biopsy    Left breast ultrasound-guided biopsy  A. 3 o'clock, periareolar  Benign breast tissue with fibroadenomatoid nodule    B. 11 o'clock, 7 cm from nipple  Invasive mammary carcinoma with mixed ductal and lobular features  Grade 1-2  ER , WV <1, HER2 1+  Lymphovascular invasion not definitively identified    Concordant. Malignancy is poorly defined on mammo, appearing as a developing asymmetry rather than a discrete mass; this measures up to 5.8 cm. On US, mass measures up to 4.1cm. Subtle asymmetry with possible distortion slightly superior and medial which could represent a second site of disease. Further characterization is indication. Right breast imaging performed on 4/12/2024; more recent mammogram may be reasonable. CESM or MRI is recommended for evaluation of extent of disease in left breast. The patient had abnormal axillary lymph nodes seen on PET/CT. 1 of these lymph nodes was visualized on US; however, it is not amenable to US-guided core needle biopsy.     10/12/2024 -  Cancer Staged    Staging form: Breast, AJCC 8th Edition  - Clinical: Stage IV (cM1) - Signed by Fam Amanda MD on 10/12/2024       10/15/2024 Biopsy    IR-guided biopsy    T8 - metastatic carcinoma, most compatible with known breast primary  ~RECEPTORS PENDING~    L1 - negative for carcinoma     11/5/2024 - 11/20/2024 Radiation      Plan ID Energy Fractions Dose per Fraction (cGy) Dose Correction (cGy) Total Dose Delivered (cGy) Elapsed Days   RT Shoulder 6X 5 / 5 400 0  2,000 7   T11_L2 Spine 10X/6X 10 / 10 300 0 3,000 15   T7_T10 Spine 6X 10 / 10 300 0 3,000 15               SUBJECTIVE  (INTERVAL HISTORY)     12/30/24 visit: patient reported that she self discontinued the Letrozole/Kisqali around 12/10/2024 because of the fatigue and night sweats, however she still has the same symptoms 3 weeks after discontinuing the therapy with no improvement. Also noted on 12/30 that she started to feel R hip pain and her R shoulder pain which was gone after RT now reoccur. Denies suicidal ideation. Had on and off mild grade fevers past month but not in past week. Eating well and still do some social activities.     Review of Systems  Review of Systems   Constitutional: Positive for fever and night sweats. Negative for decreased appetite and weight loss.        Had occasional on and off mild grade fevers past month but not in the past week as per patient on 12/30/24 . Unimproved fatigue even after she stopped her letrozole & Kisqali three weeks ago. Same, unchanged night sweats nightly or every other night , not improved after therapy discontinuation three weeks ago.    HENT:  Negative for congestion, nosebleeds, sore throat and tinnitus.    Eyes:  Negative for blurred vision, photophobia and visual disturbance.   Cardiovascular:  Negative for chest pain, dyspnea on exertion, irregular heartbeat, leg swelling and palpitations.   Respiratory:  Negative for cough, shortness of breath and wheezing.    Hematologic/Lymphatic: Negative for adenopathy and bleeding problem.   Skin:  Positive for suspicious lesions. Negative for rash.        Small subcutaneous micronodular on plantar hands /fingers , non painful    Musculoskeletal:  Positive for arthritis.        Reports starting to have occasional R hip pain , and also the R shoulder pain which was well controlled after the radiation therapy now started to reoccur occasionally , not constant    Gastrointestinal:  Positive for diarrhea. Negative for  abdominal pain, melena and vomiting.        Had diarrhea while on Black cohosh for the night sweats but noted that diarrhea is now resolved since stopping the use of Black cohosh   Genitourinary:  Negative for hematuria and pelvic pain.   Neurological:  Negative for headaches, loss of balance and numbness.   Psychiatric/Behavioral:  Positive for depression. Negative for altered mental status, hallucinations, substance abuse, suicidal ideas and thoughts of violence. The patient is nervous/anxious. The patient does not have insomnia.         Still eating well, does some social activities and visiting friends but expressing feeling too much stress and anxiety from the cancer diagnosis and related fatigue, pain and night sweats.           Past Medical History:   Diagnosis Date   • Arthritis    • Breast mass    • Cancer (HCC) 8/28/2024   • Edema of both lower legs 11/01/2018    Dr. Thang Otero; faxed records.   • Hx of skin cancer, basal cell    • Hyperlipidemia    • Hypertension    • Impingement syndrome of left shoulder 04/10/2018    Dr. Thang Otero, faxed patient records.   • Lactose intolerance    • Migraines    • Osteopenia    • Overactive bladder    • Overweight 12/16/2021   • Skin cancer 2022    Basal cell   • Toe fracture, left 11/16/2023       Past Surgical History:   Procedure Laterality Date   • ACHILLES TENDON REPAIR     • BREAST BIOPSY Left 10/04/2024   • BREAST CYST EXCISION Bilateral     1972   • BREAST LUMPECTOMY     • CATARACT EXTRACTION, BILATERAL     • COLONOSCOPY     • COLPOPEXY VAGINAL EXTRAPERITONEAL (VEC) WITH INSERTION PUBOVAGINAL SLING     • CYST REMOVAL     • HERNIA REPAIR     • HYSTERECTOMY     • IR KYPHOPLASTY/VERTEBROPLASTY WITH ABLATION  10/15/2024   • JOINT REPLACEMENT Right    • REPLACEMENT TOTAL KNEE Left    • TONSILLECTOMY  1949   • US GUIDANCE BREAST BIOPSY LEFT EACH ADDITIONAL Left 10/04/2024   • US GUIDED BREAST BIOPSY LEFT COMPLETE Left 10/04/2024       Family History    Problem Relation Age of Onset   • Heart disease Father          of MI age 41   • Breast cancer Maternal Grandmother    • Heart disease Paternal Grandmother    • Hypertension Paternal Grandfather    • Melanoma Daughter         40s   • Melanoma Daughter         50s   • Ovarian cancer Maternal Aunt    • Cancer Maternal Aunt         Ovarian Cancer   • Breast cancer Paternal Aunt        Social History     Socioeconomic History   • Marital status:      Spouse name: Not on file   • Number of children: 2   • Years of education: 18   • Highest education level: Master's degree (e.g., MA, MS, Derek, MEd, MSW, CHANELL)   Occupational History   • Not on file   Tobacco Use   • Smoking status: Former     Current packs/day: 0.00     Average packs/day: 0.5 packs/day for 15.0 years (7.5 ttl pk-yrs)     Types: Cigarettes     Quit date: 1977     Years since quittin.1     Passive exposure: Past   • Smokeless tobacco: Never   Vaping Use   • Vaping status: Never Used   Substance and Sexual Activity   • Alcohol use: Not Currently     Alcohol/week: 1.0 standard drink of alcohol     Types: 1 Glasses of wine per week     Comment: occasional   • Drug use: Never   • Sexual activity: Not Currently     Partners: Male     Birth control/protection: Abstinence, Post-menopausal, Female Sterilization   Other Topics Concern   • Not on file   Social History Narrative   • Not on file     Social Drivers of Health     Financial Resource Strain: Patient Declined (2023)    Overall Financial Resource Strain (CARDIA)    • Difficulty of Paying Living Expenses: Patient declined   Food Insecurity: No Food Insecurity (11/10/2024)    Hunger Vital Sign    • Worried About Running Out of Food in the Last Year: Never true    • Ran Out of Food in the Last Year: Never true   Transportation Needs: No Transportation Needs (11/10/2024)    PRAPARE - Transportation    • Lack of Transportation (Medical): No    • Lack of Transportation (Non-Medical): No    Physical Activity: Sufficiently Active (3/24/2022)    Exercise Vital Sign    • Days of Exercise per Week: 4 days    • Minutes of Exercise per Session: 60 min   Stress: Stress Concern Present (3/24/2022)    [employer] - Occupational Stress Questionnaire    • Feeling of Stress : Rather much   Social Connections: Socially Isolated (3/24/2022)    Social Connection and Isolation Panel [NHANES]    • Frequency of Communication with Friends and Family: More than three times a week    • Frequency of Social Gatherings with Friends and Family: Three times a week    • Attends Temple Services: Never    • Active Member of Clubs or Organizations: No    • Attends Club or Organization Meetings: Never    • Marital Status:    Intimate Partner Violence: Unknown (11/9/2024)    Nursing IPS    • Feels Physically and Emotionally Safe: Not on file    • Physically Hurt by Someone: Not on file    • Humiliated or Emotionally Abused by Someone: Not on file    • Physically Hurt by Someone: 2    • Hurt or Threatened by Someone: 2   Housing Stability: Unknown (11/10/2024)    Housing Stability Vital Sign    • Unable to Pay for Housing in the Last Year: No    • Number of Times Moved in the Last Year: Not on file    • Homeless in the Last Year: No       Allergies   Allergen Reactions   • Adhesive [Medical Tape] Hives, Itching and Blisters   • Bupropion Rash     Around 2010       Current Outpatient Medications   Medication Sig Dispense Refill   • atorvastatin (LIPITOR) 10 mg tablet Take 1 tablet (10 mg total) by mouth daily 90 tablet 3   • Calcium 500-2.5 MG-MCG CHEW Chew 2 chews daily     • Cholecalciferol (Vitamin D) 50 MCG (2000 UT) tablet Take 2,000 Units by mouth daily     • gabapentin (Neurontin) 100 mg capsule Take 3 capsules (300 mg total) by mouth daily at bedtime 90 capsule 0   • Multiple Vitamins-Minerals (MULTIVITAMIN WITH MINERALS) tablet Take 1 tablet by mouth daily     • polyethylene glycol  "(GLYCOLAX) 17 GM/SCOOP powder Take 17 g by mouth 2 (two) times a day     • Sodium Fluoride 5000 PPM 1.1 % PSTE APPLY TIHN RIBBON TO TOOTHBRUSH, BRUSH X 2MIN AT BEDTIME, SPIT, DONT RINSE     • letrozole (FEMARA) 2.5 mg tablet Take 1 tablet (2.5 mg total) by mouth daily (Patient not taking: Reported on 12/19/2024) 90 tablet 3   • Ribociclib Succ, 400 MG Dose, (Kisqali, 400 MG Dose,) 200 MG TBPK Take 400 mg by mouth daily 21 days on, followed by 7 days off (Patient not taking: Reported on 12/19/2024) 42 each 5     No current facility-administered medications for this visit.       (Not in a hospital admission)      Objective:     24 Hour Vitals Assessment:     Vitals:    12/30/24 1037   BP: 122/60   Pulse: 62   Resp: 20   Temp: 97.7 °F (36.5 °C)   SpO2: 99%       PHYSICIAN EXAM (8 organ systems needed)  - GEN: Appears well but somewhat depressed, communicating well however with good eye contact; alert and oriented x 3, pleasant and cooperative, in no acute distress  - HEENT: Anicteric, mucous membranes moist, PERRL and EOMI   - NECK: No lymphadenopathy, JVD or carotid bruits   - HEART: RRR, normal S1 and S2, no murmurs, clicks, gallops or rubs   - LUNGS: Clear to auscultation bilaterally; no wheezes, rales, or rhonchi  - ABDOMEN: Normal bowel sounds, soft, no tenderness, no distention, no organomegaly or masses felt on exam.   - EXTREMITIES: Peripheral pulses normal; no clubbing, cyanosis, or edema  - NEURO: No focal findings, CN II-XII are grossly intact.   - Musculoskeletal: 5/5 strength, normal ROM, no swollen or erythematous joints.   - SKIN: nonspecific micronodular like subcutaneous lesions , few , felt on palmar hands /fingers surfaces, non tender, no cyanosis, each about 2-3 mm diameter. Normal without suspicious lesions on exposed skin        DATA REVIEW:    Pathology Result:    Final Diagnosis   Date Value Ref Range Status   10/15/2024   Final    A. Spine, \"L1 vertebra,\" Biopsy:  - Fragment of bone   - " "Negative for carcinoma     10/15/2024   Final    A. Spine, \"Vertebra, T8,\" Biopsy:  - Metastatic carcinoma, most compatible with known breast primary     10/04/2024   Final    A.  Left breast, 3:00, periareolar (ultrasound-guided 14-gauge marquee core biopsy, 6 passes):     - Benign breast tissue with fibroadenomatoid nodule.    B.  Left breast, 11:00, 7 cm from nipple (ultrasound-guided 12-gauge marquee core biopsy, 4 passes):     - Invasive mammary carcinoma with mixed ductal and lobular features.       -- Tumor present in 4 of 4 tissue cores with largest measurable size of 11 mm.       -- mBR Grade:  Grade 1-2 of 3         - Duct formation: Score 2-3 of 3         - Nuclear grade: Score 1-2 of 3         - Mitotic rate: Score 1 of 3     - In situ component:  Focal suggestion of low-grade DCIS     - Lymph-vascular invasion:  Not definitively identified     Comment:  Block B2 forwarded for ER/SC/HER2 analysis with the results to be given in a supplemental report.     01/27/2023   Final    A. Polyp, rectum, polypectomy:  -   Colorectal mucosa with lymphoid aggregates, otherwise no significant histopathologic change.     Interpretation performed at [facility] [address] [place] [id number]           Image Results:   Image result are reviewed and documented in Hematology/Oncology history    MRI abdomen w wo contrast and mrcp  Narrative: MRI OF THE ABDOMEN WITH AND WITHOUT CONTRAST WITH MRCP    INDICATION: 79 years / Female. K83.8: Other specified diseases of biliary tract. Dilated common bile duct. History of metastatic breast cancer.    COMPARISON: CT abdomen/pelvis 11/9/2024    TECHNIQUE: Multiplanar/multisequence MRI of the abdomen with 3D MRCP was performed before and after administration of contrast.    IV Contrast: 5 mL of Gadobutrol injection (SINGLE-DOSE)    FINDINGS:  Images are degraded by motion artifact.    LOWER CHEST: Unremarkable.    LIVER:  Normal in size and configuration.  No " suspicious mass. Subcentimeter cyst adjacent to the intrahepatic IVC (5/12).  Patent hepatic and portal veins.    BILE DUCTS: Minimally dilated common bile duct measuring up to 8 mm proximally with smooth distal tapering. No intrahepatic bile duct dilation. No choledocholithiasis, biliary stricture or suspicious mass within the limits of this motion degraded   examination.    GALLBLADDER: Normal.    PANCREAS: Unremarkable.    ADRENAL GLANDS: Unremarkable.    SPLEEN: Normal.    KIDNEYS/PROXIMAL URETERS: No hydroureteronephrosis. No suspicious renal mass. Bilateral parapelvic cysts.    BOWEL: No dilated loops of bowel.    PERITONEUM/RETROPERITONEUM: No ascites.    LYMPH NODES: No abdominal lymphadenopathy.    VESSELS: No aneurysm.    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 Additional thoracic spine metastases better assessed on recent MRI spine 11/10/2024. Chronic compression deformity of L1 with kyphoplasty change.  Impression: Minimally dilated common bile duct measuring up to 8 mm proximally with smooth distal tapering. No intrahepatic bile duct dilation. No choledocholithiasis, biliary stricture or suspicious mass within the limits of this motion degraded examination.    Workstation performed: [id number]      LABS:  [facility]       Lab Results   Component Value Date    HGB 12.5 12/20/2024    HCT 37.0 12/20/2024    MCV 95 12/20/2024     12/20/2024    WBC 3.48 (L) 12/20/2024    NRBC 1 12/20/2024    BANDSPCT 1 12/04/2024    ATYLMPCT 3 (H) 12/04/2024     Lab Results   Component Value Date    K 3.3 (L) 12/20/2024     12/20/2024    CO2 32 12/20/2024    BUN 11 12/20/2024    CREATININE 0.51 (L) 12/20/2024    GLUF 84 09/26/2024    CALCIUM 9.1 12/20/2024    CORRECTEDCA 10.0 10/17/2022    AST 22 12/20/2024    ALT 19 12/20/2024    ALKPHOS 60 12/20/2024    EGFR 91 12/20/2024       No  "results found for: \"IRON\", \"TIBC\", \"FERRITIN\"    No results found for: \"[id number]\"    No results for input(s): \"WBC\", \"CREAT\", \"PLT\" in the last 72 hours.    By:  Cm Kelley DO, 12/30/2024, 11:28 AM                                    "

## 2025-01-02 PROBLEM — R23.2 HOT FLASHES: Status: ACTIVE | Noted: 2025-01-02

## 2025-01-03 ENCOUNTER — PATIENT OUTREACH (OUTPATIENT)
Dept: HEMATOLOGY ONCOLOGY | Facility: CLINIC | Age: 80
End: 2025-01-03

## 2025-01-03 ENCOUNTER — TELEMEDICINE (OUTPATIENT)
Dept: PALLIATIVE MEDICINE | Facility: CLINIC | Age: 80
End: 2025-01-03

## 2025-01-03 ENCOUNTER — TELEPHONE (OUTPATIENT)
Age: 80
End: 2025-01-03

## 2025-01-03 ENCOUNTER — SOCIAL WORK (OUTPATIENT)
Dept: PALLIATIVE MEDICINE | Facility: CLINIC | Age: 80
End: 2025-01-03

## 2025-01-03 DIAGNOSIS — Z71.89 COUNSELING AND COORDINATION OF CARE: Primary | ICD-10-CM

## 2025-01-03 DIAGNOSIS — R23.2 HOT FLASHES: ICD-10-CM

## 2025-01-03 DIAGNOSIS — G89.3 CANCER RELATED PAIN: ICD-10-CM

## 2025-01-03 DIAGNOSIS — C79.51 BREAST CANCER METASTASIZED TO BONE, RIGHT (HCC): Primary | Chronic | ICD-10-CM

## 2025-01-03 DIAGNOSIS — Z91.89 AT RISK FOR RESPIRATORY DEPRESSION DUE TO OPIOID: ICD-10-CM

## 2025-01-03 DIAGNOSIS — F33.9 DEPRESSION, RECURRENT (HCC): ICD-10-CM

## 2025-01-03 DIAGNOSIS — C50.911 BREAST CANCER METASTASIZED TO BONE, RIGHT (HCC): Primary | Chronic | ICD-10-CM

## 2025-01-03 DIAGNOSIS — Z51.5 PALLIATIVE CARE BY SPECIALIST: ICD-10-CM

## 2025-01-03 PROCEDURE — NC001 PR NO CHARGE

## 2025-01-03 RX ORDER — PAROXETINE 10 MG/1
10 TABLET, FILM COATED ORAL DAILY
Qty: 30 TABLET | Refills: 2 | Status: CANCELLED | OUTPATIENT
Start: 2025-01-03

## 2025-01-03 RX ORDER — PAROXETINE 10 MG/1
10 TABLET, FILM COATED ORAL DAILY
Qty: 30 TABLET | Refills: 2 | Status: SHIPPED | OUTPATIENT
Start: 2025-01-03

## 2025-01-03 RX ORDER — OXYCODONE HYDROCHLORIDE 5 MG/1
5-10 TABLET ORAL EVERY 4 HOURS PRN
Qty: 60 TABLET | Refills: 0 | Status: SHIPPED | OUTPATIENT
Start: 2025-01-03

## 2025-01-03 NOTE — ASSESSMENT & PLAN NOTE
2-3 episodes of soaking night sweats each night, requiring her to change clothing multiple times  Started early December while taking letrozole/ribociclib, which she stopped around 12/11/24 - these have not improved since stopping  Increase gabapentin to 300 mg HS - may need to continue escalating dose beyond this

## 2025-01-03 NOTE — TELEPHONE ENCOUNTER
Spoke with Alley who stated [facility] did not receive her Paxil prescription that was sent in today but did receive her oxycodone. [facility] confirmed receipt at 9:16am today.    Called and spoke with [facility] pharmacy who stated scripts have been getting delayed lately so it may come in later but provided verbal instructions as stated on script: Paroxetine 10mg tablet, quantity 30 tablets, 2 refills, take 1 tablet (10mg total) at mouth daily at bedtime. He verbalized understanding and stated he will fill it there are any issues with script coming through.     Called Alley back to make her aware. She verbalized understanding and agreeable with plan

## 2025-01-03 NOTE — ASSESSMENT & PLAN NOTE
Psychosocial   Supportive listening provided  Normalized experience of patient/family  Provided anxiety containment     Follow-Up Recommendations  -Follow-up with PCP and current medical specialists  -Follow-up with palliative care: scheduled with Dr. Contreras on 1/21/25

## 2025-01-03 NOTE — ASSESSMENT & PLAN NOTE
S/p radiation to right shoulder and spine  Resumed letrozole around 12/31 after holding for approximately 3 weeks  Ribociclib has not been resumed  Scheduled for follow up imaging on 1/13  Orders:    oxyCODONE (Roxicodone) 5 immediate release tablet; Take 1-2 tablets (5-10 mg total) by mouth every 4 (four) hours as needed for moderate pain or severe pain Max Daily Amount: 40 mg

## 2025-01-03 NOTE — PROGRESS NOTES
I reached out and spoke with Alley to follow up and to review for any changes in barriers to care and offer supportive services as needed.    Barriers noted previously; co-morbidities.     Current barriers and interventions provided: co-morbidities.     Bases on individual needs I will follow up with them in 4 weeks. I have provided my direct contact information and welcome them to contact me if their needs as discussed above change. They were appreciative for the call.

## 2025-01-03 NOTE — ASSESSMENT & PLAN NOTE
"She was having multiple episodes of soaking night sweats each night beginning in early December 2024  She her ribociclib and letrozole and started gabapentin beginning about 12/11  Gabapentin was increased to 300 mg qHS on 12/27, but due to feeling dizzy and foggy during the days, decreased to 200 mg for past week  She resumed letrozole around 12/31  Her night sweats are decreased but she continues to feel \"out of it\" during the days - attributes this to gabapentin  She prefers to try an SSRI and to taper gabapentin - she will take 100 mg qHS for 3-4 days and then discontinue  Start paroxetine  Orders:    PARoxetine (PAXIL) 10 mg tablet; Take 1 tablet (10 mg total) by mouth daily At bedtime    "

## 2025-01-03 NOTE — PROGRESS NOTES
"Name: Yuni Keys      : 1945      MRN: [id number]  Encounter Provider: KAI Gallardo  Encounter Date: 1/3/2025   Encounter department: [facility] [place]  :  Assessment & Plan  Breast cancer metastasized to bone, right (HCC)  S/p radiation to right shoulder and spine  Resumed letrozole around  after holding for approximately 3 weeks  Ribociclib has not been resumed  Scheduled for follow up imaging on   Orders:    oxyCODONE (Roxicodone) 5 immediate release tablet; Take 1-2 tablets (5-10 mg total) by mouth every 4 (four) hours as needed for moderate pain or severe pain Max Daily Amount: 40 mg    Hot flashes  She was having multiple episodes of soaking night sweats each night beginning in early 2024  She her ribociclib and letrozole and started gabapentin beginning about   Gabapentin was increased to 300 mg qHS on , but due to feeling dizzy and foggy during the days, decreased to 200 mg for past week  She resumed letrozole around   Her night sweats are decreased but she continues to feel \"out of it\" during the days - attributes this to gabapentin  She prefers to try an SSRI and to taper gabapentin - she will take 100 mg qHS for 3-4 days and then discontinue  Start paroxetine  Orders:    PARoxetine (PAXIL) 10 mg tablet; Take 1 tablet (10 mg total) by mouth daily At bedtime    Cancer related pain  Back pain was improved following RT but has worsened recently  Using 1-2 tabs of oxycodone per day to manage pain  Current regimen:  Oxycodone 5-10mg q4h PRN for moderate-severe pain  Alternate oxycodone with Tylenol as needed  Orders:    oxyCODONE (Roxicodone) 5 immediate release tablet; Take 1-2 tablets (5-10 mg total) by mouth every 4 (four) hours as needed for moderate pain or severe pain Max Daily Amount: 40 mg    Depression, recurrent (HCC)  Significant fatigue, no longer finding alirio in activities  She met with Palliative medicine MSW and has " "another appointment in January  Denies suicidal ideation/self-harm and denies any plan  Safety plan in place to go to ER if this changes  Start paroxetine (primarily initiating SSRI due to night sweats)  Orders:    PARoxetine (PAXIL) 10 mg tablet; Take 1 tablet (10 mg total) by mouth daily At bedtime    Palliative care by specialist  Psychosocial   Supportive listening provided  Normalized experience of patient/family  Provided anxiety containment     Follow-Up Recommendations  -Follow-up with PCP and current medical specialists  -Follow-up with palliative care: scheduled with Dr. Contreras on 1/21/25           Decisional apparatus: Patient is competent on my exam today. If competence is lost, patient's substitute decision maker would default to daughters by [place] Act 169. Daughters are named healthcare POAs.    PDMP Review: I have reviewed the patient's controlled substance dispensing history in the Prescription Drug Monitoring Program in compliance with the [facility] regulations before prescribing any controlled substances.    History of Present Illness     Yuni Keys is a 79 y.o. female who presents for follow up for her palliative diagnosis of HR+ breast cancer, metastatic to the bone. She is s/p palliative radiation to the right shoulder and spine for pain relief.    She was started on letrozole/ribociclib in October 2024 for the diagnosis of metastatic breast cancer. She developed night sweats with several episodes of soaking through her clothing each night, and self-discontinued the medications around 12/11/24. She was started on gabapentin 100 mg nightly at the same time. Unfortunately, she did not notice any improvement even with stopping hormonal therapy and starting gabapentin. Her gabapentin dose was increased to 300 mg qHS last week, but since then she contacted the office because she felt that she was experiencing dizziness and feeling \"out of it\" for much of the following day, although she did notice some " reduction in the severity of her night sweats. Her dose was reduced to gabapentin 200 mg qHS, but she continues to feel unwell during the day and attributes this to gabapentin.  She saw oncology on 12/30/24 who felt that her ongoing night sweats were more likely related to her diagnosis of breast cancer than the hormonal therapy. At that visit, she was re-started on letrozole, but is not planning to resume ribociclib. Oncology also recommended considering an antidepressant for vasomotor symptoms and she would now like to consider this. She was previously on venlafaxine and does not want to take this again. She is interested in starting paroxetine.  She is also interested in mindfulness exercises for improving sleep. MSW sent information to her.         Objective   There were no vitals taken for this visit.    Physical Exam  Constitutional:       General: She is awake. She is not in acute distress.     Appearance: Normal appearance. She is not ill-appearing.      Comments: Physical exam limited by virtual appointment   Pulmonary:      Effort: Pulmonary effort is normal. No respiratory distress.   Neurological:      Mental Status: She is alert and oriented to person, place, and time.   Psychiatric:         Attention and Perception: Attention normal.         Mood and Affect: Mood normal.         Speech: Speech normal.         Behavior: Behavior is cooperative.         Cognition and Memory: Cognition normal.         Recent labs:  Lab Results   Component Value Date/Time    SODIUM 142 12/20/2024 10:34 AM    K 3.3 (L) 12/20/2024 10:34 AM    BUN 11 12/20/2024 10:34 AM    CREATININE 0.51 (L) 12/20/2024 10:34 AM    GLUC 80 12/20/2024 10:34 AM    CALCIUM 9.1 12/20/2024 10:34 AM    AST 22 12/20/2024 10:34 AM    ALT 19 12/20/2024 10:34 AM    ALB 3.6 12/20/2024 10:34 AM    TP 6.3 (L) 12/20/2024 10:34 AM    EGFR 91 12/20/2024 10:34 AM     Lab Results   Component Value Date/Time    HGB 12.5 12/20/2024 10:34 AM    WBC 3.48 (L)  12/20/2024 10:34 AM     12/20/2024 10:34 AM    INR 1.01 10/01/2024 08:13 AM    PTT 34 10/01/2024 08:13 AM     Lab Results   Component Value Date/Time    VBT3SEDKIYDY 2.391 12/20/2024 10:34 AM       Recent Imaging:  Procedure: MRI abdomen w wo contrast and mrcp  Result Date: 12/19/2024  Narrative: MRI OF THE ABDOMEN WITH AND WITHOUT CONTRAST WITH MRCP INDICATION: 79 years / Female. K83.8: Other specified diseases of biliary tract. Dilated common bile duct. History of metastatic breast cancer. COMPARISON: CT abdomen/pelvis 11/9/2024 TECHNIQUE: Multiplanar/multisequence MRI of the abdomen with 3D MRCP was performed before and after administration of contrast. IV Contrast: 5 mL of Gadobutrol injection (SINGLE-DOSE) FINDINGS: Images are degraded by motion artifact. LOWER CHEST: Unremarkable. LIVER: Normal in size and configuration. No suspicious mass. Subcentimeter cyst adjacent to the intrahepatic IVC (5/12). Patent hepatic and portal veins. BILE DUCTS: Minimally dilated common bile duct measuring up to 8 mm proximally with smooth distal tapering. No intrahepatic bile duct dilation. No choledocholithiasis, biliary stricture or suspicious mass within the limits of this motion degraded examination. GALLBLADDER: Normal. PANCREAS: Unremarkable. ADRENAL GLANDS: Unremarkable. SPLEEN: Normal. KIDNEYS/PROXIMAL URETERS: No hydroureteronephrosis. No suspicious renal mass. Bilateral parapelvic cysts. BOWEL: No dilated loops of bowel. PERITONEUM/RETROPERITONEUM: No ascites. LYMPH NODES: No abdominal lymphadenopathy. VESSELS: No aneurysm. ABDOMINAL WALL: Unremarkable BONES: Redemonstrated osseous metastasis involving the posterior right T11 vertebral body. Additional thoracic spine metastases better assessed on recent MRI spine 11/10/2024. Chronic compression deformity of L1 with kyphoplasty change.     Impression: Minimally dilated common bile duct measuring up to 8 mm proximally with smooth distal tapering. No intrahepatic  bile duct dilation. No choledocholithiasis, biliary stricture or suspicious mass within the limits of this motion degraded examination. Workstation performed: [id number]     Procedure: X-ray chest 2 views  Result Date: 11/21/2024  Narrative: XR CHEST PA AND LATERAL INDICATION: chest pain. COMPARISON: None FINDINGS: Clear lungs. There is mild elevation of the left hemidiaphragm. No pneumothorax or pleural effusion. Normal cardiomediastinal silhouette. Midthoracic and upper lumbar compression fractures status post kyphoplasty. Normal upper abdomen.     Impression: No acute cardiopulmonary disease. This report is in agreement with the preliminary interpretation. Workstation performed: [id number]     Procedure: MRI thoracic spine w wo contrast  Result Date: 11/10/2024  Narrative: MRI THORACIC SPINE WITH AND WITHOUT CONTRAST INDICATION: Pain. COMPARISON: Thoracic spine MRI dated October 8, 2024 TECHNIQUE:  Multiplanar, multisequence imaging of the thoracic spine was performed before and after gadolinium administration. . IV Contrast:  5 mL of Gadobutrol injection (SINGLE-DOSE) IMAGE QUALITY:  Diagnostic. FINDINGS: ALIGNMENT:  Normal alignment of the thoracic spine.  No acute compression fracture. Mild height loss at T8 which has progressed slightly since prior from 10/8/2024. No subluxation.  No evidence of scoliosis. MARROW SIGNAL: Osseous metastases at T5, T8, and T11, similar to prior from 10/8/2024. No new marrow replacing lesions. Interval T8 kyphoplasty. Partially imaged L1 kyphoplasty. THORACIC CORD:  Normal signal within the thoracic cord. PREVERTEBRAL AND PARASPINAL SOFT TISSUES:   Normal. THORACIC DEGENERATIVE CHANGE: Mild multilevel degenerative changes. Mild canal stenosis at T12-L1 secondary to L1 osseous retropulsion. No high-grade canal or foraminal stenosis at any thoracic level. POSTCONTRAST:  No abnormal enhancement. OTHER FINDINGS:  None.     Impression: Interval T8 kyphoplasty. Height loss at T8 has  progressed slightly since prior from 10/8/2024. Redemonstrated osseous metastases at T5, T8, and T11. Partially imaged L1 compression fracture status post kyphoplasty. No abnormal epidural or leptomeningeal enhancement. Normal cord signal. Workstation performed: [id number]     Procedure: CT recon only lumbar spine (No Charge)  Result Date: 11/9/2024  Narrative: CT LUMBAR SPINE INDICATION:   metastatic ca. 78-year-old female with history of breast cancer, metastatic to several thoracic and lumbar vertebrae. Back pain. COMPARISON: MRI of the lumbar spine from September 21, 2024. MRI of the thoracic spine from October 8, 2024. Lumbar spine radiographs from November 8, 2024. TECHNIQUE: Axial CT examination of the lumbar spine was obtained utilizing reconstructed images from CT of the chest abdomen and pelvis performed the same day.  This examination, like all CT scans performed in the [facility], was performed utilizing techniques to minimize radiation dose exposure, including the use of iterative reconstruction and automated exposure control.   Images were reformatted in the sagittal and coronal planes. FINDINGS: ALIGNMENT: Upper lumbar scoliosis, convex to the right. 2 mm of anterolisthesis of L4 on L5, unchanged since 9/21/2024. Alignment otherwise unremarkable. VERTEBRAE: Chronic compression fracture of L1, status post kyphoplasty. No new fracture. No bone destruction or osteoblastic lesion. LOWER THORACIC DISC LEVELS:  Unremarkable. LUMBAR DISC LEVELS:   Degenerative disc disease with vacuum disc phenomenon at L1-L2 and L2-L3. FACET JOINTS: Degenerative facet arthropathy from L3-L4 through L5-S1. 5 FORAMINA:  No significant foraminal stenosis. SPINAL CANAL: Mild canal stenosis at L4-L5 due to facet arthropathy and thickening of the ligamenta flava, similar in appearance to images from the MRI of the lumbar spine from 9/21/2024. Otherwise unremarkable. INCLUDED PORTION OF THE SACRUM:  Normal.  PARASPINAL SOFT TISSUES:   Normal. IMAGED PORTIONS OF ABDOMEN AND PELVIS: Bilateral hydronephrosis, better demonstrated on a CT of the abdomen and pelvis from today. Rectum distended with feces, consistent with the patient's history of constipation.     Impression: 1.  Compression fracture of L1, status post kyphoplasty. No new lumbar vertebral fractures 2.  No evidence of lumbar spine metastasis. 3.  Mild spinal stenosis at L4-L5, unchanged since an MRI from 9/21/2024. 4.  Bilateral hydronephrosis. Workstation performed: [id number]     Procedure: CT thoracic spine without contrast  Result Date: 11/9/2024  Narrative: CT THORACIC SPINE INDICATION:   Metastatic breast cancer to spine, back pain. COMPARISON: MRI of the thoracic spine from October 8, 2024. TECHNIQUE:  Contiguous axial images through the thoracic spine were obtained. Sagittal and coronal reconstructions were obtained.  This examination, like all CT scans performed in the [facility], was performed utilizing techniques to minimize radiation dose exposure, including the use of iterative reconstruction and automated exposure control. IMAGE QUALITY:  Diagnostic. FINDINGS: ALIGNMENT: Mild accentuation of the thoracic kyphosis. Otherwise normal alignment. VERTEBRAE: Chronic compression fractures of, status post kyphoplasties. No new fracture. Osteolytic lesions in the T8, T11 and L1 vertebral bodies. No evidence of new destructive lesion. THORACIC DISC LEVELS:  Unremarkable. FACET JOINTS:  Unremarkable. SPINAL CANAL: Increased attenuation in the left anterior epidural space at the T8 level. Otherwise unremarkable. PARASPINAL SOFT TISSUES: Thickening of the paraspinous soft tissues at the T8 level INCLUDED PORTIONS OF THE LUMBAR SPINE: Pathologic compression fracture of L1, status post kyphoplasty. IMAGED PORTIONS OF CHEST AND ABDOMEN: Small right pleural effusion. Mild to moderate left-sided hydronephrosis, better demonstrated on a CT of the  abdomen and pelvis from today. Two calculi in the left kidney, each measuring 1 mm..     Impression: 1.  Osteolytic metastases involving the T8, T11 and L1 vertebral bodies with pathologic fractures treated with kyphoplasty at T8 and L1. 2.  Thickened paravertebral soft tissues at T8. This could be residual edema or hematoma from pathologic fracture and kyphoplasty. However, extraosseous spread of metastasis can have a similar appearance. 3.  Probable extraosseous extension of T8 metastasis into the left anterior epidural space. Less likely, this could represent a streak artifact from adjacent kyphoplasty material. This can be better evaluated with MRI of the thoracic spine, without and with IV contrast. 4.  No evidence of new thoracic spine metastasis. 5.  Left-sided hydronephrosis and two small nonobstructing left renal calculi. 6.  Small right pleural effusion. The study was marked in EPIC for immediate notification. Workstation performed: [id number]     Procedure: CT abdomen pelvis with contrast  Result Date: 11/9/2024  Narrative: CT ABDOMEN AND PELVIS WITH IV CONTRAST INDICATION: Constipated, back pain, metastatic breast cancer to spine.. 78-year-old female. MRI of the thoracic spine from last month demonstrated metastases at T5, T8 and T11 and a compression fracture of L1. COMPARISON: Unenhanced CT of the abdomen and pelvis from April 28, 2024. MRI of the thoracic spine from October 8, 2024. MRI of the lumbar spine from September 21, 2024. Lumbar spine radiographs from November 8, 2024. TECHNIQUE: CT examination of the abdomen and pelvis was performed. Multiplanar 2D reformatted images were created from the source data. This examination, like all CT scans performed in the [facility], was performed utilizing techniques to minimize radiation dose exposure, including the use of iterative reconstruction and automated exposure control. Radiation dose length product (DLP) for this visit: 395 mGy-cm  IV Contrast: 85 mL of iohexol (OMNIPAQUE) Enteric Contrast: Not administered. FINDINGS: ABDOMEN LOWER CHEST: Mild subsegmental atelectasis in the bases of both lower lobes. Small right pleural effusion. LIVER/BILIARY TREE: Elongated right lobe (Riedel's lobe, a normal anatomic variant). Liver otherwise unremarkable. Mild dilatation of the common hepatic and common bile duct, measuring up to 9 mm in diameter. No intrahepatic ductal dilatation. GALLBLADDER: No calcified gallstones. No pericholecystic inflammatory change. SPLEEN: Unremarkable. PANCREAS: Unremarkable. Main pancreatic duct normal in caliber. ADRENAL GLANDS: Unremarkable. KIDNEYS/URETERS: Moderate left-sided hydronephrosis, similar to the CT from 8/28/2024. New mild right hydronephrosis. No hydroureter. Several subcentimeter low attenuation foci in the renal cortices, too small to be accurately characterized, but most likely benign. By ACR guidelines, no additional imaging indicated. STOMACH AND BOWEL: Stomach and small intestine unremarkable. Rectum distended with feces. Colon otherwise unremarkable. APPENDIX: No findings to suggest appendicitis. ABDOMINOPELVIC CAVITY: No lymphadenopathy.  No ascites or discrete fluid collection.  No extraluminal gas. VESSELS: Atherosclerosis without abdominal aortic aneurysm. Incidental note made of multiple phleboliths in the right ovarian vein, unchanged since 8/28/2024. PELVIS REPRODUCTIVE ORGANS: Post hysterectomy. URINARY BLADDER: Unremarkable. ABDOMINAL WALL/INGUINAL REGIONS: Unremarkable. BONES: Chronic compression fracture of L1, status post kyphoplasty. Osteolytic metastasis in the right side of the T11 vertebral body, little changed since 8/28/2024. No other evidence of fracture or destructive lesion.     Impression: 1.  Mild dilatation of the common bile duct, 9 mm in maximal diameter, etiology indeterminate. If clinically indicated, nonemergent MRI of the abdomen/MRCP, without and with IV contrast would be  helpful to identify or exclude an obstructing lesion of the  CBD. No intrahepatic ductal dilatation. 2.  Bilateral hydronephrosis, moderate on the left and mild on the right with no hydroureter. Most likely, this is due to bilateral UPJ stenoses. 3.  No evidence of metastases in the abdomen or pelvis. 4.  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 5.  Compression fracture of L1, status post kyphoplasty, similar in appearance to lumbar spine radiographs from 11/8/2024. 6.  No evidence of acute abnormality in the abdomen or pelvis. Workstation performed: [id number]     Procedure: XR spine lumbar minimum 4 views non injury  Result Date: 11/8/2024  Narrative: XR SPINE LUMBAR MINIMUM 4 VIEWS NON INJURY INDICATION: M84.58XS: Pathological fracture in neoplastic disease, other specified site, sequela G89.3: Neoplasm related pain (acute) (chronic). COMPARISON: Spine radiographs 9/13/2024 FINDINGS: Lumbar dextroscoliosis. Alignment is unchanged. Interval kyphoplasty changes at T8 and L1. Unchanged vertebral body heights. Degenerative changes throughout the visible spine, with disc degeneration most severe at L2-3. Soft tissues unremarkable. Large colonic fecal burden. Osseous lesions better characterized on MRI.     Impression: T8 and L1 kyphoplasty changes. Stable alignment. Workstation performed: [id number]     Procedure: XR hip/pelv 2-3 vws right if performed  Result Date: 10/25/2024  Narrative: RIGHT HIP INDICATION:   Malignant neoplasm of unspecified site of left female breast. Pain in right hip. COMPARISON:  None. VIEWS:  XR HIP/PELV 2-3 VWS RIGHT W PELVIS IF PERFORMED FINDINGS: There is no acute fracture or dislocation. No significant hip degenerative changes. No lytic or blastic osseous lesion. Soft tissues are unremarkable. Degenerative changes visualized lower lumbar spine.     Impression: No acute osseous abnormality of the right hip Electronically signed: 10/25/2024 05:01 PM Tyler  MD Cj    Procedure: IR kyphoplasty/vertebroplasty with ablation  Result Date: 10/15/2024  Narrative: PROCEDURE: T8 and L1 vertebral body RF ablation and augmentation. HISTORY: Pathologic compression fractures COMPARISON: MRI on 10/8/2024 OPERATORS: Itzel. ANTIBIOTIC MEDICATION: 1g of Ancef given IV. COMPLICATIONS: None. ANESTHESIA: The patient was administered general endotracheal anesthesia under the care and supervision of the attending anesthesiologist. TECHNIQUE: The procedure, its risks, benefits, and alternatives were discussed in detail with the patient. Risks discussed included, but were not limited to, bleeding, infection, nerve root injury, spinal cord injury, and nontarget embolization of methylmethacrylate. All questions were answered and no guarantees were provided. After informed consent, the patient was brought into the angiography suite and the vertebral body levels of interest were identified via palpation and correlation made to prior imaging. The left T8 pedicle was imaged along its axis utilizing biplane fluoroscopy. Superficial, deep and periosteal Sensorcaine anesthetic was administered over the site of dermatotomy. Under fluoroscopic guidance, an introducer needle was then directed into the T8 vertebral body via a left transpedicular approach. A core biopsy was performed. Cavity creation was then performed through the introducer needle using the DFine osteotome needle. The osteotome needle was then removed. The introducer needle remained in place with its stylet. Next, the RF probe was inserted through the guide needle and RF ablation of the tumor was performed. The RF probe was removed and the stylet was reinserted into the introducer needle.  Following preparation of a polymethylmethacrylate solution, bone cement was administered into the T8 vertebral body. The left L1 pedicle was imaged along its axis utilizing biplane fluoroscopy. Superficial, deep and periosteal Sensorcaine  anesthetic was administered over the site of dermatotomy. Under fluoroscopic guidance, an introducer needle was then directed into the L1 vertebral body via a left transpedicular approach. A core biopsy was performed. Cavity creation was then performed through the introducer needle using the DFine osteotome needle. The osteotome needle was then removed. The introducer needle remained in place with its stylet. Next, the RF probe was inserted through the guide needle and RF ablation of the tumor was performed. The RF probe was removed and the stylet was reinserted into the introducer needle.  Following preparation of a polymethylmethacrylate solution, bone cement was administered into the L1 vertebral body. The introducer needle was removed. Manual pressure was applied to the access site until hemostasis was obtained. A sterile dressing was then applied. No new neurological deficits or complications were encountered during or immediately following the procedure.     Impression: FINDINGS/IMPRESSION: Successful T8 and L1 vertebral body biopsy, RF ablation and augmentation as detailed. Workstation performed: [id number]     Procedure: XR shoulder 2+ vw right  Result Date: 10/14/2024  Narrative: XR SHOULDER 2+ VW RIGHT INDICATION: M89.8X1: Other specified disorders of bone, shoulder. COMPARISON: PET scan 10/2/2024. 1/29/2024. FINDINGS: Acromial increased lucent/lytic changes with mid acromial irregular linear lucency No acute fracture or dislocation. Degenerative glenohumeral and AC joints. Unremarkable soft tissues.     Impression: Question acromial nondisplaced pathological fracture. The study was marked in EPIC for immediate notification. Workstation performed: [id number]     Procedure: MRI thoracic spine with and without contrast  Addendum Date: 10/14/2024  Addendum: ADDENDUM: The impression was inadvertently omitted from this report and is incorrect. The body of the report describes multiple findings which will  be reiterated here in a new impression. IMPRESSION: Subacute L1 compression fracture with stable configuration compared to MRI of the lumbar spine dated 9/21/2024. Multiple marrow replacing lesions are identified within the thoracic spine including T5, T8 and T11 consistent with osseous metastasis with no corresponding pathologic compression fractures. Disease appears contained within the bone with no extraosseous extension into the epidural or paraspinal soft tissues. Small pleural effusion.    Result Date: 10/14/2024  Narrative: MRI THORACIC SPINE WITH AND WITHOUT CONTRAST INDICATION: D49.2: Neoplasm of unspecified behavior of bone, soft tissue, and skin. COMPARISON: PET/CT dated 10/2/2024. MRI lumbar spine dated 9/21/2024. TECHNIQUE:  Multiplanar, multisequence imaging of the thoracic spine was performed before and after gadolinium administration. . IV Contrast:  5 mL of Gadobutrol injection (SINGLE-DOSE) IMAGE QUALITY:  Diagnostic. FINDINGS: ALIGNMENT: Mild, smooth exaggeration of the mid thoracic kyphosis. There is a moderate L1 compression fracture as seen on recent MRI with similar configuration. Approximately 50 to 60% loss of height of the mid and anterior aspect of the vertebral body with bony retropulsion of the posterior superior margin of the vertebral body into the anterior epidural space. No thoracic compression fractures are noted. MARROW SIGNAL: The L1 compression fracture demonstrates diffuse marrow edema without extension into the posterior elements of this vertebral body segment. There is a focal marrow replacing lesion within the posterior aspect of the T11 vertebral body on the right with extension into the pedicle. There is a marrow replacing lesion identified within the T8 vertebral body somewhat diffusely. There is some hyperintense signal on T1-weighted imaging in the posterior aspect of the vertebral body likely representing a hemangioma superimposed upon the diffuse marrow replacement.  There is a small marrow replacing lesion within the T5 vertebral body. THORACIC CORD: No cord compression. Normal signal within the thoracic cord. PREVERTEBRAL AND PARASPINAL SOFT TISSUES:   Small right-sided pleural effusion layering within the posterior aspect of the chest. THORACIC DEGENERATIVE CHANGE: Mild canal stenosis at the T12-L1 level as a result of POSTCONTRAST:  No abnormal enhancement. OTHER FINDINGS:  None.     Impression: Normal enhanced MRI of the thoracic spine. Workstation performed: [id number]     Procedure: US guided breast biopsy left complete  Addendum Date: 10/10/2024  Addendum: ADDENDUM: PATHOLOGY RESULTS: 2) Mass [B] (Left; Retroareolar; 3 o'clock) The pathology results indicate a Benign finding with benign breast tissue and fibroadenomatoid nodule. Radiology and pathology are concordant. 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  Please refer to the full pathology report. Radiology and pathology are concordant. In review of the patient’s recent imaging, the left malignancy is poorly defined on mammogram, appearing as a developing asymmetry rather than a discrete mass.  This measures approximately 47 mm AP by 31 mm transverse on the cc view and measures approximately 58 mm AP by 35 mm craniocaudal on the MLO view.  The difference in AP dimension between the 2 views could be related to the obliquity of the images.  On ultrasound the mass measures 41 x 13 x 41 mm.  There is a subtle asymmetry with possible distortion slightly superior and medial (at 12:00) which could represent a second site of disease.  Further characterization is indicated. The patient's most recent mammogram of the contralateral right breast is from an outside institution performed 4/12/2024.  It may be reasonable to obtain a more recent  mammogram prior to treatment for the malignancy.  Contrast-enhanced mammography or MRI is recommended for additional screening and evaluation of extent of disease in the left breast.  This is due to the subtle mammographic appearance of the malignancy in the breast density.  If the patient cannot have contrast-enhanced imaging, a more updated right mammogram is recommended as well as ultrasound and possible mammogram of the left breast to evaluate the asymmetry described above. The patient does have an abnormal PET/CT which demonstrates lymphadenopathy that was not accessible for ultrasound-guided core needle biopsy. The patient was notified of the malignant pathology results on 10/8/2024 by Dr. Andino. RECOMMENDATION:      - Surgical Consultation for the left breast.      - Breast MRI for both breasts. Signed by:  Ambreen Mukherjee MD END ADDENDUM    Result Date: 10/10/2024  Narrative: DIAGNOSIS: Status post biopsy INDICATION: Yuni Keys is a 78 y.o. female presenting for left breast biopsy, 2 locations. . Prior to the procedure, previous imaging was reviewed.  The procedure was explained to the patient in detail.  All questions were answered.  Written and verbal informed consent was obtained.  The left breast was marked. FINDINGS: Limited left breast ultrasound was performed with grayscale and color imaging.  The procedure sites were marked.  A timeout was performed.  The skin was cleansed and draped in the usual fashion.  Both biopsies were performed under continuous ultrasound guidance from a lateral approach. LEFT 2) MASS B at 3:00, retroareolar region: Anesthesia was administered with 5 mL of lidocaine 1%. A 13-gauge introducer was placed.  A 14 gauge spring-loaded biopsy device was used to obtain 6 samples under direct ultrasound guidance.  A mini Kimberly  reflector was placed in the biopsy cavity under direct ultrasound guidance.  The Kimberyl  probe was held over the procedure site and  audible clicking was heard.  1) MASS A at 11:00, 7 cm from the nipple: Anesthesia was administered with 5 mL of lidocaine 1%. An 11-gauge introducer was placed.  A 12 gauge spring-loaded biopsy device was used to obtain 4 samples under direct ultrasound guidance.  There was a small central hypoechoic component to the mass.  Due to vascularity at this location, sampling could not be obtained at that hypoechoic area.  A Kimberly  reflector was placed in the biopsy cavity under direct ultrasound guidance.  The Kimberly  probe was held over the procedure site and audible clicking was heard.  Hemostasis was obtained with direct pressure on the biopsy sites.  The patient had minimal bleeding.  The patient tolerated the procedure well. There were no immediate complications.   After each site was biopsied, the Kimberly  probe was held over the procedure site and audible clicking could be heard. Post biopsy mammogram: Left 2D CC and lateral views were obtained.  The mini Kimberly  reflector projects in the expected location at 3:00, retroareolar region (this mass was not seen mammographically as it is obscured by dense breast parenchyma). The Kimberly  reflector projects within the mass at 11:00.     Impression: 1.  Technically successful ultrasound-guided core needle biopsy of a mass in the left breast at 3:00, retroareolar region.  The procedure site was marked for possible surgery with placement of a mini Kimberly  reflector. 2.  Technically successful ultrasound-guided core needle biopsy of a mass in the left breast at 11:00, 7 cm from the nipple.  The procedure site was marked for possible surgery with placement of a Kimberly  reflector. 3.  After each site was biopsied, the Kimberly  probe was held over the procedure site and audible clicking could be heard. 4.  Management recommendations will be made once the pathology results are available. ASSESSMENT/BI-RADS CATEGORY: Left: Post-Procedure Mammogram for  Marker Placement Overall: Post-Procedure Mammogram for Marker Placement RECOMMENDATION:      - Waiting for pathology for the left breast. Workstation ID: [id number] Signed by:  Ambreen Mukherjee MD     Procedure: US guidance breast biopsy left each additional  Addendum Date: 10/10/2024  Addendum: ADDENDUM: PATHOLOGY RESULTS: 2) Mass [B] (Left; Retroareolar; 3 o'clock) The pathology results indicate a Benign finding with benign breast tissue and fibroadenomatoid nodule. Radiology and pathology are concordant. 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  Please refer to the full pathology report. Radiology and pathology are concordant. In review of the patient’s recent imaging, the left malignancy is poorly defined on mammogram, appearing as a developing asymmetry rather than a discrete mass.  This measures approximately 47 mm AP by 31 mm transverse on the cc view and measures approximately 58 mm AP by 35 mm craniocaudal on the MLO view.  The difference in AP dimension between the 2 views could be related to the obliquity of the images.  On ultrasound the mass measures 41 x 13 x 41 mm.  There is a subtle asymmetry with possible distortion slightly superior and medial (at 12:00) which could represent a second site of disease.  Further characterization is indicated. The patient's most recent mammogram of the contralateral right breast is from an outside institution performed 4/12/2024.  It may be reasonable to obtain a more recent mammogram prior to treatment for the malignancy.  Contrast-enhanced mammography or MRI is recommended for additional screening and evaluation of extent of disease in the left breast.  This is due to the subtle mammographic appearance of the malignancy in the breast density.  If the patient cannot have  contrast-enhanced imaging, a more updated right mammogram is recommended as well as ultrasound and possible mammogram of the left breast to evaluate the asymmetry described above. The patient does have an abnormal PET/CT which demonstrates lymphadenopathy that was not accessible for ultrasound-guided core needle biopsy. The patient was notified of the malignant pathology results on 10/8/2024 by Dr. Andino. RECOMMENDATION:      - Surgical Consultation for the left breast.      - Breast MRI for both breasts. Signed by:  Ambreen Mukherjee MD END ADDENDUM    Result Date: 10/10/2024  Narrative: DIAGNOSIS: Status post biopsy INDICATION: Yuni Keys is a 78 y.o. female presenting for left breast biopsy, 2 locations. . Prior to the procedure, previous imaging was reviewed.  The procedure was explained to the patient in detail.  All questions were answered.  Written and verbal informed consent was obtained.  The left breast was marked. FINDINGS: Limited left breast ultrasound was performed with grayscale and color imaging.  The procedure sites were marked.  A timeout was performed.  The skin was cleansed and draped in the usual fashion.  Both biopsies were performed under continuous ultrasound guidance from a lateral approach. LEFT 2) MASS B at 3:00, retroareolar region: Anesthesia was administered with 5 mL of lidocaine 1%. A 13-gauge introducer was placed.  A 14 gauge spring-loaded biopsy device was used to obtain 6 samples under direct ultrasound guidance.  A mini Kimberly  reflector was placed in the biopsy cavity under direct ultrasound guidance.  The Kimberly  probe was held over the procedure site and audible clicking was heard.  1) MASS A at 11:00, 7 cm from the nipple: Anesthesia was administered with 5 mL of lidocaine 1%. An 11-gauge introducer was placed.  A 12 gauge spring-loaded biopsy device was used to obtain 4 samples under direct ultrasound guidance.  There was a small central hypoechoic  component to the mass.  Due to vascularity at this location, sampling could not be obtained at that hypoechoic area.  A Kimberly  reflector was placed in the biopsy cavity under direct ultrasound guidance.  The Kimberly  probe was held over the procedure site and audible clicking was heard.  Hemostasis was obtained with direct pressure on the biopsy sites.  The patient had minimal bleeding.  The patient tolerated the procedure well. There were no immediate complications.   After each site was biopsied, the Kimberly  probe was held over the procedure site and audible clicking could be heard. Post biopsy mammogram: Left 2D CC and lateral views were obtained.  The mini Kimberly  reflector projects in the expected location at 3:00, retroareolar region (this mass was not seen mammographically as it is obscured by dense breast parenchyma). The Kimberly  reflector projects within the mass at 11:00.     Impression: 1.  Technically successful ultrasound-guided core needle biopsy of a mass in the left breast at 3:00, retroareolar region.  The procedure site was marked for possible surgery with placement of a mini Kimberly  reflector. 2.  Technically successful ultrasound-guided core needle biopsy of a mass in the left breast at 11:00, 7 cm from the nipple.  The procedure site was marked for possible surgery with placement of a Kimbrely  reflector. 3.  After each site was biopsied, the Kimberly  probe was held over the procedure site and audible clicking could be heard. 4.  Management recommendations will be made once the pathology results are available. ASSESSMENT/BI-RADS CATEGORY: Left: Post-Procedure Mammogram for Marker Placement Overall: Post-Procedure Mammogram for Marker Placement RECOMMENDATION:      - Waiting for pathology for the left breast. Workstation ID: [id number] Signed by:  Ambreen Mukherjee MD     Procedure: Mammo post biopsy left  Addendum Date: 10/10/2024  Addendum: ADDENDUM: PATHOLOGY  RESULTS: 2) Mass [B] (Left; Retroareolar; 3 o'clock) The pathology results indicate a Benign finding with benign breast tissue and fibroadenomatoid nodule. Radiology and pathology are concordant. 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  Please refer to the full pathology report. Radiology and pathology are concordant. In review of the patient’s recent imaging, the left malignancy is poorly defined on mammogram, appearing as a developing asymmetry rather than a discrete mass.  This measures approximately 47 mm AP by 31 mm transverse on the cc view and measures approximately 58 mm AP by 35 mm craniocaudal on the MLO view.  The difference in AP dimension between the 2 views could be related to the obliquity of the images.  On ultrasound the mass measures 41 x 13 x 41 mm.  There is a subtle asymmetry with possible distortion slightly superior and medial (at 12:00) which could represent a second site of disease.  Further characterization is indicated. The patient's most recent mammogram of the contralateral right breast is from an outside institution performed 4/12/2024.  It may be reasonable to obtain a more recent mammogram prior to treatment for the malignancy.  Contrast-enhanced mammography or MRI is recommended for additional screening and evaluation of extent of disease in the left breast.  This is due to the subtle mammographic appearance of the malignancy in the breast density.  If the patient cannot have contrast-enhanced imaging, a more updated right mammogram is recommended as well as ultrasound and possible mammogram of the left breast to evaluate the asymmetry described above. The patient does have an abnormal PET/CT which demonstrates lymphadenopathy that was not accessible for ultrasound-guided core needle biopsy. The  patient was notified of the malignant pathology results on 10/8/2024 by Dr. Andino. RECOMMENDATION:      - Surgical Consultation for the left breast.      - Breast MRI for both breasts. Signed by:  Ambreen Mukherjee MD END ADDENDUM    Result Date: 10/10/2024  Narrative: DIAGNOSIS: Status post biopsy INDICATION: Yuni Keys is a 78 y.o. female presenting for left breast biopsy, 2 locations. . Prior to the procedure, previous imaging was reviewed.  The procedure was explained to the patient in detail.  All questions were answered.  Written and verbal informed consent was obtained.  The left breast was marked. FINDINGS: Limited left breast ultrasound was performed with grayscale and color imaging.  The procedure sites were marked.  A timeout was performed.  The skin was cleansed and draped in the usual fashion.  Both biopsies were performed under continuous ultrasound guidance from a lateral approach. LEFT 2) MASS B at 3:00, retroareolar region: Anesthesia was administered with 5 mL of lidocaine 1%. A 13-gauge introducer was placed.  A 14 gauge spring-loaded biopsy device was used to obtain 6 samples under direct ultrasound guidance.  A mini Kimberly  reflector was placed in the biopsy cavity under direct ultrasound guidance.  The Kimberly  probe was held over the procedure site and audible clicking was heard.  1) MASS A at 11:00, 7 cm from the nipple: Anesthesia was administered with 5 mL of lidocaine 1%. An 11-gauge introducer was placed.  A 12 gauge spring-loaded biopsy device was used to obtain 4 samples under direct ultrasound guidance.  There was a small central hypoechoic component to the mass.  Due to vascularity at this location, sampling could not be obtained at that hypoechoic area.  A Kimberly  reflector was placed in the biopsy cavity under direct ultrasound guidance.  The Kimberly  probe was held over the procedure site and audible clicking was heard.  Hemostasis was obtained with direct  pressure on the biopsy sites.  The patient had minimal bleeding.  The patient tolerated the procedure well. There were no immediate complications.   After each site was biopsied, the Kimberly  probe was held over the procedure site and audible clicking could be heard. Post biopsy mammogram: Left 2D CC and lateral views were obtained.  The mini Kimberly  reflector projects in the expected location at 3:00, retroareolar region (this mass was not seen mammographically as it is obscured by dense breast parenchyma). The Kimberly  reflector projects within the mass at 11:00.     Impression: 1.  Technically successful ultrasound-guided core needle biopsy of a mass in the left breast at 3:00, retroareolar region.  The procedure site was marked for possible surgery with placement of a mini Kimberly  reflector. 2.  Technically successful ultrasound-guided core needle biopsy of a mass in the left breast at 11:00, 7 cm from the nipple.  The procedure site was marked for possible surgery with placement of a Kimberly  reflector. 3.  After each site was biopsied, the Kimberly  probe was held over the procedure site and audible clicking could be heard. 4.  Management recommendations will be made once the pathology results are available. ASSESSMENT/BI-RADS CATEGORY: Left: Post-Procedure Mammogram for Marker Placement Overall: Post-Procedure Mammogram for Marker Placement RECOMMENDATION:      - Waiting for pathology for the left breast. Workstation ID: [id number] Signed by:  Ambreen Mukherjee MD     Procedure: Mammo diagnostic left w 3d and cad  Result Date: 10/4/2024  Narrative: DIAGNOSIS: Abnormal positron emission tomography (PET) scan; Other signs and symptoms in breast TECHNIQUE: Digital diagnostic mammography was performed. Computer Aided Detection (CAD) analyzed all applicable images. Left breast ultrasound was performed. COMPARISONS: Prior breast imaging dated: 04/12/2024, 04/26/2023, 04/07/2023, 02/01/2023,  03/22/2022, 03/19/2021, and 02/20/2020 RELEVANT HISTORY: Family Breast Cancer History: History of breast cancer in Maternal Grandmother, Paternal Aunt. Family Medical History: Family medical history includes breast cancer in 2 relatives (maternal grandmother, paternal aunt) and ovarian cancer in maternal aunt. Personal History: No known relevant hormone history. Surgical history includes breast biopsy, breast excisional biopsy, and hysterectomy. No known relevant medical history. RISK ASSESSMENT: 5 Year Tyrer-Cuzick: 11.31% 10 Year Tyrer-Cuzick: No Score Lifetime Tyrer-Cuzick: 15.78% TISSUE DENSITY: The breasts are extremely dense, which lowers the sensitivity of mammography. INDICATION: Yuni Keys is a 78 y.o. female presenting for abnormal pet scan.  Patient had a PET/CT with findings suspicious for osseous metastases.  There was FDG avidity in the medial left breast and FDG avid left axillary/subpectoral lymph nodes. FINDINGS: LEFT 1) MASS [A] Mammo diagnostic left w 3d and cad: There is an equal density, irregularly shaped mass with indistinct margins seen in the left breast at 11 o'clock in the middle depth.  On the cc view the mass measures approximately 47 mm AP by 31 mm transverse.  On the MLO view the mass may measure up to 58 mm AP by 35 mm craniocaudal.  However, the exact margins of the mass are difficult to delineate on mammography.  This does correspond with the area of FDG avidity in the medial breast on the PET/CT. US breast left limited (diagnostic): There is a 41 mm x 13 mm x 41 mm irregularly shaped, hyperechoic mass with indistinct margins seen in the left breast at 11 o'clock, 7 cm from the nipple.  The margins of the mass are indistinct and the mass has an irregular shape, limiting accurate measurement.  The mass correlates with the mass seen on the mammogram.  This mass is highly suggestive of malignancy. 2) MASS [B] Mammo diagnostic left w 3d and cad: There is no corresponding mass seen on  this modality. US breast left limited (diagnostic): There is a 14 mm x 4 mm x 8 mm oval, parallel, hypoechoic mass seen in the retroareolar region of the left breast at 3 o'clock.  This appears to be intraductal.  This is suspicious. 3) MASS [C] Mammo diagnostic left w 3d and cad: There is a 6 mm oval mass seen in the left breast at 6 o'clock in the middle depth.  This is mammographically stable compared to the oldest mammogram available for comparison from 2020. US breast left limited (diagnostic): There is a 6 mm x 5 mm x 3 mm oval, parallel, hypoechoic mass with circumscribed margins seen in the retroareolar region of the left breast at 6 o'clock.  This is likely benign given the mammographic stability. 4) LYMPH NODE [D] Mammo diagnostic left w 3d and cad: There is no corresponding lymph node seen on this modality.  The PET/CT demonstrated abnormal lymph nodes. US breast left limited (diagnostic): Targeted ultrasound of the axilla was performed.  There are some morphologically benign axillary lymph nodes.  There is also a level 2 axillary lymph node which is only visible deep to the pectoralis muscles during certain phases of respiration.  This has a cortical thickness of 4 mm.  This is suspicious but is not amenable to ultrasound-guided core needle biopsy.     Impression: 1.  Irregular mass in the medial left breast corresponds with the area of FDG avidity on the PET/CT.  This is highly suggestive of malignancy.  Appropriate action should be taken.  Ultrasound-guided core needle biopsy is recommended and was performed today.  Please see the separate dictation. 2.  Oval intraductal mass in the left breast at 3:00, retroareolar region is suspicious.  Ultrasound-guided core needle biopsy is recommended. 3.  There is a 6 mm oval mass at 6:00 which is mammographically stable for 4 years.  This is likely benign. 4.  The patient had abnormal axillary lymph nodes seen on PET/CT.  1 of these lymph nodes is visualized on  ultrasound, however, it is not amenable to ultrasound-guided core needle biopsy. ASSESSMENT/BI-RADS CATEGORY: Left: 5 - Highly Suggestive of Malignancy Overall: 5 - Highly Suggestive of Malignancy RECOMMENDATION:      - Ultrasound-guided breast biopsy for the left breast. Workstation ID: [id number] Signed by:  Ambreen Mukherjee MD     Procedure: US breast left limited (diagnostic)  Result Date: 10/4/2024  Narrative: DIAGNOSIS: Abnormal positron emission tomography (PET) scan; Other signs and symptoms in breast TECHNIQUE: Digital diagnostic mammography was performed. Computer Aided Detection (CAD) analyzed all applicable images. Left breast ultrasound was performed. COMPARISONS: Prior breast imaging dated: 04/12/2024, 04/26/2023, 04/07/2023, 02/01/2023, 03/22/2022, 03/19/2021, and 02/20/2020 RELEVANT HISTORY: Family Breast Cancer History: History of breast cancer in Maternal Grandmother, Paternal Aunt. Family Medical History: Family medical history includes breast cancer in 2 relatives (maternal grandmother, paternal aunt) and ovarian cancer in maternal aunt. Personal History: No known relevant hormone history. Surgical history includes breast biopsy, breast excisional biopsy, and hysterectomy. No known relevant medical history. RISK ASSESSMENT: 5 Year Tyrer-Cuzick: 11.31% 10 Year Tyrer-Cuzick: No Score Lifetime Tyrer-Cuzick: 15.78% TISSUE DENSITY: The breasts are extremely dense, which lowers the sensitivity of mammography. INDICATION: Yuni Keys is a 78 y.o. female presenting for abnormal pet scan.  Patient had a PET/CT with findings suspicious for osseous metastases.  There was FDG avidity in the medial left breast and FDG avid left axillary/subpectoral lymph nodes. FINDINGS: LEFT 1) MASS [A] Mammo diagnostic left w 3d and cad: There is an equal density, irregularly shaped mass with indistinct margins seen in the left breast at 11 o'clock in the middle depth.  On the cc view the mass measures  approximately 47 mm AP by 31 mm transverse.  On the MLO view the mass may measure up to 58 mm AP by 35 mm craniocaudal.  However, the exact margins of the mass are difficult to delineate on mammography.  This does correspond with the area of FDG avidity in the medial breast on the PET/CT. US breast left limited (diagnostic): There is a 41 mm x 13 mm x 41 mm irregularly shaped, hyperechoic mass with indistinct margins seen in the left breast at 11 o'clock, 7 cm from the nipple.  The margins of the mass are indistinct and the mass has an irregular shape, limiting accurate measurement.  The mass correlates with the mass seen on the mammogram.  This mass is highly suggestive of malignancy. 2) MASS [B] Mammo diagnostic left w 3d and cad: There is no corresponding mass seen on this modality. US breast left limited (diagnostic): There is a 14 mm x 4 mm x 8 mm oval, parallel, hypoechoic mass seen in the retroareolar region of the left breast at 3 o'clock.  This appears to be intraductal.  This is suspicious. 3) MASS [C] Mammo diagnostic left w 3d and cad: There is a 6 mm oval mass seen in the left breast at 6 o'clock in the middle depth.  This is mammographically stable compared to the oldest mammogram available for comparison from 2020. US breast left limited (diagnostic): There is a 6 mm x 5 mm x 3 mm oval, parallel, hypoechoic mass with circumscribed margins seen in the retroareolar region of the left breast at 6 o'clock.  This is likely benign given the mammographic stability. 4) LYMPH NODE [D] Mammo diagnostic left w 3d and cad: There is no corresponding lymph node seen on this modality.  The PET/CT demonstrated abnormal lymph nodes. US breast left limited (diagnostic): Targeted ultrasound of the axilla was performed.  There are some morphologically benign axillary lymph nodes.  There is also a level 2 axillary lymph node which is only visible deep to the pectoralis muscles during certain phases of respiration.  This  has a cortical thickness of 4 mm.  This is suspicious but is not amenable to ultrasound-guided core needle biopsy.     Impression: 1.  Irregular mass in the medial left breast corresponds with the area of FDG avidity on the PET/CT.  This is highly suggestive of malignancy.  Appropriate action should be taken.  Ultrasound-guided core needle biopsy is recommended and was performed today.  Please see the separate dictation. 2.  Oval intraductal mass in the left breast at 3:00, retroareolar region is suspicious.  Ultrasound-guided core needle biopsy is recommended. 3.  There is a 6 mm oval mass at 6:00 which is mammographically stable for 4 years.  This is likely benign. 4.  The patient had abnormal axillary lymph nodes seen on PET/CT.  1 of these lymph nodes is visualized on ultrasound, however, it is not amenable to ultrasound-guided core needle biopsy. ASSESSMENT/BI-RADS CATEGORY: Left: 5 - Highly Suggestive of Malignancy Overall: 5 - Highly Suggestive of Malignancy RECOMMENDATION:      - Ultrasound-guided breast biopsy for the left breast. Workstation ID: [id number] Signed by:  Ambreen Mukherjee MD     Procedure: NM PET CT tumor imaging whole body  Result Date: 10/2/2024  Narrative: WHOLE-BODY PET/CT SCAN INDICATION: Abnormal MRI lumbar spine. 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 Multiplanar attenuation corrected and non attenuation corrected PET images were acquired 60 minutes post injection. Contiguous, low dose, axial CT sections were obtained from the vertex through the feet.  Intravenous contrast material was not utilized.  This examination, like all CT scans performed in the [facility], was performed utilizing techniques to minimize radiation dose  exposure, including the use of iterative reconstruction and automated exposure control. Fasting serum glucose: 84 mg/dl FINDINGS: VISUALIZED BRAIN: No acute abnormalities are seen. HEAD/NECK: There is a physiologic distribution of FDG. No FDG avid cervical adenopathy is seen. CT images: Unremarkable CHEST: There are several small FDG avid lymph nodes in the left axilla/subpectoral region. A lymph node here demonstrates SUV max of 6.0. This measures 6 mm short axis image 68 series 4. Slightly focal FDG uptake at the left breast medially where there is suggestion of a nodular density on CT, SUV max of 2.6. This measures up to 1.3 cm image 83 series 4. See fusion image 79 series 603. CT images: Moderate coronary artery calcifications. Heart is enlarged. Trace pleural effusions suggested. ABDOMEN: No FDG avid soft tissue lesions are seen. CT images: Parapelvic renal cysts on the left. Colonic diverticulosis. Suggestion of calcified phleboliths along the right psoas margin. PELVIS: No FDG avid soft tissue lesions are seen. CT images: Prior hysterectomy. OSSEOUS STRUCTURES/EXTREMITIES: Scattered FDG-avid osseous lesions. For example: *  Heterogeneous activity at the right acromion where there is a lytic process on CT, SUV max of 4.8. *  T8 vertebral body demonstrates SUV max 3.8. *  T11 vertebral body lytic lesion on the right demonstrates SUV max of 6.0. *  Linear activity at the compressed L1 vertebral body demonstrates SUV max of 5.9. Activity could be related to compression fracture with underlying pathologic lesion not excluded. *  Small lucent lesion at the left ischial tuberosity demonstrates SUV max of 4.7. CT images: Multilevel degenerative changes of the spine. Curvature of the lumbar spine to the right. Bilateral knee arthroplasties.     Impression: 1. Mild focal radiotracer uptake at a left breast nodular density medially. Underlying primary breast malignancy should be excluded. 2. Several small FDG avid left  axillary/subpectoral lymph nodes would raise suspicion for metastasis. 3. Scattered FDG avid lytic lesions compatible with metastasis. The study was marked in EPIC for significant notification. Workstation performed: [id number]     Procedure: MRI lumbar spine without contrast  Result Date: 9/23/2024  Narrative: MRI LUMBAR SPINE WITHOUT CONTRAST INDICATION: S32.010A: Wedge compression fracture of first lumbar vertebra, initial encounter for closed fracture. COMPARISON: Lumbar spine radiograph from 9/13/2024, CT of the lumbar spine and abdomen and pelvis from 8/28/2024, MR lumbar spine from 11/24/2019 TECHNIQUE:  Multiplanar, multisequence imaging of the lumbar spine was performed. . IMAGE QUALITY:  Diagnostic FINDINGS: VERTEBRAL BODIES:  There are 5 lumbar type vertebral bodies. There is dextroscoliosis of the lumbar spine, centered at L2, with right lateral listhesis of L3 on L4, L4 on L5, and grade 1 anterolisthesis of L4 and L5 due to moderate facet arthropathy. There is a T1 hypointense, STIR hyperintense marrow replacing lesion involving the posterior aspect of the T11 vertebral body to the right of midline extending into the pedicle, corresponding to the lucent lesion identified on CT in this region. This is new when compared to prior lumbar spine MRI from 11/24/2019. There is a worsening acute or subacute compression fracture involving the superior endplate of L1, with approximately 50% height loss noted, and associated bone marrow edema on STIR images. There is mild worsening retropulsion of the superior endplate of  the central canal. No significant associated epidural hematoma or obvious paraspinal epidural mass is identified. There is mild STIR signal hyperintensity involving the posterior aspect of the T12 inferior endplate to the left of midline in particular, without significant height loss, which may represent a microtrabecular injury in this region. Remaining lumbar vertebral body heights appear  grossly preserved. There are additional degenerative endplate changes at multiple levels most pronounced at L2-L3, where there are Modic type III degenerative endplate changes noted an associated sclerosis on recent CT. There are also Modic type I degenerative endplate changes posteriorly at the level of L3-L4. SACRUM:  Normal signal within the sacrum. No evidence of insufficiency or stress fracture. DISTAL CORD AND CONUS:  Normal size and signal within the distal cord and conus. The conus terminates at T12. PARASPINAL SOFT TISSUES:  Paraspinal soft tissues are unremarkable. LOWER THORACIC DISC SPACES:  Normal disc height and signal.  No disc herniation, canal stenosis or foraminal narrowing. LUMBAR DISC SPACES: L1-L2: Disc desiccation, diffuse disc bulge with superimposed small left foraminal disc protrusion. Mild ligamentum flavum hypertrophy. Facet joints appear grossly normal. No canal stenosis. Mild left neural foraminal stenosis. Findings appear unchanged. L2-L3: Disc height loss, disc osteophyte complex asymmetric to the left, with superimposed small right foraminal disc protrusion. Mild facet arthropathy. Ligamentum flavum hypertrophy. Mild canal stenosis. Left greater than right lateral recess stenosis.  Moderate left neural foraminal stenosis. Findings appear grossly unchanged. L3-L4: Diffuse disc bulge with superimposed left foraminal disc protrusion. Bilateral facet arthropathy and ligamentum flavum hypertrophy. Mild canal stenosis. Moderate left and mild right neural foraminal stenosis. Findings appear similar when compared to the prior study. L4-L5: Diffuse disc bulge. Bilateral facet arthropathy and ligamentum flavum hypertrophy. Mild canal stenosis. Mild right neuroforaminal stenosis. Findings appear unchanged. L5-S1: Disc bulge. Moderate facet arthropathy. No canal stenosis. Mild right neural foraminal stenosis. OTHER FINDINGS: Mild left hydronephrosis as seen on prior CT of the abdomen pelvis from  8/28/2024.     Impression: 1. Indeterminate T1 hypointense, STIR hyperintense lesion involving the right posterior aspect of T11 extending into the adjacent pedicle, corresponding to the lucent lesion identified in this region on prior CT, and new when compared to the prior lumbar  spine MRI from 11/24/2019. This raises concern for an aggressive lesion such as metastatic disease or multiple myeloma as this was new compared to 2019, with an atypical hemangioma an alternative but less likely consideration. Recommend correlation for history of primary malignancy, and consider further valuation with PET/CT. 2. Worsening acute to subacute compression fracture at L1, with now approximately 50% height loss, associated bone marrow edema, and mild retropulsion of the posterior vertebral body cortex of the central canal. No associated epidural hematoma or obvious  associated mass at this level. This may be a benign compression fracture, but given the lesion at T11, a pathologic compression fracture cannot be entirely excluded. 3. Minimal edema involving the posterior aspect of the inferior endplate of T12, without height loss in this region, which may represent a area of focal microtrabecular injury. 4. Multilevel lumbar spondylosis, as described above, contributing to at most mild canal stenosis at L2-L5, and multilevel neural foraminal stenosis, worst on the left at L2-L4. 5. Mild left-sided hydronephrosis again demonstrated, incompletely evaluated in this examination. The study was marked in EPIC for immediate notification. Workstation performed: [id number]     Procedure: XR spine thoracolumbar 2 vw  Result Date: 9/13/2024  Narrative: THORACOLUMBAR SPINE INDICATION:   Wedge compression fracture of first lumbar vertebra, initial encounter for closed fracture. COMPARISON: 8/28/2024. VIEWS:  XR SPINE THORACOLUMBAR 2 VW Images: 2 FINDINGS: Compression fracture of the L1 vertebral body again seen. This is similar to the prior.  There is moderate dextrocurvature of the lumbar spine. There is no spondylolisthesis. There is moderate multilevel disc space narrowing with osteophytes throughout the lumbar spine worse at L2-L3. There is moderate-severe facet disease lower lumbar spine.. There is no displacement of the paraspinal line. The pedicles appear intact.     Impression: Redemonstration of L1 compression fracture Moderate multilevel spondylosis worse at L2-L3. Moderate to severe facet disease. Electronically signed: 09/13/2024 03:43 PM Stephane Rachel MD      Administrative Statements   I have spent a total time of 35 minutes in caring for this patient on the day of the visit/encounter including Instructions for management, Patient and family education, Documenting in the medical record, Reviewing / ordering tests, medicine, procedures  , and Obtaining or reviewing history  . Topics discussed with the patient / family include symptom assessment and management, medication review, medication adjustment, supportive listening, and anticipatory guidance.

## 2025-01-03 NOTE — ASSESSMENT & PLAN NOTE
Significant fatigue, no longer finding alirio in activities  She met with Palliative medicine MSW and has another appointment in January  Denies suicidal ideation/self-harm and denies any plan  Safety plan in place to go to ER if this changes  Start paroxetine (primarily initiating SSRI due to night sweats)  Orders:    PARoxetine (PAXIL) 10 mg tablet; Take 1 tablet (10 mg total) by mouth daily At bedtime

## 2025-01-03 NOTE — ASSESSMENT & PLAN NOTE
Back pain was improved following RT but has worsened recently  Using 1-2 tabs of oxycodone per day to manage pain  Current regimen:  Oxycodone 5-10mg q4h PRN for moderate-severe pain  Alternate oxycodone with Tylenol as needed  Orders:    oxyCODONE (Roxicodone) 5 immediate release tablet; Take 1-2 tablets (5-10 mg total) by mouth every 4 (four) hours as needed for moderate pain or severe pain Max Daily Amount: 40 mg

## 2025-01-06 ENCOUNTER — OFFICE VISIT (OUTPATIENT)
Dept: PHYSICAL THERAPY | Facility: REHABILITATION | Age: 80
End: 2025-01-06
Payer: MEDICARE

## 2025-01-06 DIAGNOSIS — M62.81 MUSCLE WEAKNESS: Primary | ICD-10-CM

## 2025-01-06 PROCEDURE — 97110 THERAPEUTIC EXERCISES: CPT | Performed by: PHYSICAL THERAPIST

## 2025-01-06 PROCEDURE — 97530 THERAPEUTIC ACTIVITIES: CPT | Performed by: PHYSICAL THERAPIST

## 2025-01-06 NOTE — PROGRESS NOTES
Daily Note     Today's date: 2025  Patient name: Yuni Kesy  : 1945  MRN: [id number]  Referring provider: Lisa Contreras, *  Dx:   Encounter Diagnosis     ICD-10-CM    1. Muscle weakness  M62.81           Start Time: 843          Subjective: I hurt my low back      Objective: See treatment diary below      Assessment: Tolerated treatment well. Patient demonstrated fatigue post treatment      Plan: Continue per plan of care.      Precautions: cancer      Manuals 25                                                              Neuro Re-Ed                                                                                                        Ther Ex             Shoulder flexion X 15 X 20 1# x 15          Bicep curls 2# x 20 3# x 20 4# x 20          Shoulder abduction X 15 X 20 1# x 15          marching X 20 1# x 20 2# x 20          Hip abduction standing B X 15 B X 20 B 1# x 15 B          Leg press  65# x 20 75# x 20                                    Ther Activity             Sit to stand No hands x 10 No hands x 10  No hands x 10          Ube standing 1 min ea 2 min ea alternating 2.5 min ea alternating          treadmill 1.8 mph x 3 min 2.0 mph x 4 min 2.0 mph x 5 min          Recumbent #6 2 min With pillow 3 min With pillow x 3 min          Gait Training                                       Modalities

## 2025-01-06 NOTE — PROGRESS NOTES
Palliative Supportive Care SW met with patient telemedicine to continue to provide emotional support and guidance.      Updated biopsychosocial information relevant to support:    Identified areas of need include:    Pt routinely follows with palliative and supportive care for symptom management, and goals of care. Since last visit, pt reports decrease in night sweats since December 2024. Back pain was improved following RT but has worsened recently. Pain management was adjusted by CRNP.     During this visit SW provided supportive listening and validated pt's feelings and concerns. SW was able to provide the pt with emotional support as well. Pt is still aware of [facility] LCSW counseling available if needed.     Resources provided:  Mailed Mindfulness skills to pt   Supportive Listening   Emotional Support and validation of feelings and concerns     Areas that need future follow-up include:  Continue close follow-up with PCP  Continue to with using coping skills/mindfulness skills.  Continue with follow up with [facility] for symptom management.    I have spent 30 minutes with Patient today in which greater than 50% of this time was spent in counseling/coordination of care.    Palliative  will follow-up as requested by patient, family, and primary team. Please contact with any specific requests

## 2025-01-08 ENCOUNTER — OFFICE VISIT (OUTPATIENT)
Dept: PHYSICAL THERAPY | Facility: REHABILITATION | Age: 80
End: 2025-01-08
Payer: MEDICARE

## 2025-01-08 DIAGNOSIS — M62.81 MUSCLE WEAKNESS: Primary | ICD-10-CM

## 2025-01-08 PROCEDURE — 97530 THERAPEUTIC ACTIVITIES: CPT | Performed by: PHYSICAL THERAPIST

## 2025-01-08 PROCEDURE — 97110 THERAPEUTIC EXERCISES: CPT | Performed by: PHYSICAL THERAPIST

## 2025-01-08 NOTE — PROGRESS NOTES
Daily Note     Today's date: 2025  Patient name: Yuni Keys  : 1945  MRN: [id number]  Referring provider: Lisa Contreras, *  Dx:   Encounter Diagnosis     ICD-10-CM    1. Muscle weakness  M62.81           Start Time: 928          Subjective: My back is better but still sore      Objective: See treatment diary below      Assessment: Tolerated treatment well. Patient demonstrated fatigue post treatment    Showed increased endurance today  Plan: Continue per plan of care.      Precautions: cancer      Manuals 25                                                             Neuro Re-Ed                                                                                                        Ther Ex             Shoulder flexion X 15 X 20 1# x 15 1# x 20         Bicep curls 2# x 20 3# x 20 4# x 20 5# x 15         Shoulder abduction X 15 X 20 1# x 15 1# x 15         marching X 20 1# x 20 2# x 20 3# x 20         Hip abduction standing B X 15 B X 20 B 1# x 15 B 1# x 20 B         Leg press  65# x 20 75# x 20 85# x 20                                   Ther Activity             Sit to stand No hands x 10 No hands x 10  No hands x 10 No hands x 10         Ube standing 1 min ea 2 min ea alternating 2.5 min ea alternating 3 min ea alternating         treadmill 1.8 mph x 3 min 2.0 mph x 4 min 2.0 mph x 5 min 2.0 mph x 6 min         Recumbent #6 2 min With pillow 3 min With pillow x 3 min With pillow x 4 min          Gait Training                                       Modalities

## 2025-01-10 ENCOUNTER — PATIENT OUTREACH (OUTPATIENT)
Dept: CASE MANAGEMENT | Facility: OTHER | Age: 80
End: 2025-01-10

## 2025-01-10 NOTE — PROGRESS NOTES
OSW placed outreach call to Mrs. Keys today. LM on VM along with return call information. Will follow.

## 2025-01-11 NOTE — PROGRESS NOTES
Hematology/Oncology Outpatient Office Note    Date of Service: 2025    [facility] [place] [id number]  [phone]    Reason for Consultation:   Chief Complaint   Patient presents with    Follow-up     Cancer Stage at diagnosis: IV     Referral Physician: No ref. provider found    Primary Care Physician:  Lisa Fairbanks DO     Nickname: Alley    Lives alone     Daughter: James     Original ECO     Today's ECO , night sweats, fatigue, pain (R shoulder and R hip) , depression     Goals and Barriers:  Current Goal: Minimize effects of disease burden, extend life.   Barriers to accomplishing this: cancer related pain and symptoms (night sweats, pain, depression, fatigue) and treatment intolerable , see below.     Patient's Capacity to Self Care:  Patient is able to self care    ASSESSMENT & PLAN      Diagnosis ICD-10-CM Associated Orders   1. Breast cancer metastasized to bone, right (HCC)  C50.911     C79.51             This is a 79 y.o. c PMHx notable for Arthritis, hyperlipidemia, hypertension, migraines, osteopenia, basal cell skin cancer, had hysterectomy and oophorectomy in her early 50's    seen initially in in 10/2024 as consultation for likely recurrent HR+ L Breast Cancer to the bone including to T8 & L1 with pathological fractures s/p Kyphoplasty in 10/2024 with palliative radiation to T spine and R shoulder completed 2024      10/22/2024 initiated on Letrozole 2.5 mg QD and Kisqali initially 600 mg QD 3 weeks on 1 week of . then she tried 400 mg QD early 2024 due to reported side effects.   G1 Neutropenia 2/2 Kisqali was noted    24 visit: patient reported that she self discontinued the Letrozole/Kisqali around 12/10/2024 because of the fatigue and night sweats, however she still has the same symptoms 3 weeks after discontinuing the therapy with no improvement. Also noted on  that she  "started to feel R hip pain and her R shoulder pain which was gone after RT now reoccur. Denies suicidal ideation. Had on and off mild grade fevers past month but not in past week. Eating well and still do some social activities.     Discussion of decision making  Oncology history updated, accordingly, during this visit  Goals of care/patient communication  I discussed with the patient the clinical course leading up to their cancer diagnosis. I reviewed relevant office notes, imaging reports and pathology result as well.  I told the patient that this is a case of incurable disease and what this means. We discussed that the goal of anti-cancer therapy is to provide best quality of life, extend overall survival, and progression free survival as shown in clinical trials. We also discussed that there might be a point when the cancer will no longer respond to this anti-neoplastic therapy. As a result, we also discussed the role of the palliative care team being introduced early in the treatment course. We will be making this referral  I explained the risks/benefits of the proposed cancer therapy: Letrozole + Ribociclib  and after discussion including understanding risks of possible life-threatening complications and therapy-related malignancy development, informed consent for blood products and treatment has been signed and obtained.  TNM/Staging At Diagnosis  Cancer Staging   Breast cancer metastasized to bone, right (HCC)  Staging form: Breast, AJCC 8th Edition  - Clinical: Stage IV (cM1) - Signed by Fam Amanda MD on 10/12/2024    Malignant neoplasm of upper-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, estrogen receptor positive (HCC)  Staging form: Breast, AJCC 8th Edition  - Clinical: Stage IV (cM1) - Signed by Fam Amanda MD on 10/12/2024    Disease Features/Tumor Markers/Genetics  Tumor Marker: n/a  Notable Path Features:   10/4/2024 Breast: ER 90%, CA negative, HER-2 +1  10/15/2024: Spine, \"Vertebra, " "T8,\" Biopsy: Metastatic carcinoma, most compatible with known breast primary  CARIS NGS: ER+, HER2 0, no actionable biomarkers  Guardant NGS 10 muts/Mb, NF1 mutation  Treatment: Letrozole + Ribociclib   Other Supportive care: Prolia, Calcium and Vitamin D   Treatment Team Members  Surgeon: Cassandra Lynn Cardarelli, MD   Rad Onc: Olimpia Siddiqi MD   Palliative: Lisa Contreras, DO   Labs  Diagnostics  10/1/2024 EK ms QTC   10/2/2024 PET/CT: Mild focal radiotracer uptake at a left breast nodular density medially. Several small FDG avid left axillary/subpectoral lymph nodes would raise suspicion for metastasis. Scattered FDG avid lytic lesions compatible with metastasis. T-8 and T-11 spine lesions  10/8/2024 MRI T-spine w/wo c: Normal enhanced MRI of the thoracic spine  There is a DEXA from , next can get it done 2024:  ms QTC   2024 CT AP: No evidence of metastases in the abdomen or pelvis. 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  Compression fracture of L1, s/p Kyphoplasty.   2024 CT T Spine: Osteolytic metastases involving the T8, T11 and L1 vertebral bodies with pathologic fractures treated with kyphoplasty at T8 and L1. Thickened paravertebral soft tissues at T8. This could be residual edema or hematoma ... Probable extraosseous extension of T8 metastasis into the left anterior epidural space.   2024 CT L Spine: Compression fracture of L1, status post kyphoplasty. No new lumbar vertebral fractures. No evidence of lumbar spine metastasis.  11/10/2024 MRI Thoracic Spine: Interval T8 kyphoplasty ... Redemonstrated osseous metastases at T5, T8, and T11. No abnormal epidural or leptomeningeal enhancement. Normal cord signal.  2024 EKG QTC interval 423 msec   2024 MRI Abdomen: Minimally dilated CBD up to 8 mm proximally with smooth distal tapering. No intrahepatic bile duct dilation. No choledocholithiasis, biliary " stricture or suspicious mass within the limits of this motion degraded examination.  1/13/2025 CT CAP w/c: stable left breast mass with biopsy clip. Scattered bone metastases including a new right scapular lytic lesion with a healed pathological fracture, likely related to postradiation treatment changes to the right shoulder.  No metastatic disease in the solid organs of the abdomen and pelvis.    Discussion of decision making    I personally reviewed the following lab results, the image studies, pathology, other specialty/physicians consult notes and recommendations, and outside medical records. I had a lengthy discussion with the patient and shared the work-up findings. We discussed the diagnosis and management plan as below. I spent 42 minutes reviewing the records (labs, clinician notes, outside records, medical history, ordering medicine/tests/procedures, monitoring of anti-neoplastic toxicities, interpreting the imaging/labs previously done) and coordination of care as well as direct time with the patient today, of which greater than 50% of the time was spent in counseling and coordination of care with the patient/family.    Plan/Labs  EKG at baseline and q2 weeks for 2 times - completed with normal QTC intervals.   To continue follow up with palliative care; further goals of care discussion to follow.  Patient has depression symptoms but no suicidal thoughts or ideations. She initally refused SSRI but advised to reconsider. She is resistant to full therapy and wants to continue with monotherapy - see below.  To continue follow up with radiation oncology   10/22/24 was initiated on Letrozole 2.5 mg daily + Ribociclib 600 mg daily 21 days on and 7 days off   Reported fatigue and night sweats for which kisqali was decreased to 400 mg daily then patient Self Discontinued the therapy since around 12/10/24 but without improvement of the fatigue or night sweats up to today visit 12/30/24,  Continue monotherapy  Letrozole 2.5 mg daily   recommends Calcium 1200 mg and Vitamin D-3 2,000 units daily supplemental  Can use Keytruda down the road due to TMB high  Baseline DEXA due 4/2025 and would be covered by the insurance   Cont Prolia SC 60 mg q6 months (initiated on 11/12/24)   Oncology dietitian   patient was seen by genetic on 10/30/24 and genetic testing performed including but not limited to NF1, BRCA1 and BRCA2 were are negative with no clinically significant variants detected.   CBC, CMP q4 weeks ordered as standing until 11/2025  Restaging CT CAP w/c ordered 4/2025         Follow Up: 3 months    All questions were answered to the patient's satisfaction during this encounter. The patient knows the contact information for our office and knows to reach out for any relevant concerns related to this encounter. They are to call for any temperature 100.4 or higher, new symptoms including but not restricted to shaking chills, decreased appetite, nausea, vomiting, diarrhea, increased fatigue, shortness of breath or chest pain, confusion, and not feeling the strength to come to the clinic. For all other listed problems and medical diagnosis in their chart - they are managed by PCP and/or other specialists, which the patient acknowledges. Thank you very much for your consultation and making us a part of this patient's care. We are continuing to follow closely with you. Please do not hesitate to reach out to me with any additional questions or concerns.    Fam Amanda MD  Hematology & Medical Oncology Staff Physician             Disclaimer: This document was prepared using EndPlay Direct technology. If a word or phrase is confusing, or does not make sense, this is likely due to recognition error which was not discovered during this clinician's review. If you believe an error has occurred, please contact me through [facility]On service line for [email or web address].      ONCOLOGY HISTORY OF PRESENT ILLNESS        Oncology  History   Breast cancer metastasized to bone, right (HCC)   9/27/2024 Initial Diagnosis    Breast cancer metastasized to bone, right (HCC)     10/12/2024 -  Cancer Staged    Staging form: Breast, AJCC 8th Edition  - Clinical: Stage IV (cM1) - Signed by Fam Amanda MD on 10/12/2024       11/5/2024 - 11/20/2024 Radiation      Plan ID Energy Fractions Dose per Fraction (cGy) Dose Correction (cGy) Total Dose Delivered (cGy) Elapsed Days   RT Shoulder 6X 5 / 5 400 0 2,000 7   T11_L2 Spine 10X/6X 10 / 10 300 0 3,000 15   T7_T10 Spine 6X 10 / 10 300 0 3,000 15         Malignant neoplasm of upper-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, estrogen receptor positive (HCC)   10/2/2024 Observation    NM PET/CT IMPRESSION:   1. Mild focal radiotracer uptake at a left breast nodular density medially. Underlying primary breast malignancy should be excluded.  2. Several small FDG avid left axillary/subpectoral lymph nodes would raise suspicion for metastasis.  3. Scattered FDG avid lytic lesions compatible with metastasis.     10/4/2024 Biopsy    Left breast ultrasound-guided biopsy  A. 3 o'clock, periareolar  Benign breast tissue with fibroadenomatoid nodule    B. 11 o'clock, 7 cm from nipple  Invasive mammary carcinoma with mixed ductal and lobular features  Grade 1-2  ER , OR <1, HER2 1+  Lymphovascular invasion not definitively identified    Concordant. Malignancy is poorly defined on mammo, appearing as a developing asymmetry rather than a discrete mass; this measures up to 5.8 cm. On US, mass measures up to 4.1cm. Subtle asymmetry with possible distortion slightly superior and medial which could represent a second site of disease. Further characterization is indication. Right breast imaging performed on 4/12/2024; more recent mammogram may be reasonable. CESM or MRI is recommended for evaluation of extent of disease in left breast. The patient had abnormal axillary lymph nodes seen on PET/CT. 1 of these lymph  nodes was visualized on US; however, it is not amenable to US-guided core needle biopsy.     10/12/2024 -  Cancer Staged    Staging form: Breast, AJCC 8th Edition  - Clinical: Stage IV (cM1) - Signed by Fam Amanda MD on 10/12/2024       10/15/2024 Biopsy    IR-guided biopsy    T8 - metastatic carcinoma, most compatible with known breast primary  ~RECEPTORS PENDING~    L1 - negative for carcinoma     11/5/2024 - 11/20/2024 Radiation      Plan ID Energy Fractions Dose per Fraction (cGy) Dose Correction (cGy) Total Dose Delivered (cGy) Elapsed Days   RT Shoulder 6X 5 / 5 400 0 2,000 7   T11_L2 Spine 10X/6X 10 / 10 300 0 3,000 15   T7_T10 Spine 6X 10 / 10 300 0 3,000 15               SUBJECTIVE  (INTERVAL HISTORY)     12/30/24 visit: patient reported that she self discontinued the Letrozole/Kisqali around 12/10/2024 because of the fatigue and night sweats, however she still has the same symptoms 3 weeks after discontinuing the therapy with no improvement. Also noted on 12/30 that she started to feel R hip pain and her R shoulder pain which was gone after RT now reoccur. Denies suicidal ideation. Had on and off mild grade fevers past month but not in past week. Eating well and still do some social activities.     Review of Systems  Review of Systems   Constitutional: Positive for fever and night sweats. Negative for decreased appetite and weight loss.        Had occasional on and off mild grade fevers past month but not in the past week as per patient on 12/30/24 . Unimproved fatigue even after she stopped her letrozole & Kisqali three weeks ago. Same, unchanged night sweats nightly or every other night , not improved after therapy discontinuation three weeks ago.    HENT:  Negative for congestion, nosebleeds, sore throat and tinnitus.    Eyes:  Negative for blurred vision, photophobia and visual disturbance.   Cardiovascular:  Negative for chest pain, dyspnea on exertion, irregular heartbeat, leg swelling and  palpitations.   Respiratory:  Negative for cough, shortness of breath and wheezing.    Hematologic/Lymphatic: Negative for adenopathy and bleeding problem.   Skin:  Positive for suspicious lesions. Negative for rash.        Small subcutaneous micronodular on plantar hands /fingers , non painful    Musculoskeletal:  Positive for arthritis.        Reports starting to have occasional R hip pain , and also the R shoulder pain which was well controlled after the radiation therapy now started to reoccur occasionally , not constant    Gastrointestinal:  Positive for diarrhea. Negative for abdominal pain, melena and vomiting.        Had diarrhea while on Black cohosh for the night sweats but noted that diarrhea is now resolved since stopping the use of Black cohosh   Genitourinary:  Negative for hematuria and pelvic pain.   Neurological:  Negative for headaches, loss of balance and numbness.   Psychiatric/Behavioral:  Positive for depression. Negative for altered mental status, hallucinations, substance abuse, suicidal ideas and thoughts of violence. The patient is nervous/anxious. The patient does not have insomnia.         Still eating well, does some social activities and visiting friends but expressing feeling too much stress and anxiety from the cancer diagnosis and related fatigue, pain and night sweats.           Past Medical History:   Diagnosis Date    Arthritis     Breast mass     Cancer (HCC) 8/28/2024    Edema of both lower legs 11/01/2018    Dr. Thang Otero; faxed records.    Hx of skin cancer, basal cell     Hyperlipidemia     Hypertension     Impingement syndrome of left shoulder 04/10/2018    Dr. Thang Otero, faxed patient records.    Lactose intolerance     Migraines     Osteopenia     Overactive bladder     Overweight 12/16/2021    Skin cancer 2022    Basal cell    Toe fracture, left 11/16/2023       Past Surgical History:   Procedure Laterality Date    ACHILLES TENDON REPAIR      BREAST BIOPSY  Left 10/04/2024    BREAST CYST EXCISION Bilateral     1972    BREAST LUMPECTOMY      CATARACT EXTRACTION, BILATERAL      COLONOSCOPY      COLPOPEXY VAGINAL EXTRAPERITONEAL (VEC) WITH INSERTION PUBOVAGINAL SLING      CYST REMOVAL      HERNIA REPAIR      HYSTERECTOMY      IR KYPHOPLASTY/VERTEBROPLASTY WITH ABLATION  10/15/2024    JOINT REPLACEMENT Right     REPLACEMENT TOTAL KNEE Left     TONSILLECTOMY      US GUIDANCE BREAST BIOPSY LEFT EACH ADDITIONAL Left 10/04/2024    US GUIDED BREAST BIOPSY LEFT COMPLETE Left 10/04/2024       Family History   Problem Relation Age of Onset    Heart disease Father          of MI age 41    Breast cancer Maternal Grandmother     Heart disease Paternal Grandmother     Hypertension Paternal Grandfather     Melanoma Daughter         40s    Melanoma Daughter         50s    Ovarian cancer Maternal Aunt     Cancer Maternal Aunt         Ovarian Cancer    Breast cancer Paternal Aunt        Social History     Socioeconomic History    Marital status:      Spouse name: Not on file    Number of children: 2    Years of education: 18    Highest education level: Master's degree (e.g., MA, MS, Derek, MEd, MSW, CHANELL)   Occupational History    Not on file   Tobacco Use    Smoking status: Former     Current packs/day: 0.00     Average packs/day: 0.5 packs/day for 15.0 years (7.5 ttl pk-yrs)     Types: Cigarettes     Quit date: 1977     Years since quittin.2     Passive exposure: Past    Smokeless tobacco: Never   Vaping Use    Vaping status: Never Used   Substance and Sexual Activity    Alcohol use: Not Currently     Alcohol/week: 1.0 standard drink of alcohol     Types: 1 Glasses of wine per week     Comment: occasional    Drug use: Never    Sexual activity: Not Currently     Partners: Male     Birth control/protection: Abstinence, Post-menopausal, Female Sterilization   Other Topics Concern    Not on file   Social History Narrative    Not on file     Social Drivers of Health      Financial Resource Strain: Patient Declined (11/6/2023)    Overall Financial Resource Strain (CARDIA)     Difficulty of Paying Living Expenses: Patient declined   Food Insecurity: No Food Insecurity (11/10/2024)    Hunger Vital Sign     Worried About Running Out of Food in the Last Year: Never true     Ran Out of Food in the Last Year: Never true   Transportation Needs: No Transportation Needs (11/10/2024)    PRAPARE - Transportation     Lack of Transportation (Medical): No     Lack of Transportation (Non-Medical): No   Physical Activity: Sufficiently Active (3/24/2022)    Exercise Vital Sign     Days of Exercise per Week: 4 days     Minutes of Exercise per Session: 60 min   Stress: Stress Concern Present (3/24/2022)    [place] [place] [employer] - Occupational Stress Questionnaire     Feeling of Stress : Rather much   Social Connections: Socially Isolated (3/24/2022)    Social Connection and Isolation Panel [NHANES]     Frequency of Communication with Friends and Family: More than three times a week     Frequency of Social Gatherings with Friends and Family: Three times a week     Attends Bahai Services: Never     Active Member of Clubs or Organizations: No     Attends Club or Organization Meetings: Never     Marital Status:    Intimate Partner Violence: Unknown (11/9/2024)    Nursing IPS     Feels Physically and Emotionally Safe: Not on file     Physically Hurt by Someone: Not on file     Humiliated or Emotionally Abused by Someone: Not on file     Physically Hurt by Someone: No     Hurt or Threatened by Someone: No   Housing Stability: Unknown (11/10/2024)    Housing Stability Vital Sign     Unable to Pay for Housing in the Last Year: No     Number of Times Moved in the Last Year: Not on file     Homeless in the Last Year: No       Allergies   Allergen Reactions    Medical Tape Blisters, Hives, Itching and Rash    Bupropion Rash     Around 2010       Current Outpatient Medications    Medication Sig Dispense Refill    atorvastatin (LIPITOR) 10 mg tablet Take 1 tablet (10 mg total) by mouth daily 90 tablet 3    Calcium 500-2.5 MG-MCG CHEW Chew 2 chews daily      Cholecalciferol (Vitamin D) 50 MCG (2000 UT) tablet Take 2,000 Units by mouth daily      letrozole (FEMARA) 2.5 mg tablet Take 1 tablet (2.5 mg total) by mouth daily 90 tablet 3    Multiple Vitamins-Minerals (MULTIVITAMIN WITH MINERALS) tablet Take 1 tablet by mouth daily      naloxone (NARCAN) 4 mg/0.1 mL nasal spray Administer 1 spray into a nostril. If no response after 2-3 minutes, give another dose in the other nostril using a new spray. 1 each 1    oxyCODONE (Roxicodone) 5 immediate release tablet Take 1-2 tablets (5-10 mg total) by mouth every 4 (four) hours as needed for moderate pain or severe pain Max Daily Amount: 40 mg 60 tablet 0    PARoxetine (PAXIL) 10 mg tablet Take 1 tablet (10 mg total) by mouth daily At bedtime 30 tablet 2    polyethylene glycol (GLYCOLAX) 17 GM/SCOOP powder Take 17 g by mouth 2 (two) times a day      Sodium Fluoride 5000 PPM 1.1 % PSTE APPLY TIHN RIBBON TO TOOTHBRUSH, BRUSH X 2MIN AT BEDTIME, SPIT, DONT RINSE       No current facility-administered medications for this visit.       (Not in a hospital admission)      Objective:     24 Hour Vitals Assessment:     Vitals:    01/21/25 1327   BP: 110/60   Pulse: 72   Resp: 18   Temp: (!) 97.3 °F (36.3 °C)   SpO2: 99%         Physical exam:  General:  Appears in no distress, sitting up in bed  Neuro:  Speaks in full sentences, no focal deficits noted  Pulmonary:  No cyanosis, no accessory muscle use  Cardiovascular: Regular rate, no abnormal rhythm noted  GI:  Appears nondistended, no masses noted  Extremities:  No new rash noted, no cyanosis  Psychiatry:  Normal mood with congruent affect  Ear nose and throat:  Atraumatic, extraocular muscles intact      DATA REVIEW:    Pathology Result:    Final Diagnosis   Date Value Ref Range Status   10/15/2024   Final  "   A. Spine, \"L1 vertebra,\" Biopsy:  - Fragment of bone   - Negative for carcinoma     10/15/2024   Final    A. Spine, \"Vertebra, T8,\" Biopsy:  - Metastatic carcinoma, most compatible with known breast primary     10/04/2024   Final    A.  Left breast, 3:00, periareolar (ultrasound-guided 14-gauge marquee core biopsy, 6 passes):     - Benign breast tissue with fibroadenomatoid nodule.    B.  Left breast, 11:00, 7 cm from nipple (ultrasound-guided 12-gauge marquee core biopsy, 4 passes):     - Invasive mammary carcinoma with mixed ductal and lobular features.       -- Tumor present in 4 of 4 tissue cores with largest measurable size of 11 mm.       -- mBR Grade:  Grade 1-2 of 3         - Duct formation: Score 2-3 of 3         - Nuclear grade: Score 1-2 of 3         - Mitotic rate: Score 1 of 3     - In situ component:  Focal suggestion of low-grade DCIS     - Lymph-vascular invasion:  Not definitively identified     Comment:  Block B2 forwarded for ER/NV/HER2 analysis with the results to be given in a supplemental report.     01/27/2023   Final    A. Polyp, rectum, polypectomy:  -   Colorectal mucosa with lymphoid aggregates, otherwise no significant histopathologic change.     Interpretation performed at [facility] [address] [place] [id number]           Image Results:   Image result are reviewed and documented in Hematology/Oncology history    Echo complete w/ contrast if indicated    Left Ventricle: Left ventricular cavity size is normal. Wall thickness   is normal. The left ventricular ejection fraction is 64% by biplane   measurement. Systolic function is normal. Global longitudinal strain is   normal at -23%. Wall motion is normal. Diastolic function is normal for   age.    Right Ventricle: Right ventricular cavity size is normal. Systolic   function is normal.    Aortic Valve: The leaflets are moderately thickened. The leaflets are   mildly calcified. There is mildly reduced mobility. There " "is mild   regurgitation. There is mild stenosis. The aortic valve mean gradient is 9   mmHg.    Prior echo 10/31/2022.  On direct comparison there is now mild aortic   stenosis.    Strain was performed to quantify interventricular dyssynchrony and   evaluate components of myocardial function due to chemotherapy. Results   from the utilization of Strain Analysis are listed in the report below.      LABS:  Lab data are reviewed and documented in HemOnc history.       Lab Results   Component Value Date    HGB 13.8 01/17/2025    HCT 43.3 01/17/2025     (H) 01/17/2025     01/17/2025    WBC 6.52 01/17/2025    NRBC 0 01/17/2025    BANDSPCT 1 12/04/2024    ATYLMPCT 3 (H) 12/04/2024     Lab Results   Component Value Date    K 4.1 01/17/2025     01/17/2025    CO2 31 01/17/2025    BUN 16 01/17/2025    CREATININE 0.53 (L) 01/17/2025    GLUF 84 09/26/2024    CALCIUM 9.3 01/17/2025    CORRECTEDCA 10.0 10/17/2022    AST 44 (H) 01/17/2025    ALT 48 01/17/2025    ALKPHOS 50 01/17/2025    EGFR 90 01/17/2025       No results found for: \"IRON\", \"TIBC\", \"FERRITIN\"    No results found for: \"GMFNJGAT54\"    No results for input(s): \"WBC\", \"CREAT\", \"PLT\" in the last 72 hours.    By:  Fam Amanda MD, 1/21/2025, 1:32 PM                                    "

## 2025-01-13 ENCOUNTER — HOSPITAL ENCOUNTER (OUTPATIENT)
Dept: CT IMAGING | Facility: HOSPITAL | Age: 80
Discharge: HOME | End: 2025-01-13
Attending: INTERNAL MEDICINE
Payer: MEDICARE

## 2025-01-13 ENCOUNTER — OFFICE VISIT (OUTPATIENT)
Dept: PHYSICAL THERAPY | Facility: REHABILITATION | Age: 80
End: 2025-01-13
Payer: MEDICARE

## 2025-01-13 DIAGNOSIS — C79.51 BREAST CANCER METASTASIZED TO BONE, RIGHT (HCC): Chronic | ICD-10-CM

## 2025-01-13 DIAGNOSIS — Z17.0 MALIGNANT NEOPLASM OF UPPER-INNER QUADRANT OF LEFT BREAST IN FEMALE, ESTROGEN RECEPTOR POSITIVE (HCC): ICD-10-CM

## 2025-01-13 DIAGNOSIS — C50.212 MALIGNANT NEOPLASM OF UPPER-INNER QUADRANT OF LEFT BREAST IN FEMALE, ESTROGEN RECEPTOR POSITIVE (HCC): ICD-10-CM

## 2025-01-13 DIAGNOSIS — C50.911 BREAST CANCER METASTASIZED TO BONE, RIGHT (HCC): Chronic | ICD-10-CM

## 2025-01-13 DIAGNOSIS — M62.81 MUSCLE WEAKNESS: Primary | ICD-10-CM

## 2025-01-13 PROCEDURE — 97110 THERAPEUTIC EXERCISES: CPT | Performed by: PHYSICAL THERAPIST

## 2025-01-13 PROCEDURE — 71260 CT THORAX DX C+: CPT

## 2025-01-13 PROCEDURE — 74177 CT ABD & PELVIS W/CONTRAST: CPT

## 2025-01-13 PROCEDURE — 97530 THERAPEUTIC ACTIVITIES: CPT | Performed by: PHYSICAL THERAPIST

## 2025-01-13 RX ADMIN — IOHEXOL 60 ML: 350 INJECTION, SOLUTION INTRAVENOUS at 09:43

## 2025-01-13 NOTE — PROGRESS NOTES
Daily Note     Today's date: 2025  Patient name: Yuni Keys  : 1945  MRN: [id number]  Referring provider: Lisa Contreras, *  Dx:   Encounter Diagnosis     ICD-10-CM    1. Muscle weakness  M62.81           Start Time: 1350          Subjective: Can I do these exercises at home?      Objective: See treatment diary below      Assessment: Tolerated treatment well. Patient demonstrated fatigue post treatment    Showing less overall fatigue with exercise  Plan: Continue per plan of care.      Precautions: cancer      Manuals 25                                                            Neuro Re-Ed                                                                                                        Ther Ex             Shoulder flexion X 15 X 20 1# x 15 1# x 20 2# x 15        Bicep curls 2# x 20 3# x 20 4# x 20 5# x 15 5# x 20        Shoulder abduction X 15 X 20 1# x 15 1# x 15 1# x 20        marching X 20 1# x 20 2# x 20 3# x 20 4# x 20        Hip abduction standing B X 15 B X 20 B 1# x 15 B 1# x 20 B 2# x 20        Leg press  65# x 20 75# x 20 85# x 20 95# x 20                                  Ther Activity             Sit to stand No hands x 10 No hands x 10  No hands x 10 No hands x 10 No hands x 10        Ube standing 1 min ea 2 min ea alternating 2.5 min ea alternating 3 min ea alternating 3 min ea alternating        treadmill 1.8 mph x 3 min 2.0 mph x 4 min 2.0 mph x 5 min 2.0 mph x 6 min 2.0 mph x 6 min        Recumbent #6 2 min With pillow 3 min With pillow x 3 min With pillow x 4 min  With pillow x 4 min         Gait Training                                       Modalities

## 2025-01-15 ENCOUNTER — OFFICE VISIT (OUTPATIENT)
Dept: PHYSICAL THERAPY | Facility: REHABILITATION | Age: 80
End: 2025-01-15
Payer: MEDICARE

## 2025-01-15 ENCOUNTER — SOCIAL WORK (OUTPATIENT)
Dept: PALLIATIVE MEDICINE | Facility: CLINIC | Age: 80
End: 2025-01-15

## 2025-01-15 DIAGNOSIS — M62.81 MUSCLE WEAKNESS: Primary | ICD-10-CM

## 2025-01-15 DIAGNOSIS — Z71.89 COUNSELING AND COORDINATION OF CARE: Primary | ICD-10-CM

## 2025-01-15 PROCEDURE — NC001 PR NO CHARGE

## 2025-01-15 PROCEDURE — 97110 THERAPEUTIC EXERCISES: CPT | Performed by: PHYSICAL THERAPIST

## 2025-01-15 PROCEDURE — 97530 THERAPEUTIC ACTIVITIES: CPT | Performed by: PHYSICAL THERAPIST

## 2025-01-15 NOTE — PROGRESS NOTES
Daily Note     Today's date: 1/15/2025  Patient name: Yuni Keys  : 1945  MRN: [id number]  Referring provider: Lisa Contreras, *  Dx:   Encounter Diagnosis     ICD-10-CM    1. Muscle weakness  M62.81           Start Time: 853          Subjective: increased B hip pain that started Monday night.      Objective: See treatment diary below      Assessment: Tolerated treatment well. Patient demonstrated fatigue post treatment      Plan: Continue per plan of care.      Precautions: cancer      Manuals 12/18 12/30 1/6/25 1/8 1/13 1/15                                                           Neuro Re-Ed                                                                                                        Ther Ex             Shoulder flexion X 15 X 20 1# x 15 1# x 20 2# x 15 2# x 15       Bicep curls 2# x 20 3# x 20 4# x 20 5# x 15 5# x 20 6# x 15       Shoulder abduction X 15 X 20 1# x 15 1# x 15 1# x 20 2# x 15       marching X 20 1# x 20 2# x 20 3# x 20 4# x 20 4# x 20       Hip abduction standing B X 15 B X 20 B 1# x 15 B 1# x 20 B 2# x 20 2# x 20       Leg press  65# x 20 75# x 20 85# x 20 95# x 20 95# x 20                                 Ther Activity             Sit to stand No hands x 10 No hands x 10  No hands x 10 No hands x 10 No hands x 10 No hands x 10       Ube standing 1 min ea 2 min ea alternating 2.5 min ea alternating 3 min ea alternating 3 min ea alternating 3 min ea alternating       treadmill 1.8 mph x 3 min 2.0 mph x 4 min 2.0 mph x 5 min 2.0 mph x 6 min 2.0 mph x 6 min 2.0 mph x 7 min       Recumbent #6 2 min With pillow 3 min With pillow x 3 min With pillow x 4 min  With pillow x 4 min  #8 with pillow x 4 min       Gait Training                                       Modalities

## 2025-01-15 NOTE — PROGRESS NOTES
Updated biopsychosocial information relevant to support:    Alley is doing ok.  She had a friend visit her from [place] for a few days and she enjoyed their time together.  Alley was able to see her family living nearby including her grand children.  Alley was cautioned about travel on planes.  There was a concern about back injury when the plane was departing or landing.  She remains concerned about travel at this time.     Alley had a scan done Monday and is anxiously awaiting the results.  She has an appointment with the provider on Tuesday.  She states she just wants to know if everything is stable, progressed or regressed.    Alley reports she has been participating in PT.  She states she has more energy but is not enthusiastic about engaging in activities.  She was provided with the cancer support Calendar and is agreeable to exploring some of the activities.    Alley is eating well.  The pain medication is assisting her with sleeping through the night.  She has no issues with constipation.  She has been experienced pain in her neck and shoulder.  She is agreeable to speaking with her Provider regarding the pain and would like to wait for her appointment.    Alley is not pleased with her personal appearance and is annoyed with the wet sweats in the night.  She reports her hair in falling out and not manageable.  She was informed there is a hair/wig salon at the cancer support community.  She also spoke about losing her eye lashes. Alley states she will apply makeup when going out in public.      Identified areas of need include:  Test results and next steps  Engaging in activities  Developing a more active routine  Follow up with the Cancer support community for activities and possibly a wig    Resources provided:  Emotional support  Encouragement     Areas that need future follow-up include: Self-esteem/ongoing pain management discussions    I have spent 35 minutes with Patient  today in which greater than  50% of this time was spent in counseling/coordination of care     Palliative  will follow-up as requested by patient, family, and primary team.  Please contact with any specific requests

## 2025-01-16 ENCOUNTER — PATIENT OUTREACH (OUTPATIENT)
Dept: CASE MANAGEMENT | Facility: OTHER | Age: 80
End: 2025-01-16

## 2025-01-16 NOTE — PROGRESS NOTES
Supportive call placed to Mrs. Keys today after she returned call to [facility] from last week and left a VM. Mrs. Keys stated her CT scan was done on Monday, and results are in today. She is concerned about the findings, and believes she has a new area of concern. She will see her medical oncologist next Tuesday. She shared feeling fearful and scared about the prospect that her cancer is spreading.   She clarified that she stopped Kisqali, but re-started Femara. She continues to have drenching night sweats which are very uncomfortable and decreasing her QOL. She will continue discussions about this with medical oncology next week.  She met with palliative counselor, and found this helpful. Informed [facility] she was given [facility] information including the free wig salon, as pt is losing hair despite being off of Kisqali. She made an appointment for next week. [facility] strongly encouraged her to go for their orientation and explained they are a valuable resource. OSW also gently reminded her of the [facility] monthly breast cancer support group as well. She requested information be emailed, and stated she is open to talking with other women about their breast cancer journey. She asked [facility] to continue outreach if able. Will schedule another call in a few weeks. Appreciate palliative SW colleague support to pt as well. Support group flyer emailed to her at end of call.

## 2025-01-17 ENCOUNTER — APPOINTMENT (OUTPATIENT)
Dept: LAB | Facility: CLINIC | Age: 80
End: 2025-01-17
Payer: MEDICARE

## 2025-01-17 ENCOUNTER — RESULTS FOLLOW-UP (OUTPATIENT)
Dept: CARDIOLOGY CLINIC | Facility: CLINIC | Age: 80
End: 2025-01-17

## 2025-01-17 ENCOUNTER — HOSPITAL ENCOUNTER (OUTPATIENT)
Dept: NON INVASIVE DIAGNOSTICS | Facility: HOSPITAL | Age: 80
Discharge: HOME | End: 2025-01-17
Attending: INTERNAL MEDICINE
Payer: MEDICARE

## 2025-01-17 VITALS
HEIGHT: 58 IN | SYSTOLIC BLOOD PRESSURE: 122 MMHG | DIASTOLIC BLOOD PRESSURE: 60 MMHG | HEART RATE: 68 BPM | WEIGHT: 117 LBS | BODY MASS INDEX: 24.56 KG/M2

## 2025-01-17 DIAGNOSIS — C79.51 BREAST CANCER METASTASIZED TO BONE, RIGHT (HCC): ICD-10-CM

## 2025-01-17 DIAGNOSIS — C50.212 MALIGNANT NEOPLASM OF UPPER-INNER QUADRANT OF LEFT BREAST IN FEMALE, ESTROGEN RECEPTOR POSITIVE (HCC): ICD-10-CM

## 2025-01-17 DIAGNOSIS — Z17.0 MALIGNANT NEOPLASM OF UPPER-INNER QUADRANT OF LEFT BREAST IN FEMALE, ESTROGEN RECEPTOR POSITIVE (HCC): ICD-10-CM

## 2025-01-17 DIAGNOSIS — C50.911 BREAST CANCER METASTASIZED TO BONE, RIGHT (HCC): Chronic | ICD-10-CM

## 2025-01-17 DIAGNOSIS — R94.31 ABNORMAL EKG: ICD-10-CM

## 2025-01-17 DIAGNOSIS — C79.51 BREAST CANCER METASTASIZED TO BONE, RIGHT (HCC): Chronic | ICD-10-CM

## 2025-01-17 DIAGNOSIS — C50.911 BREAST CANCER METASTASIZED TO BONE, RIGHT (HCC): ICD-10-CM

## 2025-01-17 DIAGNOSIS — Z79.811 LONG TERM (CURRENT) USE OF AROMATASE INHIBITORS: ICD-10-CM

## 2025-01-17 LAB
ALBUMIN SERPL BCG-MCNC: 4.1 G/DL (ref 3.5–5)
ALP SERPL-CCNC: 50 U/L (ref 34–104)
ALT SERPL W P-5'-P-CCNC: 48 U/L (ref 7–52)
ANION GAP SERPL CALCULATED.3IONS-SCNC: 8 MMOL/L (ref 4–13)
AORTIC ROOT: 2.9 CM
AORTIC VALVE MEAN VELOCITY: 13.2 M/S
AST SERPL W P-5'-P-CCNC: 44 U/L (ref 13–39)
AV AREA BY CONTINUOUS VTI: 2 CM2
AV AREA PEAK VELOCITY: 1.7 CM2
AV LVOT MEAN GRADIENT: 3 MMHG
AV LVOT PEAK GRADIENT: 5 MMHG
AV MEAN PRESS GRAD SYS DOP V1V2: 8 MMHG
AV ORIFICE AREA US: 2.02 CM2
AV PEAK GRADIENT: 17 MMHG
AV VELOCITY RATIO: 0.64
AV VMAX SYS DOP: 2.07 M/S
BASOPHILS # BLD AUTO: 0.04 THOUSANDS/ΜL (ref 0–0.1)
BASOPHILS NFR BLD AUTO: 1 % (ref 0–1)
BILIRUB SERPL-MCNC: 0.39 MG/DL (ref 0.2–1)
BSA FOR ECHO PROCEDURE: 1.45 M2
BUN SERPL-MCNC: 16 MG/DL (ref 5–25)
CALCIUM SERPL-MCNC: 9.3 MG/DL (ref 8.4–10.2)
CHLORIDE SERPL-SCNC: 100 MMOL/L (ref 96–108)
CO2 SERPL-SCNC: 31 MMOL/L (ref 21–32)
CREAT SERPL-MCNC: 0.53 MG/DL (ref 0.6–1.3)
DOP CALC AO VTI: 37.24 CM
DOP CALC LVOT AREA: 3.14 CM2
DOP CALC LVOT CARDIAC INDEX: 3.53 L/MIN/M2
DOP CALC LVOT CARDIAC OUTPUT: 5.11 L/MIN
DOP CALC LVOT DIAMETER: 2 CM
DOP CALC LVOT PEAK VEL VTI: 23.91 CM
DOP CALC LVOT PEAK VEL: 1.09 M/S
DOP CALC LVOT STROKE INDEX: 49.7 ML/M2
DOP CALC LVOT STROKE VOLUME: 72
DOP CALC MV VTI: 22.82 CM
E WAVE DECELERATION TIME: 177 MS
E/A RATIO: 0.85
EOSINOPHIL # BLD AUTO: 0.17 THOUSAND/ΜL (ref 0–0.61)
EOSINOPHIL NFR BLD AUTO: 3 % (ref 0–6)
ERYTHROCYTE [DISTWIDTH] IN BLOOD BY AUTOMATED COUNT: 18.1 % (ref 11.6–15.1)
FRACTIONAL SHORTENING: 30 (ref 28–44)
GFR SERPL CREATININE-BSD FRML MDRD: 90 ML/MIN/1.73SQ M
GLOBAL LONGITUIDAL STRAIN: -23 %
GLUCOSE SERPL-MCNC: 52 MG/DL (ref 65–140)
HCT VFR BLD AUTO: 43.3 % (ref 34.8–46.1)
HGB BLD-MCNC: 13.8 G/DL (ref 11.5–15.4)
IMM GRANULOCYTES # BLD AUTO: 0.04 THOUSAND/UL (ref 0–0.2)
IMM GRANULOCYTES NFR BLD AUTO: 1 % (ref 0–2)
INTERVENTRICULAR SEPTUM IN DIASTOLE (PARASTERNAL SHORT AXIS VIEW): 1 CM
INTERVENTRICULAR SEPTUM: 1 CM (ref 0.6–1.1)
LAAS-AP2: 16.9 CM2
LAAS-AP4: 15.8 CM2
LEFT ATRIUM SIZE: 4.1 CM
LEFT ATRIUM VOLUME (MOD BIPLANE): 48 ML
LEFT ATRIUM VOLUME INDEX (MOD BIPLANE): 33.1 ML/M2
LEFT INTERNAL DIMENSION IN SYSTOLE: 2.8 CM (ref 2.1–4)
LEFT VENTRICLE DIASTOLIC VOLUME (MOD BIPLANE): 80 ML
LEFT VENTRICLE DIASTOLIC VOLUME INDEX (MOD BIPLANE): 55.2 ML/M2
LEFT VENTRICLE SYSTOLIC VOLUME (MOD BIPLANE): 29 ML
LEFT VENTRICLE SYSTOLIC VOLUME INDEX (MOD BIPLANE): 20 ML/M2
LEFT VENTRICULAR INTERNAL DIMENSION IN DIASTOLE: 4 CM (ref 3.5–6)
LEFT VENTRICULAR POSTERIOR WALL IN END DIASTOLE: 0.9 CM
LEFT VENTRICULAR STROKE VOLUME: 41 ML
LV EF BIPLANE MOD: 63 %
LV EF US.2D.A4C+ESTIMATED: 62 %
LVSV (TEICH): 41 ML
LYMPHOCYTES # BLD AUTO: 0.69 THOUSANDS/ΜL (ref 0.6–4.47)
LYMPHOCYTES NFR BLD AUTO: 11 % (ref 14–44)
MCH RBC QN AUTO: 31.9 PG (ref 26.8–34.3)
MCHC RBC AUTO-ENTMCNC: 31.9 G/DL (ref 31.4–37.4)
MCV RBC AUTO: 100 FL (ref 82–98)
MONOCYTES # BLD AUTO: 0.5 THOUSAND/ΜL (ref 0.17–1.22)
MONOCYTES NFR BLD AUTO: 8 % (ref 4–12)
MV E'TISSUE VEL-LAT: 10 CM/S
MV E'TISSUE VEL-SEP: 8 CM/S
MV MEAN GRADIENT: 1 MMHG
MV PEAK A VEL: 0.89 M/S
MV PEAK E VEL: 76 CM/S
MV PEAK GRADIENT: 3 MMHG
MV STENOSIS PRESSURE HALF TIME: 51 MS
MV VALVE AREA BY CONTINUITY EQUATION: 3.29 CM2
MV VALVE AREA P 1/2 METHOD: 4.3
NEUTROPHILS # BLD AUTO: 5.08 THOUSANDS/ΜL (ref 1.85–7.62)
NEUTS SEG NFR BLD AUTO: 76 % (ref 43–75)
NRBC BLD AUTO-RTO: 0 /100 WBCS
PLATELET # BLD AUTO: 342 THOUSANDS/UL (ref 149–390)
PMV BLD AUTO: 9.5 FL (ref 8.9–12.7)
POTASSIUM SERPL-SCNC: 4.1 MMOL/L (ref 3.5–5.3)
PROT SERPL-MCNC: 6.7 G/DL (ref 6.4–8.4)
RBC # BLD AUTO: 4.33 MILLION/UL (ref 3.81–5.12)
RIGHT ATRIAL 2D VOLUME: 37 ML
RIGHT ATRIUM AREA SYSTOLE A4C: 13.9 CM2
RIGHT VENTRICLE ID DIMENSION: 4.2 CM
SL CV LEFT ATRIUM LENGTH A2C: 4.6 CM
SL CV LV EF: 64
SL CV PED ECHO LEFT VENTRICLE DIASTOLIC VOLUME (MOD BIPLANE) 2D: 70 ML
SL CV PED ECHO LEFT VENTRICLE SYSTOLIC VOLUME (MOD BIPLANE) 2D: 29 ML
SODIUM SERPL-SCNC: 139 MMOL/L (ref 135–147)
TR MAX PG: 21 MMHG
TR PEAK VELOCITY: 2.3 M/S
TRICUSPID ANNULAR PLANE SYSTOLIC EXCURSION: 2.8 CM
TRICUSPID VALVE PEAK REGURGITATION VELOCITY: 2.3 M/S
WBC # BLD AUTO: 6.52 THOUSAND/UL (ref 4.31–10.16)

## 2025-01-17 PROCEDURE — 93356 MYOCRD STRAIN IMG SPCKL TRCK: CPT | Performed by: INTERNAL MEDICINE

## 2025-01-17 PROCEDURE — 93356 MYOCRD STRAIN IMG SPCKL TRCK: CPT

## 2025-01-17 PROCEDURE — 36415 COLL VENOUS BLD VENIPUNCTURE: CPT

## 2025-01-17 PROCEDURE — 93306 TTE W/DOPPLER COMPLETE: CPT

## 2025-01-17 PROCEDURE — 85025 COMPLETE CBC W/AUTO DIFF WBC: CPT

## 2025-01-17 PROCEDURE — 80053 COMPREHEN METABOLIC PANEL: CPT

## 2025-01-17 PROCEDURE — 93306 TTE W/DOPPLER COMPLETE: CPT | Performed by: INTERNAL MEDICINE

## 2025-01-17 NOTE — RESULT ENCOUNTER NOTE
Echo - 01/17/25   EF 64%.  GLS -23%.  Aortic Valve: The leaflets are thickened and calcified. There is mildly reduced mobility. There is mild regurgitation. There is mild stenosis (mean gradient 9 mmHg).

## 2025-01-20 ENCOUNTER — OFFICE VISIT (OUTPATIENT)
Dept: PHYSICAL THERAPY | Facility: REHABILITATION | Age: 80
End: 2025-01-20
Payer: MEDICARE

## 2025-01-20 DIAGNOSIS — M62.81 MUSCLE WEAKNESS: Primary | ICD-10-CM

## 2025-01-20 PROCEDURE — 97530 THERAPEUTIC ACTIVITIES: CPT | Performed by: PHYSICAL THERAPIST

## 2025-01-20 PROCEDURE — 97110 THERAPEUTIC EXERCISES: CPT | Performed by: PHYSICAL THERAPIST

## 2025-01-20 NOTE — PROGRESS NOTES
Daily Note     Today's date: 2025  Patient name: Yuni Keys  : 1945  MRN: [id number]  Referring provider: Lisa Contreras, *  Dx:   Encounter Diagnosis     ICD-10-CM    1. Muscle weakness  M62.81           Start Time: 1245          Subjective: increased right shoulder pain with a pathological fx in the right scapula but she never felt it      Objective: See treatment diary below      Assessment: Tolerated treatment well. Patient demonstrated fatigue post treatment      Plan: Continue per plan of care.      Precautions: cancer      Manuals 12/18 12/30 1/6/25 1/8 1/13 1/15 1/20                                                          Neuro Re-Ed                                                                                                        Ther Ex             Shoulder flexion X 15 X 20 1# x 15 1# x 20 2# x 15 2# x 15 2# x 20      Bicep curls 2# x 20 3# x 20 4# x 20 5# x 15 5# x 20 6# x 15 6# x 20      Shoulder abduction X 15 X 20 1# x 15 1# x 15 1# x 20 2# x 15 2 #  x 15      marching X 20 1# x 20 2# x 20 3# x 20 4# x 20 4# x 20 5# x 15      Hip abduction standing B X 15 B X 20 B 1# x 15 B 1# x 20 B 2# x 20 2# x 20 3# x 20      Leg press  65# x 20 75# x 20 85# x 20 95# x 20 95# x 20 95 # x 25                                Ther Activity             Sit to stand No hands x 10 No hands x 10  No hands x 10 No hands x 10 No hands x 10 No hands x 10 No hands x 15      Ube standing 1 min ea 2 min ea alternating 2.5 min ea alternating 3 min ea alternating 3 min ea alternating 3 min ea alternating 3 min ea alternating      treadmill 1.8 mph x 3 min 2.0 mph x 4 min 2.0 mph x 5 min 2.0 mph x 6 min 2.0 mph x 6 min 2.0 mph x 7 min 2.0 mph x 4 min, 2.3 mph x 4 min      Recumbent #6 2 min With pillow 3 min With pillow x 3 min With pillow x 4 min  With pillow x 4 min  #8 with pillow x 4 min #8 with pillow x 5 min      Gait Training                                       Modalities                        
87M PMH HTN, HLD, CHF, Afib (s/p cardioversion), T2D, CKD3 (BL Cr 1.5-1.7), gout, GERD, GI AVMs (hx bleeds), presenting with SOB, SAENZ, fevers, chills x1 day, admitted for acute respiratory failure 2/2 suspected aspiration PNA CXR: infiltrate in L mid lung.

## 2025-01-21 ENCOUNTER — OFFICE VISIT (OUTPATIENT)
Dept: PALLIATIVE MEDICINE | Facility: CLINIC | Age: 80
End: 2025-01-21
Payer: MEDICARE

## 2025-01-21 ENCOUNTER — OFFICE VISIT (OUTPATIENT)
Dept: HEMATOLOGY ONCOLOGY | Facility: CLINIC | Age: 80
End: 2025-01-21
Payer: MEDICARE

## 2025-01-21 ENCOUNTER — PATIENT OUTREACH (OUTPATIENT)
Dept: CASE MANAGEMENT | Facility: OTHER | Age: 80
End: 2025-01-21

## 2025-01-21 VITALS
TEMPERATURE: 96.2 F | WEIGHT: 119.93 LBS | BODY MASS INDEX: 25.07 KG/M2 | OXYGEN SATURATION: 96 % | HEART RATE: 74 BPM | SYSTOLIC BLOOD PRESSURE: 110 MMHG | DIASTOLIC BLOOD PRESSURE: 60 MMHG

## 2025-01-21 VITALS
TEMPERATURE: 97.3 F | DIASTOLIC BLOOD PRESSURE: 60 MMHG | WEIGHT: 119 LBS | BODY MASS INDEX: 24.98 KG/M2 | SYSTOLIC BLOOD PRESSURE: 110 MMHG | OXYGEN SATURATION: 99 % | HEIGHT: 58 IN | RESPIRATION RATE: 18 BRPM | HEART RATE: 72 BPM

## 2025-01-21 DIAGNOSIS — R23.2 HOT FLASHES: ICD-10-CM

## 2025-01-21 DIAGNOSIS — Z51.5 PALLIATIVE CARE BY SPECIALIST: ICD-10-CM

## 2025-01-21 DIAGNOSIS — C50.911 BREAST CANCER METASTASIZED TO BONE, RIGHT (HCC): Primary | Chronic | ICD-10-CM

## 2025-01-21 DIAGNOSIS — C50.212 MALIGNANT NEOPLASM OF UPPER-INNER QUADRANT OF LEFT BREAST IN FEMALE, ESTROGEN RECEPTOR POSITIVE (HCC): ICD-10-CM

## 2025-01-21 DIAGNOSIS — Z17.0 MALIGNANT NEOPLASM OF UPPER-INNER QUADRANT OF LEFT BREAST IN FEMALE, ESTROGEN RECEPTOR POSITIVE (HCC): ICD-10-CM

## 2025-01-21 DIAGNOSIS — F33.9 DEPRESSION, RECURRENT (HCC): ICD-10-CM

## 2025-01-21 DIAGNOSIS — C79.51 BREAST CANCER METASTASIZED TO BONE, RIGHT (HCC): Primary | Chronic | ICD-10-CM

## 2025-01-21 DIAGNOSIS — Z71.89 GOALS OF CARE, COUNSELING/DISCUSSION: ICD-10-CM

## 2025-01-21 DIAGNOSIS — G89.3 CANCER RELATED PAIN: ICD-10-CM

## 2025-01-21 PROCEDURE — 99215 OFFICE O/P EST HI 40 MIN: CPT | Performed by: INTERNAL MEDICINE

## 2025-01-21 PROCEDURE — 99497 ADVNCD CARE PLAN 30 MIN: CPT | Performed by: INTERNAL MEDICINE

## 2025-01-21 PROCEDURE — G2211 COMPLEX E/M VISIT ADD ON: HCPCS | Performed by: INTERNAL MEDICINE

## 2025-01-21 NOTE — ASSESSMENT & PLAN NOTE
Using 1-2 tabs of oxycodone per day to manage pain  Current regimen:  Oxycodone 5-10mg q4h PRN for moderate-severe pain  Alternate oxycodone with Tylenol as needed  Bowel regimen to prevent OIC  Opioid agreement signed 11/13/2024

## 2025-01-21 NOTE — ASSESSMENT & PLAN NOTE
Time spent with patient providing supportive listening.   All questions and concerns were addressed to their satisfaction.    RTC: 1 month

## 2025-01-21 NOTE — ASSESSMENT & PLAN NOTE
Goals of care discussion had today.  Patient considering quality of life at this time.  She feels that her overall quality has diminished greatly and she has not been finding alirio in anything she used to enjoy.   She is also concerned as her CT scan shows new metastatic lesion.  She meets with oncology later today to discuss overall prognosis further.  Patient did express concerns about the fear of the future, specifically her ability to care for herself or have someone care for her.  Discussed different resources that are available  Encouraged her to bring her daughter to a visit to address her questions and concerns.  Encouraged ongoing follow-up with LCSW  An additional 16+ minutes were spent today in goals of care and advance care planning discussions.

## 2025-01-21 NOTE — PROGRESS NOTES
Name: Yuni Keys      : 1945      MRN: [id number]  Encounter Provider: Lisa Contreras DO  Encounter Date: 2025   Encounter department: [facility] [place]  :  Assessment & Plan  Breast cancer metastasized to bone, right (HCC)         Cancer related pain  Using 1-2 tabs of oxycodone per day to manage pain  Current regimen:  Oxycodone 5-10mg q4h PRN for moderate-severe pain  Alternate oxycodone with Tylenol as needed  Bowel regimen to prevent OIC  Opioid agreement signed 2024         Depression, recurrent (HCC)  Continue to follow with LCSW  Continue paroxetine          Palliative care by specialist  Time spent with patient providing supportive listening.   All questions and concerns were addressed to their satisfaction.    RTC: 1 month             Goals of care, counseling/discussion  Goals of care discussion had today.  Patient considering quality of life at this time.  She feels that her overall quality has diminished greatly and she has not been finding alirio in anything she used to enjoy.   She is also concerned as her CT scan shows new metastatic lesion.  She meets with oncology later today to discuss overall prognosis further.  Patient did express concerns about the fear of the future, specifically her ability to care for herself or have someone care for her.  Discussed different resources that are available  Encouraged her to bring her daughter to a visit to address her questions and concerns.  Encouraged ongoing follow-up with LCSW  An additional 16+ minutes were spent today in goals of care and advance care planning discussions.           Hot flashes  Improved, continue Paxil             Decisional apparatus: Patient is competent on my exam today. If competence is lost, patient's substitute decision maker would default to adult children by [place] Act 169.   Advance Directive / Living Will / POLST: yes     PDMP Review: I have reviewed the patient's controlled  "substance dispensing history in the Prescription Drug Monitoring Program in compliance with the [facility] regulations before prescribing any controlled substances.    History of Present Illness   Yuni Keys is a 79 y.o. female who presents in follow-up for symptoms related to stage IV breast cancer.  Patient with recent imaging showing possible new lytic lesion in her scapula which is consistent with her pain she is describing today.  She plans to meet with Dr. Amanda with oncology today to discuss overall treatment plan and prognosis.    Overall her symptoms are better controlled.  She uses minimal oxycodone.  She feels the Paxil has helped with her night sweats as these have not been as debilitating.  Majority of her time was spent discussing her overall quality of life and what this means to her.  She continues to struggle with finding alirio in her day-to-day life.  She is no longer engaged in activities she wishes to be engaged in and we did explore this further.  She does plan to go get a manicure today and I did encourage her to continue things like this and other self-cares.  She also notes that she is worried about one of her daughters as every time she tries to bring up her end-of-life care, her daughter makes her stop talking and does not want engage.  Patient did complete a workbook called \"sorry for your loss, it is me\" so that everything was running down for her children.  I did encourage her to continue engaged with [facility] team and also suggested that her daughter come to one of her visits with her.  We also discussed her fear of the future and what it would look like as she continues to decline.  We discussed different services that we will be available for her.  And time spent addressing all of her questions and concerns to her satisfaction.  Medical History Reviewed by provider this encounter:  Meds     .     Objective   /60 (BP Location: Right arm, Patient Position: Sitting, Cuff Size: Standard)  "  Pulse 74   Temp (!) 96.2 °F (35.7 °C) (Temporal)   Wt 54.4 kg (119 lb 14.9 oz)   SpO2 96%   BMI 25.07 kg/m²     Physical Exam  Vitals and nursing note reviewed.   Constitutional:       General: She is not in acute distress.  HENT:      Head: Normocephalic and atraumatic.      Right Ear: External ear normal.      Left Ear: External ear normal.   Eyes:      General:         Right eye: No discharge.         Left eye: No discharge.   Cardiovascular:      Rate and Rhythm: Normal rate and regular rhythm.      Heart sounds: Murmur heard.   Pulmonary:      Effort: Pulmonary effort is normal. No respiratory distress.   Abdominal:      General: There is no distension.      Palpations: Abdomen is soft.   Musculoskeletal:      Right lower leg: No edema.      Left lower leg: No edema.   Skin:     Coloration: Skin is pale.   Neurological:      General: No focal deficit present.      Mental Status: She is oriented to person, place, and time.   Psychiatric:         Mood and Affect: Mood normal.         Recent labs:  Lab Results   Component Value Date/Time    SODIUM 139 01/17/2025 09:47 AM    K 4.1 01/17/2025 09:47 AM    BUN 16 01/17/2025 09:47 AM    CREATININE 0.53 (L) 01/17/2025 09:47 AM    GLUC 52 (L) 01/17/2025 09:47 AM    CALCIUM 9.3 01/17/2025 09:47 AM    AST 44 (H) 01/17/2025 09:47 AM    ALT 48 01/17/2025 09:47 AM    ALB 4.1 01/17/2025 09:47 AM    TP 6.7 01/17/2025 09:47 AM    EGFR 90 01/17/2025 09:47 AM     Lab Results   Component Value Date/Time    HGB 13.8 01/17/2025 09:47 AM    WBC 6.52 01/17/2025 09:47 AM     01/17/2025 09:47 AM    INR 1.01 10/01/2024 08:13 AM    PTT 34 10/01/2024 08:13 AM     Lab Results   Component Value Date/Time    YBZ5ZFRKDGTT 2.391 12/20/2024 10:34 AM       Recent Imaging:  Procedure: Echo complete w/ contrast if indicated  Result Date: 1/17/2025  Narrative:   Left Ventricle: Left ventricular cavity size is normal. Wall thickness is normal. The left ventricular ejection fraction is 64%  by biplane measurement. Systolic function is normal. Global longitudinal strain is normal at -23%. Wall motion is normal. Diastolic function is normal for age.   Right Ventricle: Right ventricular cavity size is normal. Systolic function is normal.   Aortic Valve: The leaflets are moderately thickened. The leaflets are mildly calcified. There is mildly reduced mobility. There is mild regurgitation. There is mild stenosis. The aortic valve mean gradient is 9 mmHg.   Prior echo 10/31/2022.  On direct comparison there is now mild aortic stenosis. Strain was performed to quantify interventricular dyssynchrony and evaluate components of myocardial function due to chemotherapy. Results from the utilization of Strain Analysis are listed in the report below.     Procedure: CT chest abdomen pelvis w contrast  Result Date: 1/16/2025  Narrative: CT CHEST, ABDOMEN AND PELVIS WITH IV CONTRAST INDICATION: C50.911: Malignant neoplasm of unspecified site of right female breast C79.51: Secondary malignant neoplasm of bone C50.212: Malignant neoplasm of upper-inner quadrant of left female breast Z17.0: Estrogen receptor positive status (ER+). History of metastatic left breast cancer to the T8 vertebral body. Radiation therapy to the thoracic spine and right shoulder completed on 11/20/2024. Restaging breast cancer on therapy. COMPARISON: CT abdomen pelvis 11/9/2024. PET/CT 10/2/2024. TECHNIQUE: CT examination of the chest, abdomen and pelvis was performed. Dual energy CT scan technique (DECT) was employed. Multiplanar 2D reformatted images were created from the source data. This examination, like all CT scans performed in the [facility], was performed utilizing techniques to minimize radiation dose exposure, including the use of iterative reconstruction and automated exposure control. Radiation dose length product (DLP) for this visit: 555.64 mGy-cm IV Contrast: 60 mL of iohexol (OMNIPAQUE) Enteric Contrast: Not  administered. FINDINGS: CHEST LUNGS: Bibasilar atelectasis. No focal consolidation. No tracheal or endobronchial lesion. No suspicious pulmonary nodule. PLEURA: Unremarkable. HEART/GREAT VESSELS: Cardiomegaly. Severe coronary artery calcifications. No thoracic aortic aneurysm. MEDIASTINUM AND DUANE: Unremarkable. CHEST WALL AND LOWER NECK: A 0.9 cm left breast mass with a biopsy clip, stable in size compared to PET/CT 10/2/2024. Additional biopsy clip in the left breast is noted. ABDOMEN LIVER/BILIARY TREE: No suspicious mass. Normal hepatic contours. No intrahepatic biliary dilation. Common bile duct measures 7 mm which is within the upper limits of normal for the patient's age, previously measured 8 mm on MRI 12/13/2024 most consistent  with a benign etiology. GALLBLADDER: No calcified gallstones. No pericholecystic inflammatory change. SPLEEN: Unremarkable. PANCREAS: Unremarkable. ADRENAL GLANDS: Unremarkable. KIDNEYS/URETERS: No hydronephrosis or urinary tract calculi. Subcentimeter hypoattenuating renal lesion(s), too small to characterize but statistically likely benign, which do not warrant follow-up (Radiology June 2019). Bilateral parapelvic cysts. STOMACH AND BOWEL: Colonic diverticulosis without findings of acute diverticulitis. Small rectal stool ball. Fluid-filled loops of small bowel and the cecum, likely diarrheal in etiology. APPENDIX: No findings to suggest appendicitis. ABDOMINOPELVIC CAVITY: Trace pelvic ascites (301/195), new since prior study. No pneumoperitoneum. No lymphadenopathy. VESSELS: Atherosclerosis without abdominal aortic aneurysm. PELVIS REPRODUCTIVE ORGANS: Post hysterectomy. URINARY BLADDER: Unremarkable. ABDOMINAL WALL/INGUINAL REGIONS: Small fat-containing umbilical hernia. BONES: No acute fracture. Degenerative changes of the bilateral shoulders and the visualized spine. Grade 1 anterolisthesis L4-L5. Scattered bone metastases, as follows: *  Chronic pathological fractures of the  T8 and L1 vertebral bodies status post kyphoplasty. *  Stable 1.9 cm lytic lesion of the T11 vertebral body (604/117) since PET/CT 10/2/2024. *  A 2.6 cm right acromion lytic lesion (301/13), previously 3.0 cm on 10/2/2024, likely related to postradiation changes. *  A 2.3 cm right scapular lytic lesion with a healed pathological fracture (301/24), new since PET/CT 10/2/2024, likely related to postradiation treatment changes     Impression: Stable left breast mass with biopsy clip. Scattered bone metastases including a new right scapular lytic lesion with a healed pathological fracture, likely related to postradiation treatment changes to the right shoulder. No metastatic disease in the solid organs of the abdomen and pelvis. Resident: Josh Nj I, the attending radiologist, have reviewed the images and agree with the final report above. Workstation performed: [id number]     Procedure: MRI abdomen w wo contrast and mrcp  Result Date: 12/19/2024  Narrative: MRI OF THE ABDOMEN WITH AND WITHOUT CONTRAST WITH MRCP INDICATION: 79 years / Female. K83.8: Other specified diseases of biliary tract. Dilated common bile duct. History of metastatic breast cancer. COMPARISON: CT abdomen/pelvis 11/9/2024 TECHNIQUE: Multiplanar/multisequence MRI of the abdomen with 3D MRCP was performed before and after administration of contrast. IV Contrast: 5 mL of Gadobutrol injection (SINGLE-DOSE) FINDINGS: Images are degraded by motion artifact. LOWER CHEST: Unremarkable. LIVER: Normal in size and configuration. No suspicious mass. Subcentimeter cyst adjacent to the intrahepatic IVC (5/12). Patent hepatic and portal veins. BILE DUCTS: Minimally dilated common bile duct measuring up to 8 mm proximally with smooth distal tapering. No intrahepatic bile duct dilation. No choledocholithiasis, biliary stricture or suspicious mass within the limits of this motion degraded examination. GALLBLADDER: Normal. PANCREAS: Unremarkable. ADRENAL  GLANDS: Unremarkable. SPLEEN: Normal. KIDNEYS/PROXIMAL URETERS: No hydroureteronephrosis. No suspicious renal mass. Bilateral parapelvic cysts. BOWEL: No dilated loops of bowel. PERITONEUM/RETROPERITONEUM: No ascites. LYMPH NODES: No abdominal lymphadenopathy. VESSELS: No aneurysm. ABDOMINAL WALL: Unremarkable BONES: Redemonstrated osseous metastasis involving the posterior right T11 vertebral body. Additional thoracic spine metastases better assessed on recent MRI spine 11/10/2024. Chronic compression deformity of L1 with kyphoplasty change.     Impression: Minimally dilated common bile duct measuring up to 8 mm proximally with smooth distal tapering. No intrahepatic bile duct dilation. No choledocholithiasis, biliary stricture or suspicious mass within the limits of this motion degraded examination. Workstation performed: [id number]     Procedure: X-ray chest 2 views  Result Date: 11/21/2024  Narrative: XR CHEST PA AND LATERAL INDICATION: chest pain. COMPARISON: None FINDINGS: Clear lungs. There is mild elevation of the left hemidiaphragm. No pneumothorax or pleural effusion. Normal cardiomediastinal silhouette. Midthoracic and upper lumbar compression fractures status post kyphoplasty. Normal upper abdomen.     Impression: No acute cardiopulmonary disease. This report is in agreement with the preliminary interpretation. Workstation performed: [id number]     Procedure: MRI thoracic spine w wo contrast  Result Date: 11/10/2024  Narrative: MRI THORACIC SPINE WITH AND WITHOUT CONTRAST INDICATION: Pain. COMPARISON: Thoracic spine MRI dated October 8, 2024 TECHNIQUE:  Multiplanar, multisequence imaging of the thoracic spine was performed before and after gadolinium administration. . IV Contrast:  5 mL of Gadobutrol injection (SINGLE-DOSE) IMAGE QUALITY:  Diagnostic. FINDINGS: ALIGNMENT:  Normal alignment of the thoracic spine.  No acute compression fracture. Mild height loss at T8 which has progressed slightly since  prior from 10/8/2024. No subluxation.  No evidence of scoliosis. MARROW SIGNAL: Osseous metastases at T5, T8, and T11, similar to prior from 10/8/2024. No new marrow replacing lesions. Interval T8 kyphoplasty. Partially imaged L1 kyphoplasty. THORACIC CORD:  Normal signal within the thoracic cord. PREVERTEBRAL AND PARASPINAL SOFT TISSUES:   Normal. THORACIC DEGENERATIVE CHANGE: Mild multilevel degenerative changes. Mild canal stenosis at T12-L1 secondary to L1 osseous retropulsion. No high-grade canal or foraminal stenosis at any thoracic level. POSTCONTRAST:  No abnormal enhancement. OTHER FINDINGS:  None.     Impression: Interval T8 kyphoplasty. Height loss at T8 has progressed slightly since prior from 10/8/2024. Redemonstrated osseous metastases at T5, T8, and T11. Partially imaged L1 compression fracture status post kyphoplasty. No abnormal epidural or leptomeningeal enhancement. Normal cord signal. Workstation performed: [id number]     Procedure: CT recon only lumbar spine (No Charge)  Result Date: 11/9/2024  Narrative: CT LUMBAR SPINE INDICATION:   metastatic ca. 78-year-old female with history of breast cancer, metastatic to several thoracic and lumbar vertebrae. Back pain. COMPARISON: MRI of the lumbar spine from September 21, 2024. MRI of the thoracic spine from October 8, 2024. Lumbar spine radiographs from November 8, 2024. TECHNIQUE: Axial CT examination of the lumbar spine was obtained utilizing reconstructed images from CT of the chest abdomen and pelvis performed the same day.  This examination, like all CT scans performed in the [facility], was performed utilizing techniques to minimize radiation dose exposure, including the use of iterative reconstruction and automated exposure control.   Images were reformatted in the sagittal and coronal planes. FINDINGS: ALIGNMENT: Upper lumbar scoliosis, convex to the right. 2 mm of anterolisthesis of L4 on L5, unchanged since 9/21/2024.  Alignment otherwise unremarkable. VERTEBRAE: Chronic compression fracture of L1, status post kyphoplasty. No new fracture. No bone destruction or osteoblastic lesion. LOWER THORACIC DISC LEVELS:  Unremarkable. LUMBAR DISC LEVELS:   Degenerative disc disease with vacuum disc phenomenon at L1-L2 and L2-L3. FACET JOINTS: Degenerative facet arthropathy from L3-L4 through L5-S1. 5 FORAMINA:  No significant foraminal stenosis. SPINAL CANAL: Mild canal stenosis at L4-L5 due to facet arthropathy and thickening of the ligamenta flava, similar in appearance to images from the MRI of the lumbar spine from 9/21/2024. Otherwise unremarkable. INCLUDED PORTION OF THE SACRUM:  Normal. PARASPINAL SOFT TISSUES:   Normal. IMAGED PORTIONS OF ABDOMEN AND PELVIS: Bilateral hydronephrosis, better demonstrated on a CT of the abdomen and pelvis from today. Rectum distended with feces, consistent with the patient's history of constipation.     Impression: 1.  Compression fracture of L1, status post kyphoplasty. No new lumbar vertebral fractures 2.  No evidence of lumbar spine metastasis. 3.  Mild spinal stenosis at L4-L5, unchanged since an MRI from 9/21/2024. 4.  Bilateral hydronephrosis. Workstation performed: [id number]     Procedure: CT thoracic spine without contrast  Result Date: 11/9/2024  Narrative: CT THORACIC SPINE INDICATION:   Metastatic breast cancer to spine, back pain. COMPARISON: MRI of the thoracic spine from October 8, 2024. TECHNIQUE:  Contiguous axial images through the thoracic spine were obtained. Sagittal and coronal reconstructions were obtained.  This examination, like all CT scans performed in the [facility], was performed utilizing techniques to minimize radiation dose exposure, including the use of iterative reconstruction and automated exposure control. IMAGE QUALITY:  Diagnostic. FINDINGS: ALIGNMENT: Mild accentuation of the thoracic kyphosis. Otherwise normal alignment. VERTEBRAE: Chronic  compression fractures of, status post kyphoplasties. No new fracture. Osteolytic lesions in the T8, T11 and L1 vertebral bodies. No evidence of new destructive lesion. THORACIC DISC LEVELS:  Unremarkable. FACET JOINTS:  Unremarkable. SPINAL CANAL: Increased attenuation in the left anterior epidural space at the T8 level. Otherwise unremarkable. PARASPINAL SOFT TISSUES: Thickening of the paraspinous soft tissues at the T8 level INCLUDED PORTIONS OF THE LUMBAR SPINE: Pathologic compression fracture of L1, status post kyphoplasty. IMAGED PORTIONS OF CHEST AND ABDOMEN: Small right pleural effusion. Mild to moderate left-sided hydronephrosis, better demonstrated on a CT of the abdomen and pelvis from today. Two calculi in the left kidney, each measuring 1 mm..     Impression: 1.  Osteolytic metastases involving the T8, T11 and L1 vertebral bodies with pathologic fractures treated with kyphoplasty at T8 and L1. 2.  Thickened paravertebral soft tissues at T8. This could be residual edema or hematoma from pathologic fracture and kyphoplasty. However, extraosseous spread of metastasis can have a similar appearance. 3.  Probable extraosseous extension of T8 metastasis into the left anterior epidural space. Less likely, this could represent a streak artifact from adjacent kyphoplasty material. This can be better evaluated with MRI of the thoracic spine, without and with IV contrast. 4.  No evidence of new thoracic spine metastasis. 5.  Left-sided hydronephrosis and two small nonobstructing left renal calculi. 6.  Small right pleural effusion. The study was marked in EPIC for immediate notification. Workstation performed: [id number]     Procedure: CT abdomen pelvis with contrast  Result Date: 11/9/2024  Narrative: CT ABDOMEN AND PELVIS WITH IV CONTRAST INDICATION: Constipated, back pain, metastatic breast cancer to spine.. 78-year-old female. MRI of the thoracic spine from last month demonstrated metastases at T5, T8 and T11  and a compression fracture of L1. COMPARISON: Unenhanced CT of the abdomen and pelvis from April 28, 2024. MRI of the thoracic spine from October 8, 2024. MRI of the lumbar spine from September 21, 2024. Lumbar spine radiographs from November 8, 2024. TECHNIQUE: CT examination of the abdomen and pelvis was performed. Multiplanar 2D reformatted images were created from the source data. This examination, like all CT scans performed in the [facility], was performed utilizing techniques to minimize radiation dose exposure, including the use of iterative reconstruction and automated exposure control. Radiation dose length product (DLP) for this visit: 395 mGy-cm IV Contrast: 85 mL of iohexol (OMNIPAQUE) Enteric Contrast: Not administered. FINDINGS: ABDOMEN LOWER CHEST: Mild subsegmental atelectasis in the bases of both lower lobes. Small right pleural effusion. LIVER/BILIARY TREE: Elongated right lobe (Riedel's lobe, a normal anatomic variant). Liver otherwise unremarkable. Mild dilatation of the common hepatic and common bile duct, measuring up to 9 mm in diameter. No intrahepatic ductal dilatation. GALLBLADDER: No calcified gallstones. No pericholecystic inflammatory change. SPLEEN: Unremarkable. PANCREAS: Unremarkable. Main pancreatic duct normal in caliber. ADRENAL GLANDS: Unremarkable. KIDNEYS/URETERS: Moderate left-sided hydronephrosis, similar to the CT from 8/28/2024. New mild right hydronephrosis. No hydroureter. Several subcentimeter low attenuation foci in the renal cortices, too small to be accurately characterized, but most likely benign. By ACR guidelines, no additional imaging indicated. STOMACH AND BOWEL: Stomach and small intestine unremarkable. Rectum distended with feces. Colon otherwise unremarkable. APPENDIX: No findings to suggest appendicitis. ABDOMINOPELVIC CAVITY: No lymphadenopathy.  No ascites or discrete fluid collection.  No extraluminal gas. VESSELS: Atherosclerosis without  abdominal aortic aneurysm. Incidental note made of multiple phleboliths in the right ovarian vein, unchanged since 8/28/2024. PELVIS REPRODUCTIVE ORGANS: Post hysterectomy. URINARY BLADDER: Unremarkable. ABDOMINAL WALL/INGUINAL REGIONS: Unremarkable. BONES: Chronic compression fracture of L1, status post kyphoplasty. Osteolytic metastasis in the right side of the T11 vertebral body, little changed since 8/28/2024. No other evidence of fracture or destructive lesion.     Impression: 1.  Mild dilatation of the common bile duct, 9 mm in maximal diameter, etiology indeterminate. If clinically indicated, nonemergent MRI of the abdomen/MRCP, without and with IV contrast would be helpful to identify or exclude an obstructing lesion of the  CBD. No intrahepatic ductal dilatation. 2.  Bilateral hydronephrosis, moderate on the left and mild on the right with no hydroureter. Most likely, this is due to bilateral UPJ stenoses. 3.  No evidence of metastases in the abdomen or pelvis. 4.  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 5.  Compression fracture of L1, status post kyphoplasty, similar in appearance to lumbar spine radiographs from 11/8/2024. 6.  No evidence of acute abnormality in the abdomen or pelvis. Workstation performed: [id number]     Procedure: XR spine lumbar minimum 4 views non injury  Result Date: 11/8/2024  Narrative: XR SPINE LUMBAR MINIMUM 4 VIEWS NON INJURY INDICATION: M84.58XS: Pathological fracture in neoplastic disease, other specified site, sequela G89.3: Neoplasm related pain (acute) (chronic). COMPARISON: Spine radiographs 9/13/2024 FINDINGS: Lumbar dextroscoliosis. Alignment is unchanged. Interval kyphoplasty changes at T8 and L1. Unchanged vertebral body heights. Degenerative changes throughout the visible spine, with disc degeneration most severe at L2-3. Soft tissues unremarkable. Large colonic fecal burden. Osseous lesions better characterized on MRI.     Impression:  T8 and L1 kyphoplasty changes. Stable alignment. Workstation performed: [id number]     Procedure: XR hip/pelv 2-3 vws right if performed  Result Date: 10/25/2024  Narrative: RIGHT HIP INDICATION:   Malignant neoplasm of unspecified site of left female breast. Pain in right hip. COMPARISON:  None. VIEWS:  XR HIP/PELV 2-3 VWS RIGHT W PELVIS IF PERFORMED FINDINGS: There is no acute fracture or dislocation. No significant hip degenerative changes. No lytic or blastic osseous lesion. Soft tissues are unremarkable. Degenerative changes visualized lower lumbar spine.     Impression: No acute osseous abnormality of the right hip Electronically signed: 10/25/2024 05:01 PM Tyler Corona MD    Procedure: IR kyphoplasty/vertebroplasty with ablation  Result Date: 10/15/2024  Narrative: PROCEDURE: T8 and L1 vertebral body RF ablation and augmentation. HISTORY: Pathologic compression fractures COMPARISON: MRI on 10/8/2024 OPERATORS: [employer]. ANTIBIOTIC MEDICATION: 1g of Ancef given IV. COMPLICATIONS: None. ANESTHESIA: The patient was administered general endotracheal anesthesia under the care and supervision of the attending anesthesiologist. TECHNIQUE: The procedure, its risks, benefits, and alternatives were discussed in detail with the patient. Risks discussed included, but were not limited to, bleeding, infection, nerve root injury, spinal cord injury, and nontarget embolization of methylmethacrylate. All questions were answered and no guarantees were provided. After informed consent, the patient was brought into the angiography suite and the vertebral body levels of interest were identified via palpation and correlation made to prior imaging. The left T8 pedicle was imaged along its axis utilizing biplane fluoroscopy. Superficial, deep and periosteal Sensorcaine anesthetic was administered over the site of dermatotomy. Under fluoroscopic guidance, an introducer needle was then directed into the T8 vertebral body via a left  transpedicular approach. A core biopsy was performed. Cavity creation was then performed through the introducer needle using the DFine osteotome needle. The osteotome needle was then removed. The introducer needle remained in place with its stylet. Next, the RF probe was inserted through the guide needle and RF ablation of the tumor was performed. The RF probe was removed and the stylet was reinserted into the introducer needle.  Following preparation of a polymethylmethacrylate solution, bone cement was administered into the T8 vertebral body. The left L1 pedicle was imaged along its axis utilizing biplane fluoroscopy. Superficial, deep and periosteal Sensorcaine anesthetic was administered over the site of dermatotomy. Under fluoroscopic guidance, an introducer needle was then directed into the L1 vertebral body via a left transpedicular approach. A core biopsy was performed. Cavity creation was then performed through the introducer needle using the DFine osteotome needle. The osteotome needle was then removed. The introducer needle remained in place with its stylet. Next, the RF probe was inserted through the guide needle and RF ablation of the tumor was performed. The RF probe was removed and the stylet was reinserted into the introducer needle.  Following preparation of a polymethylmethacrylate solution, bone cement was administered into the L1 vertebral body. The introducer needle was removed. Manual pressure was applied to the access site until hemostasis was obtained. A sterile dressing was then applied. No new neurological deficits or complications were encountered during or immediately following the procedure.     Impression: FINDINGS/IMPRESSION: Successful T8 and L1 vertebral body biopsy, RF ablation and augmentation as detailed. Workstation performed: [id number]     Procedure: XR shoulder 2+ vw right  Result Date: 10/14/2024  Narrative: XR SHOULDER 2+ VW RIGHT INDICATION: M89.8X1: Other specified  disorders of bone, shoulder. COMPARISON: PET scan 10/2/2024. 1/29/2024. FINDINGS: Acromial increased lucent/lytic changes with mid acromial irregular linear lucency No acute fracture or dislocation. Degenerative glenohumeral and AC joints. Unremarkable soft tissues.     Impression: Question acromial nondisplaced pathological fracture. The study was marked in EPIC for immediate notification. Workstation performed: [id number]     Procedure: MRI thoracic spine with and without contrast  Addendum Date: 10/14/2024  Addendum: ADDENDUM: The impression was inadvertently omitted from this report and is incorrect. The body of the report describes multiple findings which will be reiterated here in a new impression. IMPRESSION: Subacute L1 compression fracture with stable configuration compared to MRI of the lumbar spine dated 9/21/2024. Multiple marrow replacing lesions are identified within the thoracic spine including T5, T8 and T11 consistent with osseous metastasis with no corresponding pathologic compression fractures. Disease appears contained within the bone with no extraosseous extension into the epidural or paraspinal soft tissues. Small pleural effusion.    Result Date: 10/14/2024  Narrative: MRI THORACIC SPINE WITH AND WITHOUT CONTRAST INDICATION: D49.2: Neoplasm of unspecified behavior of bone, soft tissue, and skin. COMPARISON: PET/CT dated 10/2/2024. MRI lumbar spine dated 9/21/2024. TECHNIQUE:  Multiplanar, multisequence imaging of the thoracic spine was performed before and after gadolinium administration. . IV Contrast:  5 mL of Gadobutrol injection (SINGLE-DOSE) IMAGE QUALITY:  Diagnostic. FINDINGS: ALIGNMENT: Mild, smooth exaggeration of the mid thoracic kyphosis. There is a moderate L1 compression fracture as seen on recent MRI with similar configuration. Approximately 50 to 60% loss of height of the mid and anterior aspect of the vertebral body with bony retropulsion of the posterior superior margin of  the vertebral body into the anterior epidural space. No thoracic compression fractures are noted. MARROW SIGNAL: The L1 compression fracture demonstrates diffuse marrow edema without extension into the posterior elements of this vertebral body segment. There is a focal marrow replacing lesion within the posterior aspect of the T11 vertebral body on the right with extension into the pedicle. There is a marrow replacing lesion identified within the T8 vertebral body somewhat diffusely. There is some hyperintense signal on T1-weighted imaging in the posterior aspect of the vertebral body likely representing a hemangioma superimposed upon the diffuse marrow replacement. There is a small marrow replacing lesion within the T5 vertebral body. THORACIC CORD: No cord compression. Normal signal within the thoracic cord. PREVERTEBRAL AND PARASPINAL SOFT TISSUES:   Small right-sided pleural effusion layering within the posterior aspect of the chest. THORACIC DEGENERATIVE CHANGE: Mild canal stenosis at the T12-L1 level as a result of POSTCONTRAST:  No abnormal enhancement. OTHER FINDINGS:  None.     Impression: Normal enhanced MRI of the thoracic spine. Workstation performed: [id number]     Procedure: US guided breast biopsy left complete  Addendum Date: 10/10/2024  Addendum: ADDENDUM: PATHOLOGY RESULTS: 2) Mass [B] (Left; Retroareolar; 3 o'clock) The pathology results indicate a Benign finding with benign breast tissue and fibroadenomatoid nodule. Radiology and pathology are concordant. 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  Please refer to the full pathology report. Radiology and pathology are concordant. In review of the patient’s recent imaging, the left malignancy is poorly defined on mammogram, appearing as a developing asymmetry  rather than a discrete mass.  This measures approximately 47 mm AP by 31 mm transverse on the cc view and measures approximately 58 mm AP by 35 mm craniocaudal on the MLO view.  The difference in AP dimension between the 2 views could be related to the obliquity of the images.  On ultrasound the mass measures 41 x 13 x 41 mm.  There is a subtle asymmetry with possible distortion slightly superior and medial (at 12:00) which could represent a second site of disease.  Further characterization is indicated. The patient's most recent mammogram of the contralateral right breast is from an outside institution performed 4/12/2024.  It may be reasonable to obtain a more recent mammogram prior to treatment for the malignancy.  Contrast-enhanced mammography or MRI is recommended for additional screening and evaluation of extent of disease in the left breast.  This is due to the subtle mammographic appearance of the malignancy in the breast density.  If the patient cannot have contrast-enhanced imaging, a more updated right mammogram is recommended as well as ultrasound and possible mammogram of the left breast to evaluate the asymmetry described above. The patient does have an abnormal PET/CT which demonstrates lymphadenopathy that was not accessible for ultrasound-guided core needle biopsy. The patient was notified of the malignant pathology results on 10/8/2024 by Dr. Andino. RECOMMENDATION:      - Surgical Consultation for the left breast.      - Breast MRI for both breasts. Signed by:  Ambreen Mukherjee MD END ADDENDUM    Result Date: 10/10/2024  Narrative: DIAGNOSIS: Status post biopsy INDICATION: Yuni Keys is a 78 y.o. female presenting for left breast biopsy, 2 locations. . Prior to the procedure, previous imaging was reviewed.  The procedure was explained to the patient in detail.  All questions were answered.  Written and verbal informed consent was obtained.  The left breast was marked. FINDINGS:  Limited left breast ultrasound was performed with grayscale and color imaging.  The procedure sites were marked.  A timeout was performed.  The skin was cleansed and draped in the usual fashion.  Both biopsies were performed under continuous ultrasound guidance from a lateral approach. LEFT 2) MASS B at 3:00, retroareolar region: Anesthesia was administered with 5 mL of lidocaine 1%. A 13-gauge introducer was placed.  A 14 gauge spring-loaded biopsy device was used to obtain 6 samples under direct ultrasound guidance.  A mini Kimberly  reflector was placed in the biopsy cavity under direct ultrasound guidance.  The Kimberly  probe was held over the procedure site and audible clicking was heard.  1) MASS A at 11:00, 7 cm from the nipple: Anesthesia was administered with 5 mL of lidocaine 1%. An 11-gauge introducer was placed.  A 12 gauge spring-loaded biopsy device was used to obtain 4 samples under direct ultrasound guidance.  There was a small central hypoechoic component to the mass.  Due to vascularity at this location, sampling could not be obtained at that hypoechoic area.  A Kimberly  reflector was placed in the biopsy cavity under direct ultrasound guidance.  The Kimberly  probe was held over the procedure site and audible clicking was heard.  Hemostasis was obtained with direct pressure on the biopsy sites.  The patient had minimal bleeding.  The patient tolerated the procedure well. There were no immediate complications.   After each site was biopsied, the Kimberly  probe was held over the procedure site and audible clicking could be heard. Post biopsy mammogram: Left 2D CC and lateral views were obtained.  The mini Kimberly  reflector projects in the expected location at 3:00, retroareolar region (this mass was not seen mammographically as it is obscured by dense breast parenchyma). The Kimberly  reflector projects within the mass at 11:00.     Impression: 1.  Technically successful  ultrasound-guided core needle biopsy of a mass in the left breast at 3:00, retroareolar region.  The procedure site was marked for possible surgery with placement of a mini Kimberly  reflector. 2.  Technically successful ultrasound-guided core needle biopsy of a mass in the left breast at 11:00, 7 cm from the nipple.  The procedure site was marked for possible surgery with placement of a Kimberly  reflector. 3.  After each site was biopsied, the Kimberly  probe was held over the procedure site and audible clicking could be heard. 4.  Management recommendations will be made once the pathology results are available. ASSESSMENT/BI-RADS CATEGORY: Left: Post-Procedure Mammogram for Marker Placement Overall: Post-Procedure Mammogram for Marker Placement RECOMMENDATION:      - Waiting for pathology for the left breast. Workstation ID: [id number] Signed by:  Ambreen Mukherjee MD     Procedure: US guidance breast biopsy left each additional  Addendum Date: 10/10/2024  Addendum: ADDENDUM: PATHOLOGY RESULTS: 2) Mass [B] (Left; Retroareolar; 3 o'clock) The pathology results indicate a Benign finding with benign breast tissue and fibroadenomatoid nodule. Radiology and pathology are concordant. 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  Please refer to the full pathology report. Radiology and pathology are concordant. In review of the patient’s recent imaging, the left malignancy is poorly defined on mammogram, appearing as a developing asymmetry rather than a discrete mass.  This measures approximately 47 mm AP by 31 mm transverse on the cc view and measures approximately 58 mm AP by 35 mm craniocaudal on the MLO view.  The difference in AP dimension between the 2 views could be related to the obliquity of the images.  On ultrasound  the mass measures 41 x 13 x 41 mm.  There is a subtle asymmetry with possible distortion slightly superior and medial (at 12:00) which could represent a second site of disease.  Further characterization is indicated. The patient's most recent mammogram of the contralateral right breast is from an outside institution performed 4/12/2024.  It may be reasonable to obtain a more recent mammogram prior to treatment for the malignancy.  Contrast-enhanced mammography or MRI is recommended for additional screening and evaluation of extent of disease in the left breast.  This is due to the subtle mammographic appearance of the malignancy in the breast density.  If the patient cannot have contrast-enhanced imaging, a more updated right mammogram is recommended as well as ultrasound and possible mammogram of the left breast to evaluate the asymmetry described above. The patient does have an abnormal PET/CT which demonstrates lymphadenopathy that was not accessible for ultrasound-guided core needle biopsy. The patient was notified of the malignant pathology results on 10/8/2024 by Dr. Andino. RECOMMENDATION:      - Surgical Consultation for the left breast.      - Breast MRI for both breasts. Signed by:  Ambreen Mukherjee MD END ADDENDUM    Result Date: 10/10/2024  Narrative: DIAGNOSIS: Status post biopsy INDICATION: Yuni Keys is a 78 y.o. female presenting for left breast biopsy, 2 locations. . Prior to the procedure, previous imaging was reviewed.  The procedure was explained to the patient in detail.  All questions were answered.  Written and verbal informed consent was obtained.  The left breast was marked. FINDINGS: Limited left breast ultrasound was performed with grayscale and color imaging.  The procedure sites were marked.  A timeout was performed.  The skin was cleansed and draped in the usual fashion.  Both biopsies were performed under continuous ultrasound guidance from a lateral approach. LEFT 2) MASS  B at 3:00, retroareolar region: Anesthesia was administered with 5 mL of lidocaine 1%. A 13-gauge introducer was placed.  A 14 gauge spring-loaded biopsy device was used to obtain 6 samples under direct ultrasound guidance.  A mini Kimberly  reflector was placed in the biopsy cavity under direct ultrasound guidance.  The Kimberly  probe was held over the procedure site and audible clicking was heard.  1) MASS A at 11:00, 7 cm from the nipple: Anesthesia was administered with 5 mL of lidocaine 1%. An 11-gauge introducer was placed.  A 12 gauge spring-loaded biopsy device was used to obtain 4 samples under direct ultrasound guidance.  There was a small central hypoechoic component to the mass.  Due to vascularity at this location, sampling could not be obtained at that hypoechoic area.  A Kimberly  reflector was placed in the biopsy cavity under direct ultrasound guidance.  The Kimberly  probe was held over the procedure site and audible clicking was heard.  Hemostasis was obtained with direct pressure on the biopsy sites.  The patient had minimal bleeding.  The patient tolerated the procedure well. There were no immediate complications.   After each site was biopsied, the Kimberly  probe was held over the procedure site and audible clicking could be heard. Post biopsy mammogram: Left 2D CC and lateral views were obtained.  The mini Kimberly  reflector projects in the expected location at 3:00, retroareolar region (this mass was not seen mammographically as it is obscured by dense breast parenchyma). The Kimberly  reflector projects within the mass at 11:00.     Impression: 1.  Technically successful ultrasound-guided core needle biopsy of a mass in the left breast at 3:00, retroareolar region.  The procedure site was marked for possible surgery with placement of a mini Kimberly  reflector. 2.  Technically successful ultrasound-guided core needle biopsy of a mass in the left breast at 11:00, 7 cm from the  nipple.  The procedure site was marked for possible surgery with placement of a Kimberly  reflector. 3.  After each site was biopsied, the Kimberly  probe was held over the procedure site and audible clicking could be heard. 4.  Management recommendations will be made once the pathology results are available. ASSESSMENT/BI-RADS CATEGORY: Left: Post-Procedure Mammogram for Marker Placement Overall: Post-Procedure Mammogram for Marker Placement RECOMMENDATION:      - Waiting for pathology for the left breast. Workstation ID: [id number] Signed by:  Ambreen Mukherjee MD     Procedure: Mammo post biopsy left  Addendum Date: 10/10/2024  Addendum: ADDENDUM: PATHOLOGY RESULTS: 2) Mass [B] (Left; Retroareolar; 3 o'clock) The pathology results indicate a Benign finding with benign breast tissue and fibroadenomatoid nodule. Radiology and pathology are concordant. 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  Please refer to the full pathology report. Radiology and pathology are concordant. In review of the patient’s recent imaging, the left malignancy is poorly defined on mammogram, appearing as a developing asymmetry rather than a discrete mass.  This measures approximately 47 mm AP by 31 mm transverse on the cc view and measures approximately 58 mm AP by 35 mm craniocaudal on the MLO view.  The difference in AP dimension between the 2 views could be related to the obliquity of the images.  On ultrasound the mass measures 41 x 13 x 41 mm.  There is a subtle asymmetry with possible distortion slightly superior and medial (at 12:00) which could represent a second site of disease.  Further characterization is indicated. The patient's most recent mammogram of the contralateral right breast is from an outside institution performed  4/12/2024.  It may be reasonable to obtain a more recent mammogram prior to treatment for the malignancy.  Contrast-enhanced mammography or MRI is recommended for additional screening and evaluation of extent of disease in the left breast.  This is due to the subtle mammographic appearance of the malignancy in the breast density.  If the patient cannot have contrast-enhanced imaging, a more updated right mammogram is recommended as well as ultrasound and possible mammogram of the left breast to evaluate the asymmetry described above. The patient does have an abnormal PET/CT which demonstrates lymphadenopathy that was not accessible for ultrasound-guided core needle biopsy. The patient was notified of the malignant pathology results on 10/8/2024 by Dr. Andino. RECOMMENDATION:      - Surgical Consultation for the left breast.      - Breast MRI for both breasts. Signed by:  Ambreen Mukherjee MD END ADDENDUM    Result Date: 10/10/2024  Narrative: DIAGNOSIS: Status post biopsy INDICATION: Yuni Keys is a 78 y.o. female presenting for left breast biopsy, 2 locations. . Prior to the procedure, previous imaging was reviewed.  The procedure was explained to the patient in detail.  All questions were answered.  Written and verbal informed consent was obtained.  The left breast was marked. FINDINGS: Limited left breast ultrasound was performed with grayscale and color imaging.  The procedure sites were marked.  A timeout was performed.  The skin was cleansed and draped in the usual fashion.  Both biopsies were performed under continuous ultrasound guidance from a lateral approach. LEFT 2) MASS B at 3:00, retroareolar region: Anesthesia was administered with 5 mL of lidocaine 1%. A 13-gauge introducer was placed.  A 14 gauge spring-loaded biopsy device was used to obtain 6 samples under direct ultrasound guidance.  A mini Kimberly  reflector was placed in the biopsy cavity under direct ultrasound guidance.  The  Kimberly  probe was held over the procedure site and audible clicking was heard.  1) MASS A at 11:00, 7 cm from the nipple: Anesthesia was administered with 5 mL of lidocaine 1%. An 11-gauge introducer was placed.  A 12 gauge spring-loaded biopsy device was used to obtain 4 samples under direct ultrasound guidance.  There was a small central hypoechoic component to the mass.  Due to vascularity at this location, sampling could not be obtained at that hypoechoic area.  A Kimberly  reflector was placed in the biopsy cavity under direct ultrasound guidance.  The Kimberly  probe was held over the procedure site and audible clicking was heard.  Hemostasis was obtained with direct pressure on the biopsy sites.  The patient had minimal bleeding.  The patient tolerated the procedure well. There were no immediate complications.   After each site was biopsied, the Kimberly  probe was held over the procedure site and audible clicking could be heard. Post biopsy mammogram: Left 2D CC and lateral views were obtained.  The mini Kimberly  reflector projects in the expected location at 3:00, retroareolar region (this mass was not seen mammographically as it is obscured by dense breast parenchyma). The Kimberly  reflector projects within the mass at 11:00.     Impression: 1.  Technically successful ultrasound-guided core needle biopsy of a mass in the left breast at 3:00, retroareolar region.  The procedure site was marked for possible surgery with placement of a mini Kimberly  reflector. 2.  Technically successful ultrasound-guided core needle biopsy of a mass in the left breast at 11:00, 7 cm from the nipple.  The procedure site was marked for possible surgery with placement of a Kimberly  reflector. 3.  After each site was biopsied, the Kimberly  probe was held over the procedure site and audible clicking could be heard. 4.  Management recommendations will be made once the pathology results are available.  ASSESSMENT/BI-RADS CATEGORY: Left: Post-Procedure Mammogram for Marker Placement Overall: Post-Procedure Mammogram for Marker Placement RECOMMENDATION:      - Waiting for pathology for the left breast. Workstation ID: [id number] Signed by:  Ambreen Mukherjee MD     Procedure: Mammo diagnostic left w 3d and cad  Result Date: 10/4/2024  Narrative: DIAGNOSIS: Abnormal positron emission tomography (PET) scan; Other signs and symptoms in breast TECHNIQUE: Digital diagnostic mammography was performed. Computer Aided Detection (CAD) analyzed all applicable images. Left breast ultrasound was performed. COMPARISONS: Prior breast imaging dated: 04/12/2024, 04/26/2023, 04/07/2023, 02/01/2023, 03/22/2022, 03/19/2021, and 02/20/2020 RELEVANT HISTORY: Family Breast Cancer History: History of breast cancer in Maternal Grandmother, Paternal Aunt. Family Medical History: Family medical history includes breast cancer in 2 relatives (maternal grandmother, paternal aunt) and ovarian cancer in maternal aunt. Personal History: No known relevant hormone history. Surgical history includes breast biopsy, breast excisional biopsy, and hysterectomy. No known relevant medical history. RISK ASSESSMENT: 5 Year Tyrer-Cuzick: 11.31% 10 Year Tyrer-Cuzick: No Score Lifetime Tyrer-Cuzick: 15.78% TISSUE DENSITY: The breasts are extremely dense, which lowers the sensitivity of mammography. INDICATION: Yuni Keys is a 78 y.o. female presenting for abnormal pet scan.  Patient had a PET/CT with findings suspicious for osseous metastases.  There was FDG avidity in the medial left breast and FDG avid left axillary/subpectoral lymph nodes. FINDINGS: LEFT 1) MASS [A] Mammo diagnostic left w 3d and cad: There is an equal density, irregularly shaped mass with indistinct margins seen in the left breast at 11 o'clock in the middle depth.  On the cc view the mass measures approximately 47 mm AP by 31 mm transverse.  On the MLO view the mass may  measure up to 58 mm AP by 35 mm craniocaudal.  However, the exact margins of the mass are difficult to delineate on mammography.  This does correspond with the area of FDG avidity in the medial breast on the PET/CT. US breast left limited (diagnostic): There is a 41 mm x 13 mm x 41 mm irregularly shaped, hyperechoic mass with indistinct margins seen in the left breast at 11 o'clock, 7 cm from the nipple.  The margins of the mass are indistinct and the mass has an irregular shape, limiting accurate measurement.  The mass correlates with the mass seen on the mammogram.  This mass is highly suggestive of malignancy. 2) MASS [B] Mammo diagnostic left w 3d and cad: There is no corresponding mass seen on this modality. US breast left limited (diagnostic): There is a 14 mm x 4 mm x 8 mm oval, parallel, hypoechoic mass seen in the retroareolar region of the left breast at 3 o'clock.  This appears to be intraductal.  This is suspicious. 3) MASS [C] Mammo diagnostic left w 3d and cad: There is a 6 mm oval mass seen in the left breast at 6 o'clock in the middle depth.  This is mammographically stable compared to the oldest mammogram available for comparison from 2020. US breast left limited (diagnostic): There is a 6 mm x 5 mm x 3 mm oval, parallel, hypoechoic mass with circumscribed margins seen in the retroareolar region of the left breast at 6 o'clock.  This is likely benign given the mammographic stability. 4) LYMPH NODE [D] Mammo diagnostic left w 3d and cad: There is no corresponding lymph node seen on this modality.  The PET/CT demonstrated abnormal lymph nodes. US breast left limited (diagnostic): Targeted ultrasound of the axilla was performed.  There are some morphologically benign axillary lymph nodes.  There is also a level 2 axillary lymph node which is only visible deep to the pectoralis muscles during certain phases of respiration.  This has a cortical thickness of 4 mm.  This is suspicious but is not amenable  to ultrasound-guided core needle biopsy.     Impression: 1.  Irregular mass in the medial left breast corresponds with the area of FDG avidity on the PET/CT.  This is highly suggestive of malignancy.  Appropriate action should be taken.  Ultrasound-guided core needle biopsy is recommended and was performed today.  Please see the separate dictation. 2.  Oval intraductal mass in the left breast at 3:00, retroareolar region is suspicious.  Ultrasound-guided core needle biopsy is recommended. 3.  There is a 6 mm oval mass at 6:00 which is mammographically stable for 4 years.  This is likely benign. 4.  The patient had abnormal axillary lymph nodes seen on PET/CT.  1 of these lymph nodes is visualized on ultrasound, however, it is not amenable to ultrasound-guided core needle biopsy. ASSESSMENT/BI-RADS CATEGORY: Left: 5 - Highly Suggestive of Malignancy Overall: 5 - Highly Suggestive of Malignancy RECOMMENDATION:      - Ultrasound-guided breast biopsy for the left breast. Workstation ID: [id number] Signed by:  Ambreen Mukherjee MD     Procedure: US breast left limited (diagnostic)  Result Date: 10/4/2024  Narrative: DIAGNOSIS: Abnormal positron emission tomography (PET) scan; Other signs and symptoms in breast TECHNIQUE: Digital diagnostic mammography was performed. Computer Aided Detection (CAD) analyzed all applicable images. Left breast ultrasound was performed. COMPARISONS: Prior breast imaging dated: 04/12/2024, 04/26/2023, 04/07/2023, 02/01/2023, 03/22/2022, 03/19/2021, and 02/20/2020 RELEVANT HISTORY: Family Breast Cancer History: History of breast cancer in Maternal Grandmother, Paternal Aunt. Family Medical History: Family medical history includes breast cancer in 2 relatives (maternal grandmother, paternal aunt) and ovarian cancer in maternal aunt. Personal History: No known relevant hormone history. Surgical history includes breast biopsy, breast excisional biopsy, and hysterectomy. No known relevant  medical history. RISK ASSESSMENT: 5 Year Tyrer-Cuzick: 11.31% 10 Year Tyrer-Cuzick: No Score Lifetime Tyrer-Cuzick: 15.78% TISSUE DENSITY: The breasts are extremely dense, which lowers the sensitivity of mammography. INDICATION: Yuni Keys is a 78 y.o. female presenting for abnormal pet scan.  Patient had a PET/CT with findings suspicious for osseous metastases.  There was FDG avidity in the medial left breast and FDG avid left axillary/subpectoral lymph nodes. FINDINGS: LEFT 1) MASS [A] Mammo diagnostic left w 3d and cad: There is an equal density, irregularly shaped mass with indistinct margins seen in the left breast at 11 o'clock in the middle depth.  On the cc view the mass measures approximately 47 mm AP by 31 mm transverse.  On the MLO view the mass may measure up to 58 mm AP by 35 mm craniocaudal.  However, the exact margins of the mass are difficult to delineate on mammography.  This does correspond with the area of FDG avidity in the medial breast on the PET/CT. US breast left limited (diagnostic): There is a 41 mm x 13 mm x 41 mm irregularly shaped, hyperechoic mass with indistinct margins seen in the left breast at 11 o'clock, 7 cm from the nipple.  The margins of the mass are indistinct and the mass has an irregular shape, limiting accurate measurement.  The mass correlates with the mass seen on the mammogram.  This mass is highly suggestive of malignancy. 2) MASS [B] Mammo diagnostic left w 3d and cad: There is no corresponding mass seen on this modality. US breast left limited (diagnostic): There is a 14 mm x 4 mm x 8 mm oval, parallel, hypoechoic mass seen in the retroareolar region of the left breast at 3 o'clock.  This appears to be intraductal.  This is suspicious. 3) MASS [C] Mammo diagnostic left w 3d and cad: There is a 6 mm oval mass seen in the left breast at 6 o'clock in the middle depth.  This is mammographically stable compared to the oldest mammogram available for comparison from 2020.  US breast left limited (diagnostic): There is a 6 mm x 5 mm x 3 mm oval, parallel, hypoechoic mass with circumscribed margins seen in the retroareolar region of the left breast at 6 o'clock.  This is likely benign given the mammographic stability. 4) LYMPH NODE [D] Mammo diagnostic left w 3d and cad: There is no corresponding lymph node seen on this modality.  The PET/CT demonstrated abnormal lymph nodes. US breast left limited (diagnostic): Targeted ultrasound of the axilla was performed.  There are some morphologically benign axillary lymph nodes.  There is also a level 2 axillary lymph node which is only visible deep to the pectoralis muscles during certain phases of respiration.  This has a cortical thickness of 4 mm.  This is suspicious but is not amenable to ultrasound-guided core needle biopsy.     Impression: 1.  Irregular mass in the medial left breast corresponds with the area of FDG avidity on the PET/CT.  This is highly suggestive of malignancy.  Appropriate action should be taken.  Ultrasound-guided core needle biopsy is recommended and was performed today.  Please see the separate dictation. 2.  Oval intraductal mass in the left breast at 3:00, retroareolar region is suspicious.  Ultrasound-guided core needle biopsy is recommended. 3.  There is a 6 mm oval mass at 6:00 which is mammographically stable for 4 years.  This is likely benign. 4.  The patient had abnormal axillary lymph nodes seen on PET/CT.  1 of these lymph nodes is visualized on ultrasound, however, it is not amenable to ultrasound-guided core needle biopsy. ASSESSMENT/BI-RADS CATEGORY: Left: 5 - Highly Suggestive of Malignancy Overall: 5 - Highly Suggestive of Malignancy RECOMMENDATION:      - Ultrasound-guided breast biopsy for the left breast. Workstation ID: [id number] Signed by:  Ambreen Mukherjee MD     Procedure: NM PET CT tumor imaging whole body  Result Date: 10/2/2024  Narrative: WHOLE-BODY PET/CT SCAN INDICATION:  Abnormal MRI lumbar spine. 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 Multiplanar attenuation corrected and non attenuation corrected PET images were acquired 60 minutes post injection. Contiguous, low dose, axial CT sections were obtained from the vertex through the feet.  Intravenous contrast material was not utilized.  This examination, like all CT scans performed in the [facility], was performed utilizing techniques to minimize radiation dose exposure, including the use of iterative reconstruction and automated exposure control. Fasting serum glucose: 84 mg/dl FINDINGS: VISUALIZED BRAIN: No acute abnormalities are seen. HEAD/NECK: There is a physiologic distribution of FDG. No FDG avid cervical adenopathy is seen. CT images: Unremarkable CHEST: There are several small FDG avid lymph nodes in the left axilla/subpectoral region. A lymph node here demonstrates SUV max of 6.0. This measures 6 mm short axis image 68 series 4. Slightly focal FDG uptake at the left breast medially where there is suggestion of a nodular density on CT, SUV max of 2.6. This measures up to 1.3 cm image 83 series 4. See fusion image 79 series 603. CT images: Moderate coronary artery calcifications. Heart is enlarged. Trace pleural effusions suggested. ABDOMEN: No FDG avid soft tissue lesions are seen. CT images: Parapelvic renal cysts on the left. Colonic diverticulosis. Suggestion of calcified phleboliths along the right psoas margin. PELVIS: No FDG avid soft tissue lesions are seen. CT images: Prior hysterectomy. OSSEOUS STRUCTURES/EXTREMITIES: Scattered FDG-avid osseous lesions. For example: *  Heterogeneous activity at the right acromion where there is a lytic process on CT, SUV max of 4.8.  *  T8 vertebral body demonstrates SUV max 3.8. *  T11 vertebral body lytic lesion on the right demonstrates SUV max of 6.0. *  Linear activity at the compressed L1 vertebral body demonstrates SUV max of 5.9. Activity could be related to compression fracture with underlying pathologic lesion not excluded. *  Small lucent lesion at the left ischial tuberosity demonstrates SUV max of 4.7. CT images: Multilevel degenerative changes of the spine. Curvature of the lumbar spine to the right. Bilateral knee arthroplasties.     Impression: 1. Mild focal radiotracer uptake at a left breast nodular density medially. Underlying primary breast malignancy should be excluded. 2. Several small FDG avid left axillary/subpectoral lymph nodes would raise suspicion for metastasis. 3. Scattered FDG avid lytic lesions compatible with metastasis. The study was marked in EPIC for significant notification. Workstation performed: [id number]       Administrative Statements   I have spent a total time of 40 minutes in caring for this patient on the day of the visit/encounter including Prognosis, Risks and benefits of tx options, Instructions for management, Patient and family education, Importance of tx compliance, Risk factor reductions, Impressions, Counseling / Coordination of care, Documenting in the medical record, Reviewing / ordering tests, medicine, procedures  , Obtaining or reviewing history  , and Communicating with other healthcare professionals . Topics discussed with the patient / family include symptom assessment and management, medication review, psychosocial support, goals of care, hospice services, supportive listening, and anticipatory guidance.   Topics discussed with the patient / family include .

## 2025-01-21 NOTE — PROGRESS NOTES
OSW received incoming call from Mrs. Keys today asking for wig salon information. She is still waiting for [facility] to contact her about her orientation, which is where the free wig salon is located as well. Explained wigs from a salon can be costly, however [facility] can assist. She understood same.   OSW emailed her links to [employer], [employer], and [email or web address]. She responded confirming she received information.

## 2025-01-22 ENCOUNTER — TELEPHONE (OUTPATIENT)
Age: 80
End: 2025-01-22

## 2025-01-22 ENCOUNTER — OFFICE VISIT (OUTPATIENT)
Dept: PHYSICAL THERAPY | Facility: REHABILITATION | Age: 80
End: 2025-01-22
Payer: MEDICARE

## 2025-01-22 DIAGNOSIS — M62.81 MUSCLE WEAKNESS: Primary | ICD-10-CM

## 2025-01-22 PROCEDURE — 97530 THERAPEUTIC ACTIVITIES: CPT | Performed by: PHYSICAL THERAPIST

## 2025-01-22 PROCEDURE — 97110 THERAPEUTIC EXERCISES: CPT | Performed by: PHYSICAL THERAPIST

## 2025-01-22 NOTE — PROGRESS NOTES
Daily Note     Today's date: 2025  Patient name: Yuni Keys  : 1945  MRN: [id number]  Referring provider: Lisa Contreras, *  Dx:   Encounter Diagnosis     ICD-10-CM    1. Muscle weakness  M62.81           Start Time: 1356          Subjective: I'm a little sore across the low back and at the front of my hips      Objective: See treatment diary below      Assessment: Tolerated treatment well. Patient demonstrated fatigue post treatment      Plan: Continue per plan of care.      Precautions: cancer      Manuals 12/18 12/30 1/6/25 1/8 1/13 1/15 1/20 1/22                                                         Neuro Re-Ed                                                                                                        Ther Ex             Shoulder flexion X 15 X 20 1# x 15 1# x 20 2# x 15 2# x 15 2# x 20 3 # x 10     Bicep curls 2# x 20 3# x 20 4# x 20 5# x 15 5# x 20 6# x 15 6# x 20 6# x 20     Shoulder abduction X 15 X 20 1# x 15 1# x 15 1# x 20 2# x 15 2 #  x 15 2# x 18     marching X 20 1# x 20 2# x 20 3# x 20 4# x 20 4# x 20 5# x 15 5# x 20     Hip abduction standing B X 15 B X 20 B 1# x 15 B 1# x 20 B 2# x 20 2# x 20 3# x 20 4# x 15     Leg press  65# x 20 75# x 20 85# x 20 95# x 20 95# x 20 95 # x 25 95# x 30                               Ther Activity             Sit to stand No hands x 10 No hands x 10  No hands x 10 No hands x 10 No hands x 10 No hands x 10 No hands x 15 No hands x 15     Ube standing 1 min ea 2 min ea alternating 2.5 min ea alternating 3 min ea alternating 3 min ea alternating 3 min ea alternating 3 min ea alternating 3 min ea alternating     treadmill 1.8 mph x 3 min 2.0 mph x 4 min 2.0 mph x 5 min 2.0 mph x 6 min 2.0 mph x 6 min 2.0 mph x 7 min 2.0 mph x 4 min, 2.3 mph x 4 min 2.3 mph x 8 min     Recumbent #6 2 min With pillow 3 min With pillow x 3 min With pillow x 4 min  With pillow x 4 min  #8 with pillow x 4 min #8 with pillow x 5 min #8 with pillow x 5 min      Gait Training                                       Modalities

## 2025-01-22 NOTE — TELEPHONE ENCOUNTER
Patient is calling to schedule an appointment with Ambreen Martinez, she is requesting that the appointment not be scheduled on a Wednesday.

## 2025-01-25 ENCOUNTER — PATIENT MESSAGE (OUTPATIENT)
Dept: HEMATOLOGY ONCOLOGY | Facility: CLINIC | Age: 80
End: 2025-01-25

## 2025-01-27 ENCOUNTER — OFFICE VISIT (OUTPATIENT)
Dept: PHYSICAL THERAPY | Facility: REHABILITATION | Age: 80
End: 2025-01-27
Payer: MEDICARE

## 2025-01-27 ENCOUNTER — PATIENT MESSAGE (OUTPATIENT)
Dept: HEMATOLOGY ONCOLOGY | Facility: CLINIC | Age: 80
End: 2025-01-27

## 2025-01-27 DIAGNOSIS — M62.838 MUSCLE SPASM: Primary | ICD-10-CM

## 2025-01-27 DIAGNOSIS — M62.81 MUSCLE WEAKNESS: Primary | ICD-10-CM

## 2025-01-27 PROCEDURE — 97530 THERAPEUTIC ACTIVITIES: CPT | Performed by: PHYSICAL THERAPIST

## 2025-01-27 PROCEDURE — 97110 THERAPEUTIC EXERCISES: CPT | Performed by: PHYSICAL THERAPIST

## 2025-01-27 NOTE — PROGRESS NOTES
Daily Note     Today's date: 2025  Patient name: Yuni Keys  : 1945  MRN: [id number]  Referring provider: Lisa Contreras, *  Dx:   Encounter Diagnosis     ICD-10-CM    1. Muscle weakness  M62.81           Start Time: 853          Subjective: I walked a mile yesterday and the muscle soreness is a side effect of my cancer meds    Objective: See treatment diary below      Assessment: Tolerated treatment well. Patient demonstrated fatigue post treatment      Plan: Continue per plan of care.      Precautions: cancer      Manuals 12/18 12/30 1/6/25 1/8 1/13 1/15 1/20 1/22 1/27                                                        Neuro Re-Ed                                                                                                        Ther Ex             Shoulder flexion X 15 X 20 1# x 15 1# x 20 2# x 15 2# x 15 2# x 20 3 # x 10 3# x 15    Bicep curls 2# x 20 3# x 20 4# x 20 5# x 15 5# x 20 6# x 15 6# x 20 6# x 20 6# x 20    Shoulder abduction X 15 X 20 1# x 15 1# x 15 1# x 20 2# x 15 2 #  x 15 2# x 18 2# x 20    marching X 20 1# x 20 2# x 20 3# x 20 4# x 20 4# x 20 5# x 15 5# x 20 5# x 20    Hip abduction standing B X 15 B X 20 B 1# x 15 B 1# x 20 B 2# x 20 2# x 20 3# x 20 4# x 15 4# x 15    Leg press  65# x 20 75# x 20 85# x 20 95# x 20 95# x 20 95 # x 25 95# x 30 95# x 30                              Ther Activity             Sit to stand No hands x 10 No hands x 10  No hands x 10 No hands x 10 No hands x 10 No hands x 10 No hands x 15 No hands x 15 No hands x 1    Ube standing 1 min ea 2 min ea alternating 2.5 min ea alternating 3 min ea alternating 3 min ea alternating 3 min ea alternating 3 min ea alternating 3 min ea alternating 3 min ea alternating    treadmill 1.8 mph x 3 min 2.0 mph x 4 min 2.0 mph x 5 min 2.0 mph x 6 min 2.0 mph x 6 min 2.0 mph x 7 min 2.0 mph x 4 min, 2.3 mph x 4 min 2.3 mph x 8 min 2.3 mph x 9 min    Recumbent #6 2 min With pillow 3 min With pillow x 3 min  With pillow x 4 min  With pillow x 4 min  #8 with pillow x 4 min #8 with pillow x 5 min #8 with pillow x 5 min #8 with pillow x 5 min    Gait Training                                       Modalities

## 2025-01-28 RX ORDER — BACLOFEN 10 MG/1
5 TABLET ORAL 3 TIMES DAILY PRN
Qty: 30 TABLET | Refills: 0 | Status: SHIPPED | OUTPATIENT
Start: 2025-01-28

## 2025-01-29 ENCOUNTER — OFFICE VISIT (OUTPATIENT)
Dept: PHYSICAL THERAPY | Facility: REHABILITATION | Age: 80
End: 2025-01-29
Payer: MEDICARE

## 2025-01-29 DIAGNOSIS — M62.81 MUSCLE WEAKNESS: Primary | ICD-10-CM

## 2025-01-29 PROCEDURE — 97110 THERAPEUTIC EXERCISES: CPT | Performed by: PHYSICAL THERAPIST

## 2025-01-29 PROCEDURE — 97530 THERAPEUTIC ACTIVITIES: CPT | Performed by: PHYSICAL THERAPIST

## 2025-01-29 NOTE — PROGRESS NOTES
Daily Note     Today's date: 2025  Patient name: Yuni Keys  : 1945  MRN: [id number]  Referring provider: Lisa Contreras, *  Dx:   Encounter Diagnosis     ICD-10-CM    1. Muscle weakness  M62.81           Start Time: 853          Subjective: I am still having hip pain so they are changing my meds    Objective: See treatment diary below      Assessment: Tolerated treatment well. Patient demonstrated fatigue post treatment      Plan:  DC from skilled care at this time on I HEP     Precautions: cancer      Manuals 12/18 12/30 1/6/25 1/8 1/13 1/15 1/20 1/22 1/27 1/29                                                       Neuro Re-Ed                                                                                                        Ther Ex             Shoulder flexion X 15 X 20 1# x 15 1# x 20 2# x 15 2# x 15 2# x 20 3 # x 10 3# x 15 3# x 15   Bicep curls 2# x 20 3# x 20 4# x 20 5# x 15 5# x 20 6# x 15 6# x 20 6# x 20 6# x 20 6# x 20   Shoulder abduction X 15 X 20 1# x 15 1# x 15 1# x 20 2# x 15 2 #  x 15 2# x 18 2# x 20 3# x 10   marching X 20 1# x 20 2# x 20 3# x 20 4# x 20 4# x 20 5# x 15 5# x 20 5# x 20 5# x 20   Hip abduction standing B X 15 B X 20 B 1# x 15 B 1# x 20 B 2# x 20 2# x 20 3# x 20 4# x 15 4# x 15 4# x 20   Leg press  65# x 20 75# x 20 85# x 20 95# x 20 95# x 20 95 # x 25 95# x 30 95# x 30 95# x 30                             Ther Activity             Sit to stand No hands x 10 No hands x 10  No hands x 10 No hands x 10 No hands x 10 No hands x 10 No hands x 15 No hands x 15 No hands x 15 No hands x 15   Ube standing 1 min ea 2 min ea alternating 2.5 min ea alternating 3 min ea alternating 3 min ea alternating 3 min ea alternating 3 min ea alternating 3 min ea alternating 3 min ea alternating 3 min ea alternating   treadmill 1.8 mph x 3 min 2.0 mph x 4 min 2.0 mph x 5 min 2.0 mph x 6 min 2.0 mph x 6 min 2.0 mph x 7 min 2.0 mph x 4 min, 2.3 mph x 4 min 2.3 mph x 8 min 2.3 mph x  9 min 2.3 mph x 10 min   Recumbent #6 2 min With pillow 3 min With pillow x 3 min With pillow x 4 min  With pillow x 4 min  #8 with pillow x 4 min #8 with pillow x 5 min #8 with pillow x 5 min #8 with pillow x 5 min #8 with pillow 5 min   Gait Training                                       Modalities

## 2025-01-30 ENCOUNTER — PATIENT OUTREACH (OUTPATIENT)
Dept: CASE MANAGEMENT | Facility: OTHER | Age: 80
End: 2025-01-30

## 2025-01-30 NOTE — PROGRESS NOTES
"Supportive call placed to Mrs. Keys today. She stated she got her hair cut and colored, and feels much better after going to the . She is going to pause on getting a wig at this time, but thanked OSW for the resources. Unfortunately, she is experiencing a lot of muscle and joint pain. Her med-onc and palliative providers are aware, and her letrozole is on hold right now. She stated, \"well if I stop that one then I'm not on anything, so I guess I'm a goner.\" She asked about hospice services, but stated she is \"probably jumping the gun.\" OSW offered to provide more details about hospice if she would like, and she replied she was. She stated her   on hospice at home, but was only lived for a few days after he signed on.  Discussed home hospice with caregivers, hospice in a facility, and inpatient hospice and the criteria that surrounds that level of care. She is very worried about being alone and having severe pain. She again discussed her older daughter from [place] is more willing to talk about end of life topics more than her younger daughter who lives in [place]. She then stated her younger daughter's   from a massive MI at a young age. She thinks this tragic situation is what is preventing her from talking to her about these concerns. Mrs. Keys stated she and her younger daughter will see palliative MSW counselor in a few weeks. Her hope is that her daughter may open up during this visit about her feelings.   Mrs. Keys stated she is not ready for hospice and is not saying she wants to stop treatments at this time, she is just asking questions about what her future care might look like. OSW acknowledged and validated her thoughts and feelings.     She asked about the next breast cancer support group that will be held in February. She is actively thinking about coming. Shared it will be held on  at 4:00 pm at [facility] 2nd Floor. OSW offered to call in another few " weeks and she is appreciative.

## 2025-02-06 ENCOUNTER — TELEPHONE (OUTPATIENT)
Age: 80
End: 2025-02-06

## 2025-02-06 NOTE — TELEPHONE ENCOUNTER
Provider: Dr. Amanda    Patient sent [employer] message, reporting two lumps on her right breast.    Called patient to assess further.     DESCRIPTION (describe complaint in short phrase)     Patient reports two small lumps on her right breast that she noticed last night. She reports one is about a quarter of an inch and the other is hardly there and is almost insignificant. The bigger one may be a boil, but she isn't sure, she reports the lumps are superficial and hard.   Alley denies redness, rash, fevers, bowel and urinary issues and pain. She reports that she doesn't know if it matters to get anything checked out, since she is stage four, but wanted to make the provider aware and see what he says about it. I confirmed with her we would discuss with the provider and return her phone call. She verbalized understanding and has no additional questions or concerns at this moment.     SEVERITY (mild, moderate, severe) Mild    ONSET (of THIS SPECIFIC complaint) Noticed them last night    CAUSE (what does the patient think is causing this) Unsure    MEDICATIONS (any changes in meds or doses?, Take any meds for relief?) Denies    OTHER SYMPTOMS (yes or no- if no, just write that. If yes, write the symptoms c/o) No

## 2025-02-07 ENCOUNTER — PATIENT OUTREACH (OUTPATIENT)
Dept: HEMATOLOGY ONCOLOGY | Facility: CLINIC | Age: 80
End: 2025-02-07

## 2025-02-07 ENCOUNTER — OFFICE VISIT (OUTPATIENT)
Dept: FAMILY MEDICINE CLINIC | Facility: HOSPITAL | Age: 80
End: 2025-02-07
Payer: MEDICARE

## 2025-02-07 VITALS
SYSTOLIC BLOOD PRESSURE: 128 MMHG | WEIGHT: 125 LBS | DIASTOLIC BLOOD PRESSURE: 68 MMHG | HEART RATE: 89 BPM | HEIGHT: 58 IN | BODY MASS INDEX: 26.24 KG/M2 | OXYGEN SATURATION: 97 %

## 2025-02-07 DIAGNOSIS — N60.01 CYST OF RIGHT BREAST: ICD-10-CM

## 2025-02-07 DIAGNOSIS — E03.9 HYPOTHYROIDISM, UNSPECIFIED TYPE: ICD-10-CM

## 2025-02-07 DIAGNOSIS — C50.912 MALIGNANT NEOPLASM OF LEFT FEMALE BREAST, UNSPECIFIED ESTROGEN RECEPTOR STATUS, UNSPECIFIED SITE OF BREAST (HCC): ICD-10-CM

## 2025-02-07 DIAGNOSIS — E78.2 MIXED HYPERLIPIDEMIA: ICD-10-CM

## 2025-02-07 DIAGNOSIS — C50.212 MALIGNANT NEOPLASM OF UPPER-INNER QUADRANT OF LEFT BREAST IN FEMALE, ESTROGEN RECEPTOR POSITIVE (HCC): ICD-10-CM

## 2025-02-07 DIAGNOSIS — Z17.0 MALIGNANT NEOPLASM OF UPPER-INNER QUADRANT OF LEFT BREAST IN FEMALE, ESTROGEN RECEPTOR POSITIVE (HCC): ICD-10-CM

## 2025-02-07 DIAGNOSIS — S32.010A CLOSED COMPRESSION FRACTURE OF BODY OF L1 VERTEBRA (HCC): Primary | ICD-10-CM

## 2025-02-07 DIAGNOSIS — D70.2 OTHER DRUG-INDUCED NEUTROPENIA (HCC): ICD-10-CM

## 2025-02-07 DIAGNOSIS — R79.89 ELEVATED TSH: ICD-10-CM

## 2025-02-07 DIAGNOSIS — M84.58XS PATHOLOGICAL FRACTURE IN NEOPLASTIC DISEASE, OTHER SPECIFIED SITE, SEQUELA: ICD-10-CM

## 2025-02-07 PROCEDURE — G2211 COMPLEX E/M VISIT ADD ON: HCPCS | Performed by: INTERNAL MEDICINE

## 2025-02-07 PROCEDURE — 99214 OFFICE O/P EST MOD 30 MIN: CPT | Performed by: INTERNAL MEDICINE

## 2025-02-07 RX ORDER — CEPHALEXIN 500 MG/1
500 CAPSULE ORAL 3 TIMES DAILY
Qty: 21 CAPSULE | Refills: 0 | Status: SHIPPED | OUTPATIENT
Start: 2025-02-07 | End: 2025-02-14

## 2025-02-07 NOTE — PROGRESS NOTES
Assessment/Plan:     Diagnosis ICD-10-CM Associated Orders   1. Closed compression fracture of body of L1 vertebra (HCC)  S32.010A CBC and differential     Comprehensive metabolic panel      2. 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 Malignant neoplasm of left female breast, unspecified estrogen receptor status, unspecified site of breast (HCC)  C50.912       4. Cyst of right breast  N60.01 Comprehensive metabolic panel     cephalexin (KEFLEX) 500 mg capsule      5. Elevated TSH  R79.89 TSH, 3rd generation with Free T4 reflex      6. Malignant neoplasm of upper-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, estrogen receptor positive (HCC)  C50.212     Z17.0       7. Mixed hyperlipidemia  E78.2 Lipid Panel with Direct LDL reflex      8. Other drug-induced neutropenia (HCC)  D70.2       9. 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, other specified site, sequela       Blood    Other drug-induced neutropenia (HCC)    Due to kisgali- now off that medication-   Night sweats improved             Oncology    Malignant neoplasm of upper-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, estrogen receptor positive (HCC)       Other    Hyperlipidemia    Relevant Orders    Lipid Panel with Direct LDL reflex   Other Visit Diagnoses       Malignant neoplasm of left female breast, unspecified estrogen receptor status, unspecified site of breast (HCC)          Cyst of right breast        Relevant Orders    Comprehensive metabolic panel      Elevated TSH        Relevant Orders    TSH, 3rd generation with Free T4 reflex      Hypothyroidism, unspecified type        Relevant Orders     TSH, 3rd generation with Free T4 reflex            No follow-ups on file.      Subjective:    Patient ID: Yuni Keys is a 79 y.o. female    See porch notes- here for follow up care   Dx with Stage 4 metastatic  breast cancer to bone after fall from step ladder and severe back pain- has done radiation therapy and is now on palliative care . She had not tolerated the Kisgali and that was stopped in December    Now with new irritation and swelling of right breast         The following portions of the patient's history were reviewed and updated as appropriate: allergies, current medications and problem list.     Review of Systems   Constitutional:  Positive for fatigue. Negative for chills.        Had previous fevers with initial dx- / tumoro related  Continue to follow that closely   HENT:  Negative for congestion.    Respiratory:  Negative for cough.    Cardiovascular:  Negative for chest pain and palpitations.   Genitourinary:  Negative for dysuria.         Objective:      Current Outpatient Medications:   •  atorvastatin (LIPITOR) 10 mg tablet, Take 1 tablet (10 mg total) by mouth daily, Disp: 90 tablet, Rfl: 3  •  Calcium 500-2.5 MG-MCG CHEW, Chew 2 chews daily, Disp: , Rfl:   •  Cholecalciferol (Vitamin D) 50 MCG (2000 UT) tablet, Take 2,000 Units by mouth daily, Disp: , Rfl:   •  letrozole (FEMARA) 2.5 mg tablet, Take 1 tablet (2.5 mg total) by mouth daily, Disp: 90 tablet, Rfl: 3  •  Multiple Vitamins-Minerals (MULTIVITAMIN WITH MINERALS) tablet, Take 1 tablet by mouth daily, Disp: , Rfl:   •  PARoxetine (PAXIL) 10 mg tablet, Take 1 tablet (10 mg total) by mouth daily At bedtime, Disp: 30 tablet, Rfl: 2  •  polyethylene glycol (GLYCOLAX) 17 GM/SCOOP powder, Take 17 g by mouth 2 (two) times a day, Disp: , Rfl:   •  Sodium Fluoride 5000 PPM 1.1 % PSTE, APPLY TIHN RIBBON TO TOOTHBRUSH, BRUSH X 2MIN AT BEDTIME, SPIT, DONT RINSE, Disp: , Rfl:   •  oxyCODONE (Roxicodone) 5 immediate release tablet, Take 1-2 tablets  "(5-10 mg total) by mouth every 4 (four) hours as needed for moderate pain or severe pain Max Daily Amount: 40 mg, Disp: 60 tablet, Rfl: 0  •  pregabalin (LYRICA) 25 mg capsule, Take 1 capsule (25 mg total) by mouth 2 (two) times a day (Patient not taking: Reported on 3/7/2025), Disp: 60 capsule, Rfl: 0    Blood pressure 128/68, pulse 89, height 4' 10\" (1.473 m), weight 56.7 kg (125 lb), SpO2 97%.     Physical Exam  Vitals and nursing note reviewed.   Constitutional:       Appearance: She is not toxic-appearing.   HENT:      Head: Normocephalic.      Nose: No congestion.   Eyes:      General:         Right eye: No discharge.         Left eye: No discharge.      Conjunctiva/sclera: Conjunctivae normal.   Cardiovascular:      Rate and Rhythm: Normal rate and regular rhythm.   Pulmonary:      Breath sounds: No rhonchi.   Abdominal:      Palpations: Abdomen is soft.      Tenderness: There is no abdominal tenderness.   Skin:     Findings: Erythema present.      Comments: Firmness with cystic texture  and erythema of inner aspect of right breast with early cellulitis in region   Neurological:      Mental Status: She is alert.        "

## 2025-02-13 DIAGNOSIS — C79.51 BREAST CANCER METASTASIZED TO BONE, RIGHT (HCC): Chronic | ICD-10-CM

## 2025-02-13 DIAGNOSIS — C50.911 BREAST CANCER METASTASIZED TO BONE, RIGHT (HCC): Chronic | ICD-10-CM

## 2025-02-13 DIAGNOSIS — G89.3 CANCER RELATED PAIN: ICD-10-CM

## 2025-02-13 RX ORDER — OXYCODONE HYDROCHLORIDE 5 MG/1
5-10 TABLET ORAL EVERY 4 HOURS PRN
Qty: 60 TABLET | Refills: 0 | Status: SHIPPED | OUTPATIENT
Start: 2025-02-13

## 2025-02-13 NOTE — TELEPHONE ENCOUNTER
Refill must be reviewed and completed by the office or provider. The refill is unable to be approved or denied by the medication management team.      Patient Id Prescription # Filled Written Drug Label Qty Days Strength MME** Prescriber Pharmacy Payment REFILL #/Auth State Detail   1 [id number] 01/03/2025 01/03/2025 oxyCODONE HCL (Tablet) 60.0 8 5 MG 56.25 JUTSIN HENDERSON [facility] [place] 0 / 0 [place]    1 [id number] 11/11/2024 11/11/2024 oxyCODONE HCL (Tablet) 60.0 10 5 MG 45.0 GARRETT MOREL [facility] [place] 0 / 0 [place]    1 [id number] 11/08/2024 11/08/2024 fentaNYL TRANSDERMAL SYSTEM (Patch, Extended Release) 10.0 30 12 MCG/1 HR 28.80 VALERIY FLY [facility] [place] 0 / 0 [place]    1 [id number] 10/30/2024 10/30/2024 oxyCODONE HCL (Tablet) 40.0 7 5 MG 42.86 CHARLES MARVIN [facility] [place] 0 / 0 [place]

## 2025-02-17 DIAGNOSIS — N60.01 CYST OF RIGHT BREAST: Primary | ICD-10-CM

## 2025-02-17 RX ORDER — CEPHALEXIN 500 MG/1
500 CAPSULE ORAL 3 TIMES DAILY
Qty: 21 CAPSULE | Refills: 0 | Status: SHIPPED | OUTPATIENT
Start: 2025-02-17 | End: 2025-02-24

## 2025-02-18 ENCOUNTER — PATIENT OUTREACH (OUTPATIENT)
Dept: CASE MANAGEMENT | Facility: OTHER | Age: 80
End: 2025-02-18

## 2025-02-18 NOTE — PROGRESS NOTES
"Supportive call placed to Mrs. Keys today. She stated she went to a local  home to start discussing her final arrangements and burial. She saw her daughter yesterday, and began discussing this but her daughter told her \"you are crazy\" and \"you should travel and go to the beach and enjoy life.\" Mrs. Keys stated she is in too much pain to travel, and that she cancelled a trip to [place] in May because she does not think she can tolerate a 20 hour plane ride and walking for long periods of time. OSW explained she can still go places and do things while also making her  arrangements. She agreed, and stated she really doesn't want to go anywhere right now.   Discussed her wanting to make final arrangements is to ease the stress from her daughters. Validated her feelings, and agreed that planning ahead is tapia for anyone to do, as long as the person is comfortable and emotionally ready to make them.  Mrs. Keys is considering attending the BC support group at [facility] tomorrow. She would like to connect with other women who have or had breast cancer. Strongly encouraged her to attend.  She thanked OSW for calling. Explained this writer will be present at [facility] tomorrow if she decides to come to the support group and can meet her.   "

## 2025-02-19 ENCOUNTER — OFFICE VISIT (OUTPATIENT)
Dept: FAMILY MEDICINE CLINIC | Facility: HOSPITAL | Age: 80
End: 2025-02-19
Payer: MEDICARE

## 2025-02-19 ENCOUNTER — TELEPHONE (OUTPATIENT)
Age: 80
End: 2025-02-19

## 2025-02-19 ENCOUNTER — OFFICE VISIT (OUTPATIENT)
Dept: PALLIATIVE MEDICINE | Facility: CLINIC | Age: 80
End: 2025-02-19
Payer: MEDICARE

## 2025-02-19 VITALS
HEART RATE: 76 BPM | HEIGHT: 58 IN | BODY MASS INDEX: 26.87 KG/M2 | WEIGHT: 128 LBS | SYSTOLIC BLOOD PRESSURE: 138 MMHG | OXYGEN SATURATION: 97 % | DIASTOLIC BLOOD PRESSURE: 70 MMHG

## 2025-02-19 VITALS
BODY MASS INDEX: 26.77 KG/M2 | DIASTOLIC BLOOD PRESSURE: 62 MMHG | OXYGEN SATURATION: 96 % | HEART RATE: 64 BPM | SYSTOLIC BLOOD PRESSURE: 140 MMHG | WEIGHT: 128.09 LBS | RESPIRATION RATE: 19 BRPM | TEMPERATURE: 97.2 F

## 2025-02-19 DIAGNOSIS — N60.01 CYST OF RIGHT BREAST: ICD-10-CM

## 2025-02-19 DIAGNOSIS — E16.2 HYPOGLYCEMIA: Primary | ICD-10-CM

## 2025-02-19 DIAGNOSIS — G89.3 CANCER RELATED PAIN: Primary | ICD-10-CM

## 2025-02-19 DIAGNOSIS — R23.2 HOT FLASHES: ICD-10-CM

## 2025-02-19 DIAGNOSIS — F11.20 OPIOID DEPENDENCE, UNCOMPLICATED (HCC): ICD-10-CM

## 2025-02-19 DIAGNOSIS — C79.51 BREAST CANCER METASTASIZED TO BONE, RIGHT (HCC): Chronic | ICD-10-CM

## 2025-02-19 DIAGNOSIS — C50.911 BREAST CANCER METASTASIZED TO BONE, RIGHT (HCC): Chronic | ICD-10-CM

## 2025-02-19 DIAGNOSIS — Z51.5 PALLIATIVE CARE BY SPECIALIST: ICD-10-CM

## 2025-02-19 DIAGNOSIS — S32.010A CLOSED COMPRESSION FRACTURE OF BODY OF L1 VERTEBRA (HCC): ICD-10-CM

## 2025-02-19 PROCEDURE — 99214 OFFICE O/P EST MOD 30 MIN: CPT | Performed by: INTERNAL MEDICINE

## 2025-02-19 PROCEDURE — G2211 COMPLEX E/M VISIT ADD ON: HCPCS | Performed by: INTERNAL MEDICINE

## 2025-02-19 RX ORDER — PREGABALIN 25 MG/1
25 CAPSULE ORAL 2 TIMES DAILY
Qty: 60 CAPSULE | Refills: 0 | Status: SHIPPED | OUTPATIENT
Start: 2025-02-19

## 2025-02-19 NOTE — PROGRESS NOTES
Assessment/Plan:     Diagnosis ICD-10-CM Associated Orders   1. Hypoglycemia  E16.2 Basic metabolic panel     Hemoglobin A1C      2. Cyst of right breast  N60.01       3. Closed compression fracture of body of L1 vertebra (HCC)  S32.010A       4. Opioid dependence, uncomplicated (HCC)  F11.20           Problem List Items Addressed This Visit        Musculoskeletal and Integument    Closed compression fracture of body of L1 vertebra (HCC)       Behavioral Health    Opioid dependence, uncomplicated (HCC)    Only using opiod meds  at night-to start on  medical marijuana in future   Having back and shoulder pain- had radiation   Kisgali is now stopped due to low wbc- that improved on labs from 1/17/25        Other Visit Diagnoses       Hypoglycemia    -  Primary    Relevant Orders    Basic metabolic panel (Completed)    Hemoglobin A1C (Completed)      Cyst of right breast                No follow-ups on file.      Subjective:    Patient ID: Yuni Keys is a 79 y.o. female     1 infected right breast cyst - recheck today-  restarted on additional week of antibiotics-    Slowly improving  2, breast cancer- on letrozole- to  scan again in April with repeat labs  3. Hypoglycemia- had low glucose on labs in mid janaury- will check bmp and hba1c- no family hx of diabetes  ? Reactive hypoglycemia- had not been fasting for labs - in am  4.  Metastatic  bone pain- ongoing back pain when sitting or leaning against a chair or in car- is now on lyrica  25 mg bid -starting today with  pain management   Has been following with  palliative care team and I will send message about pain control issues? Consideration for medical marijuana- pt hesitant to use narcotics during the day as she wishes to be alert.        The following portions of the patient's history were reviewed and updated as appropriate: allergies, current medications and problem list.     Review of Systems   Constitutional:  Positive for fatigue.   HENT:  Negative for  "congestion.    Gastrointestinal:  Negative for nausea.   Musculoskeletal:  Positive for back pain.        And shoulder pain at region with metastatic bone disease         Objective:      Current Outpatient Medications:   •  atorvastatin (LIPITOR) 10 mg tablet, Take 1 tablet (10 mg total) by mouth daily, Disp: 90 tablet, Rfl: 3  •  Calcium 500-2.5 MG-MCG CHEW, Chew 2 chews daily, Disp: , Rfl:   •  Cholecalciferol (Vitamin D) 50 MCG (2000 UT) tablet, Take 2,000 Units by mouth daily, Disp: , Rfl:   •  letrozole (FEMARA) 2.5 mg tablet, Take 1 tablet (2.5 mg total) by mouth daily, Disp: 90 tablet, Rfl: 3  •  Multiple Vitamins-Minerals (MULTIVITAMIN WITH MINERALS) tablet, Take 1 tablet by mouth daily, Disp: , Rfl:   •  oxyCODONE (Roxicodone) 5 immediate release tablet, Take 1-2 tablets (5-10 mg total) by mouth every 4 (four) hours as needed for moderate pain or severe pain Max Daily Amount: 40 mg, Disp: 60 tablet, Rfl: 0  •  PARoxetine (PAXIL) 10 mg tablet, Take 1 tablet (10 mg total) by mouth daily At bedtime, Disp: 30 tablet, Rfl: 2  •  polyethylene glycol (GLYCOLAX) 17 GM/SCOOP powder, Take 17 g by mouth 2 (two) times a day, Disp: , Rfl:   •  pregabalin (LYRICA) 25 mg capsule, Take 1 capsule (25 mg total) by mouth 2 (two) times a day (Patient not taking: Reported on 3/7/2025), Disp: 60 capsule, Rfl: 0  •  Sodium Fluoride 5000 PPM 1.1 % PSTE, APPLY TIHN RIBBON TO TOOTHBRUSH, BRUSH X 2MIN AT BEDTIME, SPIT, DONT RINSE, Disp: , Rfl:     Blood pressure 138/70, pulse 76, height 4' 10\" (1.473 m), weight 58.1 kg (128 lb), SpO2 97%.     Physical Exam  Vitals and nursing note reviewed.   Constitutional:       Appearance: She is not toxic-appearing.   HENT:      Head: Normocephalic.      Nose: No congestion.      Mouth/Throat:      Pharynx: No oropharyngeal exudate.   Eyes:      General:         Right eye: No discharge.         Left eye: No discharge.   Cardiovascular:      Rate and Rhythm: Normal rate and regular rhythm.      " Heart sounds:      No gallop.   Pulmonary:      Breath sounds: No wheezing or rhonchi.   Abdominal:      General: There is no distension.      Palpations: Abdomen is soft.   Skin:     Findings: Erythema present.      Comments: Less erythema but still ome swelling and tenderness - less firmness right breast   Neurological:      Mental Status: She is alert and oriented to person, place, and time.   Psychiatric:         Mood and Affect: Mood normal.         Thought Content: Thought content normal.         Judgment: Judgment normal.

## 2025-02-19 NOTE — ASSESSMENT & PLAN NOTE
Only using opiod meds  at night-to start on  medical marijuana in future   Having back and shoulder pain- had radiation   Kisgali is now stopped due to low wbc- that improved on labs from 1/17/25

## 2025-02-19 NOTE — PROGRESS NOTES
Name: Yuni Keys      : 1945      MRN: [id number]  Encounter Provider: Lisa Contreras DO  Encounter Date: 2025   Encounter department: [facility] [place]  :  Assessment & Plan  Cancer related pain  Using 1-2 tabs of oxycodone per day to manage pain  Current regimen:  Start Lyrica 25 mg BID  Provided information regarding MMJ  Oxycodone 5-10mg q4h PRN for moderate-severe pain  Alternate oxycodone with Tylenol as needed  Bowel regimen to prevent OIC  Opioid agreement signed 2024  Orders:    pregabalin (LYRICA) 25 mg capsule; Take 1 capsule (25 mg total) by mouth 2 (two) times a day        Palliative care by specialist  Time spent with patient providing supportive listening.   Reviewed differences between hospice and palliative services  All questions and concerns were addressed to their satisfaction.    RTC: 1 month        Orders:    pregabalin (LYRICA) 25 mg capsule; Take 1 capsule (25 mg total) by mouth 2 (two) times a day      Breast cancer metastasized to bone, right (HCC)         Hot flashes  Improved, continue Paxil               Decisional apparatus: Patient is competent on my exam today. If competence is lost, patient's substitute decision maker would default to daughter by [place] 169.   Advance Directive / Living Will / POLST: yes     PDMP Review: I have reviewed the patient's controlled substance dispensing history in the Prescription Drug Monitoring Program in compliance with the [facility] regulations before prescribing any controlled substances.    History of Present Illness   Yuni Keys is a 79 y.o. female who presents in follow-up for symptoms related to metastatic breast cancer.  Patient doing better with the reduction in her chemotherapy.  Continues with back pain in the area of known metastatic lesion.  The oxycodone does work for her but she only takes this at night or when she is not planning on going out during the day.  We discussed alternatives as she  is not getting relief from Tylenol or NSAIDs over-the-counter.  Advised on using heat and we also discussed addition of Lyrica as she did not not tolerate gabapentin in the past but hopeful that this can help with some of her cancer pain.  We also discussed medical marijuana and provided information about this program for her to look into further.  She did have questions about hospice services which I described in detail.  Plan for her to follow-up with oncology in April with repeat imaging to see how her cancer is responding.   Medical History Reviewed by provider this encounter:  Meds     .     Objective   /62 (BP Location: Right arm, Patient Position: Sitting, Cuff Size: Standard)   Pulse 64   Temp (!) 97.2 °F (36.2 °C) (Temporal)   Resp 19   Wt 58.1 kg (128 lb 1.4 oz)   SpO2 96%   BMI 26.77 kg/m²     Physical Exam  Vitals and nursing note reviewed.   Constitutional:       General: She is not in acute distress.  HENT:      Head: Normocephalic and atraumatic.      Right Ear: External ear normal.      Left Ear: External ear normal.   Eyes:      General:         Right eye: No discharge.         Left eye: No discharge.   Cardiovascular:      Rate and Rhythm: Normal rate and regular rhythm.      Heart sounds: Murmur heard.   Pulmonary:      Effort: Pulmonary effort is normal. No respiratory distress.   Abdominal:      General: There is no distension.      Palpations: Abdomen is soft.   Musculoskeletal:         General: Tenderness present.      Right lower leg: No edema.      Left lower leg: No edema.   Neurological:      Mental Status: She is oriented to person, place, and time. Mental status is at baseline.   Psychiatric:         Mood and Affect: Mood normal.         Behavior: Behavior normal.         Recent labs:  Lab Results   Component Value Date/Time    SODIUM 139 01/17/2025 09:47 AM    K 4.1 01/17/2025 09:47 AM    BUN 16 01/17/2025 09:47 AM    CREATININE 0.53 (L) 01/17/2025 09:47 AM    GLUC 52 (L)  01/17/2025 09:47 AM    CALCIUM 9.3 01/17/2025 09:47 AM    AST 44 (H) 01/17/2025 09:47 AM    ALT 48 01/17/2025 09:47 AM    ALB 4.1 01/17/2025 09:47 AM    TP 6.7 01/17/2025 09:47 AM    EGFR 90 01/17/2025 09:47 AM     Lab Results   Component Value Date/Time    HGB 13.8 01/17/2025 09:47 AM    WBC 6.52 01/17/2025 09:47 AM     01/17/2025 09:47 AM    INR 1.01 10/01/2024 08:13 AM    PTT 34 10/01/2024 08:13 AM     Lab Results   Component Value Date/Time    [id number] 2.391 12/20/2024 10:34 AM       Recent Imaging:  Procedure: CT chest abdomen pelvis w contrast  Result Date: 1/16/2025  Impression: Stable left breast mass with biopsy clip. Scattered bone metastases including a new right scapular lytic lesion with a healed pathological fracture, likely related to postradiation treatment changes to the right shoulder. No metastatic disease in the solid organs of the abdomen and pelvis. Resident: Josh Nj I, the attending radiologist, have reviewed the images and agree with the final report above. Workstation performed: [id number]     Procedure: MRI abdomen w wo contrast and mrcp  Result Date: 12/19/2024  Impression: Minimally dilated common bile duct measuring up to 8 mm proximally with smooth distal tapering. No intrahepatic bile duct dilation. No choledocholithiasis, biliary stricture or suspicious mass within the limits of this motion degraded examination. Workstation performed: [id number]     Procedure: X-ray chest 2 views  Result Date: 11/21/2024  Impression: No acute cardiopulmonary disease. This report is in agreement with the preliminary interpretation. Workstation performed: [id number]     Procedure: MRI thoracic spine w wo contrast  Result Date: 11/10/2024  Impression: Interval T8 kyphoplasty. Height loss at T8 has progressed slightly since prior from 10/8/2024. Redemonstrated osseous metastases at T5, T8, and T11. Partially imaged L1 compression fracture status post kyphoplasty. No abnormal epidural  or leptomeningeal enhancement. Normal cord signal. Workstation performed: [id number]     Procedure: CT recon only lumbar spine (No Charge)  Result Date: 11/9/2024  Impression: 1.  Compression fracture of L1, status post kyphoplasty. No new lumbar vertebral fractures 2.  No evidence of lumbar spine metastasis. 3.  Mild spinal stenosis at L4-L5, unchanged since an MRI from 9/21/2024. 4.  Bilateral hydronephrosis. Workstation performed: [id number]     Procedure: CT thoracic spine without contrast  Result Date: 11/9/2024  Impression: 1.  Osteolytic metastases involving the T8, T11 and L1 vertebral bodies with pathologic fractures treated with kyphoplasty at T8 and L1. 2.  Thickened paravertebral soft tissues at T8. This could be residual edema or hematoma from pathologic fracture and kyphoplasty. However, extraosseous spread of metastasis can have a similar appearance. 3.  Probable extraosseous extension of T8 metastasis into the left anterior epidural space. Less likely, this could represent a streak artifact from adjacent kyphoplasty material. This can be better evaluated with MRI of the thoracic spine, without and with IV contrast. 4.  No evidence of new thoracic spine metastasis. 5.  Left-sided hydronephrosis and two small nonobstructing left renal calculi. 6.  Small right pleural effusion. The study was marked in EPIC for immediate notification. Workstation performed: [id number]     Procedure: CT abdomen pelvis with contrast  Result Date: 11/9/2024  Impression: 1.  Mild dilatation of the common bile duct, 9 mm in maximal diameter, etiology indeterminate. If clinically indicated, nonemergent MRI of the abdomen/MRCP, without and with IV contrast would be helpful to identify or exclude an obstructing lesion of the  CBD. No intrahepatic ductal dilatation. 2.  Bilateral hydronephrosis, moderate on the left and mild on the right with no hydroureter. Most likely, this is due to bilateral UPJ stenoses. 3.  No evidence of  metastases in the abdomen or pelvis. 4.  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 5.  Compression fracture of L1, status post kyphoplasty, similar in appearance to lumbar spine radiographs from 11/8/2024. 6.  No evidence of acute abnormality in the abdomen or pelvis. Workstation performed: [id number]     Procedure: XR spine lumbar minimum 4 views non injury  Result Date: 11/8/2024  Impression: T8 and L1 kyphoplasty changes. Stable alignment. Workstation performed: [id number]     Procedure: XR hip/pelv 2-3 vws right if performed  Result Date: 10/25/2024  Impression: No acute osseous abnormality of the right hip Electronically signed: 10/25/2024 05:01 PM Tyler Corona MD      Administrative Statements   I have spent a total time of 30 minutes in caring for this patient on the day of the visit/encounter including Risks and benefits of tx options, Instructions for management, Patient and family education, Importance of tx compliance, Risk factor reductions, Impressions, Counseling / Coordination of care, Documenting in the medical record, Reviewing/placing orders in the medical record (including tests, medications, and/or procedures), Obtaining or reviewing history  , and Communicating with other healthcare professionals .   Topics discussed with the patient / family include symptom assessment and management, medication review, medication adjustment, psychosocial support, goals of care, and supportive listening.

## 2025-02-19 NOTE — ASSESSMENT & PLAN NOTE
Time spent with patient providing supportive listening.   Reviewed differences between hospice and palliative services  All questions and concerns were addressed to their satisfaction.    RTC: 1 month        Orders:    pregabalin (LYRICA) 25 mg capsule; Take 1 capsule (25 mg total) by mouth 2 (two) times a day

## 2025-02-19 NOTE — TELEPHONE ENCOUNTER
Pt would like an [facility] appt with Dr. Contreras but the first available isn't until May. Pt would also like a virtual appt as well. Pt needs a sooner appt and provided ID number of [id number]. Pt can be reached at [phone].

## 2025-02-19 NOTE — ASSESSMENT & PLAN NOTE
Using 1-2 tabs of oxycodone per day to manage pain  Current regimen:  Start Lyrica 25 mg BID  Provided information regarding MMJ  Oxycodone 5-10mg q4h PRN for moderate-severe pain  Alternate oxycodone with Tylenol as needed  Bowel regimen to prevent OIC  Opioid agreement signed 11/13/2024  Orders:    pregabalin (LYRICA) 25 mg capsule; Take 1 capsule (25 mg total) by mouth 2 (two) times a day

## 2025-02-21 ENCOUNTER — APPOINTMENT (OUTPATIENT)
Dept: LAB | Facility: CLINIC | Age: 80
End: 2025-02-21
Payer: MEDICARE

## 2025-02-21 ENCOUNTER — PATIENT OUTREACH (OUTPATIENT)
Dept: HEMATOLOGY ONCOLOGY | Facility: CLINIC | Age: 80
End: 2025-02-21

## 2025-02-21 DIAGNOSIS — E16.2 HYPOGLYCEMIA: ICD-10-CM

## 2025-02-21 LAB
ANION GAP SERPL CALCULATED.3IONS-SCNC: 8 MMOL/L (ref 4–13)
BUN SERPL-MCNC: 14 MG/DL (ref 5–25)
CALCIUM SERPL-MCNC: 9.6 MG/DL (ref 8.4–10.2)
CHLORIDE SERPL-SCNC: 105 MMOL/L (ref 96–108)
CO2 SERPL-SCNC: 31 MMOL/L (ref 21–32)
CREAT SERPL-MCNC: 0.51 MG/DL (ref 0.6–1.3)
EST. AVERAGE GLUCOSE BLD GHB EST-MCNC: 100 MG/DL
GFR SERPL CREATININE-BSD FRML MDRD: 91 ML/MIN/1.73SQ M
GLUCOSE P FAST SERPL-MCNC: 89 MG/DL (ref 65–99)
HBA1C MFR BLD: 5.1 %
POTASSIUM SERPL-SCNC: 4.2 MMOL/L (ref 3.5–5.3)
SODIUM SERPL-SCNC: 144 MMOL/L (ref 135–147)

## 2025-02-21 PROCEDURE — 83036 HEMOGLOBIN GLYCOSYLATED A1C: CPT

## 2025-02-21 PROCEDURE — 80048 BASIC METABOLIC PNL TOTAL CA: CPT

## 2025-02-21 PROCEDURE — 36415 COLL VENOUS BLD VENIPUNCTURE: CPT

## 2025-02-21 NOTE — PROGRESS NOTES
I reached out and spoke with Alley to follow up and to review for any new changes in barriers to care and offer supportive services as needed.     Barriers noted previously and outcome of interventions;  Co-morbidities    Reviewed for any new barriers as noted below.    Are you eating and drinking normally? yes    Have you been experiencing any uncontrolled pain related to your cancer diagnosis? No    If not already established, have needs changed for a palliative care referral? established    Do you have a good support system? Yes     How are you doing with transportation to your appointments? No    Do you know when your upcoming appointments are? yes  Future Appointments   Date Time Provider Department Center   2/25/2025  8:30 AM Forrest Harden MD [facility]   3/7/2025 10:00 AM Sekou Payne MD CARD ONC [facility]   3/18/2025  8:00 AM Lisa Contreras DO [facility]   4/14/2025 11:30 AM UB CT 1 UB CT  HOSPITAL   4/22/2025  1:45 PM Fam Amanda MD HEM ONC [facility]   4/30/2025  8:00 AM Lisa Contreras DO [facility]   5/8/2025  1:40 PM Lisa Fairbanks DO [facility]   5/12/2025  9:30 AM [facility] INF CHAIR 16  [facility]          Based on individual needs I will follow up with them in 4 weeks. I have provided my direct contact information and welcome them to contact me if their needs as discussed above change. They were appreciative for the call.

## 2025-02-25 ENCOUNTER — TELEMEDICINE (OUTPATIENT)
Dept: PALLIATIVE MEDICINE | Facility: CLINIC | Age: 80
End: 2025-02-25
Payer: MEDICARE

## 2025-02-25 ENCOUNTER — RESULTS FOLLOW-UP (OUTPATIENT)
Dept: FAMILY MEDICINE CLINIC | Facility: HOSPITAL | Age: 80
End: 2025-02-25

## 2025-02-25 DIAGNOSIS — C79.51 BREAST CANCER METASTASIZED TO BONE, RIGHT (HCC): Primary | Chronic | ICD-10-CM

## 2025-02-25 DIAGNOSIS — Z51.5 PALLIATIVE CARE BY SPECIALIST: ICD-10-CM

## 2025-02-25 DIAGNOSIS — F51.01 PRIMARY INSOMNIA: ICD-10-CM

## 2025-02-25 DIAGNOSIS — G89.3 CANCER RELATED PAIN: ICD-10-CM

## 2025-02-25 DIAGNOSIS — F33.9 DEPRESSION, RECURRENT (HCC): ICD-10-CM

## 2025-02-25 DIAGNOSIS — C50.911 BREAST CANCER METASTASIZED TO BONE, RIGHT (HCC): Primary | Chronic | ICD-10-CM

## 2025-02-25 PROBLEM — Z79.899 MEDICAL MARIJUANA USE: Status: ACTIVE | Noted: 2025-02-25

## 2025-02-25 PROCEDURE — 99214 OFFICE O/P EST MOD 30 MIN: CPT | Performed by: INTERNAL MEDICINE

## 2025-02-25 PROCEDURE — G2211 COMPLEX E/M VISIT ADD ON: HCPCS | Performed by: INTERNAL MEDICINE

## 2025-02-25 NOTE — PROGRESS NOTES
Outpatient Virtual Regular Visit - Follow-up with Palliative and Supportive Care  Yuni Keys 79 y.o. female [id number]    1. Breast cancer metastasized to bone, right (HCC)  2. Cancer related pain  3. Depression, recurrent (HCC)  4. Palliative care by specialist  5. Primary insomnia    There are no discontinued medications.    Narrative rationale for today's treatments:  - MMJ as below  - Return to clinic: 6 weeks with Dr. Contreras    The patient qualifies for use of MMJ in the [facility] by having the following medical condition - cancer.    From a palliative care stand point the patient is suffering with pain.  These symptoms and side effects might be alleviated with use of MMJ products.  The patient registered online.  The patient read and I reviewed the informed consent document with the patient.  I answered all questions related to it before the patient signed it.  The patient's medical certification was completed on this date.  The patient was given a signed copy of the informed consent and medical certification.    I issued a certification for MMJ use with palliative intent -  To help alleviate cancer related symptoms and cancer treatment related side effects.  I do not endorse the belief that MMJ can treat cancer and strongly encouraged the patient to continue treatments and surveillance as recommend by cancer specialists.    This was conducted under a virtual visit.  We have obtained verbal MMJ certification consent as well as that the patient will be mailed a copy of the consent form for signature.         Yuni Keys was seen today for symptoms and planning cares related to above illnesses.  I have reviewed the patient's controlled substance dispensing history in the Prescription Drug Monitoring Program in compliance with the [place] regulations before prescribing any controlled substances.    They are invited to continue to follow with us.  If there are questions or concerns, please contact us through  our clinic/answering service 24 hours a day, seven days a week.    Forrest Harden MD  [facility]  [phone]      Visit Information    [id number]  Intake:    Reason for virtual visit is follow up symptoms.  The patient is unable to visit the clinic safely due to transport.    After connecting through [employer], the patient was identified by name and date of birth. Yuni Keys or their agent was informed that this was a telemedicine visit and that the visit is being conducted through the Epic Embedded platform. She agrees to proceed..  My office door was closed. No one else was in the room.  She acknowledged consent and understanding of privacy and security of the video platform. The patient or their agent has agreed to participate and understands they can discontinue the visit at any time.     Narrative and Interval History      Yuni Keys is a 79 y.o. female who presents in follow up of symptoms related to metasttic breast cancer.  Pertinent issues include: symptom management      Patient continues to have pain related to her malignancy.  Primarily located in her back.  Also having thigh pain.  She relates this to her cancer treatmetns.  Oxycodone somewhat effective.  Hopeful that a treatment break will help.  No other concerns.  Discussed MMJ risks/benefits at length.     Past Medical History:   Diagnosis Date    Arthritis     Breast mass     Cancer (HCC) 8/28/2024    Edema of both lower legs 11/01/2018    Dr. Thang Otero; faxed records.    Hx of skin cancer, basal cell     Hyperlipidemia     Hypertension     Impingement syndrome of left shoulder 04/10/2018    Dr. Thang Otero, faxed patient records.    Lactose intolerance     Migraines     Osteopenia     Overactive bladder     Overweight 12/16/2021    Skin cancer 2022    Basal cell    Toe fracture, left 11/16/2023     Past Surgical History:   Procedure Laterality Date    ACHILLES TENDON REPAIR      BREAST BIOPSY Left  10/04/2024    BREAST CYST EXCISION Bilateral     1972    BREAST LUMPECTOMY      CATARACT EXTRACTION, BILATERAL      COLONOSCOPY      COLPOPEXY VAGINAL EXTRAPERITONEAL (VEC) WITH INSERTION PUBOVAGINAL SLING      CYST REMOVAL      HERNIA REPAIR      HYSTERECTOMY      IR KYPHOPLASTY/VERTEBROPLASTY WITH ABLATION  10/15/2024    JOINT REPLACEMENT Right     REPLACEMENT TOTAL KNEE Left     TONSILLECTOMY  1949    US GUIDANCE BREAST BIOPSY LEFT EACH ADDITIONAL Left 10/04/2024    US GUIDED BREAST BIOPSY LEFT COMPLETE Left 10/04/2024     Current Outpatient Medications   Medication Sig Dispense Refill    atorvastatin (LIPITOR) 10 mg tablet Take 1 tablet (10 mg total) by mouth daily 90 tablet 3    Calcium 500-2.5 MG-MCG CHEW Chew 2 chews daily      Cholecalciferol (Vitamin D) 50 MCG (2000 UT) tablet Take 2,000 Units by mouth daily      letrozole (FEMARA) 2.5 mg tablet Take 1 tablet (2.5 mg total) by mouth daily 90 tablet 3    Multiple Vitamins-Minerals (MULTIVITAMIN WITH MINERALS) tablet Take 1 tablet by mouth daily      naloxone (NARCAN) 4 mg/0.1 mL nasal spray Administer 1 spray into a nostril. If no response after 2-3 minutes, give another dose in the other nostril using a new spray. (Patient not taking: Reported on 2/19/2025) 1 each 1    oxyCODONE (Roxicodone) 5 immediate release tablet Take 1-2 tablets (5-10 mg total) by mouth every 4 (four) hours as needed for moderate pain or severe pain Max Daily Amount: 40 mg 60 tablet 0    PARoxetine (PAXIL) 10 mg tablet Take 1 tablet (10 mg total) by mouth daily At bedtime 30 tablet 2    polyethylene glycol (GLYCOLAX) 17 GM/SCOOP powder Take 17 g by mouth 2 (two) times a day      pregabalin (LYRICA) 25 mg capsule Take 1 capsule (25 mg total) by mouth 2 (two) times a day 60 capsule 0    Sodium Fluoride 5000 PPM 1.1 % PSTE APPLY TIHN RIBBON TO TOOTHBRUSH, BRUSH X 2MIN AT BEDTIME, SPIT, DONT RINSE       No current facility-administered medications for this visit.      Allergies   Allergen  Reactions    Medical Tape Blisters, Hives, Itching and Rash    Bupropion Rash     Around 2010         Video Exam  There were no vitals filed for this visit.  Physical Exam  Constitutional:       General: She is not in acute distress.     Appearance: She is not ill-appearing or toxic-appearing.   HENT:      Head: Normocephalic and atraumatic.      Right Ear: External ear normal.      Left Ear: External ear normal.      Nose: Nose normal.      Mouth/Throat:      Mouth: Mucous membranes are moist.   Eyes:      General:         Right eye: No discharge.         Left eye: No discharge.   Pulmonary:      Effort: No respiratory distress.   Musculoskeletal:         General: No swelling or deformity.   Skin:     General: Skin is dry.      Coloration: Skin is not jaundiced or pale.   Neurological:      General: No focal deficit present.      Mental Status: She is alert and oriented to person, place, and time.   Psychiatric:         Mood and Affect: Mood normal.         Behavior: Behavior normal.           I have spent a total time of 27 minutes on 02/25/25 in caring for this patient including Prognosis, Instructions for management, Patient and family education, Importance of tx compliance, Risk factor reductions, Impressions, Counseling / Coordination of care, Documenting in the medical record, Reviewing/placing orders in the medical record (including tests, medications, and/or procedures), Obtaining or reviewing history  , and Communicating with other healthcare professionals .       Visit was conducted by VIDEO TELECOMMUNICATIONS, face to face with Yuni Keys.      Encounter provider Forrest Harden MD, located at:  [facility]IVE [facility] [place]  [address] [id number] [phone]    Recent Visits  Date Type Provider Dept   02/19/25 Office Visit DO Mika Gross[facility] [place] [phone]   02/19/25 Office Visit Lisa Contreras DO [facility]  [place]   Showing recent visits within past 7 days and meeting all other requirements  Today's Visits  Date Type Provider Dept   02/25/25 Telemedicine Forrest Harden MD [facility] [place]   Showing today's visits and meeting all other requirements  Future Appointments  No visits were found meeting these conditions.  Showing future appointments within next 150 days and meeting all other requirements       VIRTUAL VISIT DISCLAIMER    Yuni Keys or their agent has consented to an online visit or consultation.  They understands that the online visit is based solely on information provided by the patient or agent, and that, in the absence of a face-to-face physical evaluation by the physician, the diagnosis they receive is both limited and provisional in terms of accuracy and completeness.  This is not intended to replace a full medical face-to-face evaluation by the physician. Yuni Keys or their agent understands and accepts these terms.  The patient or their agent was informed this is a billable service.

## 2025-03-03 DIAGNOSIS — C50.911 BREAST CANCER METASTASIZED TO BONE, RIGHT (HCC): Primary | ICD-10-CM

## 2025-03-03 DIAGNOSIS — Z17.0 MALIGNANT NEOPLASM OF UPPER-INNER QUADRANT OF LEFT BREAST IN FEMALE, ESTROGEN RECEPTOR POSITIVE (HCC): ICD-10-CM

## 2025-03-03 DIAGNOSIS — C50.212 MALIGNANT NEOPLASM OF UPPER-INNER QUADRANT OF LEFT BREAST IN FEMALE, ESTROGEN RECEPTOR POSITIVE (HCC): ICD-10-CM

## 2025-03-03 DIAGNOSIS — C79.51 BREAST CANCER METASTASIZED TO BONE, RIGHT (HCC): Primary | ICD-10-CM

## 2025-03-05 ENCOUNTER — PATIENT OUTREACH (OUTPATIENT)
Dept: CASE MANAGEMENT | Facility: OTHER | Age: 80
End: 2025-03-05

## 2025-03-06 NOTE — PROGRESS NOTES
"Cardio-Oncology / Heart Failure Cardiology Clinic Note    Yuni Keys 79 y.o. female   MRN: [id number]  Encounter: [id number]        Assessment / Plan:    # Cardio-Oncology Pertinent History  Breast cancer  Dx 2024  Metastatic to bone  S/p palliative XRT to spine and shoulder  Current:   letrozole,  (ribociclib- patient holding due to perceived toxicities)    # Abnormal EKG  EKG on December 12th reported sinus rhythm and \"cannot rule out anterior infarct\".   Reviewed EKG - does NOT appear to definitively meet criteria for anterior infarct  Checked echo, normal EF, no WMA    # Coronary artery calcifications  On statin with good LDL control  Hold off on starting aspirin in setting of possible esophagitis and metastatic cancer  Any chest pain would obtain stress test  (denies chest pain or OLMEDO)    # HTN  Was on medication in the past but taken off with weight loss  BP last visit 134/70  BP today 158/80  Patient declines treatment.  Agreeable to home monitor and let me know if elevated.    # HLD  Lipitor 10  LDL 62    # Aortic valve disease  Mild AI.  Mild AS.  Serial follow-up for progression.  Repeat echo 2 years (2027).    # High risk medication use  Ribociclib can prolong QTc.  Previous EKGs reviewed and QTc interval has been acceptable.      Today's Plan Summary:  See above assessment/plan for full details of today's plan.  Briefly,     Blood pressure elevated but patient declines treatment.  She is agreeable to home monitor blood pressure and bring results to next visit.              Reason For Visit / Chief Complaint:  F/u  - abnormal EKG, coronary calcification    HPI:   Yuni Keys is a 79 y.o.  female with history as noted in the problem list and further detailed in the above assessment and plan.    Initial:  Dec 2024  Referred by Dr. Amanda for abnormal EKG.  As above, the patient has a history of metastatic breast cancer.  She was on a medication that can prolong QT (ribociclib ) so she was getting " "serial EKGs.  An EKG on December 12 reported sinus rhythm and \"cannot rule out anterior infarct\".   Today, the patient reports  -  denies chest pain.     Retired.  Was an .   .   Lives by herself.  2 daughters.    Interval:  Plan at initial visit -->   Check echo    Echo - 01/17/25   EF 64%.    Mild AI.  Mild AS.    Labs from February 21 reviewed in detail, basic metabolic panel is stable.    Today  -  no CP or SOB or edema.  No syncope.                 Cardiac Imaging personally reviewed:  EKG 12-12-24  Normal sinus rhythm   Holter or event monitor    Echo 10-31-22  EF 60%.  GLS -21%.  Mild AI    Echo - 01/17/25   EF 64%.  GLS -23%.  Aortic Valve: The leaflets are thickened and calcified. There is mildly reduced mobility. There is mild regurgitation. There is mild stenosis (mean gradient 9 mmHg).         LOGAN    Cardiac MRI    Stress testing    Coronary CTA or [facility]    CPET              Patient Active Problem List    Diagnosis Date Noted   • Nonrheumatic aortic valve stenosis 03/07/2025   • Medical marijuana use 02/25/2025   • Opioid dependence, uncomplicated (HCC) 02/19/2025   • Other drug-induced neutropenia (HCC) 02/07/2025   • Hot flashes 01/02/2025   • Coronary artery calcification 12/27/2024   • Goals of care, counseling/discussion 12/11/2024   • Odynophagia 12/02/2024   • Esophagitis 12/02/2024   • Acute radiation dermatitis 12/02/2024   • Cancer related pain 11/11/2024   • Palliative care by specialist 11/11/2024   • Constipation 11/09/2024   • Bilateral hydronephrosis 11/09/2024   • Depression, recurrent (HCC) 11/08/2024   • Age-related osteoporosis with current pathological fracture of vertebra ([facility]) 11/01/2024   • Pathological fracture in neoplastic disease, other specified site, sequela 11/01/2024   • Malignant neoplasm of upper-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, estrogen receptor positive (HCC) 10/10/2024   • Abnormal positron emission tomography (PET) scan 10/03/2024   • " Breast cancer metastasized to bone, right ([facility]) 09/27/2024   • Closed compression fracture of body of L1 vertebra ([facility]) 09/03/2024   • Compression fracture of L1 lumbar vertebra ([facility]) 08/30/2024   • Primary insomnia 05/07/2024   • History of hysterectomy 01/27/2023   • Family history of early CAD 10/14/2022   • History of abnormal uterine bleeding 10/14/2022   • MCI (mild cognitive impairment) 02/17/2022   • Primary hypertension 12/16/2021   • Hyperlipidemia 12/16/2021   • Retrocalcaneal bursitis (back of heel), right 12/30/2020   • Lumbar disc herniation    • Lumbar radiculopathy    • Spinal stenosis of lumbar region with neurogenic claudication        Past Medical History:   Diagnosis Date   • Arthritis    • Breast mass    • Cancer ([facility]) 8/28/2024   • Edema of both lower legs 11/01/2018    Dr. Thang Otero; faxed records.   • Hx of skin cancer, basal cell    • Hyperlipidemia    • Hypertension    • Impingement syndrome of left shoulder 04/10/2018    Dr. Thang Otero, faxed patient records.   • Lactose intolerance    • Migraines    • Osteopenia    • Overactive bladder    • Overweight 12/16/2021   • Skin cancer 2022    Basal cell   • Toe fracture, left 11/16/2023         Allergies   Allergen Reactions   • Medical Tape Blisters, Hives, Itching and Rash   • Bupropion Rash     Around 2010       Current Outpatient Medications   Medication Instructions   • atorvastatin (LIPITOR) 10 mg, Oral, Daily   • Calcium 500-2.5 MG-MCG CHEW Chew 2 chews daily   • letrozole (FEMARA) 2.5 mg, Oral, Daily   • Multiple Vitamins-Minerals (MULTIVITAMIN WITH MINERALS) tablet 1 tablet, Daily   • oxyCODONE (ROXICODONE) 5-10 mg, Oral, Every 4 hours PRN   • PARoxetine (PAXIL) 10 mg, Oral, Daily, At bedtime   • polyethylene glycol (GLYCOLAX) 17 g, Oral, 2 times daily   • pregabalin (LYRICA) 25 mg, Oral, 2 times daily   • Sodium Fluoride 5000 PPM 1.1 % PSTE APPLY TIHN RIBBON TO TOOTHBRUSH, BRUSH X 2MIN AT BEDTIME, SPIT, DONT RINSE   •  Vitamin D 2,000 Units, Daily       Social History     Socioeconomic History   • Marital status:      Spouse name: Not on file   • Number of children: 2   • Years of education: 18   • Highest education level: Master's degree (e.g., MA, MS, Derek, MEd, MSW, CHANELL)   Occupational History   • Not on file   Tobacco Use   • Smoking status: Former     Current packs/day: 0.00     Average packs/day: 0.5 packs/day for 15.0 years (7.5 ttl pk-yrs)     Types: Cigarettes     Quit date: 1977     Years since quittin.3     Passive exposure: Past   • Smokeless tobacco: Never   Vaping Use   • Vaping status: Never Used   Substance and Sexual Activity   • Alcohol use: Not Currently     Alcohol/week: 1.0 standard drink of alcohol     Types: 1 Glasses of wine per week     Comment: occasional   • Drug use: Never   • Sexual activity: Not Currently     Partners: Male     Birth control/protection: Abstinence, Post-menopausal, Female Sterilization   Other Topics Concern   • Not on file   Social History Narrative   • Not on file     Social Drivers of Health     Financial Resource Strain: Patient Declined (2023)    Overall Financial Resource Strain (CARDIA)    • Difficulty of Paying Living Expenses: Patient declined   Food Insecurity: No Food Insecurity (11/10/2024)    Hunger Vital Sign    • Worried About Running Out of Food in the Last Year: Never true    • Ran Out of Food in the Last Year: Never true   Transportation Needs: No Transportation Needs (11/10/2024)    PRAPARE - Transportation    • Lack of Transportation (Medical): No    • Lack of Transportation (Non-Medical): No   Physical Activity: Sufficiently Active (3/24/2022)    Exercise Vital Sign    • Days of Exercise per Week: 4 days    • Minutes of Exercise per Session: 60 min   Stress: Stress Concern Present (3/24/2022)    [place] [employer] - Occupational Stress Questionnaire    • Feeling of Stress : Rather much   Social Connections: Socially  "Isolated (3/24/2022)    Social Connection and Isolation Panel [NHANES]    • Frequency of Communication with Friends and Family: More than three times a week    • Frequency of Social Gatherings with Friends and Family: Three times a week    • Attends Cheondoism Services: Never    • Active Member of Clubs or Organizations: No    • Attends Club or Organization Meetings: Never    • Marital Status:    Intimate Partner Violence: Unknown (2024)    Nursing IPS    • Feels Physically and Emotionally Safe: Not on file    • Physically Hurt by Someone: Not on file    • Humiliated or Emotionally Abused by Someone: Not on file    • Physically Hurt by Someone: No    • Hurt or Threatened by Someone: No   Housing Stability: Unknown (11/10/2024)    Housing Stability Vital Sign    • Unable to Pay for Housing in the Last Year: No    • Number of Times Moved in the Last Year: Not on file    • Homeless in the Last Year: No       Family History   Problem Relation Age of Onset   • Heart disease Father          of MI age 41   • Breast cancer Maternal Grandmother    • Heart disease Paternal Grandmother    • Hypertension Paternal Grandfather    • Melanoma Daughter         40s   • Melanoma Daughter         50s   • Ovarian cancer Maternal Aunt    • Cancer Maternal Aunt         Ovarian Cancer   • Breast cancer Paternal Aunt        Physical Exam:  Blood pressure 158/80, pulse 68, height 4' 10\" (1.473 m), weight 59.1 kg (130 lb 6.4 oz), SpO2 98%.  Body mass index is 27.25 kg/m².  Wt Readings from Last 3 Encounters:   25 59.1 kg (130 lb 6.4 oz)   25 58.1 kg (128 lb)   25 58.1 kg (128 lb 1.4 oz)     Physical Exam  Vitals reviewed.   Constitutional:       General: She is not in acute distress.     Appearance: She is not toxic-appearing.   Neck:      Comments: No JVD  Cardiovascular:      Rate and Rhythm: Normal rate and regular rhythm.      Heart sounds: Murmur (systolic) heard.      No friction rub. No gallop. " "  Pulmonary:      Breath sounds: Normal breath sounds. No wheezing, rhonchi or rales.   Musculoskeletal:      Right lower leg: No edema.      Left lower leg: No edema.   Neurological:      Mental Status: She is alert.         Labs & Results:  Lab Results   Component Value Date    SODIUM 144 02/21/2025    K 4.2 02/21/2025     02/21/2025    CO2 31 02/21/2025    BUN 14 02/21/2025    CREATININE 0.51 (L) 02/21/2025    GLUC 52 (L) 01/17/2025    CALCIUM 9.6 02/21/2025     No results found for: \"NTBNP\"         Thank you for the opportunity to participate in the care of this patient.    Sekou Payne MD, [facility]  Staff Cardiologist  Director of Cardio-Oncology  [facility]  "

## 2025-03-07 ENCOUNTER — OFFICE VISIT (OUTPATIENT)
Dept: CARDIOLOGY CLINIC | Facility: CLINIC | Age: 80
End: 2025-03-07
Payer: MEDICARE

## 2025-03-07 VITALS
DIASTOLIC BLOOD PRESSURE: 80 MMHG | WEIGHT: 130.4 LBS | OXYGEN SATURATION: 98 % | SYSTOLIC BLOOD PRESSURE: 158 MMHG | BODY MASS INDEX: 27.37 KG/M2 | HEART RATE: 68 BPM | HEIGHT: 58 IN

## 2025-03-07 DIAGNOSIS — E78.2 MIXED HYPERLIPIDEMIA: ICD-10-CM

## 2025-03-07 DIAGNOSIS — I25.10 CORONARY ARTERY CALCIFICATION: ICD-10-CM

## 2025-03-07 DIAGNOSIS — C79.51 BREAST CANCER METASTASIZED TO BONE, RIGHT (HCC): ICD-10-CM

## 2025-03-07 DIAGNOSIS — C50.911 BREAST CANCER METASTASIZED TO BONE, RIGHT (HCC): ICD-10-CM

## 2025-03-07 DIAGNOSIS — I10 PRIMARY HYPERTENSION: ICD-10-CM

## 2025-03-07 DIAGNOSIS — I35.0 NONRHEUMATIC AORTIC VALVE STENOSIS: ICD-10-CM

## 2025-03-07 DIAGNOSIS — R94.31 ABNORMAL EKG: Primary | ICD-10-CM

## 2025-03-07 PROCEDURE — 99214 OFFICE O/P EST MOD 30 MIN: CPT | Performed by: INTERNAL MEDICINE

## 2025-03-12 ENCOUNTER — TELEPHONE (OUTPATIENT)
Age: 80
End: 2025-03-12

## 2025-03-12 ENCOUNTER — TELEPHONE (OUTPATIENT)
Dept: HEMATOLOGY ONCOLOGY | Facility: CLINIC | Age: 80
End: 2025-03-12

## 2025-03-12 NOTE — TELEPHONE ENCOUNTER
Pt wants to know if Dr Amanda wants for her to cancel her CT because she has a PET scheduled.  Please call back to advise

## 2025-03-12 NOTE — TELEPHONE ENCOUNTER
Spoke with patient and informed her that since the PET SCAN is scheduled we are okay to cancel the CT scan . Patient is aware and okay with the change.

## 2025-03-16 DIAGNOSIS — C50.911 BREAST CANCER METASTASIZED TO BONE, RIGHT (HCC): Chronic | ICD-10-CM

## 2025-03-16 DIAGNOSIS — C79.51 BREAST CANCER METASTASIZED TO BONE, RIGHT (HCC): Chronic | ICD-10-CM

## 2025-03-16 DIAGNOSIS — G89.3 CANCER RELATED PAIN: ICD-10-CM

## 2025-03-17 ENCOUNTER — HOSPITAL ENCOUNTER (OUTPATIENT)
Dept: NUCLEAR MEDICINE | Facility: HOSPITAL | Age: 80
Discharge: HOME | End: 2025-03-17
Attending: INTERNAL MEDICINE
Payer: MEDICARE

## 2025-03-17 DIAGNOSIS — C79.51 BREAST CANCER METASTASIZED TO BONE, RIGHT (HCC): ICD-10-CM

## 2025-03-17 DIAGNOSIS — C50.911 BREAST CANCER METASTASIZED TO BONE, RIGHT (HCC): ICD-10-CM

## 2025-03-17 DIAGNOSIS — Z17.0 MALIGNANT NEOPLASM OF UPPER-INNER QUADRANT OF LEFT BREAST IN FEMALE, ESTROGEN RECEPTOR POSITIVE (HCC): ICD-10-CM

## 2025-03-17 DIAGNOSIS — C50.212 MALIGNANT NEOPLASM OF UPPER-INNER QUADRANT OF LEFT BREAST IN FEMALE, ESTROGEN RECEPTOR POSITIVE (HCC): ICD-10-CM

## 2025-03-17 LAB — GLUCOSE SERPL-MCNC: 71 MG/DL (ref 65–140)

## 2025-03-17 PROCEDURE — 82948 REAGENT STRIP/BLOOD GLUCOSE: CPT

## 2025-03-17 PROCEDURE — 78815 PET IMAGE W/CT SKULL-THIGH: CPT

## 2025-03-17 PROCEDURE — A9552 F18 FDG: HCPCS

## 2025-03-17 NOTE — TELEPHONE ENCOUNTER
1 [id number] 02/13/2025 02/13/2025 oxyCODONE HCL (Tablet) 60.0 8 5 MG 56.25 [facility], [facility] [place] 0 / 0 [place]   1 [id number] 01/03/2025 01/03/2025 oxyCODONE HCL (Tablet) 60.0 8 5 MG 56.25 [facility], [facility] [place] / 0 [place]

## 2025-03-18 ENCOUNTER — PATIENT OUTREACH (OUTPATIENT)
Dept: CASE MANAGEMENT | Facility: OTHER | Age: 80
End: 2025-03-18

## 2025-03-18 RX ORDER — OXYCODONE HYDROCHLORIDE 5 MG/1
5-10 TABLET ORAL EVERY 4 HOURS PRN
Qty: 60 TABLET | Refills: 0 | Status: SHIPPED | OUTPATIENT
Start: 2025-03-18

## 2025-03-18 NOTE — PROGRESS NOTES
OSW sent email to Alley this afternoon, explaining the Breast Cancer Support Group was cancelled tomorrow due to the facilitator being out of office unexpectedly. OSW will r/s outreach to Alley via phone.

## 2025-03-20 ENCOUNTER — APPOINTMENT (OUTPATIENT)
Dept: LAB | Facility: CLINIC | Age: 80
End: 2025-03-20
Payer: MEDICARE

## 2025-03-20 DIAGNOSIS — C79.51 BREAST CANCER METASTASIZED TO BONE, RIGHT (HCC): Chronic | ICD-10-CM

## 2025-03-20 DIAGNOSIS — C50.911 BREAST CANCER METASTASIZED TO BONE, RIGHT (HCC): Chronic | ICD-10-CM

## 2025-03-20 LAB
ALBUMIN SERPL BCG-MCNC: 4.5 G/DL (ref 3.5–5)
ALP SERPL-CCNC: 53 U/L (ref 34–104)
ALT SERPL W P-5'-P-CCNC: 25 U/L (ref 7–52)
ANION GAP SERPL CALCULATED.3IONS-SCNC: 7 MMOL/L (ref 4–13)
AST SERPL W P-5'-P-CCNC: 25 U/L (ref 13–39)
BASOPHILS # BLD AUTO: 0.02 THOUSANDS/ÂΜL (ref 0–0.1)
BASOPHILS NFR BLD AUTO: 1 % (ref 0–1)
BILIRUB SERPL-MCNC: 0.41 MG/DL (ref 0.2–1)
BUN SERPL-MCNC: 15 MG/DL (ref 5–25)
CALCIUM SERPL-MCNC: 9.2 MG/DL (ref 8.4–10.2)
CHLORIDE SERPL-SCNC: 105 MMOL/L (ref 96–108)
CO2 SERPL-SCNC: 30 MMOL/L (ref 21–32)
CREAT SERPL-MCNC: 0.52 MG/DL (ref 0.6–1.3)
EOSINOPHIL # BLD AUTO: 0.21 THOUSAND/ÂΜL (ref 0–0.61)
EOSINOPHIL NFR BLD AUTO: 5 % (ref 0–6)
ERYTHROCYTE [DISTWIDTH] IN BLOOD BY AUTOMATED COUNT: 13.6 % (ref 11.6–15.1)
GFR SERPL CREATININE-BSD FRML MDRD: 91 ML/MIN/1.73SQ M
GLUCOSE P FAST SERPL-MCNC: 93 MG/DL (ref 65–99)
HCT VFR BLD AUTO: 44.6 % (ref 34.8–46.1)
HGB BLD-MCNC: 14.8 G/DL (ref 11.5–15.4)
IMM GRANULOCYTES # BLD AUTO: 0.02 THOUSAND/UL (ref 0–0.2)
IMM GRANULOCYTES NFR BLD AUTO: 1 % (ref 0–2)
LYMPHOCYTES # BLD AUTO: 0.96 THOUSANDS/ÂΜL (ref 0.6–4.47)
LYMPHOCYTES NFR BLD AUTO: 22 % (ref 14–44)
MCH RBC QN AUTO: 31.6 PG (ref 26.8–34.3)
MCHC RBC AUTO-ENTMCNC: 33.2 G/DL (ref 31.4–37.4)
MCV RBC AUTO: 95 FL (ref 82–98)
MONOCYTES # BLD AUTO: 0.32 THOUSAND/ÂΜL (ref 0.17–1.22)
MONOCYTES NFR BLD AUTO: 7 % (ref 4–12)
NEUTROPHILS # BLD AUTO: 2.86 THOUSANDS/ÂΜL (ref 1.85–7.62)
NEUTS SEG NFR BLD AUTO: 64 % (ref 43–75)
NRBC BLD AUTO-RTO: 0 /100 WBCS
PLATELET # BLD AUTO: 308 THOUSANDS/UL (ref 149–390)
PMV BLD AUTO: 9.6 FL (ref 8.9–12.7)
POTASSIUM SERPL-SCNC: 3.7 MMOL/L (ref 3.5–5.3)
PROT SERPL-MCNC: 6.6 G/DL (ref 6.4–8.4)
RBC # BLD AUTO: 4.68 MILLION/UL (ref 3.81–5.12)
SODIUM SERPL-SCNC: 142 MMOL/L (ref 135–147)
WBC # BLD AUTO: 4.39 THOUSAND/UL (ref 4.31–10.16)

## 2025-03-20 PROCEDURE — 36415 COLL VENOUS BLD VENIPUNCTURE: CPT

## 2025-03-20 PROCEDURE — 80053 COMPREHEN METABOLIC PANEL: CPT

## 2025-03-20 PROCEDURE — 85025 COMPLETE CBC W/AUTO DIFF WBC: CPT

## 2025-03-21 ENCOUNTER — TELEPHONE (OUTPATIENT)
Age: 80
End: 2025-03-21

## 2025-03-21 ENCOUNTER — PATIENT OUTREACH (OUTPATIENT)
Dept: HEMATOLOGY ONCOLOGY | Facility: CLINIC | Age: 80
End: 2025-03-21

## 2025-03-21 DIAGNOSIS — Z51.5 PALLIATIVE CARE BY SPECIALIST: ICD-10-CM

## 2025-03-21 DIAGNOSIS — G89.3 CANCER RELATED PAIN: Primary | ICD-10-CM

## 2025-03-21 RX ORDER — OXYCODONE HYDROCHLORIDE 10 MG/1
5-10 TABLET ORAL EVERY 4 HOURS PRN
Qty: 120 TABLET | Refills: 0 | Status: SHIPPED | OUTPATIENT
Start: 2025-03-21

## 2025-03-21 NOTE — TELEPHONE ENCOUNTER
Patient was only given a 5 day supply of oxycodone. I'll send a refill at the 10 mg dose so insurance will cover it. Please let her know I sent it to her pharmacy of choice.

## 2025-03-21 NOTE — TELEPHONE ENCOUNTER
Spoke to pt and informed her oxycodone 10mg tabs quantity of 120 tabs was sent to her preferred pharmacy.

## 2025-03-21 NOTE — PROGRESS NOTES
I reached out and spoke with Alley to follow up and to review for any new changes in barriers to care and offer supportive services as needed.     Barriers noted previously and outcome of interventions;  Co-morbidities    Reviewed for any new barriers as noted below.    Are you eating and drinking normally? yes    Have you been experiencing any uncontrolled pain related to your cancer diagnosis? Yes, describe: Pt is currently waiting to hear back from Palliative Care    If not already established, have needs changed for a palliative care referral? established    Do you have a good support system? Yes     Do you have any questions or concerns regarding your treatment plan? No    Any new financial concerns for your household or medical bills? No    Do you know when your upcoming appointments are? yes  Future Appointments   Date Time Provider Department Center   3/24/2025 11:00 AM Fam Amanda MD HEM ONC ALL Practice-Onc   4/2/2025  9:00 AM Lisa Contreras DO PALL [facility] Practice-[facility]   4/14/2025 11:30 AM UB CT 1 UB CT  HOSPITAL   5/8/2025  1:40 PM Lisa Fairbanks DO [facility]  [facility]-[place]   5/12/2025  9:30 AM [facility] INF CHAIR 16  [facility]   9/19/2025 10:00 AM Sekou Payne MD CARD ONC [facility]          Based on individual needs I will follow up with them in 4 weeks. I have provided my direct contact information and welcome them to contact me if their needs as discussed above change. They were appreciative for the call.

## 2025-03-23 NOTE — PROGRESS NOTES
Name: Yuni Keys      : 1945      MRN: [id number]  Encounter Provider: Fam Amanda MD  Encounter Date: 3/24/2025   Encounter department: [facility]  :  Assessment & Plan  Breast cancer metastasized to bone, right (HCC)  This is a 79 y.o. c PMHx notable for Arthritis, hyperlipidemia, hypertension, migraines, osteopenia, basal cell skin cancer, had hysterectomy and oophorectomy in her early 50's     seen initially in in 10/2024 as consultation for likely recurrent HR+ L Breast Cancer to the bone including to T8 & L1 with pathological fractures s/p Kyphoplasty in 10/2024 with palliative radiation to T spine and R shoulder completed 2024      10/22/2024 initiated on Letrozole 2.5 mg QD and Kisqali initially 600 mg QD 3 weeks on 1 week of . then she tried 400 mg QD early 2024 due to reported side effects.   G1 Neutropenia 2/2 Kisqali was noted     24 visit: patient reported that she self discontinued the Letrozole/Kisqali around 12/10/2024 because of the fatigue and night sweats, however she still has the same symptoms 3 weeks after discontinuing the therapy with no improvement. Also noted on  that she started to feel R hip pain and her R shoulder pain which was gone after RT now reoccur. Denies suicidal ideation. Had on and off mild grade fevers past month but not in past week. Eating well and still do some social activities.   Orders:    CT chest abdomen pelvis w contrast; Future    Long term (current) use of aromatase inhibitors    Orders:    DXA bone density spine hip and pelvis; Future        Return in about 3 months (around 2025) for Office Visit, Imaging - See orders.    History of Present Illness   Chief Complaint   Patient presents with    Follow-up     Oncology History   Cancer Staging   Breast cancer metastasized to bone, right (HCC)  Staging form: Breast, AJCC 8th Edition  - Clinical: Stage IV (cM1) - Signed by Fam  MENA Amanda MD on 10/12/2024    Malignant neoplasm of upper-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, estrogen receptor positive (HCC)  Staging form: Breast, AJCC 8th Edition  - Clinical: Stage IV (cM1) - Signed by Fam Amanda MD on 10/12/2024  Oncology History   Breast cancer metastasized to bone, right (HCC)   9/27/2024 Initial Diagnosis    Breast cancer metastasized to bone, right (HCC)     10/12/2024 -  Cancer Staged    Staging form: Breast, AJCC 8th Edition  - Clinical: Stage IV (cM1) - Signed by Fam Amanda MD on 10/12/2024       11/5/2024 - 11/20/2024 Radiation      Plan ID Energy Fractions Dose per Fraction (cGy) Dose Correction (cGy) Total Dose Delivered (cGy) Elapsed Days   RT Shoulder 6X 5 / 5 400 0 2,000 7   T11_L2 Spine 10X/6X 10 / 10 300 0 3,000 15   T7_T10 Spine 6X 10 / 10 300 0 3,000 15         Malignant neoplasm of upper-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, estrogen receptor positive (HCC)   10/2/2024 Observation    NM PET/CT IMPRESSION:   1. Mild focal radiotracer uptake at a left breast nodular density medially. Underlying primary breast malignancy should be excluded.  2. Several small FDG avid left axillary/subpectoral lymph nodes would raise suspicion for metastasis.  3. Scattered FDG avid lytic lesions compatible with metastasis.     10/4/2024 Biopsy    Left breast ultrasound-guided biopsy  A. 3 o'clock, periareolar  Benign breast tissue with fibroadenomatoid nodule    B. 11 o'clock, 7 cm from nipple  Invasive mammary carcinoma with mixed ductal and lobular features  Grade 1-2  ER , CO <1, HER2 1+  Lymphovascular invasion not definitively identified    Concordant. Malignancy is poorly defined on mammo, appearing as a developing asymmetry rather than a discrete mass; this measures up to 5.8 cm. On US, mass measures up to 4.1cm. Subtle asymmetry with possible distortion slightly superior and medial which could represent a second site of disease. Further characterization is  indication. Right breast imaging performed on 4/12/2024; more recent mammogram may be reasonable. CESM or MRI is recommended for evaluation of extent of disease in left breast. The patient had abnormal axillary lymph nodes seen on PET/CT. 1 of these lymph nodes was visualized on US; however, it is not amenable to US-guided core needle biopsy.     10/12/2024 -  Cancer Staged    Staging form: Breast, AJCC 8th Edition  - Clinical: Stage IV (cM1) - Signed by Fam Amanda MD on 10/12/2024       10/15/2024 Biopsy    IR-guided biopsy    T8 - metastatic carcinoma, most compatible with known breast primary  ~RECEPTORS PENDING~    L1 - negative for carcinoma     11/5/2024 - 11/20/2024 Radiation      Plan ID Energy Fractions Dose per Fraction (cGy) Dose Correction (cGy) Total Dose Delivered (cGy) Elapsed Days   RT Shoulder 6X 5 / 5 400 0 2,000 7   T11_L2 Spine 10X/6X 10 / 10 300 0 3,000 15   T7_T10 Spine 6X 10 / 10 300 0 3,000 15              Discussion of decision making  Oncology history updated, accordingly, during this visit  Goals of care/patient communication  I discussed with the patient the clinical course leading up to their cancer diagnosis. I reviewed relevant office notes, imaging reports and pathology result as well.  I told the patient that this is a case of incurable disease and what this means. We discussed that the goal of anti-cancer therapy is to provide best quality of life, extend overall survival, and progression free survival as shown in clinical trials. We also discussed that there might be a point when the cancer will no longer respond to this anti-neoplastic therapy. As a result, we also discussed the role of the palliative care team being introduced early in the treatment course. We will be making this referral  I explained the risks/benefits of the proposed cancer therapy: Letrozole + Ribociclib  and after discussion including understanding risks of possible life-threatening complications and  "therapy-related malignancy development, informed consent for blood products and treatment has been signed and obtained.  TNM/Staging At Diagnosis  Cancer Staging   Breast cancer metastasized to bone, right (HCC)  Staging form: Breast, AJCC 8th Edition  - Clinical: Stage IV (cM1) - Signed by Fam Amanda MD on 10/12/2024     Malignant neoplasm of upper-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, estrogen receptor positive (HCC)  Staging form: Breast, AJCC 8th Edition  - Clinical: Stage IV (cM1) - Signed by Fam Amanda MD on 10/12/2024     Disease Features/Tumor Markers/Genetics  Tumor Marker: n/a  Notable Path Features:   10/4/2024 Left Breast: ER 90%, AZ negative, HER-2 +1  10/4/2024: CARIS NGS: ER+, HER2 0, low tumor burden       10/15/2024: Spine, \"Vertebra, T8,\" Biopsy: Metastatic carcinoma, most compatible with known breast primary.                  ER+, AZ -ve, Her2 0.  2024: [facility] NGS 10 muts/Mb, NF1 mutation  Treatment: Letrozole + Ribociclib (she is off of Ribociclib - discontinued it herself)   Other Supportive care: Prolia, Calcium and Vitamin D   Treatment Team Members  Surgeon: Cassandra Lynn Cardarelli, MD   Rad Onc: Olimpia Siddiqi MD   Palliative: Lisa Contreras, DO   Labs  Diagnostics  10/1/2024 EK ms QTC   10/2/2024 PET/CT: Mild focal radiotracer uptake at a left breast nodular density medially. Several small FDG avid left axillary/subpectoral lymph nodes would raise suspicion for metastasis. Scattered FDG avid lytic lesions compatible with metastasis. T-8 and T-11 spine lesions  10/8/2024 MRI T-spine w/wo c: Normal enhanced MRI of the thoracic spine  There is a DEXA from , next can get it done 2024:  ms QTC   2024 CT AP: No evidence of metastases in the abdomen or pelvis. 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  Compression fracture of L1, s/p Kyphoplasty.   2024 CT T Spine: Osteolytic metastases " involving the T8, T11 and L1 vertebral bodies with pathologic fractures treated with kyphoplasty at T8 and L1. Thickened paravertebral soft tissues at T8. This could be residual edema or hematoma ... Probable extraosseous extension of T8 metastasis into the left anterior epidural space.   11/09/2024 CT L Spine: Compression fracture of L1, status post kyphoplasty. No new lumbar vertebral fractures. No evidence of lumbar spine metastasis.  11/10/2024 MRI Thoracic Spine: Interval T8 kyphoplasty ... Redemonstrated osseous metastases at T5, T8, and T11. No abnormal epidural or leptomeningeal enhancement. Normal cord signal.  12/12/2024 EKG QTC interval 423 msec   12/13/2024 MRI Abdomen: Minimally dilated CBD up to 8 mm proximally with smooth distal tapering. No intrahepatic bile duct dilation. No choledocholithiasis, biliary stricture or suspicious mass within the limits of this motion degraded examination.  1/13/2025 CT CAP w/c: stable left breast mass with biopsy clip. Scattered bone metastases including a new right scapular lytic lesion with a healed pathological fracture, likely related to postradiation treatment changes to the right shoulder.  No metastatic disease in the solid organs of the abdomen and pelvis.  3/17/2025: PET/CT indicates patchy uptake seen within the right acromion with maximal SUV of about 2.9 (previously 4.8) . There are lytic permeative changes uptake in the left sternoclavicular joint seen with SUV of 4.2, new, nonspecific Uptake noted at the left first costochondral junction, nonspecific. OSSEOUS STRUCTURES: Focal area of uptake seen within the left ischial tuberosity with maximal SUV of four 2.3 (previously 4.7) .  There is no new visceral metastatic disease   New focal area of uptake in the left first costochondral junction and in the left sternoclavicular joint, nonspecific        Discussion of decision making     I personally reviewed the following lab results, the image studies,  pathology, other specialty/physicians consult notes and recommendations, and outside medical records. I had a lengthy discussion with the patient and shared the work-up findings. We discussed the diagnosis and management plan as below. I spent 42 minutes reviewing the records (labs, clinician notes, outside records, medical history, ordering medicine/tests/procedures, monitoring of anti-neoplastic toxicities, interpreting the imaging/labs previously done) and coordination of care as well as direct time with the patient today, of which greater than 50% of the time was spent in counseling and coordination of care with the patient/family.     Plan/Labs  EKG at baseline and q2 weeks for 2 times - completed with normal QTC intervals.   To continue follow up with palliative care; further goals of care discussion to follow.  Patient has depression symptoms but no suicidal thoughts or ideations. She initally refused SSRI but advised to reconsider. She is resistant to full therapy and wants to continue with monotherapy - see below.  To continue follow up with radiation oncology   10/22/24 was initiated on Letrozole 2.5 mg daily + Ribociclib 600 mg daily 21 days on and 7 days off   Reported fatigue and night sweats for which kisqali was decreased to 400 mg daily then patient Self Discontinued the therapy since around 12/10/24 but without improvement of the fatigue or night sweats up to today visit 12/30/24,  Continue monotherapy Letrozole 2.5 mg daily   recommends Calcium 1200 mg and Vitamin D-3 2,000 units daily supplemental  Can use Keytruda down the road due to TMB high  Baseline DEXA due 4/2025 and would be covered by the insurance   Cont Prolia SC 60 mg q6 months (initiated on 11/12/24)   Oncology dietitian   patient was seen by genetic on 10/30/24 and genetic testing performed including but not limited to NF1, BRCA1 and BRCA2 were are negative with no clinically significant variants detected.   CBC, CMP q4 weeks ordered  "as standing until 11/2025  Restaging CT CAP w/c ordered 7/2025            Follow Up: 3 months     All questions were answered to the patient's satisfaction during this encounter. The patient knows the contact information for our office and knows to reach out for any relevant concerns related to this encounter. They are to call for any temperature 100.4 or higher, new symptoms including but not restricted to shaking chills, decreased appetite, nausea, vomiting, diarrhea, increased fatigue, shortness of breath or chest pain, confusion, and not feeling the strength to come to the clinic. For all other listed problems and medical diagnosis in their chart - they are managed by PCP and/or other specialists, which the patient acknowledges. Thank you very much for your consultation and making us a part of this patient's care. We are continuing to follow closely with you. Please do not hesitate to reach out to me with any additional questions or concerns.    Pertinent Medical History     03/24/25: Still has the hip/joint pain. Seems like muscular pain. It is sharp / constant. Laying down helps.  Oxycodone help for few hours.  She had radiation to the shoulder and lower back end of 2024 which seem to help alleviate some of the pain.  No other issues or complaints.  She is tolerating diet and keeping herself active.       Review of Systems  See pertinent medical history      Objective   /62 (BP Location: Left arm, Patient Position: Sitting, Cuff Size: Adult)   Pulse 61   Temp 97.9 °F (36.6 °C) (Temporal)   Resp 16   Ht 4' 10\" (1.473 m)   Wt 58.3 kg (128 lb 8 oz)   SpO2 98%   BMI 26.86 kg/m²     Pain Screening:  Pain Score: 0-No pain  ECOG   1  Physical Exam  Constitutional:       General: She is not in acute distress.     Appearance: Normal appearance. She is not ill-appearing.   HENT:      Head: Normocephalic and atraumatic.   Eyes:      Extraocular Movements: Extraocular movements intact.   Cardiovascular:      " Rate and Rhythm: Normal rate and regular rhythm.      Pulses: Normal pulses.      Heart sounds: Normal heart sounds.   Pulmonary:      Effort: Pulmonary effort is normal.      Breath sounds: Normal breath sounds.   Abdominal:      General: Abdomen is flat.      Palpations: Abdomen is soft.   Musculoskeletal:         General: Normal range of motion.      Cervical back: Normal range of motion.   Skin:     General: Skin is warm.   Neurological:      General: No focal deficit present.      Mental Status: She is alert and oriented to person, place, and time.      Comments: anxious   Psychiatric:         Mood and Affect: Mood normal.           Labs: I have reviewed the following labs:  Lab Results   Component Value Date/Time    WBC 4.39 03/20/2025 07:12 AM    RBC 4.68 03/20/2025 07:12 AM    Hemoglobin 14.8 03/20/2025 07:12 AM    Hematocrit 44.6 03/20/2025 07:12 AM    MCV 95 03/20/2025 07:12 AM    MCH 31.6 03/20/2025 07:12 AM    RDW 13.6 03/20/2025 07:12 AM    Platelets 308 03/20/2025 07:12 AM    Segmented % 64 03/20/2025 07:12 AM    Lymphocytes % 22 03/20/2025 07:12 AM    Monocytes % 7 03/20/2025 07:12 AM    Eosinophils Relative 5 03/20/2025 07:12 AM    Basophils Relative 1 03/20/2025 07:12 AM    Immature Grans % 1 03/20/2025 07:12 AM    Absolute Neutrophils 2.86 03/20/2025 07:12 AM     Lab Results   Component Value Date/Time    Potassium 3.7 03/20/2025 07:12 AM    Chloride 105 03/20/2025 07:12 AM    CO2 30 03/20/2025 07:12 AM    BUN 15 03/20/2025 07:12 AM    Creatinine 0.52 (L) 03/20/2025 07:12 AM    Glucose, Fasting 93 03/20/2025 07:12 AM    Calcium 9.2 03/20/2025 07:12 AM    AST 25 03/20/2025 07:12 AM    ALT 25 03/20/2025 07:12 AM    Alkaline Phosphatase 53 03/20/2025 07:12 AM    Total Protein 6.6 03/20/2025 07:12 AM    Albumin 4.5 03/20/2025 07:12 AM    Total Bilirubin 0.41 03/20/2025 07:12 AM    eGFR 91 03/20/2025 07:12 AM

## 2025-03-23 NOTE — ASSESSMENT & PLAN NOTE
This is a 79 y.o. c PMHx notable for Arthritis, hyperlipidemia, hypertension, migraines, osteopenia, basal cell skin cancer, had hysterectomy and oophorectomy in her early 50's     seen initially in in 10/2024 as consultation for likely recurrent HR+ L Breast Cancer to the bone including to T8 & L1 with pathological fractures s/p Kyphoplasty in 10/2024 with palliative radiation to T spine and R shoulder completed 11/20/2024      10/22/2024 initiated on Letrozole 2.5 mg QD and Kisqali initially 600 mg QD 3 weeks on 1 week of . then she tried 400 mg QD early December 2024 due to reported side effects.   G1 Neutropenia 2/2 Kisqali was noted     12/30/24 visit: patient reported that she self discontinued the Letrozole/Kisqali around 12/10/2024 because of the fatigue and night sweats, however she still has the same symptoms 3 weeks after discontinuing the therapy with no improvement. Also noted on 12/30 that she started to feel R hip pain and her R shoulder pain which was gone after RT now reoccur. Denies suicidal ideation. Had on and off mild grade fevers past month but not in past week. Eating well and still do some social activities.   Orders:    CT chest abdomen pelvis w contrast; Future

## 2025-03-24 ENCOUNTER — OFFICE VISIT (OUTPATIENT)
Dept: HEMATOLOGY ONCOLOGY | Facility: CLINIC | Age: 80
End: 2025-03-24
Payer: MEDICARE

## 2025-03-24 VITALS
DIASTOLIC BLOOD PRESSURE: 62 MMHG | WEIGHT: 128.5 LBS | HEIGHT: 58 IN | BODY MASS INDEX: 26.97 KG/M2 | OXYGEN SATURATION: 98 % | TEMPERATURE: 97.9 F | HEART RATE: 61 BPM | RESPIRATION RATE: 16 BRPM | SYSTOLIC BLOOD PRESSURE: 130 MMHG

## 2025-03-24 DIAGNOSIS — Z79.811 LONG TERM (CURRENT) USE OF AROMATASE INHIBITORS: ICD-10-CM

## 2025-03-24 DIAGNOSIS — C50.911 BREAST CANCER METASTASIZED TO BONE, RIGHT (HCC): Primary | Chronic | ICD-10-CM

## 2025-03-24 DIAGNOSIS — C79.51 BREAST CANCER METASTASIZED TO BONE, RIGHT (HCC): Primary | Chronic | ICD-10-CM

## 2025-03-24 PROCEDURE — G2211 COMPLEX E/M VISIT ADD ON: HCPCS | Performed by: INTERNAL MEDICINE

## 2025-03-24 PROCEDURE — 99215 OFFICE O/P EST HI 40 MIN: CPT | Performed by: INTERNAL MEDICINE

## 2025-03-25 ENCOUNTER — TELEPHONE (OUTPATIENT)
Dept: NUTRITION | Facility: HOSPITAL | Age: 80
End: 2025-03-25

## 2025-03-25 DIAGNOSIS — E78.2 MIXED HYPERLIPIDEMIA: ICD-10-CM

## 2025-03-25 DIAGNOSIS — R23.2 HOT FLASHES: ICD-10-CM

## 2025-03-25 DIAGNOSIS — F33.9 DEPRESSION, RECURRENT (HCC): ICD-10-CM

## 2025-03-25 NOTE — TELEPHONE ENCOUNTER
Pt called RD asking to set up appt to create anti-inflammatory diet. Appt scheduled for Thursday 3/27 at 1:00pm.

## 2025-03-26 RX ORDER — PAROXETINE 10 MG/1
10 TABLET, FILM COATED ORAL DAILY
Qty: 90 TABLET | Refills: 0 | Status: SHIPPED | OUTPATIENT
Start: 2025-03-26

## 2025-03-26 RX ORDER — PAROXETINE 10 MG/1
10 TABLET, FILM COATED ORAL
Qty: 30 TABLET | Refills: 2 | OUTPATIENT
Start: 2025-03-26

## 2025-03-26 NOTE — TELEPHONE ENCOUNTER
As per Patient it does not have to be a 90 day supply    Reason for call:   [x] Refill   [] Prior Auth  [] Other:     Office:   [] PCP/Provider -   [x] Specialty/Provider - Palliative    Medication: PARoxetine (PAXIL) 10 mg tablet     Dose/Frequency: Take 1 tablet (10 mg total) by mouth daily     Quantity: 30 tablet    Pharmacy: [facility]/pharmacy #[id number] [place], [place]      Local Pharmacy   Does the patient have enough for 3 days?   [x] Yes   [] No - Send as HP to POD    Mail Away Pharmacy   Does the patient have enough for 10 days?   [] Yes   [] No - Send as HP to POD

## 2025-03-27 ENCOUNTER — NUTRITION (OUTPATIENT)
Dept: NUTRITION | Facility: HOSPITAL | Age: 80
End: 2025-03-27

## 2025-03-27 DIAGNOSIS — Z71.3 NUTRITIONAL COUNSELING: Primary | ICD-10-CM

## 2025-03-27 RX ORDER — ATORVASTATIN CALCIUM 10 MG/1
10 TABLET, FILM COATED ORAL DAILY
Qty: 90 TABLET | Refills: 1 | Status: SHIPPED | OUTPATIENT
Start: 2025-03-27

## 2025-03-27 NOTE — PROGRESS NOTES
Outpatient Oncology Nutrition Consultation   Type of Consult: Follow Up  Care Location: Office Visit    Reason for referral: Received notification by  Claire Downs RN  on 10/23 that pt has triggered for oncology nutrition care (reason for referral: Ambulatory referral for oncology nutrition services ).     Nutrition Assessment:   Oncology Diagnosis & Treatments:   9/27/2024 Stage IV ER+ breast cancer, metastatic to bone   10/2024 Letrozole + Ribociclib - self discontinued  11/5-11/20 palliative RT to spine and shoulder  Oncology History   Breast cancer metastasized to bone, right (HCC)   9/27/2024 Initial Diagnosis    Breast cancer metastasized to bone, right (HCC)     10/12/2024 -  Cancer Staged    Staging form: Breast, AJCC 8th Edition  - Clinical: Stage IV (cM1) - Signed by Fam Amanda MD on 10/12/2024       11/5/2024 - 11/20/2024 Radiation      Plan ID Energy Fractions Dose per Fraction (cGy) Dose Correction (cGy) Total Dose Delivered (cGy) Elapsed Days   RT Shoulder 6X 5 / 5 400 0 2,000 7   T11_L2 Spine 10X/6X 10 / 10 300 0 3,000 15   T7_T10 Spine 6X 10 / 10 300 0 3,000 15         Malignant neoplasm of upper-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, estrogen receptor positive (HCC)   10/2/2024 Observation    NM PET/CT IMPRESSION:   1. Mild focal radiotracer uptake at a left breast nodular density medially. Underlying primary breast malignancy should be excluded.  2. Several small FDG avid left axillary/subpectoral lymph nodes would raise suspicion for metastasis.  3. Scattered FDG avid lytic lesions compatible with metastasis.     10/4/2024 Biopsy    Left breast ultrasound-guided biopsy  A. 3 o'clock, periareolar  Benign breast tissue with fibroadenomatoid nodule    B. 11 o'clock, 7 cm from nipple  Invasive mammary carcinoma with mixed ductal and lobular features  Grade 1-2  ER , OH <1, HER2 1+  Lymphovascular invasion not definitively identified    Concordant. Malignancy is poorly defined  on mammo, appearing as a developing asymmetry rather than a discrete mass; this measures up to 5.8 cm. On US, mass measures up to 4.1cm. Subtle asymmetry with possible distortion slightly superior and medial which could represent a second site of disease. Further characterization is indication. Right breast imaging performed on 4/12/2024; more recent mammogram may be reasonable. CESM or MRI is recommended for evaluation of extent of disease in left breast. The patient had abnormal axillary lymph nodes seen on PET/CT. 1 of these lymph nodes was visualized on US; however, it is not amenable to US-guided core needle biopsy.     10/12/2024 -  Cancer Staged    Staging form: Breast, AJCC 8th Edition  - Clinical: Stage IV (cM1) - Signed by Fam Amanda MD on 10/12/2024       10/15/2024 Biopsy    IR-guided biopsy    T8 - metastatic carcinoma, most compatible with known breast primary  ~RECEPTORS PENDING~    L1 - negative for carcinoma     11/5/2024 - 11/20/2024 Radiation      Plan ID Energy Fractions Dose per Fraction (cGy) Dose Correction (cGy) Total Dose Delivered (cGy) Elapsed Days   RT Shoulder 6X 5 / 5 400 0 2,000 7   T11_L2 Spine 10X/6X 10 / 10 300 0 3,000 15   T7_T10 Spine 6X 10 / 10 300 0 3,000 15           Past Medical & Surgical Hx:   Patient Active Problem List   Diagnosis    Lumbar disc herniation    Lumbar radiculopathy    Spinal stenosis of lumbar region with neurogenic claudication    Retrocalcaneal bursitis (back of heel), right    Primary hypertension    Hyperlipidemia    MCI (mild cognitive impairment)    Family history of early CAD    History of abnormal uterine bleeding    History of hysterectomy    Primary insomnia    Compression fracture of L1 lumbar vertebra (HCC)    Closed compression fracture of body of L1 vertebra (HCC)    Breast cancer metastasized to bone, right (HCC)    Abnormal positron emission tomography (PET) scan    Malignant neoplasm of upper-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,  estrogen receptor positive (HCC)    Age-related osteoporosis with current pathological fracture of vertebra (HCC)    Pathological fracture in neoplastic disease, other specified site, sequela    Depression, recurrent (HCC)    Constipation    Bilateral hydronephrosis    Cancer related pain    Palliative care by specialist    Odynophagia    Esophagitis    Acute radiation dermatitis    Goals of care, counseling/discussion    Coronary artery calcification    Hot flashes    Other drug-induced neutropenia (HCC)    Opioid dependence, uncomplicated (HCC)    Medical marijuana use    Nonrheumatic aortic valve stenosis     Past Medical History:   Diagnosis Date    Arthritis     Breast mass     Cancer (HCC) 8/28/2024    Edema of both lower legs 11/01/2018    Dr. Thang Otero; faxed records.    Hx of skin cancer, basal cell     Hyperlipidemia     Hypertension     Impingement syndrome of left shoulder 04/10/2018    Dr. Thang Otero, faxed patient records.    Lactose intolerance     Migraines     Osteopenia     Overactive bladder     Overweight 12/16/2021    Skin cancer 2022    Basal cell    Toe fracture, left 11/16/2023     Past Surgical History:   Procedure Laterality Date    ACHILLES TENDON REPAIR      BREAST BIOPSY Left 10/04/2024    BREAST CYST EXCISION Bilateral     1972    BREAST LUMPECTOMY      CATARACT EXTRACTION, BILATERAL      COLONOSCOPY      COLPOPEXY VAGINAL EXTRAPERITONEAL (VEC) WITH INSERTION PUBOVAGINAL SLING      CYST REMOVAL      HERNIA REPAIR      HYSTERECTOMY      IR KYPHOPLASTY/VERTEBROPLASTY WITH ABLATION  10/15/2024    JOINT REPLACEMENT Right     REPLACEMENT TOTAL KNEE Left     TONSILLECTOMY  1949    US GUIDANCE BREAST BIOPSY LEFT EACH ADDITIONAL Left 10/04/2024    US GUIDED BREAST BIOPSY LEFT COMPLETE Left 10/04/2024       Review of Medications:   Vitamins, Supplements and Herbals: Med List Reviewed & pt is only taking: Multivitamin, Vitamin D, Calcium Citrate     Current Outpatient Medications:      atorvastatin (LIPITOR) 10 mg tablet, TAKE 1 TABLET BY MOUTH EVERY DAY, Disp: 90 tablet, Rfl: 1    Calcium 500-2.5 MG-MCG CHEW, Chew 2 chews daily, Disp: , Rfl:     Cholecalciferol (Vitamin D) 50 MCG (2000 UT) tablet, Take 2,000 Units by mouth daily, Disp: , Rfl:     letrozole (FEMARA) 2.5 mg tablet, Take 1 tablet (2.5 mg total) by mouth daily, Disp: 90 tablet, Rfl: 3    Multiple Vitamins-Minerals (MULTIVITAMIN WITH MINERALS) tablet, Take 1 tablet by mouth daily, Disp: , Rfl:     oxyCODONE (ROXICODONE) 10 MG TABS, Take 0.5-1 tablets (5-10 mg total) by mouth every 4 (four) hours as needed for moderate pain Max Daily Amount: 60 mg, Disp: 120 tablet, Rfl: 0    oxyCODONE (Roxicodone) 5 immediate release tablet, Take 1-2 tablets (5-10 mg total) by mouth every 4 (four) hours as needed for moderate pain or severe pain Max Daily Amount: 40 mg, Disp: 60 tablet, Rfl: 0    PARoxetine (PAXIL) 10 mg tablet, Take 1 tablet (10 mg total) by mouth daily At bedtime, Disp: 90 tablet, Rfl: 0    polyethylene glycol (GLYCOLAX) 17 GM/SCOOP powder, Take 17 g by mouth 2 (two) times a day, Disp: , Rfl:     Sodium Fluoride 5000 PPM 1.1 % PSTE, APPLY TIHN RIBBON TO TOOTHBRUSH, BRUSH X 2MIN AT BEDTIME, SPIT, DONT RINSE, Disp: , Rfl:     Most Recent Lab Results:   Lab Results   Component Value Date    WBC 4.39 03/20/2025    NEUTROABS 2.86 03/20/2025    CHOLESTEROL 149 09/26/2024    TRIG 74 09/26/2024    HDL 72 09/26/2024    LDLCALC 62 09/26/2024    ALT 25 03/20/2025    AST 25 03/20/2025    ALB 4.5 03/20/2025    SODIUM 142 03/20/2025    SODIUM 144 02/21/2025    K 3.7 03/20/2025    K 4.2 02/21/2025     03/20/2025    BUN 15 03/20/2025    BUN 14 02/21/2025    CREATININE 0.52 (L) 03/20/2025    CREATININE 0.51 (L) 02/21/2025    EGFR 91 03/20/2025    POCGLU 71 03/17/2025    GLUF 93 03/20/2025    GLUF 89 02/21/2025    GLUC 52 (L) 01/17/2025    HGBA1C 5.1 02/21/2025    HGBA1C 5.1 10/17/2022    CALCIUM 9.2 03/20/2025    MG 2.0 11/09/2024  "      Anthropometric Measurements:   Height: 58\"  Ht Readings from Last 1 Encounters:   03/24/25 4' 10\" (1.473 m)     Wt Readings from Last 35 Encounters:   03/24/25 58.3 kg (128 lb 8 oz)   03/07/25 59.1 kg (130 lb 6.4 oz)   02/19/25 58.1 kg (128 lb)   02/19/25 58.1 kg (128 lb 1.4 oz)   02/07/25 56.7 kg (125 lb)   01/21/25 54 kg (119 lb)   01/21/25 54.4 kg (119 lb 14.9 oz)   01/17/25 53.1 kg (117 lb)   12/30/24 53.1 kg (117 lb)   12/27/24 52.6 kg (116 lb)   12/27/24 53 kg (116 lb 13.5 oz)   12/11/24 53 kg (116 lb 13.5 oz)   12/04/24 50.8 kg (112 lb)   12/02/24 51.3 kg (113 lb)   11/21/24 50.6 kg (111 lb 8 oz)   11/13/24 52.9 kg (116 lb 10 oz)   11/12/24 52.6 kg (115 lb 15.4 oz)   11/09/24 52.8 kg (116 lb 6.5 oz)   11/10/24 52.6 kg (116 lb)   11/08/24 53.5 kg (118 lb)   11/01/24 53.1 kg (117 lb)   10/28/24 53.4 kg (117 lb 11.6 oz)   10/24/24 53.3 kg (117 lb 9.6 oz)   10/22/24 53.8 kg (118 lb 8 oz)   10/15/24 51.7 kg (114 lb)   10/14/24 52.4 kg (115 lb 9.6 oz)   10/10/24 53.5 kg (118 lb)   10/08/24 54.4 kg (120 lb)   10/04/24 54.4 kg (120 lb)   10/03/24 54.4 kg (120 lb)   10/01/24 54.4 kg (120 lb)   09/27/24 53.5 kg (118 lb)   09/21/24 55.8 kg (123 lb)   09/16/24 55.8 kg (123 lb)   09/06/24 55.8 kg (123 lb 1.6 oz)       Weight History:   Usual Weight: Lost 50# about 2 years ago intentionally, worked with weight management team  Varian: 11/18: 112#  Home Scale: 107# 11/26 no clothes    Oncology Nutrition-Anthropometrics      Flowsheet Row Nutrition from 3/27/2025 in [facility] On[facility] Dietitian [facility]   Patient [age] (years): 79 years 79 years   Patient (female) height (in): 58 in 58 in   Current Weight to be used for anthropometric calculations (lbs) 128.5 lbs 111.5 lbs   Current Weight to be used for anthropometric calculations (kg) 58.4 kg 50.7 kg   BMI: 26.9 23.3   IBW female: 90 lbs 90 lbs   IBW (kg) female: 40.9 kg 40.9 kg   IBW % (female) " 142.8 % 123.9 %   Adjusted BW (female): 99.6 lbs 95.4 lbs   Adjusted BW kg (female): 45.3 kg 43.4 kg   % weight change after 1 week: -- -4.5 %   Weight change after 1 week (lbs) -- -5.2 lbs   % weight change after 1 month: 0.4 % -5.9 %   Weight change after 1 month (lbs) 0.5 lbs -7 lbs   % weight change after 3 months: 9.8 % -9.6 %   Weight change after 3 months (lbs) 11.5 lbs -11.9 lbs   % weight change after 6 months: 4.5 % --   Weight change after 6 months (lbs) 5.5 lbs --            Nutrition-Focused Physical Findings: none observed    Food/Nutrition-Related History & Client/Social History:    Current Nutrition Impact Symptoms:  [] Nausea  [] Reduced Appetite  [] Acid Reflux    [] Vomiting  [] Unintended Wt Loss  [] Malabsorption    [] Diarrhea - with certain dairy products [x] Unintended Wt Gain  [] Dumping Syndrome    [] Constipation - managed w/ Miralax; opoid induced  [] Thick Mucous/Secretions  [] Abdominal Pain    [] Dysgeusia (Altered Taste)  [] Xerostomia (Dry Mouth)  [] Gas    [] Dysosmia (Altered Smell)  [] Thrush  [] Difficulty Chewing    [] Oral Mucositis (Sore Mouth)  [] Fatigue  [] Hyperglycemia   Lab Results   Component Value Date    HGBA1C 5.1 02/21/2025      [] Odynophagia  [] Esophagitis  [] Other: pain in back   [] Dysphagia  [] Early Satiety  [x] No Problems Eating      Food Allergies & Intolerances: yes: lactose intolerance  - usually tolerates yogurt/cottage cheese w/ lactaid pill but lately causing diarrhea.  Does fine with Fairlife milk.     Current Diet: Regular Diet, No Restrictions  Current Nutrition Intake: Increased since last visit  Appetite: Good  Nutrition Route: PO  Oral Care:  brushes daily  Activity level: limited d/t pain     24 Hr Diet Recall:   Breakfast: cheerios with milk OR steel cut oatmeal OR egg sandwich (Jimmy jeans)  Lunch: sardines, julio's killer bread OR BLT w/ fries  OR yogurt w/ berries (cut out this week and no diarrhea)  Snack: string cheese (no  issues)  Dinner: chicken & broccoli OR healthy choice meal  Was eating a lot of candy, ice cream, diet soda (cutting down)    Beverages: water, Fairlife milk, decaf tea, hot chocolate (stevia + milk)  Supplements:   Dislikes      Oncology Nutrition-Estimated Needs      Flowsheet Row Nutrition from 10/29/2024 in [facility] Oncology Dietitian [place]   Weight type used Actual weight   Weight in kilograms (kg) used for estimated needs 53.5 kg   Energy needs formula:  25-30 kcal/kg   Energy needs based on 25 kcal/k kcal   Energy needs based on 30 kcal/k kcal   Protein needs formula: 1.2-1.5 g/kg   Protein needs based on 1.2 g/k g   Protein needs based on 1.5 g/kg 80 g   Fluid needs formula: 25-30 mL/kg   Fluid needs based on 25 mL/kg 1350 mL   Fluid needs in ounces 46 oz   Fluid needs based on 30 mL/kg 1620 mL   Fluid needs in ounces 55 oz             Discussion & Intervention:   Yuni was evaluated today for an RD follow up regarding  ambulatory referral for oncology .  Yuni is planned to undergo tx for metastatic breast cancer . RD met with patient today in the office per her request for assistance with creating an anti-inflammatory diet plan due to ongoing pain. She reports she was told inflammation causing pain and wants to try to change diet before relying on more pain medication. Pt did initially lose weight with diagnosis but has had a better appetite and reports indulging on sweets/large portions over the past few months. She would like to lose some weight. She reports some foods upset her stomach I.e raw lettuce, beans, some dairy. We discussed pro inflammatory foods to limit I.e processed foods, red meat, sugar sweetened beverages, & refined grains as well as anti-inflammatory foods to prioritize. Encouraged to choose whole grains when able, add color to her plate, and try to replace greasy/high fat sides at takeout with cooked vegetables. Pt does report that she prefers quick options  for dinner and may eat microwave meals. Discussed reading labels and healthier options.    Reviewed 24 hour recall, which revealed an adequate po intake, and discussed ways to increase kcal, protein, and fluid intakes and optimize nutrient intake.  Also reviewed the importance of wt management throughout the tx process and the role of a cancer preventative diet in managing wt and overall health.  Based on today's assessment, discussion included: MNT for: anti-inflammatory foods, choosing a variety of colorful, plant-based foods to support a cancer preventative diet, recipe suggestions/resources, trying new recipes, & cooking at home more often, modifying foods to increase their palatability & experimenting with new foods/flavors/cuisines, mindful eating concepts, and balancing energy intake with physical activity.   Moving forward, Yuni was encouraged to transition to a cancer-preventative lifestyle .  Materials Provided:  Mediterranean Diet Therapy handout  All questions and concerns addressed during today’s visit.  Yuni has RD contact information.    Nutrition Diagnosis:   Undesirable Food Choices related to food/nutrition knowledge deficit as evidenced by Unintended Weight Gain.  Monitoring & Evaluation:   Goals:  transition to a cancer preventative eating pattern  increase physical activity - as able/tolerated    Progress Towards Goals: Progressing    Nutrition Rx & Recommendations:  Diet: High Protein, Low-Fat, Colorful, Plant Based Diet;  4-5 servings of vegetables. 2-3 servings of fruits per day.  Focus on healthy fats from whole foods: nuts, seeds, avocado, fish.    >8 oz fish per week.  Limit red meat, avoid processed meats.  Avoid greasy, fried foods.  Limit dining out.  Include protein at all meals/snacks.  Include a variety of foods (as tolerated/allowed by diet).  Alter food choices and eating patterns to accommodate changing needs.  Maintain a healthy weight with a BMI between 18.5-24.9.  Avoid  weight gain if overweight.  Physical activity is encouraged, as able.  Include whole grains, fruits, and vegetables as tolerated (cook down, peel skin/seeds as needed).   Focus on water and unsweetened beverages as primary source of hydration.   Add color to your plate and include variety of spices/herbs I.e cinnamon, turmeric, basil, cilantro, etc.    Follow Up Plan: PRN per patient request  Recommend Referral to Other Providers: none at this time

## 2025-03-27 NOTE — PATIENT INSTRUCTIONS
Nutrition Rx & Recommendations:  Diet: High Protein, Low-Fat, Colorful, Plant Based Diet;  4-5 servings of vegetables. 2-3 servings of fruits per day.  Focus on healthy fats from whole foods: nuts, seeds, avocado, fish.    >8 oz fish per week.  Limit red meat, avoid processed meats.  Avoid greasy, fried foods.  Limit dining out.  Include protein at all meals/snacks.  Include a variety of foods (as tolerated/allowed by diet).  Alter food choices and eating patterns to accommodate changing needs.  Maintain a healthy weight with a BMI between 18.5-24.9.  Avoid weight gain if overweight.  Physical activity is encouraged, as able.  Include whole grains, fruits, and vegetables as tolerated (cook down, peel skin/seeds as needed).   Focus on water and unsweetened beverages as primary source of hydration.   Add color to your plate and include variety of spices/herbs I.e cinnamon, turmeric, basil, cilantro, etc.    Follow Up Plan: PRN per patient request  Recommend Referral to Other Providers: none at this time

## 2025-03-28 RX ORDER — LORATADINE 10 MG/1
TABLET ORAL
COMMUNITY
Start: 2025-03-20

## 2025-04-01 DIAGNOSIS — I10 PRIMARY HYPERTENSION: Primary | ICD-10-CM

## 2025-04-01 RX ORDER — AMLODIPINE BESYLATE 5 MG/1
5 TABLET ORAL DAILY
Qty: 30 TABLET | Refills: 5 | Status: SHIPPED | OUTPATIENT
Start: 2025-04-01

## 2025-04-01 NOTE — PROGRESS NOTES
Chart update:    The patient called in with home blood pressure recordings and they are above goal.    Plan: Start Norvasc 5.

## 2025-04-02 ENCOUNTER — OFFICE VISIT (OUTPATIENT)
Dept: PALLIATIVE MEDICINE | Facility: CLINIC | Age: 80
End: 2025-04-02
Payer: MEDICARE

## 2025-04-02 VITALS
HEIGHT: 58 IN | RESPIRATION RATE: 18 BRPM | DIASTOLIC BLOOD PRESSURE: 68 MMHG | WEIGHT: 128 LBS | OXYGEN SATURATION: 98 % | BODY MASS INDEX: 26.87 KG/M2 | HEART RATE: 70 BPM | SYSTOLIC BLOOD PRESSURE: 128 MMHG | TEMPERATURE: 98.2 F

## 2025-04-02 DIAGNOSIS — C50.911 BREAST CANCER METASTASIZED TO BONE, RIGHT (HCC): Primary | Chronic | ICD-10-CM

## 2025-04-02 DIAGNOSIS — Z79.899 MEDICAL MARIJUANA USE: ICD-10-CM

## 2025-04-02 DIAGNOSIS — G89.3 CANCER RELATED PAIN: ICD-10-CM

## 2025-04-02 DIAGNOSIS — C79.51 BREAST CANCER METASTASIZED TO BONE, RIGHT (HCC): Primary | Chronic | ICD-10-CM

## 2025-04-02 DIAGNOSIS — F11.20 OPIOID DEPENDENCE, UNCOMPLICATED (HCC): ICD-10-CM

## 2025-04-02 DIAGNOSIS — R13.10 ODYNOPHAGIA: ICD-10-CM

## 2025-04-02 DIAGNOSIS — Z51.5 PALLIATIVE CARE BY SPECIALIST: ICD-10-CM

## 2025-04-02 PROBLEM — L58.0 ACUTE RADIATION DERMATITIS: Status: RESOLVED | Noted: 2024-12-02 | Resolved: 2025-04-02

## 2025-04-02 PROCEDURE — 99214 OFFICE O/P EST MOD 30 MIN: CPT | Performed by: INTERNAL MEDICINE

## 2025-04-02 PROCEDURE — G2211 COMPLEX E/M VISIT ADD ON: HCPCS | Performed by: INTERNAL MEDICINE

## 2025-04-02 RX ORDER — BUPRENORPHINE 5 UG/H
1 PATCH TRANSDERMAL
Qty: 4 PATCH | Refills: 0 | Status: SHIPPED | OUTPATIENT
Start: 2025-04-02

## 2025-04-02 NOTE — PROGRESS NOTES
Name: Yuni Keys      : 1945      MRN: [id number]  Encounter Provider: Lisa Contreras DO  Encounter Date: 2025   Encounter department: [facility] [place]  :  Assessment & Plan  Breast cancer metastasized to bone, right (HCC)    Orders:    transdermal buprenorphine (BUTRANS) 5 mcg/hr TD patch; Place 1 patch on the skin over 7 days every 7 days    Opioid dependence, uncomplicated (HCC)         Medical marijuana use         Cancer related pain  Using 1-2 tabs of oxycodone per day to manage pain  Current regimen:  Start Butrans 5 mcg qweek  Started using MMJ products with variable effectiveness   Provided information about the [facility]  Oxycodone 5-10mg q4h PRN for moderate-severe pain  Alternate oxycodone with Tylenol as needed  Bowel regimen to prevent OIC  Opioid agreement signed 2024  Orders:    transdermal buprenorphine (BUTRANS) 5 mcg/hr TD patch; Place 1 patch on the skin over 7 days every 7 days          Palliative care by specialist  Time spent with patient providing supportive listening.     All questions and concerns were addressed to their satisfaction.    RTC: 1 month        Orders:    transdermal buprenorphine (BUTRANS) 5 mcg/hr TD patch; Place 1 patch on the skin over 7 days every 7 days        Odynophagia  If continues will refer to GI for further evaluation           Decisional apparatus: Patient is competent on my exam today. If competence is lost, patient's substitute decision maker would default to daughter by [place].   Advance Directive / Living Will / POLST: yes     PDMP Review: I have reviewed the patient's controlled substance dispensing history in the Prescription Drug Monitoring Program in compliance with the [facility] regulations before prescribing any controlled substances.    History of Present Illness   Yuni Keys is a 79 y.o. female who presents in follow-up for symptoms related to stage IV breast cancer.  Overall patient has been  doing remarkably well.  Overall quality of life she states has been improving.  She still has ongoing cancer pain and uses 1 or 2 oxycodone a day.  She states that it helps but does not last very long citing that it last for about 3 to 4 hours.  She is try to leave with no relief.  She recently got her medical marijuana card and has tried 2 different products and feels that the THC content may be too high..  Discussed talking to the dispensary about alternate products including creams and lotions that she could apply topically to her back.  Also discussed addition of a very low dose of buprenorphine for consistent pain relief throughout the day and she wishes to move forward with trialing this.  She did ask about acupuncture and I provided her information about the [facility] space.  Finally she did complain of some difficulty swallowing liquids and did note that she had an EGD scheduled at 1 point but had to cancel it because of an abnormal EKG.  Discussed if ongoing will have her get back in with her gastroenterology team.  Medical History Reviewed by provider this encounter:     .     Objective   There were no vitals taken for this visit.    Physical Exam  Vitals and nursing note reviewed.   Constitutional:       General: She is not in acute distress.     Appearance: Normal appearance.   HENT:      Head: Normocephalic and atraumatic.      Right Ear: External ear normal.      Left Ear: External ear normal.   Eyes:      General:         Right eye: No discharge.         Left eye: No discharge.   Cardiovascular:      Rate and Rhythm: Normal rate and regular rhythm.      Comments: +click  Pulmonary:      Effort: Pulmonary effort is normal. No respiratory distress.   Abdominal:      General: There is no distension.      Palpations: Abdomen is soft.   Skin:     Coloration: Skin is pale.   Neurological:      Mental Status: She is oriented to person, place, and time. Mental status is at baseline.   Psychiatric:          Mood and Affect: Mood normal.         Behavior: Behavior normal.         Thought Content: Thought content normal.         Judgment: Judgment normal.         Recent labs:  Lab Results   Component Value Date/Time    SODIUM 142 03/20/2025 07:12 AM    K 3.7 03/20/2025 07:12 AM    BUN 15 03/20/2025 07:12 AM    CREATININE 0.52 (L) 03/20/2025 07:12 AM    GLUC 52 (L) 01/17/2025 09:47 AM    CALCIUM 9.2 03/20/2025 07:12 AM    AST 25 03/20/2025 07:12 AM    ALT 25 03/20/2025 07:12 AM    ALB 4.5 03/20/2025 07:12 AM    TP 6.6 03/20/2025 07:12 AM    EGFR 91 03/20/2025 07:12 AM     Lab Results   Component Value Date/Time    HGB 14.8 03/20/2025 07:12 AM    WBC 4.39 03/20/2025 07:12 AM     03/20/2025 07:12 AM    INR 1.01 10/01/2024 08:13 AM    PTT 34 10/01/2024 08:13 AM     Lab Results   Component Value Date/Time    IAN1JKGEVZEX 2.391 12/20/2024 10:34 AM       Recent Imaging:  Procedure: NM PET CT skull base to mid thigh  Result Date: 3/18/2025  Impression: There is no new visceral metastatic disease Uptake seen within the right acromion, unchanged Changes of vertebral augmentation seen in the L1 vertebra and T8 vertebra Focal uptake seen in the paravertebral region in the medial right lung in the paravertebral location with SUV of 3.2, new probably postradiation, attention at follow-up. New focal area of uptake in the left first costochondral junction and in the left sternoclavicular joint, nonspecific Uptake seen in the right greater peritrochanteric region and the adjacent tendons and muscles on musculoskeletal basis tendinopathy The study was marked in EPIC for significant notification. Workstation performed: [id number]     Procedure: CT chest abdomen pelvis w contrast  Result Date: 1/16/2025  Impression: Stable left breast mass with biopsy clip. Scattered bone metastases including a new right scapular lytic lesion with a healed pathological fracture, likely related to postradiation treatment changes to the right  shoulder. No metastatic disease in the solid organs of the abdomen and pelvis. Resident: Josh Nj I, the attending radiologist, have reviewed the images and agree with the final report above. Workstation performed: [id number]     Procedure: MRI abdomen w wo contrast and mrcp  Result Date: 12/19/2024  Impression: Minimally dilated common bile duct measuring up to 8 mm proximally with smooth distal tapering. No intrahepatic bile duct dilation. No choledocholithiasis, biliary stricture or suspicious mass within the limits of this motion degraded examination. Workstation performed: [id number]       Administrative Statements   I have spent a total time of 30 minutes in caring for this patient on the day of the visit/encounter including Risks and benefits of tx options, Instructions for management, Patient and family education, Importance of tx compliance, Risk factor reductions, Impressions, Counseling / Coordination of care, Documenting in the medical record, Reviewing/placing orders in the medical record (including tests, medications, and/or procedures), Obtaining or reviewing history  , and Communicating with other healthcare professionals .   Topics discussed with the patient / family include symptom assessment and management, medication review, medication adjustment, psychosocial support, medical marijuana, and opioid titration.

## 2025-04-02 NOTE — ASSESSMENT & PLAN NOTE
Time spent with patient providing supportive listening.     All questions and concerns were addressed to their satisfaction.    RTC: 1 month        Orders:    transdermal buprenorphine (BUTRANS) 5 mcg/hr TD patch; Place 1 patch on the skin over 7 days every 7 days

## 2025-04-02 NOTE — ASSESSMENT & PLAN NOTE
Orders:    transdermal buprenorphine (BUTRANS) 5 mcg/hr TD patch; Place 1 patch on the skin over 7 days every 7 days

## 2025-04-02 NOTE — ASSESSMENT & PLAN NOTE
Using 1-2 tabs of oxycodone per day to manage pain  Current regimen:  Start Butrans 5 mcg qweek  Started using MMJ products with variable effectiveness   Provided information about the [facility]  Oxycodone 5-10mg q4h PRN for moderate-severe pain  Alternate oxycodone with Tylenol as needed  Bowel regimen to prevent OIC  Opioid agreement signed 11/13/2024  Orders:    transdermal buprenorphine (BUTRANS) 5 mcg/hr TD patch; Place 1 patch on the skin over 7 days every 7 days

## 2025-04-04 ENCOUNTER — PATIENT OUTREACH (OUTPATIENT)
Dept: CASE MANAGEMENT | Facility: OTHER | Age: 80
End: 2025-04-04

## 2025-04-04 NOTE — PROGRESS NOTES
"OSW reviewed recent chart notes and placed outreach call to Alley today. Alley stated her recent scan is stable, but she is experiencing more back pain. She was prescribed a patch, but hasn't tried it yet because she needs to be able to drive. She is going to see an acupuncturist later today in [place] with the hope she will have some relief.   She stated, \"My daughters told me to stop planning my  because my scans are stable.\" She laughed, and said she is going to continue to do her advanced care planing and not say anything more to her children.   Alley is planning on attending the breast cancer support group in two weeks. She thanked OSW for call today.  "

## 2025-04-05 ENCOUNTER — NURSE TRIAGE (OUTPATIENT)
Dept: OTHER | Facility: OTHER | Age: 80
End: 2025-04-05

## 2025-04-05 NOTE — TELEPHONE ENCOUNTER
"Received on-call message. Alley questioned whether to remove buprenorphine patch and just use oxycodone as she is not feeling any relief from patch. Explained that this is not a medication with rapid onset and is intended to provide more sustained relief, but is expected to take >24 hours to feel effect after initiating treatment with Butrans. She was concerned because she states the pharmacy told her not to use any oxycodone while using buprenorphine.    She used only 1/2 of a 5mg tab(2.5 mg) this morning of oxycodone. Reviewed prescribed regimen for breakthrough pain with oxycodone 5-10 mg q4h PRN.  She then asked if she should place a second patch on because \"this isn't doing anything\". Advised that she should use only one 5 mcg/h patch as prescribed. Stated that she understood and would call with further questions.  "

## 2025-04-05 NOTE — TELEPHONE ENCOUNTER
"FOLLOW UP: None    REASON FOR CONVERSATION: Medication Problem    SYMPTOMS: ESC sent to on call provider-  Reports that Dr. Contreras prescribed a Butrans patch. She applied the patch about 24 hours ago and it has done nothing for her pain. She is questioning if she can remove the patch and use the Oxycodone instead     OTHER: None    DISPOSITION: Call PCP Now      Reason for Disposition   [1] Caller has URGENT medicine question about med that PCP or specialist prescribed AND [2] triager unable to answer question    Answer Assessment - Initial Assessment Questions  1. NAME of MEDICINE: \"What medicine(s) are you calling about?\"      Butrans patch    2. QUESTION: \"What is your question?\" (e.g., double dose of medicine, side effect)      The Butrans patch is not helping my pain. Can I remove it and just take the oxycodone    3. PRESCRIBER: \"Who prescribed the medicine?\" Reason: if prescribed by specialist, call should be referred to that group.      Dr. Contreras    Protocols used: Medication Question Call-Adult-    "

## 2025-04-14 ENCOUNTER — APPOINTMENT (OUTPATIENT)
Dept: CT IMAGING | Facility: HOSPITAL | Age: 80
End: 2025-04-14
Attending: INTERNAL MEDICINE
Payer: MEDICARE

## 2025-04-14 ENCOUNTER — HOSPITAL ENCOUNTER (OUTPATIENT)
Dept: BONE DENSITY | Facility: CLINIC | Age: 80
Discharge: HOME | End: 2025-04-14
Payer: MEDICARE

## 2025-04-14 VITALS — BODY MASS INDEX: 26.87 KG/M2 | WEIGHT: 128 LBS | HEIGHT: 58 IN

## 2025-04-14 DIAGNOSIS — Z79.811 LONG TERM (CURRENT) USE OF AROMATASE INHIBITORS: ICD-10-CM

## 2025-04-14 PROCEDURE — 77080 DXA BONE DENSITY AXIAL: CPT

## 2025-04-16 ENCOUNTER — PATIENT OUTREACH (OUTPATIENT)
Dept: CASE MANAGEMENT | Facility: OTHER | Age: 80
End: 2025-04-16

## 2025-04-16 NOTE — PROGRESS NOTES
OSW met with Alley today. She presented to [facility] Breast Cancer support group. She stated her back is painful, but she wanted to come to the group today. She shared it is nice to meet OSW in person. Alley also shared she connected with [facility] of  and may be joining their online BC support group as well, as it is more convenient and she can take her pain medications and not have to drive anywhere. At this point of the conversation other women began to present for the group. OSW will outreach pt via phone and continue ongoing support.

## 2025-04-18 ENCOUNTER — NURSE TRIAGE (OUTPATIENT)
Age: 80
End: 2025-04-18

## 2025-04-18 ENCOUNTER — TELEPHONE (OUTPATIENT)
Age: 80
End: 2025-04-18

## 2025-04-18 NOTE — TELEPHONE ENCOUNTER
Phoned pt to find out more about her pain and mild chest ache with deep breaths. Pt states she is not taking her oxycodone every 4 hrs PRN because she does not want to take when she has to drive. Pt denies any chest pain or pain radiating to her jaw, neck or back only some discomfort if she takes a deep breath. Reviewed instructions on taking Oxycodone as if there is too much time in between taking the med the pain may get ahead of her. Reviewed upcoming appts with pt.

## 2025-04-18 NOTE — TELEPHONE ENCOUNTER
Patient is calling about her appointment on 5/20 she stated that she spoke to someone recently and was told that there is an appointment on 5/13 @ 10:30.  She would like to be rescheduled to that appointment, please call her to confirm she was rescheduled.

## 2025-04-18 NOTE — TELEPHONE ENCOUNTER
"\"My Pet scan showed that I was stable according to Dr. Amanda.  Why is the pain in my back worse?  Advil/Aleve does not help at all.  I am taking oxycodone.  I also have trouble breathing.  Thanks for your help.  Alley Keys\"    Received above message from Alley and called her to discuss further. She reports ongoing back pain that has worsened since her last visit. Patient expressed that Dr Amanda mentioned symptom is likely from inflammation, but she is now concerned if it is related to her tumor.She takes Oxycodone as needed with relief, but only when she is home, as she wants to be able to drive and function.  She denies breathing difficulties, but expressed that she experiences mild intermittent aches in her chest with deep breathing.  Patient worried that symptoms may be cancer related and will like Dr Amanda's opinion.      FOLLOW UP: Please review and call patient with recommendations    REASON FOR CONVERSATION: Back pain    SYMPTOMS: Worsening back pain    OTHER: mild chest ache with deep breaths.    DISPOSITION: Please review and call patient with recommendations.      Answer Assessment - Initial Assessment Questions  1. ONSET: \"When did the pain begin?\" (e.g., minutes, hours, days)      Ongoing and gradually worsening since 1 month.      2. LOCATION: \"Where does it hurt?\" (upper, mid or lower back)   Thoracic area.But worse in left and mid back.     3. SEVERITY: \"How bad is the pain?\"  (e.g., Scale 1-10; mild, moderate, or severe)      Constantly  at 5, but increases to an when sitting.patient rates pain at usually between 5-8.    4. PATTERN: \"Is the pain constant?\" (e.g., yes, no; constant, intermittent)       Constant, but worse when sitting    5. RADIATION: \"Does the pain shoot into your legs or somewhere else?\"      Denies    6. CAUSE:  \"What do you think is causing the back pain?\"       Inflammation/Tumor?    7. BACK OVERUSE:  \"Any recent lifting of heavy objects, strenuous work or exercise?\"   " "   Denies    8. MEDICINES: \"What have you taken so far for the pain?\" (e.g., nothing, acetaminophen, NSAIDS)      Aleve/Advil,Oxycodone    9. NEUROLOGIC SYMPTOMS: \"Do you have any weakness, numbness, or problems with bowel/bladder control?\"      Denies any of the above    10. OTHER SYMPTOMS: \"Do you have any other symptoms?\" (e.g., fever, abdomen pain, burning with urination, blood in urine)       Intermittent aches in chest with deep breaths/ 2. Experiences Intermittent diarrhea with certain certain food, like salads, beans     11. PREGNANCY: \"Is there any chance you are pregnant?\" \"When was your last menstrual period?\"        N/A    Protocols used: Back Pain-Adult-[place]    "

## 2025-04-23 ENCOUNTER — PATIENT OUTREACH (OUTPATIENT)
Dept: HEMATOLOGY ONCOLOGY | Facility: CLINIC | Age: 80
End: 2025-04-23

## 2025-04-23 DIAGNOSIS — Z51.5 PALLIATIVE CARE BY SPECIALIST: ICD-10-CM

## 2025-04-23 DIAGNOSIS — I10 PRIMARY HYPERTENSION: ICD-10-CM

## 2025-04-23 DIAGNOSIS — C50.911 BREAST CANCER METASTASIZED TO BONE, RIGHT (HCC): Chronic | ICD-10-CM

## 2025-04-23 DIAGNOSIS — G89.3 CANCER RELATED PAIN: ICD-10-CM

## 2025-04-23 DIAGNOSIS — C79.51 BREAST CANCER METASTASIZED TO BONE, RIGHT (HCC): Chronic | ICD-10-CM

## 2025-04-23 RX ORDER — AMLODIPINE BESYLATE 5 MG/1
5 TABLET ORAL DAILY
Qty: 90 TABLET | Refills: 1 | Status: SHIPPED | OUTPATIENT
Start: 2025-04-23

## 2025-04-23 NOTE — PROGRESS NOTES
"I reached out and spoke to Alley to follow up with them and to review for any changes in barriers to care and offer supportive services as needed.    Barriers noted previously: co-morbidities    Current barriers and interventions provided: co-morbidities    Pt states that she still has ongoing pain in her back that she takes medication for.  She also states that she has been to different support groups in the past but can't help but feel like a \"Debbey downer\" when she is there.    This patient will no longer require follow up.   Pt states that she receives calls from JANET Valenzuela periodically and she does not feel like I need to keep outreaching her if she already speaks to Ryanne.    I have provided care team contact information to the patient and welcome them to contact me if their needs as discussed above change.  "

## 2025-04-24 RX ORDER — BUPRENORPHINE 5 UG/H
1 PATCH TRANSDERMAL
Qty: 4 PATCH | Refills: 0 | Status: SHIPPED | OUTPATIENT
Start: 2025-04-24

## 2025-04-24 NOTE — TELEPHONE ENCOUNTER
Medication: transdermal buprenorphine 5mcg/hr  PDMP    04/03/2025 04/02/2025 Buprenorphine (Patch, Extended Release) 4.0 28 5 MCG/1 HR RUSTAM MEJIAS

## 2025-04-30 ENCOUNTER — PATIENT OUTREACH (OUTPATIENT)
Dept: CASE MANAGEMENT | Facility: OTHER | Age: 80
End: 2025-04-30

## 2025-04-30 NOTE — PROGRESS NOTES
"OSW called Alley today for emotional support and to check in. Her back pain has been relieved from using the patches along with her oral regimen as per palliative care. However, she stated the patch \"became too strong\" and she was very sleepy the other day. She took the patch off on Saturday, and is doing ok. She walked about a mile and a half earlier today. She is planning on flying to [place] to see her daughter at the end of May. OSW provided praise for her taking this opportunity to travel to see her family, and that she is trying to be active. She is going buy some flowers for the season and ask her neighbor to plant them for her. He does handiwork around the neighborhood, so she will reimburse his time to help plant.     She continues to verbalize disinterest in watching TV, or doing other things right now. OSW discussed focusing on the \"wins\" like her walk from earlier today. She talked about the breast cancer support group from a few weeks ago, but isn't sure she will regularly attend. There are patients from all different trajectories in their treatment, and didn't really connect with anyone. Gave her some positive reinforcement for attending, however.     She asked about the survivorship program. Apparently she was told about the program, but was then informed she doesn't meet criteria. She asked why. OSW unsure of this but will f/u with more of an explanation after speaking with medical oncology and/or navigation.     Will continue ongoing support.   "

## 2025-05-01 ENCOUNTER — PATIENT OUTREACH (OUTPATIENT)
Dept: CASE MANAGEMENT | Facility: OTHER | Age: 80
End: 2025-05-01

## 2025-05-01 NOTE — PROGRESS NOTES
OSW discussed survivorship appointment request with patient navigator, who then discussed with survivorship CRNP. Pt is approved to have a survivorship visit. OSW called Alley this morning to relay message and ask if she is interested. Alley stated she would like to make that appointment. In-basket sent to  and patient navigator.

## 2025-05-05 ENCOUNTER — APPOINTMENT (OUTPATIENT)
Dept: LAB | Facility: CLINIC | Age: 80
End: 2025-05-05
Payer: MEDICARE

## 2025-05-05 DIAGNOSIS — E03.9 HYPOTHYROIDISM, UNSPECIFIED TYPE: ICD-10-CM

## 2025-05-05 DIAGNOSIS — R79.89 ELEVATED TSH: ICD-10-CM

## 2025-05-05 DIAGNOSIS — E78.2 MIXED HYPERLIPIDEMIA: ICD-10-CM

## 2025-05-05 LAB
CHOLEST SERPL-MCNC: 175 MG/DL (ref ?–200)
HDLC SERPL-MCNC: 83 MG/DL
LDLC SERPL CALC-MCNC: 74 MG/DL (ref 0–100)
T4 FREE SERPL-MCNC: 0.76 NG/DL (ref 0.61–1.12)
TRIGL SERPL-MCNC: 92 MG/DL (ref ?–150)
TSH SERPL DL<=0.05 MIU/L-ACNC: 6.03 UIU/ML (ref 0.45–4.5)

## 2025-05-05 PROCEDURE — 84443 ASSAY THYROID STIM HORMONE: CPT

## 2025-05-05 PROCEDURE — 36415 COLL VENOUS BLD VENIPUNCTURE: CPT

## 2025-05-05 PROCEDURE — 80061 LIPID PANEL: CPT

## 2025-05-05 PROCEDURE — 84439 ASSAY OF FREE THYROXINE: CPT

## 2025-05-06 DIAGNOSIS — C50.911 BREAST CANCER METASTASIZED TO BONE, RIGHT (HCC): Chronic | ICD-10-CM

## 2025-05-06 DIAGNOSIS — C79.51 BREAST CANCER METASTASIZED TO BONE, RIGHT (HCC): Chronic | ICD-10-CM

## 2025-05-06 DIAGNOSIS — G89.3 CANCER RELATED PAIN: ICD-10-CM

## 2025-05-06 NOTE — TELEPHONE ENCOUNTER
1 [id number] ** 03/18/2025 03/18/2025 oxyCODONE HCL (Tablet) 60.0 5 5 MG 90.0 [facility] Medicare 0 / 0 [place]   1 [id number] ** 02/13/2025 02/13/2025 oxyCODONE HCL (Tablet) 60.0 8 5 MG 56.25 [facility] Medicare 0 / 0 [place]   1 [id number] ** 01/03/2025 01/03/2025 oxyCODONE HCL (Tablet) 60.0 8 5 MG 56.25 [facility] Medicare 0 / 0 [place]   1 [id number] ** 11/11/2024 11/11/2024 oxyCODONE HCL (Tablet) 60.0 10 5 MG 45.0 [facility] Medicare 0 / 0 [place]

## 2025-05-07 ENCOUNTER — RESULTS FOLLOW-UP (OUTPATIENT)
Dept: FAMILY MEDICINE CLINIC | Facility: HOSPITAL | Age: 80
End: 2025-05-07

## 2025-05-07 DIAGNOSIS — E03.9 ACQUIRED HYPOTHYROIDISM: Primary | ICD-10-CM

## 2025-05-07 RX ORDER — LEVOTHYROXINE SODIUM 25 UG/1
25 TABLET ORAL DAILY
Qty: 30 TABLET | Refills: 3 | Status: SHIPPED | OUTPATIENT
Start: 2025-05-07 | End: 2025-06-30

## 2025-05-07 RX ORDER — OXYCODONE HYDROCHLORIDE 5 MG/1
5-10 TABLET ORAL EVERY 4 HOURS PRN
Qty: 60 TABLET | Refills: 0 | Status: SHIPPED | OUTPATIENT
Start: 2025-05-07

## 2025-05-08 ENCOUNTER — OFFICE VISIT (OUTPATIENT)
Dept: FAMILY MEDICINE CLINIC | Facility: HOSPITAL | Age: 80
End: 2025-05-08
Payer: MEDICARE

## 2025-05-08 VITALS
HEART RATE: 71 BPM | WEIGHT: 131 LBS | SYSTOLIC BLOOD PRESSURE: 110 MMHG | DIASTOLIC BLOOD PRESSURE: 60 MMHG | OXYGEN SATURATION: 95 % | BODY MASS INDEX: 27.5 KG/M2 | HEIGHT: 58 IN

## 2025-05-08 DIAGNOSIS — S32.010A CLOSED COMPRESSION FRACTURE OF BODY OF L1 VERTEBRA (HCC): ICD-10-CM

## 2025-05-08 DIAGNOSIS — E03.9 ACQUIRED HYPOTHYROIDISM: ICD-10-CM

## 2025-05-08 DIAGNOSIS — C50.212 MALIGNANT NEOPLASM OF UPPER-INNER QUADRANT OF LEFT BREAST IN FEMALE, ESTROGEN RECEPTOR POSITIVE (HCC): ICD-10-CM

## 2025-05-08 DIAGNOSIS — E78.2 MIXED HYPERLIPIDEMIA: ICD-10-CM

## 2025-05-08 DIAGNOSIS — Z51.5 PALLIATIVE CARE BY SPECIALIST: Primary | ICD-10-CM

## 2025-05-08 DIAGNOSIS — Z17.0 MALIGNANT NEOPLASM OF UPPER-INNER QUADRANT OF LEFT BREAST IN FEMALE, ESTROGEN RECEPTOR POSITIVE (HCC): ICD-10-CM

## 2025-05-08 DIAGNOSIS — G89.3 CANCER RELATED PAIN: ICD-10-CM

## 2025-05-08 DIAGNOSIS — M84.58XS PATHOLOGICAL FRACTURE IN NEOPLASTIC DISEASE, OTHER SPECIFIED SITE, SEQUELA: ICD-10-CM

## 2025-05-08 PROCEDURE — G2211 COMPLEX E/M VISIT ADD ON: HCPCS | Performed by: INTERNAL MEDICINE

## 2025-05-08 PROCEDURE — 99214 OFFICE O/P EST MOD 30 MIN: CPT | Performed by: INTERNAL MEDICINE

## 2025-05-08 RX ORDER — CALCIUM CARB, CITRATE/VIT D3 600MG-12.5
TABLET, EXTENDED RELEASE ORAL
COMMUNITY

## 2025-05-08 NOTE — PROGRESS NOTES
Assessment/Plan:     Diagnosis ICD-10-CM Associated Orders   1. Palliative care by specialist  Z51.5       2. Mixed hyperlipidemia  E78.2       3. Malignant neoplasm of upper-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, estrogen receptor positive (HCC)  C50.212     Z17.0       4. Cancer related pain  G89.3       5. Closed compression fracture of body of L1 vertebra (HCC)  S32.010A       6. 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      Subjective:    Patient ID: Yuni Keys is a 79 y.o. female    Here for follow up- palliative care  butran patch - on 5 mg - took a while to kick in- then felt too tired so stopped it- she is now doing medical marijuana   To visit daughter in [place] soon- I encouraged her to use the wheelchair service        The following portions of the patient's history were reviewed and updated as appropriate: allergies, current medications and problem list.     Review of Systems   Constitutional:  Negative for fatigue.   HENT:  Negative for congestion.    All other systems reviewed and are negative.        Objective:      Current Outpatient Medications:     amLODIPine (NORVASC) 5 mg tablet, TAKE  "1 TABLET (5 MG TOTAL) BY MOUTH DAILY., Disp: 90 tablet, Rfl: 1    atorvastatin (LIPITOR) 10 mg tablet, TAKE 1 TABLET BY MOUTH EVERY DAY, Disp: 90 tablet, Rfl: 1    Calcium 500-2.5 MG-MCG CHEW, Chew 2 chews daily, Disp: , Rfl:     Calcium-Magnesium-Vitamin D ER (Citracal Calcium +D3) 600- MG-MG-UNIT TB24, , Disp: , Rfl:     Calcium-Magnesium-Vitamin D - MG-MG-UNIT TB24, , Disp: , Rfl:     Cholecalciferol (Vitamin D) 50 MCG (2000 UT) tablet, Take 2,000 Units by mouth daily, Disp: , Rfl:     letrozole (FEMARA) 2.5 mg tablet, Take 1 tablet (2.5 mg total) by mouth daily, Disp: 90 tablet, Rfl: 3    levothyroxine (Synthroid) 25 mcg tablet, Take 1 tablet (25 mcg total) by mouth daily, Disp: 30 tablet, Rfl: 3    loratadine (Claritin) 10 mg tablet, , Disp: , Rfl:     Multiple Vitamins-Minerals (MULTIVITAMIN WITH MINERALS) tablet, Take 1 tablet by mouth daily, Disp: , Rfl:     oxyCODONE (ROXICODONE) 10 MG TABS, Take 0.5-1 tablets (5-10 mg total) by mouth every 4 (four) hours as needed for moderate pain Max Daily Amount: 60 mg, Disp: 120 tablet, Rfl: 0    oxyCODONE (Roxicodone) 5 immediate release tablet, Take 1-2 tablets (5-10 mg total) by mouth every 4 (four) hours as needed for moderate pain or severe pain Max Daily Amount: 40 mg, Disp: 60 tablet, Rfl: 0    PARoxetine (PAXIL) 10 mg tablet, Take 1 tablet (10 mg total) by mouth daily At bedtime, Disp: 90 tablet, Rfl: 0    polyethylene glycol (GLYCOLAX) 17 GM/SCOOP powder, Take 17 g by mouth 2 (two) times a day, Disp: , Rfl:     Sodium Fluoride 5000 PPM 1.1 % PSTE, APPLY TIHN RIBBON TO TOOTHBRUSH, BRUSH X 2MIN AT BEDTIME, SPIT, DONT RINSE, Disp: , Rfl:     Blood pressure 110/60, pulse 71, height 4' 10\" (1.473 m), weight 59.4 kg (131 lb), SpO2 95%.     Physical Exam  Vitals and nursing note reviewed.   HENT:      Head: Normocephalic.      Right Ear: Tympanic membrane normal. There is no impacted cerumen.      Left Ear: Tympanic membrane normal. There is no " impacted cerumen.   Eyes:      Extraocular Movements: Extraocular movements intact.      Conjunctiva/sclera: Conjunctivae normal.      Pupils: Pupils are equal, round, and reactive to light.   Cardiovascular:      Rate and Rhythm: Normal rate and regular rhythm.      Heart sounds: Murmur heard.      No gallop.   Pulmonary:      Effort: Pulmonary effort is normal.   Abdominal:      General: Abdomen is flat.      Palpations: Abdomen is soft.      Tenderness: There is no abdominal tenderness.   Musculoskeletal:         General: No swelling.      Cervical back: No rigidity.   Lymphadenopathy:      Cervical: No cervical adenopathy.   Skin:     Findings: No erythema.   Neurological:      Mental Status: She is alert and oriented to person, place, and time.   Psychiatric:         Mood and Affect: Mood normal.         Behavior: Behavior normal.

## 2025-05-12 ENCOUNTER — APPOINTMENT (OUTPATIENT)
Dept: LAB | Facility: HOSPITAL | Age: 80
End: 2025-05-12
Payer: MEDICARE

## 2025-05-12 ENCOUNTER — HOSPITAL ENCOUNTER (OUTPATIENT)
Dept: INFUSION CENTER | Facility: HOSPITAL | Age: 80
Discharge: HOME | End: 2025-05-12
Attending: INTERNAL MEDICINE
Payer: MEDICARE

## 2025-05-12 VITALS
WEIGHT: 132.28 LBS | SYSTOLIC BLOOD PRESSURE: 154 MMHG | BODY MASS INDEX: 27.77 KG/M2 | DIASTOLIC BLOOD PRESSURE: 68 MMHG | RESPIRATION RATE: 17 BRPM | OXYGEN SATURATION: 95 % | HEIGHT: 58 IN | TEMPERATURE: 97.1 F | HEART RATE: 66 BPM

## 2025-05-12 DIAGNOSIS — Z17.0 MALIGNANT NEOPLASM OF UPPER-INNER QUADRANT OF LEFT BREAST IN FEMALE, ESTROGEN RECEPTOR POSITIVE (HCC): ICD-10-CM

## 2025-05-12 DIAGNOSIS — C50.911 BREAST CANCER METASTASIZED TO BONE, RIGHT (HCC): Primary | ICD-10-CM

## 2025-05-12 DIAGNOSIS — C50.212 MALIGNANT NEOPLASM OF UPPER-INNER QUADRANT OF LEFT BREAST IN FEMALE, ESTROGEN RECEPTOR POSITIVE (HCC): ICD-10-CM

## 2025-05-12 DIAGNOSIS — C79.51 BREAST CANCER METASTASIZED TO BONE, RIGHT (HCC): Primary | ICD-10-CM

## 2025-05-12 DIAGNOSIS — C79.51 BREAST CANCER METASTASIZED TO BONE, RIGHT (HCC): Chronic | ICD-10-CM

## 2025-05-12 DIAGNOSIS — C50.911 BREAST CANCER METASTASIZED TO BONE, RIGHT (HCC): Chronic | ICD-10-CM

## 2025-05-12 LAB
ALBUMIN SERPL BCG-MCNC: 4.1 G/DL (ref 3.5–5)
ALP SERPL-CCNC: 54 U/L (ref 34–104)
ALT SERPL W P-5'-P-CCNC: 35 U/L (ref 7–52)
ANION GAP SERPL CALCULATED.3IONS-SCNC: 7 MMOL/L (ref 4–13)
AST SERPL W P-5'-P-CCNC: 31 U/L (ref 13–39)
BASOPHILS # BLD AUTO: 0.04 THOUSANDS/ÂΜL (ref 0–0.1)
BASOPHILS NFR BLD AUTO: 1 % (ref 0–1)
BILIRUB SERPL-MCNC: 0.42 MG/DL (ref 0.2–1)
BUN SERPL-MCNC: 15 MG/DL (ref 5–25)
CALCIUM SERPL-MCNC: 9.6 MG/DL (ref 8.4–10.2)
CHLORIDE SERPL-SCNC: 103 MMOL/L (ref 96–108)
CO2 SERPL-SCNC: 30 MMOL/L (ref 21–32)
CREAT SERPL-MCNC: 0.53 MG/DL (ref 0.6–1.3)
EOSINOPHIL # BLD AUTO: 0.18 THOUSAND/ÂΜL (ref 0–0.61)
EOSINOPHIL NFR BLD AUTO: 2 % (ref 0–6)
ERYTHROCYTE [DISTWIDTH] IN BLOOD BY AUTOMATED COUNT: 14.7 % (ref 11.6–15.1)
GFR SERPL CREATININE-BSD FRML MDRD: 90 ML/MIN/1.73SQ M
GLUCOSE P FAST SERPL-MCNC: 102 MG/DL (ref 65–99)
HCT VFR BLD AUTO: 43.9 % (ref 34.8–46.1)
HGB BLD-MCNC: 14.1 G/DL (ref 11.5–15.4)
IMM GRANULOCYTES # BLD AUTO: 0.05 THOUSAND/UL (ref 0–0.2)
IMM GRANULOCYTES NFR BLD AUTO: 1 % (ref 0–2)
LYMPHOCYTES # BLD AUTO: 0.92 THOUSANDS/ÂΜL (ref 0.6–4.47)
LYMPHOCYTES NFR BLD AUTO: 12 % (ref 14–44)
MCH RBC QN AUTO: 29.1 PG (ref 26.8–34.3)
MCHC RBC AUTO-ENTMCNC: 32.1 G/DL (ref 31.4–37.4)
MCV RBC AUTO: 91 FL (ref 82–98)
MONOCYTES # BLD AUTO: 0.28 THOUSAND/ÂΜL (ref 0.17–1.22)
MONOCYTES NFR BLD AUTO: 4 % (ref 4–12)
NEUTROPHILS # BLD AUTO: 6.12 THOUSANDS/ÂΜL (ref 1.85–7.62)
NEUTS SEG NFR BLD AUTO: 80 % (ref 43–75)
NRBC BLD AUTO-RTO: 0 /100 WBCS
PLATELET # BLD AUTO: 285 THOUSANDS/UL (ref 149–390)
PMV BLD AUTO: 9 FL (ref 8.9–12.7)
POTASSIUM SERPL-SCNC: 3.9 MMOL/L (ref 3.5–5.3)
PROT SERPL-MCNC: 6.6 G/DL (ref 6.4–8.4)
RBC # BLD AUTO: 4.85 MILLION/UL (ref 3.81–5.12)
SODIUM SERPL-SCNC: 140 MMOL/L (ref 135–147)
WBC # BLD AUTO: 7.59 THOUSAND/UL (ref 4.31–10.16)

## 2025-05-12 PROCEDURE — 96372 THER/PROPH/DIAG INJ SC/IM: CPT

## 2025-05-12 PROCEDURE — 85025 COMPLETE CBC W/AUTO DIFF WBC: CPT

## 2025-05-12 PROCEDURE — 36415 COLL VENOUS BLD VENIPUNCTURE: CPT

## 2025-05-12 PROCEDURE — 80053 COMPREHEN METABOLIC PANEL: CPT

## 2025-05-12 RX ADMIN — DENOSUMAB 60 MG: 60 INJECTION SUBCUTANEOUS at 10:58

## 2025-05-12 NOTE — PROGRESS NOTES
Pt tolerated treatment with no adv reactions; AVS given; next appt is 11/10 at 1000; pt left unit ambulatory with steady gait.

## 2025-05-13 ENCOUNTER — TELEPHONE (OUTPATIENT)
Dept: HEMATOLOGY ONCOLOGY | Facility: CLINIC | Age: 80
End: 2025-05-13

## 2025-05-13 ENCOUNTER — OFFICE VISIT (OUTPATIENT)
Dept: PALLIATIVE MEDICINE | Facility: CLINIC | Age: 80
End: 2025-05-13
Payer: MEDICARE

## 2025-05-13 VITALS
SYSTOLIC BLOOD PRESSURE: 142 MMHG | HEART RATE: 61 BPM | OXYGEN SATURATION: 98 % | TEMPERATURE: 98.4 F | DIASTOLIC BLOOD PRESSURE: 72 MMHG | HEIGHT: 58 IN | WEIGHT: 131.39 LBS | BODY MASS INDEX: 27.58 KG/M2

## 2025-05-13 DIAGNOSIS — G89.3 CANCER RELATED PAIN: ICD-10-CM

## 2025-05-13 DIAGNOSIS — C50.911 BREAST CANCER METASTASIZED TO BONE, RIGHT (HCC): Primary | Chronic | ICD-10-CM

## 2025-05-13 DIAGNOSIS — Z79.899 MEDICAL MARIJUANA USE: ICD-10-CM

## 2025-05-13 DIAGNOSIS — R23.2 HOT FLASHES: ICD-10-CM

## 2025-05-13 DIAGNOSIS — C79.51 BREAST CANCER METASTASIZED TO BONE, RIGHT (HCC): Primary | Chronic | ICD-10-CM

## 2025-05-13 DIAGNOSIS — Z51.5 PALLIATIVE CARE BY SPECIALIST: ICD-10-CM

## 2025-05-13 DIAGNOSIS — F33.9 DEPRESSION, RECURRENT (HCC): ICD-10-CM

## 2025-05-13 PROCEDURE — G2211 COMPLEX E/M VISIT ADD ON: HCPCS | Performed by: INTERNAL MEDICINE

## 2025-05-13 PROCEDURE — 99214 OFFICE O/P EST MOD 30 MIN: CPT | Performed by: INTERNAL MEDICINE

## 2025-05-13 NOTE — ASSESSMENT & PLAN NOTE
Encouraged patient to discuss with the dispensary about different options for pain control and anxiety.

## 2025-05-13 NOTE — TELEPHONE ENCOUNTER
I received a in basket message from  Ryanne VALDES that Andrés BEAR agreed to see this patient for a Survivorship consult. I reached out to patient and scheduled her for the below. She had no additional questions or concerns at this time. I encouraged her to call should any arise.    Introduced myself and my role.      Type of appointment-Survivorship consult   Provider-Andrés Chiang  Date-06/05/2025  Time-10:30am  Location-[place]

## 2025-05-13 NOTE — ASSESSMENT & PLAN NOTE
Discussed increasing dose of Paxil- she would like to hold off on this as she is worried about ongoing weight gain

## 2025-05-13 NOTE — PROGRESS NOTES
Name: Yuni Keys      : 1945      MRN: [id number]  Encounter Provider: Lisa Contreras DO  Encounter Date: 2025   Encounter department: [facility] [place]  :  Assessment & Plan  Breast cancer metastasized to bone, right (HCC)         Medical marijuana use  Encouraged patient to discuss with the dispensary about different options for pain control and anxiety.         Cancer related pain  Using 1-2 tabs of oxycodone per day to manage pain  Current regimen:  Patient stopped Butrans due to fatigue  Oxycodone 5-10mg q4h PRN for moderate-severe pain  Alternate oxycodone with Tylenol as needed  Bowel regimen to prevent OIC  Opioid agreement signed 2024               Palliative care by specialist    Palliative Diagnosis: metastatic breast cancer    Goals:   Patient's ultimate goal is to improve quality of life, plan for her to visit her other daughter soon.    Social Support:  Patient's support system: adult children  Supportive listening provided  Normalized experience of patient    Care Coordination  Case discussed with NA    Follow-up  1 month                 Hot flashes    Depression, recurrent (HCC)  Discussed increasing dose of Paxil- she would like to hold off on this as she is worried about ongoing weight gain               Decisional apparatus: Patient is competent on my exam today. If competence is lost, patient's substitute decision maker would default to daughter by [place] 169.   Advance Directive / Living Will / POLST: yes     PDMP Review: I have reviewed the patient's controlled substance dispensing history in the Prescription Drug Monitoring Program in compliance with the [facility] regulations before prescribing any controlled substances.    History of Present Illness   Yuni Keys is a 79 y.o. female who presents in follow-up for symptoms related to a palliative diagnosis of stage IV breast cancer.  Since her last visit patient continues to struggle with quality of  life that she feels acceptable.  She notes that she has gained about a total of 12 pounds and is feeling very uncomfortable in her weight.  She has met with nutritionist and weight management in the past.  She feels this most likely due to her thyroid as she was recently started on levothyroxine and we discussed monitoring this to see how she progresses over the next couple weeks to months.  She also has a feeling of ongoing unease in her health.  We did discuss utilizing mental health therapy in conjunction with medical therapy and discussed increasing her Paxil but given her weight gain she wants to hold off on this.  She continues to use oxycodone albeit very infrequently.  She seems to be getting good relief from medical marijuana and is currently taken a hybrid product we discussed trying both strains separately instead of in the hybrid formation and she will talk to the dispensary about this.  She will be visiting her daughter that she has not seen in some time and is excited about this.  No other complaints.  Medical History Reviewed by provider this encounter:     .     Objective   There were no vitals taken for this visit.    Physical Exam  Vitals and nursing note reviewed.   Constitutional:       General: She is not in acute distress.  HENT:      Head: Normocephalic and atraumatic.      Right Ear: External ear normal.      Left Ear: External ear normal.   Eyes:      General:         Right eye: No discharge.         Left eye: No discharge.   Cardiovascular:      Rate and Rhythm: Normal rate and regular rhythm.      Heart sounds: Murmur heard.   Pulmonary:      Effort: Pulmonary effort is normal. No respiratory distress.   Abdominal:      General: There is no distension.      Palpations: Abdomen is soft.   Skin:     Coloration: Skin is pale.   Neurological:      Mental Status: She is oriented to person, place, and time. Mental status is at baseline.   Psychiatric:         Mood and Affect: Mood normal.          Behavior: Behavior normal.         Recent labs:  Lab Results   Component Value Date/Time    SODIUM 140 05/12/2025 09:29 AM    K 3.9 05/12/2025 09:29 AM    BUN 15 05/12/2025 09:29 AM    CREATININE 0.53 (L) 05/12/2025 09:29 AM    GLUC 52 (L) 01/17/2025 09:47 AM    CALCIUM 9.6 05/12/2025 09:29 AM    AST 31 05/12/2025 09:29 AM    ALT 35 05/12/2025 09:29 AM    ALB 4.1 05/12/2025 09:29 AM    TP 6.6 05/12/2025 09:29 AM    EGFR 90 05/12/2025 09:29 AM     Lab Results   Component Value Date/Time    HGB 14.1 05/12/2025 09:29 AM    WBC 7.59 05/12/2025 09:29 AM     05/12/2025 09:29 AM    INR 1.01 10/01/2024 08:13 AM    PTT 34 10/01/2024 08:13 AM     Lab Results   Component Value Date/Time    [id number] 6.028 (H) 05/05/2025 07:11 AM       Recent Imaging:  Procedure: DXA bone density spine hip and pelvis  Result Date: 4/15/2025  Impression: 1. Low bone mass (osteopenia). 2.  Since a DXA study from 4/5/2021, there has been: A  STATISTICALLY SIGNIFICANT DECREASE in bone mineral density of 0.086 g/cm2 (8.3%) in the lumbar spine. A  STATISTICALLY SIGNIFICANT DECREASE in bone mineral density of 0.053 g/cm2 (6.6)% in the left total hip. 3.  The 10 year risk of hip fracture is 4.8% with the 10 year risk of major osteoporotic fracture being 20% as calculated by the [employer] fracture risk assessment tool (FRAX, which is based on data generated by the WHO Collaborating [place]  for Metabolic Bone Diseases). 4.  The current Bone Health and Osteoporosis Foundation (BHOF) guidelines recommend treating patients with a T-score of -2.5 or less in the lumbar spine or hips, or in post-menopausal women and men over the age of 50 with low bone mass (osteopenia; T-score between -1.0 and -2.5) and a FRAX 10 year risk score of > 3% for hip fracture and/or > 20% for major osteoporosis-related fracture (clinical vertebral, hip, forearm, or proximal humerus). 5.  The BHOF recommends follow-up DXA in 1-2 years after initiating or  changing medical therapy for osteoporosis and at appropriate intervals thereafter, according to clinical circumstances. More frequent BMD testing may be warranted in higher-risk individuals (multiple fractures, older age, very low BMD). Less frequent BMD testing is warranted as follow-up in patients with initial T-scores in the normal or slightly below normal range (osteopenia) and for patients who have remained fracture free on  treatment. The FRAX algorithm has certain limitations: -FRAX has not been validated in patients currently or previously treated with pharmacotherapy for osteoporosis.  In such patients, clinical judgment must be exercised in interpreting FRAX scores. -Prior hip, vertebral and humeral fragility fractures appear to confer greater risk of subsequent fracture than fractures at other sites (this is especially true for individuals with severe vertebral fractures), but quantification of this incremental risk is not possible with FRAX. -FRAX underestimates fracture risk in patients with history of multiple fragility fractures. -FRAX may underestimate fracture risk in patients with history of frequent falls. -It is not appropriate to use FRAX to monitor treatment response. WHO CLASSIFICATION: Normal (a T-score of -1.0 or higher) Low bone mineral density (a T-score of less than -1.0 but higher than -2.5) Osteoporosis (a T-score of -2.5 or less) Severe osteoporosis (a T-score of -2.5 or less with a fragility fracture) LEAST SIGNIFICANT CHANGE (AT 95% C.I): Lumbar spine: 0.039 g/cm2; 4.0% Total hip: 0.031 g/cm2; 3.7% Forearm: 0.022 g/cm2; 3.8% SELECTED REFERENCES: LeBoff MS, Stacey SL, Sandy KL, et al. The clinician's guide to prevention and treatment of osteoporosis. Osteoporos Int 2022; 33:9360-5503. Agueda D, Michele SB, Brooklyn A, et al. DXA reporting updates: 2023 Official Positions of the International Society for Clinical Densitometry. J Clin Densitom 2024; 27: 764549  (https://doi.org/10.1016/j.jocd.2023.935949). Workstation performed: [id number]     Procedure: NM PET CT skull base to mid thigh  Result Date: 3/18/2025  Impression: There is no new visceral metastatic disease Uptake seen within the right acromion, unchanged Changes of vertebral augmentation seen in the L1 vertebra and T8 vertebra Focal uptake seen in the paravertebral region in the medial right lung in the paravertebral location with SUV of 3.2, new probably postradiation, attention at follow-up. New focal area of uptake in the left first costochondral junction and in the left sternoclavicular joint, nonspecific Uptake seen in the right greater peritrochanteric region and the adjacent tendons and muscles on musculoskeletal basis tendinopathy The study was marked in EPIC for significant notification. Workstation performed: [id number]       Administrative Statements   I have spent a total time of 30 minutes in caring for this patient on the day of the visit/encounter including Risks and benefits of tx options, Instructions for management, Patient and family education, Importance of tx compliance, Risk factor reductions, Impressions, Counseling / Coordination of care, Documenting in the medical record, Reviewing/placing orders in the medical record (including tests, medications, and/or procedures), and Obtaining or reviewing history  .   Topics discussed with the patient / family include symptom assessment and management, medication review, psychosocial support, and supportive listening.

## 2025-05-13 NOTE — ASSESSMENT & PLAN NOTE
Using 1-2 tabs of oxycodone per day to manage pain  Current regimen:  Patient stopped Butrans due to fatigue  Oxycodone 5-10mg q4h PRN for moderate-severe pain  Alternate oxycodone with Tylenol as needed  Bowel regimen to prevent OIC  Opioid agreement signed 11/13/2024

## 2025-05-13 NOTE — ASSESSMENT & PLAN NOTE
Palliative Diagnosis: metastatic breast cancer    Goals:   Patient's ultimate goal is to improve quality of life, plan for her to visit her other daughter soon.    Social Support:  Patient's support system: adult children  Supportive listening provided  Normalized experience of patient    Care Coordination  Case discussed with NA    Follow-up  1 month

## 2025-05-15 ENCOUNTER — TELEPHONE (OUTPATIENT)
Age: 80
End: 2025-05-15

## 2025-05-15 NOTE — TELEPHONE ENCOUNTER
Call received from patient. Stated she has a list of symptoms that she is experiencing. Stated she is having weight gain and fatigue, and was started on synthroid on 5/7. She said the weight gain is bothering her a lot. Also said she is having muscle and joint pain, and advil or aleve isnt helping this. She is having night sweats and anxiety, which she is on paxil for. She was told to increase the paxil by palliative care but patient did not want to. Also said that she has been feeling an achy pain when she takes a deep breath in but is not short of breath. Also said that she has noticed she is having difficulty swallowing certain liquids such as soda. Said that it hurts when it goes down and feels like its slightly getting stuck. Patient would like to know what all these symptoms could be coming from. Asking if its from the Femara or her thyroid.

## 2025-05-15 NOTE — TELEPHONE ENCOUNTER
Call out to patient with Dr. Patel's recommendations to hold Letrozole 7-10 days     Patient agreeable and reviewed will call office with any changes. RN to call on 5/22 to reassess.     Patient appreciative of call.

## 2025-05-19 ENCOUNTER — TELEPHONE (OUTPATIENT)
Age: 80
End: 2025-05-19

## 2025-05-19 DIAGNOSIS — Z51.5 PALLIATIVE CARE BY SPECIALIST: ICD-10-CM

## 2025-05-19 DIAGNOSIS — G89.3 CANCER RELATED PAIN: ICD-10-CM

## 2025-05-19 NOTE — TELEPHONE ENCOUNTER
"\"I have severe back pain (8 out of 10).  I guess I should just take pain meds and wait for my CT scan next month?     Thanks\"    Received above message from patient and called her to discuss further. She reports that her mid thoracic back pain has worsened since last night.  She denies doing any exercise or strenuous activity that caused the pain to worsen. She further denies any other acute symptoms. She has taken 2 oxycodone with relief today, but concerned that it just puts her to sleep. Advised to take every 4 hours as needed as pain is worse now, and she can decrease if pain resolves. She understood and had no further concerns at this time.  "

## 2025-05-20 RX ORDER — OXYCODONE HYDROCHLORIDE 10 MG/1
5-10 TABLET ORAL EVERY 4 HOURS PRN
Qty: 120 TABLET | Refills: 0 | Status: SHIPPED | OUTPATIENT
Start: 2025-05-20

## 2025-05-20 NOTE — TELEPHONE ENCOUNTER
Message sent to patient with recommendations from Dr. Amanda  Ya take pain meds as it doesn't seem the letrozole being held is helping.

## 2025-05-20 NOTE — TELEPHONE ENCOUNTER
Medication: oxycodone 10mg  PDMP    05/07/2025 05/07/2025 oxyCODONE HCL (Tablet) 60.0 8 5 MG 56.25 [facility], [facility] Private Pay 0 / 0 [place]   1 [id number] ** 04/03/2025 04/02/2025 Buprenorphine (Patch, Extended Release) 4.0 28 5 MCG/1 HR NA [facility] Private Pay 0 / 0 PA   1 [id number] ** 03/21/2025 03/21/2025 oxyCODONE HCL (Tablet) 120.0 20 10 MG 90.0 [facility]

## 2025-05-22 ENCOUNTER — TELEPHONE (OUTPATIENT)
Dept: SURGICAL ONCOLOGY | Facility: CLINIC | Age: 80
End: 2025-05-22

## 2025-05-22 NOTE — TELEPHONE ENCOUNTER
Spoke with pt regarding the cancelled appt on 6/5/25 @ 10:30 AM.  Offered to reschedule but pt does not want to reschedule at his time.  She does not feel it's needed.

## 2025-05-27 ENCOUNTER — TELEPHONE (OUTPATIENT)
Facility: HOSPITAL | Age: 80
End: 2025-05-27

## 2025-05-27 NOTE — TELEPHONE ENCOUNTER
Left message for appointment reminder at [facility]. Call back number is [phone]

## 2025-05-28 ENCOUNTER — PATIENT OUTREACH (OUTPATIENT)
Dept: CASE MANAGEMENT | Facility: OTHER | Age: 80
End: 2025-05-28

## 2025-05-28 NOTE — PROGRESS NOTES
"OSW called Alley today for support. She stated she is \"feeling disgusted\" with her body and multiple side effects she is now experiencing after being diagnosed with thyroid issues and uncontrolled HTN. She is gaining weight and had to buy new clothing, and her anxiety is not well controlled. She changed her mind about going to a survivorship appointment, stating, \"That is for people who are in remission, and I'm not. I just don't want to go anymore.\"  Alley is leaving tomorrow to visit her daughter and FLAVIO in [place]. She will fly out of [place] and her daughter will pick her up at the airport. She is looking forward to seeing her, but her grandson is studying in [place] and her grand-daughter is still in graduate school, so they won't be home to see her. Tried to re-frame some of her negative thoughts about her trip and weight gain. She continues to take daily walks and tries to remain active. WIshed her well and a good trip with her family. OSW will f/u with her in a few weeks to continue ongoing emotional support.   "

## 2025-06-03 DIAGNOSIS — F33.9 DEPRESSION, RECURRENT (HCC): ICD-10-CM

## 2025-06-03 DIAGNOSIS — R23.2 HOT FLASHES: ICD-10-CM

## 2025-06-03 RX ORDER — PAROXETINE 10 MG/1
10 TABLET, FILM COATED ORAL DAILY
Qty: 90 TABLET | Refills: 1 | Status: SHIPPED | OUTPATIENT
Start: 2025-06-03

## 2025-06-06 DIAGNOSIS — R23.2 HOT FLASHES: ICD-10-CM

## 2025-06-06 DIAGNOSIS — Z51.5 PALLIATIVE CARE BY SPECIALIST: Primary | ICD-10-CM

## 2025-06-06 RX ORDER — HYDROXYZINE HYDROCHLORIDE 10 MG/1
10 TABLET, FILM COATED ORAL EVERY 6 HOURS PRN
Qty: 30 TABLET | Refills: 0 | Status: SHIPPED | OUTPATIENT
Start: 2025-06-06

## 2025-06-09 ENCOUNTER — TELEPHONE (OUTPATIENT)
Age: 80
End: 2025-06-09

## 2025-06-09 NOTE — TELEPHONE ENCOUNTER
PA for HYDROXYZINE 10 MG SUBMITTED to [employer]    via    [x]CMM-KEY: [id number]      [x]PA sent as URGENT    All office notes, labs and other pertaining documents and studies sent. Clinical questions answered. Awaiting determination from insurance company.     Turnaround time for your insurance to make a decision on your Prior Authorization can take 7-21 business days.

## 2025-06-09 NOTE — TELEPHONE ENCOUNTER
PA for HYDROXYZINE 10 MG  APPROVED     Date(s) approved UNTIL 12/31/2099    Case #    Patient advised by          []MyChart Message  []Phone call   [x]LMOM  []L/M to call office as no active Communication consent on file  []Unable to leave detailed message as VM not approved on Communication consent       Pharmacy advised by    [x]Fax  []Phone call  []Secure Chat    Specialty Pharmacy    []     Approval letter scanned into [employer] Yes

## 2025-06-13 DIAGNOSIS — R63.5 WEIGHT GAIN: Primary | ICD-10-CM

## 2025-06-18 ENCOUNTER — TELEPHONE (OUTPATIENT)
Age: 80
End: 2025-06-18

## 2025-06-19 ENCOUNTER — PATIENT OUTREACH (OUTPATIENT)
Dept: CASE MANAGEMENT | Facility: OTHER | Age: 80
End: 2025-06-19

## 2025-06-19 NOTE — PROGRESS NOTES
"OSW called Alley today to see how she is doing since her trip to her daughter's home in [place]. She answered call, but couldn't talk long because she was going to be leaving to go to a surprise party for her neighbor's correction.    Alley stated she had a good time with her daughter. Her back hurt from the plane ride, but it was tolerable. She didn't elaborate much more, other than she had a good time with her daughter.    She stated she is feeling \"pill fatigued\" and is not happy with the weight she has gained since her thyroid issues started. She had to buy new clothes that fit her. She would like to see her dietician from [facility] weight management, however medical oncology suggested she not lose weight because she has cancer. She feels that this is not helpful, because she is emotionally not happy with having gained this weight. Suggested she try to meet with dietician and maybe they could connect to medical oncology to discuss further from a multi-disciplinary perspective.     At this point in the conversation she had to go to the party. OSW offered to call her in about 14-16 days and she is very agreeable.     "

## 2025-06-22 NOTE — PROGRESS NOTES
Name: Yuni Keys      : 1945      MRN: [id number]  Encounter Provider: Fam Amanda MD  Encounter Date: 2025   Encounter department: [facility]  :  Assessment & Plan  Breast cancer metastasized to bone, right (HCC)    This is a 79 y.o. F with metastatic breast cancer on systemic therapy  Orders:    CT chest abdomen pelvis w contrast; Future    Malignant neoplasm of upper-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, estrogen receptor positive (HCC)  As per above         Assessment & Plan  1. Breast Cancer.  The recent CAT scan showed no new lesions, indicating that the breast cancer remains stable. The patient inquired about taking a vacation from letrozole to alleviate joint pain, and it was agreed that a one-month break is acceptable but should not extend beyond that period. Alternative treatments such as Keytruda and Enhertu were discussed for future consideration if necessary. Keytruda is an immune therapy administered every 6 weeks at the [facility] center, targeting the immune system to fight cancer cells. Enhertu targets specific mutations and may be an option if the cancer progresses. Potential side effects of Enhertu include lung issues, and the patient was advised to consider these risks.    2. Joint Pain.  The joint pain is likely a side effect of letrozole. A one-month break from letrozole has been advised to see if it alleviates the joint pain. If symptoms persist, further evaluation will be necessary.    3. Weight Gain.  A referral has been made to dietitifederico Alejandra for weight management. Follow-up with the dietitian is recommended to address weight gain concerns.    4. Thyroid Dysfunction.  The TSH level was slightly above the threshold at 4.7, but the free thyroid level was normal. No changes to the current thyroid medication are necessary.    5. Hot Flashes.  Hot flashes persist despite being on Paxil. The possibility of switching to  "another antidepressant was discussed, but the patient prefers to stay on Paxil due to past difficulties with other medications.      Return in about 3 months (around 9/29/2025) for Office Visit, Imaging - See orders.    History of Present Illness   Chief Complaint   Patient presents with    Follow-up     History of Present Illness  The patient is a 79-year-old female who presents for joint pain, weight gain, thyroid issues, hot flashes, and breast cancer.    She reports an increase in joint pain, which she attributes to her morning walks. Despite taking breaks during activities such as shopping or cooking, discomfort persists the following day. Frequent back pain and limited mobility are also noted. A recent trip to [place] was uncomfortable due to prolonged sitting on the plane. Excessive sleepiness is reported, often falling asleep while watching television, a change from her previously active lifestyle that included regular gym visits. She is considering a temporary break from letrozole to see if it alleviates her joint pain.    Concerns about weight gain are expressed, having gained over 20 pounds. She has consulted with a dietitian, Elizabeth Alejandra, and is seeking a referral for further consultation.    Thyroid function has been fluctuating, leading to an increase in medication dosage from 25 to 1 by her primary care physician.    She continues to experience hot flashes and night sweats, which she believes may be exacerbated by her current medication regimen, including letrozole, thyroid medication, and Paxil. Hesitation to increase Paxil dosage is due to concerns about potential weight gain. She was previously on a different antidepressant but had difficulty discontinuing it.        Objective   /70 (BP Location: Left arm, Patient Position: Sitting, Cuff Size: Standard)   Pulse 69   Temp 97.9 °F (36.6 °C) (Temporal)   Resp 16   Ht 4' 10\" (1.473 m)   Wt 61.9 kg (136 lb 8 oz)   SpO2 98%   BMI 28.53 " kg/m²       Results  Labs   - TSH: 06/30/2025, 4.7, slightly above threshold   - Free thyroid level: 06/30/2025, Normal    Imaging   - CAT scan: No new lesions, breast cancer has not changed  Labs: I have reviewed the following labs:    This is a 79 y.o. F with metastatic breast cancer on systemic therapy      Return in about 3 months (around 9/29/2025) for Office Visit, Imaging - See orders.    History of Present Illness   Chief Complaint   Patient presents with    Follow-up     12/11/2024: Reduced Ribociclib to 400 mg  12/12/2024: EKG  ms  'EKG was read as possible anterior infarct (reason for referral).  On my review I think the EKG actually looks normal.  I am going to check an echocardiogram to ensure normal EF and no wall motion abnormalities' Kerry  1/25/2025: much muscle and joint pain, especially in the thighs, hips and abdomen  Holdin Letrozole  2/4/2025: resume Letrozole as joints not improved  2/24/2025: lots of thigh pain, I had her stop all meds for a month*  7/2025: plan for 1 month holiday off of Letrozole    Oncology History   Cancer Staging   Breast cancer metastasized to bone, right (HCC)  Staging form: Breast, AJCC 8th Edition  - Clinical: Stage IV (cM1) - Signed by Fam Amanda MD on 10/12/2024    Malignant neoplasm of upper-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, estrogen receptor positive (HCC)  Staging form: Breast, AJCC 8th Edition  - Clinical: Stage IV (cM1) - Signed by Fam Amanda MD on 10/12/2024  Oncology History   Breast cancer metastasized to bone, right (HCC)   10/12/2024 -  Cancer Staged    Staging form: Breast, AJCC 8th Edition  - Clinical: Stage IV (cM1) - Signed by Fam Amanda MD on 10/12/2024       10/2024 Genetic Testing    [facility] BRCAplus STAT Panel (13 genes): PETER, BARD1, BRCA1, BRCA2, CDH1, CHEK2, NF1, PALB2, PTEN, RAD51C, RAD51D, STK11, TP53 with reflex to [facility] CustomNext: Cancer+RNAinsight (59 genes): APC, PETER, AXIN2, BAP1, BARD1, BMPR1A,  BRCA1, BRCA2, BRIP1, CDH1, CDK4, CDKN1B, CDKN2A, CHEK2, CTNNA1, DICER1, EGLN1, EPCAM, FH, FLCN, GREM1, HOXB13, KIF1B, KIT, MAX, MEN1, MET, MITF, MLH1, MSH2, MSH3, MSH6, MUTYH, NF1, NTHL1, PALB2, PDGFRA PMS2, POLD1, POLE, POT1, PTEN, RAD51C, RAD51D, RB1, RET, SDHA, SDHAF2, SDHB, SDHC, SDHD, SMAD4, SMARCA4, STK11, GDDS915, TP53, TSC1, TSC2, VHL      Result: NEGATIVE     10/2024 -  Hormone Therapy    Letrozole  2.5 mg QD and Kisqali initially 600 mg QD 3 weeks on 1 week of . then she tried 400 mg QD early December 2024 due to reported side effects.   G1 Neutropenia 2/2 Kisqali was noted    12/2024 Patient discontinued Letrozole/Kisqail due to fatigue and night sweats.  No improvement after discontinuation for 3 weeks.   Patient in agreement to continue Letrozole.    Dr. Amanda     11/5/2024 - 11/20/2024 Radiation      Plan ID Energy Fractions Dose per Fraction (cGy) Dose Correction (cGy) Total Dose Delivered (cGy) Elapsed Days   RT Shoulder 6X 5 / 5 400 0 2,000 7   T11_L2 Spine 10X/6X 10 / 10 300 0 3,000 15   T7_T10 Spine 6X 10 / 10 300 0 3,000 15      Dr. Siddiqi     Malignant neoplasm of upper-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, estrogen receptor positive (HCC)   10/2/2024 Observation    NM PET/CT IMPRESSION:   1. Mild focal radiotracer uptake at a left breast nodular density medially. Underlying primary breast malignancy should be excluded.  2. Several small FDG avid left axillary/subpectoral lymph nodes would raise suspicion for metastasis.  3. Scattered FDG avid lytic lesions compatible with metastasis.     10/4/2024 Biopsy    Left breast ultrasound-guided biopsy  A. 3 o'clock, periareolar  Benign breast tissue with fibroadenomatoid nodule    B. 11 o'clock, 7 cm from nipple  Invasive mammary carcinoma with mixed ductal and lobular features  Grade 1-2  ER %, NH <1%, HER2 1+  Lymphovascular invasion not definitively identified    Concordant. Malignancy is poorly defined on mammo, appearing as a  developing asymmetry rather than a discrete mass; this measures up to 5.8 cm. On US, mass measures up to 4.1cm. Subtle asymmetry with possible distortion slightly superior and medial which could represent a second site of disease. Further characterization is indication. Right breast imaging performed on 4/12/2024; more recent mammogram may be reasonable. CESM or MRI is recommended for evaluation of extent of disease in left breast. The patient had abnormal axillary lymph nodes seen on PET/CT. 1 of these lymph nodes was visualized on US; however, it is not amenable to US-guided core needle biopsy.     10/12/2024 -  Cancer Staged    Staging form: Breast, AJCC 8th Edition  - Clinical: Stage IV (cM1) - Signed by Fam Amanda MD on 10/12/2024       10/15/2024 Biopsy    IR-guided biopsy    T8 - metastatic carcinoma, most compatible with known breast primary  ER 95%  VA <1%  HER2 0    L1 - negative for carcinoma     10/2024 Genetic Testing    [facility] BRCAplus STAT Panel (13 genes): PETER, BARD1, BRCA1, BRCA2, CDH1, CHEK2, NF1, PALB2, PTEN, RAD51C, RAD51D, STK11, TP53 with reflex to [facility] CustomNext: Cancer+RNAinsight (59 genes): APC, PETER, AXIN2, BAP1, BARD1, BMPR1A, BRCA1, BRCA2, BRIP1, CDH1, CDK4, CDKN1B, CDKN2A, CHEK2, CTNNA1, DICER1, EGLN1, EPCAM, FH, FLCN, GREM1, HOXB13, KIF1B, KIT, MAX, MEN1, MET, MITF, MLH1, MSH2, MSH3, MSH6, MUTYH, NF1, NTHL1, PALB2, PDGFRA PMS2, POLD1, POLE, POT1, PTEN, RAD51C, RAD51D, RB1, RET, SDHA, SDHAF2, SDHB, SDHC, SDHD, SMAD4, SMARCA4, STK11, NMFE018, TP53, TSC1, TSC2, VHL      Result: NEGATIVE     10/2024 -  Hormone Therapy    Letrozole  2.5 mg QD and Kisqali initially 600 mg QD 3 weeks on 1 week of . then she tried 400 mg QD early December 2024 due to reported side effects.   G1 Neutropenia 2/2 Kisqali was noted    12/2024 Patient discontinued Letrozole/Kisqail due to fatigue and night sweats.  No improvement after discontinuation for 3 weeks.   Patient in agreement to continue  Letrozole.    Dr. Amanda     11/5/2024 - 11/20/2024 Radiation      Plan ID Energy Fractions Dose per Fraction (cGy) Dose Correction (cGy) Total Dose Delivered (cGy) Elapsed Days   RT Shoulder 6X 5 / 5 400 0 2,000 7   T11_L2 Spine 10X/6X 10 / 10 300 0 3,000 15   T7_T10 Spine 6X 10 / 10 300 0 3,000 15      Dr. Siddiqi          Discussion of decision making  Oncology history updated, accordingly, during this visit  Goals of care/patient communication  I discussed with the patient the clinical course leading up to their cancer diagnosis. I reviewed relevant office notes, imaging reports and pathology result as well.  I told the patient that this is a case of incurable disease and what this means. We discussed that the goal of anti-cancer therapy is to provide best quality of life, extend overall survival, and progression free survival as shown in clinical trials. We also discussed that there might be a point when the cancer will no longer respond to this anti-neoplastic therapy. As a result, we also discussed the role of the palliative care team being introduced early in the treatment course. We will be making this referral  I explained the risks/benefits of the proposed cancer therapy: Letrozole + Ribociclib  and after discussion including understanding risks of possible life-threatening complications and therapy-related malignancy development, informed consent for blood products and treatment has been signed and obtained.  TNM/Staging At Diagnosis  Cancer Staging   Breast cancer metastasized to bone, right (HCC)  Staging form: Breast, AJCC 8th Edition  - Clinical: Stage IV (cM1) - Signed by Fam Amanda MD on 10/12/2024     Malignant neoplasm of upper-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, estrogen receptor positive (HCC)  Staging form: Breast, AJCC 8th Edition  - Clinical: Stage IV (cM1) - Signed by Fam Amanda MD on 10/12/2024     Disease Features/Tumor Markers/Genetics  Tumor Marker: n/a  Notable  "Path Features:   10/4/2024 Left Breast: ER 90%, KY negative, HER-2 +1  10/4/2024: [facility] NGS: ER+, HER2 0, low tumor burden       10/15/2024: Spine, \"Vertebra, T8,\" Biopsy: Metastatic carcinoma, most compatible with known breast primary.                  ER+, KY -ve, Her2 0.  2024: [facility] NGS 10 muts/Mb, NF1 mutation  Treatment: Letrozole + Ribociclib (she is off of Ribociclib - discontinued it herself)   Other Supportive care: Prolia, Calcium and Vitamin D   Treatment Team Members  Surgeon: Cassandra Lynn Cardarelli, MD   Rad Onc: Olimpia Siddiqi MD   Palliative: Lisa Contreras,    Labs  Diagnostics  10/1/2024 EK ms QTC   10/2/2024 PET/CT: Mild focal radiotracer uptake at a left breast nodular density medially. Several small FDG avid left axillary/subpectoral lymph nodes would raise suspicion for metastasis. Scattered FDG avid lytic lesions compatible with metastasis. T-8 and T-11 spine lesions  10/8/2024 MRI T-spine w/wo c: Normal enhanced MRI of the thoracic spine  There is a DEXA from , next can get it done 2024:  ms QTC   2024 CT AP: No evidence of metastases in the abdomen or pelvis. 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  Compression fracture of L1, s/p Kyphoplasty.   2024 CT T Spine: Osteolytic metastases involving the T8, T11 and L1 vertebral bodies with pathologic fractures treated with kyphoplasty at T8 and L1. Thickened paravertebral soft tissues at T8. This could be residual edema or hematoma ... Probable extraosseous extension of T8 metastasis into the left anterior epidural space.   2024 CT L Spine: Compression fracture of L1, status post kyphoplasty. No new lumbar vertebral fractures. No evidence of lumbar spine metastasis.  11/10/2024 MRI Thoracic Spine: Interval T8 kyphoplasty ... Redemonstrated osseous metastases at T5, T8, and T11. No abnormal epidural or leptomeningeal enhancement. Normal cord " signal.  12/12/2024 EKG QTC interval 423 msec   12/13/2024 MRI Abdomen: Minimally dilated CBD up to 8 mm proximally with smooth distal tapering. No intrahepatic bile duct dilation. No choledocholithiasis, biliary stricture or suspicious mass within the limits of this motion degraded examination.  1/13/2025 CT CAP w/c: stable left breast mass with biopsy clip. Scattered bone metastases including a new right scapular lytic lesion with a healed pathological fracture, likely related to postradiation treatment changes to the right shoulder.  No metastatic disease in the solid organs of the abdomen and pelvis.  3/17/2025: PET/CT indicates patchy uptake seen within the right acromion with maximal SUV of about 2.9 (previously 4.8) . There are lytic permeative changes uptake in the left sternoclavicular joint seen with SUV of 4.2, new, nonspecific Uptake noted at the left first costochondral junction, nonspecific. OSSEOUS STRUCTURES: Focal area of uptake seen within the left ischial tuberosity with maximal SUV of four 2.3 (previously 4.7) .  There is no new visceral metastatic disease   New focal area of uptake in the left first costochondral junction and in the left sternoclavicular joint, nonspecific   4/14/2025 DEXA: osteopenia LEFT  TOTAL HIP: T-score: -1.6  LEFT  FEMORAL NECK: T score: -1.7  6/23/2025 CT CAP w/c: No interval change. Stable 9 mm left breast lesion. No axillary lymphadenopathy. No pulmonary lesions. No thoracic or abdominal lymphadenopathy stable. Lytic lesion in T11 vertebral body. No new osseous lesions     Discussion of decision making     I personally reviewed the following lab results, the image studies, pathology, other specialty/physicians consult notes and recommendations, and outside medical records. I had a lengthy discussion with the patient and shared the work-up findings. We discussed the diagnosis and management plan as below. I spent 42 minutes reviewing the records (labs, clinician notes,  outside records, medical history, ordering medicine/tests/procedures, monitoring of anti-neoplastic toxicities, interpreting the imaging/labs previously done) and coordination of care as well as direct time with the patient today, of which greater than 50% of the time was spent in counseling and coordination of care with the patient/family.     Plan/Labs  EKG at baseline and q2 weeks for 2 times - completed with normal QTC intervals.   To continue follow up with palliative care; further goals of care discussion to follow.  Patient has depression symptoms but no suicidal thoughts or ideations. She is now on Paxil. She is resistant to full therapy and wants to continue with monotherapy - see below.  To continue follow up with radiation oncology   10/22/24 was initiated on Letrozole 2.5 mg daily + Ribociclib 600 mg daily 21 days on and 7 days off   Reported fatigue and night sweats for which kisqali was decreased to 400 mg daily then patient Self Discontinued the therapy since around 12/10/24 but without improvement of the fatigue or night sweats up to today visit 12/30/24,  Continue monotherapy Letrozole 2.5 mg daily  (she will take a medicine holiday for July)  recommends Calcium 1200 mg and Vitamin D-3 2,000 units daily supplemental  Can use Keytruda down the road due to TMB high  She is going to f/u with a dietitian  Baseline DEXA due 4/2025 and would be covered by the insurance   Cont Prolia SC 60 mg q6 months (initiated on 11/12/24)   Oncology dietitian   patient was seen by genetic on 10/30/24 and genetic testing performed including but not limited to NF1, BRCA1 and BRCA2 were are negative with no clinically significant variants detected.   CBC, CMP q4 weeks ordered as standing until 11/2025  Restaging CT CAP w/c ordered 9/22/2025            Follow Up: 3 months     All questions were answered to the patient's satisfaction during this encounter. The patient knows the contact information for our office and knows  "to reach out for any relevant concerns related to this encounter. They are to call for any temperature 100.4 or higher, new symptoms including but not restricted to shaking chills, decreased appetite, nausea, vomiting, diarrhea, increased fatigue, shortness of breath or chest pain, confusion, and not feeling the strength to come to the clinic. For all other listed problems and medical diagnosis in their chart - they are managed by PCP and/or other specialists, which the patient acknowledges. Thank you very much for your consultation and making us a part of this patient's care. We are continuing to follow closely with you. Please do not hesitate to reach out to me with any additional questions or concerns.    Pertinent Medical History     07/02/25: Still has the hip/joint pain. Seems like muscular pain. It is sharp / constant. Laying down helps.  Oxycodone help for few hours.  She had radiation to the shoulder and lower back end of 2024 which seem to help alleviate some of the pain.  No other issues or complaints.  She is tolerating diet and keeping herself active.       ROS  See pertinent medical history      Objective   /70 (BP Location: Left arm, Patient Position: Sitting, Cuff Size: Standard)   Pulse 69   Temp 97.9 °F (36.6 °C) (Temporal)   Resp 16   Ht 4' 10\" (1.473 m)   Wt 61.9 kg (136 lb 8 oz)   SpO2 98%   BMI 28.53 kg/m²     Pain Screening:  Pain Score: 0-No pain  ECOG   1      Physical exam:  General:  Appears in no distress, sitting up  Neuro:  Speaks in full sentences, no focal deficits noted  Pulmonary:  No cyanosis, no accessory muscle use  Cardiovascular: Regular rate, no abnormal rhythm noted  GI:  Appears nondistended, no masses noted  Extremities:  No new rash noted, no cyanosis  Psychiatry:  Normal mood with congruent affect  Ear nose and throat:  Atraumatic, extraocular muscles intact        Labs: I have reviewed the following labs:  Lab Results   Component Value Date/Time    WBC 4.98 " 06/30/2025 07:26 AM    RBC 4.56 06/30/2025 07:26 AM    Hemoglobin 13.6 06/30/2025 07:26 AM    Hematocrit 42.2 06/30/2025 07:26 AM    MCV 93 06/30/2025 07:26 AM    MCH 29.8 06/30/2025 07:26 AM    RDW 16.6 (H) 06/30/2025 07:26 AM    Platelets 310 06/30/2025 07:26 AM    Segmented % 71 06/30/2025 07:26 AM    Lymphocytes % 17 06/30/2025 07:26 AM    Monocytes % 7 06/30/2025 07:26 AM    Eosinophils Relative 4 06/30/2025 07:26 AM    Basophils Relative 1 06/30/2025 07:26 AM    Immature Grans % 0 06/30/2025 07:26 AM    Absolute Neutrophils 3.57 06/30/2025 07:26 AM     Lab Results   Component Value Date/Time    Potassium 4.2 06/30/2025 07:26 AM    Chloride 104 06/30/2025 07:26 AM    CO2 28 06/30/2025 07:26 AM    BUN 19 06/30/2025 07:26 AM    Creatinine 0.53 (L) 06/30/2025 07:26 AM    Glucose, Fasting 82 06/30/2025 07:26 AM    Calcium 8.6 06/30/2025 07:26 AM    AST 41 (H) 06/30/2025 07:26 AM    ALT 46 06/30/2025 07:26 AM    Alkaline Phosphatase 47 06/30/2025 07:26 AM    Total Protein 6.4 06/30/2025 07:26 AM    Albumin 4.1 06/30/2025 07:26 AM    Total Bilirubin 0.43 06/30/2025 07:26 AM    eGFR 90 06/30/2025 07:26 AM

## 2025-06-22 NOTE — ASSESSMENT & PLAN NOTE
This is a 79 y.o. F with metastatic breast cancer on systemic therapy  Orders:    CT chest abdomen pelvis w contrast; Future

## 2025-06-23 ENCOUNTER — NURSE TRIAGE (OUTPATIENT)
Age: 80
End: 2025-06-23

## 2025-06-23 ENCOUNTER — HOSPITAL ENCOUNTER (OUTPATIENT)
Dept: CT IMAGING | Facility: HOSPITAL | Age: 80
Discharge: HOME | End: 2025-06-23
Attending: INTERNAL MEDICINE
Payer: MEDICARE

## 2025-06-23 DIAGNOSIS — C50.911 BREAST CANCER METASTASIZED TO BONE, RIGHT (HCC): Chronic | ICD-10-CM

## 2025-06-23 DIAGNOSIS — C79.51 BREAST CANCER METASTASIZED TO BONE, RIGHT (HCC): Chronic | ICD-10-CM

## 2025-06-23 PROCEDURE — 74177 CT ABD & PELVIS W/CONTRAST: CPT

## 2025-06-23 PROCEDURE — 71260 CT THORAX DX C+: CPT

## 2025-06-23 RX ADMIN — IOHEXOL 75 ML: 350 INJECTION, SOLUTION INTRAVENOUS at 09:56

## 2025-06-23 NOTE — TELEPHONE ENCOUNTER
"Reason for Disposition   MILD pain (e.g., does not interfere with normal activities) and present > 7 days    Answer Assessment - Initial Assessment Questions  1. ONSET: \"When did the pain start?\"       Started about 6 months ago.  (When she started letrozole)  2. LOCATION: \"Where is the pain located?\"       Front of thighs , b/l worse in the right leg.   3. PAIN: \"How bad is the pain?\"    (Scale 1-10; or mild, moderate, severe)      6/10  4. WORK OR EXERCISE: \"Has there been any recent work or exercise that involved this part of the body?\"       no  5. CAUSE: \"What do you think is causing the leg pain?\"      Worried it is cancer.   6. OTHER SYMPTOMS: \"Do you have any other symptoms?\" (e.g., chest pain, back pain, breathing difficulty, swelling, rash, fever, numbness, weakness)      Back hurts since letrozole.    Protocols used: Leg Pain-Adult-[place]    "

## 2025-06-23 NOTE — TELEPHONE ENCOUNTER
REASON FOR CONVERSATION: No chief complaint on file.    SYMPTOMS: b/l thigh pain.  Pain is worse in the R thigh. (No additional swelling noted)    OTHER HEALTH INFORMATION: hx of breast ca with metastasis.     PROTOCOL DISPOSITION: See Within 2 Weeks in Office  Pt has an appointment with Dr. Amanda next week.     CARE ADVICE PROVIDED: Pt will c/b if the pain becomes worse.     PRACTICE FOLLOW-UP: no f/u needed.

## 2025-06-24 ENCOUNTER — OFFICE VISIT (OUTPATIENT)
Dept: PALLIATIVE MEDICINE | Facility: CLINIC | Age: 80
End: 2025-06-24
Payer: MEDICARE

## 2025-06-24 VITALS
HEART RATE: 66 BPM | HEIGHT: 58 IN | DIASTOLIC BLOOD PRESSURE: 78 MMHG | BODY MASS INDEX: 27.91 KG/M2 | SYSTOLIC BLOOD PRESSURE: 132 MMHG | OXYGEN SATURATION: 97 % | TEMPERATURE: 97.6 F | WEIGHT: 132.94 LBS

## 2025-06-24 DIAGNOSIS — Z51.5 PALLIATIVE CARE BY SPECIALIST: ICD-10-CM

## 2025-06-24 DIAGNOSIS — C79.51 BREAST CANCER METASTASIZED TO BONE, RIGHT (HCC): Primary | Chronic | ICD-10-CM

## 2025-06-24 DIAGNOSIS — F33.9 DEPRESSION, RECURRENT (HCC): ICD-10-CM

## 2025-06-24 DIAGNOSIS — G89.3 CANCER RELATED PAIN: ICD-10-CM

## 2025-06-24 DIAGNOSIS — C50.911 BREAST CANCER METASTASIZED TO BONE, RIGHT (HCC): Primary | Chronic | ICD-10-CM

## 2025-06-24 PROCEDURE — 99214 OFFICE O/P EST MOD 30 MIN: CPT | Performed by: INTERNAL MEDICINE

## 2025-06-24 PROCEDURE — G2211 COMPLEX E/M VISIT ADD ON: HCPCS | Performed by: INTERNAL MEDICINE

## 2025-06-24 NOTE — ASSESSMENT & PLAN NOTE
Palliative Diagnosis: metastatic breast cancer    Goals:   Patient's ultimate goal is to improve quality of life, plan for her to visit her other daughter soon.    Social Support:  Patient's support system: adult children  Supportive listening provided  Normalized experience of patient    Care Coordination  Case discussed with NA    Follow-up  2 months

## 2025-06-24 NOTE — PROGRESS NOTES
Name: Yuni Keys      : 1945      MRN: [id number]  Encounter Provider: Lisa Contreras DO  Encounter Date: 2025   Encounter department: [facility] [place]  :  Assessment & Plan  Breast cancer metastasized to bone, right (HCC)         Cancer related pain  Using 1-2 tabs of oxycodone per day to manage pain  Current regimen:  Oxycodone 5-10mg q4h PRN for moderate-severe pain  Alternate oxycodone with Tylenol as needed  Bowel regimen to prevent OIC  Opioid agreement signed 2024                  Palliative care by specialist    Palliative Diagnosis: metastatic breast cancer    Goals:   Patient's ultimate goal is to improve quality of life, plan for her to visit her other daughter soon.    Social Support:  Patient's support system: adult children  Supportive listening provided  Normalized experience of patient    Care Coordination  Case discussed with NA    Follow-up  2 months                    Depression, recurrent (HCC)  Discussed increasing dose of Paxil- she would like to hold off on this as she is worried about ongoing weight gain  Continue to follow with Ambreen Lackey LCSW                  Decisional apparatus: Patient is competent on my exam today. If competence is lost, patient's substitute decision maker would default to daughter by [place].   Advance Directive / Living Will / POLST: yes     PDMP Review: I have reviewed the patient's controlled substance dispensing history in the Prescription Drug Monitoring Program in compliance with the [facility] regulations before prescribing any controlled substances.    History of Present Illness   Yuni Keys is a 79 y.o. female who presents in follow-up for symptoms related to a palliative diagnosis of stage IV breast cancer.  Since her last visit she was able to go visit her daughter.  She states the plane ride was a little difficult with her back pain but otherwise had a nice vacation with her daughter.  She  "does note that sometimes she feels that her family forgets that she is sick and she felt bad that she did not want to go on a hike with her daughter..  She continues to state that she is not happy with her current quality of life.  However, she also does not want to add any further medications as she already has \"pill fatigue\".  We did opt to stop her hydroxyzine as she did not seem to get much benefit from this.  She does note that low-dose oxycodone has been helpful for her pain but still causes some somnolence.  She uses it once or twice a day with relief.  She does note that she is anxious to get her results from her CT scan from yesterday, final read is still pending.  Depending on these results she will determine further about continuing on with anticancer treatments or not.  She will continue to follow with the [facility] team for mental health support.  No new somatic complaints today.  Medical History Reviewed by provider this encounter:     .     Objective   /78 (BP Location: Left arm, Patient Position: Sitting, Cuff Size: Standard)   Pulse 66   Temp 97.6 °F (36.4 °C) (Temporal)   Ht 4' 10\" (1.473 m)   Wt 60.3 kg (132 lb 15 oz)   SpO2 97%   BMI 27.78 kg/m²     Physical Exam  Vitals and nursing note reviewed.   Constitutional:       General: She is not in acute distress.  HENT:      Head: Normocephalic and atraumatic.      Right Ear: External ear normal.      Left Ear: External ear normal.     Eyes:      General:         Right eye: No discharge.         Left eye: No discharge.       Cardiovascular:      Rate and Rhythm: Normal rate and regular rhythm.   Pulmonary:      Effort: Pulmonary effort is normal. No respiratory distress.   Abdominal:      General: There is no distension.      Palpations: Abdomen is soft.     Musculoskeletal:      Right lower leg: No edema.      Left lower leg: No edema.     Skin:     Coloration: Skin is pale.     Neurological:      Mental Status: Mental status is at baseline. "     Psychiatric:         Mood and Affect: Mood normal.         Behavior: Behavior normal.         Thought Content: Thought content normal.         Judgment: Judgment normal.         Recent labs:  Lab Results   Component Value Date/Time    SODIUM 140 05/12/2025 09:29 AM    K 3.9 05/12/2025 09:29 AM    BUN 15 05/12/2025 09:29 AM    CREATININE 0.53 (L) 05/12/2025 09:29 AM    GLUC 52 (L) 01/17/2025 09:47 AM    CALCIUM 9.6 05/12/2025 09:29 AM    AST 31 05/12/2025 09:29 AM    ALT 35 05/12/2025 09:29 AM    ALB 4.1 05/12/2025 09:29 AM    TP 6.6 05/12/2025 09:29 AM    EGFR 90 05/12/2025 09:29 AM     Lab Results   Component Value Date/Time    HGB 14.1 05/12/2025 09:29 AM    WBC 7.59 05/12/2025 09:29 AM     05/12/2025 09:29 AM    INR 1.01 10/01/2024 08:13 AM    PTT 34 10/01/2024 08:13 AM     Lab Results   Component Value Date/Time    LQW5CFLCQHJC 6.028 (H) 05/05/2025 07:11 AM       Recent Imaging:  Procedure: DXA bone density spine hip and pelvis  Result Date: 4/15/2025  Impression: 1. Low bone mass (osteopenia). 2.  Since a DXA study from 4/5/2021, there has been: A  STATISTICALLY SIGNIFICANT DECREASE in bone mineral density of 0.086 g/cm2 (8.3%) in the lumbar spine. A  STATISTICALLY SIGNIFICANT DECREASE in bone mineral density of 0.053 g/cm2 (6.6)% in the left total hip. 3.  The 10 year risk of hip fracture is 4.8% with the 10 year risk of major osteoporotic fracture being 20% as calculated by the [employer] fracture risk assessment tool (FRAX, which is based on data generated by the WHO Collaborating [place]  for Metabolic Bone Diseases). 4.  The current Bone Health and Osteoporosis Foundation (BHOF) guidelines recommend treating patients with a T-score of -2.5 or less in the lumbar spine or hips, or in post-menopausal women and men over the age of 50 with low bone mass (osteopenia; T-score between -1.0 and -2.5) and a FRAX 10 year risk score of > 3% for hip fracture and/or > 20% for major  osteoporosis-related fracture (clinical vertebral, hip, forearm, or proximal humerus). 5.  The BHOF recommends follow-up DXA in 1-2 years after initiating or changing medical therapy for osteoporosis and at appropriate intervals thereafter, according to clinical circumstances. More frequent BMD testing may be warranted in higher-risk individuals (multiple fractures, older age, very low BMD). Less frequent BMD testing is warranted as follow-up in patients with initial T-scores in the normal or slightly below normal range (osteopenia) and for patients who have remained fracture free on  treatment. The FRAX algorithm has certain limitations: -FRAX has not been validated in patients currently or previously treated with pharmacotherapy for osteoporosis.  In such patients, clinical judgment must be exercised in interpreting FRAX scores. -Prior hip, vertebral and humeral fragility fractures appear to confer greater risk of subsequent fracture than fractures at other sites (this is especially true for individuals with severe vertebral fractures), but quantification of this incremental risk is not possible with FRAX. -FRAX underestimates fracture risk in patients with history of multiple fragility fractures. -FRAX may underestimate fracture risk in patients with history of frequent falls. -It is not appropriate to use FRAX to monitor treatment response. WHO CLASSIFICATION: Normal (a T-score of -1.0 or higher) Low bone mineral density (a T-score of less than -1.0 but higher than -2.5) Osteoporosis (a T-score of -2.5 or less) Severe osteoporosis (a T-score of -2.5 or less with a fragility fracture) LEAST SIGNIFICANT CHANGE (AT 95% C.I): Lumbar spine: 0.039 g/cm2; 4.0% Total hip: 0.031 g/cm2; 3.7% Forearm: 0.022 g/cm2; 3.8% SELECTED REFERENCES: CitlalyBoff MS, Stacey SL, Sandy KL, et al. The clinician's guide to prevention and treatment of osteoporosis. Osteoporos Int 2022; 33:8712-1481. Agueda VELASQUEZ, Michele BILL, [place] A, et al.  DXA reporting updates: 2023 Official Positions of the International Society for Clinical Densitometry. J Clin Densitom 2024; 27: 580818 (https://doi.org/10.1016/j.jocd.2023.910141). Workstation performed: [id number]     Procedure: NM PET CT skull base to mid thigh  Result Date: 3/18/2025  Impression: There is no new visceral metastatic disease Uptake seen within the right acromion, unchanged Changes of vertebral augmentation seen in the L1 vertebra and T8 vertebra Focal uptake seen in the paravertebral region in the medial right lung in the paravertebral location with SUV of 3.2, new probably postradiation, attention at follow-up. New focal area of uptake in the left first costochondral junction and in the left sternoclavicular joint, nonspecific Uptake seen in the right greater peritrochanteric region and the adjacent tendons and muscles on musculoskeletal basis tendinopathy The study was marked in EPIC for significant notification. Workstation performed: [id number]       Administrative Statements   I have spent a total time of 30 minutes in caring for this patient on the day of the visit/encounter including Diagnostic results, Instructions for management, Patient and family education, Importance of tx compliance, Risk factor reductions, Impressions, Counseling / Coordination of care, Documenting in the medical record, Reviewing/placing orders in the medical record (including tests, medications, and/or procedures), Obtaining or reviewing history  , and Communicating with other healthcare professionals .   Topics discussed with the patient / family include symptom assessment and management, medication review, medication adjustment, psychosocial support, and supportive listening.

## 2025-06-24 NOTE — ASSESSMENT & PLAN NOTE
Discussed increasing dose of Paxil- she would like to hold off on this as she is worried about ongoing weight gain  Continue to follow with Ambreen Wesley-Michelle, ANDREAW

## 2025-06-25 DIAGNOSIS — E03.9 ACQUIRED HYPOTHYROIDISM: ICD-10-CM

## 2025-06-26 RX ORDER — LEVOTHYROXINE SODIUM 25 MCG
25 TABLET ORAL DAILY
Qty: 90 TABLET | Refills: 2 | OUTPATIENT
Start: 2025-06-26

## 2025-06-30 ENCOUNTER — APPOINTMENT (OUTPATIENT)
Dept: LAB | Facility: CLINIC | Age: 80
End: 2025-06-30
Payer: MEDICARE

## 2025-06-30 DIAGNOSIS — C50.911 BREAST CANCER METASTASIZED TO BONE, RIGHT (HCC): Chronic | ICD-10-CM

## 2025-06-30 DIAGNOSIS — C79.51 BREAST CANCER METASTASIZED TO BONE, RIGHT (HCC): Chronic | ICD-10-CM

## 2025-06-30 DIAGNOSIS — N60.01 CYST OF RIGHT BREAST: ICD-10-CM

## 2025-06-30 DIAGNOSIS — E03.9 ACQUIRED HYPOTHYROIDISM: ICD-10-CM

## 2025-06-30 DIAGNOSIS — S32.010A CLOSED COMPRESSION FRACTURE OF BODY OF L1 VERTEBRA (HCC): ICD-10-CM

## 2025-06-30 LAB
ALBUMIN SERPL BCG-MCNC: 4.1 G/DL (ref 3.5–5)
ALP SERPL-CCNC: 47 U/L (ref 34–104)
ALT SERPL W P-5'-P-CCNC: 46 U/L (ref 7–52)
ANION GAP SERPL CALCULATED.3IONS-SCNC: 9 MMOL/L (ref 4–13)
AST SERPL W P-5'-P-CCNC: 41 U/L (ref 13–39)
BASOPHILS # BLD AUTO: 0.04 THOUSANDS/ÂΜL (ref 0–0.1)
BASOPHILS NFR BLD AUTO: 1 % (ref 0–1)
BILIRUB SERPL-MCNC: 0.43 MG/DL (ref 0.2–1)
BUN SERPL-MCNC: 19 MG/DL (ref 5–25)
CALCIUM SERPL-MCNC: 8.6 MG/DL (ref 8.4–10.2)
CHLORIDE SERPL-SCNC: 104 MMOL/L (ref 96–108)
CO2 SERPL-SCNC: 28 MMOL/L (ref 21–32)
CREAT SERPL-MCNC: 0.53 MG/DL (ref 0.6–1.3)
EOSINOPHIL # BLD AUTO: 0.18 THOUSAND/ÂΜL (ref 0–0.61)
EOSINOPHIL NFR BLD AUTO: 4 % (ref 0–6)
ERYTHROCYTE [DISTWIDTH] IN BLOOD BY AUTOMATED COUNT: 16.6 % (ref 11.6–15.1)
GFR SERPL CREATININE-BSD FRML MDRD: 90 ML/MIN/1.73SQ M
GLUCOSE P FAST SERPL-MCNC: 82 MG/DL (ref 65–99)
HCT VFR BLD AUTO: 42.2 % (ref 34.8–46.1)
HGB BLD-MCNC: 13.6 G/DL (ref 11.5–15.4)
IMM GRANULOCYTES # BLD AUTO: 0.02 THOUSAND/UL (ref 0–0.2)
IMM GRANULOCYTES NFR BLD AUTO: 0 % (ref 0–2)
LYMPHOCYTES # BLD AUTO: 0.82 THOUSANDS/ÂΜL (ref 0.6–4.47)
LYMPHOCYTES NFR BLD AUTO: 17 % (ref 14–44)
MCH RBC QN AUTO: 29.8 PG (ref 26.8–34.3)
MCHC RBC AUTO-ENTMCNC: 32.2 G/DL (ref 31.4–37.4)
MCV RBC AUTO: 93 FL (ref 82–98)
MONOCYTES # BLD AUTO: 0.35 THOUSAND/ÂΜL (ref 0.17–1.22)
MONOCYTES NFR BLD AUTO: 7 % (ref 4–12)
NEUTROPHILS # BLD AUTO: 3.57 THOUSANDS/ÂΜL (ref 1.85–7.62)
NEUTS SEG NFR BLD AUTO: 71 % (ref 43–75)
NRBC BLD AUTO-RTO: 0 /100 WBCS
PLATELET # BLD AUTO: 310 THOUSANDS/UL (ref 149–390)
PMV BLD AUTO: 9.9 FL (ref 8.9–12.7)
POTASSIUM SERPL-SCNC: 4.2 MMOL/L (ref 3.5–5.3)
PROT SERPL-MCNC: 6.4 G/DL (ref 6.4–8.4)
RBC # BLD AUTO: 4.56 MILLION/UL (ref 3.81–5.12)
SODIUM SERPL-SCNC: 141 MMOL/L (ref 135–147)
T4 FREE SERPL-MCNC: 0.8 NG/DL (ref 0.61–1.12)
TSH SERPL DL<=0.05 MIU/L-ACNC: 4.72 UIU/ML (ref 0.45–4.5)
WBC # BLD AUTO: 4.98 THOUSAND/UL (ref 4.31–10.16)

## 2025-06-30 PROCEDURE — 36415 COLL VENOUS BLD VENIPUNCTURE: CPT

## 2025-06-30 PROCEDURE — 85025 COMPLETE CBC W/AUTO DIFF WBC: CPT

## 2025-06-30 PROCEDURE — 80053 COMPREHEN METABOLIC PANEL: CPT

## 2025-06-30 PROCEDURE — 84443 ASSAY THYROID STIM HORMONE: CPT

## 2025-06-30 PROCEDURE — 84439 ASSAY OF FREE THYROXINE: CPT

## 2025-06-30 RX ORDER — LEVOTHYROXINE SODIUM 25 UG/1
37.5 TABLET ORAL DAILY
Qty: 135 TABLET | Refills: 3 | Status: SHIPPED | OUTPATIENT
Start: 2025-06-30

## 2025-07-02 ENCOUNTER — OFFICE VISIT (OUTPATIENT)
Dept: HEMATOLOGY ONCOLOGY | Facility: CLINIC | Age: 80
End: 2025-07-02
Payer: MEDICARE

## 2025-07-02 VITALS
OXYGEN SATURATION: 98 % | WEIGHT: 136.5 LBS | TEMPERATURE: 97.9 F | HEART RATE: 69 BPM | HEIGHT: 58 IN | DIASTOLIC BLOOD PRESSURE: 70 MMHG | SYSTOLIC BLOOD PRESSURE: 122 MMHG | RESPIRATION RATE: 16 BRPM | BODY MASS INDEX: 28.65 KG/M2

## 2025-07-02 DIAGNOSIS — C79.51 BREAST CANCER METASTASIZED TO BONE, RIGHT (HCC): Primary | Chronic | ICD-10-CM

## 2025-07-02 DIAGNOSIS — C50.212 MALIGNANT NEOPLASM OF UPPER-INNER QUADRANT OF LEFT BREAST IN FEMALE, ESTROGEN RECEPTOR POSITIVE (HCC): ICD-10-CM

## 2025-07-02 DIAGNOSIS — Z17.0 MALIGNANT NEOPLASM OF UPPER-INNER QUADRANT OF LEFT BREAST IN FEMALE, ESTROGEN RECEPTOR POSITIVE (HCC): ICD-10-CM

## 2025-07-02 DIAGNOSIS — C50.911 BREAST CANCER METASTASIZED TO BONE, RIGHT (HCC): Primary | Chronic | ICD-10-CM

## 2025-07-02 PROCEDURE — 99215 OFFICE O/P EST HI 40 MIN: CPT | Performed by: INTERNAL MEDICINE

## 2025-07-02 PROCEDURE — G2211 COMPLEX E/M VISIT ADD ON: HCPCS | Performed by: INTERNAL MEDICINE

## 2025-07-07 ENCOUNTER — PATIENT OUTREACH (OUTPATIENT)
Dept: CASE MANAGEMENT | Facility: OTHER | Age: 80
End: 2025-07-07

## 2025-07-07 NOTE — PROGRESS NOTES
"Outreach call placed to Alley today. She answered call, stating she is experiencing \"heebie jeebies\" at times. She describes these episodes as periods of feeling agitated, unsettled, and anxious. They do not happen every day, but when they do, they occur in the afternoon when she is trying to watch tv or read and she cannot stay settled. She was prescribed Ativan, but did not find that it helped her at the current dose. She sees her PCP tomorrow and will discuss. She reached out to palliative care and was offered Seroquel, but looked up what the medication is. She stated, \"That pill is for schizophrenia, and I don't want it.\" OSW explained some psychotropic medications are used for other symptoms, however she stated she will not take that medication after looking it up.   Discussed her current feelings of depression and her lack of \"desire to want to do much of anything.\" She stated she feels very bad about herself since she has gained weight and had to buy new clothing. She is scheduled to see a dietician at the end of the month. Her bathing suit from last year does not fit and she will not buy a new one. 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 She also stated she does not want to play bridge like she used to, she does not want to volunteer, and does not want to travel like she used to.   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 She is also attending the breast cancer support group [facility] . OSW tried to re-frame some of these activities as very positive and active moments for her and strongly encouraged her to keep up with these activities. Discussed that she may have to move past the \"old\" activities and discover the \"new\" ones that are more tolerable for her. "   Alley is going to meet with palliative SW colleague this month. Encouraged her to do this. She stated she has questions for her and would like to sit down and see if she can help with ACP and GOC questions. Provided emotional support and allowed for time for Alley to express her feelings today. OSW offered to call her in a few weeks and she is agreeable.

## 2025-07-08 ENCOUNTER — OFFICE VISIT (OUTPATIENT)
Dept: FAMILY MEDICINE CLINIC | Facility: HOSPITAL | Age: 80
End: 2025-07-08
Payer: MEDICARE

## 2025-07-08 VITALS
WEIGHT: 136 LBS | DIASTOLIC BLOOD PRESSURE: 60 MMHG | HEIGHT: 58 IN | HEART RATE: 71 BPM | SYSTOLIC BLOOD PRESSURE: 118 MMHG | OXYGEN SATURATION: 95 % | BODY MASS INDEX: 28.55 KG/M2

## 2025-07-08 DIAGNOSIS — E78.2 MIXED HYPERLIPIDEMIA: ICD-10-CM

## 2025-07-08 DIAGNOSIS — E03.9 ACQUIRED HYPOTHYROIDISM: ICD-10-CM

## 2025-07-08 DIAGNOSIS — Z51.5 PALLIATIVE CARE BY SPECIALIST: ICD-10-CM

## 2025-07-08 DIAGNOSIS — F41.9 ANXIETY: ICD-10-CM

## 2025-07-08 DIAGNOSIS — C79.51 BREAST CANCER METASTASIZED TO BONE, RIGHT (HCC): Primary | Chronic | ICD-10-CM

## 2025-07-08 DIAGNOSIS — C50.911 BREAST CANCER METASTASIZED TO BONE, RIGHT (HCC): Primary | Chronic | ICD-10-CM

## 2025-07-08 DIAGNOSIS — M48.062 SPINAL STENOSIS OF LUMBAR REGION WITH NEUROGENIC CLAUDICATION: ICD-10-CM

## 2025-07-08 DIAGNOSIS — C79.51 METASTATIC CARCINOMA TO BONE (HCC): ICD-10-CM

## 2025-07-08 PROCEDURE — G2211 COMPLEX E/M VISIT ADD ON: HCPCS | Performed by: INTERNAL MEDICINE

## 2025-07-08 PROCEDURE — 99214 OFFICE O/P EST MOD 30 MIN: CPT | Performed by: INTERNAL MEDICINE

## 2025-07-08 RX ORDER — LORAZEPAM 1 MG/1
1 TABLET ORAL 2 TIMES DAILY PRN
Qty: 60 TABLET | Refills: 0 | Status: SHIPPED | OUTPATIENT
Start: 2025-07-08

## 2025-07-08 NOTE — ASSESSMENT & PLAN NOTE
Has felt anxiety and difficulty to sit and read  Had some ativan on hand from early in diagnosis.   Will refill that

## 2025-07-08 NOTE — PROGRESS NOTES
Assessment/Plan:     Diagnosis ICD-10-CM Associated Orders   1. Breast cancer metastasized to bone, right (HCC)  C50.911     C79.51       2. Palliative care by specialist  Z51.5 LORazepam (ATIVAN) 1 mg tablet      3. Anxiety  F41.9 LORazepam (ATIVAN) 1 mg tablet      4. Mixed hyperlipidemia  E78.2       5. Spinal stenosis of lumbar region with neurogenic claudication  M48.062       6. Metastatic carcinoma to bone (HCC)  C79.51 CT upper extremity wo contrast right          Problem List Items Addressed This Visit          Musculoskeletal and Integument    Breast cancer metastasized to bone, right (HCC) - Primary (Chronic)    Now doing a month off the letrozol due to the hot flashes            Surgery/Wound/Pain    Spinal stenosis of lumbar region with neurogenic claudication    Some low back pain at times- had radiation  to shoulder and spine            Other    Hyperlipidemia    Palliative care by specialist    Has felt anxiety and difficulty to sit and read  Had some ativan on hand from early in diagnosis.   Will refill that         Relevant Medications    LORazepam (ATIVAN) 1 mg tablet     Other Visit Diagnoses         Anxiety        Relevant Medications    LORazepam (ATIVAN) 1 mg tablet      Metastatic carcinoma to bone (HCC)        Relevant Orders    CT upper extremity wo contrast right              No follow-ups on file.      Subjective:    Patient ID: Yuni Keys is a 79 y.o. female    Feels energy is down-   Daughter and granddaughters were visiting- felt very tired after doing minimal cooking   Increased restlessness and anxiety   Having teeth bone loss issues  Had declined colonoscopy and derm appointment        The following portions of the patient's history were reviewed and updated as appropriate: allergies, current medications and problem list.     Review of Systems   Constitutional:  Positive for fatigue.   Respiratory:  Negative for shortness of breath.    Cardiovascular:  Negative for chest pain and  "palpitations.   Musculoskeletal:  Positive for arthralgias and back pain.         Objective:    Current Medications[1]    Blood pressure 118/60, pulse 71, height 4' 10\" (1.473 m), weight 61.7 kg (136 lb), SpO2 95%.     Physical Exam  Vitals and nursing note reviewed.   Constitutional:       General: She is not in acute distress.  HENT:      Head: Normocephalic.      Right Ear: Tympanic membrane normal.      Left Ear: Tympanic membrane normal.      Nose: No congestion.     Eyes:      General:         Right eye: No discharge.         Left eye: No discharge.       Cardiovascular:      Rate and Rhythm: Normal rate and regular rhythm.      Heart sounds: No murmur heard.  Pulmonary:      Breath sounds: No rhonchi or rales.   Abdominal:      Tenderness: There is no abdominal tenderness. There is no guarding.     Musculoskeletal:         General: Tenderness present.      Cervical back: No tenderness.      Right lower leg: No edema.      Left lower leg: No edema.      Comments: Lower lumbar tenderness- some mid thoracic and right scapulAr tenderness   Lymphadenopathy:      Cervical: No cervical adenopathy.     Skin:     Findings: No erythema or lesion.     Neurological:      Mental Status: She is alert and oriented to person, place, and time.      Motor: No weakness.     Psychiatric:         Mood and Affect: Mood normal.         Thought Content: Thought content normal.         Judgment: Judgment normal.               [1]   Current Outpatient Medications:     amLODIPine (NORVASC) 5 mg tablet, TAKE 1 TABLET (5 MG TOTAL) BY MOUTH DAILY., Disp: 90 tablet, Rfl: 1    atorvastatin (LIPITOR) 10 mg tablet, TAKE 1 TABLET BY MOUTH EVERY DAY, Disp: 90 tablet, Rfl: 1    Calcium-Magnesium-Vitamin D ER (Citracal Calcium +D3) 600- MG-MG-UNIT TB24, , Disp: , Rfl:     Cholecalciferol (Vitamin D) 50 MCG (2000 UT) tablet, Take 2,000 Units by mouth in the morning., Disp: , Rfl:     letrozole (FEMARA) 2.5 mg tablet, Take 1 tablet (2.5 mg " total) by mouth daily, Disp: 90 tablet, Rfl: 3    levothyroxine (Synthroid) 25 mcg tablet, Take 1.5 tablets (37.5 mcg total) by mouth daily, Disp: 135 tablet, Rfl: 3    LORazepam (ATIVAN) 1 mg tablet, Take 1 tablet (1 mg total) by mouth 2 (two) times a day as needed for anxiety, Disp: 60 tablet, Rfl: 0    Multiple Vitamins-Minerals (MULTIVITAMIN WITH MINERALS) tablet, Take 1 tablet by mouth in the morning., Disp: , Rfl:     oxyCODONE (ROXICODONE) 10 MG TABS, Take 0.5-1 tablets (5-10 mg total) by mouth every 4 (four) hours as needed for moderate pain Max Daily Amount: 60 mg, Disp: 120 tablet, Rfl: 0    oxyCODONE (Roxicodone) 5 immediate release tablet, Take 1-2 tablets (5-10 mg total) by mouth every 4 (four) hours as needed for moderate pain or severe pain Max Daily Amount: 40 mg, Disp: 60 tablet, Rfl: 0    PARoxetine (PAXIL) 10 mg tablet, Take 1 tablet (10 mg total) by mouth daily At bedtime, Disp: 90 tablet, Rfl: 1    Sodium Fluoride 5000 PPM 1.1 % PSTE, , Disp: , Rfl:

## 2025-07-13 DIAGNOSIS — G25.81 RESTLESS LEGS: Primary | ICD-10-CM

## 2025-07-13 RX ORDER — PRAMIPEXOLE DIHYDROCHLORIDE 0.25 MG/1
0.25 TABLET ORAL 2 TIMES DAILY
Qty: 60 TABLET | Refills: 1 | Status: SHIPPED | OUTPATIENT
Start: 2025-07-13

## 2025-07-13 NOTE — PROGRESS NOTES
"Weight Management Medical Nutrition Assessment  Alley is here for meal planning. She was last seen in the office 10/2024 but was then dx with breast ca w/mets to bone. She has experienced weight regain s/p treatment and requested to be seen. Oncology has approved her request for weight loss. Based on dx would recommend minimal loss of 0.5 lb/wk as current wt is WNL for age. Current wt 137 lbs. She has gained 21.4 lbs since last seen. While underactive thyroid and certain medications are likely contributing she also feels that some of her dietary choices are a factor. At times grazing through the day or may not feel like cooking and then just eats ice cream. She feels the accountability of coming to the office may help her with better choices. It would seem like having consistency with your snacks will also be helpful. New snack ideas as well as meal ideas provided. Support and encouragement provided. She will f/u in 2 wks for a body comp.    Of note she started with diarrhea yesterday and has gone >15 times. She did contact her oncologist who recommended urgent care and ordered lomotil. Alley reports having a hot dog on Saturday and is wondering if this may be the source. She is currently sticking with a bland diet. Reports no fever or abdominal pain.      Patient seen by Medical Provider in past 6 months:  no  Requested to schedule appointment with Medical Provider: No    Anthropometric Measurements  Start Weight (#): 177.7 lb 12/28/21  Current Weight (#):  137 lbs  TBW % Change from start weight: 23%  Ideal Body Weight (#): 119.1 lbs BMI 25 (58\")  Goal Weight (#): no specific goal at this time   Highest: 183 lbs  Lowest: 97 lbs mid 20's    Weight Loss History  Previous weight loss attempts: Commercial Programs (Weight Watchers, Accele[employer], etc.)  Exercise  Self Created Diets (Portion Control, Healthy Food Choices, etc.)    Food and Nutrition Related History  Wake up:  6:00   Bed Time: 10    Food Recall "   Breakfast: 8:00: decaf coffee w/2 tbsp h/h, greek yogurt OR puffed rice OR oatmeal  OR oikos flip yogurt OR cottage cheese, fruits  Snack:   skip  Lunch: 12:00:  salad w/string cheese   Snack: 4:00: grazing  Dinner: 5:00:~4 oz chicken/fish/hamburger/salmon, 1 1/2 cup veggies, baked potato/corn/peas OR  healthy choice meal or lean cuisine  OR ice cream  Snack:  Skip     Beverages: water, decaf coffee/tea, diet soda and alcohol (rare)  Volume of beverage intake: 32oz water/decaf coffee    Weekends: Same  Cravings:  carbs   Trouble area of day: late afternoon    Frequency of Eating out:  3x/wk  Food restrictions: lactose?   Cooking: self   Food Shopping: self    Physical Activity Intake  Activity:Walking,  Frequency:walking daily 1.5 miles  Physical limitations/barriers to exercise:  pain      Estimated Needs  Energy   [facility] Energy Needs: BMR : 986    Maintenance sedentary: 1183 (0.5 lb loss/wk 933)         Maintenance lightly active: 1356 (0.5 lb loss/wk = 1106)  Protein: 75-93 gm      (1.2-1.5g/kg actual body weight)  Total Fluid: 79 oz     (Sunil Segar Method)  Free Fluid: 63 oz (Leland Segar Method -20%)    Nutrition Diagnosis   Unintended weight gain r/t hypothyroidism and antidepressant AEB weight gain of 19% in 9 months.    Nutrition Intervention    Nutrition Prescription  Calories:7101-8029  Protein:  70 gm       Nutrition Education:    Healthy Core Manual  Calorie controlled menu  Lean protein food choices  Healthy snack options  Food journaling tips      Nutrition Counseling:  Strategies: meal planning, portion sizes, healthy snack choices, hydration, fiber intake, protein intake, exercise, food journal      Monitoring and Evaluation:  Evaluation criteria:  Energy Intake  Meet protein needs  Maintain adequate hydration  Monitor weekly weight  Meal planning/preparation  Food journal   Decreased portions at mealtimes and snacks  Physical activity     Barriers to learning:none  Readiness to  change: Preparation:  (Getting ready to change) , Action:  (Changing behavior), and Relapse: (Returning to older behaviors and abandoning the new changes)   Comprehension: very good  Expected Compliance: very good

## 2025-07-19 DIAGNOSIS — C50.911 BREAST CANCER METASTASIZED TO BONE, RIGHT (HCC): Chronic | ICD-10-CM

## 2025-07-19 DIAGNOSIS — C79.51 BREAST CANCER METASTASIZED TO BONE, RIGHT (HCC): Chronic | ICD-10-CM

## 2025-07-21 ENCOUNTER — CLINICAL SUPPORT (OUTPATIENT)
Dept: BARIATRICS | Facility: CLINIC | Age: 80
End: 2025-07-21

## 2025-07-21 VITALS — HEIGHT: 58 IN | WEIGHT: 137 LBS | BODY MASS INDEX: 28.76 KG/M2

## 2025-07-21 DIAGNOSIS — C79.51 BREAST CANCER METASTASIZED TO BONE, RIGHT (HCC): Chronic | ICD-10-CM

## 2025-07-21 DIAGNOSIS — C50.212 MALIGNANT NEOPLASM OF UPPER-INNER QUADRANT OF LEFT BREAST IN FEMALE, ESTROGEN RECEPTOR POSITIVE (HCC): ICD-10-CM

## 2025-07-21 DIAGNOSIS — K52.1 DIARRHEA DUE TO DRUG: ICD-10-CM

## 2025-07-21 DIAGNOSIS — C50.911 BREAST CANCER METASTASIZED TO BONE, RIGHT (HCC): Primary | ICD-10-CM

## 2025-07-21 DIAGNOSIS — R63.5 ABNORMAL WEIGHT GAIN: Primary | ICD-10-CM

## 2025-07-21 DIAGNOSIS — C50.911 BREAST CANCER METASTASIZED TO BONE, RIGHT (HCC): Chronic | ICD-10-CM

## 2025-07-21 DIAGNOSIS — C79.51 BREAST CANCER METASTASIZED TO BONE, RIGHT (HCC): Primary | ICD-10-CM

## 2025-07-21 DIAGNOSIS — G89.3 CANCER RELATED PAIN: ICD-10-CM

## 2025-07-21 DIAGNOSIS — Z17.0 MALIGNANT NEOPLASM OF UPPER-INNER QUADRANT OF LEFT BREAST IN FEMALE, ESTROGEN RECEPTOR POSITIVE (HCC): ICD-10-CM

## 2025-07-21 PROCEDURE — WMDI30

## 2025-07-21 PROCEDURE — RECHECK

## 2025-07-21 RX ORDER — DIPHENOXYLATE HYDROCHLORIDE AND ATROPINE SULFATE 2.5; .025 MG/1; MG/1
1 TABLET ORAL 4 TIMES DAILY PRN
Qty: 30 TABLET | Refills: 0 | Status: SHIPPED | OUTPATIENT
Start: 2025-07-21

## 2025-07-21 RX ORDER — OXYCODONE HYDROCHLORIDE 5 MG/1
5-10 TABLET ORAL EVERY 4 HOURS PRN
Qty: 60 TABLET | Refills: 0 | Status: SHIPPED | OUTPATIENT
Start: 2025-07-21

## 2025-07-21 NOTE — TELEPHONE ENCOUNTER
Medication: oxycodone 5mg  PDMP    05/07/2025 05/07/2025 oxyCODONE HCL (Tablet) 60.0 8 5 MG 56.25 VALERIY, [facility] Private Pay 0 / 0 PA   1 [id number] ** 04/03/2025 04/02/2025 Buprenorphine (Patch, Extended Release) 4.0 28 5 MCG/1 HR NA VALERIY, [facility] Private Pay 0 / 0 PA   1 [id number] ** 03/21/2025 03/21/2025 oxyCODONE HCL (Tablet) 120.0 20 10 MG 90.0 VALERIY, [facility] Medicare 0 / 0 PA   1 [id number] ** 03/18/2025 03/18/2025 oxyCODONE HCL (Tablet) 60.0 5 5 MG 90.0 JUSTIN HENDERSON

## 2025-07-22 RX ORDER — LETROZOLE 2.5 MG/1
2.5 TABLET, FILM COATED ORAL DAILY
Qty: 90 TABLET | Refills: 3 | Status: SHIPPED | OUTPATIENT
Start: 2025-07-22

## 2025-07-24 ENCOUNTER — PATIENT OUTREACH (OUTPATIENT)
Dept: CASE MANAGEMENT | Facility: OTHER | Age: 80
End: 2025-07-24

## 2025-07-24 NOTE — PROGRESS NOTES
"OSW called Alley today for ongoing emotional support. She stated she had a pretty \"terrible bout\" of diarrhea, however it is subsiding and is better after taking medication. She is not sure what it came from, other than it started after attending a weekend block party. Today she is planning on going to lunch with a friend to [employer], and is hoping she doesn't have any issues.   She saw her dietician and is going to work on some weight loss.   Alley stated, \"I'm just bored, I don't like my life.\" But then proceeded to say she she is going out again tomorrow. OSW re-framed her current activities with her together. She currently walks every day with her neighbor, she is making plans to go out a few times a week with friends, she was invited to go to [employer] to see another friend, and her step-daughter purchased a ticket for her to go to an opera in December. She stated, \"I don't know if I will be well enough to even go to that.\" OSW explained that she is right, however it is is something nice to look forward to. Suggested that even though she is not as busy and active as she once was before her MBC, she seems to be getting out more, and to consider looking at these shorter/smaller activities as \"wins.\" She agreed. Noted she is taking Paxil as prescribed by palliative care provider.   OSW provided emotional support and active listening. Alley stated she had to leave to head to her lunch date, but was thankful for call. She will see SW colleague in palliative care next week. Will plan on another outreach in a few weeks.  "

## 2025-07-28 ENCOUNTER — SOCIAL WORK (OUTPATIENT)
Dept: PALLIATIVE MEDICINE | Facility: CLINIC | Age: 80
End: 2025-07-28

## 2025-07-28 DIAGNOSIS — Z71.89 COUNSELING AND COORDINATION OF CARE: Primary | ICD-10-CM

## 2025-07-28 PROCEDURE — NC001 PR NO CHARGE

## 2025-08-01 NOTE — ASSESSMENT & PLAN NOTE
"CT Abdomen Pelvis showed: \"Bilateral hydronephrosis, moderate on the left and mild on the right with no hydroureter. Most likely, this is due to bilateral UPJ stenoses.  Left-sided hydronephrosis and two small nonobstructing left renal calculi.\"  Creatinine is within normal limits    Plan:  Continue PO hydration.  Continue to monitor.     " 1 or 2

## 2025-08-04 ENCOUNTER — CLINICAL SUPPORT (OUTPATIENT)
Dept: BARIATRICS | Facility: CLINIC | Age: 80
End: 2025-08-04

## 2025-08-04 VITALS — BODY MASS INDEX: 28.67 KG/M2 | HEIGHT: 58 IN | WEIGHT: 136.6 LBS

## 2025-08-04 DIAGNOSIS — R63.5 ABNORMAL WEIGHT GAIN: Primary | ICD-10-CM

## 2025-08-04 PROCEDURE — RECHECK

## 2025-08-04 PROCEDURE — WEIGHT

## 2025-08-09 PROBLEM — N63.23 UNSPECIFIED LUMP IN THE LEFT BREAST, LOWER OUTER QUADRANT: Status: ACTIVE | Noted: 2025-08-09

## 2025-08-09 PROBLEM — F32.9 MAJOR DEPRESSIVE DISORDER, SINGLE EPISODE, UNSPECIFIED: Status: ACTIVE | Noted: 2025-08-09

## 2025-08-09 PROBLEM — M76.61 ACHILLES TENDINITIS OF RIGHT LOWER EXTREMITY: Status: ACTIVE | Noted: 2025-08-09

## 2025-08-09 PROBLEM — M17.11 PRIMARY OSTEOARTHRITIS OF RIGHT KNEE: Status: ACTIVE | Noted: 2025-08-09

## 2025-08-09 PROBLEM — E78.00 PURE HYPERCHOLESTEROLEMIA: Status: ACTIVE | Noted: 2025-08-09

## 2025-08-09 PROBLEM — M19.019 PRIMARY OSTEOARTHRITIS, UNSPECIFIED SHOULDER: Status: ACTIVE | Noted: 2025-08-09

## 2025-08-09 PROBLEM — M47.22 SPONDYLOSIS OF CERVICAL SPINE WITH RADICULOPATHY: Status: ACTIVE | Noted: 2025-08-09

## 2025-08-09 PROBLEM — F33.41 MAJOR DEPRESSIVE DISORDER, RECURRENT, IN PARTIAL REMISSION (HCC): Status: ACTIVE | Noted: 2025-08-09

## 2025-08-09 PROBLEM — M17.12 UNILATERAL PRIMARY OSTEOARTHRITIS, LEFT KNEE: Status: ACTIVE | Noted: 2025-08-09

## 2025-08-09 PROBLEM — M19.011 PRIMARY OSTEOARTHRITIS, RIGHT SHOULDER: Status: ACTIVE | Noted: 2025-08-09

## 2025-08-09 PROBLEM — M76.62 ACHILLES TENDINITIS OF LEFT LOWER EXTREMITY: Status: ACTIVE | Noted: 2025-08-09

## 2025-08-09 PROBLEM — H46.9 UNSPECIFIED OPTIC NEURITIS: Status: ACTIVE | Noted: 2025-08-09

## 2025-08-14 ENCOUNTER — APPOINTMENT (OUTPATIENT)
Dept: LAB | Facility: CLINIC | Age: 80
End: 2025-08-14
Payer: MEDICARE

## 2025-08-15 ENCOUNTER — PATIENT OUTREACH (OUTPATIENT)
Dept: CASE MANAGEMENT | Facility: OTHER | Age: 80
End: 2025-08-15

## 2025-08-18 ENCOUNTER — CLINICAL SUPPORT (OUTPATIENT)
Dept: BARIATRICS | Facility: CLINIC | Age: 80
End: 2025-08-18

## 2025-08-18 VITALS — HEIGHT: 58 IN | WEIGHT: 136.2 LBS | BODY MASS INDEX: 28.59 KG/M2

## 2025-08-18 DIAGNOSIS — R63.5 ABNORMAL WEIGHT GAIN: Primary | ICD-10-CM

## 2025-08-18 PROCEDURE — RECHECK

## 2025-08-18 PROCEDURE — WEIGHT

## 2025-08-21 ENCOUNTER — OFFICE VISIT (OUTPATIENT)
Dept: FAMILY MEDICINE CLINIC | Facility: HOSPITAL | Age: 80
End: 2025-08-21
Payer: MEDICARE

## 2025-08-21 VITALS
HEART RATE: 78 BPM | HEIGHT: 58 IN | WEIGHT: 139 LBS | BODY MASS INDEX: 29.18 KG/M2 | DIASTOLIC BLOOD PRESSURE: 72 MMHG | OXYGEN SATURATION: 98 % | SYSTOLIC BLOOD PRESSURE: 110 MMHG

## 2025-08-21 DIAGNOSIS — C50.212 MALIGNANT NEOPLASM OF UPPER-INNER QUADRANT OF LEFT BREAST IN FEMALE, ESTROGEN RECEPTOR POSITIVE (HCC): ICD-10-CM

## 2025-08-21 DIAGNOSIS — E03.9 ACQUIRED HYPOTHYROIDISM: ICD-10-CM

## 2025-08-21 DIAGNOSIS — M80.08XS AGE-RELATED OSTEOPOROSIS WITH CURRENT PATHOLOGICAL FRACTURE OF VERTEBRA, SEQUELA: ICD-10-CM

## 2025-08-21 DIAGNOSIS — F51.01 PRIMARY INSOMNIA: ICD-10-CM

## 2025-08-21 DIAGNOSIS — Z17.0 MALIGNANT NEOPLASM OF UPPER-INNER QUADRANT OF LEFT BREAST IN FEMALE, ESTROGEN RECEPTOR POSITIVE (HCC): ICD-10-CM

## 2025-08-21 DIAGNOSIS — R19.7 DIARRHEA, UNSPECIFIED TYPE: Primary | ICD-10-CM

## 2025-08-21 DIAGNOSIS — E78.00 PURE HYPERCHOLESTEROLEMIA: ICD-10-CM

## 2025-08-21 PROCEDURE — 99214 OFFICE O/P EST MOD 30 MIN: CPT | Performed by: INTERNAL MEDICINE

## 2025-08-21 PROCEDURE — G2211 COMPLEX E/M VISIT ADD ON: HCPCS | Performed by: INTERNAL MEDICINE
